# Patient Record
Sex: MALE | Race: WHITE | Employment: OTHER | URBAN - METROPOLITAN AREA
[De-identification: names, ages, dates, MRNs, and addresses within clinical notes are randomized per-mention and may not be internally consistent; named-entity substitution may affect disease eponyms.]

---

## 2019-07-04 ENCOUNTER — APPOINTMENT (OUTPATIENT)
Dept: CT IMAGING | Age: 69
End: 2019-07-04

## 2019-07-04 ENCOUNTER — HOSPITAL ENCOUNTER (EMERGENCY)
Age: 69
Discharge: HOME | End: 2019-07-05
Attending: EMERGENCY MEDICINE

## 2019-07-04 DIAGNOSIS — R42 VERTIGO: Primary | ICD-10-CM

## 2019-07-04 DIAGNOSIS — R42 DIZZINESS: ICD-10-CM

## 2019-07-04 DIAGNOSIS — R73.9 HYPERGLYCEMIA: ICD-10-CM

## 2019-07-04 LAB
A/G RATIO: 1 (ref 1.1–2.2)
ALBUMIN SERPL-MCNC: 3.7 G/DL (ref 3.4–5)
ALP BLD-CCNC: 70 U/L (ref 40–129)
ALT SERPL-CCNC: 16 U/L (ref 10–40)
ANION GAP SERPL CALCULATED.3IONS-SCNC: 15 MMOL/L (ref 3–16)
AST SERPL-CCNC: 11 U/L (ref 15–37)
BASOPHILS ABSOLUTE: 0 K/UL (ref 0–0.2)
BASOPHILS RELATIVE PERCENT: 0.3 %
BILIRUB SERPL-MCNC: 0.3 MG/DL (ref 0–1)
BUN BLDV-MCNC: 17 MG/DL (ref 7–20)
CALCIUM SERPL-MCNC: 9.1 MG/DL (ref 8.3–10.6)
CHLORIDE BLD-SCNC: 99 MMOL/L (ref 99–110)
CO2: 24 MMOL/L (ref 21–32)
CREAT SERPL-MCNC: 1.2 MG/DL (ref 0.8–1.3)
EOSINOPHILS ABSOLUTE: 0.3 K/UL (ref 0–0.6)
EOSINOPHILS RELATIVE PERCENT: 4.1 %
GFR AFRICAN AMERICAN: >60
GFR NON-AFRICAN AMERICAN: >60
GLOBULIN: 3.8 G/DL
GLUCOSE BLD-MCNC: 230 MG/DL (ref 70–99)
HCT VFR BLD CALC: 41.3 % (ref 40.5–52.5)
HEMOGLOBIN: 14 G/DL (ref 13.5–17.5)
INR BLD: 1.07 (ref 0.86–1.14)
LYMPHOCYTES ABSOLUTE: 1.6 K/UL (ref 1–5.1)
LYMPHOCYTES RELATIVE PERCENT: 20.9 %
MAGNESIUM: 1.9 MG/DL (ref 1.8–2.4)
MCH RBC QN AUTO: 30.1 PG (ref 26–34)
MCHC RBC AUTO-ENTMCNC: 34 G/DL (ref 31–36)
MCV RBC AUTO: 88.7 FL (ref 80–100)
MONOCYTES ABSOLUTE: 0.8 K/UL (ref 0–1.3)
MONOCYTES RELATIVE PERCENT: 10.2 %
NEUTROPHILS ABSOLUTE: 5 K/UL (ref 1.7–7.7)
NEUTROPHILS RELATIVE PERCENT: 64.5 %
PDW BLD-RTO: 13.2 % (ref 12.4–15.4)
PLATELET # BLD: 187 K/UL (ref 135–450)
PMV BLD AUTO: 8.4 FL (ref 5–10.5)
POTASSIUM SERPL-SCNC: 4.1 MMOL/L (ref 3.5–5.1)
PROTHROMBIN TIME: 12.2 SEC (ref 9.8–13)
RBC # BLD: 4.65 M/UL (ref 4.2–5.9)
SODIUM BLD-SCNC: 138 MMOL/L (ref 136–145)
TOTAL PROTEIN: 7.5 G/DL (ref 6.4–8.2)
TROPONIN: <0.01 NG/ML
WBC # BLD: 7.8 K/UL (ref 4–11)

## 2019-07-04 PROCEDURE — 6360000004 HC RX CONTRAST MEDICATION: Performed by: EMERGENCY MEDICINE

## 2019-07-04 PROCEDURE — 83735 ASSAY OF MAGNESIUM: CPT

## 2019-07-04 PROCEDURE — 93005 ELECTROCARDIOGRAM TRACING: CPT | Performed by: EMERGENCY MEDICINE

## 2019-07-04 PROCEDURE — 70498 CT ANGIOGRAPHY NECK: CPT

## 2019-07-04 PROCEDURE — 80053 COMPREHEN METABOLIC PANEL: CPT

## 2019-07-04 PROCEDURE — 84484 ASSAY OF TROPONIN QUANT: CPT

## 2019-07-04 PROCEDURE — 6360000002 HC RX W HCPCS: Performed by: EMERGENCY MEDICINE

## 2019-07-04 PROCEDURE — 99284 EMERGENCY DEPT VISIT MOD MDM: CPT

## 2019-07-04 PROCEDURE — 70496 CT ANGIOGRAPHY HEAD: CPT

## 2019-07-04 PROCEDURE — 85610 PROTHROMBIN TIME: CPT

## 2019-07-04 PROCEDURE — 96374 THER/PROPH/DIAG INJ IV PUSH: CPT

## 2019-07-04 PROCEDURE — 85025 COMPLETE CBC W/AUTO DIFF WBC: CPT

## 2019-07-04 PROCEDURE — 6370000000 HC RX 637 (ALT 250 FOR IP): Performed by: EMERGENCY MEDICINE

## 2019-07-04 PROCEDURE — 70450 CT HEAD/BRAIN W/O DYE: CPT

## 2019-07-04 RX ORDER — MECLIZINE HYDROCHLORIDE 25 MG/1
25 TABLET ORAL 3 TIMES DAILY PRN
Status: DISCONTINUED | OUTPATIENT
Start: 2019-07-04 | End: 2019-07-05 | Stop reason: HOSPADM

## 2019-07-04 RX ORDER — ONDANSETRON 2 MG/ML
4 INJECTION INTRAMUSCULAR; INTRAVENOUS ONCE
Status: COMPLETED | OUTPATIENT
Start: 2019-07-04 | End: 2019-07-04

## 2019-07-04 RX ADMIN — ONDANSETRON 4 MG: 2 INJECTION INTRAMUSCULAR; INTRAVENOUS at 22:44

## 2019-07-04 RX ADMIN — IOPAMIDOL 80 ML: 755 INJECTION, SOLUTION INTRAVENOUS at 23:12

## 2019-07-04 RX ADMIN — MECLIZINE HYDROCHLORIDE 25 MG: 25 TABLET ORAL at 22:44

## 2019-07-04 SDOH — HEALTH STABILITY: MENTAL HEALTH: HOW OFTEN DO YOU HAVE A DRINK CONTAINING ALCOHOL?: NEVER

## 2019-07-05 VITALS
DIASTOLIC BLOOD PRESSURE: 79 MMHG | HEIGHT: 78 IN | BODY MASS INDEX: 30.08 KG/M2 | WEIGHT: 260 LBS | TEMPERATURE: 98 F | OXYGEN SATURATION: 99 % | SYSTOLIC BLOOD PRESSURE: 135 MMHG | HEART RATE: 88 BPM | RESPIRATION RATE: 16 BRPM

## 2019-07-05 LAB
EKG ATRIAL RATE: 100 BPM
EKG DIAGNOSIS: NORMAL
EKG P AXIS: 71 DEGREES
EKG P-R INTERVAL: 178 MS
EKG Q-T INTERVAL: 380 MS
EKG QRS DURATION: 76 MS
EKG QTC CALCULATION (BAZETT): 490 MS
EKG R AXIS: 64 DEGREES
EKG T AXIS: 55 DEGREES
EKG VENTRICULAR RATE: 100 BPM

## 2019-07-05 PROCEDURE — 93010 ELECTROCARDIOGRAM REPORT: CPT | Performed by: INTERNAL MEDICINE

## 2019-07-05 RX ORDER — MECLIZINE HYDROCHLORIDE 25 MG/1
25 TABLET ORAL EVERY 6 HOURS PRN
Qty: 20 TABLET | Refills: 0 | Status: SHIPPED | OUTPATIENT
Start: 2019-07-05 | End: 2019-07-15

## 2019-07-05 RX ORDER — ONDANSETRON 4 MG/1
4 TABLET, FILM COATED ORAL EVERY 8 HOURS PRN
Qty: 15 TABLET | Refills: 0 | Status: SHIPPED | OUTPATIENT
Start: 2019-07-05 | End: 2019-07-15

## 2019-07-05 NOTE — ED PROVIDER NOTES
Food insecurity:     Worry: Not on file     Inability: Not on file    Transportation needs:     Medical: Not on file     Non-medical: Not on file   Tobacco Use    Smoking status: Former Smoker   Substance and Sexual Activity    Alcohol use: Never     Frequency: Never     Comment: rare    Drug use: Never    Sexual activity: Not on file   Lifestyle    Physical activity:     Days per week: Not on file     Minutes per session: Not on file    Stress: Not on file   Relationships    Social connections:     Talks on phone: Not on file     Gets together: Not on file     Attends Muslim service: Not on file     Active member of club or organization: Not on file     Attends meetings of clubs or organizations: Not on file     Relationship status: Not on file    Intimate partner violence:     Fear of current or ex partner: Not on file     Emotionally abused: Not on file     Physically abused: Not on file     Forced sexual activity: Not on file   Other Topics Concern    Not on file   Social History Narrative    Not on file       Nursing Notes Reviewed      ROS:  GENERAL:  No fever, no chills, no diaphoresis, no appetite changes  EYES: no eye discharge, no eye redness, no visual changes  ENT: no nasal congestion, no sore throat  CARDIAC: no chest pain, no palpitations, no leg swelling  PULM: no cough, no shortness of breath  ABD: no abdominal pain, + nausea, + vomiting, no diarrhea  : no dysuria, no hematuria, no urgency, no frequency. No flank pain  MUSCULOSKELETAL: no back pain, no arthralgias, no myalgias  NEURO: no headache, no lightheadedness, + dizziness, no numbness, no weakness, no syncope, no confusion, no speech difficulty  SKIN: no rashes, no erythema, no wounds, no ecchymosis      PHYSICAL EXAM:  GENERAL APPEARANCE: Zunilda Valera is in no acute respiratory distress. Awake and alert.   VITAL SIGNS:   ED Triage Vitals [07/04/19 2204]   Enc Vitals Group      BP (!) 147/84      Pulse 100      Resp 18 Temp 98 °F (36.7 °C)      Temp Source Oral      SpO2       Weight 260 lb (117.9 kg)      Height 6' 7\" (2.007 m)      Head Circumference       Peak Flow       Pain Score       Pain Loc       Pain Edu? Excl. in [address]? HEAD: Normocephalic, atraumatic. EYES:  Extraocular muscles are intact. Pupils equal round and reactive to light. Conjunctivas are pink. Negative scleral icterus. ENT:  Mucous membranes are moist.  Pharynx without erythema or exudates. NECK: Nontender and supple. No cervical adenopathy. CHEST:  Clear to auscultation bilaterally. No rales, rhonchi, or wheezing. HEART:  Regular rate and regular rhythm. No murmurs. Strong and equal pulses in the upper and lower extremities. ABDOMEN: Soft,  nondistended, positive bowel sounds. abdomen is nontender. No rebound. no guarding. MUSCULOSKELETAL: The calves are nontender to palpation. Active range of motion of the upper and lower extremities. No edema. NEUROLOGICAL: Awake, alert and oriented x 3. Power intact in the upper and lower extremities. Sensation is intact to light touch in the upper and lower extremities. Cranial Nerves 2-12 are intact. No truncal ataxia. No dysarthria or aphasia. Normal finger to nose. Mildly unsteady gait but able to ambulate without any assistance  NIH Stroke Scale     Interval: Baseline  Time: 12:33 AM  Person Administering Scale: BRANDY GONZALES   Administer stroke scale items in the order listed. Record performance in each category after each subscale exam. Do not go back and change scores. Follow directions provided for each exam technique. Scores should reflect what the patient does, not what the clinician thinks the patient can do. The clinician should record answers while administering the exam and work quickly. Except where indicated, the patient should not be coached (i.e., repeated requests to patient to make a special effort). 1a  Level of consciousness: 0=alert; keenly responsive   1b.  LOC questions:  0=Performs both tasks correctly   1c. LOC commands: 0=Performs both tasks correctly   2. Best Gaze: 0=normal   3. Visual: 0=No visual loss   4. Facial Palsy: 0=Normal symmetric movement   5a. Motor left arm: 0=No drift, limb holds 90 (or 45) degrees for full 10 seconds   5b. Motor right arm: 0=No drift, limb holds 90 (or 45) degrees for full 10 seconds   6a. motor left le=No drift, limb holds 90 (or 45) degrees for full 10 seconds   6b  Motor right le=No drift, limb holds 90 (or 45) degrees for full 10 seconds   7. Limb Ataxia: 1=Present in one limb   8. Sensory: 0=Normal; no sensory loss   9. Best Language:  0=No aphasia, normal   10. Dysarthria: 0=Normal   11. Extinction and Inattention: 0=No abnormality         Total:  0       DERMATOLOGIC: No petechiae, rashes, or ecchymoses. No erythema. PSYCH: normal mood and affect. Normal thought content. ED COURSE AND MEDICAL DECISION MAKING:    EKG as interpreted by myself:  normal sinus rhythm with a rate of 100  Axis is   Normal  QTc is  490ms  Intervals and Durations are unremarkable. No specific ST-T wave changes appreciated. No evidence of acute ischemia. No prior EKG    Radiology:  Films have been read by radiologist as noted in chart unless otherwise stated. Other radiologic studies (i.e. CT, MRI, ultrasounds, etc ) have been interpreted by radiologist.     [id number]   Final Result      1. Minimal atherosclerotic changes in the carotid bifurcations. No significant vascular abnormality otherwise. No significant arterial stenosis. 2. Incidental 5 mm nodule in the right upper lobe and 4 mm nodule in the left upper lobe. Following the Fleischner Society 2017 guidelines for single solid nodules <6 mm, if patient is low risk no routine follow-up is suggested unless suspicious    morphology or upper lobe location. If patient is high risk, then optional CT at 12 months is suggested.   qzxv            PROCEDURE: CT

## 2019-11-20 ENCOUNTER — HOSPITAL ENCOUNTER (EMERGENCY)
Age: 69
Discharge: HOME | DRG: 463 | End: 2019-11-20
Attending: EMERGENCY MEDICINE
Payer: MEDICAID

## 2019-11-20 ENCOUNTER — APPOINTMENT (OUTPATIENT)
Dept: CT IMAGING | Age: 69
DRG: 463 | End: 2019-11-20
Payer: MEDICAID

## 2019-11-20 ENCOUNTER — APPOINTMENT (OUTPATIENT)
Dept: GENERAL RADIOLOGY | Age: 69
DRG: 463 | End: 2019-11-20
Payer: MEDICAID

## 2019-11-20 ENCOUNTER — HOSPITAL ENCOUNTER (INPATIENT)
Age: 69
LOS: 1 days | Discharge: HOME | DRG: 463 | End: 2019-11-22
Attending: EMERGENCY MEDICINE | Admitting: INTERNAL MEDICINE
Payer: MEDICAID

## 2019-11-20 VITALS
BODY MASS INDEX: 34.65 KG/M2 | DIASTOLIC BLOOD PRESSURE: 85 MMHG | TEMPERATURE: 97.6 F | SYSTOLIC BLOOD PRESSURE: 142 MMHG | RESPIRATION RATE: 20 BRPM | WEIGHT: 270 LBS | HEART RATE: 102 BPM | HEIGHT: 74 IN | OXYGEN SATURATION: 100 %

## 2019-11-20 DIAGNOSIS — J90 BILATERAL PLEURAL EFFUSION: ICD-10-CM

## 2019-11-20 DIAGNOSIS — K59.00 CONSTIPATION, UNSPECIFIED CONSTIPATION TYPE: Primary | ICD-10-CM

## 2019-11-20 DIAGNOSIS — N30.01 ACUTE CYSTITIS WITH HEMATURIA: ICD-10-CM

## 2019-11-20 DIAGNOSIS — N13.9 URINARY OBSTRUCTION: Primary | ICD-10-CM

## 2019-11-20 DIAGNOSIS — B37.41 YEAST CYSTITIS: ICD-10-CM

## 2019-11-20 DIAGNOSIS — N21.0 URINARY BLADDER STONE: ICD-10-CM

## 2019-11-20 LAB
A/G RATIO: 1 (ref 1.1–2.2)
ALBUMIN SERPL-MCNC: 3.5 G/DL (ref 3.4–5)
ALP BLD-CCNC: 61 U/L (ref 40–129)
ALT SERPL-CCNC: 25 U/L (ref 10–40)
ANION GAP SERPL CALCULATED.3IONS-SCNC: 17 MMOL/L (ref 3–16)
AST SERPL-CCNC: 19 U/L (ref 15–37)
BACTERIA: ABNORMAL /HPF
BASOPHILS ABSOLUTE: 0 K/UL (ref 0–0.2)
BASOPHILS RELATIVE PERCENT: 0.4 %
BILIRUB SERPL-MCNC: 0.7 MG/DL (ref 0–1)
BILIRUBIN URINE: NEGATIVE
BLOOD, URINE: ABNORMAL
BUN BLDV-MCNC: 17 MG/DL (ref 7–20)
CALCIUM SERPL-MCNC: 8.8 MG/DL (ref 8.3–10.6)
CHLORIDE BLD-SCNC: 97 MMOL/L (ref 99–110)
CLARITY: ABNORMAL
CO2: 23 MMOL/L (ref 21–32)
COLOR: YELLOW
CREAT SERPL-MCNC: 1.1 MG/DL (ref 0.8–1.3)
EOSINOPHILS ABSOLUTE: 0.3 K/UL (ref 0–0.6)
EOSINOPHILS RELATIVE PERCENT: 2.5 %
EPITHELIAL CELLS, UA: ABNORMAL /HPF
GFR AFRICAN AMERICAN: >60
GFR NON-AFRICAN AMERICAN: >60
GLOBULIN: 3.4 G/DL
GLUCOSE BLD-MCNC: 244 MG/DL (ref 70–99)
GLUCOSE URINE: NEGATIVE MG/DL
HCT VFR BLD CALC: 40.2 % (ref 40.5–52.5)
HEMOGLOBIN: 13.5 G/DL (ref 13.5–17.5)
KETONES, URINE: NEGATIVE MG/DL
LEUKOCYTE ESTERASE, URINE: ABNORMAL
LYMPHOCYTES ABSOLUTE: 1.3 K/UL (ref 1–5.1)
LYMPHOCYTES RELATIVE PERCENT: 13.1 %
MCH RBC QN AUTO: 30.2 PG (ref 26–34)
MCHC RBC AUTO-ENTMCNC: 33.6 G/DL (ref 31–36)
MCV RBC AUTO: 90.1 FL (ref 80–100)
MICROSCOPIC EXAMINATION: YES
MONOCYTES ABSOLUTE: 0.8 K/UL (ref 0–1.3)
MONOCYTES RELATIVE PERCENT: 7.8 %
NEUTROPHILS ABSOLUTE: 7.8 K/UL (ref 1.7–7.7)
NEUTROPHILS RELATIVE PERCENT: 76.2 %
NITRITE, URINE: NEGATIVE
PDW BLD-RTO: 13.5 % (ref 12.4–15.4)
PH UA: 5 (ref 5–8)
PLATELET # BLD: 184 K/UL (ref 135–450)
PMV BLD AUTO: 8.7 FL (ref 5–10.5)
POTASSIUM REFLEX MAGNESIUM: 3.9 MMOL/L (ref 3.5–5.1)
PRO-BNP: 2385 PG/ML (ref 0–124)
PROTEIN UA: 30 MG/DL
RBC # BLD: 4.46 M/UL (ref 4.2–5.9)
RBC UA: ABNORMAL /HPF (ref 0–2)
SODIUM BLD-SCNC: 137 MMOL/L (ref 136–145)
SPECIFIC GRAVITY UA: >=1.03 (ref 1–1.03)
TOTAL PROTEIN: 6.9 G/DL (ref 6.4–8.2)
URINE REFLEX TO CULTURE: YES
URINE TYPE: ABNORMAL
UROBILINOGEN, URINE: 0.2 E.U./DL
WBC # BLD: 10.3 K/UL (ref 4–11)
WBC UA: >100 /HPF (ref 0–5)
YEAST: PRESENT /HPF

## 2019-11-20 PROCEDURE — 71046 X-RAY EXAM CHEST 2 VIEWS: CPT

## 2019-11-20 PROCEDURE — 85025 COMPLETE CBC W/AUTO DIFF WBC: CPT

## 2019-11-20 PROCEDURE — 81001 URINALYSIS AUTO W/SCOPE: CPT

## 2019-11-20 PROCEDURE — 87086 URINE CULTURE/COLONY COUNT: CPT

## 2019-11-20 PROCEDURE — 74177 CT ABD & PELVIS W/CONTRAST: CPT

## 2019-11-20 PROCEDURE — 83880 ASSAY OF NATRIURETIC PEPTIDE: CPT

## 2019-11-20 PROCEDURE — 99284 EMERGENCY DEPT VISIT MOD MDM: CPT

## 2019-11-20 PROCEDURE — 80053 COMPREHEN METABOLIC PANEL: CPT

## 2019-11-20 PROCEDURE — 51702 INSERT TEMP BLADDER CATH: CPT

## 2019-11-20 PROCEDURE — 36415 COLL VENOUS BLD VENIPUNCTURE: CPT

## 2019-11-20 PROCEDURE — 6360000004 HC RX CONTRAST MEDICATION: Performed by: EMERGENCY MEDICINE

## 2019-11-20 RX ORDER — MAGNESIUM CITRATE
150 SOLUTION, ORAL ORAL ONCE
Qty: 1 BOTTLE | Refills: 0 | Status: SHIPPED | OUTPATIENT
Start: 2019-11-20 | End: 2019-11-22 | Stop reason: HOSPADM

## 2019-11-20 RX ORDER — BENZONATATE 100 MG/1
100 CAPSULE ORAL 3 TIMES DAILY PRN
Qty: 30 CAPSULE | Refills: 0 | Status: SHIPPED | OUTPATIENT
Start: 2019-11-20 | End: 2019-11-27

## 2019-11-20 RX ORDER — DOCUSATE SODIUM 100 MG/1
100 CAPSULE, LIQUID FILLED ORAL 2 TIMES DAILY
Qty: 20 CAPSULE | Refills: 0 | Status: SHIPPED | OUTPATIENT
Start: 2019-11-20 | End: 2019-11-30

## 2019-11-20 RX ADMIN — IOPAMIDOL 100 ML: 755 INJECTION, SOLUTION INTRAVENOUS at 22:21

## 2019-11-21 PROBLEM — N32.0 BLADDER OBSTRUCTION: Status: ACTIVE | Noted: 2019-11-21

## 2019-11-21 LAB
ANION GAP SERPL CALCULATED.3IONS-SCNC: 14 MMOL/L (ref 3–16)
BASOPHILS ABSOLUTE: 0 K/UL (ref 0–0.2)
BASOPHILS RELATIVE PERCENT: 0.3 %
BUN BLDV-MCNC: 16 MG/DL (ref 7–20)
CALCIUM SERPL-MCNC: 8.3 MG/DL (ref 8.3–10.6)
CHLORIDE BLD-SCNC: 100 MMOL/L (ref 99–110)
CO2: 23 MMOL/L (ref 21–32)
CREAT SERPL-MCNC: 1 MG/DL (ref 0.8–1.3)
EOSINOPHILS ABSOLUTE: 0.2 K/UL (ref 0–0.6)
EOSINOPHILS RELATIVE PERCENT: 1.9 %
GFR AFRICAN AMERICAN: >60
GFR NON-AFRICAN AMERICAN: >60
GLUCOSE BLD-MCNC: 179 MG/DL (ref 70–99)
GLUCOSE BLD-MCNC: 198 MG/DL (ref 70–99)
GLUCOSE BLD-MCNC: 203 MG/DL (ref 70–99)
GLUCOSE BLD-MCNC: 222 MG/DL (ref 70–99)
GLUCOSE BLD-MCNC: 236 MG/DL (ref 70–99)
HCT VFR BLD CALC: 38.9 % (ref 40.5–52.5)
HEMOGLOBIN: 12.9 G/DL (ref 13.5–17.5)
LYMPHOCYTES ABSOLUTE: 1.1 K/UL (ref 1–5.1)
LYMPHOCYTES RELATIVE PERCENT: 12 %
MCH RBC QN AUTO: 30.7 PG (ref 26–34)
MCHC RBC AUTO-ENTMCNC: 33.2 G/DL (ref 31–36)
MCV RBC AUTO: 92.7 FL (ref 80–100)
MONOCYTES ABSOLUTE: 0.8 K/UL (ref 0–1.3)
MONOCYTES RELATIVE PERCENT: 8.9 %
NEUTROPHILS ABSOLUTE: 6.9 K/UL (ref 1.7–7.7)
NEUTROPHILS RELATIVE PERCENT: 76.9 %
PDW BLD-RTO: 13.4 % (ref 12.4–15.4)
PERFORMED ON: ABNORMAL
PLATELET # BLD: 180 K/UL (ref 135–450)
PMV BLD AUTO: 9.6 FL (ref 5–10.5)
POTASSIUM REFLEX MAGNESIUM: 4 MMOL/L (ref 3.5–5.1)
RBC # BLD: 4.19 M/UL (ref 4.2–5.9)
SODIUM BLD-SCNC: 137 MMOL/L (ref 136–145)
WBC # BLD: 9 K/UL (ref 4–11)

## 2019-11-21 PROCEDURE — 80048 BASIC METABOLIC PNL TOTAL CA: CPT

## 2019-11-21 PROCEDURE — 6360000002 HC RX W HCPCS: Performed by: EMERGENCY MEDICINE

## 2019-11-21 PROCEDURE — 6360000002 HC RX W HCPCS: Performed by: FAMILY MEDICINE

## 2019-11-21 PROCEDURE — 6370000000 HC RX 637 (ALT 250 FOR IP): Performed by: FAMILY MEDICINE

## 2019-11-21 PROCEDURE — 6360000002 HC RX W HCPCS: Performed by: INTERNAL MEDICINE

## 2019-11-21 PROCEDURE — 1200000000 HC SEMI PRIVATE

## 2019-11-21 PROCEDURE — C8923 2D TTE W OR W/O FOL W/CON,CO: HCPCS

## 2019-11-21 PROCEDURE — 2580000003 HC RX 258: Performed by: INTERNAL MEDICINE

## 2019-11-21 PROCEDURE — 2580000003 HC RX 258: Performed by: EMERGENCY MEDICINE

## 2019-11-21 PROCEDURE — 96374 THER/PROPH/DIAG INJ IV PUSH: CPT

## 2019-11-21 PROCEDURE — 2500000003 HC RX 250 WO HCPCS: Performed by: INTERNAL MEDICINE

## 2019-11-21 PROCEDURE — 36415 COLL VENOUS BLD VENIPUNCTURE: CPT

## 2019-11-21 PROCEDURE — 6360000004 HC RX CONTRAST MEDICATION: Performed by: INTERNAL MEDICINE

## 2019-11-21 PROCEDURE — 6370000000 HC RX 637 (ALT 250 FOR IP): Performed by: EMERGENCY MEDICINE

## 2019-11-21 PROCEDURE — 85025 COMPLETE CBC W/AUTO DIFF WBC: CPT

## 2019-11-21 RX ORDER — SODIUM CHLORIDE 0.9 % (FLUSH) 0.9 %
10 SYRINGE (ML) INJECTION PRN
Status: DISCONTINUED | OUTPATIENT
Start: 2019-11-21 | End: 2019-11-22 | Stop reason: HOSPADM

## 2019-11-21 RX ORDER — INSULIN LISPRO 100 [IU]/ML
0-6 INJECTION, SOLUTION INTRAVENOUS; SUBCUTANEOUS NIGHTLY
Status: DISCONTINUED | OUTPATIENT
Start: 2019-11-21 | End: 2019-11-22 | Stop reason: HOSPADM

## 2019-11-21 RX ORDER — ACETAMINOPHEN 325 MG/1
650 TABLET ORAL EVERY 4 HOURS PRN
Status: DISCONTINUED | OUTPATIENT
Start: 2019-11-21 | End: 2019-11-22 | Stop reason: HOSPADM

## 2019-11-21 RX ORDER — SODIUM CHLORIDE 0.9 % (FLUSH) 0.9 %
10 SYRINGE (ML) INJECTION EVERY 12 HOURS SCHEDULED
Status: DISCONTINUED | OUTPATIENT
Start: 2019-11-21 | End: 2019-11-22 | Stop reason: HOSPADM

## 2019-11-21 RX ORDER — FLUCONAZOLE 150 MG/1
150 TABLET ORAL ONCE
Status: COMPLETED | OUTPATIENT
Start: 2019-11-21 | End: 2019-11-21

## 2019-11-21 RX ORDER — ONDANSETRON 2 MG/ML
4 INJECTION INTRAMUSCULAR; INTRAVENOUS EVERY 4 HOURS PRN
Status: DISCONTINUED | OUTPATIENT
Start: 2019-11-21 | End: 2019-11-22 | Stop reason: HOSPADM

## 2019-11-21 RX ORDER — SODIUM CHLORIDE 9 MG/ML
INJECTION, SOLUTION INTRAVENOUS CONTINUOUS
Status: DISCONTINUED | OUTPATIENT
Start: 2019-11-21 | End: 2019-11-22 | Stop reason: HOSPADM

## 2019-11-21 RX ORDER — INSULIN LISPRO 100 [IU]/ML
0-12 INJECTION, SOLUTION INTRAVENOUS; SUBCUTANEOUS
Status: DISCONTINUED | OUTPATIENT
Start: 2019-11-21 | End: 2019-11-22 | Stop reason: HOSPADM

## 2019-11-21 RX ORDER — FUROSEMIDE 10 MG/ML
20 INJECTION INTRAMUSCULAR; INTRAVENOUS ONCE
Status: COMPLETED | OUTPATIENT
Start: 2019-11-21 | End: 2019-11-21

## 2019-11-21 RX ADMIN — ENOXAPARIN SODIUM 40 MG: 40 INJECTION SUBCUTANEOUS at 10:25

## 2019-11-21 RX ADMIN — DEXTROSE MONOHYDRATE 1 G: 5 INJECTION INTRAVENOUS at 00:02

## 2019-11-21 RX ADMIN — Medication 10 ML: at 20:24

## 2019-11-21 RX ADMIN — FLUCONAZOLE 150 MG: 150 TABLET ORAL at 00:14

## 2019-11-21 RX ADMIN — FAMOTIDINE 20 MG: 10 INJECTION INTRAVENOUS at 10:25

## 2019-11-21 RX ADMIN — INSULIN GLARGINE 15 UNITS: 100 INJECTION, SOLUTION SUBCUTANEOUS at 20:21

## 2019-11-21 RX ADMIN — INSULIN LISPRO 1 UNITS: 100 INJECTION, SOLUTION INTRAVENOUS; SUBCUTANEOUS at 20:21

## 2019-11-21 RX ADMIN — PERFLUTREN 2.2 MG: 6.52 INJECTION, SUSPENSION INTRAVENOUS at 14:17

## 2019-11-21 RX ADMIN — FAMOTIDINE 20 MG: 10 INJECTION INTRAVENOUS at 20:21

## 2019-11-21 RX ADMIN — INSULIN LISPRO 4 UNITS: 100 INJECTION, SOLUTION INTRAVENOUS; SUBCUTANEOUS at 16:27

## 2019-11-21 RX ADMIN — FUROSEMIDE 20 MG: 10 INJECTION, SOLUTION INTRAMUSCULAR; INTRAVENOUS at 15:38

## 2019-11-21 RX ADMIN — SODIUM CHLORIDE: 9 INJECTION, SOLUTION INTRAVENOUS at 03:21

## 2019-11-21 RX ADMIN — SODIUM CHLORIDE: 9 INJECTION, SOLUTION INTRAVENOUS at 16:26

## 2019-11-21 ASSESSMENT — ENCOUNTER SYMPTOMS
NAUSEA: 0
VOMITING: 0
VOICE CHANGE: 0
FACIAL SWELLING: 0
COLOR CHANGE: 0
BACK PAIN: 0
BLOOD IN STOOL: 0
TROUBLE SWALLOWING: 0
CONSTIPATION: 1
ABDOMINAL PAIN: 0
WHEEZING: 0
PHOTOPHOBIA: 0
STRIDOR: 0
SHORTNESS OF BREATH: 1

## 2019-11-22 VITALS
TEMPERATURE: 98.1 F | SYSTOLIC BLOOD PRESSURE: 126 MMHG | RESPIRATION RATE: 20 BRPM | WEIGHT: 270 LBS | HEIGHT: 74 IN | BODY MASS INDEX: 34.65 KG/M2 | OXYGEN SATURATION: 98 % | HEART RATE: 134 BPM | DIASTOLIC BLOOD PRESSURE: 85 MMHG

## 2019-11-22 LAB
ANION GAP SERPL CALCULATED.3IONS-SCNC: 13 MMOL/L (ref 3–16)
BASOPHILS ABSOLUTE: 0 K/UL (ref 0–0.2)
BASOPHILS RELATIVE PERCENT: 0.4 %
BUN BLDV-MCNC: 13 MG/DL (ref 7–20)
CALCIUM SERPL-MCNC: 8.5 MG/DL (ref 8.3–10.6)
CHLORIDE BLD-SCNC: 106 MMOL/L (ref 99–110)
CO2: 26 MMOL/L (ref 21–32)
CREAT SERPL-MCNC: 1.1 MG/DL (ref 0.8–1.3)
EOSINOPHILS ABSOLUTE: 0.2 K/UL (ref 0–0.6)
EOSINOPHILS RELATIVE PERCENT: 3.2 %
GFR AFRICAN AMERICAN: >60
GFR NON-AFRICAN AMERICAN: >60
GLUCOSE BLD-MCNC: 177 MG/DL (ref 70–99)
GLUCOSE BLD-MCNC: 181 MG/DL (ref 70–99)
HCT VFR BLD CALC: 38.3 % (ref 40.5–52.5)
HEMOGLOBIN: 12.7 G/DL (ref 13.5–17.5)
LYMPHOCYTES ABSOLUTE: 1.2 K/UL (ref 1–5.1)
LYMPHOCYTES RELATIVE PERCENT: 15.3 %
MCH RBC QN AUTO: 31.2 PG (ref 26–34)
MCHC RBC AUTO-ENTMCNC: 33.2 G/DL (ref 31–36)
MCV RBC AUTO: 93.8 FL (ref 80–100)
MONOCYTES ABSOLUTE: 0.8 K/UL (ref 0–1.3)
MONOCYTES RELATIVE PERCENT: 9.9 %
NEUTROPHILS ABSOLUTE: 5.6 K/UL (ref 1.7–7.7)
NEUTROPHILS RELATIVE PERCENT: 71.2 %
PDW BLD-RTO: 13.6 % (ref 12.4–15.4)
PERFORMED ON: ABNORMAL
PLATELET # BLD: 173 K/UL (ref 135–450)
PMV BLD AUTO: 9 FL (ref 5–10.5)
POTASSIUM REFLEX MAGNESIUM: 4.5 MMOL/L (ref 3.5–5.1)
RBC # BLD: 4.08 M/UL (ref 4.2–5.9)
SODIUM BLD-SCNC: 145 MMOL/L (ref 136–145)
URINE CULTURE, ROUTINE: NORMAL
WBC # BLD: 7.8 K/UL (ref 4–11)

## 2019-11-22 PROCEDURE — 36415 COLL VENOUS BLD VENIPUNCTURE: CPT

## 2019-11-22 PROCEDURE — 2500000003 HC RX 250 WO HCPCS: Performed by: INTERNAL MEDICINE

## 2019-11-22 PROCEDURE — 80048 BASIC METABOLIC PNL TOTAL CA: CPT

## 2019-11-22 PROCEDURE — 2580000003 HC RX 258: Performed by: INTERNAL MEDICINE

## 2019-11-22 PROCEDURE — 85025 COMPLETE CBC W/AUTO DIFF WBC: CPT

## 2019-11-22 PROCEDURE — 6360000002 HC RX W HCPCS: Performed by: INTERNAL MEDICINE

## 2019-11-22 RX ORDER — CIPROFLOXACIN 500 MG/1
500 TABLET, FILM COATED ORAL 2 TIMES DAILY
Qty: 14 TABLET | Refills: 0 | Status: SHIPPED | OUTPATIENT
Start: 2019-11-22 | End: 2019-11-29

## 2019-11-22 RX ADMIN — ENOXAPARIN SODIUM 40 MG: 40 INJECTION SUBCUTANEOUS at 08:37

## 2019-11-22 RX ADMIN — FAMOTIDINE 20 MG: 10 INJECTION INTRAVENOUS at 08:37

## 2019-11-22 RX ADMIN — INSULIN LISPRO 2 UNITS: 100 INJECTION, SOLUTION INTRAVENOUS; SUBCUTANEOUS at 08:35

## 2019-11-22 RX ADMIN — INSULIN LISPRO 2 UNITS: 100 INJECTION, SOLUTION INTRAVENOUS; SUBCUTANEOUS at 12:42

## 2019-11-22 RX ADMIN — CEFTRIAXONE 1 G: 1 INJECTION, POWDER, FOR SOLUTION INTRAMUSCULAR; INTRAVENOUS at 00:59

## 2019-11-22 ASSESSMENT — PAIN SCALES - GENERAL: PAINLEVEL_OUTOF10: 0

## 2019-12-06 ENCOUNTER — OFFICE VISIT (OUTPATIENT)
Dept: CARDIOLOGY CLINIC | Age: 69
End: 2019-12-06
Payer: MEDICAID

## 2019-12-06 ENCOUNTER — TELEPHONE (OUTPATIENT)
Dept: CARDIOLOGY CLINIC | Age: 69
End: 2019-12-06

## 2019-12-06 VITALS
WEIGHT: 260 LBS | DIASTOLIC BLOOD PRESSURE: 70 MMHG | SYSTOLIC BLOOD PRESSURE: 110 MMHG | HEART RATE: 120 BPM | BODY MASS INDEX: 33.38 KG/M2

## 2019-12-06 DIAGNOSIS — I48.91 NEW ONSET A-FIB (HCC): ICD-10-CM

## 2019-12-06 DIAGNOSIS — I42.0 DILATED CARDIOMYOPATHY (HCC): ICD-10-CM

## 2019-12-06 DIAGNOSIS — R94.31 ABNORMAL EKG: ICD-10-CM

## 2019-12-06 DIAGNOSIS — Z01.818 PRE-OP EVALUATION: Primary | ICD-10-CM

## 2019-12-06 PROCEDURE — G8427 DOCREV CUR MEDS BY ELIG CLIN: HCPCS | Performed by: INTERNAL MEDICINE

## 2019-12-06 PROCEDURE — 99204 OFFICE O/P NEW MOD 45 MIN: CPT | Performed by: INTERNAL MEDICINE

## 2019-12-06 PROCEDURE — 1111F DSCHRG MED/CURRENT MED MERGE: CPT | Performed by: INTERNAL MEDICINE

## 2019-12-06 PROCEDURE — G8417 CALC BMI ABV UP PARAM F/U: HCPCS | Performed by: INTERNAL MEDICINE

## 2019-12-06 PROCEDURE — 3017F COLORECTAL CA SCREEN DOC REV: CPT | Performed by: INTERNAL MEDICINE

## 2019-12-06 PROCEDURE — 93000 ELECTROCARDIOGRAM COMPLETE: CPT | Performed by: INTERNAL MEDICINE

## 2019-12-06 PROCEDURE — 1123F ACP DISCUSS/DSCN MKR DOCD: CPT | Performed by: INTERNAL MEDICINE

## 2019-12-06 PROCEDURE — G8484 FLU IMMUNIZE NO ADMIN: HCPCS | Performed by: INTERNAL MEDICINE

## 2019-12-06 PROCEDURE — 4040F PNEUMOC VAC/ADMIN/RCVD: CPT | Performed by: INTERNAL MEDICINE

## 2019-12-06 PROCEDURE — 1036F TOBACCO NON-USER: CPT | Performed by: INTERNAL MEDICINE

## 2019-12-06 ASSESSMENT — ENCOUNTER SYMPTOMS
ABDOMINAL DISTENTION: 0
CHEST TIGHTNESS: 0
CHOKING: 0
SHORTNESS OF BREATH: 0
COUGH: 0
APNEA: 0

## 2019-12-10 ENCOUNTER — HOSPITAL ENCOUNTER (OUTPATIENT)
Dept: CARDIAC CATH/INVASIVE PROCEDURES | Age: 69
Setting detail: OBSERVATION
Discharge: HOME | End: 2019-12-11
Attending: INTERNAL MEDICINE | Admitting: INTERNAL MEDICINE
Payer: MEDICAID

## 2019-12-10 DIAGNOSIS — Z98.890 S/P CORONARY ANGIOGRAM: ICD-10-CM

## 2019-12-10 DIAGNOSIS — I48.91 NEW ONSET A-FIB (HCC): ICD-10-CM

## 2019-12-10 DIAGNOSIS — I42.0 DILATED CARDIOMYOPATHY (HCC): Primary | ICD-10-CM

## 2019-12-10 DIAGNOSIS — Z01.818 PRE-OP EVALUATION: ICD-10-CM

## 2019-12-10 DIAGNOSIS — N32.0 BLADDER OBSTRUCTION: ICD-10-CM

## 2019-12-10 LAB
ANION GAP SERPL CALCULATED.3IONS-SCNC: 15 MMOL/L (ref 3–16)
BUN BLDV-MCNC: 21 MG/DL (ref 7–20)
CALCIUM SERPL-MCNC: 8.7 MG/DL (ref 8.3–10.6)
CHLORIDE BLD-SCNC: 98 MMOL/L (ref 99–110)
CO2: 23 MMOL/L (ref 21–32)
CREAT SERPL-MCNC: 1.2 MG/DL (ref 0.8–1.3)
EKG ATRIAL RATE: 85 BPM
EKG DIAGNOSIS: NORMAL
EKG P AXIS: 63 DEGREES
EKG P-R INTERVAL: 178 MS
EKG Q-T INTERVAL: 418 MS
EKG QRS DURATION: 82 MS
EKG QTC CALCULATION (BAZETT): 497 MS
EKG R AXIS: 65 DEGREES
EKG T AXIS: 76 DEGREES
EKG VENTRICULAR RATE: 85 BPM
GFR AFRICAN AMERICAN: >60
GFR NON-AFRICAN AMERICAN: >60
GLUCOSE BLD-MCNC: 232 MG/DL (ref 70–99)
HCT VFR BLD CALC: 45.9 % (ref 40.5–52.5)
HEMOGLOBIN: 15.1 G/DL (ref 13.5–17.5)
INR BLD: 1.12 (ref 0.86–1.14)
LEFT VENTRICULAR EJECTION FRACTION MODE: NORMAL
LV EF: 25 %
LV EF: 30 %
MCH RBC QN AUTO: 29.9 PG (ref 26–34)
MCHC RBC AUTO-ENTMCNC: 32.9 G/DL (ref 31–36)
MCV RBC AUTO: 90.9 FL (ref 80–100)
PDW BLD-RTO: 13.6 % (ref 12.4–15.4)
PLATELET # BLD: 242 K/UL (ref 135–450)
PMV BLD AUTO: 9.2 FL (ref 5–10.5)
POTASSIUM SERPL-SCNC: 4.1 MMOL/L (ref 3.5–5.1)
PROTHROMBIN TIME: 13 SEC (ref 10–13.2)
RBC # BLD: 5.05 M/UL (ref 4.2–5.9)
SODIUM BLD-SCNC: 136 MMOL/L (ref 136–145)
WBC # BLD: 8.5 K/UL (ref 4–11)

## 2019-12-10 PROCEDURE — 93458 L HRT ARTERY/VENTRICLE ANGIO: CPT | Performed by: INTERNAL MEDICINE

## 2019-12-10 PROCEDURE — 93458 L HRT ARTERY/VENTRICLE ANGIO: CPT

## 2019-12-10 PROCEDURE — C1760 CLOSURE DEV, VASC: HCPCS

## 2019-12-10 PROCEDURE — 99152 MOD SED SAME PHYS/QHP 5/>YRS: CPT

## 2019-12-10 PROCEDURE — 93010 ELECTROCARDIOGRAM REPORT: CPT | Performed by: INTERNAL MEDICINE

## 2019-12-10 PROCEDURE — 93005 ELECTROCARDIOGRAM TRACING: CPT | Performed by: INTERNAL MEDICINE

## 2019-12-10 PROCEDURE — 85610 PROTHROMBIN TIME: CPT

## 2019-12-10 PROCEDURE — C1769 GUIDE WIRE: HCPCS

## 2019-12-10 PROCEDURE — 2709999900 HC NON-CHARGEABLE SUPPLY

## 2019-12-10 PROCEDURE — C1894 INTRO/SHEATH, NON-LASER: HCPCS

## 2019-12-10 PROCEDURE — G0378 HOSPITAL OBSERVATION PER HR: HCPCS

## 2019-12-10 PROCEDURE — 80048 BASIC METABOLIC PNL TOTAL CA: CPT

## 2019-12-10 PROCEDURE — 85027 COMPLETE CBC AUTOMATED: CPT

## 2019-12-10 PROCEDURE — 6360000004 HC RX CONTRAST MEDICATION: Performed by: INTERNAL MEDICINE

## 2019-12-10 PROCEDURE — 2500000003 HC RX 250 WO HCPCS

## 2019-12-10 PROCEDURE — 6360000002 HC RX W HCPCS

## 2019-12-10 RX ORDER — SODIUM CHLORIDE 9 MG/ML
INJECTION, SOLUTION INTRAVENOUS CONTINUOUS
Status: ACTIVE | OUTPATIENT
Start: 2019-12-10 | End: 2019-12-10

## 2019-12-10 RX ORDER — ACETAMINOPHEN 325 MG/1
650 TABLET ORAL EVERY 4 HOURS PRN
Status: DISCONTINUED | OUTPATIENT
Start: 2019-12-10 | End: 2019-12-11 | Stop reason: HOSPADM

## 2019-12-10 RX ADMIN — IOPAMIDOL 150 ML: 755 INJECTION, SOLUTION INTRAVENOUS at 10:59

## 2019-12-10 ASSESSMENT — PAIN SCALES - GENERAL: PAINLEVEL_OUTOF10: 0

## 2019-12-11 VITALS
RESPIRATION RATE: 18 BRPM | WEIGHT: 250.22 LBS | SYSTOLIC BLOOD PRESSURE: 140 MMHG | HEIGHT: 74 IN | HEART RATE: 99 BPM | TEMPERATURE: 98.3 F | BODY MASS INDEX: 32.11 KG/M2 | DIASTOLIC BLOOD PRESSURE: 91 MMHG | OXYGEN SATURATION: 98 %

## 2019-12-11 LAB
CHOLESTEROL, TOTAL: 160 MG/DL (ref 0–199)
HDLC SERPL-MCNC: 33 MG/DL (ref 40–60)
LDL CHOLESTEROL CALCULATED: 83 MG/DL
TRIGL SERPL-MCNC: 220 MG/DL (ref 0–150)
VLDLC SERPL CALC-MCNC: 44 MG/DL

## 2019-12-11 PROCEDURE — 6370000000 HC RX 637 (ALT 250 FOR IP): Performed by: NURSE PRACTITIONER

## 2019-12-11 PROCEDURE — 80061 LIPID PANEL: CPT

## 2019-12-11 PROCEDURE — G0378 HOSPITAL OBSERVATION PER HR: HCPCS

## 2019-12-11 PROCEDURE — 99217 PR OBSERVATION CARE DISCHARGE MANAGEMENT: CPT | Performed by: NURSE PRACTITIONER

## 2019-12-11 PROCEDURE — 36415 COLL VENOUS BLD VENIPUNCTURE: CPT

## 2019-12-11 RX ORDER — ASPIRIN 81 MG/1
81 TABLET, CHEWABLE ORAL DAILY
Status: DISCONTINUED | OUTPATIENT
Start: 2019-12-11 | End: 2019-12-11 | Stop reason: HOSPADM

## 2019-12-11 RX ORDER — ATORVASTATIN CALCIUM 40 MG/1
40 TABLET, FILM COATED ORAL NIGHTLY
Qty: 30 TABLET | Refills: 0 | Status: SHIPPED
Start: 2019-12-11 | End: 2020-02-12 | Stop reason: ALTCHOICE

## 2019-12-11 RX ORDER — ASPIRIN 81 MG/1
81 TABLET, CHEWABLE ORAL DAILY
Qty: 30 TABLET | Refills: 0 | Status: SHIPPED | OUTPATIENT
Start: 2019-12-11 | End: 2019-12-11

## 2019-12-11 RX ORDER — ATORVASTATIN CALCIUM 40 MG/1
40 TABLET, FILM COATED ORAL NIGHTLY
Status: DISCONTINUED | OUTPATIENT
Start: 2019-12-11 | End: 2019-12-11 | Stop reason: HOSPADM

## 2019-12-11 RX ORDER — ATORVASTATIN CALCIUM 40 MG/1
40 TABLET, FILM COATED ORAL NIGHTLY
Qty: 30 TABLET | Refills: 0 | Status: SHIPPED | OUTPATIENT
Start: 2019-12-11 | End: 2019-12-11

## 2019-12-11 RX ORDER — ASPIRIN 81 MG/1
81 TABLET, CHEWABLE ORAL DAILY
Qty: 30 TABLET | Refills: 0 | Status: SHIPPED | OUTPATIENT
Start: 2019-12-11 | End: 2020-01-03 | Stop reason: SDUPTHER

## 2019-12-11 RX ORDER — FUROSEMIDE 20 MG/1
20 TABLET ORAL DAILY
Qty: 30 TABLET | Refills: 0 | Status: SHIPPED | OUTPATIENT
Start: 2019-12-11 | End: 2019-12-11 | Stop reason: SDUPTHER

## 2019-12-11 RX ORDER — FUROSEMIDE 20 MG/1
20 TABLET ORAL DAILY
Qty: 30 TABLET | Refills: 0 | Status: SHIPPED | OUTPATIENT
Start: 2019-12-11 | End: 2020-02-12 | Stop reason: ALTCHOICE

## 2019-12-11 RX ADMIN — ASPIRIN 81 MG 81 MG: 81 TABLET ORAL at 10:08

## 2019-12-11 RX ADMIN — NYSTATIN 500000 UNITS: 100000 SUSPENSION ORAL at 01:02

## 2019-12-11 RX ADMIN — APIXABAN 5 MG: 5 TABLET, FILM COATED ORAL at 10:07

## 2019-12-11 RX ADMIN — METOPROLOL TARTRATE 25 MG: 25 TABLET ORAL at 10:07

## 2019-12-11 ASSESSMENT — PAIN SCALES - GENERAL
PAINLEVEL_OUTOF10: 0

## 2019-12-13 ENCOUNTER — TELEPHONE (OUTPATIENT)
Dept: CARDIOTHORACIC SURGERY | Age: 69
End: 2019-12-13

## 2019-12-18 ENCOUNTER — OFFICE VISIT (OUTPATIENT)
Dept: CARDIOLOGY CLINIC | Age: 69
End: 2019-12-18
Payer: MEDICAID

## 2019-12-18 VITALS
HEART RATE: 84 BPM | WEIGHT: 258.8 LBS | DIASTOLIC BLOOD PRESSURE: 70 MMHG | SYSTOLIC BLOOD PRESSURE: 110 MMHG | BODY MASS INDEX: 33.23 KG/M2

## 2019-12-18 DIAGNOSIS — I25.10 CORONARY ARTERY DISEASE INVOLVING NATIVE CORONARY ARTERY OF NATIVE HEART WITHOUT ANGINA PECTORIS: ICD-10-CM

## 2019-12-18 DIAGNOSIS — I48.91 NEW ONSET A-FIB (HCC): ICD-10-CM

## 2019-12-18 DIAGNOSIS — I42.0 DILATED CARDIOMYOPATHY (HCC): Primary | ICD-10-CM

## 2019-12-18 DIAGNOSIS — Z98.890 S/P CORONARY ANGIOGRAM: ICD-10-CM

## 2019-12-18 PROCEDURE — G8417 CALC BMI ABV UP PARAM F/U: HCPCS | Performed by: NURSE PRACTITIONER

## 2019-12-18 PROCEDURE — G8427 DOCREV CUR MEDS BY ELIG CLIN: HCPCS | Performed by: NURSE PRACTITIONER

## 2019-12-18 PROCEDURE — 99214 OFFICE O/P EST MOD 30 MIN: CPT | Performed by: NURSE PRACTITIONER

## 2019-12-18 PROCEDURE — 1036F TOBACCO NON-USER: CPT | Performed by: NURSE PRACTITIONER

## 2019-12-18 PROCEDURE — 3017F COLORECTAL CA SCREEN DOC REV: CPT | Performed by: NURSE PRACTITIONER

## 2019-12-18 PROCEDURE — 1111F DSCHRG MED/CURRENT MED MERGE: CPT | Performed by: NURSE PRACTITIONER

## 2019-12-18 PROCEDURE — G8598 ASA/ANTIPLAT THER USED: HCPCS | Performed by: NURSE PRACTITIONER

## 2019-12-18 PROCEDURE — G8484 FLU IMMUNIZE NO ADMIN: HCPCS | Performed by: NURSE PRACTITIONER

## 2019-12-18 PROCEDURE — 4040F PNEUMOC VAC/ADMIN/RCVD: CPT | Performed by: NURSE PRACTITIONER

## 2019-12-18 PROCEDURE — 1123F ACP DISCUSS/DSCN MKR DOCD: CPT | Performed by: NURSE PRACTITIONER

## 2019-12-18 RX ORDER — CIPROFLOXACIN 500 MG/1
500 TABLET, FILM COATED ORAL 2 TIMES DAILY
COMMUNITY
End: 2020-02-12 | Stop reason: ALTCHOICE

## 2019-12-23 ENCOUNTER — TELEPHONE (OUTPATIENT)
Dept: CARDIOTHORACIC SURGERY | Age: 69
End: 2019-12-23

## 2020-01-03 RX ORDER — ASPIRIN 81 MG/1
81 TABLET, CHEWABLE ORAL DAILY
Qty: 30 TABLET | Refills: 3 | Status: SHIPPED | OUTPATIENT
Start: 2020-01-03 | End: 2020-03-24 | Stop reason: SDUPTHER

## 2020-01-09 PROBLEM — Z01.818 PRE-OP EVALUATION: Status: RESOLVED | Noted: 2019-12-10 | Resolved: 2020-01-09

## 2020-01-28 ENCOUNTER — OFFICE VISIT (OUTPATIENT)
Dept: CARDIOLOGY CLINIC | Age: 70
End: 2020-01-28
Payer: MEDICAID

## 2020-01-28 VITALS
HEART RATE: 96 BPM | WEIGHT: 268 LBS | DIASTOLIC BLOOD PRESSURE: 70 MMHG | SYSTOLIC BLOOD PRESSURE: 130 MMHG | BODY MASS INDEX: 34.41 KG/M2

## 2020-01-28 PROCEDURE — G8484 FLU IMMUNIZE NO ADMIN: HCPCS | Performed by: INTERNAL MEDICINE

## 2020-01-28 PROCEDURE — G8417 CALC BMI ABV UP PARAM F/U: HCPCS | Performed by: INTERNAL MEDICINE

## 2020-01-28 PROCEDURE — 99214 OFFICE O/P EST MOD 30 MIN: CPT | Performed by: INTERNAL MEDICINE

## 2020-01-28 PROCEDURE — 4040F PNEUMOC VAC/ADMIN/RCVD: CPT | Performed by: INTERNAL MEDICINE

## 2020-01-28 PROCEDURE — 3017F COLORECTAL CA SCREEN DOC REV: CPT | Performed by: INTERNAL MEDICINE

## 2020-01-28 PROCEDURE — G8427 DOCREV CUR MEDS BY ELIG CLIN: HCPCS | Performed by: INTERNAL MEDICINE

## 2020-01-28 PROCEDURE — 1036F TOBACCO NON-USER: CPT | Performed by: INTERNAL MEDICINE

## 2020-01-28 PROCEDURE — 1123F ACP DISCUSS/DSCN MKR DOCD: CPT | Performed by: INTERNAL MEDICINE

## 2020-01-28 ASSESSMENT — ENCOUNTER SYMPTOMS
APNEA: 0
COUGH: 0
CHOKING: 0
SHORTNESS OF BREATH: 0
CHEST TIGHTNESS: 0
ABDOMINAL DISTENTION: 0

## 2020-01-28 NOTE — PROGRESS NOTES
of Systems   Constitutional: Negative for activity change and fatigue. Respiratory: Negative for apnea, cough, choking, chest tightness and shortness of breath. Cardiovascular: Negative for chest pain, palpitations and leg swelling. No PND or orthopnea. No tachycardia. Gastrointestinal: Negative for abdominal distention. Musculoskeletal: Negative for myalgias. Neurological: Negative for dizziness, syncope and light-headedness. Psychiatric/Behavioral: Negative for agitation, behavioral problems and confusion. All other systems reviewed and are negative. Objective:   Physical Exam  Constitutional:       General: He is not in acute distress. Appearance: Normal appearance. He is well-developed. HENT:      Head: Normocephalic. Right Ear: External ear normal.      Left Ear: External ear normal.   Neck:      Musculoskeletal: Normal range of motion and neck supple. Vascular: No JVD. Cardiovascular:      Rate and Rhythm: Tachycardia present. Rhythm irregular. Heart sounds: Normal heart sounds. No gallop. Pulmonary:      Effort: Pulmonary effort is normal. No respiratory distress. Breath sounds: Normal breath sounds. No wheezing or rales. Abdominal:      General: Bowel sounds are normal.      Palpations: Abdomen is soft. Tenderness: There is no abdominal tenderness. Musculoskeletal: Normal range of motion. General: Swelling present. Comments: Tr-+ ankle edema. Skin:     General: Skin is warm and dry. Neurological:      Mental Status: He is oriented to person, place, and time. Psychiatric:         Mood and Affect: Mood normal.         Behavior: Behavior normal.         Assessment:       Diagnosis Orders   1. CAD in native artery     2. Ischemic cardiomyopathy     3. Paroxysmal atrial fibrillation (HCC)             Plan:      He is relatively asx. Some LEE. Had afib but NSR at cath. .  Also with significant LV dysfunction.  On ASA, Lopressor 25 mg bid. To restart Eliquis bid since did not. To see CVTS. 6-8 wks.         Lorene Ryan MD

## 2020-02-12 ENCOUNTER — OFFICE VISIT (OUTPATIENT)
Dept: CARDIOTHORACIC SURGERY | Age: 70
End: 2020-02-12
Payer: MEDICAID

## 2020-02-12 VITALS
SYSTOLIC BLOOD PRESSURE: 118 MMHG | HEIGHT: 70 IN | TEMPERATURE: 98.1 F | BODY MASS INDEX: 38.08 KG/M2 | OXYGEN SATURATION: 100 % | HEART RATE: 87 BPM | DIASTOLIC BLOOD PRESSURE: 76 MMHG | WEIGHT: 266 LBS

## 2020-02-12 PROCEDURE — 4040F PNEUMOC VAC/ADMIN/RCVD: CPT | Performed by: THORACIC SURGERY (CARDIOTHORACIC VASCULAR SURGERY)

## 2020-02-12 PROCEDURE — G8484 FLU IMMUNIZE NO ADMIN: HCPCS | Performed by: THORACIC SURGERY (CARDIOTHORACIC VASCULAR SURGERY)

## 2020-02-12 PROCEDURE — G8417 CALC BMI ABV UP PARAM F/U: HCPCS | Performed by: THORACIC SURGERY (CARDIOTHORACIC VASCULAR SURGERY)

## 2020-02-12 PROCEDURE — 3017F COLORECTAL CA SCREEN DOC REV: CPT | Performed by: THORACIC SURGERY (CARDIOTHORACIC VASCULAR SURGERY)

## 2020-02-12 PROCEDURE — 99202 OFFICE O/P NEW SF 15 MIN: CPT | Performed by: THORACIC SURGERY (CARDIOTHORACIC VASCULAR SURGERY)

## 2020-02-12 PROCEDURE — 1123F ACP DISCUSS/DSCN MKR DOCD: CPT | Performed by: THORACIC SURGERY (CARDIOTHORACIC VASCULAR SURGERY)

## 2020-02-12 PROCEDURE — G8427 DOCREV CUR MEDS BY ELIG CLIN: HCPCS | Performed by: THORACIC SURGERY (CARDIOTHORACIC VASCULAR SURGERY)

## 2020-02-12 PROCEDURE — 1036F TOBACCO NON-USER: CPT | Performed by: THORACIC SURGERY (CARDIOTHORACIC VASCULAR SURGERY)

## 2020-02-12 SDOH — HEALTH STABILITY: MENTAL HEALTH: HOW OFTEN DO YOU HAVE A DRINK CONTAINING ALCOHOL?: NOT ASKED

## 2020-02-12 ASSESSMENT — ENCOUNTER SYMPTOMS
SHORTNESS OF BREATH: 1
EYE PAIN: 0
DIARRHEA: 0
BACK PAIN: 0
ABDOMINAL PAIN: 0
APNEA: 0
EYE REDNESS: 0
CONSTIPATION: 0
BLOOD IN STOOL: 0
CHEST TIGHTNESS: 0
COUGH: 0
TROUBLE SWALLOWING: 0

## 2020-02-13 ENCOUNTER — TELEPHONE (OUTPATIENT)
Dept: CARDIOTHORACIC SURGERY | Age: 70
End: 2020-02-13

## 2020-02-17 NOTE — PROGRESS NOTES
[facility] / [facility] ([facility]) [address], [facility] [place] [address]    Acknowledgment of Informed Consent for Surgical or Medical Procedure and Sedation  I agree to allow doctor(S) Amina Gage and his/her associates or assistants, including residents and/or other qualified medical practitioner to perform the following medical treatment or procedure and to administer or direct the administration of sedation as necessary:  Procedure(s): CORONARY ARTERY BYPASS GRAFT X 3 INTERNAL MAMMARY AND SAPHENOUS VEIN GRAFT, TCP-BP WITH PULMONARY VEIN ISOLATION AND LEFT ARTRIAL APPENDAGE CLIP     My doctor has explained the following regarding the proposed procedure:   the explanation of the procedure   the benefits of the procedure   the potential problems that might occur during recuperation   the risks and side effects of the procedure which could include but are not limited to severe blood loss, infection, stroke or death   the benefits, risks and side effect of alternative procedures including the consequences of declining this procedure or any alternative procedures   the likelihood of achieving satisfactory results. I acknowledge no guarantee or assurance has been made to me regarding the results. I understand that during the course of this treatment/procedure, unforeseen conditions can occur which require an additional or different procedure. I agree to allow my physician or assistants to perform such extension of the original procedure as they may find necessary. I understand that sedation will often result in temporary impairment of memory and fine motor skills and that sedation can occasionally progress to a state of deep sedation or general anesthesia. I understand the risks of anesthesia for surgery include, but are not limited to, sore throat, hoarseness, injury to face, mouth, or teeth; nausea; headache; injury to blood vessels or nerves; death, brain damage, or paralysis.     I understand that if I have a Limitation of Treatment order in effect during my hospitalization, the order may or may not be in effect during this procedure. I give my doctor permission to give me blood or blood products. I understand that there are risks with receiving blood such as hepatitis, AIDS, fever, or allergic reaction. I acknowledge that the risks, benefits, and alternatives of this treatment have been explained to me and that no express or implied warranty has been given by the hospital, any blood bank, or any person or entity as to the blood or blood components transfused. At the discretion of my doctor, I agree to allow observers, equipment/product representatives and allow photographing, and/or televising of the procedure, provided my name or identity is maintained confidentially. I agree the hospital may dispose of or use for scientific or educational purposes any tissue, fluid, or body parts which may be removed.     ________________________________Date________Time______ am/pm  ([place])  Patient or Signature of Closest Relative or Legal Guardian    ________________________________Date________Time______am/pm      Page 1 of  1  Witness

## 2020-02-26 ENCOUNTER — HOSPITAL ENCOUNTER (OUTPATIENT)
Dept: PREADMISSION TESTING | Age: 70
Discharge: HOME | End: 2020-03-01
Payer: MEDICAID

## 2020-02-26 ENCOUNTER — ANESTHESIA EVENT (OUTPATIENT)
Dept: OPERATING ROOM | Age: 70
DRG: 166 | End: 2020-02-26
Payer: MEDICAID

## 2020-02-26 ENCOUNTER — HOSPITAL ENCOUNTER (OUTPATIENT)
Dept: GENERAL RADIOLOGY | Age: 70
Discharge: HOME | End: 2020-02-26
Payer: MEDICAID

## 2020-02-26 VITALS
SYSTOLIC BLOOD PRESSURE: 141 MMHG | DIASTOLIC BLOOD PRESSURE: 69 MMHG | TEMPERATURE: 97 F | BODY MASS INDEX: 38.65 KG/M2 | RESPIRATION RATE: 16 BRPM | WEIGHT: 270 LBS | HEIGHT: 70 IN | OXYGEN SATURATION: 97 % | HEART RATE: 102 BPM

## 2020-02-26 LAB
A/G RATIO: 1 (ref 1.1–2.2)
ABO/RH: NORMAL
ALBUMIN SERPL-MCNC: 3.6 G/DL (ref 3.4–5)
ALP BLD-CCNC: 63 U/L (ref 40–129)
ALT SERPL-CCNC: 12 U/L (ref 10–40)
ANION GAP SERPL CALCULATED.3IONS-SCNC: 16 MMOL/L (ref 3–16)
ANTIBODY SCREEN: NORMAL
APTT: 34.2 SEC (ref 24.2–36.2)
AST SERPL-CCNC: 13 U/L (ref 15–37)
BACTERIA: ABNORMAL /HPF
BILIRUB SERPL-MCNC: 0.6 MG/DL (ref 0–1)
BILIRUBIN URINE: NEGATIVE
BLOOD, URINE: ABNORMAL
BUN BLDV-MCNC: 22 MG/DL (ref 7–20)
CALCIUM SERPL-MCNC: 8.9 MG/DL (ref 8.3–10.6)
CHLORIDE BLD-SCNC: 94 MMOL/L (ref 99–110)
CLARITY: ABNORMAL
CO2: 21 MMOL/L (ref 21–32)
COLOR: YELLOW
CREAT SERPL-MCNC: 1.3 MG/DL (ref 0.8–1.3)
EKG ATRIAL RATE: 102 BPM
EKG DIAGNOSIS: NORMAL
EKG P AXIS: 67 DEGREES
EKG P-R INTERVAL: 166 MS
EKG Q-T INTERVAL: 356 MS
EKG QRS DURATION: 80 MS
EKG QTC CALCULATION (BAZETT): 463 MS
EKG R AXIS: 88 DEGREES
EKG T AXIS: 59 DEGREES
EKG VENTRICULAR RATE: 102 BPM
EPITHELIAL CELLS, UA: ABNORMAL /HPF (ref 0–5)
FIBRINOGEN: 338 MG/DL (ref 200–397)
GFR AFRICAN AMERICAN: >60
GFR NON-AFRICAN AMERICAN: 55
GLOBULIN: 3.7 G/DL
GLUCOSE BLD-MCNC: 325 MG/DL (ref 70–99)
GLUCOSE URINE: NEGATIVE MG/DL
HCT VFR BLD CALC: 43.5 % (ref 40.5–52.5)
HEMOGLOBIN: 14.6 G/DL (ref 13.5–17.5)
INR BLD: 1.14 (ref 0.86–1.14)
KETONES, URINE: NEGATIVE MG/DL
LEUKOCYTE ESTERASE, URINE: ABNORMAL
MAGNESIUM: 1.8 MG/DL (ref 1.8–2.4)
MCH RBC QN AUTO: 29.9 PG (ref 26–34)
MCHC RBC AUTO-ENTMCNC: 33.6 G/DL (ref 31–36)
MCV RBC AUTO: 89 FL (ref 80–100)
MICROSCOPIC EXAMINATION: YES
MUCUS: ABNORMAL /LPF
NITRITE, URINE: NEGATIVE
PDW BLD-RTO: 14 % (ref 12.4–15.4)
PH UA: 6.5 (ref 5–8)
PLATELET # BLD: 178 K/UL (ref 135–450)
PMV BLD AUTO: 8.4 FL (ref 5–10.5)
POTASSIUM SERPL-SCNC: 4 MMOL/L (ref 3.5–5.1)
PROTEIN UA: 30 MG/DL
PROTHROMBIN TIME: 13.3 SEC (ref 10–13.2)
RBC # BLD: 4.89 M/UL (ref 4.2–5.9)
RBC UA: ABNORMAL /HPF (ref 0–4)
SODIUM BLD-SCNC: 131 MMOL/L (ref 136–145)
SPECIFIC GRAVITY UA: 1.02 (ref 1–1.03)
TOTAL PROTEIN: 7.3 G/DL (ref 6.4–8.2)
URINE TYPE: ABNORMAL
UROBILINOGEN, URINE: 0.2 E.U./DL
WBC # BLD: 7.1 K/UL (ref 4–11)
WBC UA: >100 /HPF (ref 0–5)

## 2020-02-26 PROCEDURE — 85610 PROTHROMBIN TIME: CPT

## 2020-02-26 PROCEDURE — 86850 RBC ANTIBODY SCREEN: CPT

## 2020-02-26 PROCEDURE — 85730 THROMBOPLASTIN TIME PARTIAL: CPT

## 2020-02-26 PROCEDURE — 80053 COMPREHEN METABOLIC PANEL: CPT

## 2020-02-26 PROCEDURE — 87086 URINE CULTURE/COLONY COUNT: CPT

## 2020-02-26 PROCEDURE — 83036 HEMOGLOBIN GLYCOSYLATED A1C: CPT

## 2020-02-26 PROCEDURE — 85027 COMPLETE CBC AUTOMATED: CPT

## 2020-02-26 PROCEDURE — 71046 X-RAY EXAM CHEST 2 VIEWS: CPT

## 2020-02-26 PROCEDURE — 93010 ELECTROCARDIOGRAM REPORT: CPT | Performed by: INTERNAL MEDICINE

## 2020-02-26 PROCEDURE — 81001 URINALYSIS AUTO W/SCOPE: CPT

## 2020-02-26 PROCEDURE — 83735 ASSAY OF MAGNESIUM: CPT

## 2020-02-26 PROCEDURE — 85384 FIBRINOGEN ACTIVITY: CPT

## 2020-02-26 PROCEDURE — 93005 ELECTROCARDIOGRAM TRACING: CPT | Performed by: THORACIC SURGERY (CARDIOTHORACIC VASCULAR SURGERY)

## 2020-02-26 PROCEDURE — 87641 MR-STAPH DNA AMP PROBE: CPT

## 2020-02-26 PROCEDURE — 86900 BLOOD TYPING SEROLOGIC ABO: CPT

## 2020-02-26 PROCEDURE — 86901 BLOOD TYPING SEROLOGIC RH(D): CPT

## 2020-02-26 ASSESSMENT — LIFESTYLE VARIABLES: SMOKING_STATUS: 0

## 2020-02-26 NOTE — PROGRESS NOTES
Patient will not have anyone at hospital DOS or any support after discharge.  Discussed with Tres Fields

## 2020-02-26 NOTE — PROGRESS NOTES
[facility]                          Date of Procedure3/2     PRIOR TO PROCEDURE DATE:  1. Please follow any guidelines/instructions prior to your procedure as advised by your surgeon. 2. Arrange for someone to drive you home and be with you for the first 24 hours after discharge for your safety after your procedure for which you received sedation. Ensure it is someone we can share information with regarding your discharge. 3. You must contact your surgeon for instructions IF:   You are taking any blood thinners, aspirin, anti-inflammatory or vitamin E.   There is a change in your physical condition such as a cold, fever, rash, cuts, sores or any other infection, especially near your surgical site. 4. Do not drink alcohol the day before or day of your procedure. 5. A Pre-op History and Physical for surgery MUST be completed by your Physician or Urgent Care within 30 days of your procedure date. Please bring a copy with you on the day of your procedure and along with any other testing performed. THE DAY OF YOUR PROCEDURE:  1. Follow instructions for ARRIVAL TIME as DIRECTED BY YOUR SURGEON. 2. Enter the MAIN entrance from [employer] and follow the signs to the free [employer] or [employer] parking (offered free of charge 6am-5pm). 3. Enter the Main Entrance of the hospital (do NOT enter from the lower level of the parking garage). Upon entrance, check in with the  at the main desk on your left. If no one is available at the desk, proceed into the [facility] Waiting Room and go through the door directly into the [facility]. There is a Check-in desk ACROSS from Room 5 (marked with a sign hanging from the ceiling). The phone number for the surgery center is [phone].    4. DO NOT EAT ANYTHING eight hours prior to surgery.   May have 8 ounces of water 4 hours prior to surgery (exception would be medication instructions below only)     5. MEDICATIONS    Take the following medications with a SMALL sip of water:  METOPROLOL    Use your usual dose of inhalers the morning of surgery. BRING your rescue inhaler with you to hospital.    Anesthesia does NOT want you to take insulin the morning of surgery. They will control your blood sugar while you are at the hospital. Please contact your ordering physician for instructions regarding your insulin the night before your procedure. If you have an insulin pump, please keep it set on basal rate. 6.  Do not swallow water when brushing teeth. No gum, candy, mints or ice chips. Refrain from smoking or at least decrease the amount. 7.  Dress in loose, comfortable clothing appropriate for redressing after your procedure. Do not wear jewelry (including body piercings), make-up (especially NO eye make-up), fingernail polish (NO toenail polish if foot/leg surgery), lotion, powders or metal hairclips. 8.  Dentures, glasses, or contacts will need to be removed before your procedure. Bring cases for your glasses, contacts, dentures, or hearing aids to protect them while you are in surgery. 9.  If you use a CPAP, please bring it with you on the day of your procedure. 10. We recommend that valuable personal  belongings, such as cash, cell phones, e-tablets or jewelry, be left at home during your stay. The hospital will not be responsible for valuables that are not secured in the hospital safe. However, if your insurance requires a co-pay, you may want to bring a method of payment, i.e. Check or credit card, if you wish to pay your co-pay the day of surgery. 11. If you are to stay overnight, you may bring a bag with personal items. Please have any large items you may need brought in by your family after your arrival to your hospital room. 12. If you have a Living Will or Durable Power of , please bring a copy on the day of your procedure.      13. With your permission, one family member may accompany you while you are being prepared for surgery. Once you are ready, additional family members may join you. HOW WE KEEP YOU SAFE and WORK TO PREVENT SURGICAL SITE INFECTIONS:  1. Health care workers should always check your ID bracelet to verify your name and birth date. You will be asked many times to state your name, date of birth, and allergies. 2. Health care workers should always clean their hands with soap or alcohol gel before providing care to you. It is okay to ask anyone if they cleaned their hands before they touch you. 3. You will be actively involved in verifying the type of procedure you are having and ensuring the correct surgical site. This will be confirmed multiple times prior to your procedure. Do NOT eileen your surgery site UNLESS instructed to by your surgeon. 4. Do not shave or wax for 72 hours prior to procedure near your operative site. Shaving with a razor can irritate your skin and make it easier to develop an infection. On the day of your procedure, any hair that needs to be removed near the surgical site will be clipped by a healthcare worker using a special clippers designed to avoid skin irritation. 5. When you are in the operating room, your surgical site will be cleansed with a special soap, and in most cases, you will be given an antibiotic before the surgery begins. What to expect AFTER YOUR PROCEDURE:  1. Immediately following your procedure, your will be taken to the PACU for the first phase of your recovery. Your nurse will help you recover from any potential side effects of anesthesia, such as extreme drowsiness, changes in your vital signs or breathing patterns. Nausea, headache, muscle aches, or sore throat may also occur after anesthesia. Your nurse will help you manage these potential side effects. 2. For comfort and safety, arrange to have someone at home with you for the first 24 hours after discharge.     3. You and

## 2020-02-26 NOTE — ANESTHESIA PRE PROCEDURE
P70.665       Past Medical History:        Diagnosis Date    A-fib [facility]) 2020    CAD (coronary artery disease)     Diabetes mellitus ([address]     diet controlled     Enlarged prostate     Kidney stones        Past Surgical History:        Procedure Laterality Date    CARDIAC CATHETERIZATION  12/10/2019    NASAL POLYP SURGERY  x2    NASAL SEPTUM SURGERY         Social History:    Social History     Tobacco Use    Smoking status: Never Smoker    Smokeless tobacco: Never Used   Substance Use Topics    Alcohol use: Not Currently     Comment: rare                                Counseling given: Not Answered      Vital Signs (Current): There were no vitals filed for this visit. BP Readings from Last 3 Encounters:   02/26/20 (!) 147/78   02/12/20 118/76   01/28/20 130/70       NPO Status:                                                                                 BMI:   Wt Readings from Last 3 Encounters:   02/26/20 270 lb (122.5 kg)   02/12/20 266 lb (120.7 kg)   01/28/20 268 lb (121.6 kg)     There is no height or weight on file to calculate BMI.    CBC:   Lab Results   Component Value Date    WBC 7.1 02/26/2020    RBC 4.89 02/26/2020    HGB 14.6 02/26/2020    HCT 43.5 02/26/2020    MCV 89.0 02/26/2020    RDW 14.0 02/26/2020     02/26/2020       CMP:   Lab Results   Component Value Date     12/10/2019    K 4.1 12/10/2019    K 4.5 11/22/2019    CL 98 12/10/2019    CO2 23 12/10/2019    BUN 21 12/10/2019    CREATININE 1.2 12/10/2019    GFRAA >60 12/10/2019    AGRATIO 1.0 11/20/2019    LABGLOM >60 12/10/2019    GLUCOSE 232 12/10/2019    PROT 6.9 11/20/2019    CALCIUM 8.7 12/10/2019    BILITOT 0.7 11/20/2019    ALKPHOS 61 11/20/2019    AST 19 11/20/2019    ALT 25 11/20/2019       POC Tests: No results for input(s): POCGLU, POCNA, POCK, POCCL, POCBUN, POCHEMO, POCHCT in the last 72 hours.     Coags:   Lab Results   Component Value Date    PROTIME 13.3 02/26/2020    INR 1.14 02/26/2020    APTT 34.2 02/26/2020       HCG (If Applicable): No results found for: PREGTESTUR, PREGSERUM, HCG, HCGQUANT     ABGs: No results found for: PHART, PO2ART, GVY8IJL, RYT7QMF, BEART, O0RJBMWS     Type & Screen (If Applicable):  No results found for: LABABO, [address]    Anesthesia Evaluation  Patient summary reviewed  Airway: Mallampati: III  TM distance: >3 FB   Neck ROM: full  Mouth opening: > = 3 FB Dental:          Pulmonary: breath sounds clear to auscultation      (-) COPD, asthma, sleep apnea and not a current smoker                           Cardiovascular:  Exercise tolerance: good (>4 METS),   (+) angina: at rest, CAD: obstructive, dysrhythmias: atrial fibrillation, CHF: systolic, hyperlipidemia    (-) hypertension    ECG reviewed  Rhythm: regular  Rate: normal  Echocardiogram reviewed         Beta Blocker:  Dose within 24 Hrs      ROS comment: EF 25-30%     Neuro/Psych:      (-) seizures, TIA and CVA           GI/Hepatic/Renal:        (-) GERD, PUD, no renal disease, bowel prep and no morbid obesity       Endo/Other:    (+) DiabetesType II DM, , no malignancy/cancer. (-) hypothyroidism, hyperthyroidism, no malignancy/cancer               Abdominal:           Vascular:                                        Anesthesia Plan      general     ASA 4       Induction: intravenous. arterial line, central line, CVP, PA catheter and WAYEN  MIPS: Postoperative opioids intended and Prophylactic antiemetics administered. Anesthetic plan and risks discussed with patient. Use of blood products discussed with patient whom consented to blood products.                    Clotilde Soto MD   2/26/2020

## 2020-02-26 NOTE — PROGRESS NOTES
PRE-OP CARDIAC SURGERY TEACHING    Reviewed the upcoming operation with patient. Patient educated, using the teach back method. Reviewed the open heart teaching binder with the patient, specifically patient plan of care, goals and expectations, diet and exercise, medications and discharge instructions. Instructed that after surgery, nurses will be making frequent skin assessments particularly at the bony prominences. Gave patient prescription for Bactroban Nasal ointment 2% 1 gram with instructions for administration. Hibiclens was also given with instructions for administration pre-operatively. Instructed to take beta blocker (metoprolol) with a small sip of water prior to coming in to the hospital the morning of surgery. His last dose of apixaban was 2/25/2020. He lives alone and needs either ARU or SNF at discharge. He is agreeable to this. He is trying to figure out how to get to the hospital the day of surgery. Instructed to call Medicaid to see if they may help him. Have discussed this with  to see if there are ways to help him come to the hospital since he is by himself. All patient questions were answered pertaining to upcoming surgery and had no further questions.      Mili Salcedo, EVELYN  2/26/2020  9:52 AM

## 2020-02-27 LAB
ESTIMATED AVERAGE GLUCOSE: 177.2 MG/DL
HBA1C MFR BLD: 7.8 %
MRSA SCREEN RT-PCR: NORMAL
ORGANISM: ABNORMAL
URINE CULTURE, ROUTINE: ABNORMAL
URINE CULTURE, ROUTINE: ABNORMAL

## 2020-03-02 ENCOUNTER — HOSPITAL ENCOUNTER (INPATIENT)
Age: 70
LOS: 9 days | Discharge: NURSING, REHAB OR LONG-TERM CARE | DRG: 166 | End: 2020-03-11
Attending: THORACIC SURGERY (CARDIOTHORACIC VASCULAR SURGERY) | Admitting: THORACIC SURGERY (CARDIOTHORACIC VASCULAR SURGERY)
Payer: MEDICAID

## 2020-03-02 ENCOUNTER — APPOINTMENT (OUTPATIENT)
Dept: GENERAL RADIOLOGY | Age: 70
DRG: 166 | End: 2020-03-02
Attending: THORACIC SURGERY (CARDIOTHORACIC VASCULAR SURGERY)
Payer: MEDICAID

## 2020-03-02 ENCOUNTER — ANESTHESIA (OUTPATIENT)
Dept: OPERATING ROOM | Age: 70
DRG: 166 | End: 2020-03-02
Payer: MEDICAID

## 2020-03-02 VITALS
RESPIRATION RATE: 15 BRPM | DIASTOLIC BLOOD PRESSURE: 60 MMHG | TEMPERATURE: 99.1 F | OXYGEN SATURATION: 100 % | SYSTOLIC BLOOD PRESSURE: 117 MMHG

## 2020-03-02 PROBLEM — B37.41: Status: ACTIVE | Noted: 2020-03-02

## 2020-03-02 PROBLEM — I25.10 CAD IN NATIVE ARTERY: Status: ACTIVE | Noted: 2020-03-02

## 2020-03-02 PROBLEM — K59.00 CONSTIPATION: Status: ACTIVE | Noted: 2020-03-02

## 2020-03-02 PROBLEM — N13.9 URINARY TRACT OBSTRUCTION: Status: ACTIVE | Noted: 2020-03-02

## 2020-03-02 PROBLEM — N30.00 ACUTE CYSTITIS: Status: ACTIVE | Noted: 2020-03-02

## 2020-03-02 PROBLEM — J90 BILATERAL PLEURAL EFFUSION: Status: ACTIVE | Noted: 2020-03-02

## 2020-03-02 LAB
ABO/RH: NORMAL
ACTIVATED CLOTTING TIME: 111 SEC (ref 99–130)
ACTIVATED CLOTTING TIME: 120 SEC (ref 99–130)
ACTIVATED CLOTTING TIME: 426 SEC (ref 99–130)
ACTIVATED CLOTTING TIME: 429 SEC (ref 99–130)
ACTIVATED CLOTTING TIME: 451 SEC (ref 99–130)
ACTIVATED CLOTTING TIME: 467 SEC (ref 99–130)
ACTIVATED CLOTTING TIME: 473 SEC (ref 99–130)
ACTIVATED CLOTTING TIME: 480 SEC (ref 99–130)
ANION GAP SERPL CALCULATED.3IONS-SCNC: 20 MMOL/L (ref 3–16)
ANTIBODY SCREEN: NORMAL
APTT: 30.7 SEC (ref 24.2–36.2)
APTT: 37.2 SEC (ref 24.2–36.2)
BACTERIA: ABNORMAL /HPF
BASE EXCESS ARTERIAL: -2 (ref -3–3)
BASE EXCESS ARTERIAL: -2 (ref -3–3)
BASE EXCESS ARTERIAL: -3 (ref -3–3)
BASE EXCESS ARTERIAL: -3 (ref -3–3)
BASE EXCESS ARTERIAL: -4 (ref -3–3)
BASE EXCESS ARTERIAL: -5 (ref -3–3)
BASE EXCESS ARTERIAL: -5 (ref -3–3)
BASE EXCESS ARTERIAL: -6 (ref -3–3)
BASE EXCESS ARTERIAL: -7 (ref -3–3)
BASE EXCESS ARTERIAL: 0 (ref -3–3)
BILIRUBIN URINE: NEGATIVE
BLOOD, URINE: ABNORMAL
BUN BLDV-MCNC: 22 MG/DL (ref 7–20)
CALCIUM IONIZED: 1.08 MMOL/L (ref 1.12–1.32)
CALCIUM IONIZED: 1.09 MMOL/L (ref 1.12–1.32)
CALCIUM IONIZED: 1.09 MMOL/L (ref 1.12–1.32)
CALCIUM IONIZED: 1.12 MMOL/L (ref 1.12–1.32)
CALCIUM IONIZED: 1.14 MMOL/L (ref 1.12–1.32)
CALCIUM IONIZED: 1.15 MMOL/L (ref 1.12–1.32)
CALCIUM IONIZED: 1.21 MMOL/L (ref 1.12–1.32)
CALCIUM IONIZED: 1.21 MMOL/L (ref 1.12–1.32)
CALCIUM IONIZED: 1.23 MMOL/L (ref 1.12–1.32)
CALCIUM IONIZED: 1.29 MMOL/L (ref 1.12–1.32)
CALCIUM SERPL-MCNC: 8.6 MG/DL (ref 8.3–10.6)
CHLORIDE BLD-SCNC: 101 MMOL/L (ref 99–110)
CHOLESTEROL, TOTAL: 172 MG/DL (ref 0–199)
CLARITY: CLEAR
CO2: 19 MMOL/L (ref 21–32)
COLOR: YELLOW
CREAT SERPL-MCNC: 1.4 MG/DL (ref 0.8–1.3)
GFR AFRICAN AMERICAN: >60
GFR NON-AFRICAN AMERICAN: 50
GLUCOSE BLD-MCNC: 168 MG/DL (ref 70–99)
GLUCOSE BLD-MCNC: 173 MG/DL (ref 70–99)
GLUCOSE BLD-MCNC: 182 MG/DL (ref 70–99)
GLUCOSE BLD-MCNC: 190 MG/DL (ref 70–99)
GLUCOSE BLD-MCNC: 197 MG/DL (ref 70–99)
GLUCOSE BLD-MCNC: 198 MG/DL (ref 70–99)
GLUCOSE BLD-MCNC: 200 MG/DL (ref 70–99)
GLUCOSE BLD-MCNC: 217 MG/DL (ref 70–99)
GLUCOSE BLD-MCNC: 232 MG/DL (ref 70–99)
GLUCOSE BLD-MCNC: 256 MG/DL (ref 70–99)
GLUCOSE BLD-MCNC: 265 MG/DL (ref 70–99)
GLUCOSE BLD-MCNC: 281 MG/DL (ref 70–99)
GLUCOSE BLD-MCNC: 287 MG/DL (ref 70–99)
GLUCOSE BLD-MCNC: 308 MG/DL (ref 70–99)
GLUCOSE BLD-MCNC: 310 MG/DL (ref 70–99)
GLUCOSE BLD-MCNC: 313 MG/DL (ref 70–99)
GLUCOSE BLD-MCNC: 338 MG/DL (ref 70–99)
GLUCOSE BLD-MCNC: 367 MG/DL (ref 70–99)
GLUCOSE BLD-MCNC: 375 MG/DL (ref 70–99)
GLUCOSE BLD-MCNC: 390 MG/DL (ref 70–99)
GLUCOSE BLD-MCNC: 428 MG/DL (ref 70–99)
GLUCOSE URINE: NEGATIVE MG/DL
HCO3 ARTERIAL: 20.4 MMOL/L (ref 21–29)
HCO3 ARTERIAL: 21.1 MMOL/L (ref 21–29)
HCO3 ARTERIAL: 21.9 MMOL/L (ref 21–29)
HCO3 ARTERIAL: 22.1 MMOL/L (ref 21–29)
HCO3 ARTERIAL: 22.2 MMOL/L (ref 21–29)
HCO3 ARTERIAL: 22.3 MMOL/L (ref 21–29)
HCO3 ARTERIAL: 22.6 MMOL/L (ref 21–29)
HCO3 ARTERIAL: 22.7 MMOL/L (ref 21–29)
HCO3 ARTERIAL: 22.7 MMOL/L (ref 21–29)
HCO3 ARTERIAL: 23.2 MMOL/L (ref 21–29)
HCO3 ARTERIAL: 23.4 MMOL/L (ref 21–29)
HCO3 ARTERIAL: 24.2 MMOL/L (ref 21–29)
HCO3 ARTERIAL: 24.5 MMOL/L (ref 21–29)
HCO3 ARTERIAL: 25.8 MMOL/L (ref 21–29)
HCT VFR BLD CALC: 41.9 % (ref 40.5–52.5)
HDLC SERPL-MCNC: 31 MG/DL (ref 40–60)
HEMOGLOBIN: 13.9 G/DL (ref 13.5–17.5)
INR BLD: 1.06 (ref 0.86–1.14)
KETONES, URINE: NEGATIVE MG/DL
LACTATE: 2.08 MMOL/L (ref 0.4–2)
LACTATE: 9.01 MMOL/L (ref 0.4–2)
LDL CHOLESTEROL CALCULATED: 94 MG/DL
LEUKOCYTE ESTERASE, URINE: ABNORMAL
MAGNESIUM: 2.5 MG/DL (ref 1.8–2.4)
MCH RBC QN AUTO: 29.8 PG (ref 26–34)
MCHC RBC AUTO-ENTMCNC: 33.2 G/DL (ref 31–36)
MCV RBC AUTO: 89.7 FL (ref 80–100)
MICROSCOPIC EXAMINATION: YES
NITRITE, URINE: NEGATIVE
O2 SAT, ARTERIAL: 100 % (ref 93–100)
O2 SAT, ARTERIAL: 96 % (ref 93–100)
O2 SAT, ARTERIAL: 97 % (ref 93–100)
O2 SAT, ARTERIAL: 98 % (ref 93–100)
O2 SAT, ARTERIAL: 98 % (ref 93–100)
O2 SAT, ARTERIAL: 99 % (ref 93–100)
PCO2 ARTERIAL: 40.8 MM HG (ref 35–45)
PCO2 ARTERIAL: 41.2 MM HG (ref 35–45)
PCO2 ARTERIAL: 42 MM HG (ref 35–45)
PCO2 ARTERIAL: 45.7 MM HG (ref 35–45)
PCO2 ARTERIAL: 47.1 MM HG (ref 35–45)
PCO2 ARTERIAL: 47.1 MM HG (ref 35–45)
PCO2 ARTERIAL: 47.6 MM HG (ref 35–45)
PCO2 ARTERIAL: 48.6 MM HG (ref 35–45)
PCO2 ARTERIAL: 49.3 MM HG (ref 35–45)
PCO2 ARTERIAL: 50.1 MM HG (ref 35–45)
PCO2 ARTERIAL: 50.6 MM HG (ref 35–45)
PCO2 ARTERIAL: 51 MM HG (ref 35–45)
PCO2 ARTERIAL: 51.1 MM HG (ref 35–45)
PCO2 ARTERIAL: 53.6 MM HG (ref 35–45)
PDW BLD-RTO: 13.9 % (ref 12.4–15.4)
PERFORMED ON: ABNORMAL
PH ARTERIAL: 7.23 (ref 7.35–7.45)
PH ARTERIAL: 7.23 (ref 7.35–7.45)
PH ARTERIAL: 7.26 (ref 7.35–7.45)
PH ARTERIAL: 7.26 (ref 7.35–7.45)
PH ARTERIAL: 7.27 (ref 7.35–7.45)
PH ARTERIAL: 7.28 (ref 7.35–7.45)
PH ARTERIAL: 7.28 (ref 7.35–7.45)
PH ARTERIAL: 7.29 (ref 7.35–7.45)
PH ARTERIAL: 7.29 (ref 7.35–7.45)
PH ARTERIAL: 7.3 (ref 7.35–7.45)
PH ARTERIAL: 7.33 (ref 7.35–7.45)
PH ARTERIAL: 7.33 (ref 7.35–7.45)
PH ARTERIAL: 7.35 (ref 7.35–7.45)
PH ARTERIAL: 7.37 (ref 7.35–7.45)
PH UA: 6 (ref 5–8)
PLATELET # BLD: 179 K/UL (ref 135–450)
PMV BLD AUTO: 8.7 FL (ref 5–10.5)
PO2 ARTERIAL: 112.2 MM HG (ref 75–108)
PO2 ARTERIAL: 121.5 MM HG (ref 75–108)
PO2 ARTERIAL: 177.8 MM HG (ref 75–108)
PO2 ARTERIAL: 210.6 MM HG (ref 75–108)
PO2 ARTERIAL: 215.3 MM HG (ref 75–108)
PO2 ARTERIAL: 227.3 MM HG (ref 75–108)
PO2 ARTERIAL: 247.2 MM HG (ref 75–108)
PO2 ARTERIAL: 248.2 MM HG (ref 75–108)
PO2 ARTERIAL: 265.5 MM HG (ref 75–108)
PO2 ARTERIAL: 293.1 MM HG (ref 75–108)
PO2 ARTERIAL: 84.2 MM HG (ref 75–108)
PO2 ARTERIAL: 84.2 MM HG (ref 75–108)
PO2 ARTERIAL: 89.1 MM HG (ref 75–108)
PO2 ARTERIAL: 99.6 MM HG (ref 75–108)
POC POTASSIUM: 2.9 MMOL/L (ref 3.5–5.1)
POC POTASSIUM: 3 MMOL/L (ref 3.5–5.1)
POC POTASSIUM: 3 MMOL/L (ref 3.5–5.1)
POC POTASSIUM: 3.1 MMOL/L (ref 3.5–5.1)
POC POTASSIUM: 3.1 MMOL/L (ref 3.5–5.1)
POC POTASSIUM: 3.2 MMOL/L (ref 3.5–5.1)
POC POTASSIUM: 3.3 MMOL/L (ref 3.5–5.1)
POC POTASSIUM: 3.4 MMOL/L (ref 3.5–5.1)
POC POTASSIUM: 3.9 MMOL/L (ref 3.5–5.1)
POC POTASSIUM: 4.5 MMOL/L (ref 3.5–5.1)
POC SAMPLE TYPE: ABNORMAL
POC SODIUM: 139 MMOL/L (ref 136–145)
POC SODIUM: 139 MMOL/L (ref 136–145)
POC SODIUM: 140 MMOL/L (ref 136–145)
POC SODIUM: 140 MMOL/L (ref 136–145)
POC SODIUM: 141 MMOL/L (ref 136–145)
POC SODIUM: 142 MMOL/L (ref 136–145)
POC SODIUM: 143 MMOL/L (ref 136–145)
POC SODIUM: 146 MMOL/L (ref 136–145)
POTASSIUM SERPL-SCNC: 3.6 MMOL/L (ref 3.5–5.1)
PROTEIN UA: 30 MG/DL
PROTHROMBIN TIME: 12.3 SEC (ref 10–13.2)
RBC # BLD: 4.67 M/UL (ref 4.2–5.9)
RBC UA: ABNORMAL /HPF (ref 0–4)
SODIUM BLD-SCNC: 140 MMOL/L (ref 136–145)
SPECIFIC GRAVITY UA: 1.02 (ref 1–1.03)
TCO2 ARTERIAL: 22 MMOL/L
TCO2 ARTERIAL: 23 MMOL/L
TCO2 ARTERIAL: 23 MMOL/L
TCO2 ARTERIAL: 24 MMOL/L
TCO2 ARTERIAL: 25 MMOL/L
TCO2 ARTERIAL: 25 MMOL/L
TCO2 ARTERIAL: 26 MMOL/L
TCO2 ARTERIAL: 26 MMOL/L
TCO2 ARTERIAL: 27 MMOL/L
TRIGL SERPL-MCNC: 236 MG/DL (ref 0–150)
URINE REFLEX TO CULTURE: YES
URINE TYPE: ABNORMAL
UROBILINOGEN, URINE: 0.2 E.U./DL
VLDLC SERPL CALC-MCNC: 47 MG/DL
WBC # BLD: 29.9 K/UL (ref 4–11)
WBC UA: >100 /HPF (ref 0–5)

## 2020-03-02 PROCEDURE — 33572 OPEN CORONARY ENDARTERECTOMY: CPT | Performed by: THORACIC SURGERY (CARDIOTHORACIC VASCULAR SURGERY)

## 2020-03-02 PROCEDURE — 99999 PR OFFICE/OUTPT VISIT,PROCEDURE ONLY: CPT | Performed by: PHYSICIAN ASSISTANT

## 2020-03-02 PROCEDURE — 2500000003 HC RX 250 WO HCPCS: Performed by: ANESTHESIOLOGY

## 2020-03-02 PROCEDURE — 86850 RBC ANTIBODY SCREEN: CPT

## 2020-03-02 PROCEDURE — 88311 DECALCIFY TISSUE: CPT

## 2020-03-02 PROCEDURE — C1729 CATH, DRAINAGE: HCPCS | Performed by: THORACIC SURGERY (CARDIOTHORACIC VASCULAR SURGERY)

## 2020-03-02 PROCEDURE — 94761 N-INVAS EAR/PLS OXIMETRY MLT: CPT

## 2020-03-02 PROCEDURE — 88304 TISSUE EXAM BY PATHOLOGIST: CPT

## 2020-03-02 PROCEDURE — 85610 PROTHROMBIN TIME: CPT

## 2020-03-02 PROCEDURE — 6360000002 HC RX W HCPCS: Performed by: ANESTHESIOLOGY

## 2020-03-02 PROCEDURE — 3E033XZ INTRODUCTION OF VASOPRESSOR INTO PERIPHERAL VEIN, PERCUTANEOUS APPROACH: ICD-10-PCS | Performed by: THORACIC SURGERY (CARDIOTHORACIC VASCULAR SURGERY)

## 2020-03-02 PROCEDURE — 84132 ASSAY OF SERUM POTASSIUM: CPT

## 2020-03-02 PROCEDURE — 2580000003 HC RX 258: Performed by: ANESTHESIOLOGY

## 2020-03-02 PROCEDURE — 2700000000 HC OXYGEN THERAPY PER DAY

## 2020-03-02 PROCEDURE — 6360000002 HC RX W HCPCS: Performed by: THORACIC SURGERY (CARDIOTHORACIC VASCULAR SURGERY)

## 2020-03-02 PROCEDURE — 87086 URINE CULTURE/COLONY COUNT: CPT

## 2020-03-02 PROCEDURE — 33518 CABG ARTERY-VEIN TWO: CPT | Performed by: THORACIC SURGERY (CARDIOTHORACIC VASCULAR SURGERY)

## 2020-03-02 PROCEDURE — 2780000010 HC IMPLANT OTHER: Performed by: THORACIC SURGERY (CARDIOTHORACIC VASCULAR SURGERY)

## 2020-03-02 PROCEDURE — 3700000001 HC ADD 15 MINUTES (ANESTHESIA): Performed by: THORACIC SURGERY (CARDIOTHORACIC VASCULAR SURGERY)

## 2020-03-02 PROCEDURE — 92953 TEMPORARY EXTERNAL PACING: CPT

## 2020-03-02 PROCEDURE — 5A1221Z PERFORMANCE OF CARDIAC OUTPUT, CONTINUOUS: ICD-10-PCS | Performed by: THORACIC SURGERY (CARDIOTHORACIC VASCULAR SURGERY)

## 2020-03-02 PROCEDURE — 71045 X-RAY EXAM CHEST 1 VIEW: CPT

## 2020-03-02 PROCEDURE — 33508 ENDOSCOPIC VEIN HARVEST: CPT | Performed by: THORACIC SURGERY (CARDIOTHORACIC VASCULAR SURGERY)

## 2020-03-02 PROCEDURE — 02L70CK OCCLUSION OF LEFT ATRIAL APPENDAGE WITH EXTRALUMINAL DEVICE, OPEN APPROACH: ICD-10-PCS | Performed by: THORACIC SURGERY (CARDIOTHORACIC VASCULAR SURGERY)

## 2020-03-02 PROCEDURE — 5A2204Z RESTORATION OF CARDIAC RHYTHM, SINGLE: ICD-10-PCS | Performed by: THORACIC SURGERY (CARDIOTHORACIC VASCULAR SURGERY)

## 2020-03-02 PROCEDURE — 83735 ASSAY OF MAGNESIUM: CPT

## 2020-03-02 PROCEDURE — 85347 COAGULATION TIME ACTIVATED: CPT

## 2020-03-02 PROCEDURE — 93503 INSERT/PLACE HEART CATHETER: CPT

## 2020-03-02 PROCEDURE — 2500000003 HC RX 250 WO HCPCS: Performed by: THORACIC SURGERY (CARDIOTHORACIC VASCULAR SURGERY)

## 2020-03-02 PROCEDURE — C1751 CATH, INF, PER/CENT/MIDLINE: HCPCS | Performed by: THORACIC SURGERY (CARDIOTHORACIC VASCULAR SURGERY)

## 2020-03-02 PROCEDURE — 85027 COMPLETE CBC AUTOMATED: CPT

## 2020-03-02 PROCEDURE — 82947 ASSAY GLUCOSE BLOOD QUANT: CPT

## 2020-03-02 PROCEDURE — P9045 ALBUMIN (HUMAN), 5%, 250 ML: HCPCS | Performed by: THORACIC SURGERY (CARDIOTHORACIC VASCULAR SURGERY)

## 2020-03-02 PROCEDURE — 82330 ASSAY OF CALCIUM: CPT

## 2020-03-02 PROCEDURE — P9045 ALBUMIN (HUMAN), 5%, 250 ML: HCPCS | Performed by: ANESTHESIOLOGY

## 2020-03-02 PROCEDURE — 2580000003 HC RX 258: Performed by: THORACIC SURGERY (CARDIOTHORACIC VASCULAR SURGERY)

## 2020-03-02 PROCEDURE — 3600000008 HC SURGERY OHS BASE: Performed by: THORACIC SURGERY (CARDIOTHORACIC VASCULAR SURGERY)

## 2020-03-02 PROCEDURE — B246ZZ4 ULTRASONOGRAPHY OF RIGHT AND LEFT HEART, TRANSESOPHAGEAL: ICD-10-PCS | Performed by: THORACIC SURGERY (CARDIOTHORACIC VASCULAR SURGERY)

## 2020-03-02 PROCEDURE — 85018 HEMOGLOBIN: CPT

## 2020-03-02 PROCEDURE — 7100000010 HC PHASE II RECOVERY - FIRST 15 MIN

## 2020-03-02 PROCEDURE — 80048 BASIC METABOLIC PNL TOTAL CA: CPT

## 2020-03-02 PROCEDURE — 84295 ASSAY OF SERUM SODIUM: CPT

## 2020-03-02 PROCEDURE — 94770 HC ETCO2 MONITOR DAILY: CPT

## 2020-03-02 PROCEDURE — 86901 BLOOD TYPING SEROLOGIC RH(D): CPT

## 2020-03-02 PROCEDURE — 2000000000 HC ICU R&B

## 2020-03-02 PROCEDURE — 33257 ABLATE ATRIA LMTD ADD-ON: CPT | Performed by: THORACIC SURGERY (CARDIOTHORACIC VASCULAR SURGERY)

## 2020-03-02 PROCEDURE — 02100Z9 BYPASS CORONARY ARTERY, ONE ARTERY FROM LEFT INTERNAL MAMMARY, OPEN APPROACH: ICD-10-PCS | Performed by: THORACIC SURGERY (CARDIOTHORACIC VASCULAR SURGERY)

## 2020-03-02 PROCEDURE — 80061 LIPID PANEL: CPT

## 2020-03-02 PROCEDURE — 7100000011 HC PHASE II RECOVERY - ADDTL 15 MIN

## 2020-03-02 PROCEDURE — 1200000000 HC SEMI PRIVATE

## 2020-03-02 PROCEDURE — 3700000000 HC ANESTHESIA ATTENDED CARE: Performed by: THORACIC SURGERY (CARDIOTHORACIC VASCULAR SURGERY)

## 2020-03-02 PROCEDURE — 94002 VENT MGMT INPAT INIT DAY: CPT

## 2020-03-02 PROCEDURE — 2720000010 HC SURG SUPPLY STERILE: Performed by: THORACIC SURGERY (CARDIOTHORACIC VASCULAR SURGERY)

## 2020-03-02 PROCEDURE — 021109W BYPASS CORONARY ARTERY, TWO ARTERIES FROM AORTA WITH AUTOLOGOUS VENOUS TISSUE, OPEN APPROACH: ICD-10-PCS | Performed by: THORACIC SURGERY (CARDIOTHORACIC VASCULAR SURGERY)

## 2020-03-02 PROCEDURE — 2709999900 HC NON-CHARGEABLE SUPPLY: Performed by: THORACIC SURGERY (CARDIOTHORACIC VASCULAR SURGERY)

## 2020-03-02 PROCEDURE — 06BP4ZZ EXCISION OF RIGHT SAPHENOUS VEIN, PERCUTANEOUS ENDOSCOPIC APPROACH: ICD-10-PCS | Performed by: THORACIC SURGERY (CARDIOTHORACIC VASCULAR SURGERY)

## 2020-03-02 PROCEDURE — 83605 ASSAY OF LACTIC ACID: CPT

## 2020-03-02 PROCEDURE — 85730 THROMBOPLASTIN TIME PARTIAL: CPT

## 2020-03-02 PROCEDURE — 86900 BLOOD TYPING SEROLOGIC ABO: CPT

## 2020-03-02 PROCEDURE — 6370000000 HC RX 637 (ALT 250 FOR IP): Performed by: THORACIC SURGERY (CARDIOTHORACIC VASCULAR SURGERY)

## 2020-03-02 PROCEDURE — 3600000018 HC SURGERY OHS ADDTL 15MIN: Performed by: THORACIC SURGERY (CARDIOTHORACIC VASCULAR SURGERY)

## 2020-03-02 PROCEDURE — 33533 CABG ARTERIAL SINGLE: CPT | Performed by: THORACIC SURGERY (CARDIOTHORACIC VASCULAR SURGERY)

## 2020-03-02 PROCEDURE — 81001 URINALYSIS AUTO W/SCOPE: CPT

## 2020-03-02 PROCEDURE — 6370000000 HC RX 637 (ALT 250 FOR IP): Performed by: ANESTHESIOLOGY

## 2020-03-02 PROCEDURE — 82803 BLOOD GASES ANY COMBINATION: CPT

## 2020-03-02 PROCEDURE — C1713 ANCHOR/SCREW BN/BN,TIS/BN: HCPCS | Performed by: THORACIC SURGERY (CARDIOTHORACIC VASCULAR SURGERY)

## 2020-03-02 PROCEDURE — 94750 HC PULMONARY COMPLIANCE STUDY: CPT

## 2020-03-02 DEVICE — IMPLANTABLE DEVICE: Type: IMPLANTABLE DEVICE | Site: CHEST | Status: FUNCTIONAL

## 2020-03-02 DEVICE — SCREW BNE L15MM DIA2.3MM THOR STRNL TI LOK DRL FREE LEV 1: Type: IMPLANTABLE DEVICE | Site: CHEST | Status: FUNCTIONAL

## 2020-03-02 DEVICE — ZINACTIVE USE 2540325 DEVICE OCCL CLP L40MM SM FOOTPRINT 1 HND APPL SUTURELESS W/: Type: IMPLANTABLE DEVICE | Site: CHEST | Status: FUNCTIONAL

## 2020-03-02 RX ORDER — MAGNESIUM HYDROXIDE 1200 MG/15ML
LIQUID ORAL CONTINUOUS PRN
Status: COMPLETED | OUTPATIENT
Start: 2020-03-02 | End: 2020-03-02

## 2020-03-02 RX ORDER — POTASSIUM CHLORIDE 750 MG/1
10 TABLET, EXTENDED RELEASE ORAL
Status: DISCONTINUED | OUTPATIENT
Start: 2020-03-03 | End: 2020-03-05

## 2020-03-02 RX ORDER — ALBUTEROL SULFATE 2.5 MG/3ML
2.5 SOLUTION RESPIRATORY (INHALATION) EVERY 6 HOURS PRN
Status: DISCONTINUED | OUTPATIENT
Start: 2020-03-02 | End: 2020-03-11 | Stop reason: HOSPADM

## 2020-03-02 RX ORDER — MIDAZOLAM HYDROCHLORIDE 1 MG/ML
INJECTION INTRAMUSCULAR; INTRAVENOUS PRN
Status: DISCONTINUED | OUTPATIENT
Start: 2020-03-02 | End: 2020-03-02 | Stop reason: SDUPTHER

## 2020-03-02 RX ORDER — ROCURONIUM BROMIDE 10 MG/ML
INJECTION, SOLUTION INTRAVENOUS PRN
Status: DISCONTINUED | OUTPATIENT
Start: 2020-03-02 | End: 2020-03-02 | Stop reason: SDUPTHER

## 2020-03-02 RX ORDER — LANOLIN ALCOHOL/MO/W.PET/CERES
400 CREAM (GRAM) TOPICAL 2 TIMES DAILY
Status: DISCONTINUED | OUTPATIENT
Start: 2020-03-03 | End: 2020-03-04

## 2020-03-02 RX ORDER — FENTANYL CITRATE 50 UG/ML
25 INJECTION, SOLUTION INTRAMUSCULAR; INTRAVENOUS
Status: DISCONTINUED | OUTPATIENT
Start: 2020-03-02 | End: 2020-03-05

## 2020-03-02 RX ORDER — SENNA AND DOCUSATE SODIUM 50; 8.6 MG/1; MG/1
1 TABLET, FILM COATED ORAL 2 TIMES DAILY
Status: DISCONTINUED | OUTPATIENT
Start: 2020-03-02 | End: 2020-03-11 | Stop reason: HOSPADM

## 2020-03-02 RX ORDER — CHLORHEXIDINE GLUCONATE 0.12 MG/ML
15 RINSE ORAL ONCE
Status: COMPLETED | OUTPATIENT
Start: 2020-03-02 | End: 2020-03-02

## 2020-03-02 RX ORDER — MIDAZOLAM HYDROCHLORIDE 1 MG/ML
1 INJECTION INTRAMUSCULAR; INTRAVENOUS
Status: ACTIVE | OUTPATIENT
Start: 2020-03-02 | End: 2020-03-03

## 2020-03-02 RX ORDER — CHLORHEXIDINE GLUCONATE 4 G/100ML
SOLUTION TOPICAL ONCE
Status: DISCONTINUED | OUTPATIENT
Start: 2020-03-02 | End: 2020-03-02 | Stop reason: HOSPADM

## 2020-03-02 RX ORDER — DIPHENHYDRAMINE HCL 25 MG
25 TABLET ORAL NIGHTLY PRN
Status: DISCONTINUED | OUTPATIENT
Start: 2020-03-03 | End: 2020-03-11 | Stop reason: HOSPADM

## 2020-03-02 RX ORDER — 0.9 % SODIUM CHLORIDE 0.9 %
1000 INTRAVENOUS SOLUTION INTRAVENOUS CONTINUOUS PRN
Status: DISCONTINUED | OUTPATIENT
Start: 2020-03-02 | End: 2020-03-04

## 2020-03-02 RX ORDER — FENTANYL CITRATE 50 UG/ML
INJECTION, SOLUTION INTRAMUSCULAR; INTRAVENOUS PRN
Status: DISCONTINUED | OUTPATIENT
Start: 2020-03-02 | End: 2020-03-02

## 2020-03-02 RX ORDER — FUROSEMIDE 10 MG/ML
40 INJECTION INTRAMUSCULAR; INTRAVENOUS
Status: ACTIVE | OUTPATIENT
Start: 2020-03-02 | End: 2020-03-02

## 2020-03-02 RX ORDER — HEPARIN SODIUM 10000 [USP'U]/ML
INJECTION, SOLUTION INTRAVENOUS; SUBCUTANEOUS PRN
Status: DISCONTINUED | OUTPATIENT
Start: 2020-03-02 | End: 2020-03-02 | Stop reason: SDUPTHER

## 2020-03-02 RX ORDER — PROTAMINE SULFATE 10 MG/ML
INJECTION, SOLUTION INTRAVENOUS PRN
Status: DISCONTINUED | OUTPATIENT
Start: 2020-03-02 | End: 2020-03-02

## 2020-03-02 RX ORDER — LISINOPRIL 2.5 MG/1
2.5 TABLET ORAL
Status: DISCONTINUED | OUTPATIENT
Start: 2020-03-03 | End: 2020-03-11

## 2020-03-02 RX ORDER — DOPAMINE HYDROCHLORIDE 160 MG/100ML
2 INJECTION, SOLUTION INTRAVENOUS CONTINUOUS PRN
Status: DISCONTINUED | OUTPATIENT
Start: 2020-03-02 | End: 2020-03-04

## 2020-03-02 RX ORDER — DEXTROSE MONOHYDRATE 25 G/50ML
12.5 INJECTION, SOLUTION INTRAVENOUS PRN
Status: DISCONTINUED | OUTPATIENT
Start: 2020-03-02 | End: 2020-03-11 | Stop reason: HOSPADM

## 2020-03-02 RX ORDER — SODIUM CHLORIDE, SODIUM LACTATE, POTASSIUM CHLORIDE, CALCIUM CHLORIDE 600; 310; 30; 20 MG/100ML; MG/100ML; MG/100ML; MG/100ML
INJECTION, SOLUTION INTRAVENOUS CONTINUOUS PRN
Status: DISCONTINUED | OUTPATIENT
Start: 2020-03-02 | End: 2020-03-02 | Stop reason: SDUPTHER

## 2020-03-02 RX ORDER — ONDANSETRON 2 MG/ML
4 INJECTION INTRAMUSCULAR; INTRAVENOUS EVERY 8 HOURS PRN
Status: DISCONTINUED | OUTPATIENT
Start: 2020-03-02 | End: 2020-03-11 | Stop reason: HOSPADM

## 2020-03-02 RX ORDER — METOPROLOL TARTRATE 5 MG/5ML
2.5 INJECTION INTRAVENOUS EVERY 10 MIN PRN
Status: DISCONTINUED | OUTPATIENT
Start: 2020-03-02 | End: 2020-03-11 | Stop reason: HOSPADM

## 2020-03-02 RX ORDER — INSULIN LISPRO 100 [IU]/ML
0-12 INJECTION, SOLUTION INTRAVENOUS; SUBCUTANEOUS EVERY 4 HOURS
Status: DISCONTINUED | OUTPATIENT
Start: 2020-03-03 | End: 2020-03-06

## 2020-03-02 RX ORDER — MEPERIDINE HYDROCHLORIDE 50 MG/ML
25 INJECTION INTRAMUSCULAR; INTRAVENOUS; SUBCUTANEOUS
Status: ACTIVE | OUTPATIENT
Start: 2020-03-02 | End: 2020-03-02

## 2020-03-02 RX ORDER — NITROGLYCERIN 20 MG/100ML
10 INJECTION INTRAVENOUS CONTINUOUS PRN
Status: DISCONTINUED | OUTPATIENT
Start: 2020-03-02 | End: 2020-03-04

## 2020-03-02 RX ORDER — PROTAMINE SULFATE 10 MG/ML
50 INJECTION, SOLUTION INTRAVENOUS
Status: ACTIVE | OUTPATIENT
Start: 2020-03-02 | End: 2020-03-02

## 2020-03-02 RX ORDER — SODIUM CHLORIDE 0.9 % (FLUSH) 0.9 %
10 SYRINGE (ML) INJECTION PRN
Status: DISCONTINUED | OUTPATIENT
Start: 2020-03-02 | End: 2020-03-11 | Stop reason: HOSPADM

## 2020-03-02 RX ORDER — SODIUM CHLORIDE 0.9 % (FLUSH) 0.9 %
10 SYRINGE (ML) INJECTION EVERY 12 HOURS SCHEDULED
Status: CANCELLED | OUTPATIENT
Start: 2020-03-02

## 2020-03-02 RX ORDER — KETAMINE HYDROCHLORIDE 50 MG/ML
INJECTION, SOLUTION, CONCENTRATE INTRAMUSCULAR; INTRAVENOUS PRN
Status: DISCONTINUED | OUTPATIENT
Start: 2020-03-02 | End: 2020-03-02 | Stop reason: SDUPTHER

## 2020-03-02 RX ORDER — MILRINONE LACTATE 0.2 MG/ML
INJECTION, SOLUTION INTRAVENOUS CONTINUOUS PRN
Status: DISCONTINUED | OUTPATIENT
Start: 2020-03-02 | End: 2020-03-02

## 2020-03-02 RX ORDER — BUPIVACAINE HYDROCHLORIDE 5 MG/ML
INJECTION, SOLUTION EPIDURAL; INTRACAUDAL PRN
Status: DISCONTINUED | OUTPATIENT
Start: 2020-03-02 | End: 2020-03-02 | Stop reason: ALTCHOICE

## 2020-03-02 RX ORDER — HYDRALAZINE HYDROCHLORIDE 20 MG/ML
5 INJECTION INTRAMUSCULAR; INTRAVENOUS EVERY 5 MIN PRN
Status: DISCONTINUED | OUTPATIENT
Start: 2020-03-02 | End: 2020-03-11 | Stop reason: HOSPADM

## 2020-03-02 RX ORDER — ACETAMINOPHEN 325 MG/1
650 TABLET ORAL EVERY 4 HOURS PRN
Status: DISCONTINUED | OUTPATIENT
Start: 2020-03-02 | End: 2020-03-11 | Stop reason: HOSPADM

## 2020-03-02 RX ORDER — MILRINONE LACTATE 0.2 MG/ML
0.38 INJECTION, SOLUTION INTRAVENOUS CONTINUOUS PRN
Status: DISCONTINUED | OUTPATIENT
Start: 2020-03-02 | End: 2020-03-04

## 2020-03-02 RX ORDER — ATORVASTATIN CALCIUM 40 MG/1
40 TABLET, FILM COATED ORAL NIGHTLY
Status: DISCONTINUED | OUTPATIENT
Start: 2020-03-03 | End: 2020-03-11 | Stop reason: HOSPADM

## 2020-03-02 RX ORDER — SODIUM CHLORIDE 0.9 % (FLUSH) 0.9 %
10 SYRINGE (ML) INJECTION EVERY 12 HOURS SCHEDULED
Status: DISCONTINUED | OUTPATIENT
Start: 2020-03-02 | End: 2020-03-11 | Stop reason: HOSPADM

## 2020-03-02 RX ORDER — FUROSEMIDE 10 MG/ML
40 INJECTION INTRAMUSCULAR; INTRAVENOUS 2 TIMES DAILY
Status: DISCONTINUED | OUTPATIENT
Start: 2020-03-03 | End: 2020-03-04

## 2020-03-02 RX ORDER — SODIUM CHLORIDE 0.9 % (FLUSH) 0.9 %
10 SYRINGE (ML) INJECTION PRN
Status: CANCELLED | OUTPATIENT
Start: 2020-03-02

## 2020-03-02 RX ORDER — CLOPIDOGREL BISULFATE 75 MG/1
75 TABLET ORAL DAILY
Status: DISCONTINUED | OUTPATIENT
Start: 2020-03-03 | End: 2020-03-05

## 2020-03-02 RX ORDER — NICOTINE POLACRILEX 4 MG
15 LOZENGE BUCCAL PRN
Status: DISCONTINUED | OUTPATIENT
Start: 2020-03-02 | End: 2020-03-11 | Stop reason: HOSPADM

## 2020-03-02 RX ORDER — OXYCODONE HYDROCHLORIDE AND ACETAMINOPHEN 5; 325 MG/1; MG/1
2 TABLET ORAL EVERY 4 HOURS PRN
Status: DISCONTINUED | OUTPATIENT
Start: 2020-03-02 | End: 2020-03-05

## 2020-03-02 RX ORDER — SODIUM CHLORIDE, SODIUM LACTATE, POTASSIUM CHLORIDE, CALCIUM CHLORIDE 600; 310; 30; 20 MG/100ML; MG/100ML; MG/100ML; MG/100ML
INJECTION, SOLUTION INTRAVENOUS CONTINUOUS
Status: DISCONTINUED | OUTPATIENT
Start: 2020-03-02 | End: 2020-03-02

## 2020-03-02 RX ORDER — ESMOLOL HYDROCHLORIDE 10 MG/ML
INJECTION INTRAVENOUS PRN
Status: DISCONTINUED | OUTPATIENT
Start: 2020-03-02 | End: 2020-03-02

## 2020-03-02 RX ORDER — MAGNESIUM SULFATE IN WATER 40 MG/ML
2 INJECTION, SOLUTION INTRAVENOUS PRN
Status: DISCONTINUED | OUTPATIENT
Start: 2020-03-02 | End: 2020-03-11 | Stop reason: HOSPADM

## 2020-03-02 RX ORDER — DEXTROSE MONOHYDRATE 50 MG/ML
100 INJECTION, SOLUTION INTRAVENOUS PRN
Status: DISCONTINUED | OUTPATIENT
Start: 2020-03-02 | End: 2020-03-11 | Stop reason: HOSPADM

## 2020-03-02 RX ORDER — OXYCODONE HYDROCHLORIDE AND ACETAMINOPHEN 5; 325 MG/1; MG/1
1 TABLET ORAL EVERY 4 HOURS PRN
Status: DISCONTINUED | OUTPATIENT
Start: 2020-03-02 | End: 2020-03-05

## 2020-03-02 RX ORDER — METOCLOPRAMIDE HYDROCHLORIDE 5 MG/ML
10 INJECTION INTRAMUSCULAR; INTRAVENOUS EVERY 6 HOURS PRN
Status: DISCONTINUED | OUTPATIENT
Start: 2020-03-02 | End: 2020-03-04

## 2020-03-02 RX ORDER — POLYETHYLENE GLYCOL 3350 17 G/17G
17 POWDER, FOR SOLUTION ORAL DAILY
Status: DISCONTINUED | OUTPATIENT
Start: 2020-03-02 | End: 2020-03-11 | Stop reason: HOSPADM

## 2020-03-02 RX ORDER — PAPAVERINE HYDROCHLORIDE 30 MG/ML
INJECTION INTRAMUSCULAR; INTRAVENOUS PRN
Status: DISCONTINUED | OUTPATIENT
Start: 2020-03-02 | End: 2020-03-02 | Stop reason: HOSPADM

## 2020-03-02 RX ORDER — ALBUMIN, HUMAN INJ 5% 5 %
SOLUTION INTRAVENOUS PRN
Status: DISCONTINUED | OUTPATIENT
Start: 2020-03-02 | End: 2020-03-02

## 2020-03-02 RX ORDER — POTASSIUM CHLORIDE 29.8 MG/ML
20 INJECTION INTRAVENOUS PRN
Status: DISCONTINUED | OUTPATIENT
Start: 2020-03-02 | End: 2020-03-04

## 2020-03-02 RX ORDER — EPINEPHRINE 1 MG/ML
INJECTION, SOLUTION, CONCENTRATE INTRAVENOUS PRN
Status: DISCONTINUED | OUTPATIENT
Start: 2020-03-02 | End: 2020-03-02

## 2020-03-02 RX ORDER — SODIUM CHLORIDE, SODIUM LACTATE, POTASSIUM CHLORIDE, CALCIUM CHLORIDE 600; 310; 30; 20 MG/100ML; MG/100ML; MG/100ML; MG/100ML
INJECTION, SOLUTION INTRAVENOUS CONTINUOUS
Status: DISCONTINUED | OUTPATIENT
Start: 2020-03-02 | End: 2020-03-03

## 2020-03-02 RX ORDER — ALBUMIN, HUMAN INJ 5% 5 %
25 SOLUTION INTRAVENOUS PRN
Status: DISCONTINUED | OUTPATIENT
Start: 2020-03-02 | End: 2020-03-04

## 2020-03-02 RX ORDER — CHLORHEXIDINE GLUCONATE 0.12 MG/ML
15 RINSE ORAL 2 TIMES DAILY
Status: DISCONTINUED | OUTPATIENT
Start: 2020-03-02 | End: 2020-03-11 | Stop reason: HOSPADM

## 2020-03-02 RX ADMIN — POTASSIUM CHLORIDE 20 MEQ: 29.8 INJECTION, SOLUTION INTRAVENOUS at 13:51

## 2020-03-02 RX ADMIN — MUPIROCIN: 20 OINTMENT TOPICAL at 20:34

## 2020-03-02 RX ADMIN — POTASSIUM CHLORIDE 20 MEQ: 29.8 INJECTION, SOLUTION INTRAVENOUS at 18:38

## 2020-03-02 RX ADMIN — INSULIN HUMAN 10 UNITS: 100 INJECTION, SOLUTION PARENTERAL at 09:05

## 2020-03-02 RX ADMIN — CEFAZOLIN 3 G: 10 INJECTION, POWDER, FOR SOLUTION INTRAVENOUS; PARENTERAL at 07:30

## 2020-03-02 RX ADMIN — ALBUMIN (HUMAN) 250 ML: 12.5 SOLUTION INTRAVENOUS at 12:03

## 2020-03-02 RX ADMIN — POTASSIUM CHLORIDE 20 MEQ: 29.8 INJECTION, SOLUTION INTRAVENOUS at 17:26

## 2020-03-02 RX ADMIN — POTASSIUM CHLORIDE 20 MEQ: 29.8 INJECTION, SOLUTION INTRAVENOUS at 15:03

## 2020-03-02 RX ADMIN — MUPIROCIN: 20 OINTMENT TOPICAL at 15:43

## 2020-03-02 RX ADMIN — SODIUM CHLORIDE 9.8 UNITS/HR: 9 INJECTION, SOLUTION INTRAVENOUS at 16:21

## 2020-03-02 RX ADMIN — METOPROLOL TARTRATE 12.5 MG: 25 TABLET ORAL at 05:50

## 2020-03-02 RX ADMIN — FENTANYL CITRATE 12.5 MCG: 50 INJECTION, SOLUTION INTRAMUSCULAR; INTRAVENOUS at 15:09

## 2020-03-02 RX ADMIN — ESMOLOL HYDROCHLORIDE 100 MG: 10 INJECTION, SOLUTION INTRAVENOUS at 11:58

## 2020-03-02 RX ADMIN — ROCURONIUM BROMIDE 50 MG: 10 INJECTION, SOLUTION INTRAVENOUS at 08:55

## 2020-03-02 RX ADMIN — FENTANYL CITRATE 500 MCG: 50 INJECTION, SOLUTION INTRAMUSCULAR; INTRAVENOUS at 07:34

## 2020-03-02 RX ADMIN — CEFAZOLIN 1 G: 10 INJECTION, POWDER, FOR SOLUTION INTRAVENOUS; PARENTERAL at 11:30

## 2020-03-02 RX ADMIN — ALBUMIN (HUMAN) 12.5 G: 12.5 INJECTION, SOLUTION INTRAVENOUS at 13:55

## 2020-03-02 RX ADMIN — MILRINONE LACTATE IN DEXTROSE 0.38 MCG/KG/MIN: 200 INJECTION, SOLUTION INTRAVENOUS at 14:07

## 2020-03-02 RX ADMIN — Medication 0.02 MCG/KG/MIN: at 07:30

## 2020-03-02 RX ADMIN — SODIUM CHLORIDE 4.2 UNITS/HR: 900 INJECTION, SOLUTION INTRAVENOUS at 08:04

## 2020-03-02 RX ADMIN — SODIUM CHLORIDE 1000 ML: 9 INJECTION, SOLUTION INTRAVENOUS at 23:42

## 2020-03-02 RX ADMIN — MILRINONE LACTATE 0.38 MCG/KG/MIN: 200 INJECTION, SOLUTION INTRAVENOUS at 07:30

## 2020-03-02 RX ADMIN — DEXTROSE MONOHYDRATE 0.2 MCG/KG/MIN: 5 INJECTION, SOLUTION INTRAVENOUS at 12:03

## 2020-03-02 RX ADMIN — SODIUM CHLORIDE 1000 ML: 9 INJECTION, SOLUTION INTRAVENOUS at 15:41

## 2020-03-02 RX ADMIN — ALBUMIN (HUMAN) 12.5 G: 12.5 INJECTION, SOLUTION INTRAVENOUS at 14:08

## 2020-03-02 RX ADMIN — FAMOTIDINE 20 MG: 10 INJECTION, SOLUTION INTRAVENOUS at 21:09

## 2020-03-02 RX ADMIN — ROCURONIUM BROMIDE 50 MG: 10 INJECTION, SOLUTION INTRAVENOUS at 07:56

## 2020-03-02 RX ADMIN — SODIUM CHLORIDE, SODIUM LACTATE, POTASSIUM CHLORIDE, AND CALCIUM CHLORIDE: 600; 310; 30; 20 INJECTION, SOLUTION INTRAVENOUS at 06:42

## 2020-03-02 RX ADMIN — Medication 15 ML: at 05:50

## 2020-03-02 RX ADMIN — PROTAMINE SULFATE 450 MG: 10 INJECTION, SOLUTION INTRAVENOUS at 12:10

## 2020-03-02 RX ADMIN — SODIUM CHLORIDE, SODIUM LACTATE, POTASSIUM CHLORIDE, AND CALCIUM CHLORIDE: 600; 310; 30; 20 INJECTION, SOLUTION INTRAVENOUS at 12:03

## 2020-03-02 RX ADMIN — ROCURONIUM BROMIDE 50 MG: 10 INJECTION, SOLUTION INTRAVENOUS at 10:00

## 2020-03-02 RX ADMIN — POTASSIUM CHLORIDE 20 MEQ: 29.8 INJECTION, SOLUTION INTRAVENOUS at 16:17

## 2020-03-02 RX ADMIN — VANCOMYCIN HYDROCHLORIDE 1500 MG: 10 INJECTION, POWDER, LYOPHILIZED, FOR SOLUTION INTRAVENOUS at 20:33

## 2020-03-02 RX ADMIN — MIDAZOLAM HYDROCHLORIDE 3 MG: 2 INJECTION, SOLUTION INTRAMUSCULAR; INTRAVENOUS at 07:05

## 2020-03-02 RX ADMIN — KETAMINE HYDROCHLORIDE 50 MG: 50 INJECTION, SOLUTION INTRAMUSCULAR; INTRAVENOUS at 06:42

## 2020-03-02 RX ADMIN — EPINEPHRINE 10 MCG: 1 INJECTION, SOLUTION, CONCENTRATE INTRAVENOUS at 08:25

## 2020-03-02 RX ADMIN — CEFAZOLIN 2 G: 10 INJECTION, POWDER, FOR SOLUTION INTRAVENOUS; PARENTERAL at 19:02

## 2020-03-02 RX ADMIN — Medication 10 ML: at 20:42

## 2020-03-02 RX ADMIN — KETAMINE HYDROCHLORIDE 10 MG: 50 INJECTION, SOLUTION INTRAMUSCULAR; INTRAVENOUS at 07:05

## 2020-03-02 RX ADMIN — ALBUMIN (HUMAN) 12.5 G: 12.5 INJECTION, SOLUTION INTRAVENOUS at 20:36

## 2020-03-02 RX ADMIN — ALBUMIN (HUMAN) 12.5 G: 12.5 INJECTION, SOLUTION INTRAVENOUS at 15:10

## 2020-03-02 RX ADMIN — FENTANYL CITRATE 500 MCG: 50 INJECTION, SOLUTION INTRAMUSCULAR; INTRAVENOUS at 07:05

## 2020-03-02 RX ADMIN — VANCOMYCIN HYDROCHLORIDE 1.75 G: 10 INJECTION, POWDER, LYOPHILIZED, FOR SOLUTION INTRAVENOUS at 07:30

## 2020-03-02 RX ADMIN — MUPIROCIN: 20 OINTMENT TOPICAL at 05:51

## 2020-03-02 RX ADMIN — AMINOCAPROIC ACID 100 G/HR: 250 INJECTION, SOLUTION INTRAVENOUS at 07:30

## 2020-03-02 RX ADMIN — CHLORHEXIDINE GLUCONATE 0.12% ORAL RINSE 15 ML: 1.2 LIQUID ORAL at 19:49

## 2020-03-02 RX ADMIN — FAMOTIDINE 20 MG: 10 INJECTION, SOLUTION INTRAVENOUS at 15:42

## 2020-03-02 RX ADMIN — HEPARIN SODIUM 40000 UNITS: 10000 INJECTION, SOLUTION INTRAVENOUS; SUBCUTANEOUS at 09:03

## 2020-03-02 RX ADMIN — SODIUM BICARBONATE 50 MEQ: 84 INJECTION, SOLUTION INTRAVENOUS at 12:35

## 2020-03-02 RX ADMIN — MIDAZOLAM HYDROCHLORIDE 2 MG: 2 INJECTION, SOLUTION INTRAMUSCULAR; INTRAVENOUS at 06:42

## 2020-03-02 RX ADMIN — SODIUM CHLORIDE, POTASSIUM CHLORIDE, SODIUM LACTATE AND CALCIUM CHLORIDE: 600; 310; 30; 20 INJECTION, SOLUTION INTRAVENOUS at 13:56

## 2020-03-02 RX ADMIN — ROCURONIUM BROMIDE 50 MG: 10 INJECTION, SOLUTION INTRAVENOUS at 11:21

## 2020-03-02 RX ADMIN — ROCURONIUM BROMIDE 100 MG: 10 INJECTION, SOLUTION INTRAVENOUS at 07:05

## 2020-03-02 RX ADMIN — SODIUM BICARBONATE 50 MEQ: 84 INJECTION, SOLUTION INTRAVENOUS at 16:24

## 2020-03-02 RX ADMIN — ALBUMIN (HUMAN) 12.5 G: 12.5 INJECTION, SOLUTION INTRAVENOUS at 21:09

## 2020-03-02 ASSESSMENT — PULMONARY FUNCTION TESTS
PIF_VALUE: 1
PIF_VALUE: 27
PIF_VALUE: 1
PIF_VALUE: 23
PIF_VALUE: 1
PIF_VALUE: 1
PIF_VALUE: 2
PIF_VALUE: 20
PIF_VALUE: 23
PIF_VALUE: 24
PIF_VALUE: 15
PIF_VALUE: 26
PIF_VALUE: 26
PIF_VALUE: 24
PIF_VALUE: 1
PIF_VALUE: 1
PIF_VALUE: 22
PIF_VALUE: 23
PIF_VALUE: 24
PIF_VALUE: 26
PIF_VALUE: 27
PIF_VALUE: 2
PIF_VALUE: 21
PIF_VALUE: 22
PIF_VALUE: 15
PIF_VALUE: 1
PIF_VALUE: 21
PIF_VALUE: 23
PIF_VALUE: 1
PIF_VALUE: 24
PIF_VALUE: 1
PIF_VALUE: 25
PIF_VALUE: 23
PIF_VALUE: 1
PIF_VALUE: 24
PIF_VALUE: 2
PIF_VALUE: 3
PIF_VALUE: 24
PIF_VALUE: 1
PIF_VALUE: 26
PIF_VALUE: 24
PIF_VALUE: 23
PIF_VALUE: 25
PIF_VALUE: 1
PIF_VALUE: 23
PIF_VALUE: 22
PIF_VALUE: 23
PIF_VALUE: 2
PIF_VALUE: 1
PIF_VALUE: 25
PIF_VALUE: 24
PIF_VALUE: 24
PIF_VALUE: 7
PIF_VALUE: 1
PIF_VALUE: 26
PIF_VALUE: 1
PIF_VALUE: 1
PIF_VALUE: 25
PIF_VALUE: 1
PIF_VALUE: 15
PIF_VALUE: 24
PIF_VALUE: 1
PIF_VALUE: 23
PIF_VALUE: 1
PIF_VALUE: 23
PIF_VALUE: 23
PIF_VALUE: 1
PIF_VALUE: 1
PIF_VALUE: 26
PIF_VALUE: 2
PIF_VALUE: 4
PIF_VALUE: 26
PIF_VALUE: 23
PIF_VALUE: 0
PIF_VALUE: 22
PIF_VALUE: 23
PIF_VALUE: 1
PIF_VALUE: 24
PIF_VALUE: 1
PIF_VALUE: 2
PIF_VALUE: 19
PIF_VALUE: 26
PIF_VALUE: 1
PIF_VALUE: 26
PIF_VALUE: 21
PIF_VALUE: 1
PIF_VALUE: 2
PIF_VALUE: 1
PIF_VALUE: 23
PIF_VALUE: 1
PIF_VALUE: 19
PIF_VALUE: 1
PIF_VALUE: 1
PIF_VALUE: 25
PIF_VALUE: 25
PIF_VALUE: 1
PIF_VALUE: 25
PIF_VALUE: 23
PIF_VALUE: 1
PIF_VALUE: 26
PIF_VALUE: 25
PIF_VALUE: 1
PIF_VALUE: 1
PIF_VALUE: 23
PIF_VALUE: 23
PIF_VALUE: 2
PIF_VALUE: 22
PIF_VALUE: 25
PIF_VALUE: 26
PIF_VALUE: 2
PIF_VALUE: 1
PIF_VALUE: 26
PIF_VALUE: 3
PIF_VALUE: 23
PIF_VALUE: 27
PIF_VALUE: 1
PIF_VALUE: 2
PIF_VALUE: 14
PIF_VALUE: 24
PIF_VALUE: 24
PIF_VALUE: 27
PIF_VALUE: 24
PIF_VALUE: 27
PIF_VALUE: 1
PIF_VALUE: 1
PIF_VALUE: 24
PIF_VALUE: 1
PIF_VALUE: 24
PIF_VALUE: 2
PIF_VALUE: 20
PIF_VALUE: 3
PIF_VALUE: 24
PIF_VALUE: 26
PIF_VALUE: 23
PIF_VALUE: 22
PIF_VALUE: 23
PIF_VALUE: 26
PIF_VALUE: 22
PIF_VALUE: 3
PIF_VALUE: 27
PIF_VALUE: 20
PIF_VALUE: 26
PIF_VALUE: 21
PIF_VALUE: 1
PIF_VALUE: 27
PIF_VALUE: 24
PIF_VALUE: 3
PIF_VALUE: 24
PIF_VALUE: 23
PIF_VALUE: 18
PIF_VALUE: 3
PIF_VALUE: 15
PIF_VALUE: 3
PIF_VALUE: 25
PIF_VALUE: 26
PIF_VALUE: 24
PIF_VALUE: 26
PIF_VALUE: 26
PIF_VALUE: 1
PIF_VALUE: 20
PIF_VALUE: 1
PIF_VALUE: 25
PIF_VALUE: 23
PIF_VALUE: 1
PIF_VALUE: 25
PIF_VALUE: 26
PIF_VALUE: 22
PIF_VALUE: 23
PIF_VALUE: 25
PIF_VALUE: 3
PIF_VALUE: 24
PIF_VALUE: 24
PIF_VALUE: 1
PIF_VALUE: 2
PIF_VALUE: 26
PIF_VALUE: 23
PIF_VALUE: 26
PIF_VALUE: 28
PIF_VALUE: 24
PIF_VALUE: 1
PIF_VALUE: 24
PIF_VALUE: 4
PIF_VALUE: 25
PIF_VALUE: 25
PIF_VALUE: 5
PIF_VALUE: 23
PIF_VALUE: 0
PIF_VALUE: 1
PIF_VALUE: 1
PIF_VALUE: 26
PIF_VALUE: 6
PIF_VALUE: 24
PIF_VALUE: 24
PIF_VALUE: 2
PIF_VALUE: 26
PIF_VALUE: 24
PIF_VALUE: 3
PIF_VALUE: 24
PIF_VALUE: 25
PIF_VALUE: 1
PIF_VALUE: 26
PIF_VALUE: 1
PIF_VALUE: 25
PIF_VALUE: 1
PIF_VALUE: 23
PIF_VALUE: 1
PIF_VALUE: 25
PIF_VALUE: 24
PIF_VALUE: 3
PIF_VALUE: 24
PIF_VALUE: 1
PIF_VALUE: 25
PIF_VALUE: 27
PIF_VALUE: 26
PIF_VALUE: 1
PIF_VALUE: 3
PIF_VALUE: 19
PIF_VALUE: 2
PIF_VALUE: 26
PIF_VALUE: 27
PIF_VALUE: 6
PIF_VALUE: 24
PIF_VALUE: 2
PIF_VALUE: 2
PIF_VALUE: 24
PIF_VALUE: 23
PIF_VALUE: 15
PIF_VALUE: 1
PIF_VALUE: 1
PIF_VALUE: 29
PIF_VALUE: 24
PIF_VALUE: 1
PIF_VALUE: 1
PIF_VALUE: 2
PIF_VALUE: 23
PIF_VALUE: 10
PIF_VALUE: 25
PIF_VALUE: 25
PIF_VALUE: 1
PIF_VALUE: 26
PIF_VALUE: 9
PIF_VALUE: 23
PIF_VALUE: 1
PIF_VALUE: 24
PIF_VALUE: 1
PIF_VALUE: 21
PIF_VALUE: 24
PIF_VALUE: 24
PIF_VALUE: 21
PIF_VALUE: 2
PIF_VALUE: 24
PIF_VALUE: 26
PIF_VALUE: 1
PIF_VALUE: 24
PIF_VALUE: 2
PIF_VALUE: 1
PIF_VALUE: 1
PIF_VALUE: 23
PIF_VALUE: 24
PIF_VALUE: 4
PIF_VALUE: 1
PIF_VALUE: 26
PIF_VALUE: 1
PIF_VALUE: 24
PIF_VALUE: 22
PIF_VALUE: 24
PIF_VALUE: 1
PIF_VALUE: 19
PIF_VALUE: 1
PIF_VALUE: 24
PIF_VALUE: 27
PIF_VALUE: 1
PIF_VALUE: 1
PIF_VALUE: 24
PIF_VALUE: 1
PIF_VALUE: 24
PIF_VALUE: 15
PIF_VALUE: 26
PIF_VALUE: 24
PIF_VALUE: 2
PIF_VALUE: 1
PIF_VALUE: 26
PIF_VALUE: 23
PIF_VALUE: 1
PIF_VALUE: 2
PIF_VALUE: 1
PIF_VALUE: 26
PIF_VALUE: 27
PIF_VALUE: 24
PIF_VALUE: 15
PIF_VALUE: 2
PIF_VALUE: 23
PIF_VALUE: 24
PIF_VALUE: 1
PIF_VALUE: 25
PIF_VALUE: 23
PIF_VALUE: 24
PIF_VALUE: 24
PIF_VALUE: 18
PIF_VALUE: 23
PIF_VALUE: 23
PIF_VALUE: 1
PIF_VALUE: 1
PIF_VALUE: 27
PIF_VALUE: 19
PIF_VALUE: 1
PIF_VALUE: 26
PIF_VALUE: 1
PIF_VALUE: 1
PIF_VALUE: 24
PIF_VALUE: 23
PIF_VALUE: 27
PIF_VALUE: 3
PIF_VALUE: 25
PIF_VALUE: 1
PIF_VALUE: 23
PIF_VALUE: 21
PIF_VALUE: 1
PIF_VALUE: 36
PIF_VALUE: 1
PIF_VALUE: 26
PIF_VALUE: 25
PIF_VALUE: 24
PIF_VALUE: 1
PIF_VALUE: 23
PIF_VALUE: 1
PIF_VALUE: 2
PIF_VALUE: 22
PIF_VALUE: 26
PIF_VALUE: 24
PIF_VALUE: 2
PIF_VALUE: 1
PIF_VALUE: 24
PIF_VALUE: 1
PIF_VALUE: 26
PIF_VALUE: 23
PIF_VALUE: 2
PIF_VALUE: 1
PIF_VALUE: 24
PIF_VALUE: 25
PIF_VALUE: 25
PIF_VALUE: 24
PIF_VALUE: 1
PIF_VALUE: 18
PIF_VALUE: 25
PIF_VALUE: 27
PIF_VALUE: 22
PIF_VALUE: 22
PIF_VALUE: 24
PIF_VALUE: 1
PIF_VALUE: 24
PIF_VALUE: 27
PIF_VALUE: 1
PIF_VALUE: 20
PIF_VALUE: 22
PIF_VALUE: 1
PIF_VALUE: 26
PIF_VALUE: 24
PIF_VALUE: 1
PIF_VALUE: 24
PIF_VALUE: 18
PIF_VALUE: 24
PIF_VALUE: 1
PIF_VALUE: 25
PIF_VALUE: 2
PIF_VALUE: 39
PIF_VALUE: 32
PIF_VALUE: 23
PIF_VALUE: 1
PIF_VALUE: 25
PIF_VALUE: 21
PIF_VALUE: 1
PIF_VALUE: 26
PIF_VALUE: 26
PIF_VALUE: 1
PIF_VALUE: 1
PIF_VALUE: 2
PIF_VALUE: 26
PIF_VALUE: 1
PIF_VALUE: 2
PIF_VALUE: 25
PIF_VALUE: 23
PIF_VALUE: 4
PIF_VALUE: 22
PIF_VALUE: 23
PIF_VALUE: 26
PIF_VALUE: 1
PIF_VALUE: 1
PIF_VALUE: 24
PIF_VALUE: 24
PIF_VALUE: 1
PIF_VALUE: 8
PIF_VALUE: 23
PIF_VALUE: 1
PIF_VALUE: 27
PIF_VALUE: 1
PIF_VALUE: 24
PIF_VALUE: 2
PIF_VALUE: 23
PIF_VALUE: 23
PIF_VALUE: 27
PIF_VALUE: 1
PIF_VALUE: 14
PIF_VALUE: 23
PIF_VALUE: 19
PIF_VALUE: 27
PIF_VALUE: 1
PIF_VALUE: 1
PIF_VALUE: 23
PIF_VALUE: 1
PIF_VALUE: 24
PIF_VALUE: 1
PIF_VALUE: 25
PIF_VALUE: 23
PIF_VALUE: 25
PIF_VALUE: 25

## 2020-03-02 ASSESSMENT — PAIN - FUNCTIONAL ASSESSMENT: PAIN_FUNCTIONAL_ASSESSMENT: 0-10

## 2020-03-02 ASSESSMENT — PAIN SCALES - GENERAL
PAINLEVEL_OUTOF10: 0
PAINLEVEL_OUTOF10: 4
PAINLEVEL_OUTOF10: 3
PAINLEVEL_OUTOF10: 2

## 2020-03-02 NOTE — BRIEF OP NOTE
Pre-op Dx:CAD; Type 2 DM; obesity; Class1 chronic systolic heart failure (EF ~20%); paroxysmal afib      Post-op Dx:same; acute blood loss anemia      Procedure:WAYNE; TCPB; CABG x 3, LIMA to LAD w/ long segment endarterectomy (4 cm); bilateral pulmonary vein isolation; 40 mm Atriclip to L atril appendage; endoscopic L GSV harvest; ICNB x 5 levels bilat; sternal plate x 1 to manubrium      Anesthesia:General endotracheal anesthesia      Surgeon/Assistants:Jf/Chepe/Paulino      Specimens: LAD plaque      Complications: none      Findings:pre-op EF ~20-25%; post op EF ~40% w/ marked improvement in anterior and septal contracttlity

## 2020-03-02 NOTE — PROGRESS NOTES
MECHANICAL VENTILATION WEANING PROTOCOL    PRE-TRIAL PATIENT ASSESSMENT - COMPLETED AT 1825    Ventilatory Assessment:    PARAMETER CRITERIA FOR WEANING   Spontaneous Cough:  Yes    Sputum Characteristics:  ·    ·    ·   SPONTANEOUS COUGH With small to moderate  Amount of secretions   FiO2 : 50 % FIO2 less than or equal to 50%     PEEP less than or equal to 8   Progressive Mobility Protocol  No     ABG:  Lab Results   Component Value Date    PHART 7.347 03/02/2020    BZV9FCC 41.2 03/02/2020    PO2ART 89.1 03/02/2020    G3BFJSCL 96 03/02/2020    DBX8IUK 22.6 03/02/2020    BEART -3 03/02/2020     HGB/WBC:  Lab Results   Component Value Date    HGB 13.9 03/02/2020    WBC 29.9 03/02/2020        Vital Signs:    PARAMETER CRITERIA FOR WEANING Meets Criteria   Pulse: 102 Within patient's normal limits / stable Yes   Resp: 23 Less than or equal to 30 Yes   BP: 99/61 Within patient's normal limits / minimal pressors (Hemodynamically Stable) Yes   SpO2: 100 % Greater than or equal to 90% Yes   End Tidal CO2: 34 (%) Within patient's normal limits Yes   Temp: 98.4 °F (36.9 °C) Less than 38. 5oC / 101. 3oF Yes     [x]    Based on this assessment and the [facility] Ventilator Weaning Protocol, this patient  IS being placed on a Spontaneous Breathing Trial (SBT) at this time.  []    Based on this assessment and the [facility] Ventilator Weaning Protocol, this patient  IS NOT being placed on a Spontaneous Breathing Trial (SBT) at this time. []    Patient  IS NOT being placed on a Spontaneous Breathing Trial (SBT) at this time because of factors not previously addressed.           SBT - Initiated at  [address] [place] [address] Road:  CPAP - 5 cmH2O, PS - 10 cmH2O    1 Minute SBT Respiratory Parameters:   VE: 12.5 L   RR: 22 b/m   VT: 568 mL (average VT = VE/RR)   RSBI: 39 (RR/VT in liters)   ETCO2: 31 cmH2O   SPO2: 99 %   If on sedation, amount and type See MAR    []   RR is less than 35, RSBI is less than 100, patient's vitals signs are stable, and

## 2020-03-02 NOTE — PROGRESS NOTES
Patient is starting to wake up, able to lift head off of bed and nod head appropriately.  Respiratory called to attempt spontaneous breathing trial.

## 2020-03-02 NOTE — H&P
Nasim Mayers    [id number]    [facility] Same Day Surgery Update H & P  Department of General Surgery   Surgical Service   Pre-operative History and Physical  Last H & P within the last 30 days. DIAGNOSIS:   MULTI-VESSEL CORONARY ARTERY DISEASE I25.10    PROCEDURE:  NY CABG, VEIN, THREE [51374] (CORONARY ARTERY BYPASS GRAFT X 3 WITH PULMONARY VEIN ISOLATION AND LEFT ATRIAL APPENDAGE CLIP)      HISTORY OF PRESENT ILLNESS:   Patient with c/o dyspnea on exertion was found to have multi vessel CAD on workup and presents today for the above procedure.        Past Medical History:        Diagnosis Date    A-fib [facility]) 2020    CAD (coronary artery disease)     Diabetes mellitus ([facility] [address]     diet controlled     Enlarged prostate     Environmental allergies     Kidney stones      Past Surgical History:        Procedure Laterality Date    CARDIAC CATHETERIZATION  12/10/2019    NASAL POLYP SURGERY  x2    NASAL SEPTUM SURGERY       Past Social History:  Social History     Socioeconomic History    Marital status: Single     Spouse name: None    Number of children: None    Years of education: None    Highest education level: None   Occupational History    None   Social Needs    Financial resource strain: None    Food insecurity:     Worry: None     Inability: None    Transportation needs:     Medical: None     Non-medical: None   Tobacco Use    Smoking status: Never Smoker    Smokeless tobacco: Never Used   Substance and Sexual Activity    Alcohol use: Not Currently     Comment: rare    Drug use: Never    Sexual activity: None   Lifestyle    Physical activity:     Days per week: None     Minutes per session: None    Stress: None   Relationships    Social connections:     Talks on phone: None     Gets together: None     Attends [employer] service: None     Active member of club or organization: None     Attends meetings of clubs or organizations: None     Relationship status: None    Intimate partner

## 2020-03-02 NOTE — DISCHARGE INSTR - COC
Continuity of Care Form    Patient Name: Nasim Mayers   :    MRN:  [id number]    Admit date:  3/2/2020  Discharge date:  ***    Code Status Order: Full Code   Advance Directives:   Advance Care Flowsheet Documentation     Date/Time Healthcare Directive Type of Healthcare Directive Copy in [address] Agent's Name Healthcare Agent's Phone Number    20 0532  No, patient does not have an advance directive for healthcare treatment -- -- -- -- --          Admitting Physician:  Kian Perea MD  PCP: No primary care provider on file. Discharging Nurse: [facility] Unit/Room#: OR/NONE  Discharging Unit Phone Number: ***    Emergency Contact:   Extended Emergency Contact Information  Primary Emergency Contact: ADRIANNE SHAH  Home Phone: [phone]  Relation: Niece/Nephew    Past Surgical History:  Past Surgical History:   Procedure Laterality Date    CARDIAC CATHETERIZATION  12/10/2019    NASAL POLYP SURGERY  x2    NASAL SEPTUM SURGERY         Immunization History: There is no immunization history on file for this patient.     Active Problems:  Patient Active Problem List   Diagnosis Code    Bladder obstruction N32.0    Dilated cardiomyopathy (HCC) I42.0    New onset a-fib ([address] I48.91    S/P coronary angiogram Z98.890       Isolation/Infection:   Isolation          No Isolation        Patient Infection Status     None to display          Nurse Assessment:  Last Vital Signs: /79 Comment: right arm  Pulse 94   Temp 98.4 °F (36.9 °C) (Oral)   Resp 16   Wt 270 lb (122.5 kg)   SpO2 94%   BMI 38.74 kg/m²     Last documented pain score (0-10 scale): Pain Level: 0  Last Weight:   Wt Readings from Last 1 Encounters:   20 270 lb (122.5 kg)     Mental Status:  oriented and alert    IV Access:  - None    Nursing Mobility/ADLs:  Walking   Assisted  Transfer  Assisted  Bathing  Assisted  Dressing  Assisted  Toileting  Assisted  Feeding Independent  Med Admin  Assisted  Med Delivery   whole    Wound Care Documentation and Therapy:        Elimination:  Continence:   · Bowel: Yes  · Bladder: Yes  Urinary Catheter: None   Colostomy/Ileostomy/Ileal Conduit: No       Date of Last BM: 3/11/2020    No intake or output data in the 24 hours ending 03/02/20 0745  No intake/output data recorded. Safety Concerns: At Risk for Falls    Impairments/Disabilities:      None    Nutrition Therapy:  Current Nutrition Therapy:   - Oral Diet:  Carb Control 3 carbs/meal (1500kcals/day) and Low Sodium (3-4gm)    Routes of Feeding: Oral  Liquids: Thin Liquids  Daily Fluid Restriction: yes - amount 1500  Last Modified Barium Swallow with Video (Video Swallowing Test): not done    Treatments at the Time of Hospital Discharge:   Respiratory Treatments: ***  Oxygen Therapy:  is not on home oxygen therapy.   Ventilator:    - No ventilator support    Rehab Therapies: Physical Therapy and Occupational Therapy  Weight Bearing Status/Restrictions: No pushing or pulling with upper extremities d/t sternal precautions  Other Medical Equipment (for information only, NOT a DME order):  {EQUIPMENT:[id number]}  Other Treatments: ***    Patient's personal belongings (please select all that are sent with patient):  Asya    RN SIGNATURE:  Electronically signed by Jeaneth Kim RN on 3/11/20 at 1:40 PM EDT    CASE MANAGEMENT/SOCIAL WORK SECTION    Inpatient Status Date: 3/2/2020    Readmission Risk Assessment Score:  Readmission Risk              Risk of Unplanned Readmission:        9           Discharging to Facility/ [id number] [address]  Report: [phone]  Fax: [phone]      / signature: Electronically signed by EYAL Clement on 3/9/20 at 2:27 PM EDT    PHYSICIAN SECTION    Prognosis: Good    Condition at Discharge: Stable    Rehab Potential (if transferring to Rehab): Good    Recommended Labs or Other Treatments After Discharge:   MUST  [address] 24-48 HOURS FROM DISCHARGED [address]    Pre-op weight:   260 lbs    Incision/Wound Care:    Midsternal      Chest Tube Site     Legs         Clean with antibacterial soap and water daily. Monitor for signs of infection    Upper Extremity Restrictions (sternal precautions):  No lifting, pushing, pulling over 5 pounds for 8 weeks. Remove chest tubes sutures 10 days post-op, after checking with surgeon's office. Please remove IJ suture if it is still in.   1.  Wash EACH incision daily with separate wash cloth with antibacterial soap and water; pat dry. Do NOT apply anything to incisions - NO LOTIONS, HYDROGEN PEROXIDE, POWDER. 2.  If discharged with dressing on wound, remove dressing and wash daily with antibacterial soap and water. Leave open to air unless draining. 3. TRUMAN hose on during the day and off at night. Keep legs elevated while sitting, especially if edematous. 4.  Incentive spiropmeter X 10, cough and deep breath every hour while awake. 5.  Pulse Ox checks with each visit for home care and with vital signs for ECF. CALL if pulse ox less than 90%. 6.  Keep activity log (ambulate as directed and bring log to follow up appointment.)  7. Daily weight and record  8. No tub bath. May shower  9. Up in chair for all meals  10. PT/OT - evaluate and treat  11. Follow up to see Dr Kristina Kitchen in 1 week (Wednesday, March 18, 2020 at 2:45 PM)  12. Labs: POC glucose AC & HS, Renal fuction panel Friday, March 13 and Monday March 16. Send results to Dr Clover Ca office    Call the surgeon at [phone] for any for any of the following reasons:  · Changes in incision (increased redness, tenderness, warmth or drainage)  · Call for pain not relieved with pain medication  · Call for temp >101, HR <50 or >110 beats/min, SBP < 90 or >160.   · No bowel movement for 3 days  · Daily weights: call if 2 pound weight gain in 24 hours or 5 pound weight gain in 1 week. · Call for persistent diarrhea, nausea, or vomiting. · Pain, tenderness, warmth, or redness in calf  · Call for symptomatic fluttering in chest, irregular pulse, dyspnea  · DEPRESSION: Call Primary Care Physician if feelings of depression and depression persists  · DIABETICS: Call Primary Care Physician for blood sugars >130 consistently       Physician Certification: I certify the above information and transfer of Aric Tello  is necessary for the continuing treatment of the diagnosis listed and that he requires [facility] for less 30 days. Update Admission H&P: No change in H&P    PHYSICIAN SIGNATURE:  Electronically signed by EVELYN Lemons/SMITHA Krishnan MD on 3/11/20 at 10:31 AM EDT

## 2020-03-02 NOTE — PROGRESS NOTES
1340: patient admitted to 4513 from OR. Connected to monitor and continuous cardiac output monitoring. Patient opens eyes to name and follows simple commands. Hemodynamically patient is stable, with levophed and milrinone infusing. Urine output is bloody, MD aware. Blood collected and sent to lab. Pt has no family to update.

## 2020-03-03 ENCOUNTER — APPOINTMENT (OUTPATIENT)
Dept: CT IMAGING | Age: 70
DRG: 166 | End: 2020-03-03
Attending: THORACIC SURGERY (CARDIOTHORACIC VASCULAR SURGERY)
Payer: MEDICAID

## 2020-03-03 LAB
ANION GAP SERPL CALCULATED.3IONS-SCNC: 14 MMOL/L (ref 3–16)
BUN BLDV-MCNC: 21 MG/DL (ref 7–20)
CALCIUM SERPL-MCNC: 7.5 MG/DL (ref 8.3–10.6)
CHLORIDE BLD-SCNC: 109 MMOL/L (ref 99–110)
CO2: 21 MMOL/L (ref 21–32)
CREAT SERPL-MCNC: 1.3 MG/DL (ref 0.8–1.3)
GFR AFRICAN AMERICAN: >60
GFR NON-AFRICAN AMERICAN: 55
GLUCOSE BLD-MCNC: 109 MG/DL (ref 70–99)
GLUCOSE BLD-MCNC: 111 MG/DL (ref 70–99)
GLUCOSE BLD-MCNC: 112 MG/DL (ref 70–99)
GLUCOSE BLD-MCNC: 115 MG/DL (ref 70–99)
GLUCOSE BLD-MCNC: 116 MG/DL (ref 70–99)
GLUCOSE BLD-MCNC: 118 MG/DL (ref 70–99)
GLUCOSE BLD-MCNC: 119 MG/DL (ref 70–99)
GLUCOSE BLD-MCNC: 122 MG/DL (ref 70–99)
GLUCOSE BLD-MCNC: 125 MG/DL (ref 70–99)
GLUCOSE BLD-MCNC: 128 MG/DL (ref 70–99)
GLUCOSE BLD-MCNC: 143 MG/DL (ref 70–99)
GLUCOSE BLD-MCNC: 185 MG/DL (ref 70–99)
GLUCOSE BLD-MCNC: 211 MG/DL (ref 70–99)
HCT VFR BLD CALC: 32.6 % (ref 40.5–52.5)
HEMOGLOBIN: 10.7 G/DL (ref 13.5–17.5)
MAGNESIUM: 2 MG/DL (ref 1.8–2.4)
MCH RBC QN AUTO: 29.6 PG (ref 26–34)
MCHC RBC AUTO-ENTMCNC: 32.9 G/DL (ref 31–36)
MCV RBC AUTO: 89.8 FL (ref 80–100)
PDW BLD-RTO: 14.2 % (ref 12.4–15.4)
PERFORMED ON: ABNORMAL
PLATELET # BLD: 99 K/UL (ref 135–450)
PMV BLD AUTO: 9.1 FL (ref 5–10.5)
POTASSIUM SERPL-SCNC: 4.6 MMOL/L (ref 3.5–5.1)
RBC # BLD: 3.63 M/UL (ref 4.2–5.9)
SODIUM BLD-SCNC: 144 MMOL/L (ref 136–145)
URINE CULTURE, ROUTINE: NORMAL
WBC # BLD: 12.9 K/UL (ref 4–11)

## 2020-03-03 PROCEDURE — 97166 OT EVAL MOD COMPLEX 45 MIN: CPT

## 2020-03-03 PROCEDURE — 83735 ASSAY OF MAGNESIUM: CPT

## 2020-03-03 PROCEDURE — 94640 AIRWAY INHALATION TREATMENT: CPT

## 2020-03-03 PROCEDURE — 2000000000 HC ICU R&B

## 2020-03-03 PROCEDURE — 70470 CT HEAD/BRAIN W/O & W/DYE: CPT

## 2020-03-03 PROCEDURE — 6360000004 HC RX CONTRAST MEDICATION: Performed by: THORACIC SURGERY (CARDIOTHORACIC VASCULAR SURGERY)

## 2020-03-03 PROCEDURE — 2700000000 HC OXYGEN THERAPY PER DAY

## 2020-03-03 PROCEDURE — 6370000000 HC RX 637 (ALT 250 FOR IP): Performed by: THORACIC SURGERY (CARDIOTHORACIC VASCULAR SURGERY)

## 2020-03-03 PROCEDURE — 97535 SELF CARE MNGMENT TRAINING: CPT

## 2020-03-03 PROCEDURE — 2500000003 HC RX 250 WO HCPCS: Performed by: THORACIC SURGERY (CARDIOTHORACIC VASCULAR SURGERY)

## 2020-03-03 PROCEDURE — 2580000003 HC RX 258: Performed by: THORACIC SURGERY (CARDIOTHORACIC VASCULAR SURGERY)

## 2020-03-03 PROCEDURE — 6360000002 HC RX W HCPCS: Performed by: THORACIC SURGERY (CARDIOTHORACIC VASCULAR SURGERY)

## 2020-03-03 PROCEDURE — 97530 THERAPEUTIC ACTIVITIES: CPT

## 2020-03-03 PROCEDURE — 1200000000 HC SEMI PRIVATE

## 2020-03-03 PROCEDURE — 94761 N-INVAS EAR/PLS OXIMETRY MLT: CPT

## 2020-03-03 PROCEDURE — 85027 COMPLETE CBC AUTOMATED: CPT

## 2020-03-03 PROCEDURE — 80048 BASIC METABOLIC PNL TOTAL CA: CPT

## 2020-03-03 RX ORDER — KETOROLAC TROMETHAMINE 30 MG/ML
15 INJECTION, SOLUTION INTRAMUSCULAR; INTRAVENOUS EVERY 6 HOURS
Status: DISCONTINUED | OUTPATIENT
Start: 2020-03-03 | End: 2020-03-04

## 2020-03-03 RX ORDER — FAMOTIDINE 20 MG/1
20 TABLET, FILM COATED ORAL 2 TIMES DAILY
Status: DISCONTINUED | OUTPATIENT
Start: 2020-03-03 | End: 2020-03-04

## 2020-03-03 RX ORDER — KETOROLAC TROMETHAMINE 30 MG/ML
30 INJECTION, SOLUTION INTRAMUSCULAR; INTRAVENOUS ONCE
Status: COMPLETED | OUTPATIENT
Start: 2020-03-03 | End: 2020-03-03

## 2020-03-03 RX ADMIN — ATORVASTATIN CALCIUM 40 MG: 40 TABLET, FILM COATED ORAL at 21:35

## 2020-03-03 RX ADMIN — DIPHENHYDRAMINE HYDROCHLORIDE 25 MG: 25 TABLET ORAL at 21:35

## 2020-03-03 RX ADMIN — Medication 10 ML: at 11:25

## 2020-03-03 RX ADMIN — ALBUTEROL SULFATE 2.5 MG: 2.5 SOLUTION RESPIRATORY (INHALATION) at 06:55

## 2020-03-03 RX ADMIN — VANCOMYCIN HYDROCHLORIDE 1500 MG: 10 INJECTION, POWDER, LYOPHILIZED, FOR SOLUTION INTRAVENOUS at 20:34

## 2020-03-03 RX ADMIN — FUROSEMIDE 40 MG: 10 INJECTION, SOLUTION INTRAMUSCULAR; INTRAVENOUS at 11:24

## 2020-03-03 RX ADMIN — POTASSIUM CHLORIDE 10 MEQ: 10 TABLET, EXTENDED RELEASE ORAL at 18:26

## 2020-03-03 RX ADMIN — SENNOSIDES AND DOCUSATE SODIUM 1 TABLET: 8.6; 5 TABLET ORAL at 21:35

## 2020-03-03 RX ADMIN — FAMOTIDINE 20 MG: 20 TABLET, FILM COATED ORAL at 21:35

## 2020-03-03 RX ADMIN — ONDANSETRON 4 MG: 2 INJECTION INTRAMUSCULAR; INTRAVENOUS at 07:24

## 2020-03-03 RX ADMIN — MUPIROCIN: 20 OINTMENT TOPICAL at 21:35

## 2020-03-03 RX ADMIN — IOPAMIDOL 80 ML: 755 INJECTION, SOLUTION INTRAVENOUS at 09:32

## 2020-03-03 RX ADMIN — CHLORHEXIDINE GLUCONATE 0.12% ORAL RINSE 15 ML: 1.2 LIQUID ORAL at 21:35

## 2020-03-03 RX ADMIN — CEFAZOLIN 2 G: 10 INJECTION, POWDER, FOR SOLUTION INTRAVENOUS; PARENTERAL at 20:34

## 2020-03-03 RX ADMIN — VANCOMYCIN HYDROCHLORIDE 1500 MG: 10 INJECTION, POWDER, LYOPHILIZED, FOR SOLUTION INTRAVENOUS at 07:22

## 2020-03-03 RX ADMIN — METOPROLOL TARTRATE 12.5 MG: 25 TABLET, FILM COATED ORAL at 21:36

## 2020-03-03 RX ADMIN — FENTANYL CITRATE 25 MCG: 50 INJECTION, SOLUTION INTRAMUSCULAR; INTRAVENOUS at 07:02

## 2020-03-03 RX ADMIN — MUPIROCIN: 20 OINTMENT TOPICAL at 11:27

## 2020-03-03 RX ADMIN — INSULIN LISPRO 4 UNITS: 100 INJECTION, SOLUTION INTRAVENOUS; SUBCUTANEOUS at 23:02

## 2020-03-03 RX ADMIN — SODIUM CHLORIDE 6.6 UNITS/HR: 9 INJECTION, SOLUTION INTRAVENOUS at 03:02

## 2020-03-03 RX ADMIN — CEFAZOLIN 2 G: 10 INJECTION, POWDER, FOR SOLUTION INTRAVENOUS; PARENTERAL at 03:24

## 2020-03-03 RX ADMIN — POTASSIUM CHLORIDE 10 MEQ: 10 TABLET, EXTENDED RELEASE ORAL at 11:24

## 2020-03-03 RX ADMIN — Medication 10 ML: at 21:36

## 2020-03-03 RX ADMIN — FAMOTIDINE 20 MG: 10 INJECTION, SOLUTION INTRAVENOUS at 11:24

## 2020-03-03 RX ADMIN — INSULIN GLARGINE 15 UNITS: 100 INJECTION, SOLUTION SUBCUTANEOUS at 23:02

## 2020-03-03 RX ADMIN — CEFAZOLIN 2 G: 10 INJECTION, POWDER, FOR SOLUTION INTRAVENOUS; PARENTERAL at 11:26

## 2020-03-03 RX ADMIN — KETOROLAC TROMETHAMINE 15 MG: 30 INJECTION, SOLUTION INTRAMUSCULAR at 17:24

## 2020-03-03 RX ADMIN — Medication 400 MG: at 21:35

## 2020-03-03 RX ADMIN — KETOROLAC TROMETHAMINE 30 MG: 30 INJECTION, SOLUTION INTRAMUSCULAR at 11:24

## 2020-03-03 RX ADMIN — INSULIN LISPRO 2 UNITS: 100 INJECTION, SOLUTION INTRAVENOUS; SUBCUTANEOUS at 19:11

## 2020-03-03 RX ADMIN — ASPIRIN 325 MG: 325 TABLET, COATED ORAL at 11:24

## 2020-03-03 RX ADMIN — KETOROLAC TROMETHAMINE 15 MG: 30 INJECTION, SOLUTION INTRAMUSCULAR at 21:36

## 2020-03-03 RX ADMIN — FUROSEMIDE 40 MG: 10 INJECTION, SOLUTION INTRAMUSCULAR; INTRAVENOUS at 18:26

## 2020-03-03 RX ADMIN — CHLORHEXIDINE GLUCONATE 0.12% ORAL RINSE 15 ML: 1.2 LIQUID ORAL at 11:25

## 2020-03-03 RX ADMIN — Medication 400 MG: at 11:24

## 2020-03-03 RX ADMIN — CLOPIDOGREL BISULFATE 75 MG: 75 TABLET ORAL at 11:24

## 2020-03-03 ASSESSMENT — PAIN DESCRIPTION - PAIN TYPE: TYPE: ACUTE PAIN;SURGICAL PAIN

## 2020-03-03 ASSESSMENT — PAIN DESCRIPTION - LOCATION: LOCATION: CHEST

## 2020-03-03 ASSESSMENT — PAIN SCALES - GENERAL
PAINLEVEL_OUTOF10: 0
PAINLEVEL_OUTOF10: 8
PAINLEVEL_OUTOF10: 0
PAINLEVEL_OUTOF10: 2

## 2020-03-03 ASSESSMENT — PAIN DESCRIPTION - PROGRESSION: CLINICAL_PROGRESSION: NOT CHANGED

## 2020-03-03 ASSESSMENT — PAIN DESCRIPTION - FREQUENCY: FREQUENCY: INTERMITTENT

## 2020-03-03 ASSESSMENT — PAIN DESCRIPTION - DESCRIPTORS: DESCRIPTORS: ACHING;SORE;DISCOMFORT

## 2020-03-03 ASSESSMENT — PAIN DESCRIPTION - ONSET: ONSET: ON-GOING

## 2020-03-03 ASSESSMENT — PAIN - FUNCTIONAL ASSESSMENT: PAIN_FUNCTIONAL_ASSESSMENT: PREVENTS OR INTERFERES WITH MANY ACTIVE NOT PASSIVE ACTIVITIES

## 2020-03-03 ASSESSMENT — PAIN DESCRIPTION - ORIENTATION: ORIENTATION: ANTERIOR

## 2020-03-03 NOTE — PROGRESS NOTES
Occupational Therapy/Physical Therapy  Referral received. Chart reviewed. RN requesting PT/OT to evaluate at a later time 2/2 patient fatigue. Will follow up at a later time versus later date as schedule permits and as appropriate.    [facility],  [address], [place]  Darinel Espinoza, DPT [id number]

## 2020-03-03 NOTE — FLOWSHEET NOTE
03/03/20 1108   Encounter Summary   Services provided to: Patient   Referral/Consult From: Grace   Continue Visiting   (Seen 3/3/2020, KEMI. )   Complexity of Encounter Moderate   Length of Encounter 15 minutes   Routine   Type Initial   Assessment Approachable   Intervention Nurtured hope   Outcome Expressed gratitude   Spiritual/Rastafari   Type Spiritual support   Assessment Approachable   Intervention Nurtured hope   Outcome Expressed gratitude

## 2020-03-03 NOTE — PROGRESS NOTES
Dr. Anjelica Tang called for update, patient on Levophed drip at  0.05 mcg/kg/min, and Milrinone  Drip at 0.17 mcg/kg/min, /59, CI 3.1, SVO2 73,  ST. Patient calm and comfortable at this time, alert and oriented x4,  Tolerating ice chips, O2 Sats 98% on 4 liters O2 per nasal cannula. Urine and chest tube output WNL. Dr. Anjelica Tang ok to titrate drips as tolerated by patient and transition in the morning.

## 2020-03-03 NOTE — PROGRESS NOTES
Progress Note    S/P  CABG x 3 and surgical ablation with pulmonary vein isolation and ligation of left atrial appendage    Subjective/Interval Events:    Overall doing well  Extubated last night  Inotropes weaned off by early am.    Remains on low dose levophed at 0.02mcg/kg/min    Feels reasonable, says he is not having pain. Vital Signs:                                                 /69   Pulse 105   Temp (P) 97.2 °F (36.2 °C) (Core)   Resp 24   Ht 5' 10\" (1.778 m)   Wt 260 lb (117.9 kg)   SpO2 98%   BMI 37.31 kg/m²  O2 Flow Rate (L/min): 2 L/min     Gen: awake in bed, appears comfortable  CV:   Mild tachycardia, regular  Pulm: normal effort  Incisions:    Sternotomy site looks good, right leg with ACE wrap  Abd:  Soft, obese, non-tender  Ext: mild edema, warm  Neuro: no focal deficits, alert and oriented    Chest tubes with 500mL of serosanguinous output since OR    Labs reviewed  Na 144  BUN 21  Cr 1.3    BG  overnight    WBC 12.3  Hgb 10.7  Plt 99    Assessment/Plan:  Mr. Kimberly Caballero is doing well after CABG    He had history of chronic systolic heart failure related to ischemic cardiomyopathy, also had  recent paroxysmal atrial fibrillation    Overnight weaned off inotropes  Index remains good  DC swan    Remains on low dose levophed at 0.02mcg/kg/min with adequate BP.   Would not give more fluid at this point, discussed with nursing, wean pressors as able     Keep arterial line until off pressors    Hold BB  Start diuresis today, PAD and CVP elevated    Up in chair, ambulate later if BP adequate  Pulmonary toilet    Keep guo for now, has a very large bladder calculi  UOP good, guo required flushing overnight  History of mild renal insufficiency  Intra-op urine culture sent given character of urine, follow up results    Keep pacing wires and chest tubes    Will start aspirin and plavix for grafts  Will hold off on anticoagulation for now      Eber Barrientos MD  3/3/2020  6:11 AM

## 2020-03-03 NOTE — PLAN OF CARE
CDI, biopatch in place. CHG bath done this shift. Problem: Risk for Impaired Skin Integrity  Goal: Tissue integrity - skin and mucous membranes  Description  Structural intactness and normal physiological function of skin and  mucous membranes. Outcome: Ongoing  Note:   Pt at risk for skin breakdown. See Damaso score. Pt remains on bedrest. Unable to reposition self in bed. Heels elevated off bed. Sacral heart mepilex intact to protect,  site inspected and intact underneath. Will continue to turn and reposition patient every two hours and as needed. Will continue to keep patient clean and dry, applying skin care cream as needed. Pillows used for repositioning q2hs. Will continue to monitor and assess for skin breakdown. No skin breakdown. Sternal incision CDI , well approximated.

## 2020-03-03 NOTE — PROGRESS NOTES
Occupational Therapy   Occupational Therapy Initial Assessment  Date: 3/3/2020   Patient Name: Jose Parker  MRN: [id number]     : 1950    Date of Service: 3/3/2020    Discharge Recommendations:Veronica Chaidez scored a 82/36 on the AM-PAC ADL Inpatient form. Current research shows that an AM-PAC score of 17 or less is typically not associated with a discharge to the patient's home setting. Based on the patients AM-PAC score and their current ADL deficits, it is recommended that the patient have 3-5 sessions per week of Occupational Therapy at d/c to increase the patients independence. OT Equipment Recommendations  Other: assess further     Assessment   Performance deficits / Impairments: Decreased functional mobility ; Decreased ADL status  Assessment: Pt demonstrated decreased functional independence after a coronary artery bypass graft x 3. Pt completed a transfer with [facility] and increased time for line management and was educated on transfer training while considering sternal precautions. Pt talkative throughout session and stated he would be willing to go to a SNF as he does not have assist at home upon d/c. Feel pt would benefit from continued OT services   Treatment Diagnosis: Decreased functional independence   Prognosis: Good  Decision Making: Medium Complexity  OT Education: OT Role;Transfer Training  Patient Education: Pt acknowledged OT role and transfer training with sternal precautions   REQUIRES OT FOLLOW UP: Yes  Activity Tolerance  Activity Tolerance: Patient Tolerated treatment well  Safety Devices  Safety Devices in place: Yes  Type of devices: Left in chair;Call light within reach; Chair alarm in place;Nurse notified         Treatment Diagnosis: Decreased functional independence       Restrictions  Position Activity Restriction  Other position/activity restrictions: ambulate, sternal precautions, cushion in chair    Subjective   General  Chart Reviewed: Yes  Patient assessed for rehabilitation services?: Yes  Additional Pertinent Hx: 2/26/2020 coronary artery bypass graft x 3 internal mammary and saphenous vein graft, TCP-BP with pulmonary vein isolation and L arterial appendage clip. PMHx includes A fib, CAD, diabetes mellitus, kidney stones   Family / Caregiver Present: No  Referring Practitioner: Dr. Carmine Daniel   Diagnosis: CAD in native artery   Subjective  Subjective: Pt sitting in chair upon entry and agreeable to therapy. Pt talkative throughout session. Patient Currently in Pain: Denies  Vital Signs  Patient Currently in Pain: Denies  Social/Functional History  Social/Functional History  Lives With: Alone  Type of Home: House  Home Layout: 1/2 bath on main level, Bed/Bath upstairs, Two level  Home Access: Stairs to enter with rails  Entrance Stairs - Number of Steps: ~14 VIOLETTA  Bathroom Shower/Tub: Tub/Shower unit  [employer]: Standard  Bathroom Equipment: Grab bars in shower, Shower chair(has shower chair but does not use)  Home Equipment: Rolling walker  Receives Help From: (none )  ADL Assistance: [address] [place] [address] Avenue: Independent  Homemaking Responsibilities: Yes  Ambulation Assistance: Independent  Transfer Assistance: Independent  Active : Yes  Additional Comments: Pt reports one fall at home on the front steps 3 weeks prior to surgery. Pt does not have assist at home.        Objective   Vision: Impaired  Vision Exceptions: Wears glasses at all times  Hearing: Within functional limits    Orientation  Overall Orientation Status: Within Normal Limits  Observation/Palpation  Posture: Good  Observation: 2 L O2, chest tube w/ suction   Balance  Sitting Balance: Supervision  Standing Balance: Contact guard assistance  Standing Balance  Time: 1 min   Activity: Transfer   Functional Mobility  Functional - Mobility Device: (Rollator )  Activity: (Transfer)  Assist Level: Contact guard assistance  Functional Mobility Comments: increased time for line next treatment, this will serve as the discharge summary.     [address] [facility],  [address], [place]

## 2020-03-03 NOTE — CARE COORDINATION
Case Management Assessment           Initial Evaluation                Date / Time of Evaluation: 3/3/2020 5:19 PM                 Assessment Completed by: Shala Velarde    Patient Name: Yosvany Marie     YOB: 1950  Diagnosis: CAD in native artery [I25.10]     Date / Time: 3/2/2020  5:05 AM    Patient Admission Status: Inpatient    If patient is discharged prior to next notation, then this note serves as note for discharge by case management. Current PCP: No primary care provider on file. Clinic Patient: No    Chart Reviewed: Yes  Patient/ Family Interviewed: Yes    Initial assessment completed at bedside with: Patient     Hospitalization in the last 30 days: No    Emergency Contacts:  Extended Emergency Contact Information  Primary Emergency Contact: [address] Phone: [phone]  Relation: Niece/Nephew    Advance Directives:   Code Status: Full Code    Healthcare Power of : No    Financial:  Payor: Henri Unger / Plan: Lizette Fontanez / Product Type: *No Product type* /     Pre-cert required for SNF: Yes    Pharmacy:    Faisal Dawson[address] [phone]  [address] [place], [place] [address]ule[facility]  Phone: [phone] Fax: [phone]      Potential assistance Purchasing Medications:    Does Patient want to participate in local refill/ meds to beds program?:      Meds To Beds General Rules:  1. Can ONLY be done Monday- Friday between 8:30am-5pm  2. Prescription(s) must be in pharmacy by 3pm to be filled same day  3. Copy of patient's insurance/ prescription drug card and patient face sheet must be sent along with the prescription(s)  4. Cost of Rx cannot be added to hospital bill. If financial assistance is needed, please contact unit  or ;   or  CANNOT provide pharmacy voucher for patients co-pays  5. Patients can then  the prescription on their way out of the hospital at discharge, or pharmacy can deliver to the bedside if staff is available. (payment due at time of pick-up or delivery - cash, check, or card accepted)     Able to afford home medications/ co-pay costs: Yes    ADLS:  Support Systems:      PT AM-PAC:   /24  OT AM-PAC: 15 /24    Housing:  Lives With: Alone  Type of Home: [address] [place] [address],[address]: 1/2 bath on main level, Bed/Bath upstairs, Two level  Home Access: Stairs to enter with rails  Entrance Stairs - Number of Steps: ~14 VIOLETTA  Bathroom Shower/Tub: Tub/Shower unit  Bathroom Toilet: Standard  Bathroom Equipment: Grab bars in shower, Shower chair(has shower chair but does not use)  Home Equipment: Rolling walker  Receives Help From: (none )  ADL Assistance: Independent  Homemaking Assistance: Independent  Homemaking Responsibilities: Yes  Ambulation Assistance: Independent  Transfer Assistance: Independent  Active : Yes  Additional Comments: Pt reports one fall at home on the front steps 3 weeks prior to surgery. Pt does not have assist at home.       Home Care Information:  Currently ACTIVE with Home Health Care: No  Home Care Agency: Not Applicable    Currently ACTIVE with [facility] on Aging: No    Durable Medical Equipment:  DME Provider: none   Equipment: None     Home Oxygen and Respiratory Equipment:  Has HOME OXYGEN prior to admission: No    Dialysis:  Active with HD/PD prior to admission: No    DISCHARGE PLAN:  Disposition: Pending progress. Home with [place] [address] vs. Placement (SNF)     Transportation PLAN for discharge: unknown at this time. Factors facilitating achievement of predicted outcomes: Cooperative and Pleasant    Barriers to discharge: Limited family support, No caregiver support and Medical complications    Additional Case Management Notes:   SW met with patient at bedside on this date. Patient from  Home alone.  He has 14 steps to enter the home. Patient reported he believes he will need some type of placement at discharge with lack of support at home. SW stated he could provide list and options for patient to look at. Patient has [facility] and will need Elodie Soulier specific list for review. Patient was independent at home, but used a rolling walker at baseline. SW to gather list and provide tomorrow in AM. Patient to make at least 3 choices for referrals to be completed. Therapy pending at this time. SW provided contact information to patient/family and placed information on white board in room should needs arise. No other needs noted at this time. The Plan for Transition of Care is related to the following treatment goals   CAD, multiple vessel I25.10         The Patient and/or patient representative Patient was provided with a choice of provider and agrees with the discharge plan Yes    Freedom of choice list was provided with basic dialogue that supports the patient's individualized plan of care/goals and shares the quality data associated with the providers.  Yes    Care Transition patient: No    EYAL Bhatia  The [address] [place] [address] ICU  Case Management Department  Ph: [phone]

## 2020-03-04 LAB
ANION GAP SERPL CALCULATED.3IONS-SCNC: 14 MMOL/L (ref 3–16)
BUN BLDV-MCNC: 32 MG/DL (ref 7–20)
CALCIUM SERPL-MCNC: 7.7 MG/DL (ref 8.3–10.6)
CHLORIDE BLD-SCNC: 100 MMOL/L (ref 99–110)
CO2: 22 MMOL/L (ref 21–32)
CREAT SERPL-MCNC: 1.7 MG/DL (ref 0.8–1.3)
GFR AFRICAN AMERICAN: 49
GFR NON-AFRICAN AMERICAN: 40
GLUCOSE BLD-MCNC: 185 MG/DL (ref 70–99)
GLUCOSE BLD-MCNC: 234 MG/DL (ref 70–99)
GLUCOSE BLD-MCNC: 261 MG/DL (ref 70–99)
GLUCOSE BLD-MCNC: 262 MG/DL (ref 70–99)
GLUCOSE BLD-MCNC: 269 MG/DL (ref 70–99)
HCT VFR BLD CALC: 31.8 % (ref 40.5–52.5)
HEMOGLOBIN: 10.5 G/DL (ref 13.5–17.5)
HEMOGLOBIN: 10.5 G/DL (ref 13.5–17.5)
HEMOGLOBIN: 12.3 G/DL (ref 13.5–17.5)
HEMOGLOBIN: 13.5 G/DL (ref 13.5–17.5)
HEMOGLOBIN: 8.5 G/DL (ref 13.5–17.5)
HEMOGLOBIN: 8.6 G/DL (ref 13.5–17.5)
HEMOGLOBIN: 9 G/DL (ref 13.5–17.5)
HEMOGLOBIN: 9.1 G/DL (ref 13.5–17.5)
HEMOGLOBIN: 9.7 G/DL (ref 13.5–17.5)
MAGNESIUM: 2.2 MG/DL (ref 1.8–2.4)
MCH RBC QN AUTO: 29.7 PG (ref 26–34)
MCHC RBC AUTO-ENTMCNC: 32.9 G/DL (ref 31–36)
MCV RBC AUTO: 90.3 FL (ref 80–100)
PDW BLD-RTO: 14.7 % (ref 12.4–15.4)
PERFORMED ON: ABNORMAL
PLATELET # BLD: 93 K/UL (ref 135–450)
PMV BLD AUTO: 9.1 FL (ref 5–10.5)
POTASSIUM SERPL-SCNC: 5 MMOL/L (ref 3.5–5.1)
RBC # BLD: 3.52 M/UL (ref 4.2–5.9)
SODIUM BLD-SCNC: 136 MMOL/L (ref 136–145)
WBC # BLD: 14.2 K/UL (ref 4–11)

## 2020-03-04 PROCEDURE — 2000000000 HC ICU R&B

## 2020-03-04 PROCEDURE — 36592 COLLECT BLOOD FROM PICC: CPT

## 2020-03-04 PROCEDURE — 1200000000 HC SEMI PRIVATE

## 2020-03-04 PROCEDURE — 2580000003 HC RX 258: Performed by: THORACIC SURGERY (CARDIOTHORACIC VASCULAR SURGERY)

## 2020-03-04 PROCEDURE — 6360000002 HC RX W HCPCS: Performed by: THORACIC SURGERY (CARDIOTHORACIC VASCULAR SURGERY)

## 2020-03-04 PROCEDURE — 83735 ASSAY OF MAGNESIUM: CPT

## 2020-03-04 PROCEDURE — 97530 THERAPEUTIC ACTIVITIES: CPT

## 2020-03-04 PROCEDURE — 80048 BASIC METABOLIC PNL TOTAL CA: CPT

## 2020-03-04 PROCEDURE — 85027 COMPLETE CBC AUTOMATED: CPT

## 2020-03-04 PROCEDURE — 6370000000 HC RX 637 (ALT 250 FOR IP): Performed by: THORACIC SURGERY (CARDIOTHORACIC VASCULAR SURGERY)

## 2020-03-04 PROCEDURE — 97162 PT EVAL MOD COMPLEX 30 MIN: CPT

## 2020-03-04 PROCEDURE — 97535 SELF CARE MNGMENT TRAINING: CPT

## 2020-03-04 RX ORDER — ASPIRIN 81 MG/1
81 TABLET ORAL DAILY
Status: DISCONTINUED | OUTPATIENT
Start: 2020-03-05 | End: 2020-03-11 | Stop reason: HOSPADM

## 2020-03-04 RX ORDER — FAMOTIDINE 20 MG/1
20 TABLET, FILM COATED ORAL DAILY
Status: DISCONTINUED | OUTPATIENT
Start: 2020-03-05 | End: 2020-03-11 | Stop reason: HOSPADM

## 2020-03-04 RX ORDER — LANOLIN ALCOHOL/MO/W.PET/CERES
400 CREAM (GRAM) TOPICAL DAILY
Status: DISCONTINUED | OUTPATIENT
Start: 2020-03-05 | End: 2020-03-11 | Stop reason: HOSPADM

## 2020-03-04 RX ORDER — METOCLOPRAMIDE HYDROCHLORIDE 5 MG/ML
5 INJECTION INTRAMUSCULAR; INTRAVENOUS EVERY 6 HOURS PRN
Status: DISCONTINUED | OUTPATIENT
Start: 2020-03-04 | End: 2020-03-11 | Stop reason: HOSPADM

## 2020-03-04 RX ORDER — FUROSEMIDE 40 MG/1
40 TABLET ORAL DAILY
Status: DISCONTINUED | OUTPATIENT
Start: 2020-03-05 | End: 2020-03-05

## 2020-03-04 RX ADMIN — ATORVASTATIN CALCIUM 40 MG: 40 TABLET, FILM COATED ORAL at 20:44

## 2020-03-04 RX ADMIN — INSULIN LISPRO 2 UNITS: 100 INJECTION, SOLUTION INTRAVENOUS; SUBCUTANEOUS at 17:33

## 2020-03-04 RX ADMIN — Medication 10 ML: at 08:21

## 2020-03-04 RX ADMIN — INSULIN LISPRO 4 UNITS: 100 INJECTION, SOLUTION INTRAVENOUS; SUBCUTANEOUS at 06:09

## 2020-03-04 RX ADMIN — MUPIROCIN: 20 OINTMENT TOPICAL at 08:22

## 2020-03-04 RX ADMIN — SENNOSIDES AND DOCUSATE SODIUM 1 TABLET: 8.6; 5 TABLET ORAL at 20:44

## 2020-03-04 RX ADMIN — METOPROLOL TARTRATE 12.5 MG: 25 TABLET, FILM COATED ORAL at 20:44

## 2020-03-04 RX ADMIN — SENNOSIDES AND DOCUSATE SODIUM 1 TABLET: 8.6; 5 TABLET ORAL at 08:20

## 2020-03-04 RX ADMIN — FUROSEMIDE 40 MG: 10 INJECTION, SOLUTION INTRAMUSCULAR; INTRAVENOUS at 08:21

## 2020-03-04 RX ADMIN — INSULIN LISPRO 6 UNITS: 100 INJECTION, SOLUTION INTRAVENOUS; SUBCUTANEOUS at 11:06

## 2020-03-04 RX ADMIN — METOPROLOL TARTRATE 12.5 MG: 25 TABLET, FILM COATED ORAL at 08:19

## 2020-03-04 RX ADMIN — CEFAZOLIN 2 G: 10 INJECTION, POWDER, FOR SOLUTION INTRAVENOUS; PARENTERAL at 04:00

## 2020-03-04 RX ADMIN — ACETAMINOPHEN 650 MG: 325 TABLET ORAL at 08:19

## 2020-03-04 RX ADMIN — Medication 400 MG: at 08:21

## 2020-03-04 RX ADMIN — CHLORHEXIDINE GLUCONATE 0.12% ORAL RINSE 15 ML: 1.2 LIQUID ORAL at 08:19

## 2020-03-04 RX ADMIN — FAMOTIDINE 20 MG: 20 TABLET, FILM COATED ORAL at 08:19

## 2020-03-04 RX ADMIN — DEXTROSE MONOHYDRATE 150 MG: 50 INJECTION, SOLUTION INTRAVENOUS at 22:16

## 2020-03-04 RX ADMIN — MUPIROCIN: 20 OINTMENT TOPICAL at 20:45

## 2020-03-04 RX ADMIN — CLOPIDOGREL BISULFATE 75 MG: 75 TABLET ORAL at 08:21

## 2020-03-04 RX ADMIN — CHLORHEXIDINE GLUCONATE 0.12% ORAL RINSE 15 ML: 1.2 LIQUID ORAL at 20:45

## 2020-03-04 RX ADMIN — OXYCODONE HYDROCHLORIDE AND ACETAMINOPHEN 1 TABLET: 5; 325 TABLET ORAL at 14:37

## 2020-03-04 RX ADMIN — INSULIN LISPRO 6 UNITS: 100 INJECTION, SOLUTION INTRAVENOUS; SUBCUTANEOUS at 13:44

## 2020-03-04 RX ADMIN — KETOROLAC TROMETHAMINE 15 MG: 30 INJECTION, SOLUTION INTRAMUSCULAR at 04:02

## 2020-03-04 RX ADMIN — Medication 10 ML: at 20:47

## 2020-03-04 RX ADMIN — INSULIN LISPRO 6 UNITS: 100 INJECTION, SOLUTION INTRAVENOUS; SUBCUTANEOUS at 20:49

## 2020-03-04 ASSESSMENT — PAIN SCALES - GENERAL
PAINLEVEL_OUTOF10: 2
PAINLEVEL_OUTOF10: 3
PAINLEVEL_OUTOF10: 0
PAINLEVEL_OUTOF10: 4
PAINLEVEL_OUTOF10: 3
PAINLEVEL_OUTOF10: 4
PAINLEVEL_OUTOF10: 1
PAINLEVEL_OUTOF10: 0

## 2020-03-04 ASSESSMENT — PAIN DESCRIPTION - PROGRESSION: CLINICAL_PROGRESSION: GRADUALLY WORSENING

## 2020-03-04 ASSESSMENT — PAIN DESCRIPTION - FREQUENCY
FREQUENCY: CONTINUOUS
FREQUENCY: CONTINUOUS

## 2020-03-04 ASSESSMENT — PAIN DESCRIPTION - ORIENTATION
ORIENTATION: ANTERIOR;MID
ORIENTATION: ANTERIOR;MID

## 2020-03-04 ASSESSMENT — PAIN DESCRIPTION - PAIN TYPE
TYPE: SURGICAL PAIN
TYPE: SURGICAL PAIN

## 2020-03-04 ASSESSMENT — PAIN DESCRIPTION - DESCRIPTORS
DESCRIPTORS: DISCOMFORT
DESCRIPTORS: DISCOMFORT

## 2020-03-04 ASSESSMENT — PAIN DESCRIPTION - ONSET
ONSET: ON-GOING
ONSET: ON-GOING

## 2020-03-04 ASSESSMENT — PAIN DESCRIPTION - LOCATION
LOCATION: CHEST
LOCATION: CHEST

## 2020-03-04 ASSESSMENT — PAIN - FUNCTIONAL ASSESSMENT: PAIN_FUNCTIONAL_ASSESSMENT: PREVENTS OR INTERFERES SOME ACTIVE ACTIVITIES AND ADLS

## 2020-03-04 ASSESSMENT — PAIN DESCRIPTION - DIRECTION: RADIATING_TOWARDS: CHEST

## 2020-03-04 NOTE — PROGRESS NOTES
Pacing wire removed per order. Suture removed prior to removal of wire. Pt instructed on procedure; questions addressed. Pt instructed on mandatory bedrest with increased vital signs for next 1 hour. Lung sounds unchanged pre and post removal of wire SpO2 98% on 2 L O2 NC. EKG unchanged pre and post. Will monitor closely.

## 2020-03-04 NOTE — PROGRESS NOTES
Yes  Patient assessed for rehabilitation services?: Yes  Additional Pertinent Hx: 2/26/2020 coronary artery bypass graft x 3 internal mammary and saphenous vein graft, TCP-BP with pulmonary vein isolation and L arterial appendage clip. PMHx includes A fib, CAD, diabetes mellitus, kidney stones   Response to previous treatment: Patient with no complaints from previous session  Family / Caregiver Present: No  Referring Practitioner: Dr. Elle Kimball   Diagnosis: CAD in native artery   Subjective  Subjective: Pt lying in bed upon arrival and agreeable to therapy. \"Let's keep walking\"  Vital Signs  Patient Currently in Pain: Denies   Orientation  Orientation  Overall Orientation Status: Within Functional Limits  Objective    ADL  Toileting: (Denied need)  Additional Comments: to further assess as tolerated        Balance  Sitting Balance: Supervision  Standing Balance: Maximum assistance(CGA progressed to max during syncope episode )  Standing Balance  Time: 1 min x1, 5 min x1  Activity: bathroom mobility activity/walk in govea attempt  Functional Mobility  Functional - Mobility Device: (Rollator )  Activity: To/from bathroom(Walk in govea attempt )  Assist Level: Maximum assistance(CGA throughout but Max assist 2/2  syncope episode )  Functional Mobility Comments: Pt with syncopal episode during mobility with BP dropping to 83/44. Chair brought to pt and pt brought back to room. RN aware   Toilet Transfers  Toilet - Technique: Ambulating  Equipment Used: Standard toilet  Toilet Transfer: Minimal assistance  Bed mobility  Supine to Sit: Minimal assistance; Moderate assistance(min/mod Ax1 via log roll )  Scooting: Stand by assistance(to EOB )  Transfers  Stand Step Transfers: Contact guard assistance  Sit to stand: Contact guard assistance  Stand to sit: Contact guard assistance  Transfer Comments: verbal cues to adhere to sternal precautions.                         Cognition  Overall Cognitive Status: [facility] LUE AROM (degrees)  LUE AROM : WFL  RUE AROM (degrees)  RUE AROM : WFL                 Plan   Plan  Times per week: 2-5  Times per day: Daily  Current Treatment Recommendations: Safety Education & Training, Functional Mobility Training, Self-Care / ADL, Endurance Training    AM-PAC Score        AM-PAC Inpatient Daily Activity Raw Score: 15 (03/03/20 1513)  AM-PAC Inpatient ADL T-Scale Score : 34.69 (03/03/20 1513)  ADL Inpatient CMS 0-100% Score: 56.46 (03/03/20 1513)  ADL Inpatient CMS G-Code Modifier : CK (03/03/20 1513)    Goals  Short term goals                    No goals met. Time Frame for Short term goals: by discharge   Short term goal 1: toilet transfer with SBA-not met  Short term goal 2: stand 5 minutes with SBA during functional mobility/ADLs-not met   Patient Goals   Patient goals : to go home        Therapy Time   Individual Concurrent Group Co-treatment   Time In 1100         Time Out 1126         Minutes 26         Timed Code Treatment Minutes: 26 Minutes    Total Tx Min: 26 minutes     Therapist was present, directed the patient's care, made skilled judgment, and was responsible for assessment and treatment of the patient. If patient is discharged prior to next treatment, this will serve as the discharge summary.     2 [facility],  [address], OTS

## 2020-03-04 NOTE — PLAN OF CARE
Problem: Falls - Risk of:  Goal: Will remain free from falls  Description  Will remain free from falls  3/4/2020 0835 by Shyann Brown RN  Outcome: Ongoing  Note:   Pt is a fall risk. Fall risk protocol in place. See Leticia Lagunas Fall Score. Pt bed is in low position, bed alarm is on, side rails up, fall risk bracelet applied. , non-skid footwear in use. Patient/family educated on fall risk protocol, instructed to call for assistance when needed and belongings are in reach. assistance. Will continue with hourly rounds for po intake, pain needs, toileting and repositioning as needed. Will continue to monitor for needs. Problem: Cardiac Output - Decreased:  Goal: Hemodynamic stability will improve  Description  Hemodynamic stability will improve  Outcome: Ongoing  Note:   VSS this morning. Pt a little hypotensive overnight. Will monitor. Problem: Fluid Volume - Imbalance:  Goal: Ability to achieve a balanced intake and output will improve  Description  Ability to achieve a balanced intake and output will improve  Outcome: Ongoing  Note:   Pt tolerating lasix BID. Pt able to ambulate to bathroom to void. Will monitor. Problem: Pain:  Goal: Control of acute pain  Description  Control of acute pain  Outcome: Ongoing  Note:   Pt with c/o pain this morning r/t surgical incisions. PRN pain medication administered and pt repositioned for comfort. Will monitor. Problem: Infection - Central Venous Catheter-Associated Bloodstream Infection:  Goal: Will show no infection signs and symptoms  Description  Will show no infection signs and symptoms  Outcome: Ongoing  Note:   WBC elevated. Pt afebrile. Will monitor. Problem: Risk for Impaired Skin Integrity  Goal: Tissue integrity - skin and mucous membranes  Description  Structural intactness and normal physiological function of skin and  mucous membranes. 3/4/2020 0835 by Shyann Brown RN  Outcome: Ongoing  Note:   Pt at risk for skin breakdown.  See Damaso

## 2020-03-04 NOTE — OP NOTE
activations of the clamp. The aorta was then cross-clamped and the heart arrested with 600mL of anterograde and 600mL of retrograde cardioplegia. The heart arrested quickly, without distention of the LV, and the myocardium was cooled adequately. Throughout the case, retrograde cardioplegia was given at about 20-minute intervals to maintain diastolic arrest and cooling. The heart was positioned to expose the posterior descending artery which had a proximal stenosis based on the angiogram but the distal vessel was very small and not suitable for grafting. The heart was positioned to expose the third obtuse marginal artery which was on the inferior wall, and it was dissected out and an end to side anastomosis was made with the saphenous vein graft. The anastomosis was tested with cold heparinized saline, and there was brisk flow with no significant leak. The vein was used in a sequential fashion to perform a side to side anastomosis to the second obtuse marginal artery. This anastomosis was also tested with cold heparinized saline, and there was brisk flow with no significant leak. The heart was positioned to expose the left atrial appendage. The base of the appendage was measured and 40mm Atri-clip was selected and applied to the base of the appendage to ligate and exclude it. A lateral tunnel was made in the pericardium taking care to avoid injury to the left phrenic nerve, and the left internal mammary artery was brought through this opening in the pericardium. The mammary artery was confirmed not to be twisted and adequate length was confirmed prior to trimming length off the distal artery. The mid left anterior descending artery was dissected out and it was clear that there was diffuse disease. A long arteriotomy was made in the mid vessel and a long segment endarterectomy was performed ultimately removing a plaque 4 cm in length.   A long end to side anastomosis was made to the LAD manubrium for additional stabilization. The wound was irrigated with antibiotic-containing saline solution. The fascia and subcutaneous tissues and skin were then closed in multiple layers with running absorbable sutures. Local anesthetic was administered in the form of 0.5% plain marcaine along the incision site. The final skin closure was with a Prineo dressing. The right leg incision sites were closed with subcutaneous and subcuticular aborbable sutures. The right leg was wrapped with an ace-wrap. The patient was transferred to the ICU intubated and was on high dose levophed and moderate dose milrinone. Archie Bob's presence was essential for this procedure as no qualified resident was available for saphenous vein harvest.    MD ERICA Haines was present and scrubbed in for all critical portions of the procedure and participated in all decision making.

## 2020-03-04 NOTE — PROGRESS NOTES
Progress Note  Hospital Day: 3  POD#  2  S/P Coronary artery bypass x 3, bilat PVI, placement of SHANTI clip (3/2/2020)    Chief Complaint: Postop follow up    Mr. Wiley Galeazzi is sitting up in the chair. Incisional pain well controlled. Orthostatic hypotensive while working with PT. VITAL SIGNS   Temperature:  Current - Temp: 98 °F (36.7 °C); Max - Temp  Av.8 °F (36.6 °C)  Min: 97.5 °F (36.4 °C)  Max: 98 °F (36.7 °C)    Respiratory Rate : Resp  Av.5  Min: 16  Max: 30    Pulse Range: Pulse  Av.7  Min: 82  Max: 106    Blood Pressure Range:  Systolic (87BBA), HO , Min:73 , IHF:419   ; Diastolic (91RRN), YHA:83, Min:43, Max:75    Pulse ox Range: SpO2  Av.1 %  Min: 93 %  Max: 100 %  O2 Flow Rate (L/min): 2 L/min    Admission Weight: Weight: 270 lb (122.5 kg)    Current Weight: up 3 lbs from preop  Patient Vitals for the past 96 hrs (Last 3 readings):   Weight   20 0600 263 lb 14.3 oz (119.7 kg)   20 0528 260 lb (117.9 kg)     I/O:     Intake/Output Summary (Last 24 hours) at 3/4/2020 1113  Last data filed at 3/4/2020 1100  Gross per 24 hour   Intake 1469 ml   Output 815 ml   Net 654 ml     Urine: 400-75- x 9ml/shift  Chest tube:  100-150-50 ml/shift  serosanguinous, on waterseal, no leak    LINES/ACCESS:   Central Access: RIJ Day #: 2   Peripheral Access: Rt wrist Day #: 2  Pacing Wires Day #:  2      Diet: DIET CARDIAC; Carb Control: 3 carb choices (45 gms)/meal; No Added Salt (3-4 GM);  Daily Fluid Restriction: 1500 ml    MEDICATIONS:      [START ON 3/5/2020] famotidine  20 mg Oral Daily    [START ON 3/5/2020] magnesium oxide  400 mg Oral Daily    [START ON 3/5/2020] aspirin  81 mg Oral Daily    sodium chloride flush  10 mL Intravenous 2 times per day    metoprolol tartrate  12.5 mg Oral BID    [Held by provider] lisinopril  2.5 mg Oral Lunch    chlorhexidine  15 mL Mouth/Throat BID    furosemide  40 mg Intravenous BID    mupirocin   Nasal BID    [Held by provider] potassium chloride  10 mEq Oral TID WC    atorvastatin  40 mg Oral Nightly    clopidogrel  75 mg Oral Daily    insulin lispro  0-12 Units Subcutaneous Q4H    polyethylene glycol  17 g Oral Daily    sennosides-docusate sodium  1 tablet Oral BID     DATA REVIEW:   CBC:   Recent Labs     03/02/20  1347 03/03/20  0448 03/04/20  0407   WBC 29.9* 12.9* 14.2*   HGB 13.9 10.7* 10.5*   HCT 41.9 32.6* 31.8*   MCV 89.7 89.8 90.3    99* 93*     BMP:   Recent Labs     03/02/20  1347 03/03/20  0448 03/04/20  0407    144 136   K 3.6 4.6 5.0    109 100   CO2 19* 21 22   GLUCOSE 313* 116* 234*   BUN 22* 21* 32*   CREATININE 1.4* 1.3 1.7*   CALCIUM 8.6 7.5* 7.7*   MG 2.50* 2.00 2.20     Accucheck Glucoses:   Recent Labs     03/03/20  1123 03/03/20  1335 03/03/20  1718 03/03/20  2140 03/04/20  1105   POCGLU 118* 112* 185* 211* 262*     PT/INR:   Recent Labs     03/02/20  0618   PROTIME 12.3   INR 1.06     APTT:   Recent Labs     03/02/20  0618 03/02/20  1347   APTT 30.7 37.2*     ABG:    Lab Results   Component Value Date    PHART 7.367 03/02/2020    PO2ART 84.2 03/02/2020    FAM8OPR 40.8 03/02/2020    V1JHPIKP 96 03/02/2020    RPL9YVD 25 03/02/2020    DHR5AJD 23.4 03/02/2020    BEART -2 03/02/2020    BEART -3 03/02/2020    BEART -4 03/02/2020    BEART -5 03/02/2020    BEART -3 03/02/2020     U/A:  (intraop)  Recent Labs     03/02/20  0721 03/02/20  0934   COLORU Yellow  --    PHUR 6.0  --    WBCUA  --  >100*   RBCUA  --  *   BACTERIA  --  Rare*   CLARITYU Clear  --    SPECGRAV 1.020  --    LEUKOCYTESUR LARGE*  --    UROBILINOGEN 0.2  --    BILIRUBINUR Negative  --    BLOODU LARGE*  --    GLUCOSEU Negative  --      Urine Culture 3/2/2020 (intraop):  Streptococcus viridans isolated.  No further workup   This organism is considered normal skin/urogenital carine     ASSESSMENT:   Patient Active Problem List:     Bladder obstruction     Dilated cardiomyopathy ([facility] [address]     New onset a-fib (HCC)     S/P coronary angiogram     Acute cystitis     Bilateral pleural effusion     Constipation     Monilial cystitis     Urinary tract obstruction     CAD, multiple vessel     CAD in native artery      Neuro: awake, alert and oriented x 3  CV:   Sinus  Pulm:  Diminished in bases  Abd:  soft, non-tender; bowel sounds normal; passing flatus, +BM  Wounds: clean, dry and intact  Ext: warm, + edema    PLAN:   · CV - on Beta blocker, statin, ASA/Plavix   - hold ace inhibitor due to elevated creatinine   - remove pacing wires  · Pulm - wean O2 as tolerated, continue incentive spirometry  · Renal - creatinine elevated to 1.7 - decrease Lasix to daily  · Heme - Acute blood loss anemia as expected- continue to monitor                  -  Acute postoperative thrombocytopenia - continue to monitor, hold ASA     · FEN - cardiac diet with 1500 ml fluid restriction     - hold KCl today, decrease Mag ox to daily  · Remove chest tubes when criteria met  · DC scheduled Toradol due to elevated creatinine  · VTE prophylaxis - SCD and TRUMAN hose  · Disposition - Continue PT/OT, ARU/SNF at discharge  Discussed patient with Dr Johnnie Purvis who is agreeable to assessment and plan.         Vahe Patel, [phone] pager  3/4/2020  11:13 AM

## 2020-03-05 LAB
ALBUMIN SERPL-MCNC: 3.1 G/DL (ref 3.4–5)
ANION GAP SERPL CALCULATED.3IONS-SCNC: 10 MMOL/L (ref 3–16)
BUN BLDV-MCNC: 47 MG/DL (ref 7–20)
CALCIUM SERPL-MCNC: 8.4 MG/DL (ref 8.3–10.6)
CHLORIDE BLD-SCNC: 99 MMOL/L (ref 99–110)
CO2: 25 MMOL/L (ref 21–32)
CREAT SERPL-MCNC: 1.7 MG/DL (ref 0.8–1.3)
EKG ATRIAL RATE: 53 BPM
EKG DIAGNOSIS: NORMAL
EKG Q-T INTERVAL: 388 MS
EKG QRS DURATION: 92 MS
EKG QTC CALCULATION (BAZETT): 490 MS
EKG R AXIS: 69 DEGREES
EKG T AXIS: 136 DEGREES
EKG VENTRICULAR RATE: 96 BPM
GFR AFRICAN AMERICAN: 49
GFR NON-AFRICAN AMERICAN: 40
GLUCOSE BLD-MCNC: 140 MG/DL (ref 70–99)
GLUCOSE BLD-MCNC: 177 MG/DL (ref 70–99)
GLUCOSE BLD-MCNC: 183 MG/DL (ref 70–99)
GLUCOSE BLD-MCNC: 193 MG/DL (ref 70–99)
GLUCOSE BLD-MCNC: 207 MG/DL (ref 70–99)
GLUCOSE BLD-MCNC: 210 MG/DL (ref 70–99)
GLUCOSE BLD-MCNC: 242 MG/DL (ref 70–99)
HCT VFR BLD CALC: 32.3 % (ref 40.5–52.5)
HEMOGLOBIN: 10.6 G/DL (ref 13.5–17.5)
MAGNESIUM: 2.5 MG/DL (ref 1.8–2.4)
MCH RBC QN AUTO: 29.9 PG (ref 26–34)
MCHC RBC AUTO-ENTMCNC: 32.8 G/DL (ref 31–36)
MCV RBC AUTO: 91.1 FL (ref 80–100)
PDW BLD-RTO: 14.7 % (ref 12.4–15.4)
PERFORMED ON: ABNORMAL
PHOSPHORUS: 3.6 MG/DL (ref 2.5–4.9)
PLATELET # BLD: 103 K/UL (ref 135–450)
PMV BLD AUTO: 9.2 FL (ref 5–10.5)
POTASSIUM SERPL-SCNC: 4.6 MMOL/L (ref 3.5–5.1)
RBC # BLD: 3.55 M/UL (ref 4.2–5.9)
SODIUM BLD-SCNC: 134 MMOL/L (ref 136–145)
WBC # BLD: 14.7 K/UL (ref 4–11)

## 2020-03-05 PROCEDURE — 93010 ELECTROCARDIOGRAM REPORT: CPT | Performed by: INTERNAL MEDICINE

## 2020-03-05 PROCEDURE — 80069 RENAL FUNCTION PANEL: CPT

## 2020-03-05 PROCEDURE — 6360000002 HC RX W HCPCS: Performed by: CLINICAL NURSE SPECIALIST

## 2020-03-05 PROCEDURE — 2580000003 HC RX 258: Performed by: THORACIC SURGERY (CARDIOTHORACIC VASCULAR SURGERY)

## 2020-03-05 PROCEDURE — 97116 GAIT TRAINING THERAPY: CPT

## 2020-03-05 PROCEDURE — 36415 COLL VENOUS BLD VENIPUNCTURE: CPT

## 2020-03-05 PROCEDURE — 85027 COMPLETE CBC AUTOMATED: CPT

## 2020-03-05 PROCEDURE — 93005 ELECTROCARDIOGRAM TRACING: CPT | Performed by: THORACIC SURGERY (CARDIOTHORACIC VASCULAR SURGERY)

## 2020-03-05 PROCEDURE — 97535 SELF CARE MNGMENT TRAINING: CPT

## 2020-03-05 PROCEDURE — 36592 COLLECT BLOOD FROM PICC: CPT

## 2020-03-05 PROCEDURE — 2000000000 HC ICU R&B

## 2020-03-05 PROCEDURE — 97530 THERAPEUTIC ACTIVITIES: CPT

## 2020-03-05 PROCEDURE — 1200000000 HC SEMI PRIVATE

## 2020-03-05 PROCEDURE — 6370000000 HC RX 637 (ALT 250 FOR IP): Performed by: CLINICAL NURSE SPECIALIST

## 2020-03-05 PROCEDURE — 6370000000 HC RX 637 (ALT 250 FOR IP): Performed by: THORACIC SURGERY (CARDIOTHORACIC VASCULAR SURGERY)

## 2020-03-05 PROCEDURE — 6360000002 HC RX W HCPCS: Performed by: THORACIC SURGERY (CARDIOTHORACIC VASCULAR SURGERY)

## 2020-03-05 PROCEDURE — 83735 ASSAY OF MAGNESIUM: CPT

## 2020-03-05 RX ORDER — GLIPIZIDE 5 MG/1
5 TABLET ORAL
Status: DISCONTINUED | OUTPATIENT
Start: 2020-03-05 | End: 2020-03-06

## 2020-03-05 RX ORDER — ACETAMINOPHEN 500 MG
1000 TABLET ORAL EVERY 8 HOURS SCHEDULED
Status: DISCONTINUED | OUTPATIENT
Start: 2020-03-05 | End: 2020-03-09

## 2020-03-05 RX ORDER — TRAMADOL HYDROCHLORIDE 50 MG/1
50 TABLET ORAL EVERY 6 HOURS PRN
Status: DISCONTINUED | OUTPATIENT
Start: 2020-03-05 | End: 2020-03-11 | Stop reason: HOSPADM

## 2020-03-05 RX ORDER — POTASSIUM CHLORIDE 750 MG/1
10 TABLET, EXTENDED RELEASE ORAL 2 TIMES DAILY
Status: DISCONTINUED | OUTPATIENT
Start: 2020-03-05 | End: 2020-03-11 | Stop reason: HOSPADM

## 2020-03-05 RX ORDER — TRAMADOL HYDROCHLORIDE 50 MG/1
100 TABLET ORAL EVERY 6 HOURS PRN
Status: DISCONTINUED | OUTPATIENT
Start: 2020-03-05 | End: 2020-03-09

## 2020-03-05 RX ORDER — FUROSEMIDE 10 MG/ML
40 INJECTION INTRAMUSCULAR; INTRAVENOUS 2 TIMES DAILY
Status: DISCONTINUED | OUTPATIENT
Start: 2020-03-05 | End: 2020-03-10

## 2020-03-05 RX ORDER — AMIODARONE HYDROCHLORIDE 200 MG/1
400 TABLET ORAL 2 TIMES DAILY
Status: COMPLETED | OUTPATIENT
Start: 2020-03-05 | End: 2020-03-07

## 2020-03-05 RX ADMIN — ACETAMINOPHEN 650 MG: 325 TABLET ORAL at 08:24

## 2020-03-05 RX ADMIN — POTASSIUM CHLORIDE 10 MEQ: 750 TABLET, EXTENDED RELEASE ORAL at 20:50

## 2020-03-05 RX ADMIN — INSULIN LISPRO 4 UNITS: 100 INJECTION, SOLUTION INTRAVENOUS; SUBCUTANEOUS at 12:12

## 2020-03-05 RX ADMIN — APIXABAN 5 MG: 5 TABLET, FILM COATED ORAL at 20:50

## 2020-03-05 RX ADMIN — ONDANSETRON 4 MG: 2 INJECTION INTRAMUSCULAR; INTRAVENOUS at 16:30

## 2020-03-05 RX ADMIN — ACETAMINOPHEN 1000 MG: 500 TABLET, COATED ORAL at 15:00

## 2020-03-05 RX ADMIN — AMIODARONE HYDROCHLORIDE 400 MG: 200 TABLET ORAL at 08:24

## 2020-03-05 RX ADMIN — MUPIROCIN: 20 OINTMENT TOPICAL at 08:28

## 2020-03-05 RX ADMIN — INSULIN LISPRO 2 UNITS: 100 INJECTION, SOLUTION INTRAVENOUS; SUBCUTANEOUS at 18:16

## 2020-03-05 RX ADMIN — Medication 400 MG: at 08:24

## 2020-03-05 RX ADMIN — Medication 10 ML: at 20:53

## 2020-03-05 RX ADMIN — AMIODARONE HYDROCHLORIDE 400 MG: 200 TABLET ORAL at 20:50

## 2020-03-05 RX ADMIN — APIXABAN 5 MG: 5 TABLET, FILM COATED ORAL at 08:35

## 2020-03-05 RX ADMIN — FUROSEMIDE 40 MG: 10 INJECTION, SOLUTION INTRAMUSCULAR; INTRAVENOUS at 08:35

## 2020-03-05 RX ADMIN — FUROSEMIDE 40 MG: 10 INJECTION, SOLUTION INTRAMUSCULAR; INTRAVENOUS at 18:16

## 2020-03-05 RX ADMIN — AMIODARONE HYDROCHLORIDE 0.5 MG/MIN: 50 INJECTION, SOLUTION INTRAVENOUS at 08:35

## 2020-03-05 RX ADMIN — INSULIN LISPRO 2 UNITS: 100 INJECTION, SOLUTION INTRAVENOUS; SUBCUTANEOUS at 05:52

## 2020-03-05 RX ADMIN — CHLORHEXIDINE GLUCONATE 0.12% ORAL RINSE 15 ML: 1.2 LIQUID ORAL at 20:50

## 2020-03-05 RX ADMIN — FAMOTIDINE 20 MG: 20 TABLET, FILM COATED ORAL at 08:25

## 2020-03-05 RX ADMIN — ASPIRIN 81 MG: 81 TABLET, COATED ORAL at 08:24

## 2020-03-05 RX ADMIN — MUPIROCIN: 20 OINTMENT TOPICAL at 20:50

## 2020-03-05 RX ADMIN — METOPROLOL TARTRATE 12.5 MG: 25 TABLET, FILM COATED ORAL at 08:24

## 2020-03-05 RX ADMIN — Medication 10 ML: at 08:25

## 2020-03-05 RX ADMIN — SENNOSIDES AND DOCUSATE SODIUM 1 TABLET: 8.6; 5 TABLET ORAL at 08:24

## 2020-03-05 RX ADMIN — CHLORHEXIDINE GLUCONATE 0.12% ORAL RINSE 15 ML: 1.2 LIQUID ORAL at 08:24

## 2020-03-05 RX ADMIN — INSULIN LISPRO 2 UNITS: 100 INJECTION, SOLUTION INTRAVENOUS; SUBCUTANEOUS at 22:22

## 2020-03-05 RX ADMIN — INSULIN LISPRO 4 UNITS: 100 INJECTION, SOLUTION INTRAVENOUS; SUBCUTANEOUS at 08:25

## 2020-03-05 RX ADMIN — GLIPIZIDE 5 MG: 5 TABLET ORAL at 12:11

## 2020-03-05 RX ADMIN — INSULIN LISPRO 4 UNITS: 100 INJECTION, SOLUTION INTRAVENOUS; SUBCUTANEOUS at 01:46

## 2020-03-05 RX ADMIN — ATORVASTATIN CALCIUM 40 MG: 40 TABLET, FILM COATED ORAL at 20:50

## 2020-03-05 RX ADMIN — AMIODARONE HYDROCHLORIDE 0.5 MG/MIN: 50 INJECTION, SOLUTION INTRAVENOUS at 23:36

## 2020-03-05 ASSESSMENT — PAIN - FUNCTIONAL ASSESSMENT: PAIN_FUNCTIONAL_ASSESSMENT: PREVENTS OR INTERFERES SOME ACTIVE ACTIVITIES AND ADLS

## 2020-03-05 ASSESSMENT — PAIN DESCRIPTION - ONSET: ONSET: AWAKENED FROM SLEEP

## 2020-03-05 ASSESSMENT — PAIN SCALES - GENERAL
PAINLEVEL_OUTOF10: 0
PAINLEVEL_OUTOF10: 4
PAINLEVEL_OUTOF10: 0
PAINLEVEL_OUTOF10: 2
PAINLEVEL_OUTOF10: 0

## 2020-03-05 ASSESSMENT — PAIN DESCRIPTION - ORIENTATION: ORIENTATION: ANTERIOR;MID

## 2020-03-05 ASSESSMENT — PAIN DESCRIPTION - PAIN TYPE: TYPE: SURGICAL PAIN

## 2020-03-05 ASSESSMENT — PAIN DESCRIPTION - DIRECTION: RADIATING_TOWARDS: MIDDLE

## 2020-03-05 ASSESSMENT — PAIN DESCRIPTION - DESCRIPTORS: DESCRIPTORS: DISCOMFORT

## 2020-03-05 ASSESSMENT — PAIN DESCRIPTION - LOCATION: LOCATION: CHEST

## 2020-03-05 ASSESSMENT — PAIN DESCRIPTION - PROGRESSION: CLINICAL_PROGRESSION: GRADUALLY WORSENING

## 2020-03-05 ASSESSMENT — PAIN DESCRIPTION - FREQUENCY: FREQUENCY: CONTINUOUS

## 2020-03-05 ASSESSMENT — PULMONARY FUNCTION TESTS: PIF_VALUE: 26

## 2020-03-05 NOTE — PROGRESS NOTES
Pt continues to have issues with orthostatic hypotension today. Pt able to ambulate to bathroom and around room but BP noted to be soft post ambulation and takes some time to recuperate. Pt denies symptoms when hypotensive. MD aware. Will monitor .

## 2020-03-05 NOTE — PROGRESS NOTES
Patient bathed. Weight 126.4kg with standing scale. Patient walked with walker about 15 feet then stated he could no  Longer walk. Patient needs a lot of reinforcement for sternal precautions.

## 2020-03-05 NOTE — PROGRESS NOTES
Functional Mobility Training, Transfer Training, Endurance Training, Home Exercise Program, Safety Education & Training  Safety Devices  Type of devices: Left in chair, Chair alarm in place, Gait belt, Nurse notified, Call light within reach     Therapy Time   Individual Concurrent Group Co-treatment   Time In 1440         Time Out 1510         Minutes 30           Timed Code Treatment Minutes:  30    Total Treatment Minutes:  30    If the patient is discharged before the next treatment session, this note will serve as the discharge summary.      Tamika Neal, PT, DPT [id number]

## 2020-03-05 NOTE — PROGRESS NOTES
Progress Note    S/P  CABGx3 and Pulmonary Vein isolation and ligation of left atrial appendage    Subjective/Interval Events:    Doing relatively well although he is reluctant to ambulate and using his incentive spirometer as he has been instructed and encouraged. Had another talk this morning about the importance of these things. He also continues to struggle with being compliant with his sternal precautions and is using his arms to push himself around in the chair. This has been re-enforced with him several times. Went into afib overnight, amiodarone started    Vital Signs:                                                 /85   Pulse 94   Temp 98 °F (36.7 °C) (Oral)   Resp 18   Ht 5' 10\" (1.778 m)   Wt 276 lb 3.8 oz (125.3 kg)   SpO2 100%   BMI 39.64 kg/m²  O2 Flow Rate (L/min): 2 L/min     General: up in chair, appears well  CV:   Normal rate, Irregular, no murmur or rub  Pulm: normal effort, clear but limited effort  Incisions:    Sternotomy site looks good  Abd:  Soft, obese, non-tender  Ext: warm, moderate lower extremity edema  Neuro: no focal deficits      Assessment/Plan:  Mr. Maty Najera is recovering well after CABG and surgical ablation on 3/2/20    Went into atrial fibrillation las night, has history of recent paroxysmal atrial fibrillation pre-op  On amiodarone gtt, transition to oral per protocol    Resume home eliquis, 5 BID  Stop Plavix  Will plan for discharge on eliquis for afib and aspirin for grafts    Continue current dose of metoprolol    Increase lasix to 40 IV BID  Cr stable, 1.7,  has chronic renal insufficiency  Weight 16lbs above pre-op weight    Add glipizide 5 daily  Consult hospitalist for diabetes, was not on any meds at home and HgbA1C was 7.8 in pre-admission testing, should go home on something.     Encourage ambulation  Encourage pulmonary toilet    Discharge planning to ARU or SNF    Courtney Ward MD  3/5/2020  8:15 AM

## 2020-03-05 NOTE — CARE COORDINATION
Case Management Assessment           Daily Note                 Date/ Time of Note: 3/5/2020 3:53 PM         Note completed by: Zoltan Dacosta    Patient Name: Lesley Sargent  YOB: 1950    Diagnosis:CAD in native artery [I25.10]  Patient Admission Status: Inpatient    Date of Admission:3/2/2020  5:05 AM Length of Stay: 3 GLOS:        Current Plan of Care: SNF on d/c  ________________________________________________________________________________________  PT AM-PAC: 14 / 24 per last evaluation on: 03/05/2020    OT AM-PAC: 17 / 24 per last evaluation on: 03/05/2020    DME Needs for discharge: wheelchair    ________________________________________________________________________________________   Discharge Plan: SNF: [facility]    Tentative discharge date: 03/06/2020    Current barriers to discharge: Medical complications      Referrals completed: SNF: [facility]     Resources/ information provided: SNF List  ________________________________________________________________________________________  Case Management Notes:Referred to pt for d/c planning. Spoke with pt at bedside to follow up regarding SNF on d/c. Pt voiced he reviewed the advanced quality list but reports being uncertain of a facility. Pt reported he would like to remain close to his home.  outlined SNF close to pt residing zip code. Pt voiced interest in numerous facilities. [facility] both contacted and unable to accept pt on d/c. [facility] contacted and response for acceptance pending.  to follow up. Annmarie Lama and his family were provided with choice of provider; he and his family are in agreement with the discharge plan.     Care Transition Patient: DELVIN Smith  [facility]  Case Management Department  Electronically signed by EYAL Merrill, HAMIDA on 3/5/2020 at 4:19 PM

## 2020-03-05 NOTE — PROGRESS NOTES
Occupational Therapy  Facility/Department: [facility]  Daily Treatment Note  NAME: Louisa Vázquez  :   MRN: [id number]    Date of Service: 3/5/2020    Discharge Recommendations:Veronica Chaidez scored a 62/40 on the AM-PAC ADL Inpatient form. Current research shows that an AM-PAC score of 18 or greater is typically associated with a discharge to the patient's home setting. Based on the patients AM-PAC score and their current ADL deficits, it is recommended that the patient have 2-3 sessions per week of Occupational Therapy at d/c to increase the patients independence. OT Equipment Recommendations  Other: assess further     Assessment   Performance deficits / Impairments: Decreased functional mobility ; Decreased ADL status; Decreased strength;Decreased safe awareness;Decreased endurance  Assessment: Pt continues to demonstrate decreased functional independence. Pt rquired mod assist for transfers and needed verbal cues to adhere to sternal precautions. Pt's BP continues to drop with mobility. Continue OT services per plan of care. Treatment Diagnosis: Decreased functional independence   Prognosis: Good  OT Education: OT Role;Transfer Training  Patient Education: Pt acknowledged OT role, and transfer training with sternal precautions   REQUIRES OT FOLLOW UP: Yes  Activity Tolerance  Activity Tolerance: Treatment limited secondary to medical complications (free text); Patient Tolerated treatment well  Activity Tolerance: Pt's BP dropping to 74/47 after mobility - recovering to 90/72 after rest. RN notified  Safety Devices  Safety Devices in place: Yes  Type of devices: Left in chair;Call light within reach; Chair alarm in place;Nurse notified       Restrictions  Position Activity Restriction  Other position/activity restrictions: ambulate, sternal precautions, cushion in chair  Subjective   General  Chart Reviewed: Yes  Patient assessed for rehabilitation services?: Yes  Additional Pertinent Hx: 2020 Inpatient ADL T-Scale Score : 37.26 (03/05/20 1551)  ADL Inpatient CMS 0-100% Score: 50.11 (03/05/20 1551)  ADL Inpatient CMS G-Code Modifier : CK (03/05/20 1551)    Goals  Short term goals  Time Frame for Short term goals: by discharge   Short term goal 1: toilet transfer with SBA-not met  Short term goal 2: stand 5 minutes with SBA during functional mobility/ADLs-not met   Patient Goals   Patient goals : to go home        Therapy Time   Individual Concurrent Group Co-treatment   Time In 1440         Time Out 1510         Minutes 30         Timed Code Treatment Minutes: 30 Minutes    Total Tx Min: 30 Minutes     Therapist was present, directed the patient's care, made skilled judgment, and was responsible for assessment and treatment of the patient. If patient is discharged prior to next treatment, this will serve as the discharge summary.     [facility],  [address], OTS

## 2020-03-05 NOTE — PLAN OF CARE
Problem: Falls - Risk of:  Goal: Will remain free from falls  Description  Will remain free from falls  Outcome: Ongoing  Note:   Pt is a fall risk. Fall risk protocol in place. See Maryann Garcia Fall Score. Pt bed is in low position, bed alarm is on, side rails up, fall risk bracelet applied. , non-skid footwear in use. Patient/family educated on fall risk protocol, instructed to call for assistance when needed and belongings are in reach. assistance. Will continue with hourly rounds for po intake, pain needs, toileting and repositioning as needed. Will continue to monitor for needs. Problem: Discharge Planning:  Goal: Discharged to appropriate level of care  Description  Discharged to appropriate level of care  Outcome: Ongoing  Note:   Pt to be discharged to ARU or SNF per MD. Will communicate with SW about plan of care. Problem: Cardiac Output - Decreased:  Goal: Hemodynamic stability will improve  Description  Hemodynamic stability will improve  Outcome: Ongoing  Note:   VSS. Pt having issues with orthostatic hypotension. Will monitor. Problem: Pain:  Goal: Control of acute pain  Description  Control of acute pain  Outcome: Ongoing  Note:   Pt with c/o pain this morning r/t surgical incisions to chest. PRN pain medication administered and pt repositioned for comfort. Will monitor. Problem: Risk for Impaired Skin Integrity  Goal: Tissue integrity - skin and mucous membranes  Description  Structural intactness and normal physiological function of skin and  mucous membranes. Outcome: Ongoing  Note:   Pt at risk for skin breakdown. See Damaso score. Pt is able to help to reposition self in bed. Heels elevated off bed. Sacral heart mepilex intact to protect, site inspected and intact underneath. Will continue to turn and reposition patient every two hours and as needed. Will continue to keep patient clean and dry, applying skin care cream as needed. Pillows used for repositioning q2hs.   Will continue to monitor and assess for skin breakdown.

## 2020-03-06 ENCOUNTER — APPOINTMENT (OUTPATIENT)
Dept: ULTRASOUND IMAGING | Age: 70
DRG: 166 | End: 2020-03-06
Attending: THORACIC SURGERY (CARDIOTHORACIC VASCULAR SURGERY)
Payer: MEDICAID

## 2020-03-06 LAB
ALBUMIN SERPL-MCNC: 2.8 G/DL (ref 3.4–5)
ANION GAP SERPL CALCULATED.3IONS-SCNC: 12 MMOL/L (ref 3–16)
BUN BLDV-MCNC: 52 MG/DL (ref 7–20)
CALCIUM SERPL-MCNC: 8 MG/DL (ref 8.3–10.6)
CHLORIDE BLD-SCNC: 97 MMOL/L (ref 99–110)
CO2: 22 MMOL/L (ref 21–32)
CREAT SERPL-MCNC: 1.8 MG/DL (ref 0.8–1.3)
GFR AFRICAN AMERICAN: 46
GFR NON-AFRICAN AMERICAN: 38
GLUCOSE BLD-MCNC: 127 MG/DL (ref 70–99)
GLUCOSE BLD-MCNC: 139 MG/DL (ref 70–99)
GLUCOSE BLD-MCNC: 148 MG/DL (ref 70–99)
GLUCOSE BLD-MCNC: 167 MG/DL (ref 70–99)
GLUCOSE BLD-MCNC: 197 MG/DL (ref 70–99)
HCT VFR BLD CALC: 30 % (ref 40.5–52.5)
HEMOGLOBIN: 9.9 G/DL (ref 13.5–17.5)
MAGNESIUM: 2.3 MG/DL (ref 1.8–2.4)
MCH RBC QN AUTO: 30.2 PG (ref 26–34)
MCHC RBC AUTO-ENTMCNC: 33.1 G/DL (ref 31–36)
MCV RBC AUTO: 91.1 FL (ref 80–100)
PDW BLD-RTO: 14 % (ref 12.4–15.4)
PERFORMED ON: ABNORMAL
PHOSPHORUS: 4 MG/DL (ref 2.5–4.9)
PLATELET # BLD: 133 K/UL (ref 135–450)
PMV BLD AUTO: 9.4 FL (ref 5–10.5)
POTASSIUM SERPL-SCNC: 4.4 MMOL/L (ref 3.5–5.1)
RBC # BLD: 3.29 M/UL (ref 4.2–5.9)
SODIUM BLD-SCNC: 131 MMOL/L (ref 136–145)
WBC # BLD: 13.2 K/UL (ref 4–11)

## 2020-03-06 PROCEDURE — 80069 RENAL FUNCTION PANEL: CPT

## 2020-03-06 PROCEDURE — 6370000000 HC RX 637 (ALT 250 FOR IP): Performed by: INTERNAL MEDICINE

## 2020-03-06 PROCEDURE — 6360000002 HC RX W HCPCS: Performed by: CLINICAL NURSE SPECIALIST

## 2020-03-06 PROCEDURE — 6370000000 HC RX 637 (ALT 250 FOR IP): Performed by: CLINICAL NURSE SPECIALIST

## 2020-03-06 PROCEDURE — 76770 US EXAM ABDO BACK WALL COMP: CPT

## 2020-03-06 PROCEDURE — 6370000000 HC RX 637 (ALT 250 FOR IP): Performed by: THORACIC SURGERY (CARDIOTHORACIC VASCULAR SURGERY)

## 2020-03-06 PROCEDURE — 85027 COMPLETE CBC AUTOMATED: CPT

## 2020-03-06 PROCEDURE — 83735 ASSAY OF MAGNESIUM: CPT

## 2020-03-06 PROCEDURE — 36415 COLL VENOUS BLD VENIPUNCTURE: CPT

## 2020-03-06 PROCEDURE — 1200000000 HC SEMI PRIVATE

## 2020-03-06 PROCEDURE — 2580000003 HC RX 258: Performed by: THORACIC SURGERY (CARDIOTHORACIC VASCULAR SURGERY)

## 2020-03-06 PROCEDURE — 6360000002 HC RX W HCPCS: Performed by: THORACIC SURGERY (CARDIOTHORACIC VASCULAR SURGERY)

## 2020-03-06 PROCEDURE — 94150 VITAL CAPACITY TEST: CPT

## 2020-03-06 RX ORDER — INSULIN LISPRO 100 [IU]/ML
0-12 INJECTION, SOLUTION INTRAVENOUS; SUBCUTANEOUS
Status: DISCONTINUED | OUTPATIENT
Start: 2020-03-06 | End: 2020-03-11 | Stop reason: HOSPADM

## 2020-03-06 RX ORDER — INSULIN LISPRO 100 [IU]/ML
0-6 INJECTION, SOLUTION INTRAVENOUS; SUBCUTANEOUS NIGHTLY
Status: DISCONTINUED | OUTPATIENT
Start: 2020-03-06 | End: 2020-03-11 | Stop reason: HOSPADM

## 2020-03-06 RX ADMIN — POTASSIUM CHLORIDE 10 MEQ: 750 TABLET, EXTENDED RELEASE ORAL at 22:19

## 2020-03-06 RX ADMIN — FAMOTIDINE 20 MG: 20 TABLET, FILM COATED ORAL at 09:40

## 2020-03-06 RX ADMIN — CHLORHEXIDINE GLUCONATE 0.12% ORAL RINSE 15 ML: 1.2 LIQUID ORAL at 22:19

## 2020-03-06 RX ADMIN — MUPIROCIN: 20 OINTMENT TOPICAL at 09:41

## 2020-03-06 RX ADMIN — GLIPIZIDE 5 MG: 5 TABLET ORAL at 06:11

## 2020-03-06 RX ADMIN — AMIODARONE HYDROCHLORIDE 400 MG: 200 TABLET ORAL at 22:20

## 2020-03-06 RX ADMIN — SENNOSIDES AND DOCUSATE SODIUM 1 TABLET: 8.6; 5 TABLET ORAL at 09:41

## 2020-03-06 RX ADMIN — AMIODARONE HYDROCHLORIDE 400 MG: 200 TABLET ORAL at 09:40

## 2020-03-06 RX ADMIN — Medication 400 MG: at 09:40

## 2020-03-06 RX ADMIN — FUROSEMIDE 40 MG: 10 INJECTION, SOLUTION INTRAMUSCULAR; INTRAVENOUS at 19:08

## 2020-03-06 RX ADMIN — ACETAMINOPHEN 1000 MG: 500 TABLET, COATED ORAL at 22:20

## 2020-03-06 RX ADMIN — ONDANSETRON 4 MG: 2 INJECTION INTRAMUSCULAR; INTRAVENOUS at 06:53

## 2020-03-06 RX ADMIN — CHLORHEXIDINE GLUCONATE 0.12% ORAL RINSE 15 ML: 1.2 LIQUID ORAL at 09:40

## 2020-03-06 RX ADMIN — Medication 10 ML: at 09:41

## 2020-03-06 RX ADMIN — METOCLOPRAMIDE 5 MG: 5 INJECTION, SOLUTION INTRAMUSCULAR; INTRAVENOUS at 22:19

## 2020-03-06 RX ADMIN — ATORVASTATIN CALCIUM 40 MG: 40 TABLET, FILM COATED ORAL at 22:19

## 2020-03-06 RX ADMIN — INSULIN LISPRO 2 UNITS: 100 INJECTION, SOLUTION INTRAVENOUS; SUBCUTANEOUS at 05:47

## 2020-03-06 RX ADMIN — INSULIN LISPRO 1 UNITS: 100 INJECTION, SOLUTION INTRAVENOUS; SUBCUTANEOUS at 22:28

## 2020-03-06 RX ADMIN — FUROSEMIDE 40 MG: 10 INJECTION, SOLUTION INTRAMUSCULAR; INTRAVENOUS at 09:40

## 2020-03-06 RX ADMIN — Medication 10 ML: at 22:20

## 2020-03-06 RX ADMIN — APIXABAN 5 MG: 5 TABLET, FILM COATED ORAL at 22:20

## 2020-03-06 RX ADMIN — APIXABAN 5 MG: 5 TABLET, FILM COATED ORAL at 09:41

## 2020-03-06 RX ADMIN — ASPIRIN 81 MG: 81 TABLET, COATED ORAL at 09:40

## 2020-03-06 RX ADMIN — POTASSIUM CHLORIDE 10 MEQ: 750 TABLET, EXTENDED RELEASE ORAL at 09:40

## 2020-03-06 RX ADMIN — MUPIROCIN: 20 OINTMENT TOPICAL at 22:19

## 2020-03-06 ASSESSMENT — PAIN SCALES - GENERAL
PAINLEVEL_OUTOF10: 0

## 2020-03-06 NOTE — PROGRESS NOTES
Progress Note  Hospital Day: 5  POD#  4  S/P Coronary artery bypass x 3, bilat PVI, placement of SHANTI clip (3/2/2020)    Chief Complaint: Postop follow up    Mr. Monica Portillo complains of being either hot or cold. Needs frequent reminders for sternal precautions. Just ambulated around the ICU with wheeled walker and frequent stops to catch his breath as he walks quickly. Started on Glyburide yesterday for elevated blood sugars. Held BB yesterday to orthostatic hypotension although continued Amiodarone. Restarted preop apixaban for preop paroxysmal AF. VITAL SIGNS   Temperature:  Current - Temp: 98.9 °F (37.2 °C); Max - Temp  Av.1 °F (36.7 °C)  Min: 97.4 °F (36.3 °C)  Max: 98.9 °F (37.2 °C)    Respiratory Rate : Resp  Av.8  Min: 16  Max: 18    Pulse Range: Pulse  Av.8  Min: 75  Max: 103    Blood Pressure Range:  Systolic (95MQA), FSJ:29 , Min:81 , RNX:698   ; Diastolic (58XJO), DQY:37, Min:57, Max:98    Pulse ox Range: SpO2  Av.4 %  Min: 77 %  Max: 100 %  O2 Flow Rate (L/min): 0 L/min    Admission Weight: Weight: 270 lb (122.5 kg)    Current Weight: up 18 lbs from preop  Patient Vitals for the past 96 hrs (Last 3 readings):   Weight   20 0600 278 lb 10.6 oz (126.4 kg)   20 0400 276 lb 3.8 oz (125.3 kg)   20 0600 263 lb 14.3 oz (119.7 kg)     I/O:     Intake/Output Summary (Last 24 hours) at 3/6/2020 0945  Last data filed at 3/6/2020 0940  Gross per 24 hour   Intake 2378.84 ml   Output 450 ml   Net 1928.84 ml     Urine: 2x-450- 9x ml/shift    LINES/ACCESS:   Central Access: RIJ Day #: 4  Peripheral Access: Rt wrist Day #: 4      Diet: DIET CARDIAC; Carb Control: 3 carb choices (45 gms)/meal; No Added Salt (3-4 GM);  Daily Fluid Restriction: 1500 ml    MEDICATIONS:      amiodarone  400 mg Oral BID    apixaban  5 mg Oral BID    furosemide  40 mg Intravenous BID    glipiZIDE  5 mg Oral QAM AC    potassium chloride  10 mEq Oral BID    acetaminophen  1,000 mg Oral 3 passing flatus, +BM  Wounds: clean, dry and intact  Ext: warm, + edema    PLAN:   · CV - on Amiodarone, statin, ASA/apixaban   - hold ace inhibitor due to elevated creatinine   - hold BB due to marginal BP  · Pulm - off O2, continue incentive spirometry  · Renal - creatinine elevated to 1.8 (preop cr 1.3) - continue to monitor    · Heme - Acute blood loss anemia as expected- continue to monitor     · FEN - cardiac diet with 1500 ml fluid restriction     - continue glyburide 5 mg, consult hospitalist for DM mgmt and need for PCP  · VTE prophylaxis - SCD and TRUMAN hose  · Disposition - Continue PT/OT, await precert to SNF  Discussed patient with Dr Susanna Recinos who is agreeable to assessment and plan.         Keegan Armenta, [address] [facility][note 3] pager  3/6/2020  9:45 AM

## 2020-03-06 NOTE — PLAN OF CARE
Problem: Falls - Risk of:  Goal: Will remain free from falls  Description  Will remain free from falls  3/5/2020 2241 by Sergo Heller RN  Outcome: Ongoing  3/5/2020 1056 by Surya Knox RN  Outcome: Ongoing  Note:   Pt is a fall risk. Fall risk protocol in place. See Alin Cespedes Fall Score. Pt bed is in low position, bed alarm is on, side rails up, fall risk bracelet applied. , non-skid footwear in use. Patient/family educated on fall risk protocol, instructed to call for assistance when needed and belongings are in reach. assistance. Will continue with hourly rounds for po intake, pain needs, toileting and repositioning as needed. Will continue to monitor for needs. Goal: Absence of physical injury  Description  Absence of physical injury  Outcome: Ongoing     Problem: Discharge Planning:  Goal: Discharged to appropriate level of care  Description  Discharged to appropriate level of care  3/5/2020 2241 by Sergo Heller RN  Outcome: Ongoing  3/5/2020 1056 by Surya Knox RN  Outcome: Ongoing  Note:   Pt to be discharged to ARU or SNF per MD. Will communicate with SW about plan of care. Problem: Cardiac Output - Decreased:  Goal: Cardiac output within specified parameters  Description  Cardiac output within specified parameters  Outcome: Ongoing  Goal: Hemodynamic stability will improve  Description  Hemodynamic stability will improve  3/5/2020 2241 by Sergo Heller RN  Outcome: Ongoing  3/5/2020 1056 by Surya Knox RN  Outcome: Ongoing  Note:   VSS. Pt having issues with orthostatic hypotension. Will monitor.       Problem: Fluid Volume - Imbalance:  Goal: Ability to achieve a balanced intake and output will improve  Description  Ability to achieve a balanced intake and output will improve  Outcome: Ongoing  Goal: Chest tube drainage is within specified parameters  Description  Chest tube drainage is within specified parameters  Outcome: Ongoing     Problem: Pain:  Goal: Pain level will decrease  Description  Pain level will decrease  Outcome: Ongoing  Goal: Control of acute pain  Description  Control of acute pain  3/5/2020 2241 by Gavin Elizondo RN  Outcome: Ongoing  3/5/2020 1056 by Ayanna Proctor RN  Outcome: Ongoing  Note:   Pt with c/o pain this morning r/t surgical incisions to chest. PRN pain medication administered and pt repositioned for comfort. Will monitor. Goal: Control of chronic pain  Description  Control of chronic pain  Outcome: Ongoing     Problem: Tobacco Use:  Goal: Will participate in inpatient tobacco-use cessation counseling  Description  Will participate in inpatient tobacco-use cessation counseling  Outcome: Ongoing     Problem: Urinary Elimination:  Goal: Complications related to the disease process, condition or treatment will be avoided or minimized  Description  Complications related to the disease process, condition or treatment will be avoided or minimized  Outcome: Ongoing     Problem: Infection - Central Venous Catheter-Associated Bloodstream Infection:  Goal: Will show no infection signs and symptoms  Description  Will show no infection signs and symptoms  Outcome: Ongoing     Problem: Risk for Impaired Skin Integrity  Goal: Tissue integrity - skin and mucous membranes  Description  Structural intactness and normal physiological function of skin and  mucous membranes. 3/5/2020 2241 by Gavin Elizondo RN  Outcome: Ongoing  3/5/2020 1056 by Ayanna Proctor RN  Outcome: Ongoing  Note:   Pt at risk for skin breakdown. See Damaso score. Pt is able to help to reposition self in bed. Heels elevated off bed. Sacral heart mepilex intact to protect, site inspected and intact underneath. Will continue to turn and reposition patient every two hours and as needed. Will continue to keep patient clean and dry, applying skin care cream as needed. Pillows used for repositioning q2hs. Will continue to monitor and assess for skin breakdown.

## 2020-03-06 NOTE — CONSULTS
Hospital Medicine  Consult History & Physical        Chief Complaint:  Elevated blood sugars    Date of Service: Pt seen/examined in consultation on 3/6/2020    History Of Present Illness: The patient is a 81 y.o. male with multiple medical problems as below, most notable for CAD, s/p CABG this admission, systolic CHF, a fib, DM, HTN, bladder calculi, who we are asked to see/evaluate by Dr. Nguyen Napier for brian-operative mgt. Patient has not seen PCP for several years and his previous PCP retired and he did not establish new doctor. He presented to [facility] in November 2019 with urinary issues and was found to have obstructive bladder stones, as well as systolic CHF, which prompted further work up- cardiac angiogram, followed by CABG this admission. Evacuation of bladder stones is delayed until patient is stable cardio vascularly. Since CABG, patient has been extubated, he is ambulatory, tolerating PO, he reports frequent urination which became his baseline.  He was on variety of medications for diabetes in the past, he remembers he had bad diarrhea on metformin, and he was not on anything in the recent past.         Past Medical History:        Diagnosis Date    A-fib [facility]) 2020    CAD, multiple vessel 12/2019    Diabetes mellitus ([address]     diet controlled     Enlarged prostate     Environmental allergies     Kidney stones        Past Surgical History:        Procedure Laterality Date    CARDIAC CATHETERIZATION  12/10/2019    Dr. Bin Cano - severe multi-vessel disease    CORONARY ARTERY BYPASS GRAFT N/A 3/2/2020    WAYNE; TCPB; CABG x 3, LIMA to LAD w/ long segment endarterectomy (4 cm); bilateral pulmonary vein isolation; 40 mm Atriclip to L atril appendage; endoscopic L GSV harvest; ICNB x 5 levels bilat; sternal plate x 1 to manubrium performed by Allan Giraldo [facility] at [facility]  x2    NASAL SEPTUM SURGERY         Medications Prior to Admission:    Prior to Admission medications hours.  COAG  No results for input(s): INR in the last 72 hours. CARDIAC ENZYMES  No results for input(s): CKTOTAL, CKMB, CKMBINDEX, TROPONINI in the last 72 hours. U/A:    Lab Results   Component Value Date    COLORU Yellow 03/02/2020    WBCUA >100 03/02/2020    RBCUA  03/02/2020    MUCUS 1+ 02/26/2020    BACTERIA Rare 03/02/2020    CLARITYU Clear 03/02/2020    SPECGRAV 1.020 03/02/2020    LEUKOCYTESUR LARGE 03/02/2020    BLOODU LARGE 03/02/2020    GLUCOSEU Negative 03/02/2020       ABG    Lab Results   Component Value Date    FTA1NQK 23.4 03/02/2020    BEART -2 03/02/2020    M7BEQJSS 96 03/02/2020    PHART 7.367 03/02/2020    YHY2QMW 40.8 03/02/2020    PO2ART 84.2 03/02/2020    SSH2ROD 25 03/02/2020           Active Hospital Problems    Diagnosis Date Noted    CAD in native artery [I25.10] 03/02/2020    CAD, multiple vessel [I25.10]        ASSESSMENT/PLAN:    DM type 2:  A1C 7.8%. Considering fluctuating renal function, he might be safer to be on insulin until his bladder calculi is taken care of. Patient does not mind being on insulin if recommended- will switch him tonight and monitor. Consult diabetic educator. NANETTE on CKD 3:  Will get post void bladder scan and renal ultrasound- will rule out hydronephrosis given known bladder calculus. CABG, a fib, cardiomyopathy:  Continue with care as per CT surgery team.    DVT Prophylaxis: eliquis  Diet: DIET CARDIAC; Carb Control: 3 carb choices (45 gms)/meal; No Added Salt (3-4 GM);  Daily Fluid Restriction: 1500 ml  Code Status: Full Code  PT/OT Eval Status: pending    Dieter Rodriguez MD

## 2020-03-07 LAB
ALBUMIN SERPL-MCNC: 3.2 G/DL (ref 3.4–5)
ANION GAP SERPL CALCULATED.3IONS-SCNC: 13 MMOL/L (ref 3–16)
BUN BLDV-MCNC: 43 MG/DL (ref 7–20)
CALCIUM SERPL-MCNC: 8.2 MG/DL (ref 8.3–10.6)
CHLORIDE BLD-SCNC: 99 MMOL/L (ref 99–110)
CO2: 24 MMOL/L (ref 21–32)
CREAT SERPL-MCNC: 1.5 MG/DL (ref 0.8–1.3)
GFR AFRICAN AMERICAN: 56
GFR NON-AFRICAN AMERICAN: 47
GLUCOSE BLD-MCNC: 111 MG/DL (ref 70–99)
GLUCOSE BLD-MCNC: 118 MG/DL (ref 70–99)
GLUCOSE BLD-MCNC: 158 MG/DL (ref 70–99)
GLUCOSE BLD-MCNC: 170 MG/DL (ref 70–99)
GLUCOSE BLD-MCNC: 173 MG/DL (ref 70–99)
HCT VFR BLD CALC: 33 % (ref 40.5–52.5)
HEMOGLOBIN: 11.2 G/DL (ref 13.5–17.5)
MAGNESIUM: 2.2 MG/DL (ref 1.8–2.4)
MCH RBC QN AUTO: 30.6 PG (ref 26–34)
MCHC RBC AUTO-ENTMCNC: 34 G/DL (ref 31–36)
MCV RBC AUTO: 90.1 FL (ref 80–100)
PDW BLD-RTO: 13.8 % (ref 12.4–15.4)
PERFORMED ON: ABNORMAL
PHOSPHORUS: 3 MG/DL (ref 2.5–4.9)
PLATELET # BLD: 181 K/UL (ref 135–450)
PMV BLD AUTO: 8.4 FL (ref 5–10.5)
POTASSIUM SERPL-SCNC: 4 MMOL/L (ref 3.5–5.1)
RBC # BLD: 3.66 M/UL (ref 4.2–5.9)
SODIUM BLD-SCNC: 136 MMOL/L (ref 136–145)
WBC # BLD: 12 K/UL (ref 4–11)

## 2020-03-07 PROCEDURE — 83735 ASSAY OF MAGNESIUM: CPT

## 2020-03-07 PROCEDURE — 6370000000 HC RX 637 (ALT 250 FOR IP): Performed by: CLINICAL NURSE SPECIALIST

## 2020-03-07 PROCEDURE — 85027 COMPLETE CBC AUTOMATED: CPT

## 2020-03-07 PROCEDURE — 2580000003 HC RX 258: Performed by: THORACIC SURGERY (CARDIOTHORACIC VASCULAR SURGERY)

## 2020-03-07 PROCEDURE — 6370000000 HC RX 637 (ALT 250 FOR IP): Performed by: THORACIC SURGERY (CARDIOTHORACIC VASCULAR SURGERY)

## 2020-03-07 PROCEDURE — 6370000000 HC RX 637 (ALT 250 FOR IP): Performed by: INTERNAL MEDICINE

## 2020-03-07 PROCEDURE — 36415 COLL VENOUS BLD VENIPUNCTURE: CPT

## 2020-03-07 PROCEDURE — 80069 RENAL FUNCTION PANEL: CPT

## 2020-03-07 PROCEDURE — 6360000002 HC RX W HCPCS: Performed by: CLINICAL NURSE SPECIALIST

## 2020-03-07 PROCEDURE — 1200000000 HC SEMI PRIVATE

## 2020-03-07 RX ORDER — TAMSULOSIN HYDROCHLORIDE 0.4 MG/1
0.4 CAPSULE ORAL DAILY
Status: DISCONTINUED | OUTPATIENT
Start: 2020-03-07 | End: 2020-03-07

## 2020-03-07 RX ADMIN — METOPROLOL TARTRATE 6.25 MG: 25 TABLET, FILM COATED ORAL at 20:18

## 2020-03-07 RX ADMIN — Medication 10 ML: at 08:11

## 2020-03-07 RX ADMIN — FAMOTIDINE 20 MG: 20 TABLET, FILM COATED ORAL at 08:07

## 2020-03-07 RX ADMIN — FUROSEMIDE 40 MG: 10 INJECTION, SOLUTION INTRAMUSCULAR; INTRAVENOUS at 08:08

## 2020-03-07 RX ADMIN — Medication 10 ML: at 20:41

## 2020-03-07 RX ADMIN — ASPIRIN 81 MG: 81 TABLET, COATED ORAL at 08:07

## 2020-03-07 RX ADMIN — METOPROLOL TARTRATE 6.25 MG: 25 TABLET, FILM COATED ORAL at 18:14

## 2020-03-07 RX ADMIN — CHLORHEXIDINE GLUCONATE 0.12% ORAL RINSE 15 ML: 1.2 LIQUID ORAL at 20:28

## 2020-03-07 RX ADMIN — MAGNESIUM CITRATE 296 ML: 1.75 LIQUID ORAL at 14:30

## 2020-03-07 RX ADMIN — APIXABAN 5 MG: 5 TABLET, FILM COATED ORAL at 20:16

## 2020-03-07 RX ADMIN — POTASSIUM CHLORIDE 10 MEQ: 750 TABLET, EXTENDED RELEASE ORAL at 20:18

## 2020-03-07 RX ADMIN — APIXABAN 5 MG: 5 TABLET, FILM COATED ORAL at 08:08

## 2020-03-07 RX ADMIN — POLYETHYLENE GLYCOL (3350) 17 G: 17 POWDER, FOR SOLUTION ORAL at 08:07

## 2020-03-07 RX ADMIN — INSULIN LISPRO 2 UNITS: 100 INJECTION, SOLUTION INTRAVENOUS; SUBCUTANEOUS at 11:54

## 2020-03-07 RX ADMIN — CHLORHEXIDINE GLUCONATE 0.12% ORAL RINSE 15 ML: 1.2 LIQUID ORAL at 08:07

## 2020-03-07 RX ADMIN — ATORVASTATIN CALCIUM 40 MG: 40 TABLET, FILM COATED ORAL at 20:16

## 2020-03-07 RX ADMIN — AMIODARONE HYDROCHLORIDE 400 MG: 200 TABLET ORAL at 08:08

## 2020-03-07 RX ADMIN — Medication 400 MG: at 08:08

## 2020-03-07 RX ADMIN — INSULIN LISPRO 1 UNITS: 100 INJECTION, SOLUTION INTRAVENOUS; SUBCUTANEOUS at 22:12

## 2020-03-07 RX ADMIN — POTASSIUM CHLORIDE 10 MEQ: 750 TABLET, EXTENDED RELEASE ORAL at 08:08

## 2020-03-07 RX ADMIN — FUROSEMIDE 40 MG: 10 INJECTION, SOLUTION INTRAMUSCULAR; INTRAVENOUS at 18:45

## 2020-03-07 RX ADMIN — SENNOSIDES AND DOCUSATE SODIUM 1 TABLET: 8.6; 5 TABLET ORAL at 08:08

## 2020-03-07 RX ADMIN — INSULIN LISPRO 2 UNITS: 100 INJECTION, SOLUTION INTRAVENOUS; SUBCUTANEOUS at 18:09

## 2020-03-07 RX ADMIN — INSULIN GLARGINE 12 UNITS: 100 INJECTION, SOLUTION SUBCUTANEOUS at 08:11

## 2020-03-07 ASSESSMENT — PAIN SCALES - GENERAL
PAINLEVEL_OUTOF10: 0

## 2020-03-07 NOTE — PROGRESS NOTES
Pt is dressed and ready to go home. States he wants to check into rehab on Monday after taking the weekend off. Pt voiding hourly but he isn't using the urinal.  No BM \"in a couple of days,\" Senna S and Miralax given. Pt ambulated in govea 250 feet with walker, gait steady. Achieves IS volumes of 1500 mL. BLE 1+ edema, pt is unwilling to wear TRUMAN hose because he has his jeans on.

## 2020-03-07 NOTE — PROGRESS NOTES
Hospitalist Progress Note      PCP: No primary care provider on file. Date of Admission: 3/2/2020    Chief Complaint on Admission: need CABG    Pt Seen/Examined and Chart Reviewed. Admitting dx CAD, CABG    SUBJECTIVE/OBJECTIVE:   Patient is all dressed up this morning, he was going to go home for few days and then check in to rehab from home. He wants to have baconator from [facility], which is his routine on weekends. He called his friend to pick him up. Explained that he is not ready for discharge yet and on IV lasix. And in general he has to go to rehab directly from the hospital and it would be unwise to eat fast food after he just had CABG. Patient understood but wants to stay dressed for some time and settled on asking his friend to bring him bagel and lox. He was seen by urology and does not need guo catheter. Creatinine is better today.      Allergies  Codeine    Medications      Scheduled Meds:   insulin lispro  0-12 Units Subcutaneous TID WC    insulin lispro  0-6 Units Subcutaneous Nightly    insulin glargine  12 Units Subcutaneous Daily    apixaban  5 mg Oral BID    furosemide  40 mg Intravenous BID    potassium chloride  10 mEq Oral BID    acetaminophen  1,000 mg Oral 3 times per day    famotidine  20 mg Oral Daily    magnesium oxide  400 mg Oral Daily    aspirin  81 mg Oral Daily    sodium chloride flush  10 mL Intravenous 2 times per day    [Held by provider] metoprolol tartrate  12.5 mg Oral BID    [Held by provider] lisinopril  2.5 mg Oral Lunch    chlorhexidine  15 mL Mouth/Throat BID    atorvastatin  40 mg Oral Nightly    polyethylene glycol  17 g Oral Daily    sennosides-docusate sodium  1 tablet Oral BID       Infusions:   amiodarone Stopped (03/06/20 0800)    dextrose         PRN Meds:  traMADol **OR** traMADol, metoclopramide, sodium chloride flush, magnesium sulfate, acetaminophen, diphenhydrAMINE, ondansetron, hydrALAZINE, metoprolol, albuterol, glucose, dextrose, glucagon (rDNA), dextrose    Vitals    TEMPERATURE:  Current - Temp: 97.3 °F (36.3 °C); Max - Temp  Av.8 °F (36.6 °C)  Min: 97.3 °F (36.3 °C)  Max: 98 °F (36.7 °C)  RESPIRATIONS RANGE: Resp  Av.4  Min: 16  Max: 20  PULSE RANGE: Pulse  Av.3  Min: 78  Max: 115  BLOOD PRESSURE RANGE:  Systolic (84MIF), SFI:782 , Min:106 , JVD:813   ; Diastolic (94ZYN), WUU:25, Min:57, Max:87    PULSE OXIMETRY RANGE: SpO2  Av.4 %  Min: 95 %  Max: 100 %  24HR INTAKE/OUTPUT:      Intake/Output Summary (Last 24 hours) at 3/7/2020 1216  Last data filed at 3/7/2020 0930  Gross per 24 hour   Intake 614 ml   Output --   Net 614 ml       Exam:      General appearance: No apparent distress, appears stated age and cooperative. Lungs: mostly clear bilaterally  Heart: irregular rate and rhythm with Normal S1/S2 without  murmurs, rubs or gallops, point of maximum impulse non-displaced  Abdomen: Soft, non-tender or non-distended without rigidity or guarding and positive bowel sounds all four quadrants. Extremities: No clubbing, cyanosis, LE edema bilaterally. Skin: Skin color, texture, turgor normal.    Neurologic: Alert and oriented X 3,  grossly non-focal.  Mental status: Alert, oriented, thought content appropriate. Data    Recent Labs     200 20  044   WBC 14.7* 13.2* 12.0*   HGB 10.6* 9.9* 11.2*   HCT 32.3* 30.0* 33.0*   * 133* 181      Recent Labs     20  0453 20  0410 20  0442   * 131* 136   K 4.6 4.4 4.0   CL 99 97* 99   CO2 25 22 24   PHOS 3.6 4.0 3.0   BUN 47* 52* 43*   CREATININE 1.7* 1.8* 1.5*     No results for input(s): AST, ALT, ALB, BILIDIR, BILITOT, ALKPHOS in the last 72 hours. No results for input(s): INR in the last 72 hours. No results for input(s): CKTOTAL, CKMB, CKMBINDEX, TROPONINI in the last 72 hours.     Consults:     IP CONSULT TO CARDIAC REHAB  IP CONSULT TO CASE MANAGEMENT  IP CONSULT TO SOCIAL WORK  IP CONSULT TO DIETITIAN  IP CONSULT TO HOSPITALIST  IP CONSULT TO DIABETES EDUCATOR  IP CONSULT TO UROLOGY    Active Hospital Problems    Diagnosis Date Noted    CAD in native artery [I25.10] 03/02/2020    CAD, multiple vessel [I25.10]          ASSESSMENT AND PLAN      DM type 2:  A1C 7.8%. Considering fluctuating renal function, he might be safer to be on insulin until his bladder calculi is taken care of. Cont lantus 12 u daily and ISS     NANETTE on CKD 3:  Creatinine is fluctuating but improving. Seen by urology- no need for guo catheter for now, but needs close follow up with millicent on discharge.      CABG, a fib, cardiomyopathy:  Continue with care as per CT surgery team.        DVT Prophylaxis: eliquis  Diet: DIET CARDIAC; Carb Control: 3 carb choices (45 gms)/meal; No Added Salt (3-4 GM);  Daily Fluid Restriction: 1500 ml  Code Status: Full Code    PT/OT Eval Status:SNF    Dispo - inpt    Parvin Kaye MD

## 2020-03-07 NOTE — PROGRESS NOTES
Progress Note    S/P WAYNE; TCPB; CABG x 3, LIMA to LAD w/ long segment endarterectomy (4 cm); bilateral pulmonary vein isolation; 40 mm Atriclip to L atril appendage; on 3/2    Vital Signs:                                                 /81   Pulse 115   Temp 97.3 °F (36.3 °C) (Oral)   Resp 16   Ht 5' 10\" (1.778 m)   Wt 273 lb 9.5 oz (124.1 kg)   SpO2 98%   BMI 39.26 kg/m²  O2 Flow Rate (L/min): 0 L/min     CVP (Mean): 18 mmHg  PAP: 47/23  PAP (Mean): 32 mmHg  SVR (Using ABP Mean): 764.18 dyne*sec/cm5  CCI: 2.8 L/min  Admission Weight: 270 lb (122.5 kg)      Vent Settings:  Vent Information  $Ventilation: $Initial Day  Ventilator Started: Yes  Ventilation Day(s): 1  Equipment ID: [id number]  Equipment Changed: Airway securing device  Vent Type: [id number]  Vent Mode: CPAP  Vt Ordered: 500 mL  Rate Set: 18 bmp  Pressure Support: 10 cmH20  FiO2 : 80 %  Sensitivity: 2  PEEP/CPAP: 5  I Time/ I Time %: 1 s  Cuff Pressure (cm H2O): (MOV)  Humidification Source: [facility]     Drips:  none    I/O:      Intake/Output Summary (Last 24 hours) at 3/7/2020 1419  Last data filed at 3/7/2020 0930  Gross per 24 hour   Intake 614 ml   Output --   Net 614 ml     Chest Tube:     CV: reg, wound c/d/i  Pulm: decreased  Abd: soft  Ext: warm, 1+ edema    Data Review:  CBC:   Recent Labs     03/05/20 0453 03/06/20 0410 03/07/20  0442   WBC 14.7* 13.2* 12.0*   HGB 10.6* 9.9* 11.2*   HCT 32.3* 30.0* 33.0*   MCV 91.1 91.1 90.1   * 133* 181     BMP:   Recent Labs     03/05/20 0453 03/06/20 0410 03/07/20  0442   * 131* 136   K 4.6 4.4 4.0   CL 99 97* 99   CO2 25 22 24   PHOS 3.6 4.0 3.0   BUN 47* 52* 43*   CREATININE 1.7* 1.8* 1.5*   CALCIUM 8.4 8.0* 8.2*   MG 2.50* 2.30 2.20     Cardiac Enzymes: No results for input(s): CKTOTAL, CKMB, CKMBINDEX, TROPONINI in the last 72 hours. PT/INR: No results for input(s): PROTIME, INR in the last 72 hours. APTT: No results for input(s):  APTT in the last 72 hours.    Assessment/Plan:  CV - Rate controlled afib on PO amio, will add back small dose of lopressor (patient is at 25mg POD bid at home)  pulm - Aggressive pulmonary tolilet  Renal - Cr is coming down but not back to baseline.  Will rechekc BMP in am   Good UO  GI: Bowel regimen, Protein boost shakes to improve nutrition   Dispo     - awaiting precert for NSF placement    Silvino Tuttle MD  3/7/2020  2:19 PM

## 2020-03-07 NOTE — CONSULTS
Urology Attending Consult Note      Reason for Consultation: BPH, 7cm bladder stone, bilateral hydro     History: 82 yo M with significant cardiac issues, s/p CABG and bypass surgery. We are following for known 7cm bladder stone and bilateral hydro. The plan was to set up for TURP and open bladder stone removal once medically stable and cleared by cardiology. On Eliquis and ASA. Cr @ 1.5 from 1.8 (baseline 1.3). He is voiding. Discharging to SNF today? ? Family History, Social History, Review of Systems:  Reviewed and agreed to as per chart    Vitals:  /84   Pulse 106   Temp 98 °F (36.7 °C) (Oral)   Resp 20   Ht 5' 10\" (1.778 m)   Wt 273 lb 9.5 oz (124.1 kg)   SpO2 98%   BMI 39.26 kg/m²   Temp  Av °F (36.7 °C)  Min: 97.8 °F (36.6 °C)  Max: 98.2 °F (36.8 °C)    Intake/Output Summary (Last 24 hours) at 3/7/2020 1006  Last data filed at 3/7/2020 0930  Gross per 24 hour   Intake 974 ml   Output --   Net 974 ml         Physical:   Well developed, well nourished in no acute distress   Mood indicates no abnormalities. Pt doesnt appear depressed   Orientated to time and place   Neck is supple, trachea is midline   Respiratory effort is normal   Cardiovascular show no extremity swelling   Abdomen no masses or hernias are palpated, there is no tenderness. Liver and Spleen appear normal.   Skin show no abnormal lesions   Lymph nodes are not palpated in the inguinal, neck, or axillary area.      Male :   Penis appears normal and circumcised   Urethral meatus is normal in size and location   Scrotum appears normal and both testicles appear normal in size and location   Voiding clear urine       Labs:  WBC:    Lab Results   Component Value Date    WBC 12.0 2020     Hemoglobin/Hematocrit:    Lab Results   Component Value Date    HGB 11.2 2020    HCT 33.0 2020     BMP:    Lab Results   Component Value Date     2020    K 4.0 2020    K 4.5 2019    CL 99 03/07/2020    CO2 24 03/07/2020    BUN 43 03/07/2020    LABALBU 3.2 03/07/2020    CREATININE 1.5 03/07/2020    CALCIUM 8.2 03/07/2020    GFRAA 56 03/07/2020    LABGLOM 47 03/07/2020     PT/INR:    Lab Results   Component Value Date    PROTIME 12.3 03/02/2020    INR 1.06 03/02/2020     PTT:    Lab Results   Component Value Date    APTT 37.2 03/02/2020   [APTT        Antibiotic Therapy: none     Imaging:   GABY 3/6/20  1. Bilateral hydronephrosis. 2. Large right renal cyst, benign-appearing. 3. Large bladder calcification. CT abd/pelvis 11/20/20  1. Bilateral small pleural effusions. 2. Partially visualized mildly enlarged mediastinal subcarinal lymph nodes which are nonspecific/indeterminate. Recommended follow-up with dedicated CT chest.   3. Cholelithiasis. 4. 7.0 x 3.3 cm large calculus in the urinary bladder. 5. Irregular bladder wall thickening with evidence of diverticuli, trabeculation and sacculations suggestive of chronic outlet obstruction. 6. Bilateral mild hydroureteronephrosis likely related to back pressure changes from chronic bladder outlet obstruction. 7. Mild prostatomegaly. Impression/Plan:  80 yo M significant cardiac hx, s/p CABG and bypass this admission, known 7cm bladder stone  -voiding clear urine   -Cr @ 1.5 from 1.8  -okay to discharge to SNF  -will set up for open bladder stone removal once medically stable and cleared by cardiology.  He would have to be able to come off thinners prior   -he will see us in one week to repeat NCCT and check a [address], APRN - CNP

## 2020-03-08 LAB
ALBUMIN SERPL-MCNC: 3.3 G/DL (ref 3.4–5)
ANION GAP SERPL CALCULATED.3IONS-SCNC: 15 MMOL/L (ref 3–16)
BUN BLDV-MCNC: 42 MG/DL (ref 7–20)
CALCIUM SERPL-MCNC: 8.5 MG/DL (ref 8.3–10.6)
CHLORIDE BLD-SCNC: 96 MMOL/L (ref 99–110)
CO2: 24 MMOL/L (ref 21–32)
CREAT SERPL-MCNC: 1.6 MG/DL (ref 0.8–1.3)
GFR AFRICAN AMERICAN: 52
GFR NON-AFRICAN AMERICAN: 43
GLUCOSE BLD-MCNC: 146 MG/DL (ref 70–99)
GLUCOSE BLD-MCNC: 147 MG/DL (ref 70–99)
GLUCOSE BLD-MCNC: 150 MG/DL (ref 70–99)
GLUCOSE BLD-MCNC: 204 MG/DL (ref 70–99)
GLUCOSE BLD-MCNC: 238 MG/DL (ref 70–99)
HCT VFR BLD CALC: 34 % (ref 40.5–52.5)
HEMOGLOBIN: 11.2 G/DL (ref 13.5–17.5)
MAGNESIUM: 2.2 MG/DL (ref 1.8–2.4)
MCH RBC QN AUTO: 30.3 PG (ref 26–34)
MCHC RBC AUTO-ENTMCNC: 33.1 G/DL (ref 31–36)
MCV RBC AUTO: 91.8 FL (ref 80–100)
PDW BLD-RTO: 14 % (ref 12.4–15.4)
PERFORMED ON: ABNORMAL
PHOSPHORUS: 3.3 MG/DL (ref 2.5–4.9)
PLATELET # BLD: 218 K/UL (ref 135–450)
PMV BLD AUTO: 8.6 FL (ref 5–10.5)
POTASSIUM SERPL-SCNC: 4.3 MMOL/L (ref 3.5–5.1)
RBC # BLD: 3.7 M/UL (ref 4.2–5.9)
SODIUM BLD-SCNC: 135 MMOL/L (ref 136–145)
WBC # BLD: 12 K/UL (ref 4–11)

## 2020-03-08 PROCEDURE — 2580000003 HC RX 258: Performed by: THORACIC SURGERY (CARDIOTHORACIC VASCULAR SURGERY)

## 2020-03-08 PROCEDURE — 6370000000 HC RX 637 (ALT 250 FOR IP): Performed by: THORACIC SURGERY (CARDIOTHORACIC VASCULAR SURGERY)

## 2020-03-08 PROCEDURE — 6370000000 HC RX 637 (ALT 250 FOR IP): Performed by: INTERNAL MEDICINE

## 2020-03-08 PROCEDURE — 6370000000 HC RX 637 (ALT 250 FOR IP): Performed by: CLINICAL NURSE SPECIALIST

## 2020-03-08 PROCEDURE — 85027 COMPLETE CBC AUTOMATED: CPT

## 2020-03-08 PROCEDURE — 6360000002 HC RX W HCPCS: Performed by: CLINICAL NURSE SPECIALIST

## 2020-03-08 PROCEDURE — 36415 COLL VENOUS BLD VENIPUNCTURE: CPT

## 2020-03-08 PROCEDURE — 1200000000 HC SEMI PRIVATE

## 2020-03-08 PROCEDURE — 80069 RENAL FUNCTION PANEL: CPT

## 2020-03-08 PROCEDURE — 83735 ASSAY OF MAGNESIUM: CPT

## 2020-03-08 RX ORDER — INSULIN LISPRO 100 [IU]/ML
3 INJECTION, SOLUTION INTRAVENOUS; SUBCUTANEOUS
Status: DISCONTINUED | OUTPATIENT
Start: 2020-03-08 | End: 2020-03-11 | Stop reason: HOSPADM

## 2020-03-08 RX ADMIN — METOPROLOL TARTRATE 6.25 MG: 25 TABLET, FILM COATED ORAL at 08:56

## 2020-03-08 RX ADMIN — INSULIN LISPRO 2 UNITS: 100 INJECTION, SOLUTION INTRAVENOUS; SUBCUTANEOUS at 17:58

## 2020-03-08 RX ADMIN — INSULIN LISPRO 2 UNITS: 100 INJECTION, SOLUTION INTRAVENOUS; SUBCUTANEOUS at 08:59

## 2020-03-08 RX ADMIN — APIXABAN 5 MG: 5 TABLET, FILM COATED ORAL at 20:34

## 2020-03-08 RX ADMIN — INSULIN GLARGINE 12 UNITS: 100 INJECTION, SOLUTION SUBCUTANEOUS at 08:59

## 2020-03-08 RX ADMIN — FAMOTIDINE 20 MG: 20 TABLET, FILM COATED ORAL at 08:55

## 2020-03-08 RX ADMIN — ACETAMINOPHEN 1000 MG: 500 TABLET, COATED ORAL at 20:34

## 2020-03-08 RX ADMIN — APIXABAN 5 MG: 5 TABLET, FILM COATED ORAL at 08:55

## 2020-03-08 RX ADMIN — ASPIRIN 81 MG: 81 TABLET, COATED ORAL at 08:55

## 2020-03-08 RX ADMIN — POTASSIUM CHLORIDE 10 MEQ: 750 TABLET, EXTENDED RELEASE ORAL at 20:34

## 2020-03-08 RX ADMIN — INSULIN LISPRO 2 UNITS: 100 INJECTION, SOLUTION INTRAVENOUS; SUBCUTANEOUS at 20:55

## 2020-03-08 RX ADMIN — ACETAMINOPHEN 1000 MG: 500 TABLET, COATED ORAL at 15:41

## 2020-03-08 RX ADMIN — FUROSEMIDE 40 MG: 10 INJECTION, SOLUTION INTRAMUSCULAR; INTRAVENOUS at 08:58

## 2020-03-08 RX ADMIN — FUROSEMIDE 40 MG: 10 INJECTION, SOLUTION INTRAMUSCULAR; INTRAVENOUS at 17:54

## 2020-03-08 RX ADMIN — INSULIN LISPRO 3 UNITS: 100 INJECTION, SOLUTION INTRAVENOUS; SUBCUTANEOUS at 17:56

## 2020-03-08 RX ADMIN — INSULIN LISPRO 4 UNITS: 100 INJECTION, SOLUTION INTRAVENOUS; SUBCUTANEOUS at 12:40

## 2020-03-08 RX ADMIN — Medication 10 ML: at 09:00

## 2020-03-08 RX ADMIN — POTASSIUM CHLORIDE 10 MEQ: 750 TABLET, EXTENDED RELEASE ORAL at 08:55

## 2020-03-08 RX ADMIN — ATORVASTATIN CALCIUM 40 MG: 40 TABLET, FILM COATED ORAL at 20:34

## 2020-03-08 RX ADMIN — SENNOSIDES AND DOCUSATE SODIUM 1 TABLET: 8.6; 5 TABLET ORAL at 20:34

## 2020-03-08 RX ADMIN — Medication 400 MG: at 08:55

## 2020-03-08 RX ADMIN — METOPROLOL TARTRATE 6.25 MG: 25 TABLET, FILM COATED ORAL at 20:33

## 2020-03-08 ASSESSMENT — PAIN SCALES - GENERAL
PAINLEVEL_OUTOF10: 0
PAINLEVEL_OUTOF10: 0
PAINLEVEL_OUTOF10: 3
PAINLEVEL_OUTOF10: 0
PAINLEVEL_OUTOF10: 1
PAINLEVEL_OUTOF10: 0
PAINLEVEL_OUTOF10: 0

## 2020-03-08 NOTE — PROGRESS NOTES
Urology Progress Note      /78   Pulse 87   Temp 97.7 °F (36.5 °C) (Oral)   Resp 18   Ht 5' 10\" (1.778 m)   Wt 272 lb 11.3 oz (123.7 kg)   SpO2 99%   BMI 39.13 kg/m²   Temp  Av.5 °F (36.4 °C)  Min: 97.2 °F (36.2 °C)  Max: 98 °F (36.7 °C)    Pt seen and examined  He is having urinary frequency   Cr 1.6, stable  Plan: NCCT on Monday to eval stone burden in bladder as well as hydro      Davion Tavarez MD

## 2020-03-08 NOTE — PROGRESS NOTES
hours.    Assessment/Plan:  CV - Rate controlled afib on PO amio, will add back small dose of lopressor (patient is at 25mg POD bid at home)  pulm - Aggressive pulmonary tolilet  Renal - Cr 1.6, not back to baseline. Will rechekc BMP in am   Good UO. Still a little 3.5Kg up from pre op weight. Cont Gentle diuresis.  If urology finds hydronephrosis tomorrow, would consider nephrology consult  Per Urology, NCCT on Monday to eval stone burden in bladder as well as hydro  GI: Bowel regimen, Protein boost shakes to improve nutrition   Dispo     - awaiting precert for NSF placement    Silvino Tuttle MD  3/8/2020  12:16 PM

## 2020-03-08 NOTE — PLAN OF CARE
Problem: Falls - Risk of:  Goal: Will remain free from falls  Description: Will remain free from falls  3/7/2020 1953 by Eliseo Ga RN  Outcome: Ongoing  Note:  Pt ambulating with wheeled walker, gait steady. Fall precautions in place per protocol (nonskid socks, yellow cloth tied at foot of bed, fall sign on door, bed locked and in lowest position, side rails up x3, call light & possessions within reach). Chair alarm used this shift. Goal: Absence of physical injury  Description: Absence of physical injury  Outcome: Ongoing     Problem: Discharge Planning:  Goal: Discharged to appropriate level of care  Description: Discharged to appropriate level of care  Outcome: Ongoing  Note: Waiting on pre-cert     Problem: Cardiac Output - Decreased:  Goal: Cardiac output within specified parameters  Description: Cardiac output within specified parameters  Outcome: Ongoing  Note: Pt in a-fib with -115 bpm.  Received 400 mg po amiodarone. Metoprolol 6.25 mg po given for elevated HR. Problem: Fluid Volume - Imbalance:  Goal: Ability to achieve a balanced intake and output will improve  Description: Ability to achieve a balanced intake and output will improve  Outcome: Ongoing  Note: Education provided on fluid restriction, which was not well received. Lasix 40 mg IVP bid continued. Problem: Pain:  Description: Pain management should include both nonpharmacologic and pharmacologic interventions. Goal: Pain level will decrease  Description: Pain level will decrease  Outcome: Ongoing  Note: Pt reports he developed a slight headache after drinking a diet soda, has declined offer for Tylenol.     Goal: Control of acute pain  Description: Control of acute pain  Outcome: Ongoing  Goal: Control of chronic pain  Description: Control of chronic pain  Outcome: Ongoing     Problem: Risk for Impaired Skin Integrity  Goal: Tissue integrity - skin and mucous membranes  Description: Structural intactness and normal

## 2020-03-08 NOTE — PLAN OF CARE
Problem: Falls - Risk of:  Goal: Will remain free from falls  Description: Will remain free from falls  3/8/2020 0034 by Migel Sauceda RN  Outcome: Ongoing  Pt up with steady gait. Instructed to call for assistance for ambulation, call light in reach. Pt does call appropriately for assistance but then immediately heads to bathroom, not waiting for assistance to arrive. Pt educated multiple times on this, but refuses to wait d/t urgency. Chair alarm in place, working properly. Non skid socks in place. Problem: Cardiac Output - Decreased:  Goal: Cardiac output within specified parameters  Description: Cardiac output within specified parameters  3/8/2020 0034 by Migel Sauceda RN  Outcome: Ongoing    Problem: Pain:  Goal: Pain level will decrease  Description: Pain level will decrease  3/8/2020 0034 by Migel Sauceda RN  Outcome: Ongoing  Pt denies pain, will cont to monitor. Refusing scheduled Tylenol. Problem: Risk for Impaired Skin Integrity  Goal: Tissue integrity - skin and mucous membranes  Description: Structural intactness and normal physiological function of skin and  mucous membranes. 3/8/2020 0034 by Migel Sauceda RN  Outcome: Ongoing  Pt with surgical site to sternum and RLE. RLE ecchymosis noted around incision. Pt refusing to get back in bed, states he is more comfortable in chair. Up to bathroom very frequently.

## 2020-03-08 NOTE — PROGRESS NOTES
7.8%.   Considering fluctuating renal function, he might be safer to be on insulin until his bladder calculi is taken care of. Cont lantus 12 u daily , add prandial lispro as eating more, and ISS     NANETTE on CKD 3:  Creatinine is fluctuating, he is off ACE-I, needs diuresis and is on lasix. Seen by urology- no need for guo catheter for now, but needs close follow up, possible NCCT tomorrow.      CABG, a fib, cardiomyopathy:  Continue with care as per CT surgery team.        DVT Prophylaxis: eliquis  Diet: DIET CARDIAC; Carb Control: 3 carb choices (45 gms)/meal; No Added Salt (3-4 GM);  Daily Fluid Restriction: 1500 ml  Dietary Nutrition Supplements: Diabetic Oral Supplement  Code Status: Full Code    PT/OT Eval Status:SNF    Dispo - inpt    Claribel Garber MD

## 2020-03-09 ENCOUNTER — APPOINTMENT (OUTPATIENT)
Dept: CT IMAGING | Age: 70
DRG: 166 | End: 2020-03-09
Attending: THORACIC SURGERY (CARDIOTHORACIC VASCULAR SURGERY)
Payer: MEDICAID

## 2020-03-09 LAB
ALBUMIN SERPL-MCNC: 3.1 G/DL (ref 3.4–5)
ANION GAP SERPL CALCULATED.3IONS-SCNC: 13 MMOL/L (ref 3–16)
BUN BLDV-MCNC: 41 MG/DL (ref 7–20)
CALCIUM SERPL-MCNC: 8.9 MG/DL (ref 8.3–10.6)
CHLORIDE BLD-SCNC: 98 MMOL/L (ref 99–110)
CO2: 28 MMOL/L (ref 21–32)
CREAT SERPL-MCNC: 1.5 MG/DL (ref 0.8–1.3)
GFR AFRICAN AMERICAN: 56
GFR NON-AFRICAN AMERICAN: 47
GLUCOSE BLD-MCNC: 137 MG/DL (ref 70–99)
GLUCOSE BLD-MCNC: 149 MG/DL (ref 70–99)
GLUCOSE BLD-MCNC: 162 MG/DL (ref 70–99)
GLUCOSE BLD-MCNC: 203 MG/DL (ref 70–99)
GLUCOSE BLD-MCNC: 263 MG/DL (ref 70–99)
HCT VFR BLD CALC: 32.8 % (ref 40.5–52.5)
HEMOGLOBIN: 10.8 G/DL (ref 13.5–17.5)
MAGNESIUM: 2.2 MG/DL (ref 1.8–2.4)
MCH RBC QN AUTO: 29.9 PG (ref 26–34)
MCHC RBC AUTO-ENTMCNC: 33 G/DL (ref 31–36)
MCV RBC AUTO: 90.6 FL (ref 80–100)
PDW BLD-RTO: 14.4 % (ref 12.4–15.4)
PERFORMED ON: ABNORMAL
PHOSPHORUS: 4 MG/DL (ref 2.5–4.9)
PLATELET # BLD: 239 K/UL (ref 135–450)
PMV BLD AUTO: 8.6 FL (ref 5–10.5)
POTASSIUM SERPL-SCNC: 4.5 MMOL/L (ref 3.5–5.1)
RBC # BLD: 3.62 M/UL (ref 4.2–5.9)
SODIUM BLD-SCNC: 139 MMOL/L (ref 136–145)
WBC # BLD: 11.1 K/UL (ref 4–11)

## 2020-03-09 PROCEDURE — 36415 COLL VENOUS BLD VENIPUNCTURE: CPT

## 2020-03-09 PROCEDURE — 6370000000 HC RX 637 (ALT 250 FOR IP): Performed by: CLINICAL NURSE SPECIALIST

## 2020-03-09 PROCEDURE — 74176 CT ABD & PELVIS W/O CONTRAST: CPT

## 2020-03-09 PROCEDURE — 6370000000 HC RX 637 (ALT 250 FOR IP): Performed by: THORACIC SURGERY (CARDIOTHORACIC VASCULAR SURGERY)

## 2020-03-09 PROCEDURE — 94150 VITAL CAPACITY TEST: CPT

## 2020-03-09 PROCEDURE — 6360000002 HC RX W HCPCS: Performed by: CLINICAL NURSE SPECIALIST

## 2020-03-09 PROCEDURE — 83735 ASSAY OF MAGNESIUM: CPT

## 2020-03-09 PROCEDURE — 97530 THERAPEUTIC ACTIVITIES: CPT

## 2020-03-09 PROCEDURE — 1200000000 HC SEMI PRIVATE

## 2020-03-09 PROCEDURE — 85027 COMPLETE CBC AUTOMATED: CPT

## 2020-03-09 PROCEDURE — 97535 SELF CARE MNGMENT TRAINING: CPT

## 2020-03-09 PROCEDURE — 97116 GAIT TRAINING THERAPY: CPT

## 2020-03-09 PROCEDURE — 80069 RENAL FUNCTION PANEL: CPT

## 2020-03-09 PROCEDURE — 2580000003 HC RX 258: Performed by: THORACIC SURGERY (CARDIOTHORACIC VASCULAR SURGERY)

## 2020-03-09 RX ORDER — NYSTATIN 100000 U/G
CREAM TOPICAL 2 TIMES DAILY
Status: DISCONTINUED | OUTPATIENT
Start: 2020-03-09 | End: 2020-03-11 | Stop reason: HOSPADM

## 2020-03-09 RX ORDER — SODIUM CHLORIDE 9 MG/ML
INJECTION, SOLUTION INTRAVENOUS ONCE
Status: DISCONTINUED | OUTPATIENT
Start: 2020-03-09 | End: 2020-03-09

## 2020-03-09 RX ADMIN — FUROSEMIDE 40 MG: 10 INJECTION, SOLUTION INTRAMUSCULAR; INTRAVENOUS at 09:06

## 2020-03-09 RX ADMIN — FUROSEMIDE 40 MG: 10 INJECTION, SOLUTION INTRAMUSCULAR; INTRAVENOUS at 09:01

## 2020-03-09 RX ADMIN — NYSTATIN: 100000 CREAM TOPICAL at 09:20

## 2020-03-09 RX ADMIN — INSULIN LISPRO 3 UNITS: 100 INJECTION, SOLUTION INTRAVENOUS; SUBCUTANEOUS at 12:23

## 2020-03-09 RX ADMIN — SENNOSIDES AND DOCUSATE SODIUM 1 TABLET: 8.6; 5 TABLET ORAL at 21:21

## 2020-03-09 RX ADMIN — Medication 10 ML: at 09:20

## 2020-03-09 RX ADMIN — FAMOTIDINE 20 MG: 20 TABLET, FILM COATED ORAL at 09:07

## 2020-03-09 RX ADMIN — POTASSIUM CHLORIDE 10 MEQ: 750 TABLET, EXTENDED RELEASE ORAL at 09:07

## 2020-03-09 RX ADMIN — APIXABAN 5 MG: 5 TABLET, FILM COATED ORAL at 20:51

## 2020-03-09 RX ADMIN — INSULIN LISPRO 3 UNITS: 100 INJECTION, SOLUTION INTRAVENOUS; SUBCUTANEOUS at 18:24

## 2020-03-09 RX ADMIN — INSULIN LISPRO 3 UNITS: 100 INJECTION, SOLUTION INTRAVENOUS; SUBCUTANEOUS at 21:10

## 2020-03-09 RX ADMIN — POTASSIUM CHLORIDE 10 MEQ: 750 TABLET, EXTENDED RELEASE ORAL at 20:51

## 2020-03-09 RX ADMIN — METOPROLOL TARTRATE 6.25 MG: 25 TABLET, FILM COATED ORAL at 20:51

## 2020-03-09 RX ADMIN — CHLORHEXIDINE GLUCONATE 0.12% ORAL RINSE 15 ML: 1.2 LIQUID ORAL at 09:10

## 2020-03-09 RX ADMIN — INSULIN LISPRO 3 UNITS: 100 INJECTION, SOLUTION INTRAVENOUS; SUBCUTANEOUS at 09:10

## 2020-03-09 RX ADMIN — CHLORHEXIDINE GLUCONATE 0.12% ORAL RINSE 15 ML: 1.2 LIQUID ORAL at 20:51

## 2020-03-09 RX ADMIN — SENNOSIDES AND DOCUSATE SODIUM 1 TABLET: 8.6; 5 TABLET ORAL at 09:07

## 2020-03-09 RX ADMIN — ASPIRIN 81 MG: 81 TABLET, COATED ORAL at 09:07

## 2020-03-09 RX ADMIN — INSULIN LISPRO 4 UNITS: 100 INJECTION, SOLUTION INTRAVENOUS; SUBCUTANEOUS at 12:18

## 2020-03-09 RX ADMIN — ATORVASTATIN CALCIUM 40 MG: 40 TABLET, FILM COATED ORAL at 20:51

## 2020-03-09 RX ADMIN — METOPROLOL TARTRATE 6.25 MG: 25 TABLET, FILM COATED ORAL at 09:23

## 2020-03-09 RX ADMIN — Medication 10 ML: at 21:55

## 2020-03-09 RX ADMIN — INSULIN LISPRO 2 UNITS: 100 INJECTION, SOLUTION INTRAVENOUS; SUBCUTANEOUS at 09:13

## 2020-03-09 RX ADMIN — FUROSEMIDE 40 MG: 10 INJECTION, SOLUTION INTRAMUSCULAR; INTRAVENOUS at 18:24

## 2020-03-09 RX ADMIN — POLYETHYLENE GLYCOL (3350) 17 G: 17 POWDER, FOR SOLUTION ORAL at 09:06

## 2020-03-09 RX ADMIN — INSULIN GLARGINE 12 UNITS: 100 INJECTION, SOLUTION SUBCUTANEOUS at 09:15

## 2020-03-09 RX ADMIN — APIXABAN 5 MG: 5 TABLET, FILM COATED ORAL at 09:07

## 2020-03-09 RX ADMIN — NYSTATIN: 100000 CREAM TOPICAL at 21:21

## 2020-03-09 RX ADMIN — Medication 400 MG: at 09:10

## 2020-03-09 ASSESSMENT — PAIN SCALES - GENERAL
PAINLEVEL_OUTOF10: 0

## 2020-03-09 NOTE — PROGRESS NOTES
Pt currently resting in chair. Vitals stable with no change in status. Pt states no needs at this time. Pt is still non complaint with using call light before ambulating. Chair alarm in place. Will continue to educate, will continue to monitor.

## 2020-03-09 NOTE — PROGRESS NOTES
Hospital Medicine Care  Progress Note      Chief complain; CABG          Subjective:     Sitting up in a chair. Denies any chest pain, shortness of breath. Denies any nausea or vomiting. Review of Systems:     Review of Systems as mentioned above, other ros is negative. Objective:       Medications        Scheduled Meds:   nystatin   Topical BID    insulin lispro  3 Units Subcutaneous TID WC    metoprolol tartrate  6.25 mg Oral BID    insulin lispro  0-12 Units Subcutaneous TID WC    insulin lispro  0-6 Units Subcutaneous Nightly    insulin glargine  12 Units Subcutaneous Daily    apixaban  5 mg Oral BID    furosemide  40 mg Intravenous BID    potassium chloride  10 mEq Oral BID    famotidine  20 mg Oral Daily    magnesium oxide  400 mg Oral Daily    aspirin  81 mg Oral Daily    sodium chloride flush  10 mL Intravenous 2 times per day    [Held by provider] lisinopril  2.5 mg Oral Lunch    chlorhexidine  15 mL Mouth/Throat BID    atorvastatin  40 mg Oral Nightly    polyethylene glycol  17 g Oral Daily    sennosides-docusate sodium  1 tablet Oral BID     Continuous Infusions:   dextrose       PRN Meds:.traMADol **OR** traMADol, metoclopramide, sodium chloride flush, magnesium sulfate, acetaminophen, diphenhydrAMINE, ondansetron, hydrALAZINE, metoprolol, albuterol, glucose, dextrose, glucagon (rDNA), dextrose      Allergies  Allergies   Allergen Reactions    Codeine Other (See Comments)     Seizures         Vitals:    03/09/20 0000 03/09/20 0430 03/09/20 0800 03/09/20 1023   BP: 111/63 116/66 119/69    Pulse: 80 84     Resp: 17 18 18 16   Temp: 97.6 °F (36.4 °C) 97.9 °F (36.6 °C) 97.5 °F (36.4 °C)    TempSrc: Oral Oral Oral    SpO2: 95% 97% 96% 95%   Weight:       Height:             Physical exam:         Physical Exam  Constitutional:       Appearance: Normal appearance. HENT:      Head: Normocephalic and atraumatic. Neck:      Musculoskeletal: Neck supple.    Cardiovascular:

## 2020-03-09 NOTE — PROGRESS NOTES
3/2/2020). Restrictions  Position Activity Restriction  Other position/activity restrictions: ambulate, sternal precautions, cushion in chair  Subjective   General  Chart Reviewed: Yes  Additional Pertinent Hx: 82 y/o pt s/p CABG x 3 and surgical ablation with pulmonary vein isolation and ligation of left atrial appendage. Family / Caregiver Present: No  Referring Practitioner: Carmine Daniel MD  Subjective  Subjective: Pt standing in bathroom with OT present & agreeable to PT. Pain Screening  Patient Currently in Pain: Denies  Vital Signs  Patient Currently in Pain: Denies               Objective      Transfers  Sit to Stand: Contact guard assistance(from chair. verbal cues required)  Stand to sit: Contact guard assistance(verbal cues required)  Ambulation  Ambulation?: Yes  Ambulation 1  Surface: level tile  Device: Rolling Walker  Assistance: Contact guard assistance  Gait Deviations: Increased CHRISTIAN; Slow Pamella  Distance: 10 ft, 200 ft  Comments: distance limited by fatigue. c/o mild dizziness. Stairs/Curb  Stairs?: No                                                                                    AM-PAC Score  AM-PAC Inpatient Mobility Raw Score : 16 (03/09/20 1210)  AM-PAC Inpatient T-Scale Score : 40.78 (03/09/20 1210)  Mobility Inpatient CMS 0-100% Score: 54.16 (03/09/20 1210)  Mobility Inpatient CMS G-Code Modifier : CK (03/09/20 1210)          Goals  Short term goals  Time Frame for Short term goals: d/c  Short term goal 1: sup<>sit supervision maintaining sternal precautions ongoing  Short term goal 2: sit<>stand supervision maintaining sternal precautions ongoing  Short term goal 3: amb 150' with RW ongoing (met 3/9). new goal: amb 150 ft with RW SBA.   Short term goal 4: Ascend/descend 10 steps with R HR and CGA ongoing  Patient Goals   Patient goals : return home     Plan    Plan  Times per week: 2-5  Times per day: Daily  Current Treatment Recommendations: Strengthening, ROM, Stair training, Gait Training, Balance Training, Functional Mobility Training, Transfer Training, Endurance Training, Home Exercise Program, Safety Education & Training  Safety Devices  Type of devices: Call light within reach, Chair alarm in place, Left in chair, Nurse notified     Therapy Time   Individual Concurrent Group Co-treatment   Time In 1115         Time Out 1143         Minutes 28               This note will serve as a discharge summary if pt discharged prior to next treatment session.   Gume Davidson, PT

## 2020-03-09 NOTE — PROGRESS NOTES
Progress Note  Hospital Day: 8  POD#  7  S/P Coronary artery bypass x 3, bilat PVI, placement of SHANTI clip (3/2/2020)    Chief Complaint: Postop follow up    Mr. Tung Rivers is sitting up in the chair. States he feels ready to go to SNF. Urology consulted for pre-existing bladder stones and urinary frequency. Re-started on BB at a lower dose this weekend and patient is tolerating. VITAL SIGNS   Temperature:  Current - Temp: 97.9 °F (36.6 °C); Max - Temp  Av.6 °F (36.4 °C)  Min: 97 °F (36.1 °C)  Max: 97.9 °F (36.6 °C)    Respiratory Rate : Resp  Av.3  Min: 17  Max: 20    Pulse Range: Pulse  Av.7  Min: 80  Max: 93    Blood Pressure Range:  Systolic (44AST), MVZ:753 , Min:110 , WZK:320   ; Diastolic (89KJL), WNA:92, Min:53, Max:78    Pulse ox Range: SpO2  Av.1 %  Min: 95 %  Max: 100 %  O2 Flow Rate (L/min): 0 L/min    Admission Weight: Weight: 270 lb (122.5 kg)    Current Weight: up 12 lbs from preop  Patient Vitals for the past 96 hrs (Last 3 readings):   Weight   20 0623 272 lb 11.3 oz (123.7 kg)   20 0600 273 lb 9.5 oz (124.1 kg)   20 0600 278 lb 10.6 oz (126.4 kg)     I/O:     Intake/Output Summary (Last 24 hours) at 3/9/2020 0756  Last data filed at 3/9/2020 0500  Gross per 24 hour   Intake 1796 ml   Output --   Net 1796 ml     Urine: voiding, unable to measure amt    LINES/ACCESS:   Peripheral Access: Rt wrist Day #: 7      Diet: DIET CARDIAC; Carb Control: 3 carb choices (45 gms)/meal; No Added Salt (3-4 GM);  Daily Fluid Restriction: 1500 ml  Dietary Nutrition Supplements: Diabetic Oral Supplement    MEDICATIONS:      insulin lispro  3 Units Subcutaneous TID WC    metoprolol tartrate  6.25 mg Oral BID    insulin lispro  0-12 Units Subcutaneous TID WC    insulin lispro  0-6 Units Subcutaneous Nightly    insulin glargine  12 Units Subcutaneous Daily    apixaban  5 mg Oral BID    furosemide  40 mg Intravenous BID    potassium chloride  10 mEq Oral BID    acetaminophen  1,000 mg Oral 3 times per day    famotidine  20 mg Oral Daily    magnesium oxide  400 mg Oral Daily    aspirin  81 mg Oral Daily    sodium chloride flush  10 mL Intravenous 2 times per day    [Held by provider] metoprolol tartrate  12.5 mg Oral BID    [Held by provider] lisinopril  2.5 mg Oral Lunch    chlorhexidine  15 mL Mouth/Throat BID    atorvastatin  40 mg Oral Nightly    polyethylene glycol  17 g Oral Daily    sennosides-docusate sodium  1 tablet Oral BID     DATA REVIEW:   CBC:   Recent Labs     03/07/20  0442 03/08/20  0547 03/09/20  0450   WBC 12.0* 12.0* 11.1*   HGB 11.2* 11.2* 10.8*   HCT 33.0* 34.0* 32.8*   MCV 90.1 91.8 90.6    218 239     BMP:   Recent Labs     03/07/20  0442 03/08/20  0546 03/09/20  0450    135* 139   K 4.0 4.3 4.5   CL 99 96* 98*   CO2 24 24 28   PHOS 3.0 3.3 4.0   GLUCOSE 111* 150* 162*   BUN 43* 42* 41*   CREATININE 1.5* 1.6* 1.5*   CALCIUM 8.2* 8.5 8.9   MG 2.20 2.20 2.20     Accucheck Glucoses:   Recent Labs     03/07/20  2211 03/08/20  0805 03/08/20  1215 03/08/20  1707 03/08/20  2039   POCGLU 173* 147* 204* 146* 238*     ABG:    Lab Results   Component Value Date    PHART 7.367 03/02/2020    PO2ART 84.2 03/02/2020    BEG8BXZ 40.8 03/02/2020    S5HZMKEP 96 03/02/2020    SBF8MZJ 25 03/02/2020    FLD6LJL 23.4 03/02/2020    BEART -2 03/02/2020    BEART -3 03/02/2020    BEART -4 03/02/2020    BEART -5 03/02/2020    BEART -3 03/02/2020     ASSESSMENT:   Patient Active Problem List:     Bladder obstruction     Dilated cardiomyopathy ([address]     New onset a-fib (HCC)     S/P coronary angiogram     Acute cystitis     Bilateral pleural effusion     Constipation     Monilial cystitis     Urinary tract obstruction     CAD, multiple vessel     CAD in native artery      Neuro: awake, alert and oriented x 3  CV:   Sinus  Pulm:  Crackles left side  Abd:  soft, non-tender; bowel sounds normal; passing flatus, +BM  Wounds: clean, dry and intact  Ext: warm, + edema    PLAN:   · CV - on BB, statin, ASA/apixaban   - has been off oral amiodarone since 3/7/20   - hold ace inhibitor due to elevated creatinine  · Pulm - off O2, continue incentive spirometry  · Renal - creatinine 1.5 (preop cr 1.3) - continue diuresis    · Heme - stable  · FEN - cardiac diet with 1500 ml fluid restriction     - insulin per hospitalist  · VTE prophylaxis - SCD and TRUMAN hose  · Disposition - Continue PT/OT, await precert to SNF  Discussed patient with Dr Blake Soulier who is agreeable to assessment and plan.       Faviola Velez, 1964 [facility] pager  3/9/2020  7:56 AM

## 2020-03-09 NOTE — CARE COORDINATION
Case Management Assessment           Daily Note                 Date/ Time of Note: 3/9/2020 2:25 PM         Note completed by: Enrrique [place] Day    Patient Name: Jennifer Briscoe  YOB: 1950    Diagnosis:CAD in native artery [I25.10]  Patient Admission Status: Inpatient    Date of Admission:3/2/2020  5:05 AM Length of Stay: 7 GLOS:        Current Plan of Care: Pre-cert pending for SNF.    ________________________________________________________________________________________  PT AM-PAC: 16 / 24 per last evaluation on: 3/9    OT AM-PAC: 17 / 24 per last evaluation on: 3/9    DME Needs for discharge: defer  ________________________________________________________________________________________  Discharge Plan: SNF: [address], [place], [address] [facility]  Report: [phone]  Fax: [phone]    Tentative discharge date: 3/9 vs 3/10     Current barriers to discharge: Pending pre-cert. Updated therapy notes submitted today. Referrals completed: SNF: see previous    Resources/ information provided: SNF List  ________________________________________________________________________________________  Case Management Notes: SW reviewed chart this AM. SW spoke with therapy on floor around 10:00 AM and they reported patient is on their list to see. LEROY stated need related to pre-cert. LEROY spoke with Tracey Das, [phone], liaison this AM. LEROY stated therapy would put patient on SW. LEROY called and updated Frandy Sean at 2:00 pm that therapy notes were in. She will pull and submit for remain pre-cert. Neela Wilks and his family were provided with choice of provider; he and his family are in agreement with the discharge plan.     Care Transition Patient: EYAL Snider  The [address] [place] [address] ICU  Case Management Department  Ph: [phone]

## 2020-03-09 NOTE — PROGRESS NOTES
Occupational Therapy  Facility/Department: [facility] ICU  Daily Treatment Note  NAME: Nai Jimenez  : [birth year]  MRN: [id number]    Date of Service: 3/9/2020  Assessment: Functional independence still decreased from baseline. Pt requiring CGA for mobility and Max - Min A with ADLs. Pt requires max verbal cues to adhere to sternal precautions throughout session. Recommend continued inpt OT services at d/c. Discharge Recommendations:Veronica Chaidez scored a 65/02 on the AM-PAC ADL Inpatient form. Current research shows that an AM-PAC score of 17 or less is typically not associated with a discharge to the patient's home setting. Based on the patients AM-PAC score and their current ADL deficits, it is recommended that the patient have 3-5 sessions per week of Occupational Therapy at d/c to increase the patients independence. See assessment. OT Equipment Recommendations  Equipment Needed: No    Assessment   Performance deficits / Impairments: Decreased functional mobility ; Decreased ADL status; Decreased strength;Decreased safe awareness;Decreased endurance  REQUIRES OT FOLLOW UP: Yes  Activity Tolerance  Activity Tolerance: Patient Tolerated treatment well  Activity Tolerance: BP checked after activities in restroom - 121/74   Safety Devices  Safety Devices in place: Yes  Type of devices: Left in chair;Call light within reach; Chair alarm in place;Nurse notified           Restrictions  Position Activity Restriction  Other position/activity restrictions: ambulate, sternal precautions, cushion in chair  Subjective   General  Chart Reviewed: Yes  Patient assessed for rehabilitation services?: Yes  Additional Pertinent Hx: 2020 coronary artery bypass graft x 3 internal mammary and saphenous vein graft, TCP-BP with pulmonary vein isolation and L arterial appendage clip.  PMHx includes A fib, CAD, diabetes mellitus, kidney stones   Response to previous treatment: Patient with no complaints from previous session  Family / Caregiver Present: No  Referring Practitioner: Dr. Valerio Humphreys   Diagnosis: CAD in native artery   Subjective  Subjective: Pt in bathroom upon entry without walker. Agreeable to therapy. Orientation  Orientation  Overall Orientation Status: Within Functional Limits  Objective    ADL  Grooming: Independent(CGA for stance at sink )  LE Dressing: Moderate assistance(Don/doff pants )  Toileting: Contact guard assistance  Additional Comments: Pt needing max verbal cues throughout all ADL performance to follow sternal precautions         Balance  Sitting Balance: Supervision  Standing Balance: Contact guard assistance  Standing Balance  Time: 2 min + 1 min + 5 min   Activity: funcitonal mobility in room and bathroom with RW; ambulation in hallway   Comment: CGA for transfers, stance, and gait; Max cues needed for following sternal precautions, walker use, and hand placement   Functional Mobility  Functional - Mobility Device: Rolling Walker  Toilet Transfers  Toilet - Technique: Ambulating  Equipment Used: Standard toilet  Toilet Transfer: Contact guard assistance  Bed mobility  Comment: NT, pt in bathroom upon entry and in chair upon exit   Transfers  Sit to stand: Contact guard assistance  Stand to sit: Contact guard assistance  Transfer Comments: verbal cues to adhere to sternal precautions. Cognition  Overall Cognitive Status: [facility]                                         Plan   Plan  Times per week: 2-5  Times per day: Daily  Current Treatment Recommendations: Safety Education & Training, Functional Mobility Training, Self-Care / ADL, Endurance Training    If pt discharges prior to next treatment, this note will serve as d/c summary. Continue per POC if pt does not d/c.     AM-PAC Score  AM-PAC Inpatient Daily Activity Raw Score: 17 (03/09/20 1213)  AM-PAC Inpatient ADL T-Scale Score : 37.26 (03/09/20 1213)  ADL Inpatient CMS 0-100% Score: 50.11 (03/09/20 1213)  ADL Inpatient CMS G-Code Modifier : CK (03/09/20 1213)    Goals  Short term goals  Time Frame for Short term goals: by discharge   Short term goal 1: toilet transfer with SBA-not met  Short term goal 2: stand 5 minutes with SBA during functional mobility/ADLs-not met   Short term goal 3: complete lower body dressing with CGA - not met   Patient Goals   Patient goals : to go home        Therapy Time   Individual Concurrent Group Co-treatment   Time In 1114         Time Out 1141         Minutes 27         Timed Code Tx Min:27  Total Tx Time:27    Therapist was present, directed the patient's care, made skilled judgment, and was responsible for assessment and treatment of the patient.         Tiburcio Nichole, s/OT   (Vikki Girard, OTR/L, [id number])

## 2020-03-10 LAB
ALBUMIN SERPL-MCNC: 2.9 G/DL (ref 3.4–5)
ANION GAP SERPL CALCULATED.3IONS-SCNC: 12 MMOL/L (ref 3–16)
BUN BLDV-MCNC: 36 MG/DL (ref 7–20)
CALCIUM SERPL-MCNC: 8.7 MG/DL (ref 8.3–10.6)
CHLORIDE BLD-SCNC: 96 MMOL/L (ref 99–110)
CO2: 24 MMOL/L (ref 21–32)
CREAT SERPL-MCNC: 1.4 MG/DL (ref 0.8–1.3)
GFR AFRICAN AMERICAN: >60
GFR NON-AFRICAN AMERICAN: 50
GLUCOSE BLD-MCNC: 165 MG/DL (ref 70–99)
GLUCOSE BLD-MCNC: 175 MG/DL (ref 70–99)
GLUCOSE BLD-MCNC: 218 MG/DL (ref 70–99)
GLUCOSE BLD-MCNC: 249 MG/DL (ref 70–99)
GLUCOSE BLD-MCNC: 340 MG/DL (ref 70–99)
HCT VFR BLD CALC: 31.8 % (ref 40.5–52.5)
HEMOGLOBIN: 10.5 G/DL (ref 13.5–17.5)
MAGNESIUM: 2 MG/DL (ref 1.8–2.4)
MCH RBC QN AUTO: 29.8 PG (ref 26–34)
MCHC RBC AUTO-ENTMCNC: 32.9 G/DL (ref 31–36)
MCV RBC AUTO: 90.5 FL (ref 80–100)
PDW BLD-RTO: 14.5 % (ref 12.4–15.4)
PERFORMED ON: ABNORMAL
PHOSPHORUS: 3.9 MG/DL (ref 2.5–4.9)
PLATELET # BLD: 264 K/UL (ref 135–450)
PMV BLD AUTO: 8.5 FL (ref 5–10.5)
POTASSIUM SERPL-SCNC: 4.3 MMOL/L (ref 3.5–5.1)
RBC # BLD: 3.51 M/UL (ref 4.2–5.9)
SODIUM BLD-SCNC: 132 MMOL/L (ref 136–145)
WBC # BLD: 14.3 K/UL (ref 4–11)

## 2020-03-10 PROCEDURE — 6370000000 HC RX 637 (ALT 250 FOR IP): Performed by: THORACIC SURGERY (CARDIOTHORACIC VASCULAR SURGERY)

## 2020-03-10 PROCEDURE — 6360000002 HC RX W HCPCS: Performed by: THORACIC SURGERY (CARDIOTHORACIC VASCULAR SURGERY)

## 2020-03-10 PROCEDURE — 6360000002 HC RX W HCPCS: Performed by: CLINICAL NURSE SPECIALIST

## 2020-03-10 PROCEDURE — 85027 COMPLETE CBC AUTOMATED: CPT

## 2020-03-10 PROCEDURE — 2580000003 HC RX 258: Performed by: THORACIC SURGERY (CARDIOTHORACIC VASCULAR SURGERY)

## 2020-03-10 PROCEDURE — 83735 ASSAY OF MAGNESIUM: CPT

## 2020-03-10 PROCEDURE — 80069 RENAL FUNCTION PANEL: CPT

## 2020-03-10 PROCEDURE — 6370000000 HC RX 637 (ALT 250 FOR IP): Performed by: CLINICAL NURSE SPECIALIST

## 2020-03-10 PROCEDURE — 2060000000 HC ICU INTERMEDIATE R&B

## 2020-03-10 PROCEDURE — 36415 COLL VENOUS BLD VENIPUNCTURE: CPT

## 2020-03-10 PROCEDURE — 99024 POSTOP FOLLOW-UP VISIT: CPT | Performed by: THORACIC SURGERY (CARDIOTHORACIC VASCULAR SURGERY)

## 2020-03-10 RX ORDER — AMIODARONE HYDROCHLORIDE 200 MG/1
400 TABLET ORAL 2 TIMES DAILY
Status: CANCELLED | OUTPATIENT
Start: 2020-03-10 | End: 2020-03-15

## 2020-03-10 RX ORDER — AMIODARONE HYDROCHLORIDE 200 MG/1
400 TABLET ORAL 2 TIMES DAILY
Status: DISCONTINUED | OUTPATIENT
Start: 2020-03-10 | End: 2020-03-11 | Stop reason: HOSPADM

## 2020-03-10 RX ORDER — FUROSEMIDE 10 MG/ML
80 INJECTION INTRAMUSCULAR; INTRAVENOUS 2 TIMES DAILY
Status: DISCONTINUED | OUTPATIENT
Start: 2020-03-10 | End: 2020-03-11

## 2020-03-10 RX ORDER — AMIODARONE HYDROCHLORIDE 200 MG/1
200 TABLET ORAL DAILY
Status: DISCONTINUED | OUTPATIENT
Start: 2020-03-15 | End: 2020-03-11 | Stop reason: HOSPADM

## 2020-03-10 RX ADMIN — DEXTROSE MONOHYDRATE 1 MG/MIN: 50 INJECTION, SOLUTION INTRAVENOUS at 07:00

## 2020-03-10 RX ADMIN — NYSTATIN: 100000 CREAM TOPICAL at 20:11

## 2020-03-10 RX ADMIN — METOPROLOL TARTRATE 6.25 MG: 25 TABLET, FILM COATED ORAL at 20:11

## 2020-03-10 RX ADMIN — Medication 10 ML: at 09:26

## 2020-03-10 RX ADMIN — ATORVASTATIN CALCIUM 40 MG: 40 TABLET, FILM COATED ORAL at 20:11

## 2020-03-10 RX ADMIN — INSULIN LISPRO 4 UNITS: 100 INJECTION, SOLUTION INTRAVENOUS; SUBCUTANEOUS at 09:29

## 2020-03-10 RX ADMIN — ONDANSETRON 4 MG: 2 INJECTION INTRAMUSCULAR; INTRAVENOUS at 06:29

## 2020-03-10 RX ADMIN — INSULIN LISPRO 3 UNITS: 100 INJECTION, SOLUTION INTRAVENOUS; SUBCUTANEOUS at 09:30

## 2020-03-10 RX ADMIN — POTASSIUM CHLORIDE 10 MEQ: 750 TABLET, EXTENDED RELEASE ORAL at 20:10

## 2020-03-10 RX ADMIN — AMIODARONE HYDROCHLORIDE 0.5 MG/MIN: 50 INJECTION, SOLUTION INTRAVENOUS at 11:50

## 2020-03-10 RX ADMIN — AMIODARONE HYDROCHLORIDE 400 MG: 200 TABLET ORAL at 13:18

## 2020-03-10 RX ADMIN — DEXTROSE MONOHYDRATE 150 MG: 50 INJECTION, SOLUTION INTRAVENOUS at 06:11

## 2020-03-10 RX ADMIN — INSULIN GLARGINE 12 UNITS: 100 INJECTION, SOLUTION SUBCUTANEOUS at 09:30

## 2020-03-10 RX ADMIN — APIXABAN 5 MG: 5 TABLET, FILM COATED ORAL at 20:11

## 2020-03-10 RX ADMIN — POTASSIUM CHLORIDE 10 MEQ: 750 TABLET, EXTENDED RELEASE ORAL at 09:21

## 2020-03-10 RX ADMIN — FUROSEMIDE 40 MG: 10 INJECTION, SOLUTION INTRAMUSCULAR; INTRAVENOUS at 09:20

## 2020-03-10 RX ADMIN — APIXABAN 5 MG: 5 TABLET, FILM COATED ORAL at 11:51

## 2020-03-10 RX ADMIN — Medication 10 ML: at 20:24

## 2020-03-10 RX ADMIN — NYSTATIN: 100000 CREAM TOPICAL at 09:26

## 2020-03-10 RX ADMIN — FAMOTIDINE 20 MG: 20 TABLET, FILM COATED ORAL at 09:20

## 2020-03-10 RX ADMIN — INSULIN LISPRO 3 UNITS: 100 INJECTION, SOLUTION INTRAVENOUS; SUBCUTANEOUS at 18:12

## 2020-03-10 RX ADMIN — SENNOSIDES AND DOCUSATE SODIUM 1 TABLET: 8.6; 5 TABLET ORAL at 20:11

## 2020-03-10 RX ADMIN — Medication 400 MG: at 09:22

## 2020-03-10 RX ADMIN — INSULIN LISPRO 3 UNITS: 100 INJECTION, SOLUTION INTRAVENOUS; SUBCUTANEOUS at 13:19

## 2020-03-10 RX ADMIN — INSULIN LISPRO 8 UNITS: 100 INJECTION, SOLUTION INTRAVENOUS; SUBCUTANEOUS at 13:18

## 2020-03-10 RX ADMIN — CHLORHEXIDINE GLUCONATE 0.12% ORAL RINSE 15 ML: 1.2 LIQUID ORAL at 20:11

## 2020-03-10 RX ADMIN — FUROSEMIDE 80 MG: 10 INJECTION, SOLUTION INTRAMUSCULAR; INTRAVENOUS at 18:14

## 2020-03-10 RX ADMIN — INSULIN LISPRO 2 UNITS: 100 INJECTION, SOLUTION INTRAVENOUS; SUBCUTANEOUS at 20:11

## 2020-03-10 RX ADMIN — POLYETHYLENE GLYCOL (3350) 17 G: 17 POWDER, FOR SOLUTION ORAL at 09:26

## 2020-03-10 RX ADMIN — METOPROLOL TARTRATE 6.25 MG: 25 TABLET, FILM COATED ORAL at 09:20

## 2020-03-10 RX ADMIN — INSULIN LISPRO 2 UNITS: 100 INJECTION, SOLUTION INTRAVENOUS; SUBCUTANEOUS at 18:10

## 2020-03-10 RX ADMIN — SENNOSIDES AND DOCUSATE SODIUM 1 TABLET: 8.6; 5 TABLET ORAL at 09:21

## 2020-03-10 RX ADMIN — CHLORHEXIDINE GLUCONATE 0.12% ORAL RINSE 15 ML: 1.2 LIQUID ORAL at 09:22

## 2020-03-10 RX ADMIN — ASPIRIN 81 MG: 81 TABLET, COATED ORAL at 09:21

## 2020-03-10 RX ADMIN — AMIODARONE HYDROCHLORIDE 400 MG: 200 TABLET ORAL at 20:10

## 2020-03-10 ASSESSMENT — PAIN SCALES - GENERAL
PAINLEVEL_OUTOF10: 2
PAINLEVEL_OUTOF10: 0

## 2020-03-10 ASSESSMENT — PAIN DESCRIPTION - ORIENTATION: ORIENTATION: RIGHT

## 2020-03-10 ASSESSMENT — PAIN DESCRIPTION - PROGRESSION: CLINICAL_PROGRESSION: NOT CHANGED

## 2020-03-10 ASSESSMENT — PAIN DESCRIPTION - LOCATION: LOCATION: FOOT

## 2020-03-10 ASSESSMENT — PAIN DESCRIPTION - PAIN TYPE: TYPE: SURGICAL PAIN

## 2020-03-10 ASSESSMENT — PAIN DESCRIPTION - ONSET: ONSET: ON-GOING

## 2020-03-10 ASSESSMENT — PAIN DESCRIPTION - FREQUENCY: FREQUENCY: INTERMITTENT

## 2020-03-10 NOTE — PROGRESS NOTES
Evening assessment and medications completed. Patient up to chair, and has called out and gotten up multiple times to use the bathroom. Multiple small unmeasured bowel movements, multiple unmeasured urine occurrences. Patient needs significant reminding on not using arms and using proper sternal precautions, and has needed multiple reminders to clean and wash hands upon leaving bathroom. Discussed hand hygeine and infection prevention again with patient. Chair alarm on, call light within reach, RN outside room at nursing station.

## 2020-03-10 NOTE — PROGRESS NOTES
Hospital Medicine Care  Progress Note      Chief complain; CABG          Subjective:     Sitting up in a chair. Denies any chest pain, shortness of breath. Denies any nausea or vomiting. Had Afib with RVR     Review of Systems:     Review of Systems as mentioned above, other ros is negative.      Objective:       Medications        Scheduled Meds:   amiodarone  400 mg Oral BID    Followed by   Fernando Louis ON 3/15/2020] amiodarone  200 mg Oral Daily    [START ON 3/11/2020] insulin glargine  16 Units Subcutaneous Daily    nystatin   Topical BID    insulin lispro  3 Units Subcutaneous TID WC    metoprolol tartrate  6.25 mg Oral BID    insulin lispro  0-12 Units Subcutaneous TID WC    insulin lispro  0-6 Units Subcutaneous Nightly    apixaban  5 mg Oral BID    furosemide  40 mg Intravenous BID    potassium chloride  10 mEq Oral BID    famotidine  20 mg Oral Daily    magnesium oxide  400 mg Oral Daily    aspirin  81 mg Oral Daily    sodium chloride flush  10 mL Intravenous 2 times per day    [Held by provider] lisinopril  2.5 mg Oral Lunch    chlorhexidine  15 mL Mouth/Throat BID    atorvastatin  40 mg Oral Nightly    polyethylene glycol  17 g Oral Daily    sennosides-docusate sodium  1 tablet Oral BID     Continuous Infusions:   amiodarone 450mg/250ml D5W infusion 0.5 mg/min (03/10/20 1150)    dextrose       PRN Meds:.traMADol **OR** [DISCONTINUED] traMADol, metoclopramide, sodium chloride flush, magnesium sulfate, acetaminophen, diphenhydrAMINE, ondansetron, hydrALAZINE, metoprolol, albuterol, glucose, dextrose, glucagon (rDNA), dextrose      Allergies  Allergies   Allergen Reactions    Codeine Other (See Comments)     Seizures         Vitals:    03/10/20 1200 03/10/20 1330 03/10/20 1400 03/10/20 1430   BP: (!) 104/91  121/73    Pulse: 110 103 106 101   Resp: 18      Temp: 97.5 °F (36.4 °C)      TempSrc: Oral      SpO2:       Weight:       Height:             Physical exam:         Physical

## 2020-03-10 NOTE — PROGRESS NOTES
Assumed pt care at 0700. Pt AAOx3 warm dry pink. Hr 135 A fib. Pt currently sitting up in chair. Pt states no SOB or CP. Pt reeducated on all safety measures. Pt states he understands. Pt states no further needs at this time. Call bell within reach. Will continue to monitor.

## 2020-03-10 NOTE — PROGRESS NOTES
Patient continues to call and get up from chair, roughly every ten minutes. States he is having relief from very small bowel movements and continues to have urination during each bathroom trip. Does not have any cramping, no abdominal pain. Continues to feel \"urge to have a bowel movement. \" Continues to need reminders about sternal precautions, washing hands after using the bathroom.

## 2020-03-10 NOTE — PLAN OF CARE
All fall precautions and skin integrity precautions in place. Pt  reeducated on the importance of using his call light and how to protect his sternal incision. Pain is controlled at this time. Will continue to monitor.

## 2020-03-10 NOTE — PROGRESS NOTES
Looks well today. Back in afib last night. Rate controlled. Back on amio drip. On exam R leg more swollen than left. Normal DP pulses bilat. Common to see this post GSV harvest.  Overall his edema should be better by now. Given that it's not, will increase lasix and follow.

## 2020-03-11 VITALS
SYSTOLIC BLOOD PRESSURE: 96 MMHG | WEIGHT: 264.33 LBS | RESPIRATION RATE: 18 BRPM | OXYGEN SATURATION: 95 % | TEMPERATURE: 98 F | DIASTOLIC BLOOD PRESSURE: 56 MMHG | HEART RATE: 105 BPM | HEIGHT: 70 IN | BODY MASS INDEX: 37.84 KG/M2

## 2020-03-11 LAB
ALBUMIN SERPL-MCNC: 2.8 G/DL (ref 3.4–5)
ANION GAP SERPL CALCULATED.3IONS-SCNC: 14 MMOL/L (ref 3–16)
BUN BLDV-MCNC: 35 MG/DL (ref 7–20)
CALCIUM SERPL-MCNC: 8.4 MG/DL (ref 8.3–10.6)
CHLORIDE BLD-SCNC: 94 MMOL/L (ref 99–110)
CO2: 25 MMOL/L (ref 21–32)
CREAT SERPL-MCNC: 1.5 MG/DL (ref 0.8–1.3)
GFR AFRICAN AMERICAN: 56
GFR NON-AFRICAN AMERICAN: 47
GLUCOSE BLD-MCNC: 180 MG/DL (ref 70–99)
GLUCOSE BLD-MCNC: 190 MG/DL (ref 70–99)
GLUCOSE BLD-MCNC: 248 MG/DL (ref 70–99)
HCT VFR BLD CALC: 30 % (ref 40.5–52.5)
HEMOGLOBIN: 9.8 G/DL (ref 13.5–17.5)
MAGNESIUM: 2.1 MG/DL (ref 1.8–2.4)
MCH RBC QN AUTO: 29.5 PG (ref 26–34)
MCHC RBC AUTO-ENTMCNC: 32.7 G/DL (ref 31–36)
MCV RBC AUTO: 90.3 FL (ref 80–100)
PDW BLD-RTO: 14.2 % (ref 12.4–15.4)
PERFORMED ON: ABNORMAL
PERFORMED ON: ABNORMAL
PHOSPHORUS: 3.7 MG/DL (ref 2.5–4.9)
PLATELET # BLD: 252 K/UL (ref 135–450)
PMV BLD AUTO: 9.4 FL (ref 5–10.5)
POTASSIUM SERPL-SCNC: 4.2 MMOL/L (ref 3.5–5.1)
RBC # BLD: 3.33 M/UL (ref 4.2–5.9)
SODIUM BLD-SCNC: 133 MMOL/L (ref 136–145)
WBC # BLD: 14.2 K/UL (ref 4–11)

## 2020-03-11 PROCEDURE — 6370000000 HC RX 637 (ALT 250 FOR IP): Performed by: THORACIC SURGERY (CARDIOTHORACIC VASCULAR SURGERY)

## 2020-03-11 PROCEDURE — 80069 RENAL FUNCTION PANEL: CPT

## 2020-03-11 PROCEDURE — 36415 COLL VENOUS BLD VENIPUNCTURE: CPT

## 2020-03-11 PROCEDURE — 85027 COMPLETE CBC AUTOMATED: CPT

## 2020-03-11 PROCEDURE — 97110 THERAPEUTIC EXERCISES: CPT

## 2020-03-11 PROCEDURE — 6360000002 HC RX W HCPCS: Performed by: THORACIC SURGERY (CARDIOTHORACIC VASCULAR SURGERY)

## 2020-03-11 PROCEDURE — 97530 THERAPEUTIC ACTIVITIES: CPT

## 2020-03-11 PROCEDURE — 6370000000 HC RX 637 (ALT 250 FOR IP): Performed by: CLINICAL NURSE SPECIALIST

## 2020-03-11 PROCEDURE — 97116 GAIT TRAINING THERAPY: CPT

## 2020-03-11 PROCEDURE — 2580000003 HC RX 258: Performed by: THORACIC SURGERY (CARDIOTHORACIC VASCULAR SURGERY)

## 2020-03-11 PROCEDURE — 97535 SELF CARE MNGMENT TRAINING: CPT

## 2020-03-11 PROCEDURE — 83735 ASSAY OF MAGNESIUM: CPT

## 2020-03-11 RX ORDER — AMIODARONE HYDROCHLORIDE 400 MG/1
400 TABLET ORAL 2 TIMES DAILY
Qty: 30 TABLET | Refills: 3 | Status: CANCELLED | DISCHARGE
Start: 2020-03-11

## 2020-03-11 RX ORDER — POTASSIUM CHLORIDE 750 MG/1
10 TABLET, EXTENDED RELEASE ORAL 2 TIMES DAILY
Qty: 60 TABLET | Refills: 3 | DISCHARGE
Start: 2020-03-11 | End: 2020-03-13 | Stop reason: SDUPTHER

## 2020-03-11 RX ORDER — AMIODARONE HYDROCHLORIDE 200 MG/1
TABLET ORAL
Qty: 90 TABLET | Refills: 0 | DISCHARGE
Start: 2020-03-11 | End: 2020-03-13 | Stop reason: SDUPTHER

## 2020-03-11 RX ORDER — LANOLIN ALCOHOL/MO/W.PET/CERES
400 CREAM (GRAM) TOPICAL DAILY
Qty: 30 TABLET | DISCHARGE
Start: 2020-03-11 | End: 2020-03-13 | Stop reason: SDUPTHER

## 2020-03-11 RX ORDER — INSULIN LISPRO 100 [IU]/ML
0-6 INJECTION, SOLUTION INTRAVENOUS; SUBCUTANEOUS NIGHTLY
DISCHARGE
Start: 2020-03-11 | End: 2020-03-13

## 2020-03-11 RX ORDER — AMIODARONE HYDROCHLORIDE 200 MG/1
200 TABLET ORAL DAILY
Qty: 30 TABLET | Refills: 3 | Status: CANCELLED | DISCHARGE
Start: 2020-03-15

## 2020-03-11 RX ORDER — INSULIN LISPRO 100 [IU]/ML
0-12 INJECTION, SOLUTION INTRAVENOUS; SUBCUTANEOUS
DISCHARGE
Start: 2020-03-11 | End: 2020-03-13

## 2020-03-11 RX ORDER — FAMOTIDINE 20 MG/1
20 TABLET, FILM COATED ORAL DAILY
Qty: 60 TABLET | Refills: 3 | DISCHARGE
Start: 2020-03-11 | End: 2020-03-13 | Stop reason: SDUPTHER

## 2020-03-11 RX ORDER — FUROSEMIDE 80 MG
80 TABLET ORAL 2 TIMES DAILY
Qty: 60 TABLET | Refills: 3 | DISCHARGE
Start: 2020-03-11 | End: 2020-03-13 | Stop reason: SDUPTHER

## 2020-03-11 RX ORDER — ACETAMINOPHEN 325 MG/1
650 TABLET ORAL EVERY 4 HOURS PRN
Qty: 120 TABLET | Refills: 3 | DISCHARGE
Start: 2020-03-11 | End: 2020-05-13

## 2020-03-11 RX ORDER — ATORVASTATIN CALCIUM 40 MG/1
40 TABLET, FILM COATED ORAL NIGHTLY
Qty: 30 TABLET | Refills: 3 | DISCHARGE
Start: 2020-03-11 | End: 2020-03-13 | Stop reason: SDUPTHER

## 2020-03-11 RX ORDER — FUROSEMIDE 80 MG
80 TABLET ORAL 2 TIMES DAILY
Status: DISCONTINUED | OUTPATIENT
Start: 2020-03-11 | End: 2020-03-11 | Stop reason: HOSPADM

## 2020-03-11 RX ORDER — NYSTATIN 100000 U/G
CREAM TOPICAL
DISCHARGE
Start: 2020-03-11 | End: 2020-03-13 | Stop reason: SDUPTHER

## 2020-03-11 RX ORDER — POLYETHYLENE GLYCOL 3350 17 G/17G
17 POWDER, FOR SOLUTION ORAL DAILY
Qty: 527 G | Refills: 1 | DISCHARGE
Start: 2020-03-11 | End: 2020-03-13 | Stop reason: SDUPTHER

## 2020-03-11 RX ORDER — INSULIN LISPRO 100 [IU]/ML
3 INJECTION, SOLUTION INTRAVENOUS; SUBCUTANEOUS
DISCHARGE
Start: 2020-03-11 | End: 2020-03-13 | Stop reason: SDUPTHER

## 2020-03-11 RX ADMIN — ONDANSETRON 4 MG: 2 INJECTION INTRAMUSCULAR; INTRAVENOUS at 04:36

## 2020-03-11 RX ADMIN — POTASSIUM CHLORIDE 10 MEQ: 750 TABLET, EXTENDED RELEASE ORAL at 08:48

## 2020-03-11 RX ADMIN — ASPIRIN 81 MG: 81 TABLET, COATED ORAL at 08:48

## 2020-03-11 RX ADMIN — POLYETHYLENE GLYCOL (3350) 17 G: 17 POWDER, FOR SOLUTION ORAL at 08:49

## 2020-03-11 RX ADMIN — Medication 400 MG: at 08:48

## 2020-03-11 RX ADMIN — INSULIN LISPRO 4 UNITS: 100 INJECTION, SOLUTION INTRAVENOUS; SUBCUTANEOUS at 13:18

## 2020-03-11 RX ADMIN — INSULIN LISPRO 3 UNITS: 100 INJECTION, SOLUTION INTRAVENOUS; SUBCUTANEOUS at 08:50

## 2020-03-11 RX ADMIN — CHLORHEXIDINE GLUCONATE 0.12% ORAL RINSE 15 ML: 1.2 LIQUID ORAL at 08:49

## 2020-03-11 RX ADMIN — ACETAMINOPHEN 650 MG: 325 TABLET ORAL at 05:03

## 2020-03-11 RX ADMIN — METOPROLOL TARTRATE 6.25 MG: 25 TABLET, FILM COATED ORAL at 08:48

## 2020-03-11 RX ADMIN — INSULIN LISPRO 3 UNITS: 100 INJECTION, SOLUTION INTRAVENOUS; SUBCUTANEOUS at 13:18

## 2020-03-11 RX ADMIN — FUROSEMIDE 80 MG: 10 INJECTION, SOLUTION INTRAMUSCULAR; INTRAVENOUS at 08:49

## 2020-03-11 RX ADMIN — SENNOSIDES AND DOCUSATE SODIUM 1 TABLET: 8.6; 5 TABLET ORAL at 08:48

## 2020-03-11 RX ADMIN — FAMOTIDINE 20 MG: 20 TABLET, FILM COATED ORAL at 08:48

## 2020-03-11 RX ADMIN — NYSTATIN: 100000 CREAM TOPICAL at 08:49

## 2020-03-11 RX ADMIN — Medication 10 ML: at 08:49

## 2020-03-11 RX ADMIN — INSULIN LISPRO 2 UNITS: 100 INJECTION, SOLUTION INTRAVENOUS; SUBCUTANEOUS at 08:50

## 2020-03-11 RX ADMIN — APIXABAN 5 MG: 5 TABLET, FILM COATED ORAL at 08:48

## 2020-03-11 RX ADMIN — AMIODARONE HYDROCHLORIDE 400 MG: 200 TABLET ORAL at 08:46

## 2020-03-11 ASSESSMENT — PAIN SCALES - GENERAL
PAINLEVEL_OUTOF10: 0
PAINLEVEL_OUTOF10: 3
PAINLEVEL_OUTOF10: 0
PAINLEVEL_OUTOF10: 0

## 2020-03-11 ASSESSMENT — PAIN DESCRIPTION - LOCATION: LOCATION: KNEE

## 2020-03-11 ASSESSMENT — PAIN DESCRIPTION - ORIENTATION: ORIENTATION: RIGHT

## 2020-03-11 ASSESSMENT — PAIN DESCRIPTION - DESCRIPTORS: DESCRIPTORS: ACHING

## 2020-03-11 ASSESSMENT — PAIN DESCRIPTION - PAIN TYPE: TYPE: ACUTE PAIN

## 2020-03-11 NOTE — CARE COORDINATION
Case Management Assessment            Discharge Note                    Date / Time of Note: 3/11/2020 12:49 PM                  Discharge Note Completed by: Pauline Coffman    Patient Name: Jodi Sanchez   YOB: 1950  Diagnosis: CAD in native artery [I25.10]   Date / Time: 3/2/2020  5:05 AM    Current PCP: No primary care provider on file. Clinic patient: No    Hospitalization in the last 30 days: No    Advance Directives:  Code Status: Full Code  [place] DNR form completed and on chart: Not Indicated    Financial:  Payor: Heather Galdamez / Plan: Familia Husain / Product Type: *No Product type* /      Pharmacy:    [address] [facility] [address] [facility] [address] [facility] [phone]  [address] [place] [address] Isaias [place]  Phone: [phone] Fax: [phone]      Assistance purchasing medications?: Potential Assistance Purchasing Medications: No  Assistance provided by Case Management: None at this time    Does patient want to participate in local refill/ meds to beds program?: No    Meds To Beds General Rules:  1. Can ONLY be done Monday- Friday between 8:30am-5pm  2. Prescription(s) must be in pharmacy by 3pm to be filled same day  3. Copy of patient's insurance/ prescription drug card and patient face sheet must be sent along with the prescription(s)  4. Cost of Rx cannot be added to hospital bill. If financial assistance is needed, please contact unit  or ;  or  CANNOT provide pharmacy voucher for patients co-pays  5.  Patients can then  the prescription on their way out of the hospital at discharge, or pharmacy can deliver to the bedside if staff is available. (payment due at time of pick-up or delivery - cash, check, or card accepted)     Able to afford home medications/ co-pay costs: No    ADLS:  Current PT AM-PAC Score: 14 /24  Current OT AM-PAC Score: 18 /24      DISCHARGE Disposition: [place] (SNF): [address] [facility] Phone: [phone] Fax: [phone]    LOC at discharge: Skilled  [address] Completed: Yes    Notification completed in HENS/PAS?:  Yes : CM has completed HENS online through secure website for SNF admission at [address] [place] [address]. Document ID #: by EYAL Valladares    IMM Completed:   Not Indicated    Transportation:  Transportation PLAN for discharge: EMS transportation   Mode of Transport: Ambulance stretcher - BLS  Reason for medical transport: Bed confined: Meets the following criteria 1) unable to get out of bed without assistance or ambulate, 2) unable to safely sit up in a wheelchair, 3) unable to maintain erect seating position in a chair for time needed for transport  Name of Transport Company: [facility]  Phone: [phone]  Time of Transport: 3PM    Transport form completed: Yes    Home Care:  [address] ordered at discharge: Not [address]: Not Applicable  Orders faxed: No    Durable Medical Equipment:  DME Provider:   Equipment obtained during hospitalization:     Home Oxygen and Respiratory Equipment:  Oxygen needed at discharge?:  [address]:   Portable tank available for discharge?:     Dialysis:  Dialysis patient:     Dialysis Center:      Hospice Services:  Location:   Agency:     Consents signed:     Referrals made at [facility] for outpatient continued care: Additional CM Notes:   DC order in and SAMINA/AVS completed. Pt's precert for Heather Fowler obtained and CHAVA done. [facility] arranged. SW called Admissions at [facility] to inform and faxed Ting Esparza. Staff RN aware to call report. Pt aware of DC plans and in agreement.      The Plan for Transition of Care is related to the following treatment goals of CAD in native artery [I25.10]    The Patient and/or patient representative SURGERY [facility] and his family were provided with a choice of provider and agrees with the discharge plan Yes    Freedom of choice list was provided with

## 2020-03-11 NOTE — DISCHARGE SUMMARY
Patient discharged with all belongings to nursing facility via [address]. Report called to receiving RN. PHILIP removed.

## 2020-03-11 NOTE — PROGRESS NOTES
walker. Static stance at sink SBA with UE support, CGA without UE support. Time in stance: ~5 min    ADLs   Grooming: Mod I, set up (wash face and brush teeth standing at sink)  Bathing: offered, declined, stating already completed with nursing    UE Exercises   Elbow flex/ext: 20 reps  Alternating shoulder flex to 90 degrees: 10 reps (mod cues)  Alternating punches: 8 reps  Shoulder elevation: 10 reps (min cues)    Activity Tolerance: Fair, limited by knee pain today. Appears SOB at rest but spO2 100%, HR low 100s. Patient Education: Activity promotion, sternal precautions, transfers, ADLs - verb understanding (a bit more receptive about sternal precautions)    Safety Devices in Place: left in chair, reclined, ice to R knee, needs in reach, Alarm on, RN aware    Goals:  Short term goals  Time Frame for Short term goals: by discharge   Short term goal 1: toilet transfer with SBA-not met  Short term goal 2: stand 5 minutes with SBA during functional mobility/ADLs-not met   Short term goal 3: complete lower body dressing with CGA - not met          Plan:      Times per week: 2-5   Times per day: Daily    If patient is discharged prior to next treatment, this note will serve as the discharge summary.     Therapy Time   Individual Concurrent Group Co-treatment   Time In 1015         Time Out 1041         Minutes 26           Timed Code Treatment Minutes: 26  Total Treatment Time: 26       Therese Corbett, OT

## 2020-03-11 NOTE — PROGRESS NOTES
indeterminate may relate to hyperdense cyst less likely neoplasm. Follow-up recommended. Dominant right renal cyst      Gallstones. Fatty infiltration of the liver. Bilateral pleural effusions with bilateral lower lobe atelectasis and/or consolidation. US RENAL COMPLETE   Final Result      1. Bilateral hydronephrosis. 2. Large right renal cyst, benign-appearing. 3. Large bladder calcification. CT HEAD W WO CONTRAST   Final Result      1. No acute intracranial abnormality or significant mass effect. 2. Focal gliosis in right frontal lobe, new since 7/4/2019.   2. Mild to moderate diffuse paranasal sinus mucosal disease predominantly involving the maxillary sinuses. XR CHEST PORTABLE   Final Result   Impression: Interval sternotomy. Life support devices, as above. Prominence of the background pulmonary vasculature suggesting mild edema. No pneumothorax. Assessment and Plan: Active Problems:    CAD, multiple vessel    CAD in native artery  Resolved Problems:    * No resolved hospital problems. *       CAD  S/p CABG  On ASA, BB, Not on ACEI as has elevated cr. On statin    Kidney stone, hydronephrosis  Urology following. OP procedure planned    NANETTE on CKD  Cr at 1.5  Baseline 1.3  May improve with stone removal.     DM type 2  Hemoglobin A1c, 7.8  On dc consider Metformin if cr improves. Continue Lantus + SSI now. Increased lantus. Afib  S/p SHANTI clip   On Apixaban   HR controlled.      Dispo: per CT surgery   Jenifer Light MD  3/11/2020

## 2020-03-11 NOTE — PROGRESS NOTES
Patient up to chair and ambulating in room this morning to and from bathroom. Patient does become tired quickly, but vitals remain stable. Patient consumed 100% of breakfast. No complaints of chest pain, nausea, or vomiting. Patient voiding adequately and has had 2 BM this morning. Patient now working with PT. Will continue to monitor.

## 2020-03-11 NOTE — PROGRESS NOTES
Nasal septum surgery; and Coronary artery bypass graft (N/A, 3/2/2020). Restrictions  Position Activity Restriction  Other position/activity restrictions: ambulate, sternal precautions, cushion in chair  Subjective   General  Chart Reviewed: Yes  Additional Pertinent Hx: 82 y/o pt s/p CABG x 3 and surgical ablation with pulmonary vein isolation and ligation of left atrial appendage. Family / Caregiver Present: No  Referring Practitioner: Reema Nuñez MD  Subjective  Subjective: Pt found sitting up in chair upon arrival, reporting 7/10 R knee pain, agreeable to therapy. Orientation  Orientation  Overall Orientation Status: Within Normal Limits  Objective      Transfers  Sit to Stand: Maximum Assistance(pt requiring VC to maintain sternal precautions)  Stand to sit: Maximum Assistance(pt reuqiring VC to refrain from reaching back for chair 2/2 sternal precautions)  Comment: Pt with reports of R knee pain. Ambulation  Ambulation?: Yes  Ambulation 1  Surface: level tile  Device: Rolling Walker  Assistance: Contact guard assistance  Quality of Gait: moderate temo, stride length and Maci. Distance: 50'+100'   Comments: distance limited by fatigue. Seated rest break needed between bouts of amb  Stairs/Curb  Stairs?: No        Exercises  Comments: AP, LAQ, seated marches x10 BLE     AM-PAC Score  AM-PAC Inpatient Mobility Raw Score : 14 (03/11/20 1216)  AM-PAC Inpatient T-Scale Score : 38.1 (03/11/20 1216)  Mobility Inpatient CMS 0-100% Score: 61.29 (03/11/20 1216)  Mobility Inpatient CMS G-Code Modifier : CL (03/11/20 1216)          Goals  Short term goals  Time Frame for Short term goals: d/c  Short term goal 1: sup<>sit supervision maintaining sternal precautions ongoing  Short term goal 2: sit<>stand supervision maintaining sternal precautions ongoing  Short term goal 3: amb 150' with RW ongoing (met 3/9).  new goal: amb 150 ft with RW SBA. ongoing  Short term goal 4: Ascend/descend 10 steps with R HR and CGA ongoing  Patient Goals   Patient goals : return home     Plan    Plan  Times per week: 2-5  Times per day: Daily  Current Treatment Recommendations: Strengthening, ROM, Stair training, Gait Training, Balance Training, Functional Mobility Training, Transfer Training, Endurance Training, Home Exercise Program, Safety Education & Training  Safety Devices  Type of devices: Call light within reach, Chair alarm in place, Left in chair, Nurse notified     Therapy Time   Individual Concurrent Group Co-treatment   Time In 1140         Time Out 1204         Minutes 24           Timed Code Treatment Minutes:  24    Total Treatment Minutes:  24    If the patient is discharged before the next treatment session, this note will serve as the discharge summary.      Maria Guadalupe Hart, PT, DPT [id number]

## 2020-03-11 NOTE — DISCHARGE SUMMARY
DISCHARGE SUMMARY    Patient ID: Meghann Barbosa  MRN:  [id number]  YOB: 1950      Admission Date:  3/2/2020  5:05 AM  Discharge Date:  03/11/20    Principle Diagnosis: Coronary artery disease  Secondary Diagnosis:  Active Problems:    CAD, multiple vessel    CAD in native artery    Paroxysmal atrial fibrillation    Ischemic cardiomyopathy with severely reduced ejection fratction    Type 2 diabetes mellitus    BPH    Bladder stone    Bilateral hydronephrosis    Chronic Renal Insufficiency    Procedure:    Coronary artery bypass grafting x3 - Left internal mammary artery to mid left anterior descending artery, left anterior descending endarterectomy, reverse aortocoronary saphenous vein graft sequentially to obtuse marginal branches 2 and 3; bilateral pulmonary vein isolation, ligation and exclusion of the left atrial appendage with a 40mm Atri-Clip, endoscopic saphenous vein harvest from right thigh and leg; total cardiopulmonary bypass; transesophageal echocardiography; sternal plating x1    History:  The patient is a 81 y.o.  male  with multi-vessel coronary artery disease and ischemic cardiomyopathy as well as recent history of paroxysmal atrial fibrillation who presented for elective coronary artery bypass grafting and surgical atrial fibrillation ablation and ligation of the left atrial appendage. He also had a very large bladder calculi identified on the CT scan for which he is planned eventually for an intervention with urology. Hospital Course: The patient underwent elective CABG x 3 with atriclip placement and sternal plate x 1 to manubrium on March 2, 2020. In surgery, the preoperative WAYNE demonstrated preserved LV EF of 20%.   There were no significant valve abnormalities. After bypass, the WAYNE showed the LV EF improved to 40% with 0.375mcg/kg/min of milrinone.  The right saphenous vein was a good conduit.  The left internal mammary artery was a good conduit and had with bilateral lower lobe atelectasis and/or consolidation. Renal ultrasound 3/6/2020:   1. Bilateral hydronephrosis. 2. Large right renal cyst, benign-appearing. 3. Large bladder calcification. CT Head w/o contrast 3/3/2020:  1. No acute intracranial abnormality or significant mass effect. 2. Focal gliosis in right frontal lobe, new since 7/4/2019.   2. Mild to moderate diffuse paranasal sinus mucosal disease predominantly involving the maxillary sinuses.      Treatments: IV hydration, cardiac meds: metoprolol, furosemide and amiodarone, anticoagulation: ASA and apixaban, insulin: Humalog and Lantus and therapies: PT and OT    LABS:  Recent Labs     03/09/20  0450 03/10/20  0441 03/11/20  0419   WBC 11.1* 14.3* 14.2*   HGB 10.8* 10.5* 9.8*   HCT 32.8* 31.8* 30.0*    264 252                                                                  Recent Labs     03/09/20  0450 03/10/20  0441 03/11/20  0419    132* 133*   K 4.5 4.3 4.2   CL 98* 96* 94*   CO2 28 24 25   BUN 41* 36* 35*   CREATININE 1.5* 1.4* 1.5*   GLUCOSE 162* 165* 180*     Condition at discharge: Stable     Disposition:  [address]    Physical Exam on discharge day:   BP (!) 116/52   Pulse 107   Temp 98.1 °F (36.7 °C) (Axillary)   Resp 16   Ht 5' 10\" (1.778 m)   Wt 264 lb 5.3 oz (119.9 kg)   SpO2 93%   BMI 37.93 kg/m²     Neuro: awake, alert and oriented x 3  CV:   Sinus  Pulm:  Diminished  Abd:  soft, non-tender; bowel sounds normal; passing flatus, +BM  Wounds: clean, dry and intact  Ext: warm, + edema    Discharge Medications:      Medication List      START taking these medications    acetaminophen 325 MG tablet  Commonly known as:  TYLENOL  Take 2 tablets by mouth every 4 hours as needed for Pain or Fever     amiodarone 200 MG tablet  Commonly known as:  CORDARONE  Take 2 tablets by mouth twice daily for four days then 1 tablet daily     atorvastatin 40 MG tablet  Commonly known as:  LIPITOR  Take 1 tablet by mouth nightly     famotidine 20 MG tablet  Commonly known as:  PEPCID  Take 1 tablet by mouth daily     furosemide 80 MG tablet  Commonly known as:  LASIX  Take 1 tablet by mouth 2 times daily     insulin glargine 100 UNIT/ML injection pen  Commonly known as:  LANTUS  Inject 16 Units into the skin daily     * insulin lispro (1 Unit Dial) 100 UNIT/ML Sopn  Inject 3 Units into the skin 3 times daily (with meals)     * insulin lispro (1 Unit Dial) 100 UNIT/ML Sopn  Inject 0-6 Units into the skin nightly     * insulin lispro (1 Unit Dial) 100 UNIT/ML Sopn  Inject 0-12 Units into the skin 3 times daily (with meals)     magnesium oxide 400 (240 Mg) MG tablet  Commonly known as:  MAG-OX  Take 1 tablet by mouth daily     nystatin 635512 UNIT/GM cream  Commonly known as:  MYCOSTATIN  Apply topically 2 times daily. polyethylene glycol packet  Commonly known as:  GLYCOLAX  Take 17 g by mouth daily     potassium chloride 10 MEQ extended release tablet  Commonly known as:  KLOR-CON M  Take 1 tablet by mouth 2 times daily         * This list has 3 medication(s) that are the same as other medications prescribed for you. Read the directions carefully, and ask your doctor or other care provider to review them with you. CHANGE how you take these medications    metoprolol tartrate 25 MG tablet  Commonly known as:  LOPRESSOR  Take 0.25 tablets by mouth 2 times daily  What changed:  how much to take        CONTINUE taking these medications    apixaban 5 MG Tabs tablet  Commonly known as:  Eliquis  Take 1 tablet by mouth 2 times daily     aspirin 81 MG chewable tablet  Take 1 tablet by mouth daily     Handicap Placard Misc  by Does not apply route Pt cannot walk more that 250 feet without becoming SOB.   Dx: CAD, Cardiomyopathy, Atrial Fibrilation    Not to exceed 5 years           Where to Get Your Medications      Information about where to get these medications is not yet available    Ask your nurse or doctor

## 2020-03-12 RX ORDER — POTASSIUM CHLORIDE 750 MG/1
10 TABLET, EXTENDED RELEASE ORAL 2 TIMES DAILY
Qty: 60 TABLET | Refills: 3 | Status: CANCELLED | OUTPATIENT
Start: 2020-03-12

## 2020-03-12 RX ORDER — AMIODARONE HYDROCHLORIDE 200 MG/1
TABLET ORAL
Qty: 90 TABLET | Refills: 0 | Status: CANCELLED | OUTPATIENT
Start: 2020-03-12

## 2020-03-12 RX ORDER — FUROSEMIDE 80 MG
80 TABLET ORAL 2 TIMES DAILY
Qty: 60 TABLET | Refills: 3 | Status: CANCELLED | OUTPATIENT
Start: 2020-03-12

## 2020-03-13 ENCOUNTER — APPOINTMENT (OUTPATIENT)
Dept: VASCULAR LAB | Age: 70
End: 2020-03-13
Payer: MEDICAID

## 2020-03-13 ENCOUNTER — HOSPITAL ENCOUNTER (EMERGENCY)
Age: 70
Discharge: HOME | End: 2020-03-13
Attending: EMERGENCY MEDICINE
Payer: MEDICAID

## 2020-03-13 VITALS
RESPIRATION RATE: 16 BRPM | SYSTOLIC BLOOD PRESSURE: 136 MMHG | DIASTOLIC BLOOD PRESSURE: 81 MMHG | HEART RATE: 96 BPM | TEMPERATURE: 98.7 F | OXYGEN SATURATION: 98 %

## 2020-03-13 LAB
ANION GAP SERPL CALCULATED.3IONS-SCNC: 13 MMOL/L (ref 3–16)
BASOPHILS ABSOLUTE: 0.1 K/UL (ref 0–0.2)
BASOPHILS RELATIVE PERCENT: 0.5 %
BUN BLDV-MCNC: 27 MG/DL (ref 7–20)
CALCIUM SERPL-MCNC: 8.8 MG/DL (ref 8.3–10.6)
CHLORIDE BLD-SCNC: 97 MMOL/L (ref 99–110)
CO2: 25 MMOL/L (ref 21–32)
CREAT SERPL-MCNC: 1.3 MG/DL (ref 0.8–1.3)
EOSINOPHILS ABSOLUTE: 0.3 K/UL (ref 0–0.6)
EOSINOPHILS RELATIVE PERCENT: 2.9 %
GFR AFRICAN AMERICAN: >60
GFR NON-AFRICAN AMERICAN: 55
GLUCOSE BLD-MCNC: 195 MG/DL (ref 70–99)
HCT VFR BLD CALC: 31.8 % (ref 40.5–52.5)
HEMOGLOBIN: 10.4 G/DL (ref 13.5–17.5)
LYMPHOCYTES ABSOLUTE: 0.9 K/UL (ref 1–5.1)
LYMPHOCYTES RELATIVE PERCENT: 8.6 %
MCH RBC QN AUTO: 29.6 PG (ref 26–34)
MCHC RBC AUTO-ENTMCNC: 32.9 G/DL (ref 31–36)
MCV RBC AUTO: 90 FL (ref 80–100)
MONOCYTES ABSOLUTE: 0.8 K/UL (ref 0–1.3)
MONOCYTES RELATIVE PERCENT: 7.5 %
NEUTROPHILS ABSOLUTE: 8.6 K/UL (ref 1.7–7.7)
NEUTROPHILS RELATIVE PERCENT: 80.5 %
PDW BLD-RTO: 14.2 % (ref 12.4–15.4)
PLATELET # BLD: 397 K/UL (ref 135–450)
PMV BLD AUTO: 8.4 FL (ref 5–10.5)
POTASSIUM REFLEX MAGNESIUM: 4.1 MMOL/L (ref 3.5–5.1)
RBC # BLD: 3.53 M/UL (ref 4.2–5.9)
SODIUM BLD-SCNC: 135 MMOL/L (ref 136–145)
WBC # BLD: 10.7 K/UL (ref 4–11)

## 2020-03-13 PROCEDURE — 99284 EMERGENCY DEPT VISIT MOD MDM: CPT

## 2020-03-13 PROCEDURE — 80048 BASIC METABOLIC PNL TOTAL CA: CPT

## 2020-03-13 PROCEDURE — 85025 COMPLETE CBC W/AUTO DIFF WBC: CPT

## 2020-03-13 PROCEDURE — 93971 EXTREMITY STUDY: CPT

## 2020-03-13 PROCEDURE — 96374 THER/PROPH/DIAG INJ IV PUSH: CPT

## 2020-03-13 PROCEDURE — 6360000002 HC RX W HCPCS: Performed by: EMERGENCY MEDICINE

## 2020-03-13 PROCEDURE — 6370000000 HC RX 637 (ALT 250 FOR IP): Performed by: EMERGENCY MEDICINE

## 2020-03-13 PROCEDURE — 36415 COLL VENOUS BLD VENIPUNCTURE: CPT

## 2020-03-13 RX ORDER — LANOLIN ALCOHOL/MO/W.PET/CERES
400 CREAM (GRAM) TOPICAL DAILY
Qty: 30 TABLET | Refills: 0 | Status: SHIPPED | OUTPATIENT
Start: 2020-03-13 | End: 2020-05-13 | Stop reason: ALTCHOICE

## 2020-03-13 RX ORDER — POLYETHYLENE GLYCOL 3350 17 G/17G
17 POWDER, FOR SOLUTION ORAL DAILY
Qty: 30 EACH | Refills: 0 | Status: SHIPPED | OUTPATIENT
Start: 2020-03-13 | End: 2020-04-12

## 2020-03-13 RX ORDER — INSULIN LISPRO 100 [IU]/ML
3 INJECTION, SOLUTION INTRAVENOUS; SUBCUTANEOUS
Qty: 1 PEN | Refills: 0 | Status: ON HOLD | OUTPATIENT
Start: 2020-03-13 | End: 2022-08-27 | Stop reason: HOSPADM

## 2020-03-13 RX ORDER — HYDROCODONE BITARTRATE AND ACETAMINOPHEN 5; 325 MG/1; MG/1
1 TABLET ORAL ONCE
Status: COMPLETED | OUTPATIENT
Start: 2020-03-13 | End: 2020-03-13

## 2020-03-13 RX ORDER — NYSTATIN 100000 U/G
CREAM TOPICAL
Qty: 1 TUBE | Refills: 0 | Status: ON HOLD | OUTPATIENT
Start: 2020-03-13 | End: 2021-08-26

## 2020-03-13 RX ORDER — POTASSIUM CHLORIDE 750 MG/1
10 TABLET, EXTENDED RELEASE ORAL 2 TIMES DAILY
Qty: 60 TABLET | Refills: 3 | Status: SHIPPED | OUTPATIENT
Start: 2020-03-13 | End: 2022-08-29 | Stop reason: SDUPTHER

## 2020-03-13 RX ORDER — AMIODARONE HYDROCHLORIDE 200 MG/1
TABLET ORAL
Qty: 90 TABLET | Refills: 0 | Status: ON HOLD | OUTPATIENT
Start: 2020-03-13 | End: 2021-08-29 | Stop reason: HOSPADM

## 2020-03-13 RX ORDER — FUROSEMIDE 10 MG/ML
20 INJECTION INTRAMUSCULAR; INTRAVENOUS ONCE
Status: COMPLETED | OUTPATIENT
Start: 2020-03-13 | End: 2020-03-13

## 2020-03-13 RX ORDER — FUROSEMIDE 80 MG
80 TABLET ORAL 2 TIMES DAILY
Qty: 60 TABLET | Refills: 3 | Status: SHIPPED | OUTPATIENT
Start: 2020-03-13 | End: 2021-09-16 | Stop reason: SDUPTHER

## 2020-03-13 RX ORDER — ATORVASTATIN CALCIUM 40 MG/1
40 TABLET, FILM COATED ORAL NIGHTLY
Qty: 30 TABLET | Refills: 0 | Status: ON HOLD | OUTPATIENT
Start: 2020-03-13 | End: 2021-08-26

## 2020-03-13 RX ORDER — FAMOTIDINE 20 MG/1
20 TABLET, FILM COATED ORAL DAILY
Qty: 60 TABLET | Refills: 3 | Status: ON HOLD | OUTPATIENT
Start: 2020-03-13 | End: 2022-08-27 | Stop reason: HOSPADM

## 2020-03-13 RX ADMIN — HYDROCODONE BITARTRATE AND ACETAMINOPHEN 1 TABLET: 5; 325 TABLET ORAL at 15:41

## 2020-03-13 RX ADMIN — FUROSEMIDE 20 MG: 10 INJECTION, SOLUTION INTRAVENOUS at 17:42

## 2020-03-13 ASSESSMENT — PAIN SCALES - GENERAL
PAINLEVEL_OUTOF10: 6
PAINLEVEL_OUTOF10: 6
PAINLEVEL_OUTOF10: 5

## 2020-03-13 ASSESSMENT — ENCOUNTER SYMPTOMS
VOMITING: 0
NAUSEA: 0
DIARRHEA: 0
SHORTNESS OF BREATH: 0
ABDOMINAL PAIN: 0
COUGH: 0

## 2020-03-13 ASSESSMENT — PAIN DESCRIPTION - FREQUENCY: FREQUENCY: CONTINUOUS

## 2020-03-13 ASSESSMENT — PAIN - FUNCTIONAL ASSESSMENT: PAIN_FUNCTIONAL_ASSESSMENT: PREVENTS OR INTERFERES WITH MANY ACTIVE NOT PASSIVE ACTIVITIES

## 2020-03-13 ASSESSMENT — PAIN DESCRIPTION - PAIN TYPE: TYPE: ACUTE PAIN

## 2020-03-13 ASSESSMENT — PAIN DESCRIPTION - ONSET: ONSET: PROGRESSIVE

## 2020-03-13 ASSESSMENT — PAIN DESCRIPTION - PROGRESSION: CLINICAL_PROGRESSION: GRADUALLY WORSENING

## 2020-03-13 ASSESSMENT — PAIN DESCRIPTION - ORIENTATION: ORIENTATION: RIGHT;LOWER

## 2020-03-13 ASSESSMENT — PAIN DESCRIPTION - LOCATION: LOCATION: LEG

## 2020-03-13 ASSESSMENT — PAIN SCALES - WONG BAKER
WONGBAKER_NUMERICALRESPONSE: 6
WONGBAKER_NUMERICALRESPONSE: 6

## 2020-03-13 ASSESSMENT — PAIN DESCRIPTION - DESCRIPTORS: DESCRIPTORS: CONSTANT

## 2020-03-13 NOTE — ED PROVIDER NOTES
[address] [facility] [address] ENCOUNTER          ATTENDING PHYSICIAN NOTE       Date of evaluation: 3/13/2020    Chief Complaint     Leg Pain      History of Present Illness     Jodi Sanchez is a 81 y.o. male who presents to the emergency department with a chief complaint of right-sided leg swelling. He actually had a multivessel cardiac bypass performed a bit over a week ago. He had venous harvest from this right lower extremity. He had been discharged nursing care facility but stated that he \"left there early\" to go back home a few days ago. He notes that he has had swelling in the right leg for about 3 days. It has been painful and he has noted some discoloration. He denies any sensory change. He denies any chest pain or shortness of breath. Review of Systems     Review of Systems   Constitutional: Negative for chills and fever. Respiratory: Negative for cough and shortness of breath. Cardiovascular: Negative for chest pain. Gastrointestinal: Negative for abdominal pain, diarrhea, nausea and vomiting. Genitourinary: Negative for difficulty urinating. All other systems reviewed and are negative. Past Medical, Surgical, Family, and Social History     He has a past medical history of A-fib ([facility] [address] CAD, multiple vessel, Diabetes mellitus ([facility] [address] Enlarged prostate, Environmental allergies, and Kidney stones. He has a past surgical history that includes Cardiac catheterization (12/10/2019); Nasal polyp surgery (x2); Nasal septum surgery; and Coronary artery bypass graft (N/A, 3/2/2020). His family history includes Alzheimer's Disease in his mother; Dementia in his mother; Diabetes in his paternal aunt; Heart Attack in his father. He reports that he has never smoked. He has never used smokeless tobacco. He reports previous alcohol use. He reports that he does not use drugs.     Medications     Discharge Medication List as of 3/13/2020  6:39 PM      CONTINUE these medications which have NOT CHANGED    Details   acetaminophen (TYLENOL) 325 MG tablet Take 2 tablets by mouth every 4 hours as needed for Pain or Fever, Disp-120 tablet, R-3DC to [facility]      Handicap Placard [facility] Starting Tue 1/28/2020, Disp-1 each, R-0, PrintPt cannot walk more that 250 feet without becoming SOB. Dx: CAD, Cardiomyopathy, Atrial Fibrilation   Not to exceed 5 years      aspirin 81 MG chewable tablet Take 1 tablet by mouth daily, Disp-30 tablet, R-3Normal             Allergies     He is allergic to codeine and aspartame and phenylalanine. Physical Exam     INITIAL VITALS: BP: 134/70, Temp: 98.7 °F (37.1 °C), Pulse: 96, Resp: 16, SpO2: 100 %   Physical Exam  Vitals signs and nursing note reviewed. Constitutional:       General: He is not in acute distress. Appearance: He is well-developed. He is not diaphoretic. HENT:      Head: Normocephalic. Eyes:      Pupils: Pupils are equal, round, and reactive to light. Neck:      Musculoskeletal: Normal range of motion. Cardiovascular:      Rate and Rhythm: Normal rate and regular rhythm. Pulmonary:      Effort: Pulmonary effort is normal.      Breath sounds: Normal breath sounds. Abdominal:      General: There is no distension. Palpations: Abdomen is soft. Musculoskeletal: Normal range of motion. General: Swelling (Bilaterally, 2+ edema, right greater than left.) present. Skin:     General: Skin is warm and dry. Findings: No rash. Neurological:      Mental Status: He is alert and oriented to person, place, and time. Psychiatric:         Behavior: Behavior normal.         Diagnostic Results     EKG   None.     RADIOLOGY:  VL Extremity Venous Right             LABS:   Results for orders placed or performed during the hospital encounter of 03/13/20   CBC Auto Differential   Result Value Ref Range    WBC 10.7 4.0 - 11.0 K/uL    RBC 3.53 (L) 4.20 - 5.90 M/uL    Hemoglobin 10.4 (L) 13.5 - 17.5 g/dL    Hematocrit 31.8 (L) 40.5 - 52.5 % MCV 90.0 80.0 - 100.0 fL    MCH 29.6 26.0 - 34.0 pg    MCHC 32.9 31.0 - 36.0 g/dL    RDW 14.2 12.4 - 15.4 %    Platelets 173 435 - 897 K/uL    MPV 8.4 5.0 - 10.5 fL    Neutrophils % 80.5 %    Lymphocytes % 8.6 %    Monocytes % 7.5 %    Eosinophils % 2.9 %    Basophils % 0.5 %    Neutrophils Absolute 8.6 (H) 1.7 - 7.7 K/uL    Lymphocytes Absolute 0.9 (L) 1.0 - 5.1 K/uL    Monocytes Absolute 0.8 0.0 - 1.3 K/uL    Eosinophils Absolute 0.3 0.0 - 0.6 K/uL    Basophils Absolute 0.1 0.0 - 0.2 K/uL   Basic Metabolic Panel w/ Reflex to MG   Result Value Ref Range    Sodium 135 (L) 136 - 145 mmol/L    Potassium reflex Magnesium 4.1 3.5 - 5.1 mmol/L    Chloride 97 (L) 99 - 110 mmol/L    CO2 25 21 - 32 mmol/L    Anion Gap 13 3 - 16    Glucose 195 (H) 70 - 99 mg/dL    BUN 27 (H) 7 - 20 mg/dL    CREATININE 1.3 0.8 - 1.3 mg/dL    GFR Non-African American 55 (A) >60    GFR African American >60 >60    Calcium 8.8 8.3 - 10.6 mg/dL       ED BEDSIDE ULTRASOUND:  None. RECENT VITALS:  BP: 136/81,Temp: 98.7 °F (37.1 °C), Pulse: 96, Resp: 16, SpO2: 98 %     Procedures     None. ED Course     Nursing Notes, Past Medical Hx, Past Surgical Hx, Social Hx,Allergies, and Family Hx were reviewed.     patient was given the following medications:  Orders Placed This Encounter   Medications    HYDROcodone-acetaminophen (NORCO) 5-325 MG per tablet 1 tablet    furosemide (LASIX) injection 20 mg    amiodarone (CORDARONE) 200 MG tablet     Sig: Take 2 tablets by mouth twice daily for four days then 1 tablet daily     Dispense:  90 tablet     Refill:  0    atorvastatin (LIPITOR) 40 MG tablet     Sig: Take 1 tablet by mouth nightly     Dispense:  30 tablet     Refill:  0    famotidine (PEPCID) 20 MG tablet     Sig: Take 1 tablet by mouth daily     Dispense:  60 tablet     Refill:  3    furosemide (LASIX) 80 MG tablet     Sig: Take 1 tablet by mouth 2 times daily     Dispense:  60 tablet     Refill:  3    insulin glargine (LANTUS;BASAGLAR)

## 2020-03-23 ENCOUNTER — TELEPHONE (OUTPATIENT)
Dept: CARDIOTHORACIC SURGERY | Age: 70
End: 2020-03-23

## 2020-03-24 RX ORDER — ASPIRIN 81 MG/1
81 TABLET, CHEWABLE ORAL DAILY
Qty: 30 TABLET | Refills: 3 | Status: SHIPPED | OUTPATIENT
Start: 2020-03-24

## 2020-03-25 ENCOUNTER — OFFICE VISIT (OUTPATIENT)
Dept: CARDIOTHORACIC SURGERY | Age: 70
End: 2020-03-25

## 2020-03-25 VITALS
HEIGHT: 70 IN | WEIGHT: 247 LBS | SYSTOLIC BLOOD PRESSURE: 120 MMHG | HEART RATE: 100 BPM | OXYGEN SATURATION: 98 % | DIASTOLIC BLOOD PRESSURE: 68 MMHG | BODY MASS INDEX: 35.36 KG/M2 | TEMPERATURE: 98.4 F

## 2020-03-25 PROCEDURE — 99024 POSTOP FOLLOW-UP VISIT: CPT | Performed by: THORACIC SURGERY (CARDIOTHORACIC VASCULAR SURGERY)

## 2020-03-25 ASSESSMENT — ENCOUNTER SYMPTOMS
SHORTNESS OF BREATH: 1
ABDOMINAL DISTENTION: 0
NAUSEA: 0
DIARRHEA: 0
WHEEZING: 0
CONSTIPATION: 0
ABDOMINAL PAIN: 0
CHEST TIGHTNESS: 0
VOMITING: 0
COUGH: 0

## 2020-03-25 NOTE — PROGRESS NOTES
Progress Note    S/P    Coronary artery bypass grafting x3  Bilateral pulmonary vein isolation  Ligation and exclusion of the left atrial appendage with a 40mm Atri-Clip  Sternal Plating x1     Subjective:    Mr. Trevor West returns for post-operative follow-up. On discharge he was sent to SNF, however, took it upon himself to sign out AMA. He has returned to living in his home alone. He reports small fall several days ago but denies any dizziness, lightheadedness, or syncope. A friend has been bringing him a meal daily. He has been eating chili dogs, [employer], and sugary foods. He has not been checking his blood sugars as his glucometer is broken but reports his friend is bringing him a new one today. The importance of appropriate blood glucose management. He reports he stopped taking his aspirin but is taking all his other medications including his Eliquis and lasix. After discussion of importance of aspirin, he will continue. Mr. Trevor West reports that he has been nervous about showering. He was assured that   He needs to do so daily and wash wound with soapy water and pat it dry. Mr. Flynn Lynch has an abnormal affect. He displays some frontal lobe symptoms with inappropriate affect and poor hygiene displaying a lack of insight. Review of Systems   Constitutional: Positive for fatigue. Negative for activity change, appetite change and unexpected weight change. HENT: Negative. Respiratory: Positive for shortness of breath. Negative for cough, chest tightness and wheezing. Cardiovascular: Positive for leg swelling. Negative for chest pain and palpitations. Gastrointestinal: Negative for abdominal distention, abdominal pain, constipation, diarrhea, nausea and vomiting. Endocrine: Negative. Genitourinary: Negative. Musculoskeletal: Negative. Neurological: Negative. Hematological: Bruises/bleeds easily. Psychiatric/Behavioral: Positive for dysphoric mood.    All other systems
preferably daily. Make an appointment with your cardiologist Dr. Olimpia Cardenas 3-4 weeks after your surgery. [x] WEIGHT REDUCTION: Your ideal body mass index is 18.5-24.9 kg/m2. Your body mass index is Body mass index is 35.44 kg/m². . You may calculate this by using the following formula: (weight in pounds X 0.45) / (height in inches X 0.0254) squared. A waist circumference of < 40 inches for men and < 35 inches for women is recommended. A 10% weight reduction can lower your risk of future events. [x] DIABETES: Your HbA1c should be <7%. Diet, exercise, weight reduction and proper medications can reduce your body's tendency toward high blood sugar. Check with your PCP for assistance. [x] PROPER MEDICATIONS:  [x] Aspirin  [] ACE inhibitor / ARB  (-opril / -sartan)  [x] Beta-blocker (-olol or -ilol)  [x] Statin    Risk factor management works. The person for whom this is most important is YOU. Post this information on your refrigerator or other prominent place as a reminder to you. Please call or write if you have further questions. We want you and your operation to last for many more years.

## 2020-03-27 ENCOUNTER — TELEPHONE (OUTPATIENT)
Dept: CARDIOTHORACIC SURGERY | Age: 70
End: 2020-03-27

## 2020-03-27 NOTE — TELEPHONE ENCOUNTER
I spoke with Alejandro Adkins, RN with [facility] to confirm that she received referral for home care yesterday. She did receive the referral and states patient is scheduled to be seen today. I also confirmed that patient needed a Renal panel drawn today.

## 2020-03-30 ENCOUNTER — TELEPHONE (OUTPATIENT)
Dept: CARDIOTHORACIC SURGERY | Age: 70
End: 2020-03-30

## 2020-03-30 LAB
ALBUMIN SERPL-MCNC: 3.4 G/DL (ref 3.5–5)
ANION GAP SERPL CALCULATED.3IONS-SCNC: 9 MMOL/L (ref 6–18)
BUN BLDV-MCNC: 30 MG/DL (ref 8–26)
CALCIUM SERPL-MCNC: 8.7 MG/DL (ref 8.5–10.5)
CHLORIDE BLD-SCNC: 100 MEQ/L (ref 101–111)
CO2: 24 MMOL/L (ref 24–36)
CREAT SERPL-MCNC: 1.28 MG/DL (ref 0.64–1.27)
GFR AFRICAN AMERICAN: 65 ML/MIN/1.73 M2
GFR NON-AFRICAN AMERICAN: 56 ML/MIN/1.73 M2
GLUCOSE BLD-MCNC: 153 MG/DL (ref 70–99)
PHOSPHORUS: 3.5 MG/DL (ref 2.5–4.5)
POTASSIUM SERPL-SCNC: 4.7 MEQ/L (ref 3.6–5.1)
SODIUM BLD-SCNC: 133 MEQ/L (ref 135–145)

## 2020-03-30 NOTE — TELEPHONE ENCOUNTER
Silvana Pickett , ph: [phone] states blood sample for ordered blood work taken to lab at [facility].

## 2020-05-13 ENCOUNTER — OFFICE VISIT (OUTPATIENT)
Dept: CARDIOTHORACIC SURGERY | Age: 70
End: 2020-05-13

## 2020-05-13 VITALS
DIASTOLIC BLOOD PRESSURE: 70 MMHG | HEART RATE: 92 BPM | TEMPERATURE: 98.3 F | WEIGHT: 258 LBS | SYSTOLIC BLOOD PRESSURE: 128 MMHG | OXYGEN SATURATION: 98 % | HEIGHT: 70 IN | BODY MASS INDEX: 36.94 KG/M2

## 2020-05-13 PROCEDURE — 99024 POSTOP FOLLOW-UP VISIT: CPT | Performed by: THORACIC SURGERY (CARDIOTHORACIC VASCULAR SURGERY)

## 2020-05-13 ASSESSMENT — ENCOUNTER SYMPTOMS
GASTROINTESTINAL NEGATIVE: 1
SHORTNESS OF BREATH: 0
COUGH: 0
EYES NEGATIVE: 1
CHEST TIGHTNESS: 0

## 2020-05-13 NOTE — PROGRESS NOTES
range of motion. General: No swelling or tenderness. Skin:     General: Skin is warm and dry. Comments: Sternotomy incision c/d/i, no erythema or drainage, no clicking   Neurological:      Mental Status: He is alert and oriented to person, place, and time. Mental status is at baseline. Psychiatric:         Mood and Affect: Mood normal.         Behavior: Behavior normal.         Assessment:      Mr. Aniceto Johnson returns for his second post-op visit after his 3 vessel CABG 3/2/20. He is recovering well from surgery and will need to follow up with Dr. Michaela Call for urological intervention. Additionally he is to follow up with his cardiologist, Dr. Sarah Ortiz. Plan:      Mr. Aniceto Johnson may return to clinic should he need anything or call my office if he has any questions. He is to follow up with Urology for his large bladder calculus. Ana Munson MD     I saw and evaluated the patient. I agree with the findings/plan of care in the fellow's note.       Conrad Tobias MD  5/13/2020  12:38 PM

## 2020-07-06 ENCOUNTER — TELEPHONE (OUTPATIENT)
Dept: CARDIOLOGY CLINIC | Age: 70
End: 2020-07-06

## 2020-07-06 NOTE — TELEPHONE ENCOUNTER
Patient needs a letter stating he needs central air. His air condition is broken. Patient is low income and said that   [employer] is a non profit organization that can supply him central air.     please call patient [phone]

## 2020-09-03 ENCOUNTER — OFFICE VISIT (OUTPATIENT)
Dept: CARDIOLOGY CLINIC | Age: 70
End: 2020-09-03
Payer: MEDICAID

## 2020-09-03 VITALS
WEIGHT: 269.2 LBS | OXYGEN SATURATION: 96 % | BODY MASS INDEX: 38.63 KG/M2 | SYSTOLIC BLOOD PRESSURE: 124 MMHG | HEART RATE: 100 BPM | DIASTOLIC BLOOD PRESSURE: 70 MMHG | TEMPERATURE: 98 F

## 2020-09-03 PROCEDURE — 99214 OFFICE O/P EST MOD 30 MIN: CPT | Performed by: INTERNAL MEDICINE

## 2020-09-03 PROCEDURE — 1123F ACP DISCUSS/DSCN MKR DOCD: CPT | Performed by: INTERNAL MEDICINE

## 2020-09-03 PROCEDURE — 4040F PNEUMOC VAC/ADMIN/RCVD: CPT | Performed by: INTERNAL MEDICINE

## 2020-09-03 PROCEDURE — G8417 CALC BMI ABV UP PARAM F/U: HCPCS | Performed by: INTERNAL MEDICINE

## 2020-09-03 PROCEDURE — G8427 DOCREV CUR MEDS BY ELIG CLIN: HCPCS | Performed by: INTERNAL MEDICINE

## 2020-09-03 PROCEDURE — 3017F COLORECTAL CA SCREEN DOC REV: CPT | Performed by: INTERNAL MEDICINE

## 2020-09-03 PROCEDURE — 1036F TOBACCO NON-USER: CPT | Performed by: INTERNAL MEDICINE

## 2020-09-03 RX ORDER — LEVOCETIRIZINE DIHYDROCHLORIDE 5 MG/1
5 TABLET, FILM COATED ORAL NIGHTLY
Status: ON HOLD | COMMUNITY
End: 2021-08-26

## 2020-09-03 ASSESSMENT — ENCOUNTER SYMPTOMS
SHORTNESS OF BREATH: 0
ABDOMINAL DISTENTION: 0
CHOKING: 0
CHEST TIGHTNESS: 0
COUGH: 0
APNEA: 0

## 2020-09-03 NOTE — PROGRESS NOTES
Subjective:      Patient ID: Merna Andrade is a 78 y.o. male. Coronary Artery Disease   Pertinent negatives include no chest pain, chest tightness, dizziness, leg swelling, palpitations or shortness of breath. Hematuria       Follow up CAD/CABG/afib/cardiomyop/abn ekg. No specific cardiac sx. Not real active. No sob with walking. No chest pain. No tachycardia/syncope. Chronic LE edema. No pnd or orthopnea. Wt up. Feeling good. Some  bleeding. Usually with asa. Taking Eliquis. Rhythm stable.      Past Medical History:   Diagnosis Date    A-fib [facility]) 2020    CAD, multiple vessel 12/2019    Diabetes mellitus ([facility] [address]     diet controlled     Enlarged prostate     Environmental allergies     Kidney stones      Past Surgical History:   Procedure Laterality Date    CARDIAC CATHETERIZATION  12/10/2019    Dr. Hinojosa Neighbor - severe multi-vessel disease    CORONARY ARTERY BYPASS GRAFT N/A 3/2/2020    WAYNE; TCPB; CABG x 3, LIMA to LAD w/ long segment endarterectomy (4 cm); bilateral pulmonary vein isolation; 40 mm Atriclip to L atril appendage; endoscopic L GSV harvest; ICNB x 5 levels bilat; sternal plate x 1 to manubrium performed by Lucina Rogers MD at [facility]  x2    NASAL SEPTUM SURGERY       Social History     Socioeconomic History    Marital status: Single     Spouse name: Not on file    Number of children: Not on file    Years of education: Not on file    Highest education level: Not on file   Occupational History    Not on file   Social Needs    Financial resource strain: Not on file    Food insecurity     Worry: Not on file     Inability: Not on file    Transportation needs     Medical: Not on file     Non-medical: Not on file   Tobacco Use    Smoking status: Never Smoker    Smokeless tobacco: Never Used   Substance and Sexual Activity    Alcohol use: Not Currently     Comment: rare    Drug use: Never    Sexual activity: Not on file   Lifestyle    Physical activity     Days per week: Not on file     Minutes per session: Not on file    Stress: Not on file   Relationships    Social connections     Talks on phone: Not on file     Gets together: Not on file     Attends Oriental orthodox service: Not on file     Active member of club or organization: Not on file     Attends meetings of clubs or organizations: Not on file     Relationship status: Not on file    Intimate partner violence     Fear of current or ex partner: Not on file     Emotionally abused: Not on file     Physically abused: Not on file     Forced sexual activity: Not on file   Other Topics Concern    Not on file   Social History Narrative    Not on file      FH MI in [place] in 62s. Vitals:    09/03/20 1058   BP: 124/70   Pulse: 100   Temp: 98 °F (36.7 °C)   SpO2: 96%     Wt 268    Review of Systems   Constitutional: Negative for activity change and fatigue. Respiratory: Negative for apnea, cough, choking, chest tightness and shortness of breath. Cardiovascular: Negative for chest pain, palpitations and leg swelling. No PND or orthopnea. No tachycardia. Gastrointestinal: Negative for abdominal distention. Genitourinary: Positive for hematuria. Musculoskeletal: Negative for myalgias. Neurological: Negative for dizziness, syncope and light-headedness. Psychiatric/Behavioral: Negative for agitation, behavioral problems and confusion. All other systems reviewed and are negative. Objective:   Physical Exam  Constitutional:       General: He is not in acute distress. Appearance: Normal appearance. He is well-developed. HENT:      Head: Normocephalic. Right Ear: External ear normal.      Left Ear: External ear normal.   Neck:      Musculoskeletal: Normal range of motion and neck supple. Vascular: No JVD. Cardiovascular:      Rate and Rhythm: Tachycardia present. Rhythm irregular. Heart sounds: Normal heart sounds. No gallop.     Pulmonary:      Effort: Pulmonary effort is normal. No respiratory distress. Breath sounds: Normal breath sounds. No wheezing or rales. Abdominal:      General: Bowel sounds are normal.      Palpations: Abdomen is soft. Tenderness: There is no abdominal tenderness. Musculoskeletal: Normal range of motion. General: Swelling present. Comments: Tr-+ ankle edema. Skin:     General: Skin is warm and dry. Neurological:      Mental Status: He is oriented to person, place, and time. Psychiatric:         Mood and Affect: Mood normal.         Behavior: Behavior normal.         Assessment:       Diagnosis Orders   1. CAD in native artery     2. Hx of CABG     3. Ischemic cardiomyopathy     4. Paroxysmal atrial fibrillation (HCC)             Plan:      CV appear stable. No angina. Compensated. Continue eliquis. Will have echo to reassess LV function. Increase lopressor to 12.5 mg bid. On Eliquis. Reviewed previous records and testing. Likely have EP see if LV function low. No changes. Follow up 3 months.         Jose Limon MD

## 2020-10-07 ENCOUNTER — HOSPITAL ENCOUNTER (OUTPATIENT)
Dept: NON INVASIVE DIAGNOSTICS | Age: 70
Discharge: HOME | End: 2020-10-07
Payer: MEDICAID

## 2020-10-07 PROCEDURE — 6360000004 HC RX CONTRAST MEDICATION: Performed by: INTERNAL MEDICINE

## 2020-10-07 PROCEDURE — C8929 TTE W OR WO FOL WCON,DOPPLER: HCPCS

## 2020-10-07 RX ADMIN — PERFLUTREN 2.2 MG: 6.52 INJECTION, SUSPENSION INTRAVENOUS at 16:18

## 2020-11-03 PROBLEM — I25.10 CAD IN NATIVE ARTERY: Status: RESOLVED | Noted: 2020-03-02 | Resolved: 2020-11-03

## 2020-12-11 ENCOUNTER — OFFICE VISIT (OUTPATIENT)
Dept: CARDIOLOGY CLINIC | Age: 70
End: 2020-12-11
Payer: MEDICAID

## 2020-12-11 VITALS
WEIGHT: 268 LBS | BODY MASS INDEX: 38.45 KG/M2 | DIASTOLIC BLOOD PRESSURE: 80 MMHG | TEMPERATURE: 97.8 F | HEART RATE: 72 BPM | SYSTOLIC BLOOD PRESSURE: 128 MMHG

## 2020-12-11 PROCEDURE — 1036F TOBACCO NON-USER: CPT | Performed by: INTERNAL MEDICINE

## 2020-12-11 PROCEDURE — 1123F ACP DISCUSS/DSCN MKR DOCD: CPT | Performed by: INTERNAL MEDICINE

## 2020-12-11 PROCEDURE — G8417 CALC BMI ABV UP PARAM F/U: HCPCS | Performed by: INTERNAL MEDICINE

## 2020-12-11 PROCEDURE — G8484 FLU IMMUNIZE NO ADMIN: HCPCS | Performed by: INTERNAL MEDICINE

## 2020-12-11 PROCEDURE — 3017F COLORECTAL CA SCREEN DOC REV: CPT | Performed by: INTERNAL MEDICINE

## 2020-12-11 PROCEDURE — 99214 OFFICE O/P EST MOD 30 MIN: CPT | Performed by: INTERNAL MEDICINE

## 2020-12-11 PROCEDURE — G8427 DOCREV CUR MEDS BY ELIG CLIN: HCPCS | Performed by: INTERNAL MEDICINE

## 2020-12-11 PROCEDURE — 4040F PNEUMOC VAC/ADMIN/RCVD: CPT | Performed by: INTERNAL MEDICINE

## 2020-12-11 ASSESSMENT — ENCOUNTER SYMPTOMS
COUGH: 0
SHORTNESS OF BREATH: 0
CHOKING: 0
APNEA: 0
CHEST TIGHTNESS: 0
ABDOMINAL DISTENTION: 0

## 2020-12-11 NOTE — PROGRESS NOTES
Subjective:      Patient ID: Ibrahima Brooke is a 78 y.o. male. Coronary Artery Disease   Pertinent negatives include no chest pain, chest tightness, dizziness, leg swelling, palpitations or shortness of breath. Hematuria       Follow up CAD/CABG/afib/cardiomyop/abn ekg. No specific cardiac sx. Not real active. No chest pain. No tachycardia/syncope. Chronic LE edema. No pnd or orthopnea. Wt up. Feeling good. Usually with asa. Taking Eliquis. Rhythm stable.      Past Medical History:   Diagnosis Date    A-fib [facility]) 2020    CAD, multiple vessel 12/2019    Diabetes mellitus ([facility] [address]     diet controlled     Enlarged prostate     Environmental allergies     Kidney stones      Past Surgical History:   Procedure Laterality Date    CARDIAC CATHETERIZATION  12/10/2019    Dr. Sherwin Veras - severe multi-vessel disease    CORONARY ARTERY BYPASS GRAFT N/A 3/2/2020    WAYNE; TCPB; CABG x 3, LIMA to LAD w/ long segment endarterectomy (4 cm); bilateral pulmonary vein isolation; 40 mm Atriclip to L atril appendage; endoscopic L GSV harvest; ICNB x 5 levels bilat; sternal plate x 1 to manubrium performed by Zenaida Bardales MD at [facility]  x2    NASAL SEPTUM SURGERY       Social History     Socioeconomic History    Marital status: Single     Spouse name: Not on file    Number of children: Not on file    Years of education: Not on file    Highest education level: Not on file   Occupational History    Not on file   Social Needs    Financial resource strain: Not on file    Food insecurity     Worry: Not on file     Inability: Not on file    Transportation needs     Medical: Not on file     Non-medical: Not on file   Tobacco Use    Smoking status: Never Smoker    Smokeless tobacco: Never Used   Substance and Sexual Activity    Alcohol use: Not Currently     Comment: rare    Drug use: Never    Sexual activity: Not on file   Lifestyle    Physical activity     Days per week: Not on file Minutes per session: Not on file    Stress: Not on file   Relationships    Social connections     Talks on phone: Not on file     Gets together: Not on file     Attends Jew service: Not on file     Active member of club or organization: Not on file     Attends meetings of clubs or organizations: Not on file     Relationship status: Not on file    Intimate partner violence     Fear of current or ex partner: Not on file     Emotionally abused: Not on file     Physically abused: Not on file     Forced sexual activity: Not on file   Other Topics Concern    Not on file   Social History Narrative    Not on file      FH MI in [place] in 62s. Vitals:    12/11/20 1359   BP: 128/80   Pulse: 72   Temp: 97.8 °F (36.6 °C)     Wt 268    Review of Systems   Constitutional: Negative for activity change and fatigue. Respiratory: Negative for apnea, cough, choking, chest tightness and shortness of breath. Cardiovascular: Negative for chest pain, palpitations and leg swelling. No PND or orthopnea. No tachycardia. Gastrointestinal: Negative for abdominal distention. Genitourinary: Positive for hematuria. Musculoskeletal: Negative for myalgias. Neurological: Negative for dizziness, syncope and light-headedness. Psychiatric/Behavioral: Negative for agitation, behavioral problems and confusion. All other systems reviewed and are negative. Objective:   Physical Exam  Constitutional:       General: He is not in acute distress. Appearance: Normal appearance. He is well-developed. HENT:      Head: Normocephalic. Right Ear: External ear normal.      Left Ear: External ear normal.   Neck:      Musculoskeletal: Normal range of motion and neck supple. Vascular: No JVD. Cardiovascular:      Rate and Rhythm: Tachycardia present. Rhythm irregular. Heart sounds: Normal heart sounds. No gallop. Pulmonary:      Effort: Pulmonary effort is normal. No respiratory distress.       Breath sounds: Normal breath sounds. No wheezing or rales. Abdominal:      General: Bowel sounds are normal.      Palpations: Abdomen is soft. Tenderness: There is no abdominal tenderness. Musculoskeletal: Normal range of motion. General: Swelling present. Comments: Tr ankle edema. Skin:     General: Skin is warm and dry. Neurological:      Mental Status: He is oriented to person, place, and time. Psychiatric:         Mood and Affect: Mood normal.         Behavior: Behavior normal.         Assessment:       Diagnosis Orders   1. CAD in native artery     2. Hx of CABG     3. Ischemic cardiomyopathy     4. Paroxysmal atrial fibrillation (HCC)             Plan:      CV stable. No angina. Compensated. Continue eliquis. On Eliquis. Reviewed previous records and testing. EP to see for cardiomyop/ICD/afib. No changes. Follow up 3 months.         Jean Pierre Ang MD

## 2021-01-08 NOTE — PROGRESS NOTES
Physical Therapy    Facility/Department: [facility] ICU  Initial Assessment/Treatment  NAME: Mekhi Pierce  :   MRN: [id number]    Date of Service: 3/4/2020    Discharge Recommendations:  Mekih Pierce scored a 85/76 on the AM-PAC short mobility form. Current research shows that an AM-PAC score of 17 or less is typically not associated with a discharge to the patient's home setting. Based on the patients AM-PAC score and their current functional mobility deficits, it is recommended that the patient have 3-5 sessions per week of Physical Therapy at d/c to increase the patients independence. PT Equipment Recommendations  Equipment Needed: No    Assessment   Body structures, Functions, Activity limitations: Decreased functional mobility ; Decreased strength;Decreased balance;Decreased endurance  Assessment: 80 y/o pt s/p CABG x 3 and surgical ablation with pulmonary vein isolation and ligation of left atrial appendage. Pt currently requiring min/mod Ax1 for bed mobility with VC to maintain sternal precautions. Pt requiring CGA for transfers and amb with rollator. Pt with syncopal episode during amb with BP dropping to 83/44. Pt remains below his baseline and will require further skilled PT to maximize safety and independence with functional mobility. Will continue to follow. Treatment Diagnosis: Decreased functional mobility   Prognosis: Good  Patient Education: role of PT, use of call light, sternal precautions, d/c planning; pt verb understanding  Barriers to Learning: none  REQUIRES PT FOLLOW UP: Yes  Activity Tolerance  Activity Tolerance: Patient Tolerated treatment well; Other  Activity Tolerance: Pt with syncopal episode during amb 2/2 to BP dropping to 83/44. RN notified. Patient Diagnosis(es): There were no encounter diagnoses. has a past medical history of A-fib ([address] CAD, multiple vessel, Diabetes mellitus ([address] Enlarged prostate, Environmental allergies, and Kidney stones.    has a RLE: WFL  Strength LLE  Strength LLE: WFL        Bed mobility  Supine to Sit: Minimal assistance; Moderate assistance(min/mod Ax1 via log roll )  Scooting: Stand by assistance(to EOB )  Transfers  Sit to Stand: Contact guard assistance;Minimal Assistance  Stand to sit: Contact guard assistance;Minimal Assistance  Ambulation  Ambulation?: Yes  Ambulation 1  Surface: level tile  Device: Rollator  Other Apparatus: O2(3L, chest tube, monitor)  Assistance: Contact guard assistance  Quality of Gait: moderate temo, stride length and Maci. Distance: 10'+20'  Comments: Pt with one posterior LOB 2/2 to syncopal episode during 2nd bout of amb requiring Max Ax1 to maintain balance - pt recovering and able to stand with A until chair brought for him to sit. BP was 105/69 prior to mobility, 83/44 taken right after sitting in chair RN notfied- recovering to 99/69 after a few minutes of seated rest   Stairs/Curb  Stairs?: No     Balance  Posture: Good  Sitting - Static: Good  Sitting - Dynamic: Good  Standing - Static: Good  Standing - Dynamic: Good(with 4WW)      PT evaluation and treatment initiated. Treatment included gait and transfer training as well as patient education.     Plan   Plan  Times per week: 2-5  Times per day: Daily  Current Treatment Recommendations: Strengthening, ROM, Stair training, Gait Training, Balance Training, Functional Mobility Training, Transfer Training, Endurance Training, Home Exercise Program, Safety Education & Training  Safety Devices  Type of devices: Left in chair, Chair alarm in place, Gait belt, Nurse notified, Call light within reach    AM-PAC Score  AM-PAC Inpatient Mobility Raw Score : 16 (03/04/20 1213)  AM-PAC Inpatient T-Scale Score : 40.78 (03/04/20 1213)  Mobility Inpatient CMS 0-100% Score: 54.16 (03/04/20 1213)  Mobility Inpatient CMS G-Code Modifier : CK (03/04/20 1213)          Goals  Short term goals  Time Frame for Short term goals: d/c  Short term goal 1: sup<>sit no hearing difficulty/no throat pain

## 2021-02-16 NOTE — PROGRESS NOTES
[facility]   Cardiac Electrophysiology Consultation   Date: 2/25/2021  Reason for Consultation:  Leticia Andino AF  Consult Requesting Physician: No att. providers found     Chief Complaint:   Chief Complaint   Patient presents with    New Patient     ICD consideration      HPI: London Torres is a 78 y.o. male referred by Dr. Stephy Hernandez for consideration of ICD. 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 Amiodarone was started 3/2020 during hospitalization post CABG. Repeat echo (10/2020) showed EF remains at 20-25% with LAE and severe global hypokinesis. He is on Lopressor, but no ACE/ARB due to impaired kidney function. Interval History: Today, he is here to discuss ICD implantation for primary prevention. Denies complaints of palpitations, dizziness, CP, SOB, orthopnea, presyncope, or syncope. He is a retired . Past Medical History:   Diagnosis Date    A-fib [facility]) 2020    CAD, multiple vessel 12/2019    Diabetes mellitus ([facility] [address]     diet controlled     Enlarged prostate     Environmental allergies     Kidney stones         Past Surgical History:   Procedure Laterality Date    CARDIAC CATHETERIZATION  12/10/2019    Dr. Saran Lewis - severe multi-vessel disease    CORONARY ARTERY BYPASS GRAFT N/A 3/2/2020    WAYNE; TCPB; CABG x 3, LIMA to LAD w/ long segment endarterectomy (4 cm); bilateral pulmonary vein isolation; 40 mm Atriclip to L atril appendage; endoscopic L GSV harvest; ICNB x 5 levels bilat; sternal plate x 1 to manubrium performed by Paradise Wilson MD at [facility]  x2    NASAL SEPTUM SURGERY         Allergies: Allergies   Allergen Reactions    Codeine Other (See Comments)     Seizures    Aspartame And Phenylalanine        Medication:   Prior to Admission medications    Medication Sig Start Date End Date Taking?  Authorizing Provider   levocetirizine (XYZAL) 5 MG tablet Take 5 mg by mouth nightly   Yes Lisa Saenz MD   aspirin 81 MG chewable tablet Take 1 tablet by mouth daily 3/24/20  Yes Blanche Jeong MD   amiodarone (CORDARONE) 200 MG tablet Take 2 tablets by mouth twice daily for four days then 1 tablet daily 3/13/20  Yes Henrietta Duane, MD   atorvastatin (LIPITOR) 40 MG tablet Take 1 tablet by mouth nightly 3/13/20  Yes Henrietta Duane, MD   famotidine (PEPCID) 20 MG tablet Take 1 tablet by mouth daily 3/13/20  Yes Henrietta Duane, MD   furosemide (LASIX) 80 MG tablet Take 1 tablet by mouth 2 times daily 3/13/20  Yes Henrietta Duane, MD   insulin glargine (LANTUS;BASAGLAR) 100 UNIT/ML injection pen Inject 16 Units into the skin daily 3/13/20  Yes Henrietta Duane, MD   insulin lispro, 1 Unit Dial, 100 UNIT/ML SOPN Inject 3 Units into the skin 3 times daily (with meals) 3/13/20  Yes Henrietta Duane, MD   metoprolol tartrate (LOPRESSOR) 25 MG tablet Take 0.25 tablets by mouth 2 times daily 3/13/20  Yes Henrietta Duane, MD   nystatin (MYCOSTATIN) 236899 UNIT/GM cream Apply topically 2 times daily. 3/13/20  Yes Henrietta Duane, MD   potassium chloride (KLOR-CON M) 10 MEQ extended release tablet Take 1 tablet by mouth 2 times daily 3/13/20  Yes Henrietta Duane, MD   apixaban Gwendolyn Sellar) 5 MG TABS tablet Take 1 tablet by mouth 2 times daily 3/13/20  Yes Henrietta Duane, MD   Handicap Placard MISC by Does not apply route Pt cannot walk more that 250 feet without becoming SOB. Dx: CAD, Cardiomyopathy, Atrial Fibrilation    Not to exceed 5 years 1/28/20  Yes Blanche Jeong MD       Social History:   reports that he has never smoked. He has never used smokeless tobacco. He reports previous alcohol use. He reports that he does not use drugs. Family History:  family history includes Alzheimer's Disease in his mother; Dementia in his mother; Diabetes in his paternal aunt; Heart Attack in his father. Reviewed.  Denies family history of sudden cardiac death, arrhythmia, premature CAD    Review of System:    · General ROS: negative for - chills, fever   · Psychological ROS: negative for - anxiety or depression  · Ophthalmic ROS: negative for - eye pain or loss of vision  · ENT ROS: negative for - epistaxis, headaches, nasal discharge, sore throat   · Allergy and Immunology ROS: negative for - hives, nasal congestion   · Hematological and Lymphatic ROS: negative for - bleeding problems, blood clots, bruising or jaundice  · Endocrine ROS: negative for - skin changes, temperature intolerance or unexpected weight changes  · Respiratory ROS: negative for - cough, hemoptysis, pleuritic pain, SOB, sputum changes or wheezing  · Cardiovascular ROS: Per HPI. · Gastrointestinal ROS: negative for - abdominal pain, blood in stools, diarrhea, hematemesis, melena, nausea/vomiting or swallowing difficulty/pain  · Genito-Urinary ROS: negative for - dysuria or incontinence  · Musculoskeletal ROS: negative for - joint swelling or muscle pain  · Neurological ROS: negative for - confusion, dizziness, gait disturbance, headaches, numbness/tingling, seizures, speech problems, tremors, visual changes or weakness  · Dermatological ROS: negative for - rash    Physical Examination:  Vitals:    02/25/21 1052   BP: 132/74   Pulse: 104   Temp: 97.1 °F (36.2 °C)       · Constitutional: Oriented. No distress. · Head: Normocephalic and atraumatic. · Mouth/Throat: Oropharynx is clear and moist.   · Eyes: Conjunctivae normal. EOM are normal.   · Neck: Normal range of motion. Neck supple. No rigidity. No JVD present. · Cardiovascular: Normal rate, regular rhythm, S1&S2 and intact distal pulses. · Pulmonary/Chest: Bilateral respiratory sounds. No wheezes. No rhonchi. · Abdominal: Soft. Bowel sounds present. No distension, No tenderness. · Musculoskeletal: No tenderness. No edema    · Lymphadenopathy: Has no cervical adenopathy. · Neurological: Alert and oriented.  Cranial nerve appears intact, No Gross deficit   · Skin: Skin is warm and dry. No rash noted. · Psychiatric: Has a normal mood, affect and behavior     Labs:  Reviewed. ECG: reviewed, ST, rate 104, QRS duration 88 ms. No pathologic Q waves, ventricular pre-excitation, or QT prolongation. Studies:   1. Event monitor:     2. Echo 10/7/20:   Summary   Normal left ventricular size. Moderate concentric left ventricular hypertrophy. Severely decreased left ventricular systolic function with an estimated   ejection fraction of 20-25%. Severe global hypokinesis. Indeterminate diastolic function. Normal right ventricular size with decreased systolic function. Tapse: 0.56 cm   RV s: 6.31 cm/s   The aortic root is dilated measuring 3.8 cm. The pulmonic valve is not well visualized. IVC not well visualized. 3. Stress Test:      4. Cath 12/10/19:   RESULTS:  HEMODYNAMICS:  Left ventricular end-diastolic pressure equals 20. There was no significant gradient across the aortic valve by pullback  post cineangiography. LEFT VENTRICULOGRAM:  Left ventriculogram demonstrates global  hypokinesis. Estimated ejection fraction is 25%   LEFT MAIN:  Left main is free of significant obstructive disease. LEFT ANTERIOR DESCENDING:  The LAD courses to and wraps around the apex. It gives off a relatively large bifurcating first diagonal branch and a  moderate-size second diagonal branch. The large first diagonal branch  had an 80% proximal stenosis. The LAD proper had severe mid vessel  disease with a very irregular area of subtotal stenosis in the mid  vessel. It then coursed on towards the apex. There is a 90% stenosis  at the apex. LEFT CIRCUMFLEX:  Circumflex gives rise to a small-to-moderate first  marginal branch, a large second marginal branch, and terminates in  moderate-size posterolateral branch. There appears to be ostial disease  which is difficult to quantitate but appears to be in excess of 70%.    The circumflex then has 90% stenosis before the distal posterolateral  branch. RIGHT CORONARY ARTERY:  Right coronary artery is a dominant vessel. It  gives off a moderate-size PDA and two posterolateral branches. There is  diffuse disease throughout the body of the right coronary artery and 90%  stenosis in the PDA. IMPRESSION:  1. Severe LV dysfunction. Estimated ejection fraction is 25%. 2.  Severe multivessel coronary artery disease as described above. 3.  I recommend surgical consultation. 4.  Successful Angio-Seal of the right femoral arteriotomy site. The MCOT, echocardiogram, stress test, and coronary angiography/PCI were reviewed by myself and used for my plan of care. Procedures:  1. Implantation of dual chamber ICD    Assessment/Plan:   1. PAF, on amiodarone and Eliquis  2. ICMP, EF 20-25% on Toprol, no ACE/ARB due to impaired kidney function  3.  HFrEF  4.  CAD, s/p CABG 3/2020, on ASA  5.  HLD, stable on statin    Chronic systolic congestive heart failure with LV ejection fraction 20-25%  On maximally tolerated guideline directed medical therapy for more than 3 months. No recent revascularization within the past 3 months  No recent MI within the past 40 days  Narrow QRS complex. NYHA class 2    Hence he would qualify for ICD implantation for primary prevention of sudden cardiac arrest. (MADIT II, SCD HeFT and DEFINITE trials)    I had a detailed shared decision making interaction with the patient and he is willing to proceed with ICD implantation. CONSENT:  The indications, risks, benefits and alternatives to ICD implantation have been reviewed in detail with the patient. Procedure will be performed with moderate sedation. The risks include perforation, bleeding, pneumothorax, pocket pain or hematoma, pocket or system infection, lead dislodgement, malfunctioning device and complications associated with defibrillation testing. I have used 3D anatomic model of the heart to explain in detail.  Lay terms were used and patient's questions were answered in detail. The patient was specifically told that this device is meant to decrease the risk of sudden death and will not decrease the risk of myocardial infarction or symptomatic heart failure. Post-procedure wound care and restrictions were also discussed. A detailed instruction sheet will be given to the patient and family after procedure and before discharge from the hospital. Regular post-procedure follow-up in the device clinic was emphasized. The patient was told that getting one or multiple shocks is likely during the life of the device but the timing or the number of shocks cannot be predicted. The patient verbalized understanding and wishes to proceed with the procedure. Follow up in 1 week with device tech for wound and device check  Follow up in 1 month with EP  NP with device check  Follow up in 3 months with me with device check. Thank you for allowing me to participate in the care of [facility]. All questions and concerns were addressed to the patient/family. Alternatives to my treatment were discussed. Evette Morataya RN, am scribing for and in the presence of Dr. Saige Orr. 02/25/21 11:43 AM  Hi Cordero RN    I, Michele Portillo MD, personally performed the services prescribed in this documentation as scribed by Ms. Hi Cordero RN in my presence and it is both accurate and complete.      Michele Portillo MD  Cardiac Electrophysiology  \[facility] [place] [facility]\"" 81

## 2021-02-25 ENCOUNTER — OFFICE VISIT (OUTPATIENT)
Dept: CARDIOLOGY CLINIC | Age: 71
End: 2021-02-25
Payer: MEDICAID

## 2021-02-25 ENCOUNTER — PREP FOR PROCEDURE (OUTPATIENT)
Dept: CARDIOLOGY CLINIC | Age: 71
End: 2021-02-25

## 2021-02-25 VITALS
BODY MASS INDEX: 42.53 KG/M2 | WEIGHT: 271 LBS | SYSTOLIC BLOOD PRESSURE: 132 MMHG | DIASTOLIC BLOOD PRESSURE: 74 MMHG | TEMPERATURE: 97.1 F | HEIGHT: 67 IN | HEART RATE: 104 BPM

## 2021-02-25 DIAGNOSIS — I25.10 CAD IN NATIVE ARTERY: ICD-10-CM

## 2021-02-25 DIAGNOSIS — I42.0 DILATED CARDIOMYOPATHY (HCC): ICD-10-CM

## 2021-02-25 DIAGNOSIS — I48.0 PAROXYSMAL ATRIAL FIBRILLATION (HCC): Primary | ICD-10-CM

## 2021-02-25 DIAGNOSIS — E78.2 MIXED HYPERLIPIDEMIA: ICD-10-CM

## 2021-02-25 PROCEDURE — 3017F COLORECTAL CA SCREEN DOC REV: CPT | Performed by: INTERNAL MEDICINE

## 2021-02-25 PROCEDURE — 4040F PNEUMOC VAC/ADMIN/RCVD: CPT | Performed by: INTERNAL MEDICINE

## 2021-02-25 PROCEDURE — 1036F TOBACCO NON-USER: CPT | Performed by: INTERNAL MEDICINE

## 2021-02-25 PROCEDURE — 99205 OFFICE O/P NEW HI 60 MIN: CPT | Performed by: INTERNAL MEDICINE

## 2021-02-25 PROCEDURE — G8417 CALC BMI ABV UP PARAM F/U: HCPCS | Performed by: INTERNAL MEDICINE

## 2021-02-25 PROCEDURE — 93000 ELECTROCARDIOGRAM COMPLETE: CPT | Performed by: INTERNAL MEDICINE

## 2021-02-25 PROCEDURE — G8427 DOCREV CUR MEDS BY ELIG CLIN: HCPCS | Performed by: INTERNAL MEDICINE

## 2021-02-25 PROCEDURE — 1123F ACP DISCUSS/DSCN MKR DOCD: CPT | Performed by: INTERNAL MEDICINE

## 2021-02-25 PROCEDURE — G8484 FLU IMMUNIZE NO ADMIN: HCPCS | Performed by: INTERNAL MEDICINE

## 2021-02-25 RX ORDER — SODIUM CHLORIDE 0.9 % (FLUSH) 0.9 %
10 SYRINGE (ML) INJECTION EVERY 12 HOURS SCHEDULED
Status: CANCELLED | OUTPATIENT
Start: 2021-02-25

## 2021-02-25 RX ORDER — SODIUM CHLORIDE 9 MG/ML
INJECTION, SOLUTION INTRAVENOUS CONTINUOUS
Status: CANCELLED | OUTPATIENT
Start: 2021-02-25

## 2021-02-25 RX ORDER — CEFAZOLIN SODIUM 1 G/3ML
2000 INJECTION, POWDER, FOR SOLUTION INTRAMUSCULAR; INTRAVENOUS ONCE
Status: CANCELLED | OUTPATIENT
Start: 2021-03-12

## 2021-02-25 RX ORDER — SODIUM CHLORIDE 0.9 % (FLUSH) 0.9 %
10 SYRINGE (ML) INJECTION PRN
Status: CANCELLED | OUTPATIENT
Start: 2021-02-25

## 2021-02-25 NOTE — PATIENT INSTRUCTIONS
Implantable Cardioverter Defibrillator    Date of Procedure: March 12, 2021    Time of Arrival: 9:30 am    Cardiologist performing procedure: Dr. Nicki Sanford    · Arrive at [facility]. through the main entrance. Check in at the Outpatient Diagnostic desk on the 1st floor. · Do not eat or drink anything after midnight the night before the procedure. · You may brush your teeth and rinse the morning of the procedure. · Take your routine medications the morning of the procedure. However, if you are taking diabetic medications, please HOLD on the day of the procedure (including insulin). If you take Lantus insulin, take HALF of your usual dose the night before. · Do NOT stop taking aspirin. · HOLD Eliquis (blood thinner) for 2 days before the procedure. · Please get tested for COVID-19 on 3/8/21 at [facility] testing center (located outside the hospital). They are open 8 am - 3 pm.     · Please use Hibiclens soap (or any antibacterial soap such as Dial) to wash neck, chest, and abdomen the night before the procedure. · Do not apply any lotion, powder, or deodorant after you shower the night before or the morning of the procedure. · Please bring a list of your medications to the hospital with you. · You must have someone available to drive you home the next day. It is recommended that you do not drive for 24 hours after the procedure. · If you are unable to make this appointment, please call [facility] at [phone].

## 2021-02-25 NOTE — LETTER
[employer]  EP Procedure Sheet  [birth year]    Kylee Diazjosé antonio  [birth year]    Physician: Dr. Chuy Giron    EP Procedures   Pacemaker implant  EP Study   x ICD implant--dual chamber  Atrial flutter ablation     Biv implant  Atrial fibrillation ablation    Generator Change  SVT ablation    Lead revision  VT ablation    Lead extraction +/- upgrade  AVN ablation    Loop implant  Cardioversion     Other:   WAYNE     Equipment  x [employer]tronic   AVIVA Mapping System    [place]  [address] [address]  [employer]    [employer]ron[employer]  Laser Lead Extraction    Special Equipment       EP Procedures Scheduling Request  Time Requested  1130   Specific Day 3/12/21   Anesthesia xxx   CT surgery backup    Location [facility]     Pre-Procedure Labs / Imaging   PT/INR  Type & cross    CBC  Units PRBC    BMP/Mg  Units FFP    Venogram  CXR    Echo  Pulmonary CTA for Pulmonary vein mapping     Patient Instructions

## 2021-03-08 ENCOUNTER — OFFICE VISIT (OUTPATIENT)
Dept: PRIMARY CARE CLINIC | Age: 71
End: 2021-03-08
Payer: MEDICAID

## 2021-03-08 DIAGNOSIS — Z01.818 PREOP EXAMINATION: Primary | ICD-10-CM

## 2021-03-08 PROCEDURE — G8417 CALC BMI ABV UP PARAM F/U: HCPCS | Performed by: NURSE PRACTITIONER

## 2021-03-08 PROCEDURE — G8428 CUR MEDS NOT DOCUMENT: HCPCS | Performed by: NURSE PRACTITIONER

## 2021-03-08 PROCEDURE — 99211 OFF/OP EST MAY X REQ PHY/QHP: CPT | Performed by: NURSE PRACTITIONER

## 2021-03-09 LAB — SARS-COV-2, PCR: NOT DETECTED

## 2021-03-10 ENCOUNTER — OFFICE VISIT (OUTPATIENT)
Dept: CARDIOLOGY CLINIC | Age: 71
End: 2021-03-10
Payer: MEDICAID

## 2021-03-10 VITALS
SYSTOLIC BLOOD PRESSURE: 124 MMHG | DIASTOLIC BLOOD PRESSURE: 70 MMHG | WEIGHT: 213 LBS | BODY MASS INDEX: 33.36 KG/M2 | HEART RATE: 60 BPM | TEMPERATURE: 97.5 F

## 2021-03-10 DIAGNOSIS — I48.0 PAROXYSMAL ATRIAL FIBRILLATION (HCC): ICD-10-CM

## 2021-03-10 DIAGNOSIS — Z95.1 HX OF CABG: ICD-10-CM

## 2021-03-10 DIAGNOSIS — I25.10 CAD IN NATIVE ARTERY: Primary | ICD-10-CM

## 2021-03-10 DIAGNOSIS — I25.5 ISCHEMIC CARDIOMYOPATHY: ICD-10-CM

## 2021-03-10 PROCEDURE — 3017F COLORECTAL CA SCREEN DOC REV: CPT | Performed by: INTERNAL MEDICINE

## 2021-03-10 PROCEDURE — G8427 DOCREV CUR MEDS BY ELIG CLIN: HCPCS | Performed by: INTERNAL MEDICINE

## 2021-03-10 PROCEDURE — 99214 OFFICE O/P EST MOD 30 MIN: CPT | Performed by: INTERNAL MEDICINE

## 2021-03-10 PROCEDURE — 4040F PNEUMOC VAC/ADMIN/RCVD: CPT | Performed by: INTERNAL MEDICINE

## 2021-03-10 PROCEDURE — G8484 FLU IMMUNIZE NO ADMIN: HCPCS | Performed by: INTERNAL MEDICINE

## 2021-03-10 PROCEDURE — G8417 CALC BMI ABV UP PARAM F/U: HCPCS | Performed by: INTERNAL MEDICINE

## 2021-03-10 PROCEDURE — 1123F ACP DISCUSS/DSCN MKR DOCD: CPT | Performed by: INTERNAL MEDICINE

## 2021-03-10 PROCEDURE — 1036F TOBACCO NON-USER: CPT | Performed by: INTERNAL MEDICINE

## 2021-03-10 ASSESSMENT — ENCOUNTER SYMPTOMS
CHEST TIGHTNESS: 0
APNEA: 0
SHORTNESS OF BREATH: 0
COUGH: 0
ABDOMINAL DISTENTION: 0
CHOKING: 0

## 2021-03-10 NOTE — PROGRESS NOTES
Subjective:      Patient ID: Mary Alvarado is a 78 y.o. male. Coronary Artery Disease  Pertinent negatives include no chest pain, chest tightness, dizziness, leg swelling, palpitations or shortness of breath. Hematuria      Follow up CAD/CABG/afib/cardiomyop/abn ekg. No complaints. No chest pain. No tachycardia/syncope. Chronic LE edema. No pnd or orthopnea. Wt up. Feeling good. Usually with asa. Taking Eliquis. No bleeding issues. Rhythm stable.      Past Medical History:   Diagnosis Date    A-fib [facility]) 2020    CAD, multiple vessel 12/2019    Diabetes mellitus ([facility] [address]     diet controlled     Enlarged prostate     Environmental allergies     Kidney stones      Past Surgical History:   Procedure Laterality Date    CARDIAC CATHETERIZATION  12/10/2019    Dr. Henri Santacruz - severe multi-vessel disease    CORONARY ARTERY BYPASS GRAFT N/A 3/2/2020    WAYNE; TCPB; CABG x 3, LIMA to LAD w/ long segment endarterectomy (4 cm); bilateral pulmonary vein isolation; 40 mm Atriclip to L atril appendage; endoscopic L GSV harvest; ICNB x 5 levels bilat; sternal plate x 1 to manubrium performed by Jared Miguel MD at [facility]  x2    NASAL SEPTUM SURGERY       Social History     Socioeconomic History    Marital status: Single     Spouse name: Not on file    Number of children: Not on file    Years of education: Not on file    Highest education level: Not on file   Occupational History    Not on file   Social Needs    Financial resource strain: Not on file    Food insecurity     Worry: Not on file     Inability: Not on file    Transportation needs     Medical: Not on file     Non-medical: Not on file   Tobacco Use    Smoking status: Never Smoker    Smokeless tobacco: Never Used   Substance and Sexual Activity    Alcohol use: Not Currently     Comment: rare    Drug use: Never    Sexual activity: Not on file   Lifestyle    Physical activity     Days per week: Not on file     Minutes per session: Not on file    Stress: Not on file   Relationships    Social connections     Talks on phone: Not on file     Gets together: Not on file     Attends Islam service: Not on file     Active member of club or organization: Not on file     Attends meetings of clubs or organizations: Not on file     Relationship status: Not on file    Intimate partner violence     Fear of current or ex partner: Not on file     Emotionally abused: Not on file     Physically abused: Not on file     Forced sexual activity: Not on file   Other Topics Concern    Not on file   Social History Narrative    Not on file      FH MI in [place] in 64s. Vitals:    03/10/21 1414   BP: 124/70   Pulse: 60   Temp: 97.5 °F (36.4 °C)     Wt 213    Review of Systems   Constitutional: Negative for activity change and fatigue. Respiratory: Negative for apnea, cough, choking, chest tightness and shortness of breath. Cardiovascular: Negative for chest pain, palpitations and leg swelling. No PND or orthopnea. No tachycardia. Gastrointestinal: Negative for abdominal distention. Genitourinary: Positive for hematuria. Musculoskeletal: Negative for myalgias. Neurological: Negative for dizziness, syncope and light-headedness. Psychiatric/Behavioral: Negative for agitation, behavioral problems and confusion. All other systems reviewed and are negative. Objective:   Physical Exam  Constitutional:       General: He is not in acute distress. Appearance: Normal appearance. He is well-developed. HENT:      Head: Normocephalic. Right Ear: External ear normal.      Left Ear: External ear normal.   Neck:      Musculoskeletal: Normal range of motion and neck supple. Vascular: No JVD. Cardiovascular:      Rate and Rhythm: Tachycardia present. Rhythm irregular. Heart sounds: Normal heart sounds. No gallop. Pulmonary:      Effort: Pulmonary effort is normal. No respiratory distress.       Breath sounds: Normal breath sounds. No wheezing or rales. Abdominal:      General: Bowel sounds are normal.      Palpations: Abdomen is soft. Tenderness: There is no abdominal tenderness. Musculoskeletal: Normal range of motion. General: Swelling present. Comments: Tr ankle edema. Skin:     General: Skin is warm and dry. Neurological:      Mental Status: He is oriented to person, place, and time. Psychiatric:         Mood and Affect: Mood normal.         Behavior: Behavior normal.         Assessment:       Diagnosis Orders   1. CAD in native artery     2. Hx of CABG     3. Ischemic cardiomyopathy     4. Paroxysmal atrial fibrillation (HCC)             Plan:      CV stable. No angina. Compensated. Continue eliquis. On Eliquis. Reviewed previous records and testing. EP to see for cardiomyop/ICD/afib. To have ICD in 2 days. . No changes. Follow up 3 months.         Adilson Watson MD

## 2021-03-11 ENCOUNTER — ANESTHESIA EVENT (OUTPATIENT)
Dept: CARDIAC CATH/INVASIVE PROCEDURES | Age: 71
End: 2021-03-11

## 2021-03-12 ENCOUNTER — HOSPITAL ENCOUNTER (OUTPATIENT)
Dept: CARDIAC CATH/INVASIVE PROCEDURES | Age: 71
Setting detail: OBSERVATION
Discharge: HOME | End: 2021-03-13
Attending: INTERNAL MEDICINE | Admitting: INTERNAL MEDICINE
Payer: MEDICAID

## 2021-03-12 ENCOUNTER — ANESTHESIA (OUTPATIENT)
Dept: CARDIAC CATH/INVASIVE PROCEDURES | Age: 71
End: 2021-03-12

## 2021-03-12 ENCOUNTER — NURSE ONLY (OUTPATIENT)
Dept: CARDIOLOGY CLINIC | Age: 71
End: 2021-03-12

## 2021-03-12 VITALS — SYSTOLIC BLOOD PRESSURE: 121 MMHG | OXYGEN SATURATION: 100 % | DIASTOLIC BLOOD PRESSURE: 70 MMHG

## 2021-03-12 DIAGNOSIS — Z95.810 ICD (IMPLANTABLE CARDIOVERTER-DEFIBRILLATOR) IN PLACE: Primary | ICD-10-CM

## 2021-03-12 DIAGNOSIS — I25.5 ISCHEMIC CARDIOMYOPATHY: ICD-10-CM

## 2021-03-12 LAB
ANION GAP SERPL CALCULATED.3IONS-SCNC: 10 MMOL/L (ref 3–16)
BUN BLDV-MCNC: 26 MG/DL (ref 7–20)
CALCIUM SERPL-MCNC: 9 MG/DL (ref 8.3–10.6)
CHLORIDE BLD-SCNC: 101 MMOL/L (ref 99–110)
CO2: 23 MMOL/L (ref 21–32)
CREAT SERPL-MCNC: 1.2 MG/DL (ref 0.8–1.3)
EKG ATRIAL RATE: 312 BPM
EKG DIAGNOSIS: NORMAL
EKG Q-T INTERVAL: 386 MS
EKG QRS DURATION: 86 MS
EKG QTC CALCULATION (BAZETT): 515 MS
EKG R AXIS: 94 DEGREES
EKG T AXIS: 20 DEGREES
EKG VENTRICULAR RATE: 107 BPM
GFR AFRICAN AMERICAN: >60
GFR NON-AFRICAN AMERICAN: >60
GLUCOSE BLD-MCNC: 136 MG/DL (ref 70–99)
GLUCOSE BLD-MCNC: 180 MG/DL (ref 70–99)
GLUCOSE BLD-MCNC: 193 MG/DL (ref 70–99)
HCT VFR BLD CALC: 45 % (ref 40.5–52.5)
HEMOGLOBIN: 14.8 G/DL (ref 13.5–17.5)
INR BLD: 1.13 (ref 0.86–1.14)
MCH RBC QN AUTO: 29.4 PG (ref 26–34)
MCHC RBC AUTO-ENTMCNC: 32.8 G/DL (ref 31–36)
MCV RBC AUTO: 89.5 FL (ref 80–100)
PDW BLD-RTO: 13.7 % (ref 12.4–15.4)
PERFORMED ON: ABNORMAL
PERFORMED ON: ABNORMAL
PLATELET # BLD: 197 K/UL (ref 135–450)
PMV BLD AUTO: 9.3 FL (ref 5–10.5)
POTASSIUM SERPL-SCNC: 4.3 MMOL/L (ref 3.5–5.1)
PROTHROMBIN TIME: 13.1 SEC (ref 10–13.2)
RBC # BLD: 5.03 M/UL (ref 4.2–5.9)
SODIUM BLD-SCNC: 134 MMOL/L (ref 136–145)
WBC # BLD: 8.3 K/UL (ref 4–11)

## 2021-03-12 PROCEDURE — C1721 AICD, DUAL CHAMBER: HCPCS

## 2021-03-12 PROCEDURE — 2580000003 HC RX 258: Performed by: ANESTHESIOLOGY

## 2021-03-12 PROCEDURE — G0379 DIRECT REFER HOSPITAL OBSERV: HCPCS

## 2021-03-12 PROCEDURE — 93005 ELECTROCARDIOGRAM TRACING: CPT | Performed by: INTERNAL MEDICINE

## 2021-03-12 PROCEDURE — 6360000002 HC RX W HCPCS

## 2021-03-12 PROCEDURE — 6370000000 HC RX 637 (ALT 250 FOR IP): Performed by: INTERNAL MEDICINE

## 2021-03-12 PROCEDURE — 2580000003 HC RX 258: Performed by: INTERNAL MEDICINE

## 2021-03-12 PROCEDURE — 85610 PROTHROMBIN TIME: CPT

## 2021-03-12 PROCEDURE — 2500000003 HC RX 250 WO HCPCS

## 2021-03-12 PROCEDURE — C1898 LEAD, PMKR, OTHER THAN TRANS: HCPCS

## 2021-03-12 PROCEDURE — G0378 HOSPITAL OBSERVATION PER HR: HCPCS

## 2021-03-12 PROCEDURE — 33249 INSJ/RPLCMT DEFIB W/LEAD(S): CPT | Performed by: INTERNAL MEDICINE

## 2021-03-12 PROCEDURE — 3700000001 HC ADD 15 MINUTES (ANESTHESIA)

## 2021-03-12 PROCEDURE — 6360000002 HC RX W HCPCS: Performed by: NURSE ANESTHETIST, CERTIFIED REGISTERED

## 2021-03-12 PROCEDURE — 33249 INSJ/RPLCMT DEFIB W/LEAD(S): CPT

## 2021-03-12 PROCEDURE — C1777 LEAD, AICD, ENDO SINGLE COIL: HCPCS

## 2021-03-12 PROCEDURE — 6360000002 HC RX W HCPCS: Performed by: INTERNAL MEDICINE

## 2021-03-12 PROCEDURE — 80048 BASIC METABOLIC PNL TOTAL CA: CPT

## 2021-03-12 PROCEDURE — 85027 COMPLETE CBC AUTOMATED: CPT

## 2021-03-12 PROCEDURE — 3700000000 HC ANESTHESIA ATTENDED CARE

## 2021-03-12 PROCEDURE — 93010 ELECTROCARDIOGRAM REPORT: CPT | Performed by: INTERNAL MEDICINE

## 2021-03-12 PROCEDURE — C1894 INTRO/SHEATH, NON-LASER: HCPCS

## 2021-03-12 RX ORDER — SODIUM CHLORIDE 0.9 % (FLUSH) 0.9 %
10 SYRINGE (ML) INJECTION EVERY 12 HOURS SCHEDULED
Status: DISCONTINUED | OUTPATIENT
Start: 2021-03-12 | End: 2021-03-13 | Stop reason: HOSPADM

## 2021-03-12 RX ORDER — FENTANYL CITRATE 50 UG/ML
INJECTION, SOLUTION INTRAMUSCULAR; INTRAVENOUS PRN
Status: DISCONTINUED | OUTPATIENT
Start: 2021-03-12 | End: 2021-03-12 | Stop reason: SDUPTHER

## 2021-03-12 RX ORDER — FUROSEMIDE 80 MG
80 TABLET ORAL 2 TIMES DAILY
Status: DISCONTINUED | OUTPATIENT
Start: 2021-03-12 | End: 2021-03-13 | Stop reason: HOSPADM

## 2021-03-12 RX ORDER — NICOTINE POLACRILEX 4 MG
15 LOZENGE BUCCAL PRN
Status: DISCONTINUED | OUTPATIENT
Start: 2021-03-12 | End: 2021-03-13 | Stop reason: HOSPADM

## 2021-03-12 RX ORDER — CETIRIZINE HYDROCHLORIDE 10 MG/1
5 TABLET ORAL DAILY
Status: DISCONTINUED | OUTPATIENT
Start: 2021-03-12 | End: 2021-03-13 | Stop reason: HOSPADM

## 2021-03-12 RX ORDER — AMIODARONE HYDROCHLORIDE 200 MG/1
200 TABLET ORAL DAILY
Status: DISCONTINUED | OUTPATIENT
Start: 2021-03-12 | End: 2021-03-13 | Stop reason: HOSPADM

## 2021-03-12 RX ORDER — SODIUM CHLORIDE 9 MG/ML
INJECTION, SOLUTION INTRAVENOUS CONTINUOUS
Status: DISCONTINUED | OUTPATIENT
Start: 2021-03-12 | End: 2021-03-12

## 2021-03-12 RX ORDER — POTASSIUM CHLORIDE 750 MG/1
10 TABLET, EXTENDED RELEASE ORAL 2 TIMES DAILY
Status: DISCONTINUED | OUTPATIENT
Start: 2021-03-12 | End: 2021-03-13 | Stop reason: HOSPADM

## 2021-03-12 RX ORDER — ASPIRIN 81 MG/1
81 TABLET, CHEWABLE ORAL DAILY
Status: DISCONTINUED | OUTPATIENT
Start: 2021-03-12 | End: 2021-03-13 | Stop reason: HOSPADM

## 2021-03-12 RX ORDER — FAMOTIDINE 20 MG/1
20 TABLET, FILM COATED ORAL DAILY
Status: DISCONTINUED | OUTPATIENT
Start: 2021-03-12 | End: 2021-03-13 | Stop reason: HOSPADM

## 2021-03-12 RX ORDER — TRAMADOL HYDROCHLORIDE 50 MG/1
50 TABLET ORAL EVERY 6 HOURS PRN
Status: DISCONTINUED | OUTPATIENT
Start: 2021-03-12 | End: 2021-03-13 | Stop reason: HOSPADM

## 2021-03-12 RX ORDER — INSULIN LISPRO 100 [IU]/ML
3 INJECTION, SOLUTION INTRAVENOUS; SUBCUTANEOUS
Status: DISCONTINUED | OUTPATIENT
Start: 2021-03-12 | End: 2021-03-13 | Stop reason: HOSPADM

## 2021-03-12 RX ORDER — SODIUM CHLORIDE, SODIUM LACTATE, POTASSIUM CHLORIDE, CALCIUM CHLORIDE 600; 310; 30; 20 MG/100ML; MG/100ML; MG/100ML; MG/100ML
INJECTION, SOLUTION INTRAVENOUS CONTINUOUS
Status: DISCONTINUED | OUTPATIENT
Start: 2021-03-12 | End: 2021-03-12

## 2021-03-12 RX ORDER — ATORVASTATIN CALCIUM 40 MG/1
40 TABLET, FILM COATED ORAL NIGHTLY
Status: DISCONTINUED | OUTPATIENT
Start: 2021-03-12 | End: 2021-03-13 | Stop reason: HOSPADM

## 2021-03-12 RX ORDER — TRAMADOL HYDROCHLORIDE 50 MG/1
100 TABLET ORAL EVERY 6 HOURS PRN
Status: DISCONTINUED | OUTPATIENT
Start: 2021-03-12 | End: 2021-03-13 | Stop reason: HOSPADM

## 2021-03-12 RX ORDER — DEXTROSE MONOHYDRATE 25 G/50ML
12.5 INJECTION, SOLUTION INTRAVENOUS PRN
Status: DISCONTINUED | OUTPATIENT
Start: 2021-03-12 | End: 2021-03-13 | Stop reason: HOSPADM

## 2021-03-12 RX ORDER — PROPOFOL 10 MG/ML
INJECTION, EMULSION INTRAVENOUS CONTINUOUS PRN
Status: DISCONTINUED | OUTPATIENT
Start: 2021-03-12 | End: 2021-03-12 | Stop reason: SDUPTHER

## 2021-03-12 RX ORDER — DEXTROSE MONOHYDRATE 50 MG/ML
100 INJECTION, SOLUTION INTRAVENOUS PRN
Status: DISCONTINUED | OUTPATIENT
Start: 2021-03-12 | End: 2021-03-13 | Stop reason: HOSPADM

## 2021-03-12 RX ORDER — SODIUM CHLORIDE 0.9 % (FLUSH) 0.9 %
10 SYRINGE (ML) INJECTION PRN
Status: DISCONTINUED | OUTPATIENT
Start: 2021-03-12 | End: 2021-03-12

## 2021-03-12 RX ORDER — SODIUM CHLORIDE 0.9 % (FLUSH) 0.9 %
10 SYRINGE (ML) INJECTION PRN
Status: DISCONTINUED | OUTPATIENT
Start: 2021-03-12 | End: 2021-03-13 | Stop reason: HOSPADM

## 2021-03-12 RX ORDER — CEFAZOLIN SODIUM 2 G/50ML
2000 SOLUTION INTRAVENOUS EVERY 8 HOURS
Status: COMPLETED | OUTPATIENT
Start: 2021-03-12 | End: 2021-03-13

## 2021-03-12 RX ORDER — SODIUM CHLORIDE 9 MG/ML
INJECTION, SOLUTION INTRAVENOUS CONTINUOUS
Status: DISCONTINUED | OUTPATIENT
Start: 2021-03-12 | End: 2021-03-13 | Stop reason: HOSPADM

## 2021-03-12 RX ORDER — SODIUM CHLORIDE 0.9 % (FLUSH) 0.9 %
10 SYRINGE (ML) INJECTION EVERY 12 HOURS SCHEDULED
Status: DISCONTINUED | OUTPATIENT
Start: 2021-03-12 | End: 2021-03-12

## 2021-03-12 RX ORDER — CEFAZOLIN SODIUM 1 G/3ML
2000 INJECTION, POWDER, FOR SOLUTION INTRAMUSCULAR; INTRAVENOUS ONCE
Status: DISCONTINUED | OUTPATIENT
Start: 2021-03-12 | End: 2021-03-13 | Stop reason: HOSPADM

## 2021-03-12 RX ADMIN — Medication 10 ML: at 21:15

## 2021-03-12 RX ADMIN — FENTANYL CITRATE 50 MCG: 50 INJECTION, SOLUTION INTRAMUSCULAR; INTRAVENOUS at 13:54

## 2021-03-12 RX ADMIN — FENTANYL CITRATE 50 MCG: 50 INJECTION, SOLUTION INTRAMUSCULAR; INTRAVENOUS at 14:02

## 2021-03-12 RX ADMIN — CETIRIZINE HYDROCHLORIDE 5 MG: 10 TABLET, FILM COATED ORAL at 18:14

## 2021-03-12 RX ADMIN — SODIUM CHLORIDE: 9 INJECTION, SOLUTION INTRAVENOUS at 18:00

## 2021-03-12 RX ADMIN — PROPOFOL 25 MCG/KG/MIN: 10 INJECTION, EMULSION INTRAVENOUS at 13:38

## 2021-03-12 RX ADMIN — SODIUM CHLORIDE, SODIUM LACTATE, POTASSIUM CHLORIDE, AND CALCIUM CHLORIDE: .6; .31; .03; .02 INJECTION, SOLUTION INTRAVENOUS at 13:26

## 2021-03-12 RX ADMIN — FUROSEMIDE 80 MG: 80 TABLET ORAL at 18:05

## 2021-03-12 RX ADMIN — POTASSIUM CHLORIDE 10 MEQ: 750 TABLET, EXTENDED RELEASE ORAL at 21:15

## 2021-03-12 RX ADMIN — CEFAZOLIN SODIUM 2000 MG: 2 SOLUTION INTRAVENOUS at 21:55

## 2021-03-12 RX ADMIN — ATORVASTATIN CALCIUM 40 MG: 40 TABLET, FILM COATED ORAL at 21:14

## 2021-03-12 RX ADMIN — AMIODARONE HYDROCHLORIDE 200 MG: 200 TABLET ORAL at 18:05

## 2021-03-12 RX ADMIN — Medication 10 ML: at 18:01

## 2021-03-12 RX ADMIN — FAMOTIDINE 20 MG: 20 TABLET, FILM COATED ORAL at 18:05

## 2021-03-12 RX ADMIN — INSULIN GLARGINE 16 UNITS: 100 INJECTION, SOLUTION SUBCUTANEOUS at 21:15

## 2021-03-12 RX ADMIN — INSULIN LISPRO 3 UNITS: 100 INJECTION, SOLUTION INTRAVENOUS; SUBCUTANEOUS at 18:15

## 2021-03-12 ASSESSMENT — PULMONARY FUNCTION TESTS
PIF_VALUE: 0
PIF_VALUE: 1
PIF_VALUE: 0
PIF_VALUE: 1
PIF_VALUE: 0
PIF_VALUE: 1
PIF_VALUE: 1
PIF_VALUE: 0
PIF_VALUE: 1
PIF_VALUE: 0
PIF_VALUE: 1
PIF_VALUE: 0
PIF_VALUE: 1
PIF_VALUE: 0
PIF_VALUE: 0
PIF_VALUE: 2
PIF_VALUE: 0
PIF_VALUE: 1
PIF_VALUE: 0
PIF_VALUE: 1
PIF_VALUE: 0
PIF_VALUE: 0
PIF_VALUE: 1
PIF_VALUE: 0

## 2021-03-12 ASSESSMENT — LIFESTYLE VARIABLES: SMOKING_STATUS: 0

## 2021-03-12 ASSESSMENT — PAIN SCALES - GENERAL: PAINLEVEL_OUTOF10: 0

## 2021-03-12 NOTE — ANESTHESIA POSTPROCEDURE EVALUATION
Department of Anesthesiology  Postprocedure Note    Patient: Kylee Gutierrez  MRN: [id number]  YOB: 1950  Date of evaluation: 3/12/2021  Time:  4:20 PM     Procedure Summary     Date: 03/12/21 Room / Location: [facility] Cath Lab    Anesthesia Start: 8428 Anesthesia Stop: 4467    Procedure: CATH LAB WITH ANESTHESIA Diagnosis: Ischemic cardiomyopathy    Scheduled Providers: EVELYN Lynn - CRNA; April Tucker DO Responsible Provider: April Tucker DO    Anesthesia Type: MAC ASA Status: 4          Anesthesia Type: MAC    Esmer Phase I:      Esmer Phase II:      Last vitals: Reviewed and per EMR flowsheets.        Anesthesia Post Evaluation    Patient location during evaluation: PACU  Patient participation: complete - patient participated  Level of consciousness: awake and alert  Pain score: 0  Airway patency: patent  Nausea & Vomiting: no nausea and no vomiting  Complications: no  Cardiovascular status: hemodynamically stable  Respiratory status: acceptable  Hydration status: stable

## 2021-03-12 NOTE — H&P
Patient's History and Physical from February 16, by me was reviewed. Patient examined. There has been no change in the plan. Surgical site verified.        Krunal Estrada MD   Cardiac Electrophysiology  [address] [place] [address] [phone]  [facility]

## 2021-03-12 NOTE — PROCEDURES
[address]     Electrophysiology Procedure Note       Date of Procedure: 3/12/2021  Patient's Name: Jordan Perez  YOB: 1950   Medical Record Number: [id number]  Procedure Performed by: Keon Mackay MD    Procedures performed:    · Selective venography of left upper extremity. · Insertion of MRI compatible right ventricular defibrillator lead under fluoroscopy. · Insertion of MRI compatible right atrial lead under fluoroscopy  · Implantation of a MRI compatible Medtronic ICD. · Electronic analysis of lead and device. · Anesthesia: Local and Monitored Anesthesia Care, as per anesthesia colleagues  · Mallampati airway assessment class: I  · ASA class: 1  · Estimated blood loss less than 20 cc      Indication of the procedure:    Jordan Perez is a 78 y.o. male with ischemic cardiomyopathy, LVEF thirty%, NYHA Class 2, QRS duration ninety-six ms who has been on guideline-directed medical therapy for more than three months and with patient prognosis of life expectancy of > 1 year. He have a history of longstanding persistent atrial fibrillation and hence, I recommended dual-chamber ICD implantation. Details of procedure: The patient was brought to the electrophysiology laboratory in stable condition. The patient was in a fasting and non-sedated state. The risks, benefits and alternatives of the procedure were discussed with the patient. The risks including, but not limited to, the risks of vascular injury, bleeding, infection, device malfunction, lead dislodgement, radiation exposure, injury to cardiac and surrounding structures (including pneumothorax), stroke, myocardial infarction and death were discussed in detail. The patient opted to proceed with the device implantation. Written informed consent was signed and placed in the chart. Prophylactic antibiotic using Ancef 2 g IV was given. The patient was prepped and draped in sterile fashion.  A timeout protocol was completed to identify the patient and the procedure being performed. IV sedation was provided with IV Versed and IV Fentanyl. The patient was monitored continuously with ECG, pulse oximetry, blood pressure monitoring, and direct observation. An incision was made in the left upper pectoral area in a transverse line roughly 1 cm from the clavicle after administration of lidocaine/bupivicaine combination. Using electrocautery and blunt dissection, a pocket was created. Central venous access into the left extrathoracic subclavian vein was obtained using the modified Seldinger technique. After central venous access was obtained, a sheath was placed in the axillary vein. A right ventricular lead was advanced into the RV apical septal position under fluoroscopic guidance and using a series of curved and straight stylets. The lead was actively fixated. After confirming appropriate function and no diaphragmatic stimulation at maximum output, the sheath was split and removed. The lead was secured to the underlying tissue using suture material.      A new sheath was advanced over a second previously placed wire into the vein. The atrial lead was advanced to the right atrial appendage and actively fixated under fluoroscopic guidance. After confirming appropriate function and no diaphragmatic stimulation at maximum output, the sheath was split and removed. The lead was secured to the underlying tissue using suture. The leads were then connected to the new ICD pulse generator which was then placed into the cleaned pocket. Copious amount (> 200 cc) of saline flush was used to irrigate the pocket. The pocket was then closed using a 2-0 Vicryl and 3-0 Vicryl subcutaneous layer and a subcuticular layer using 4-0 Vicryl. The skin was covered with pressure dressing. Defibrillation threshold testing was not performed. All sponge and needle counts were reported as correct at the end of the procedure.  The patient tolerated the procedure well and there were no complications. Post-sedation evaluation was completed. Patient was transported to the holding area in stable condition. Device and Leads information:          Impression:    Pre- and post-procedural diagnoses of ischemic cardiomyopathy with successful implantation of a Medtronic ICD. Plan:     The patient will be admitted and have usual post-implant care, including chest x-ray, and antibiotic therapy and interrogation of the device. If there are no complications, the patient will be discharged home tomorrow with a 1-week wound check with our clinic nurse and a 1 month follow-up with Ms. Tita Medellin    Thank you for allowing us to participate in the care of your patient. If you have any questions, please do not hesitate to contact me.     Aura Sicard MD   Cardiac Electrophysiology  [address] [place] [address] [phone]  [employer]

## 2021-03-12 NOTE — ANESTHESIA PRE PROCEDURE
Department of Anesthesiology  Preprocedure Note       Name:  Jordan Perez   Age:  78 y.o.  :  1950                                          MRN:  [id number]         Date:  3/12/2021      Surgeon: * Surgery not found *    Procedure:     Medications prior to admission:   Prior to Admission medications    Medication Sig Start Date End Date Taking? Authorizing Provider   levocetirizine (XYZAL) 5 MG tablet Take 5 mg by mouth nightly   Yes Historical Provider, MD   aspirin 81 MG chewable tablet Take 1 tablet by mouth daily 3/24/20  Yes Dallie Cockayne, MD   amiodarone (CORDARONE) 200 MG tablet Take 2 tablets by mouth twice daily for four days then 1 tablet daily 3/13/20  Yes Salvatore Asencio MD   atorvastatin (LIPITOR) 40 MG tablet Take 1 tablet by mouth nightly 3/13/20  Yes Salvatore Asencio MD   famotidine (PEPCID) 20 MG tablet Take 1 tablet by mouth daily 3/13/20  Yes Salvatore Asencio MD   furosemide (LASIX) 80 MG tablet Take 1 tablet by mouth 2 times daily 3/13/20  Yes Salvatore Asencio MD   insulin glargine (LANTUS;BASAGLAR) 100 UNIT/ML injection pen Inject 16 Units into the skin daily 3/13/20  Yes Salvatore Asencio MD   insulin lispro, 1 Unit Dial, 100 UNIT/ML SOPN Inject 3 Units into the skin 3 times daily (with meals) 3/13/20  Yes Salvatore Asencio MD   metoprolol tartrate (LOPRESSOR) 25 MG tablet Take 0.25 tablets by mouth 2 times daily 3/13/20  Yes Salvatore Asencio MD   potassium chloride (KLOR-CON M) 10 MEQ extended release tablet Take 1 tablet by mouth 2 times daily 3/13/20  Yes Salvatore Asencio MD   apixaban (ELIQUIS) 5 MG TABS tablet Take 1 tablet by mouth 2 times daily 3/13/20  Yes Salvatore Asencio MD   nystatin (MYCOSTATIN) 275725 UNIT/GM cream Apply topically 2 times daily. 3/13/20   Salvatore Asencio MD   Handicap Placard MISC by Does not apply route Pt cannot walk more that 250 feet without becoming SOB.   Dx: CAD, Cardiomyopathy, Atrial Fibrilation    Not to exceed 5 years 20   Dallie Cockayne, MD Current medications:    Current Outpatient Medications   Medication Sig Dispense Refill    levocetirizine (XYZAL) 5 MG tablet Take 5 mg by mouth nightly      aspirin 81 MG chewable tablet Take 1 tablet by mouth daily 30 tablet 3    amiodarone (CORDARONE) 200 MG tablet Take 2 tablets by mouth twice daily for four days then 1 tablet daily 90 tablet 0    atorvastatin (LIPITOR) 40 MG tablet Take 1 tablet by mouth nightly 30 tablet 0    famotidine (PEPCID) 20 MG tablet Take 1 tablet by mouth daily 60 tablet 3    furosemide (LASIX) 80 MG tablet Take 1 tablet by mouth 2 times daily 60 tablet 3    insulin glargine (LANTUS;BASAGLAR) 100 UNIT/ML injection pen Inject 16 Units into the skin daily 5 pen 3    insulin lispro, 1 Unit Dial, 100 UNIT/ML SOPN Inject 3 Units into the skin 3 times daily (with meals) 1 pen 0    metoprolol tartrate (LOPRESSOR) 25 MG tablet Take 0.25 tablets by mouth 2 times daily 60 tablet 3    potassium chloride (KLOR-CON M) 10 MEQ extended release tablet Take 1 tablet by mouth 2 times daily 60 tablet 3    apixaban (ELIQUIS) 5 MG TABS tablet Take 1 tablet by mouth 2 times daily 60 tablet 5    nystatin (MYCOSTATIN) 717941 UNIT/GM cream Apply topically 2 times daily. 1 Tube 0    Handicap Placard MISC by Does not apply route Pt cannot walk more that 250 feet without becoming SOB.   Dx: CAD, Cardiomyopathy, Atrial Fibrilation    Not to exceed 5 years 1 each 0     Current Facility-Administered Medications   Medication Dose Route Frequency Provider Last Rate Last Admin    sodium chloride flush 0.9 % injection 10 mL  10 mL Intravenous 2 times per day Mary [place], DO        sodium chloride flush 0.9 % injection 10 mL  10 mL Intravenous PRN Mary [place], DO        0.9 % sodium chloride infusion   Intravenous Continuous Mary [place], DO        lactated ringers infusion   Intravenous Continuous Mary [place], DO        0.9 % sodium chloride infusion   Intravenous Continuous Shanna Morrow Comment: rare                                Counseling given: Not Answered      Vital Signs (Current): There were no vitals filed for this visit. BP Readings from Last 3 Encounters:   03/10/21 124/70   02/25/21 132/74   12/11/20 128/80       NPO Status: Time of last liquid consumption: 2300                        Time of last solid consumption: 2300                                                      BMI:   Wt Readings from Last 3 Encounters:   03/10/21 213 lb (96.6 kg)   02/25/21 271 lb (122.9 kg)   12/11/20 268 lb (121.6 kg)     There is no height or weight on file to calculate BMI.    CBC:   Lab Results   Component Value Date    WBC 8.3 03/12/2021    RBC 5.03 03/12/2021    HGB 14.8 03/12/2021    HCT 45.0 03/12/2021    MCV 89.5 03/12/2021    RDW 13.7 03/12/2021     03/12/2021       CMP:   Lab Results   Component Value Date     03/12/2021    K 4.3 03/12/2021    K 4.1 03/13/2020     03/12/2021    CO2 23 03/12/2021    BUN 26 03/12/2021    CREATININE 1.2 03/12/2021    GFRAA >60 03/12/2021    AGRATIO 1.0 02/26/2020    LABGLOM >60 03/12/2021    GLUCOSE 193 03/12/2021    PROT 7.3 02/26/2020    CALCIUM 9.0 03/12/2021    BILITOT 0.6 02/26/2020    ALKPHOS 63 02/26/2020    AST 13 02/26/2020    ALT 12 02/26/2020       POC Tests: No results for input(s): POCGLU, POCNA, POCK, POCCL, POCBUN, POCHEMO, POCHCT in the last 72 hours.     Coags:   Lab Results   Component Value Date    PROTIME 13.1 03/12/2021    INR 1.13 03/12/2021    APTT 37.2 03/02/2020       HCG (If Applicable): No results found for: PREGTESTUR, PREGSERUM, HCG, HCGQUANT     ABGs:   Lab Results   Component Value Date    PHART 7.367 03/02/2020    PO2ART 84.2 03/02/2020    MAR6XZI 40.8 03/02/2020    HLH6ORA 23.4 03/02/2020    BEART -2 03/02/2020    L8NZVKCI 96 03/02/2020        Type & Screen (If Applicable):  No results found for: LABABO, LABRH    Drug/Infectious Status (If Applicable):  No results found for: HIV, HEPCAB    COVID-19 Screening (If Applicable):   Lab Results   Component Value Date    COVID19 Not Detected 03/08/2021         Anesthesia Evaluation  Patient summary reviewed and Nursing notes reviewed no history of anesthetic complications:   Airway: Mallampati: II  TM distance: >3 FB   Neck ROM: full  Mouth opening: > = 3 FB Dental:          Pulmonary: breath sounds clear to auscultation  (+) sleep apnea (probable):      (-) not a current smoker (never )                           Cardiovascular:  Exercise tolerance: good (>4 METS),   (+) CABG/stent (3V CABG 3/2020):, dysrhythmias: atrial fibrillation,     (-)  angina    NYHA Classification: II  ECG reviewed  Rhythm: irregular  Rate: normal           Beta Blocker:  Dose within 24 Hrs      ROS comment: EF 20-25%     Neuro/Psych:                ROS comment: Diabetic neuropathy  GI/Hepatic/Renal:   (+) morbid obesity     (-) GERD       Endo/Other:    (+) DiabetesType II DM, well controlled, using insulin, . Abdominal:   (+) obese,         Vascular:                                        Anesthesia Plan      MAC     ASA 4       Induction: intravenous. MIPS: Prophylactic antiemetics administered. Anesthetic plan and risks discussed with patient. Plan discussed with CRNA.     Attending anesthesiologist reviewed and agrees with DO Nestor   3/12/2021

## 2021-03-12 NOTE — PRE SEDATION
Sedation Pre-Procedure Note    Patient Name: Jeanine Denis   [facility]  Room/Bed: Room/bed info not found  Medical Record Number: [id number]  Date: 3/12/2021   Time: 3:38 PM       Indication: ICD implant    Consent: I have discussed with the patient and/or the patient representative the indication, alternatives, and the possible risks and/or complications of the planned procedure and the anesthesia methods. The patient and/or patient representative appear to understand and agree to proceed. Vital Signs: There were no vitals filed for this visit. Past Medical History:   has a past medical history of A-fib ([facility] [address] CAD, multiple vessel, Diabetes mellitus ([facility] [address] Enlarged prostate, Environmental allergies, and Kidney stones. Past Surgical History:   has a past surgical history that includes Cardiac catheterization (12/10/2019); Nasal polyp surgery (x2); Nasal septum surgery; and Coronary artery bypass graft (N/A, 3/2/2020). Medications:   Scheduled Meds:    sodium chloride flush  10 mL Intravenous 2 times per day    sodium chloride flush  10 mL Intravenous 2 times per day    ceFAZolin  2,000 mg Intravenous Once     Continuous Infusions:    sodium chloride      lactated ringers      sodium chloride       PRN Meds: sodium chloride flush, sodium chloride flush, iohexol  Home Meds:   Prior to Admission medications    Medication Sig Start Date End Date Taking?  Authorizing Provider   levocetirizine (XYZAL) 5 MG tablet Take 5 mg by mouth nightly   Yes Historical Provider, MD   aspirin 81 MG chewable tablet Take 1 tablet by mouth daily 3/24/20  Yes Reymundo Gomez MD   amiodarone (CORDARONE) 200 MG tablet Take 2 tablets by mouth twice daily for four days then 1 tablet daily 3/13/20  Yes Karen Alonso MD   atorvastatin (LIPITOR) 40 MG tablet Take 1 tablet by mouth nightly 3/13/20  Yes Karen Alonso MD   famotidine (PEPCID) 20 MG tablet Take 1 tablet by mouth daily 3/13/20  Yes Karen Alonso MD   furosemide (LASIX) 80 MG tablet Take 1 tablet by mouth 2 times daily 3/13/20  Yes Luis Mcnulty MD   insulin glargine (LANTUS;BASAGLAR) 100 UNIT/ML injection pen Inject 16 Units into the skin daily 3/13/20  Yes Luis Mcnulty MD   insulin lispro, 1 Unit Dial, 100 UNIT/ML SOPN Inject 3 Units into the skin 3 times daily (with meals) 3/13/20  Yes Luis Mcnulty MD   metoprolol tartrate (LOPRESSOR) 25 MG tablet Take 0.25 tablets by mouth 2 times daily 3/13/20  Yes Luis Mcnulty MD   potassium chloride (KLOR-CON M) 10 MEQ extended release tablet Take 1 tablet by mouth 2 times daily 3/13/20  Yes Luis Mcnulty MD   apixaban (ELIQUIS) 5 MG TABS tablet Take 1 tablet by mouth 2 times daily 3/13/20  Yes Luis Mcnulty MD   nystatin (MYCOSTATIN) 618938 UNIT/GM cream Apply topically 2 times daily. 3/13/20   Luis Mcnulty MD   Handicacarina Gil MISC by Does not apply route Pt cannot walk more that 250 feet without becoming SOB. Dx: CAD, Cardiomyopathy, Atrial Fibrilation    Not to exceed 5 years 1/28/20   Mara Willingham MD     Coumadin Use Last 7 Days: No  Antiplatelet drug therapy use last 7 days: no  Other anticoagulant use last 7 days: yes -Eliquis  Additional Medication Information: None      Pre-Sedation Documentation and Exam:   NoneI have personally completed a history, physical exam & review of systems for this patient (see notes). Vital signs have been reviewed (see flow sheet for vitals).     Mallampati Airway Assessment:  Mallampati Class I - (soft palate, fauces, uvula & anterior/posterior tonsillar pillars are visible)    Prior History of Anesthesia Complications:   none    ASA Classification:  Class 2 - A normal healthy patient with mild systemic disease    Sedation/ Anesthesia Plan:   intravenous sedation    Medications Planned:   propofol intravenously    Patient is an appropriate candidate for plan of sedation: yes    Electronically signed by Romulo Aburto MD on 3/12/2021 at 3:38 PM

## 2021-03-13 ENCOUNTER — APPOINTMENT (OUTPATIENT)
Dept: GENERAL RADIOLOGY | Age: 71
End: 2021-03-13
Attending: INTERNAL MEDICINE
Payer: MEDICAID

## 2021-03-13 VITALS
SYSTOLIC BLOOD PRESSURE: 133 MMHG | HEIGHT: 67 IN | HEART RATE: 89 BPM | RESPIRATION RATE: 20 BRPM | DIASTOLIC BLOOD PRESSURE: 82 MMHG | BODY MASS INDEX: 33.43 KG/M2 | TEMPERATURE: 97.1 F | OXYGEN SATURATION: 96 % | WEIGHT: 213 LBS

## 2021-03-13 PROBLEM — Z95.810 AICD (AUTOMATIC CARDIOVERTER/DEFIBRILLATOR) PRESENT: Status: ACTIVE | Noted: 2021-03-13

## 2021-03-13 LAB
GLUCOSE BLD-MCNC: 147 MG/DL (ref 70–99)
PERFORMED ON: ABNORMAL

## 2021-03-13 PROCEDURE — 6360000002 HC RX W HCPCS: Performed by: INTERNAL MEDICINE

## 2021-03-13 PROCEDURE — 71046 X-RAY EXAM CHEST 2 VIEWS: CPT

## 2021-03-13 PROCEDURE — 6370000000 HC RX 637 (ALT 250 FOR IP): Performed by: INTERNAL MEDICINE

## 2021-03-13 PROCEDURE — G0378 HOSPITAL OBSERVATION PER HR: HCPCS

## 2021-03-13 PROCEDURE — 99217 PR OBSERVATION CARE DISCHARGE MANAGEMENT: CPT | Performed by: NURSE PRACTITIONER

## 2021-03-13 RX ADMIN — INSULIN LISPRO 3 UNITS: 100 INJECTION, SOLUTION INTRAVENOUS; SUBCUTANEOUS at 08:20

## 2021-03-13 RX ADMIN — ASPIRIN 81 MG: 81 TABLET, CHEWABLE ORAL at 08:18

## 2021-03-13 RX ADMIN — CEFAZOLIN SODIUM 2000 MG: 2 SOLUTION INTRAVENOUS at 07:00

## 2021-03-13 RX ADMIN — FUROSEMIDE 80 MG: 80 TABLET ORAL at 08:18

## 2021-03-13 RX ADMIN — POTASSIUM CHLORIDE 10 MEQ: 750 TABLET, EXTENDED RELEASE ORAL at 09:14

## 2021-03-13 RX ADMIN — FAMOTIDINE 20 MG: 20 TABLET, FILM COATED ORAL at 08:18

## 2021-03-13 RX ADMIN — AMIODARONE HYDROCHLORIDE 200 MG: 200 TABLET ORAL at 08:19

## 2021-03-13 RX ADMIN — CETIRIZINE HYDROCHLORIDE 5 MG: 10 TABLET, FILM COATED ORAL at 08:18

## 2021-03-13 NOTE — CARE COORDINATION
Case Management Assessment            Discharge Note                    Date / Time of Note: 3/13/2021 10:30 AM                  Discharge Note Completed by: Shaneka Kate    Patient Name: Miguelangel Marrero   YOB: 1950  Diagnosis: Ischemic cardiomyopathy [I25.5]   Date / Time: 3/12/2021  4:27 PM    Current PCP: No primary care provider on file. Clinic patient: No    Hospitalization in the last 30 days: No    Advance Directives:  Code Status: Full Code  [place] DNR form completed and on chart: No    Financial:  Payor: Stuart Dunn / Plan: Raven Kenyon / Product Type: *No Product type* /      Pharmacy:    [address] [place] [address] 68, [address] [facility] [address] Isaias Abdul  Phone: [phone] Fax: [phone]      Assistance purchasing medications?:    Assistance provided by Case Management: None at this time    Does patient want to participate in local refill/ meds to beds program?:      Meds To Beds General Rules:  1. Can ONLY be done Monday- Friday between 8:30am-5pm  2. Prescription(s) must be in pharmacy by 3pm to be filled same day  3. Copy of patient's insurance/ prescription drug card and patient face sheet must be sent along with the prescription(s)  4. Cost of Rx cannot be added to hospital bill. If financial assistance is needed, please contact unit  or ;  or  CANNOT provide pharmacy voucher for patients co-pays  5.  Patients can then  the prescription on their way out of the hospital at discharge, or pharmacy can deliver to the bedside if staff is available. (payment due at time of pick-up or delivery - cash, check, or card accepted)     Able to afford home medications/ co-pay costs: No    ADLS:  Current PT AM-PAC Score:   /24  Current OT AM-PAC Score:   /24      DISCHARGE Disposition: Home- No Services Needed    LOC at discharge: Not Applicable  SAMINA Completed: Not Indicated    Notification completed in HENS/PAS?:  Not Applicable    IMM Completed:   Not Indicated    Transportation:  Transportation PLAN for discharge: family   Mode of Transport: [facility]  ordered at discharge: Not [address]: Not Applicable  Orders faxed: No    Durable Medical Equipment:  DME Provider: none  Equipment obtained during hospitalization:     Home Oxygen and Respiratory Equipment:  Oxygen needed at discharge?: Not [address]: Not Applicable  Portable tank available for discharge?: Not Indicated    Dialysis:  Dialysis patient: No    Dialysis Center:  Not Applicable      Additional CM Notes: Pt from home ind with family support will return home no needs. The Plan for Transition of Care is related to the following treatment goals of Ischemic cardiomyopathy [I25.5]    The Patient and/or patient representative [facility] and his family were provided with a choice of provider and agrees with the discharge plan Yes    Freedom of choice list was provided with basic dialogue that supports the patient's individualized plan of care/goals and shares the quality data associated with the providers.  Not Indicated    Care Transitions patient: No    Nadya Mauricio RN  [facility]  Case Management Department  Ph: [phone]  Fax: [phone]

## 2021-03-13 NOTE — DISCHARGE SUMMARY
EP Discharge Summary  MD Krunal Dover MD Colie Edward CNP  3/13/21    Patient :Roberto Martin  Room/Bed: 9393/7765-86  Medical Record: [id number]  YOB: 1950    Admit date: 3/12/2021  Discharge date and time: 03/13/21     Admitting Physician: Anna Marie Mac MD   Discharge Physician: Anna Marie Mac MD   [address] [place] [address]    Admission Diagnoses: Ischemic cardiomyopathy [I25.5]  Discharge Diagnoses: CM    Discharged Condition: good   Hospital Course: pt admitted for AICD  with CAD/CABG/afib/cardiomyop/abn ekg  Alert calm pleasant no c/o cp/SOB/edema PND   On RA SV02  96%  / VSS / HR sinus   Chest xray 3/13/2021:  Left-sided ICD visualized in appropriate location.  No pneumothorax seen      Discharge Medications:   Current Discharge Medication List      CONTINUE these medications which have NOT CHANGED    Details   levocetirizine (XYZAL) 5 MG tablet Take 5 mg by mouth nightly      aspirin 81 MG chewable tablet Take 1 tablet by mouth daily  Qty: 30 tablet, Refills: 3      amiodarone (CORDARONE) 200 MG tablet Take 2 tablets by mouth twice daily for four days then 1 tablet daily  Qty: 90 tablet, Refills: 0      atorvastatin (LIPITOR) 40 MG tablet Take 1 tablet by mouth nightly  Qty: 30 tablet, Refills: 0      famotidine (PEPCID) 20 MG tablet Take 1 tablet by mouth daily  Qty: 60 tablet, Refills: 3      furosemide (LASIX) 80 MG tablet Take 1 tablet by mouth 2 times daily  Qty: 60 tablet, Refills: 3      insulin glargine (LANTUS;BASAGLAR) 100 UNIT/ML injection pen Inject 16 Units into the skin daily  Qty: 5 pen, Refills: 3      insulin lispro, 1 Unit Dial, 100 UNIT/ML SOPN Inject 3 Units into the skin 3 times daily (with meals)  Qty: 1 pen, Refills: 0      metoprolol tartrate (LOPRESSOR) 25 MG tablet Take 0.25 tablets by mouth 2 times daily  Qty: 60 tablet, Refills: 3      potassium chloride (KLOR-CON M) 10 MEQ extended release tablet Take 1 tablet by mouth 2 times daily  Qty: 60 tablet, Refills: 3      apixaban (ELIQUIS) 5 MG TABS tablet Take 1 tablet by mouth 2 times daily  Qty: 60 tablet, Refills: 5      nystatin (MYCOSTATIN) 199850 UNIT/GM cream Apply topically 2 times daily. Qty: 1 Tube, Refills: 0      Handicap Placard MISC by Does not apply route Pt cannot walk more that 250 feet without becoming SOB. Dx: CAD, Cardiomyopathy, Atrial Fibrilation    Not to exceed 5 years  Qty: 1 each, Refills: 0           Per Dr Rodriguez Ricardo is a 78 y.o. male with ischemic cardiomyopathy, LVEF thirty%, NYHA Class 2, QRS duration ninety-six ms who has been on guideline-directed medical therapy for more than three months and with patient prognosis of life expectancy of > 1 year. He have a history of longstanding persistent atrial fibrillation and hence, I recommended dual-chamber ICD implantation. · 3/12/2021 Insertion of MRI compatible right ventricular defibrillator lead under fluoroscopy. · Insertion of MRI compatible right atrial lead under fluoroscopy  Implantation of a MRI compatible Medtronic ICD. Discharge Exam:wnl   VSS lungs clear no edema noted DSD intact   Disposition:  home    Patient Instructions: Activity: See discharge instructions,  Diet: cardiac diet. Wound Care: See discharge instructions. Follow-up with [facility] as planned in approx a week and with   Dr. Emerita Keys  in a month      appt at [facility] scheduled appointment. Call [phone] with any questions.     AICD  MDT interrogation   wnl one at/af episode burden 64.5% duration 12 hours max v rate 150   AP<0.01%  V paced <0.1%     Signed:  Lieutenant Sellers  3/13/2021  9:16 AM    Greater than 25 min

## 2021-03-13 NOTE — PLAN OF CARE
Problem: Cardiac Output - Decreased:  Goal: Hemodynamic stability will improve  Description: Hemodynamic stability will improve  Outcome: Ongoing   VSS throughout shift thus far. Denies CP, lightheadedness, or SOB. Remains atrial fibrillation on telemetry, -110s at rest. Skin warm and dry. Pulses WDL. S/p L chest PPM, dressing CDI.

## 2021-03-13 NOTE — PROGRESS NOTES
Pt provided after visit summarry with pertinent medications and follow up appointments; educated on site care and signs and symptoms that owuld necessitate earlier follow-up; pt arranged for pick-up; IV and telemetry removed; pt taken down to ride in wheel chair; discharged home.

## 2021-03-15 ENCOUNTER — CARE COORDINATION (OUTPATIENT)
Dept: CASE MANAGEMENT | Age: 71
End: 2021-03-15

## 2021-03-15 ENCOUNTER — NURSE ONLY (OUTPATIENT)
Dept: CARDIOLOGY CLINIC | Age: 71
End: 2021-03-15
Payer: MEDICAID

## 2021-03-15 DIAGNOSIS — Z95.810 ICD (IMPLANTABLE CARDIOVERTER-DEFIBRILLATOR) IN PLACE: ICD-10-CM

## 2021-03-15 DIAGNOSIS — I42.0 DILATED CARDIOMYOPATHY (HCC): ICD-10-CM

## 2021-03-15 DIAGNOSIS — I48.91 NEW ONSET A-FIB (HCC): ICD-10-CM

## 2021-03-15 DIAGNOSIS — I25.5 ISCHEMIC CARDIOMYOPATHY: ICD-10-CM

## 2021-03-15 DIAGNOSIS — Z95.810 AICD (AUTOMATIC CARDIOVERTER/DEFIBRILLATOR) PRESENT: Primary | ICD-10-CM

## 2021-03-15 DIAGNOSIS — I48.0 PAROXYSMAL ATRIAL FIBRILLATION (HCC): ICD-10-CM

## 2021-03-15 PROCEDURE — 93283 PRGRMG EVAL IMPLANTABLE DFB: CPT | Performed by: INTERNAL MEDICINE

## 2021-03-15 NOTE — CARE COORDINATION
Patient contacted regarding Sneha Liming. Care Transition Nurse contacted the patient by telephone to perform post discharge assessment. Call within 2 business days of discharge: Yes. Verified name and  with patient as identifiers. Provided introduction to self, and explanation of the CTN/ACM role, and reason for call due to risk factors for infection and/or exposure to COVID-19. Symptoms reviewed with patient who verbalized the following symptoms: no new symptoms and no worsening symptoms. Due to no new or worsening symptoms encounter was not routed to provider for escalation. Discussed follow-up appointments. If no appointment was previously scheduled, appointment scheduling offered: [facility] follow up appointment(s):   Future Appointments   Date Time Provider Rai Aburto   3/15/2021  2:30 PM Rush Po Edwardo Treze De Cyndy [address] [facility]   2021  1:30 PM SCHEDULE, Edwardo Clarke Correa Cyndy [address] [facility]   2021  2:00 PM EVELYN De La Paz - CNP [place] [facility]   2021  2:30 PM MD Fito [place] Card [facility]   2021 11:30 AM SCHEDULE, Shay Hawk DEVICE CHECK [place] [facility]   2021 11:45 AM Aftab Feliciano MD [place] [facility]       Non-face-to-face services provided:  Obtained and reviewed discharge summary and/or continuity of care documents  Education of patient/family/caregiver/guardian to support self-management-. Assessment and support for treatment adherence and medication management-. Advance Care Planning:   Does patient have an Advance Directive:  not on file. Patient has following risk factors of: diabetes and CAD, A-Fib. CTN reviewed discharge instructions, medical action plan and red flags such as increased shortness of breath, increasing fever and signs of decompensation with patient who verbalized understanding. Discussed exposure protocols and quarantine with CDC Guidelines What to do if you are sick with coronavirus disease 2019.  Patient was given an opportunity for questions and concerns. The patient agrees to contact the [facility] exposure line [phone], local [facility] department [place] Department of Health: ([phone]) and PCP office for questions related to their healthcare. CTN provided contact information for future needs. Reviewed and educated patient on any new and changed medications related to discharge diagnosis. Summary  CTN spoke with patient this am for initial COVID call. Patient states he is doing well, reports that he is not had any fever, chills, nausea, vomiting, chest pain, SOB or cough. No other issues or concerns at this time. Patient instructed to continue to monitor himself for any of the above s/s, reporting any that may present to MD immediately. Plan for follow up call in 7-14 days based on severity of symptoms and risk factors.     Thank Marilynn Conte RN  Care Transition Coordinator  Contact [facility]URE[facility]NOW:[phone]

## 2021-03-16 NOTE — PROGRESS NOTES
1 week wound and device check. (not a full week)  MD DC ICD implanted 3/12/21  Temp 97.1    Dressing carefully removed from left chest device site. Incision is well approximated. Old drainage noted on steri strips. Reviewed post op wound care and restrictions. Pt informed to call the office if he develops any redness, increased swelling, fever or drainage from site. Discussed cell phone usage and not carrying cell phone in left chest pocket proximal to device. Pt voiced an understanding. Patient stated home monitor was ordered during hospitalization. Discussed home monitoring. [employer] postcard given to patient. Normal device function. All testing appear wnl. 11 years to VELIA  Presenting AsVs @ 100-110bpm  AP <0.1%   <0.1% MVP on  AT/AF burden <0.1%  0 episodes  Reviewed device tones  Updated ID in   Follow up as scheduled.

## 2021-03-22 ENCOUNTER — NURSE ONLY (OUTPATIENT)
Dept: CARDIOLOGY CLINIC | Age: 71
End: 2021-03-22

## 2021-03-22 NOTE — LETTER
[address] [facility] [phone]  [facility]- [address] [phone]    Pacemaker[facility]Defibrillator Clinic          [birth year]        [address] [address]        Dear Katia Arenas    This letter is to inform you that we received the transmission from your monitor at home that checks your implanted heart device. The next date your monitor will automatically transmit will be 9/27. If your report needs attention we will notify you. Your device and monitor are wireless and most transmit cellularly, but please periodically check your monitor is still plugged in to the electrical outlet. If you still use the telephone land line to send please ensure the connection to the phone rika is secure. This will help to ensure successful automatic transmissions in the future. Also, the monitor needs to be close to you while sleeping at night. Please be aware that the remote device transmission sites are periodically monitored only during regular business hours during which simultaneous in-office device clinics are being run. If your transmission requires attention, we will contact you as soon as possible. Thank you.             [facility]

## 2021-03-29 ENCOUNTER — CARE COORDINATION (OUTPATIENT)
Dept: CASE MANAGEMENT | Age: 71
End: 2021-03-29

## 2021-03-29 NOTE — CARE COORDINATION
You Patient resolved from the Care Transitions episode on 03/29/2021    Patient/family has been provided the following resources and education related to COVID-19:                         Signs, symptoms and red flags related to COVID-19            CDC exposure and quarantine guidelines            [facility] exposure contact - [phone]            Contact for their local Department of Health                 Patient currently reports that the following symptoms have improved:  no new/worsening symptoms. No further outreach scheduled with this CTN/ACM. Episode of Care resolved. Patient has this CTN/ACM contact information if future needs arise.     Thank Shalonda Fatima RN  Care Transition Coordinator  Contact HGEDNY:[phone]

## 2021-04-14 ENCOUNTER — TELEPHONE (OUTPATIENT)
Dept: CARDIOLOGY CLINIC | Age: 71
End: 2021-04-14

## 2021-04-14 NOTE — TELEPHONE ENCOUNTER
Patient [facility] to [facility], and needs to find out how long to stop blood thinner. [facility] phone number is [phone]. Please call patient about this at [phone]. Thanks.

## 2021-04-16 NOTE — PROGRESS NOTES
[address]   Electrophysiology  Office Visit  Date: 4/22/2021    Chief Complaint   Patient presents with    Cardiomyopathy    Congestive Heart Failure    Atrial Fibrillation    Coronary Artery Disease       Cardiac HX: Tata Aguirre is a 78 y.o. man with a h/o DM, HTN, CAD, CABG (3/2020), follows w/ Dr. Seema Harrell, pAF, on Eliquis post Cabg, ICMP, EF 20-25%, s/p dual ch MDT ICD (3/12/2021, Dr. Justin Ortiz). Interval History/HPI: Patient is here for follow-up for ICMP, ICD management and paroxysmal AF. Patient underwent implantation of a dual-chamber Medtronic ICD on 3/12/2021. His left chest incision is healed well. Review of device today shows that he paces 0.4% of the time in the atrium, 0.1% of the time in the ventricle. He has had 2 AT/AF episodes with the longest lasting 40 hours in length for a total AF burden of 8.7%. He has been on 200 mg of amiodarone daily since his CABG in March 2020. Review of chart shows that he is due for screening labs. Heart rate is fast when he is in atrial fibrillation and today he presents in NSR with a heart rate of 90 bpm.  He is on atorvastatin for his coronary artery disease and he has concerns today in the office as he feels that it is causing some memory loss. He would like to come off this medication. He has not discussed this yet with Dr. Seema Harrell. He has not had any heart racing, palpitations or irregular heartbeats that he can tell. He has no chest pain, does note shortness of breath with exertion but denies PND orthopnea. He does have chronic lower extremity edema.     Home medications:   Current Outpatient Medications on File Prior to Visit   Medication Sig Dispense Refill    levocetirizine (XYZAL) 5 MG tablet Take 5 mg by mouth nightly      aspirin 81 MG chewable tablet Take 1 tablet by mouth daily 30 tablet 3    amiodarone (CORDARONE) 200 MG tablet Take 2 tablets by mouth twice daily for four days then 1 tablet daily 90 tablet 0    atorvastatin (LIPITOR) 40 MG tablet Take 1 tablet by mouth nightly 30 tablet 0    famotidine (PEPCID) 20 MG tablet Take 1 tablet by mouth daily 60 tablet 3    furosemide (LASIX) 80 MG tablet Take 1 tablet by mouth 2 times daily 60 tablet 3    insulin glargine (LANTUS;BASAGLAR) 100 UNIT/ML injection pen Inject 16 Units into the skin daily 5 pen 3    insulin lispro, 1 Unit Dial, 100 UNIT/ML SOPN Inject 3 Units into the skin 3 times daily (with meals) 1 pen 0    nystatin (MYCOSTATIN) 713769 UNIT/GM cream Apply topically 2 times daily. 1 Tube 0    potassium chloride (KLOR-CON M) 10 MEQ extended release tablet Take 1 tablet by mouth 2 times daily 60 tablet 3    apixaban (ELIQUIS) 5 MG TABS tablet Take 1 tablet by mouth 2 times daily 60 tablet 5    Handicap Placard MISC by Does not apply route Pt cannot walk more that 250 feet without becoming SOB. Dx: CAD, Cardiomyopathy, Atrial Fibrilation    Not to exceed 5 years 1 each 0    ibuprofen (ADVIL;MOTRIN) 600 MG tablet prn       No current facility-administered medications on file prior to visit. Past Medical History:   Diagnosis Date    A-fib [facility]) 2020    CAD, multiple vessel 12/2019    Diabetes mellitus ([facility]     diet controlled     Enlarged prostate     Environmental allergies     Kidney stones         Past Surgical History:   Procedure Laterality Date    CARDIAC CATHETERIZATION  12/10/2019    Dr. Scott Britton - severe multi-vessel disease    CORONARY ARTERY BYPASS GRAFT N/A 3/2/2020    WAYNE; TCPB; CABG x 3, LIMA to LAD w/ long segment endarterectomy (4 cm); bilateral pulmonary vein isolation; 40 mm Atriclip to L atril appendage; endoscopic L GSV harvest; ICNB x 5 levels bilat; sternal plate x 1 to manubrium performed by Romulo Shaw MD at [facility]  x2    NASAL SEPTUM SURGERY         Allergies   Allergen Reactions    Codeine Other (See Comments)     Seizures    Aspartame And Phenylalanine        Social History:  Reviewed.   reports that he has never smoked. He has never used smokeless tobacco. He reports previous alcohol use. He reports that he does not use drugs. Family History:  Reviewed. family history includes Alzheimer's Disease in his mother; Dementia in his mother; Diabetes in his paternal aunt; Heart Attack in his father. Review of System:    · Constitutional: No fevers, chills. · Eyes: No visual changes or diplopia. No scleral icterus. · ENT: No Headaches. No mouth sores or sore throat. · Cardiovascular: No for chest pain, Yes for dyspnea on exertion, No for palpitations or No for loss of consciousness. No cough, hemoptysis, No for pleuritic pain, or phlebitis. · Respiratory: No for cough or wheezing. No hematemesis. · Gastrointestinal: No abdominal pain, blood in stools. · Genitourinary: No dysuria, or hematuria. · Musculoskeletal: No gait disturbance,    · Integumentary: No rash or pruritis. · Neurological: No headache, change in muscle strength, numbness or tingling. · Psychiatric: No anxiety, or depression. · Endocrine: No temperature intolerance. No excessive thirst, fluid intake, or urination. · Hem/Lymph: No abnormal bruising or bleeding, blood clots or swollen lymph nodes. · Allergic/Immunologic: No nasal congestion or hives. Physical Examination:  Vitals:    04/22/21 1545   BP: 134/72   Pulse: 90         Wt Readings from Last 3 Encounters:   04/22/21 280 lb (127 kg)   03/12/21 213 lb (96.6 kg)   03/10/21 213 lb (96.6 kg)       · Constitutional: Oriented. No distress. · Head: Normocephalic and atraumatic. · Mouth/Throat: Oropharynx is clear and moist.   · Eyes: Conjunctivae clear without jaunduice. PERRL. · Neck: Neck supple. No rigidity. No JVD present. · Cardiovascular: Normal rate, regular rhythm, S1&S2. · Pulmonary/Chest: Bilateral respiratory sounds. No wheezes, No rhonchi. · Abdominal: Soft. Bowel sounds present. No distension, No tenderness. · Musculoskeletal: No tenderness.  No ([facility] 12/10/2019    [facility]fib [facility] 12/10/2019    S/P coronary angiogram 12/10/2019    Bladder obstruction 11/21/2019        Assessment:   1. Ischemic cardiomyopathy    2. Chronic systolic CHF (congestive heart failure) ([facility]    3. ICD (implantable cardioverter-defibrillator), dual, in situ    4. Paroxysmal atrial fibrillation (HCC)    5. On amiodarone therapy    6. Coronary artery disease involving native coronary artery of native heart without angina pectoris         Cardiac HX: Jose Armando Rivera is a 78 y.o. man with a h/o DM, HTN, CAD, CABG (3/2020), follows w/ Dr. Kim Angeles, pAF, on Eliquis post Cabg, ICMP, EF 20-25%, s/p dual ch MDT ICD (3/12/2021, Dr. Vandana Silva). [id number] 5. ICMP  - EF 20- 25%  - NYHA class II/III  - QRS 90  - On metoprolol 6.25 mg twice daily, will change to Toprol XL 25 mg QD  - S/p dual-chamber MDT ICD  - Device check today shows 0.4% AP, 0.1% , 2AT/AF episodes with the longest lasting 40 hours in length for a total burden of 8.7%, other parameters stable. - F/u in 4 weeks to uptitrate medication. AF   - In NSR -HR 90  - 2 AT/AF episodes on device with the longest lasting 40 hours in length  - On Eliquis 5mg BID - no s/s bleeding - continue  - On amio 200 mg QD(3/13/2020) - will check screening labs  - Changing BB to Toprol for better HR control  - Will check PFTs  - Amio level  - Sleep study  - ECG ordered and results personally reviewed     CAD  - No s/s  - On ASA, statin  - S/p CABG (2020)  - Follows with Dr. Kim Angeles  - Patient would like to come off statin d/t memory loss - will refer back to Dr. Kim Angeles    EF of 78-68%  ACEi for systolic HF  ASA and Statin for CAD  Anticoagulation for AF and heart failure  No Tobacco use. All questions and concerns were addressed to the patient/family. Alternatives to my treatment were discussed. The note was completed using EMR.  Every effort was made to ensure accuracy; however, inadvertent computerized transcription errors may be present. Patient received education regarding their diagnosis, treatment and medications while in the office today. Helen Ortiz [facility]      I  have spent > 40 minutes of face to face time directly with the patient/family with more than 50% spent counseling and coordinating care for this patient. , including treatment options and the side effects of medications

## 2021-04-22 ENCOUNTER — OFFICE VISIT (OUTPATIENT)
Dept: CARDIOLOGY CLINIC | Age: 71
End: 2021-04-22
Payer: MEDICAID

## 2021-04-22 ENCOUNTER — NURSE ONLY (OUTPATIENT)
Dept: CARDIOLOGY CLINIC | Age: 71
End: 2021-04-22
Payer: MEDICAID

## 2021-04-22 VITALS
WEIGHT: 280 LBS | HEART RATE: 90 BPM | BODY MASS INDEX: 43.95 KG/M2 | DIASTOLIC BLOOD PRESSURE: 72 MMHG | SYSTOLIC BLOOD PRESSURE: 134 MMHG | HEIGHT: 67 IN

## 2021-04-22 DIAGNOSIS — I48.91 NEW ONSET A-FIB (HCC): ICD-10-CM

## 2021-04-22 DIAGNOSIS — Z95.810 AICD (AUTOMATIC CARDIOVERTER/DEFIBRILLATOR) PRESENT: ICD-10-CM

## 2021-04-22 DIAGNOSIS — Z79.899 ON AMIODARONE THERAPY: ICD-10-CM

## 2021-04-22 DIAGNOSIS — I48.0 PAROXYSMAL ATRIAL FIBRILLATION (HCC): ICD-10-CM

## 2021-04-22 DIAGNOSIS — I50.22 CHRONIC SYSTOLIC CHF (CONGESTIVE HEART FAILURE) (HCC): ICD-10-CM

## 2021-04-22 DIAGNOSIS — I25.5 ISCHEMIC CARDIOMYOPATHY: ICD-10-CM

## 2021-04-22 DIAGNOSIS — I25.5 ISCHEMIC CARDIOMYOPATHY: Primary | ICD-10-CM

## 2021-04-22 DIAGNOSIS — I42.0 DILATED CARDIOMYOPATHY (HCC): ICD-10-CM

## 2021-04-22 DIAGNOSIS — Z95.810 ICD (IMPLANTABLE CARDIOVERTER-DEFIBRILLATOR) IN PLACE: ICD-10-CM

## 2021-04-22 DIAGNOSIS — I25.10 CORONARY ARTERY DISEASE INVOLVING NATIVE CORONARY ARTERY OF NATIVE HEART WITHOUT ANGINA PECTORIS: ICD-10-CM

## 2021-04-22 DIAGNOSIS — Z95.810 ICD (IMPLANTABLE CARDIOVERTER-DEFIBRILLATOR), DUAL, IN SITU: ICD-10-CM

## 2021-04-22 PROCEDURE — 1036F TOBACCO NON-USER: CPT | Performed by: NURSE PRACTITIONER

## 2021-04-22 PROCEDURE — 1123F ACP DISCUSS/DSCN MKR DOCD: CPT | Performed by: NURSE PRACTITIONER

## 2021-04-22 PROCEDURE — 4040F PNEUMOC VAC/ADMIN/RCVD: CPT | Performed by: NURSE PRACTITIONER

## 2021-04-22 PROCEDURE — 93283 PRGRMG EVAL IMPLANTABLE DFB: CPT | Performed by: INTERNAL MEDICINE

## 2021-04-22 PROCEDURE — 3017F COLORECTAL CA SCREEN DOC REV: CPT | Performed by: NURSE PRACTITIONER

## 2021-04-22 PROCEDURE — 99215 OFFICE O/P EST HI 40 MIN: CPT | Performed by: NURSE PRACTITIONER

## 2021-04-22 PROCEDURE — G8417 CALC BMI ABV UP PARAM F/U: HCPCS | Performed by: NURSE PRACTITIONER

## 2021-04-22 PROCEDURE — G8427 DOCREV CUR MEDS BY ELIG CLIN: HCPCS | Performed by: NURSE PRACTITIONER

## 2021-04-22 RX ORDER — IBUPROFEN 600 MG/1
TABLET ORAL
Status: ON HOLD | COMMUNITY
Start: 2021-04-21 | End: 2021-08-26

## 2021-04-22 RX ORDER — METOPROLOL SUCCINATE 25 MG/1
25 TABLET, EXTENDED RELEASE ORAL DAILY
Qty: 30 TABLET | Refills: 3 | Status: SHIPPED | OUTPATIENT
Start: 2021-04-22 | End: 2021-06-01 | Stop reason: SDUPTHER

## 2021-04-23 NOTE — PROGRESS NOTES
1 month device check and ov w/NPFW. MDT DC ICD implanted 3/12/21 by ASHLEY. Hx: DCM, HF, AF/AFL  Eliquis, amiodarone, furosemide, asa 81 mg, metoprolol. Incision is healing nicely. Normal device function  11 years to VELIA  All testing appear wnl. Presenting AsVs @ 85-90 bpm  AP <0.5%   0.1%  AT/AF burden 8.7%  2 at/af episodes. Recent 4/18. Longest x 40 hours (oac). V rates > 100 bpm.  PVC Singles increased from <0.1/hr to 0.4/hr  No changes. See paceart report under cardiology tab. Follow up accordingly after today's visit w/NPFW.     Discussed home monitor and automaticity verses manual.

## 2021-05-04 ENCOUNTER — TELEPHONE (OUTPATIENT)
Dept: CARDIOLOGY CLINIC | Age: 71
End: 2021-05-04

## 2021-05-04 NOTE — TELEPHONE ENCOUNTER
Mr. Nany Street was in to see F.W on 04/22/2021 he had  concerns that Atorvastatin may be affecting his memory and wanted to discuss this with Boy Ball when he returned from vacation. After speaking with  he agreed he could come off of the medication however if he didn't see any changes within 2-4 weeks he is to restart the medication. Pt informed.

## 2021-05-24 NOTE — PROGRESS NOTES
[address]   Electrophysiology  Office Visit  Date: 6/1/2021    Chief Complaint   Patient presents with    Atrial Fibrillation    Cardiomyopathy    Coronary Artery Disease    Congestive Heart Failure       Cardiac HX: Maksim Morse is a 73 y.o. man with a h/o DM, HTN, CAD, CABG (3/2020), follows w/ Dr. Ladarius Flynn, pAF, on Eliquis post Cabg, ICMP, EF 20-25%, s/p dual ch MDT ICD (3/12/2021, Dr. Elliott Ortiz). Interval History/HPI: Patient is here for f/u for ICMP, ICD mgmnt and pAF. Patient underwent implantation of a dual-chamber Medtronic ICD on 3/12/2021. Review of device today shows he is AP 1.8%,  0.3%. He had 1 AT/AF episode lasting 48 hours in length for a total AF burden of 5.1%, he's asymptomatic. He is in NSR today. Remains on Eliquis, no reported breakthrough bleeding. On amiodarone 200 mg QD, due for screening labs. He has not had any heart racing, palpitations or irregular heartbeats that he can tell. He has no chest pain, does note shortness of breath with exertion but denies PND, orthopnea, he has chronic BLE edema that is unchanged. States that he stopped taking his Atorvastatin d/t memory loss, reports his memory is improved and that he notified Dr. Ladarius Flynn. Patient is focused on losing weight by changing his diet to help with his DM. Down 4 lbs from previous office visit.       Home medications:   Current Outpatient Medications on File Prior to Visit   Medication Sig Dispense Refill    ibuprofen (ADVIL;MOTRIN) 600 MG tablet prn      levocetirizine (XYZAL) 5 MG tablet Take 5 mg by mouth nightly      aspirin 81 MG chewable tablet Take 1 tablet by mouth daily 30 tablet 3    amiodarone (CORDARONE) 200 MG tablet Take 2 tablets by mouth twice daily for four days then 1 tablet daily 90 tablet 0    atorvastatin (LIPITOR) 40 MG tablet Take 1 tablet by mouth nightly 30 tablet 0    famotidine (PEPCID) 20 MG tablet Take 1 tablet by mouth daily 60 tablet 3    furosemide (LASIX) 80 MG tablet Take 1 tablet by mouth 2 times daily 60 tablet 3    insulin glargine (LANTUS;BASAGLAR) 100 UNIT/ML injection pen Inject 16 Units into the skin daily 5 pen 3    insulin lispro, 1 Unit Dial, 100 UNIT/ML SOPN Inject 3 Units into the skin 3 times daily (with meals) 1 pen 0    nystatin (MYCOSTATIN) 562631 UNIT/GM cream Apply topically 2 times daily. 1 Tube 0    potassium chloride (KLOR-CON M) 10 MEQ extended release tablet Take 1 tablet by mouth 2 times daily 60 tablet 3    apixaban (ELIQUIS) 5 MG TABS tablet Take 1 tablet by mouth 2 times daily 60 tablet 5    Handicap Placard MISC by Does not apply route Pt cannot walk more that 250 feet without becoming SOB. Dx: CAD, Cardiomyopathy, Atrial Fibrilation    Not to exceed 5 years 1 each 0     No current facility-administered medications on file prior to visit. Past Medical History:   Diagnosis Date    A-fib [facility]) 2020    CAD, multiple vessel 12/2019    Diabetes mellitus ([facility] [address]     diet controlled     Enlarged prostate     Environmental allergies     Kidney stones         Past Surgical History:   Procedure Laterality Date    CARDIAC CATHETERIZATION  12/10/2019    Dr. Patience Hopkins - severe multi-vessel disease    CORONARY ARTERY BYPASS GRAFT N/A 3/2/2020    [facility]; TCPB; CABG x 3, LIMA to LAD w/ long segment endarterectomy (4 cm); bilateral pulmonary vein isolation; 40 mm Atriclip to L atril appendage; endoscopic L GSV harvest; ICNB x 5 levels bilat; sternal plate x 1 to manubrium performed by Mery Payne MD at [facility]  x2    NASAL SEPTUM SURGERY         Allergies   Allergen Reactions    Codeine Other (See Comments)     Seizures    Aspartame And Phenylalanine        Social History:  Reviewed. reports that he has never smoked. He has never used smokeless tobacco. He reports previous alcohol use. He reports that he does not use drugs. Family History:  Reviewed.  family history includes Alzheimer's Disease in his mother; Dementia in his behavior     Labs:  Reviewed. No results for input(s): NA, K, CL, CO2, PHOS, BUN, CREATININE in the last 72 hours. Invalid input(s): CA,  TSH  No results for input(s): WBC, HGB, HCT, MCV, PLT in the last 72 hours. Lab Results   Component Value Date    TROPONINI <0.01 07/04/2019     No results found for: BNP  Lab Results   Component Value Date    PROTIME 13.1 03/12/2021    PROTIME 12.3 03/02/2020    PROTIME 13.3 02/26/2020    INR 1.13 03/12/2021    INR 1.06 03/02/2020    INR 1.14 02/26/2020     Lab Results   Component Value Date    CHOL 172 03/02/2020    HDL 31 03/02/2020    TRIG 236 03/02/2020       ECG: Personally reviewed: NSR, HR 94, , QRS 88, QTc 433    ECHO:  10.7.2020   Summary   Normal left ventricular size. Moderate concentric left ventricular hypertrophy. Severely decreased left ventricular systolic function with an estimated   ejection fraction of 20-25%. Severe global hypokinesis. Indeterminate diastolic function. Normal right ventricular size with decreased systolic function. Tapse: 0.56 cm   RV s: 6.31 cm/s   The aortic root is dilated measuring 3.8 cm. The pulmonic valve is not well visualized. IVC not well visualized. Stress Test: N/A    Cardiac Angiography: 12/10/2019  RESULTS:  HEMODYNAMICS:  Left ventricular end-diastolic pressure equals 20.     There was no significant gradient across the aortic valve by pullback  post cineangiography.     LEFT VENTRICULOGRAM:  Left ventriculogram demonstrates global  hypokinesis. Estimated ejection fraction is 25%.     LEFT MAIN:  Left main is free of significant obstructive disease.     LEFT ANTERIOR DESCENDING:  The LAD courses to and wraps around the apex. It gives off a relatively large bifurcating first diagonal branch and a  moderate-size second diagonal branch. The large first diagonal branch  had an 80% proximal stenosis.   The LAD proper had severe mid vessel  disease with a very irregular area of subtotal stenosis in the mid  vessel. It then coursed on towards the apex. There is a 90% stenosis  at the apex.     LEFT CIRCUMFLEX:  Circumflex gives rise to a small-to-moderate first  marginal branch, a large second marginal branch, and terminates in  moderate-size posterolateral branch. There appears to be ostial disease  which is difficult to quantitate but appears to be in excess of 70%. The circumflex then has 90% stenosis before the distal posterolateral  branch.     RIGHT CORONARY ARTERY:  Right coronary artery is a dominant vessel. It  gives off a moderate-size PDA and two posterolateral branches. There is  diffuse disease throughout the body of the right coronary artery and 90%  stenosis in the PDA.     IMPRESSION:  1. Severe LV dysfunction. Estimated ejection fraction is 25%. 2.  Severe multivessel coronary artery disease as described above. 3.  I recommend surgical consultation. 4.  Successful Angio-Seal of the right femoral arteriotomy site. Problem List:   Patient Active Problem List    Diagnosis Date Noted    AICD (automatic cardioverter/defibrillator) present 03/13/2021    ICD (implantable cardioverter-defibrillator) in place-MEDTRONIC 03/12/2021    Ischemic cardiomyopathy 03/12/2021    Acute cystitis 03/02/2020    Bilateral pleural effusion 03/02/2020    Constipation 03/02/2020    Monilial cystitis 03/02/2020    Urinary tract obstruction 03/02/2020    CAD, multiple vessel     Dilated cardiomyopathy ([address] 12/10/2019    New onset a-fib ([address] 12/10/2019    S/P coronary angiogram 12/10/2019    Bladder obstruction 11/21/2019        Assessment:   1. Ischemic cardiomyopathy    2. Chronic systolic HF (heart failure) ([address]    3. ICD (implantable cardioverter-defibrillator), dual, in situ    4. Paroxysmal atrial fibrillation (HCC)    5. On amiodarone therapy    6.  Coronary artery disease involving native coronary artery of native heart without angina pectoris         Cardiac HX: Kylietk Darryl is a 73 y.o. man with a h/o DM, HTN, CAD, CABG (3/2020), follows w/ Dr. Dayana Lujan, pAF, on Eliquis post Cabg, ICMP, EF 20-25%, s/p dual ch MDT ICD (3/12/2021, Dr. Mark Anthony Lockhart). [id number]. ICMP  - EF 20- 25%  - NYHA class II/III  - QRS 88  - On Toprol XL 25 mg QD- will increase to BID  - Consider adding ACEI/ ARB at next office visit  - S/p dual-chamber MDT ICD  - Review of device today shows he is AP 1.8%,  0.3%. He had 1 AT/AF episode lasting 48 hours in length for a total AF burden pf 5.1%, Other parameters stable  - Trying to lose weight- educated on diet/ exercise    AF   - In NSR -HR 94  - 1 AT/AF episode lasting 48 hours in length for a total AF burden pf 5.1%.  - On Eliquis 5mg BID - no s/s bleeding - continue  - On Amio 200 mg QD(3/13/2020) - ordered PFT, LFT, TSH today  - Toprol XL 25 mg- increase to BID  - Sleep study 8/1/2021 w/ Dr Steffanie Wallis  - ECG ordered and results personally reviewed     CAD  - No s/s  - On ASA, he stopped statin d/t memory loss  - S/p CABG (2020)  - Follows with Dr. Dayana Lujan    F/u with Dr. Mark Anthony Lockhart in 3 months    EF of 20-25%  No ACEi/ ARB d/t low BP will attempt to add at next OV  ASA for CAD  Anticoagulation for AF  No Tobacco use. All questions and concerns were addressed to the patient/family. Alternatives to my treatment were discussed. The note was completed using EMR. Every effort was made to ensure accuracy; however, inadvertent computerized transcription errors may be present. Patient received education regarding their diagnosis, treatment and medications while in the office today. Tim Cornea CNP  [address]      I  have spent > 40 minutes of face to face time directly with the patient/family with more than 50% spent counseling and coordinating care for this patient. , including treatment options and the side effects of medications

## 2021-06-01 ENCOUNTER — OFFICE VISIT (OUTPATIENT)
Dept: CARDIOLOGY CLINIC | Age: 71
End: 2021-06-01
Payer: MEDICAID

## 2021-06-01 ENCOUNTER — NURSE ONLY (OUTPATIENT)
Dept: CARDIOLOGY CLINIC | Age: 71
End: 2021-06-01
Payer: MEDICAID

## 2021-06-01 VITALS
WEIGHT: 276 LBS | BODY MASS INDEX: 43.23 KG/M2 | HEART RATE: 94 BPM | DIASTOLIC BLOOD PRESSURE: 84 MMHG | SYSTOLIC BLOOD PRESSURE: 130 MMHG

## 2021-06-01 DIAGNOSIS — I25.5 ISCHEMIC CARDIOMYOPATHY: Primary | ICD-10-CM

## 2021-06-01 DIAGNOSIS — I48.0 PAROXYSMAL ATRIAL FIBRILLATION (HCC): ICD-10-CM

## 2021-06-01 DIAGNOSIS — Z95.810 ICD (IMPLANTABLE CARDIOVERTER-DEFIBRILLATOR) IN PLACE: ICD-10-CM

## 2021-06-01 DIAGNOSIS — I42.0 DILATED CARDIOMYOPATHY (HCC): ICD-10-CM

## 2021-06-01 DIAGNOSIS — Z79.899 ON AMIODARONE THERAPY: ICD-10-CM

## 2021-06-01 DIAGNOSIS — I25.5 ISCHEMIC CARDIOMYOPATHY: ICD-10-CM

## 2021-06-01 DIAGNOSIS — I50.22 CHRONIC SYSTOLIC HF (HEART FAILURE) (HCC): ICD-10-CM

## 2021-06-01 DIAGNOSIS — Z95.810 ICD (IMPLANTABLE CARDIOVERTER-DEFIBRILLATOR), DUAL, IN SITU: ICD-10-CM

## 2021-06-01 DIAGNOSIS — Z95.810 AICD (AUTOMATIC CARDIOVERTER/DEFIBRILLATOR) PRESENT: ICD-10-CM

## 2021-06-01 DIAGNOSIS — I25.10 CORONARY ARTERY DISEASE INVOLVING NATIVE CORONARY ARTERY OF NATIVE HEART WITHOUT ANGINA PECTORIS: ICD-10-CM

## 2021-06-01 PROCEDURE — 1123F ACP DISCUSS/DSCN MKR DOCD: CPT | Performed by: NURSE PRACTITIONER

## 2021-06-01 PROCEDURE — 4040F PNEUMOC VAC/ADMIN/RCVD: CPT | Performed by: NURSE PRACTITIONER

## 2021-06-01 PROCEDURE — 1036F TOBACCO NON-USER: CPT | Performed by: NURSE PRACTITIONER

## 2021-06-01 PROCEDURE — 3017F COLORECTAL CA SCREEN DOC REV: CPT | Performed by: NURSE PRACTITIONER

## 2021-06-01 PROCEDURE — G8427 DOCREV CUR MEDS BY ELIG CLIN: HCPCS | Performed by: NURSE PRACTITIONER

## 2021-06-01 PROCEDURE — 93283 PRGRMG EVAL IMPLANTABLE DFB: CPT | Performed by: INTERNAL MEDICINE

## 2021-06-01 PROCEDURE — 99214 OFFICE O/P EST MOD 30 MIN: CPT | Performed by: NURSE PRACTITIONER

## 2021-06-01 PROCEDURE — G8417 CALC BMI ABV UP PARAM F/U: HCPCS | Performed by: NURSE PRACTITIONER

## 2021-06-01 RX ORDER — METOPROLOL SUCCINATE 25 MG/1
25 TABLET, EXTENDED RELEASE ORAL 2 TIMES DAILY
Qty: 60 TABLET | Refills: 5 | Status: SHIPPED | OUTPATIENT
Start: 2021-06-01 | End: 2021-09-22 | Stop reason: SDUPTHER

## 2021-06-01 NOTE — PATIENT INSTRUCTIONS
Amiodarone Instructions    You are taking amiodarone which is an antiarrhythmic medication. This helps keep you in a regular rhythm when you have atrial fibrillation or ventricular tachycardia. Due to the potential for side effects of the amiodarone, we monitor your liver, thyroid and lungs while taking this medication. A T4, TSH and liver profile should be done every 6 months while taking amiodarone. These are blood tests that look at the levels in your thryoid and liver. A chest xray should be done at least once a year. A Pulmonary Function Test should be done once a year. You should wear sunscreen daily as your skin  can burn easily while taking amiodarone. You should have your eyes examined once a year checking for corneal deposits while taking amiodarone.

## 2021-06-09 DIAGNOSIS — Z79.899 ON AMIODARONE THERAPY: ICD-10-CM

## 2021-06-09 DIAGNOSIS — I48.0 PAROXYSMAL ATRIAL FIBRILLATION (HCC): ICD-10-CM

## 2021-06-09 LAB
ALBUMIN SERPL-MCNC: 4.1 G/DL (ref 3.4–5)
ALP BLD-CCNC: 64 U/L (ref 40–129)
ALT SERPL-CCNC: 9 U/L (ref 10–40)
AST SERPL-CCNC: 12 U/L (ref 15–37)
BILIRUB SERPL-MCNC: 0.6 MG/DL (ref 0–1)
BILIRUBIN DIRECT: <0.2 MG/DL (ref 0–0.3)
BILIRUBIN, INDIRECT: ABNORMAL MG/DL (ref 0–1)
TOTAL PROTEIN: 7.2 G/DL (ref 6.4–8.2)
TSH REFLEX: 2.99 UIU/ML (ref 0.27–4.2)

## 2021-06-16 ENCOUNTER — OFFICE VISIT (OUTPATIENT)
Dept: CARDIOLOGY CLINIC | Age: 71
End: 2021-06-16
Payer: MEDICAID

## 2021-06-16 VITALS
HEART RATE: 64 BPM | DIASTOLIC BLOOD PRESSURE: 80 MMHG | SYSTOLIC BLOOD PRESSURE: 118 MMHG | WEIGHT: 271 LBS | BODY MASS INDEX: 42.44 KG/M2

## 2021-06-16 DIAGNOSIS — I25.5 ISCHEMIC CARDIOMYOPATHY: ICD-10-CM

## 2021-06-16 DIAGNOSIS — I48.0 PAROXYSMAL ATRIAL FIBRILLATION (HCC): ICD-10-CM

## 2021-06-16 DIAGNOSIS — Z95.1 HX OF CABG: ICD-10-CM

## 2021-06-16 DIAGNOSIS — Z95.810 ICD (IMPLANTABLE CARDIOVERTER-DEFIBRILLATOR) IN PLACE: ICD-10-CM

## 2021-06-16 DIAGNOSIS — Z79.899 ON AMIODARONE THERAPY: ICD-10-CM

## 2021-06-16 DIAGNOSIS — I25.10 CAD IN NATIVE ARTERY: Primary | ICD-10-CM

## 2021-06-16 PROCEDURE — G8427 DOCREV CUR MEDS BY ELIG CLIN: HCPCS | Performed by: INTERNAL MEDICINE

## 2021-06-16 PROCEDURE — 99213 OFFICE O/P EST LOW 20 MIN: CPT | Performed by: INTERNAL MEDICINE

## 2021-06-16 PROCEDURE — 4040F PNEUMOC VAC/ADMIN/RCVD: CPT | Performed by: INTERNAL MEDICINE

## 2021-06-16 PROCEDURE — 1123F ACP DISCUSS/DSCN MKR DOCD: CPT | Performed by: INTERNAL MEDICINE

## 2021-06-16 PROCEDURE — G8417 CALC BMI ABV UP PARAM F/U: HCPCS | Performed by: INTERNAL MEDICINE

## 2021-06-16 PROCEDURE — 1036F TOBACCO NON-USER: CPT | Performed by: INTERNAL MEDICINE

## 2021-06-16 PROCEDURE — 3017F COLORECTAL CA SCREEN DOC REV: CPT | Performed by: INTERNAL MEDICINE

## 2021-06-16 ASSESSMENT — ENCOUNTER SYMPTOMS
ABDOMINAL DISTENTION: 0
SHORTNESS OF BREATH: 0
COUGH: 0
CHOKING: 0
CHEST TIGHTNESS: 0
APNEA: 0

## 2021-06-16 NOTE — PROGRESS NOTES
Subjective:      Patient ID: Funmi Singer is a 73 y.o. male. Coronary Artery Disease  Pertinent negatives include no chest pain, chest tightness, dizziness, leg swelling, palpitations or shortness of breath. Hematuria      Follow up CAD/CABG/afib/cardiomyop/abn ekg. No complaints. Stress for being fined for not cutting grass. No chest pain. No tachycardia/syncope. Chronic LE edema. No pnd or orthopnea. Wt up. Feeling good. Taking Eliquis. No bleeding issues. Rhythm stable.      Past Medical History:   Diagnosis Date    A-fib [facility]) 2020    CAD, multiple vessel 12/2019    Diabetes mellitus ([facility] [address]     diet controlled     Enlarged prostate     Environmental allergies     Kidney stones      Past Surgical History:   Procedure Laterality Date    CARDIAC CATHETERIZATION  12/10/2019    Dr. Lenora Mac - severe multi-vessel disease    CORONARY ARTERY BYPASS GRAFT N/A 3/2/2020    [facility]; TCPB; CABG x 3, LIMA to LAD w/ long segment endarterectomy (4 cm); bilateral pulmonary vein isolation; 40 mm Atriclip to L atril appendage; endoscopic L GSV harvest; ICNB x 5 levels bilat; sternal plate x 1 to manubrium performed by [facility]nett [facility] at [facility]  x2    NASAL SEPTUM SURGERY       Social History     Socioeconomic History    Marital status: Single     Spouse name: Not on file    Number of children: Not on file    Years of education: Not on file    Highest education level: Not on file   Occupational History    Not on file   Tobacco Use    Smoking status: Never Smoker    Smokeless tobacco: Never Used   Substance and Sexual Activity    Alcohol use: Not Currently     Comment: rare    Drug use: Never    Sexual activity: Not on file   Other Topics Concern    Not on file   Social History Narrative    Not on file     Social Determinants of Health     Financial Resource Strain:     Difficulty of Paying Living Expenses:    Food Insecurity:     Worried About Running Out of Food in the Last Year:     Ran Out of Food in the Last Year:    Transportation Needs:     Lack of Transportation (Medical):  Lack of Transportation (Non-Medical):    Physical Activity:     Days of Exercise per Week:     Minutes of Exercise per Session:    Stress:     Feeling of Stress :    Social Connections:     Frequency of Communication with Friends and Family:     Frequency of Social Gatherings with Friends and Family:     Attends [employer] Services:     Active Member of Clubs or Organizations:     Attends Club or Organization Meetings:     Marital Status:    Intimate Partner Violence:     Fear of Current or Ex-Partner:     Emotionally Abused:     Physically Abused:     Sexually Abused:       FH MI in [address] in 64s. Vitals:    06/16/21 1436   BP: 118/80   Pulse: 64     Wt 271    Review of Systems   Constitutional: Negative for activity change and fatigue. Respiratory: Negative for apnea, cough, choking, chest tightness and shortness of breath. Cardiovascular: Negative for chest pain, palpitations and leg swelling. No PND or orthopnea. No tachycardia. Gastrointestinal: Negative for abdominal distention. Genitourinary: Positive for hematuria. Musculoskeletal: Negative for myalgias. Neurological: Negative for dizziness, syncope and light-headedness. Psychiatric/Behavioral: Negative for agitation, behavioral problems and confusion. All other systems reviewed and are negative. Objective:   Physical Exam  Constitutional:       General: He is not in acute distress. Appearance: Normal appearance. He is well-developed. HENT:      Head: Normocephalic. Right Ear: External ear normal.      Left Ear: External ear normal.   Neck:      Vascular: No JVD. Cardiovascular:      Rate and Rhythm: Tachycardia present. Rhythm irregular. Heart sounds: Normal heart sounds. No gallop. Pulmonary:      Effort: Pulmonary effort is normal. No respiratory distress.       Breath sounds: Normal breath sounds. No wheezing or rales. Abdominal:      General: Bowel sounds are normal.      Palpations: Abdomen is soft. Tenderness: There is no abdominal tenderness. Musculoskeletal:         General: Swelling present. Normal range of motion. Cervical back: Normal range of motion and neck supple. Comments: Tr ankle edema. Skin:     General: Skin is warm and dry. Neurological:      Mental Status: He is oriented to person, place, and time. Psychiatric:         Mood and Affect: Mood normal.         Behavior: Behavior normal.         Assessment:       Diagnosis Orders   1. CAD in native artery     2. Hx of CABG     3. Ischemic cardiomyopathy     4. Paroxysmal atrial fibrillation (HCC)     5. ICD (implantable cardioverter-defibrillator) in place             Plan:      CV stable. No angina. Compensated. Pushes 2 liters fluid per day. Encouraged not to push fluids. Continue eliquis. On Eliquis. Reviewed previous records and testing. No changes. Follow up 3 months.         Nitin Villeda MD

## 2021-06-17 LAB
ALBUMIN SERPL-MCNC: 4 G/DL (ref 3.4–5)
ALP BLD-CCNC: 67 U/L (ref 40–129)
ALT SERPL-CCNC: 11 U/L (ref 10–40)
AST SERPL-CCNC: 15 U/L (ref 15–37)
BILIRUB SERPL-MCNC: 0.6 MG/DL (ref 0–1)
BILIRUBIN DIRECT: <0.2 MG/DL (ref 0–0.3)
BILIRUBIN, INDIRECT: NORMAL MG/DL (ref 0–1)
TOTAL PROTEIN: 7.2 G/DL (ref 6.4–8.2)
TSH REFLEX: 2.16 UIU/ML (ref 0.27–4.2)

## 2021-06-18 LAB
AMIODARONE LEVEL: <0.3 UG/ML (ref 0.5–2)
DES-AMIOD: <0.3 UG/ML

## 2021-07-19 ENCOUNTER — HOSPITAL ENCOUNTER (EMERGENCY)
Age: 71
Discharge: ANOTHER HOSPITAL | End: 2021-07-19
Attending: EMERGENCY MEDICINE
Payer: MEDICAID

## 2021-07-19 VITALS
HEIGHT: 66 IN | HEART RATE: 86 BPM | BODY MASS INDEX: 43.39 KG/M2 | DIASTOLIC BLOOD PRESSURE: 84 MMHG | WEIGHT: 270 LBS | SYSTOLIC BLOOD PRESSURE: 140 MMHG | RESPIRATION RATE: 18 BRPM | TEMPERATURE: 100.4 F | OXYGEN SATURATION: 95 %

## 2021-07-19 DIAGNOSIS — T25.322A: ICD-10-CM
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
## 2021-07-19 DIAGNOSIS — T30.0 FULL THICKNESS BURN DUE TO SCALDING: Primary | ICD-10-CM

## 2021-07-19 LAB
ABO/RH: NORMAL
ANION GAP SERPL CALCULATED.3IONS-SCNC: 11 MMOL/L (ref 3–16)
ANTIBODY SCREEN: NORMAL
BASE EXCESS VENOUS: 2.9 MMOL/L (ref -2–3)
BASOPHILS ABSOLUTE: 0 K/UL (ref 0–0.2)
BASOPHILS RELATIVE PERCENT: 0.1 %
BUN BLDV-MCNC: 22 MG/DL (ref 7–20)
CALCIUM SERPL-MCNC: 9.2 MG/DL (ref 8.3–10.6)
CARBOXYHEMOGLOBIN: 1.8 % (ref 0–1.5)
CHLORIDE BLD-SCNC: 96 MMOL/L (ref 99–110)
CO2: 24 MMOL/L (ref 21–32)
CREAT SERPL-MCNC: 1.2 MG/DL (ref 0.8–1.3)
EOSINOPHILS ABSOLUTE: 0.2 K/UL (ref 0–0.6)
EOSINOPHILS RELATIVE PERCENT: 1.1 %
GFR AFRICAN AMERICAN: >60
GFR NON-AFRICAN AMERICAN: 60
GLUCOSE BLD-MCNC: 186 MG/DL (ref 70–99)
HCO3 VENOUS: 29.3 MMOL/L (ref 24–28)
HCT VFR BLD CALC: 43.9 % (ref 40.5–52.5)
HEMOGLOBIN, VEN, REDUCED: 77.6 %
HEMOGLOBIN: 15 G/DL (ref 13.5–17.5)
INR BLD: 1.77 (ref 0.88–1.12)
LACTIC ACID: 1.6 MMOL/L (ref 0.4–2)
LYMPHOCYTES ABSOLUTE: 0.7 K/UL (ref 1–5.1)
LYMPHOCYTES RELATIVE PERCENT: 5.2 %
MCH RBC QN AUTO: 30.1 PG (ref 26–34)
MCHC RBC AUTO-ENTMCNC: 34.2 G/DL (ref 31–36)
MCV RBC AUTO: 88 FL (ref 80–100)
METHEMOGLOBIN VENOUS: 0.5 % (ref 0–1.5)
MONOCYTES ABSOLUTE: 0.8 K/UL (ref 0–1.3)
MONOCYTES RELATIVE PERCENT: 6.2 %
NEUTROPHILS ABSOLUTE: 11.7 K/UL (ref 1.7–7.7)
NEUTROPHILS RELATIVE PERCENT: 87.4 %
O2 SAT, VEN: 21 %
PCO2, VEN: 50.2 MMHG (ref 41–51)
PDW BLD-RTO: 14.8 % (ref 12.4–15.4)
PH VENOUS: 7.37 (ref 7.35–7.45)
PLATELET # BLD: 167 K/UL (ref 135–450)
PMV BLD AUTO: 9 FL (ref 5–10.5)
PO2, VEN: <30 MMHG (ref 25–40)
POTASSIUM REFLEX MAGNESIUM: 4.4 MMOL/L (ref 3.5–5.1)
PROTHROMBIN TIME: 20.5 SEC (ref 9.9–12.7)
RBC # BLD: 4.99 M/UL (ref 4.2–5.9)
SODIUM BLD-SCNC: 131 MMOL/L (ref 136–145)
TCO2 CALC VENOUS: 31 MMOL/L
WBC # BLD: 13.4 K/UL (ref 4–11)

## 2021-07-19 PROCEDURE — 85610 PROTHROMBIN TIME: CPT

## 2021-07-19 PROCEDURE — 90471 IMMUNIZATION ADMIN: CPT | Performed by: EMERGENCY MEDICINE

## 2021-07-19 PROCEDURE — 96365 THER/PROPH/DIAG IV INF INIT: CPT

## 2021-07-19 PROCEDURE — 83605 ASSAY OF LACTIC ACID: CPT

## 2021-07-19 PROCEDURE — 6360000002 HC RX W HCPCS: Performed by: EMERGENCY MEDICINE

## 2021-07-19 PROCEDURE — 96368 THER/DIAG CONCURRENT INF: CPT

## 2021-07-19 PROCEDURE — 80048 BASIC METABOLIC PNL TOTAL CA: CPT

## 2021-07-19 PROCEDURE — 86850 RBC ANTIBODY SCREEN: CPT

## 2021-07-19 PROCEDURE — 36415 COLL VENOUS BLD VENIPUNCTURE: CPT

## 2021-07-19 PROCEDURE — 82803 BLOOD GASES ANY COMBINATION: CPT

## 2021-07-19 PROCEDURE — 99284 EMERGENCY DEPT VISIT MOD MDM: CPT

## 2021-07-19 PROCEDURE — 16020 DRESS/DEBRID P-THICK BURN S: CPT

## 2021-07-19 PROCEDURE — 85025 COMPLETE CBC W/AUTO DIFF WBC: CPT

## 2021-07-19 PROCEDURE — 86900 BLOOD TYPING SEROLOGIC ABO: CPT

## 2021-07-19 PROCEDURE — 86901 BLOOD TYPING SEROLOGIC RH(D): CPT

## 2021-07-19 PROCEDURE — 6370000000 HC RX 637 (ALT 250 FOR IP): Performed by: EMERGENCY MEDICINE

## 2021-07-19 PROCEDURE — 90715 TDAP VACCINE 7 YRS/> IM: CPT | Performed by: EMERGENCY MEDICINE

## 2021-07-19 PROCEDURE — 2580000003 HC RX 258: Performed by: EMERGENCY MEDICINE

## 2021-07-19 PROCEDURE — 96367 TX/PROPH/DG ADDL SEQ IV INF: CPT

## 2021-07-19 RX ORDER — GINSENG 100 MG
CAPSULE ORAL 3 TIMES DAILY
Status: DISCONTINUED | OUTPATIENT
Start: 2021-07-19 | End: 2021-07-19 | Stop reason: HOSPADM

## 2021-07-19 RX ADMIN — BACITRACIN: 500 OINTMENT TOPICAL at 15:17

## 2021-07-19 RX ADMIN — TETANUS TOXOID, REDUCED DIPHTHERIA TOXOID AND ACELLULAR PERTUSSIS VACCINE, ADSORBED 0.5 ML: 5; 2.5; 8; 8; 2.5 SUSPENSION INTRAMUSCULAR at 15:19

## 2021-07-19 RX ADMIN — VANCOMYCIN HYDROCHLORIDE 1750 MG: 10 INJECTION, POWDER, LYOPHILIZED, FOR SOLUTION INTRAVENOUS at 15:34

## 2021-07-19 RX ADMIN — PIPERACILLIN AND TAZOBACTAM 3375 MG: 3; .375 INJECTION, POWDER, LYOPHILIZED, FOR SOLUTION INTRAVENOUS at 15:16
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
## 2021-07-19 NOTE — ED NOTES
Bed: B18-18  Expected date:   Expected time:   Means of arrival:   Comments:  Andrew Aguayo, NAVDEEP  07/19/21 9917
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
## 2021-07-26 ENCOUNTER — NURSE ONLY (OUTPATIENT)
Dept: CARDIOLOGY CLINIC | Age: 71
End: 2021-07-26
Payer: MEDICAID

## 2021-07-26 DIAGNOSIS — Z95.810 ICD (IMPLANTABLE CARDIOVERTER-DEFIBRILLATOR) IN PLACE: ICD-10-CM

## 2021-07-26 DIAGNOSIS — I48.91 NEW ONSET A-FIB (HCC): ICD-10-CM

## 2021-07-26 DIAGNOSIS — I42.0 DILATED CARDIOMYOPATHY (HCC): ICD-10-CM

## 2021-07-26 DIAGNOSIS — I50.22 CHRONIC SYSTOLIC (CONGESTIVE) HEART FAILURE (HCC): ICD-10-CM

## 2021-07-26 NOTE — LETTER
[address] [facility] [phone]  [facility] [address] [phone]    Pacemaker/Defibrillator Clinic    [birth year]      [address] [address]      Dear Laurel Johnson    This letter is to inform you that we received the transmission from your monitor at home that checks your implanted heart device. The next date your monitor will automatically transmit will be 10/25. If your report needs attention we will notify you. Your device and monitor are wireless and most transmit cellularly, but please periodically check your monitor is still plugged in to the electrical outlet. If you still use the telephone land line to send please ensure the connection to the phone rika is secure. This will help to ensure successful automatic transmissions in the future. Also, the monitor needs to be close to you while sleeping at night. Please be aware that the remote device transmission sites are periodically monitored only during regular business hours during which simultaneous in-office device clinics are being run. If your transmission requires attention, we will contact you as soon as possible. **PLEASE NOTE** that our [facility] policy and processes are changing to ensure a more seamless approach for all parties involved, allowing more time for our nurses to address patient issues and concerns. We will no longer be sending letters for NORMAL remote transmissions. You will be contacted by phone if your transmission requires attention (as previously done), and letters will only be sent regarding monitor disconnections or missed transmissions if you are unable to be reached by phone. Please do not be alarmed by this new process, as we will continue to contact you if your transmission report requires attention. This will be your final \"remote received\" letter.   From this point forward, the [facility] will be utilizing the no news is good news approach. As always, please feel free to contact your nurse with any questions or concerns. Thank you.     [facility], [place], operated by [facility]

## 2021-07-28 ENCOUNTER — TELEPHONE (OUTPATIENT)
Dept: CARDIOLOGY CLINIC | Age: 71
End: 2021-07-28

## 2021-07-29 NOTE — PROGRESS NOTES
We received remote transmission from patient's dual chamber ICD monitor at home. Transmission shows normal sensing and pacing function. Known AF/AFL noted (Toprol XL, Amiodarone, Eliquis). Optivol is within normal range. EP physician will review. See interrogation under cardiology tab in the [address] [place] [address] [address] field for more details.

## 2021-07-30 ENCOUNTER — TELEPHONE (OUTPATIENT)
Dept: CARDIOLOGY CLINIC | Age: 71
End: 2021-07-30
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
## 2021-08-03 PROCEDURE — 93295 DEV INTERROG REMOTE 1/2/MLT: CPT | Performed by: INTERNAL MEDICINE

## 2021-08-03 PROCEDURE — 93296 REM INTERROG EVL PM/IDS: CPT | Performed by: INTERNAL MEDICINE

## 2021-08-03 PROCEDURE — 93297 REM INTERROG DEV EVAL ICPMS: CPT | Performed by: INTERNAL MEDICINE
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
## 2021-08-26 ENCOUNTER — APPOINTMENT (OUTPATIENT)
Dept: CT IMAGING | Age: 71
DRG: 501 | End: 2021-08-26
Payer: MEDICAID

## 2021-08-26 ENCOUNTER — APPOINTMENT (OUTPATIENT)
Dept: ULTRASOUND IMAGING | Age: 71
DRG: 501 | End: 2021-08-26
Payer: MEDICAID

## 2021-08-26 ENCOUNTER — HOSPITAL ENCOUNTER (INPATIENT)
Age: 71
LOS: 3 days | Discharge: HOSPICE | DRG: 501 | End: 2021-08-29
Attending: EMERGENCY MEDICINE | Admitting: INTERNAL MEDICINE
Payer: MEDICAID

## 2021-08-26 DIAGNOSIS — N49.2 SCROTAL ABSCESS: Primary | ICD-10-CM

## 2021-08-26 PROBLEM — N45.3 EPIDIDYMOORCHITIS: Status: ACTIVE | Noted: 2021-08-26

## 2021-08-26 LAB
ANION GAP SERPL CALCULATED.3IONS-SCNC: 14 MMOL/L (ref 3–16)
BACTERIA: ABNORMAL /HPF
BASOPHILS ABSOLUTE: 0 K/UL (ref 0–0.2)
BASOPHILS RELATIVE PERCENT: 0.1 %
BILIRUBIN URINE: NEGATIVE
BLOOD, URINE: ABNORMAL
BUN BLDV-MCNC: 21 MG/DL (ref 7–20)
C-REACTIVE PROTEIN: 16.1 MG/L (ref 0–5.1)
CALCIUM SERPL-MCNC: 9.8 MG/DL (ref 8.3–10.6)
CHLORIDE BLD-SCNC: 100 MMOL/L (ref 99–110)
CHP ED QC CHECK: YES
CLARITY: ABNORMAL
CO2: 21 MMOL/L (ref 21–32)
COLOR: YELLOW
CREAT SERPL-MCNC: 1.2 MG/DL (ref 0.8–1.3)
EOSINOPHILS ABSOLUTE: 0.3 K/UL (ref 0–0.6)
EOSINOPHILS RELATIVE PERCENT: 2.5 %
GFR AFRICAN AMERICAN: >60
GFR NON-AFRICAN AMERICAN: 60
GLUCOSE BLD-MCNC: 114 MG/DL
GLUCOSE BLD-MCNC: 114 MG/DL (ref 70–99)
GLUCOSE BLD-MCNC: 194 MG/DL (ref 70–99)
GLUCOSE BLD-MCNC: 255 MG/DL (ref 70–99)
GLUCOSE URINE: NEGATIVE MG/DL
HCT VFR BLD CALC: 36.2 % (ref 40.5–52.5)
HEMOGLOBIN: 12.2 G/DL (ref 13.5–17.5)
KETONES, URINE: NEGATIVE MG/DL
LACTIC ACID, SEPSIS: 1.2 MMOL/L (ref 0.4–1.9)
LACTIC ACID, SEPSIS: 1.8 MMOL/L (ref 0.4–1.9)
LEUKOCYTE ESTERASE, URINE: ABNORMAL
LYMPHOCYTES ABSOLUTE: 1.1 K/UL (ref 1–5.1)
LYMPHOCYTES RELATIVE PERCENT: 8.3 %
MCH RBC QN AUTO: 29.2 PG (ref 26–34)
MCHC RBC AUTO-ENTMCNC: 33.6 G/DL (ref 31–36)
MCV RBC AUTO: 86.8 FL (ref 80–100)
MICROSCOPIC EXAMINATION: YES
MONOCYTES ABSOLUTE: 0.8 K/UL (ref 0–1.3)
MONOCYTES RELATIVE PERCENT: 5.9 %
NEUTROPHILS ABSOLUTE: 10.8 K/UL (ref 1.7–7.7)
NEUTROPHILS RELATIVE PERCENT: 83.2 %
NITRITE, URINE: NEGATIVE
PDW BLD-RTO: 14.9 % (ref 12.4–15.4)
PERFORMED ON: ABNORMAL
PERFORMED ON: ABNORMAL
PH UA: 6 (ref 5–8)
PLATELET # BLD: 356 K/UL (ref 135–450)
PMV BLD AUTO: 8.5 FL (ref 5–10.5)
POTASSIUM REFLEX MAGNESIUM: 4.2 MMOL/L (ref 3.5–5.1)
PROTEIN UA: 30 MG/DL
RBC # BLD: 4.17 M/UL (ref 4.2–5.9)
RBC UA: ABNORMAL /HPF (ref 0–4)
SODIUM BLD-SCNC: 135 MMOL/L (ref 136–145)
SPECIFIC GRAVITY UA: 1.02 (ref 1–1.03)
URINE REFLEX TO CULTURE: YES
URINE TYPE: ABNORMAL
UROBILINOGEN, URINE: 0.2 E.U./DL
WBC # BLD: 13 K/UL (ref 4–11)
WBC UA: >100 /HPF (ref 0–5)

## 2021-08-26 PROCEDURE — 36415 COLL VENOUS BLD VENIPUNCTURE: CPT

## 2021-08-26 PROCEDURE — 2580000003 HC RX 258: Performed by: INTERNAL MEDICINE

## 2021-08-26 PROCEDURE — 6360000004 HC RX CONTRAST MEDICATION: Performed by: EMERGENCY MEDICINE

## 2021-08-26 PROCEDURE — 6360000002 HC RX W HCPCS: Performed by: INTERNAL MEDICINE

## 2021-08-26 PROCEDURE — 87088 URINE BACTERIA CULTURE: CPT

## 2021-08-26 PROCEDURE — 81001 URINALYSIS AUTO W/SCOPE: CPT

## 2021-08-26 PROCEDURE — 6370000000 HC RX 637 (ALT 250 FOR IP): Performed by: INTERNAL MEDICINE

## 2021-08-26 PROCEDURE — 86140 C-REACTIVE PROTEIN: CPT

## 2021-08-26 PROCEDURE — 96375 TX/PRO/DX INJ NEW DRUG ADDON: CPT

## 2021-08-26 PROCEDURE — 80048 BASIC METABOLIC PNL TOTAL CA: CPT

## 2021-08-26 PROCEDURE — 2580000003 HC RX 258: Performed by: EMERGENCY MEDICINE

## 2021-08-26 PROCEDURE — 83605 ASSAY OF LACTIC ACID: CPT

## 2021-08-26 PROCEDURE — 2500000003 HC RX 250 WO HCPCS: Performed by: INTERNAL MEDICINE

## 2021-08-26 PROCEDURE — 87591 N.GONORRHOEAE DNA AMP PROB: CPT

## 2021-08-26 PROCEDURE — 99283 EMERGENCY DEPT VISIT LOW MDM: CPT

## 2021-08-26 PROCEDURE — 96365 THER/PROPH/DIAG IV INF INIT: CPT

## 2021-08-26 PROCEDURE — 87186 SC STD MICRODIL/AGAR DIL: CPT

## 2021-08-26 PROCEDURE — 76870 US EXAM SCROTUM: CPT

## 2021-08-26 PROCEDURE — 6360000002 HC RX W HCPCS: Performed by: EMERGENCY MEDICINE

## 2021-08-26 PROCEDURE — 87491 CHLMYD TRACH DNA AMP PROBE: CPT

## 2021-08-26 PROCEDURE — 85025 COMPLETE CBC W/AUTO DIFF WBC: CPT

## 2021-08-26 PROCEDURE — 87086 URINE CULTURE/COLONY COUNT: CPT

## 2021-08-26 PROCEDURE — 1200000000 HC SEMI PRIVATE

## 2021-08-26 PROCEDURE — 72193 CT PELVIS W/DYE: CPT

## 2021-08-26 RX ORDER — NICOTINE POLACRILEX 4 MG
15 LOZENGE BUCCAL PRN
Status: DISCONTINUED | OUTPATIENT
Start: 2021-08-26 | End: 2021-08-29 | Stop reason: HOSPADM

## 2021-08-26 RX ORDER — SENNA AND DOCUSATE SODIUM 50; 8.6 MG/1; MG/1
2 TABLET, FILM COATED ORAL DAILY PRN
Status: DISCONTINUED | OUTPATIENT
Start: 2021-08-26 | End: 2021-08-29 | Stop reason: HOSPADM

## 2021-08-26 RX ORDER — POLYETHYLENE GLYCOL 3350 17 G/17G
17 POWDER, FOR SOLUTION ORAL ONCE
Status: COMPLETED | OUTPATIENT
Start: 2021-08-26 | End: 2021-08-26

## 2021-08-26 RX ORDER — ACETAMINOPHEN 650 MG/1
650 SUPPOSITORY RECTAL EVERY 6 HOURS PRN
Status: DISCONTINUED | OUTPATIENT
Start: 2021-08-26 | End: 2021-08-29 | Stop reason: HOSPADM

## 2021-08-26 RX ORDER — LORATADINE 10 MG/1
10 TABLET ORAL DAILY
COMMUNITY
End: 2022-08-22

## 2021-08-26 RX ORDER — INSULIN LISPRO 100 [IU]/ML
3 INJECTION, SOLUTION INTRAVENOUS; SUBCUTANEOUS
Status: DISCONTINUED | OUTPATIENT
Start: 2021-08-27 | End: 2021-08-29 | Stop reason: HOSPADM

## 2021-08-26 RX ORDER — ONDANSETRON 2 MG/ML
4 INJECTION INTRAMUSCULAR; INTRAVENOUS EVERY 6 HOURS PRN
Status: DISCONTINUED | OUTPATIENT
Start: 2021-08-26 | End: 2021-08-29 | Stop reason: HOSPADM

## 2021-08-26 RX ORDER — DEXTROSE MONOHYDRATE 25 G/50ML
12.5 INJECTION, SOLUTION INTRAVENOUS PRN
Status: DISCONTINUED | OUTPATIENT
Start: 2021-08-26 | End: 2021-08-29 | Stop reason: HOSPADM

## 2021-08-26 RX ORDER — SODIUM CHLORIDE 0.9 % (FLUSH) 0.9 %
5-40 SYRINGE (ML) INJECTION EVERY 12 HOURS SCHEDULED
Status: DISCONTINUED | OUTPATIENT
Start: 2021-08-26 | End: 2021-08-29 | Stop reason: HOSPADM

## 2021-08-26 RX ORDER — ONDANSETRON 4 MG/1
4 TABLET, ORALLY DISINTEGRATING ORAL EVERY 8 HOURS PRN
Status: DISCONTINUED | OUTPATIENT
Start: 2021-08-26 | End: 2021-08-29 | Stop reason: HOSPADM

## 2021-08-26 RX ORDER — METOPROLOL SUCCINATE 25 MG/1
25 TABLET, EXTENDED RELEASE ORAL 2 TIMES DAILY
Status: DISCONTINUED | OUTPATIENT
Start: 2021-08-26 | End: 2021-08-29 | Stop reason: HOSPADM

## 2021-08-26 RX ORDER — ACETAMINOPHEN 325 MG/1
650 TABLET ORAL EVERY 6 HOURS PRN
Status: DISCONTINUED | OUTPATIENT
Start: 2021-08-26 | End: 2021-08-29 | Stop reason: HOSPADM

## 2021-08-26 RX ORDER — ATORVASTATIN CALCIUM 40 MG/1
40 TABLET, FILM COATED ORAL NIGHTLY
Status: DISCONTINUED | OUTPATIENT
Start: 2021-08-26 | End: 2021-08-29 | Stop reason: HOSPADM

## 2021-08-26 RX ORDER — SODIUM CHLORIDE 9 MG/ML
25 INJECTION, SOLUTION INTRAVENOUS PRN
Status: DISCONTINUED | OUTPATIENT
Start: 2021-08-26 | End: 2021-08-29 | Stop reason: HOSPADM

## 2021-08-26 RX ORDER — SODIUM CHLORIDE 0.9 % (FLUSH) 0.9 %
5-40 SYRINGE (ML) INJECTION PRN
Status: DISCONTINUED | OUTPATIENT
Start: 2021-08-26 | End: 2021-08-29 | Stop reason: HOSPADM

## 2021-08-26 RX ORDER — ASPIRIN 81 MG/1
81 TABLET, CHEWABLE ORAL DAILY
Status: DISCONTINUED | OUTPATIENT
Start: 2021-08-26 | End: 2021-08-29 | Stop reason: HOSPADM

## 2021-08-26 RX ORDER — POLYETHYLENE GLYCOL 3350 17 G/17G
17 POWDER, FOR SOLUTION ORAL DAILY PRN
Status: DISCONTINUED | OUTPATIENT
Start: 2021-08-26 | End: 2021-08-29 | Stop reason: HOSPADM

## 2021-08-26 RX ORDER — LINEZOLID 2 MG/ML
600 INJECTION, SOLUTION INTRAVENOUS EVERY 12 HOURS
Status: DISCONTINUED | OUTPATIENT
Start: 2021-08-26 | End: 2021-08-29

## 2021-08-26 RX ORDER — DEXTROSE MONOHYDRATE 50 MG/ML
100 INJECTION, SOLUTION INTRAVENOUS PRN
Status: DISCONTINUED | OUTPATIENT
Start: 2021-08-26 | End: 2021-08-29 | Stop reason: HOSPADM

## 2021-08-26 RX ORDER — INSULIN LISPRO 100 [IU]/ML
0-3 INJECTION, SOLUTION INTRAVENOUS; SUBCUTANEOUS NIGHTLY
Status: DISCONTINUED | OUTPATIENT
Start: 2021-08-26 | End: 2021-08-29 | Stop reason: HOSPADM

## 2021-08-26 RX ORDER — MORPHINE SULFATE 2 MG/ML
2 INJECTION, SOLUTION INTRAMUSCULAR; INTRAVENOUS ONCE
Status: COMPLETED | OUTPATIENT
Start: 2021-08-26 | End: 2021-08-26

## 2021-08-26 RX ORDER — AMIODARONE HYDROCHLORIDE 200 MG/1
200 TABLET ORAL DAILY
Status: DISCONTINUED | OUTPATIENT
Start: 2021-08-26 | End: 2021-08-29 | Stop reason: HOSPADM

## 2021-08-26 RX ORDER — INSULIN LISPRO 100 [IU]/ML
0-6 INJECTION, SOLUTION INTRAVENOUS; SUBCUTANEOUS
Status: DISCONTINUED | OUTPATIENT
Start: 2021-08-26 | End: 2021-08-29 | Stop reason: HOSPADM

## 2021-08-26 RX ORDER — CETIRIZINE HYDROCHLORIDE 10 MG/1
10 TABLET ORAL DAILY
Status: DISCONTINUED | OUTPATIENT
Start: 2021-08-26 | End: 2021-08-29 | Stop reason: HOSPADM

## 2021-08-26 RX ADMIN — AMIODARONE HYDROCHLORIDE 200 MG: 200 TABLET ORAL at 22:52

## 2021-08-26 RX ADMIN — MICONAZOLE NITRATE: 20 POWDER TOPICAL at 22:54

## 2021-08-26 RX ADMIN — PIPERACILLIN AND TAZOBACTAM 3375 MG: 3; .375 INJECTION, POWDER, LYOPHILIZED, FOR SOLUTION INTRAVENOUS at 14:06

## 2021-08-26 RX ADMIN — LINEZOLID 600 MG: 600 INJECTION, SOLUTION INTRAVENOUS at 23:56

## 2021-08-26 RX ADMIN — METOPROLOL SUCCINATE 25 MG: 25 TABLET, EXTENDED RELEASE ORAL at 22:52

## 2021-08-26 RX ADMIN — MORPHINE SULFATE 2 MG: 2 INJECTION, SOLUTION INTRAMUSCULAR; INTRAVENOUS at 12:43

## 2021-08-26 RX ADMIN — SODIUM CHLORIDE 25 ML: 9 INJECTION, SOLUTION INTRAVENOUS at 23:03

## 2021-08-26 RX ADMIN — IOPAMIDOL 80 ML: 755 INJECTION, SOLUTION INTRAVENOUS at 14:17

## 2021-08-26 RX ADMIN — PIPERACILLIN AND TAZOBACTAM 3375 MG: 3; .375 INJECTION, POWDER, LYOPHILIZED, FOR SOLUTION INTRAVENOUS at 23:04

## 2021-08-26 RX ADMIN — CETIRIZINE HYDROCHLORIDE 10 MG: 10 TABLET, FILM COATED ORAL at 22:52

## 2021-08-26 RX ADMIN — ATORVASTATIN CALCIUM 40 MG: 40 TABLET, FILM COATED ORAL at 22:53

## 2021-08-26 RX ADMIN — POLYETHYLENE GLYCOL 3350 17 G: 17 POWDER, FOR SOLUTION ORAL at 22:54

## 2021-08-26 RX ADMIN — ASPIRIN 81 MG: 81 TABLET, CHEWABLE ORAL at 22:52

## 2021-08-26 RX ADMIN — ENOXAPARIN SODIUM 40 MG: 40 INJECTION SUBCUTANEOUS at 22:53

## 2021-08-26 ASSESSMENT — PAIN SCALES - GENERAL
PAINLEVEL_OUTOF10: 8
PAINLEVEL_OUTOF10: 0

## 2021-08-26 ASSESSMENT — ENCOUNTER SYMPTOMS
SHORTNESS OF BREATH: 0
DIARRHEA: 0
ABDOMINAL PAIN: 0
NAUSEA: 0
VOMITING: 0

## 2021-08-26 NOTE — CONSULTS
Clinical Pharmacy Consult Note       Pharmacy consulted by Dr. Marlo Hansen in ED to order first dose of vancomycin. Indication: Scrotal abscess    Patient Data:     Recent Labs     08/26/21  1217   *   K 4.2      CO2 21   BUN 21*   CREATININE 1.2     Estimated Creatinine Clearance: 69 mL/min (based on SCr of 1.2 mg/dL). Recent Labs     08/26/21  1217   WBC 13.0*   HGB 12.2*   HCT 36.2*   MCV 86.8        Height:    Weight: 249 lb 12.8 oz (113.3 kg)    Plan: Will order vancomycin 1750 x 1 dose, to be administered in ED. Please note this consult covers ED vancomycin dose only. If admitting provider would like further vancomycin dose management by pharmacy, please place additional consult order. Thank you for the consult. Please call with questions.   Dorthea Friendly, PharmD  Main Pharmacy: [id number]  8/26/2021 6:42 PM
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
## 2021-08-27 ENCOUNTER — APPOINTMENT (OUTPATIENT)
Dept: GENERAL RADIOLOGY | Age: 71
DRG: 501 | End: 2021-08-27
Payer: MEDICAID

## 2021-08-27 LAB
ANION GAP SERPL CALCULATED.3IONS-SCNC: 14 MMOL/L (ref 3–16)
BASOPHILS ABSOLUTE: 0 K/UL (ref 0–0.2)
BASOPHILS RELATIVE PERCENT: 0.4 %
BUN BLDV-MCNC: 20 MG/DL (ref 7–20)
CALCIUM SERPL-MCNC: 9.3 MG/DL (ref 8.3–10.6)
CHLORIDE BLD-SCNC: 101 MMOL/L (ref 99–110)
CO2: 20 MMOL/L (ref 21–32)
CREAT SERPL-MCNC: 1.1 MG/DL (ref 0.8–1.3)
EOSINOPHILS ABSOLUTE: 0.4 K/UL (ref 0–0.6)
EOSINOPHILS RELATIVE PERCENT: 5.4 %
GFR AFRICAN AMERICAN: >60
GFR NON-AFRICAN AMERICAN: >60
GLUCOSE BLD-MCNC: 129 MG/DL (ref 70–99)
GLUCOSE BLD-MCNC: 140 MG/DL (ref 70–99)
GLUCOSE BLD-MCNC: 150 MG/DL (ref 70–99)
GLUCOSE BLD-MCNC: 158 MG/DL (ref 70–99)
GLUCOSE BLD-MCNC: 249 MG/DL (ref 70–99)
HCT VFR BLD CALC: 34.2 % (ref 40.5–52.5)
HEMOGLOBIN: 11.7 G/DL (ref 13.5–17.5)
LYMPHOCYTES ABSOLUTE: 1.4 K/UL (ref 1–5.1)
LYMPHOCYTES RELATIVE PERCENT: 17.8 %
MCH RBC QN AUTO: 29.3 PG (ref 26–34)
MCHC RBC AUTO-ENTMCNC: 34.1 G/DL (ref 31–36)
MCV RBC AUTO: 86 FL (ref 80–100)
MONOCYTES ABSOLUTE: 0.7 K/UL (ref 0–1.3)
MONOCYTES RELATIVE PERCENT: 8.9 %
NEUTROPHILS ABSOLUTE: 5.2 K/UL (ref 1.7–7.7)
NEUTROPHILS RELATIVE PERCENT: 67.5 %
PDW BLD-RTO: 14.8 % (ref 12.4–15.4)
PERFORMED ON: ABNORMAL
PLATELET # BLD: 317 K/UL (ref 135–450)
PMV BLD AUTO: 8.1 FL (ref 5–10.5)
POTASSIUM REFLEX MAGNESIUM: 3.9 MMOL/L (ref 3.5–5.1)
RBC # BLD: 3.98 M/UL (ref 4.2–5.9)
SODIUM BLD-SCNC: 135 MMOL/L (ref 136–145)
WBC # BLD: 7.7 K/UL (ref 4–11)

## 2021-08-27 PROCEDURE — 1200000000 HC SEMI PRIVATE

## 2021-08-27 PROCEDURE — 36415 COLL VENOUS BLD VENIPUNCTURE: CPT

## 2021-08-27 PROCEDURE — 73630 X-RAY EXAM OF FOOT: CPT

## 2021-08-27 PROCEDURE — 84134 ASSAY OF PREALBUMIN: CPT

## 2021-08-27 PROCEDURE — 80048 BASIC METABOLIC PNL TOTAL CA: CPT

## 2021-08-27 PROCEDURE — 6370000000 HC RX 637 (ALT 250 FOR IP): Performed by: INTERNAL MEDICINE

## 2021-08-27 PROCEDURE — 6360000002 HC RX W HCPCS: Performed by: INTERNAL MEDICINE

## 2021-08-27 PROCEDURE — 2580000003 HC RX 258: Performed by: INTERNAL MEDICINE

## 2021-08-27 PROCEDURE — 85025 COMPLETE CBC W/AUTO DIFF WBC: CPT

## 2021-08-27 RX ADMIN — METOPROLOL SUCCINATE 25 MG: 25 TABLET, EXTENDED RELEASE ORAL at 10:17

## 2021-08-27 RX ADMIN — INSULIN LISPRO 1 UNITS: 100 INJECTION, SOLUTION INTRAVENOUS; SUBCUTANEOUS at 10:28

## 2021-08-27 RX ADMIN — INSULIN GLARGINE 16 UNITS: 100 INJECTION, SOLUTION SUBCUTANEOUS at 00:10

## 2021-08-27 RX ADMIN — MICONAZOLE NITRATE: 20 POWDER TOPICAL at 10:17

## 2021-08-27 RX ADMIN — INSULIN LISPRO 3 UNITS: 100 INJECTION, SOLUTION INTRAVENOUS; SUBCUTANEOUS at 10:29

## 2021-08-27 RX ADMIN — CETIRIZINE HYDROCHLORIDE 10 MG: 10 TABLET, FILM COATED ORAL at 10:16

## 2021-08-27 RX ADMIN — ATORVASTATIN CALCIUM 40 MG: 40 TABLET, FILM COATED ORAL at 21:10

## 2021-08-27 RX ADMIN — PIPERACILLIN AND TAZOBACTAM 3375 MG: 3; .375 INJECTION, POWDER, LYOPHILIZED, FOR SOLUTION INTRAVENOUS at 05:20

## 2021-08-27 RX ADMIN — AMIODARONE HYDROCHLORIDE 200 MG: 200 TABLET ORAL at 10:28

## 2021-08-27 RX ADMIN — METOPROLOL SUCCINATE 25 MG: 25 TABLET, EXTENDED RELEASE ORAL at 21:10

## 2021-08-27 RX ADMIN — INSULIN LISPRO 2 UNITS: 100 INJECTION, SOLUTION INTRAVENOUS; SUBCUTANEOUS at 13:30

## 2021-08-27 RX ADMIN — INSULIN LISPRO 3 UNITS: 100 INJECTION, SOLUTION INTRAVENOUS; SUBCUTANEOUS at 18:12

## 2021-08-27 RX ADMIN — INSULIN LISPRO 3 UNITS: 100 INJECTION, SOLUTION INTRAVENOUS; SUBCUTANEOUS at 13:31

## 2021-08-27 RX ADMIN — INSULIN LISPRO 2 UNITS: 100 INJECTION, SOLUTION INTRAVENOUS; SUBCUTANEOUS at 00:09

## 2021-08-27 RX ADMIN — SODIUM CHLORIDE 25 ML: 9 INJECTION, SOLUTION INTRAVENOUS at 05:20

## 2021-08-27 RX ADMIN — Medication 10 ML: at 10:17

## 2021-08-27 RX ADMIN — POLYETHYLENE GLYCOL 3350 17 G: 17 POWDER, FOR SOLUTION ORAL at 21:10

## 2021-08-27 RX ADMIN — INSULIN GLARGINE 16 UNITS: 100 INJECTION, SOLUTION SUBCUTANEOUS at 21:11

## 2021-08-27 RX ADMIN — Medication 5 ML: at 00:58

## 2021-08-27 RX ADMIN — MICONAZOLE NITRATE: 20 POWDER TOPICAL at 21:10

## 2021-08-27 RX ADMIN — PIPERACILLIN AND TAZOBACTAM 3375 MG: 3; .375 INJECTION, POWDER, LYOPHILIZED, FOR SOLUTION INTRAVENOUS at 13:33

## 2021-08-27 RX ADMIN — PIPERACILLIN AND TAZOBACTAM 3375 MG: 3; .375 INJECTION, POWDER, LYOPHILIZED, FOR SOLUTION INTRAVENOUS at 21:53

## 2021-08-27 RX ADMIN — ATORVASTATIN CALCIUM 40 MG: 40 TABLET, FILM COATED ORAL at 10:16

## 2021-08-27 RX ADMIN — LINEZOLID 600 MG: 600 INJECTION, SOLUTION INTRAVENOUS at 11:00

## 2021-08-27 RX ADMIN — Medication 10 ML: at 21:10

## 2021-08-27 RX ADMIN — LINEZOLID 600 MG: 600 INJECTION, SOLUTION INTRAVENOUS at 21:13

## 2021-08-27 RX ADMIN — INSULIN LISPRO 2 UNITS: 100 INJECTION, SOLUTION INTRAVENOUS; SUBCUTANEOUS at 21:11

## 2021-08-27 RX ADMIN — ASPIRIN 81 MG: 81 TABLET, CHEWABLE ORAL at 10:30

## 2021-08-27 RX ADMIN — ENOXAPARIN SODIUM 40 MG: 40 INJECTION SUBCUTANEOUS at 10:30

## 2021-08-27 ASSESSMENT — PAIN SCALES - GENERAL: PAINLEVEL_OUTOF10: 0

## 2021-08-27 NOTE — PROGRESS NOTES
Urology Attending Progress Note      Subjective: FEELS BETTER    Vitals:  /83   Pulse 94   Temp 97.9 °F (36.6 °C) (Oral)   Resp 18   Ht 5' 6\" (1.676 m)   Wt 249 lb 12.8 oz (113.3 kg)   SpO2 96%   BMI 40.32 kg/m²   Temp  Av.8 °F (36.6 °C)  Min: 97.6 °F (36.4 °C)  Max: 97.9 °F (36.6 °C)  No intake or output data in the 24 hours ending 21 1123    Exam: NAD  LEFT TESTICULAR SWELLING MILDLY IMPROVED, LESS TENDER  SCROTAL ERYTHEMA REMAINS. NO ABSCESS SEEN    Labs:  WBC:    Lab Results   Component Value Date    WBC 7.7 2021     Hemoglobin/Hematocrit:    Lab Results   Component Value Date    HGB 11.7 2021    HCT 34.2 2021     BMP:    Lab Results   Component Value Date     2021    K 3.9 2021     2021    CO2 20 2021    BUN 20 2021    LABALBU 4.0 2021    CREATININE 1.1 2021    CALCIUM 9.3 2021    GFRAA >60 2021    LABGLOM >60 2021     PT/INR:    Lab Results   Component Value Date    PROTIME 20.5 2021    INR 1.77 2021     PTT:    Lab Results   Component Value Date    APTT 37.2 2020   [APTT        Impression/Plan: 74 YO WITH HUGE BLADDER STONE, VOIDING DYSFUNCTION, HYDRO, LEFT ORCHITIS   -CONTINUE ANTIBIOTICS  -HE WILL ULTIMATELY NEED THIS BLADDER STONE REMOVED (WITH POSSIBLE TURP). -TIMING IS TBD DEPENDING ON HIS CLINICAL COURSE.     Clover Cameron MD
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
## 2021-08-27 NOTE — PLAN OF CARE
Problem: Falls - Risk of:  Goal: Will remain free from falls  Description: Will remain free from falls. 8/27/2021 1024 by Calli Prince RN  Outcome: Ongoing    Problem: Skin Integrity:  Goal: Will show no infection signs and symptoms  Description: Will show no infection signs and symptoms.   Outcome: Ongoing
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
## 2021-08-27 NOTE — PROGRESS NOTES
Patient admitted to room 5512. Patient is alert and oriented. Vital signs are stable. Will continue to monitor and reassess.

## 2021-08-27 NOTE — PLAN OF CARE
Problem: Falls - Risk of:  Goal: Will remain free from falls  Description: Will remain free from falls  Outcome: Ongoing  Patient remains free from falls during this shift. Patient is up x1 person assist. Bed is in the lowest position and the bed alarm is activated. Anti-slip socks are on. Call light is within reach. Will continue to monitor and reassess. Problem: Skin Integrity:  Goal: Absence of new skin breakdown  Description: Absence of new skin breakdown  Outcome: Ongoing   Patient shows no signs of new skin breakdown thus far this shift. Will continue to monitor and reassess.

## 2021-08-27 NOTE — PROGRESS NOTES
Patient is alert and oriented x4. VSS. Tolerates diet and fluids well. Denies n/v. Up x1 assist to bathroom. Calls out appropriately. Will continue to monitor.
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
## 2021-08-28 LAB
GLUCOSE BLD-MCNC: 139 MG/DL (ref 70–99)
GLUCOSE BLD-MCNC: 146 MG/DL (ref 70–99)
GLUCOSE BLD-MCNC: 160 MG/DL (ref 70–99)
GLUCOSE BLD-MCNC: 160 MG/DL (ref 70–99)
ORGANISM: ABNORMAL
PERFORMED ON: ABNORMAL
PREALBUMIN: 14.9 MG/DL (ref 20–40)
URINE CULTURE, ROUTINE: ABNORMAL

## 2021-08-28 PROCEDURE — 6360000002 HC RX W HCPCS: Performed by: INTERNAL MEDICINE

## 2021-08-28 PROCEDURE — 1200000000 HC SEMI PRIVATE

## 2021-08-28 PROCEDURE — 2580000003 HC RX 258: Performed by: INTERNAL MEDICINE

## 2021-08-28 PROCEDURE — 6370000000 HC RX 637 (ALT 250 FOR IP): Performed by: INTERNAL MEDICINE

## 2021-08-28 PROCEDURE — 83036 HEMOGLOBIN GLYCOSYLATED A1C: CPT

## 2021-08-28 PROCEDURE — 36415 COLL VENOUS BLD VENIPUNCTURE: CPT

## 2021-08-28 RX ADMIN — INSULIN LISPRO 1 UNITS: 100 INJECTION, SOLUTION INTRAVENOUS; SUBCUTANEOUS at 08:06

## 2021-08-28 RX ADMIN — ATORVASTATIN CALCIUM 40 MG: 40 TABLET, FILM COATED ORAL at 20:30

## 2021-08-28 RX ADMIN — METOPROLOL SUCCINATE 25 MG: 25 TABLET, EXTENDED RELEASE ORAL at 08:07

## 2021-08-28 RX ADMIN — INSULIN GLARGINE 16 UNITS: 100 INJECTION, SOLUTION SUBCUTANEOUS at 20:30

## 2021-08-28 RX ADMIN — LINEZOLID 600 MG: 600 INJECTION, SOLUTION INTRAVENOUS at 10:56

## 2021-08-28 RX ADMIN — INSULIN LISPRO 1 UNITS: 100 INJECTION, SOLUTION INTRAVENOUS; SUBCUTANEOUS at 12:00

## 2021-08-28 RX ADMIN — MICONAZOLE NITRATE: 20 POWDER TOPICAL at 20:30

## 2021-08-28 RX ADMIN — INSULIN LISPRO 3 UNITS: 100 INJECTION, SOLUTION INTRAVENOUS; SUBCUTANEOUS at 17:27

## 2021-08-28 RX ADMIN — MICONAZOLE NITRATE: 20 POWDER TOPICAL at 08:05

## 2021-08-28 RX ADMIN — Medication 10 ML: at 08:10

## 2021-08-28 RX ADMIN — CETIRIZINE HYDROCHLORIDE 10 MG: 10 TABLET, FILM COATED ORAL at 08:07

## 2021-08-28 RX ADMIN — INSULIN LISPRO 1 UNITS: 100 INJECTION, SOLUTION INTRAVENOUS; SUBCUTANEOUS at 17:26

## 2021-08-28 RX ADMIN — PIPERACILLIN AND TAZOBACTAM 3375 MG: 3; .375 INJECTION, POWDER, LYOPHILIZED, FOR SOLUTION INTRAVENOUS at 14:28

## 2021-08-28 RX ADMIN — LINEZOLID 600 MG: 600 INJECTION, SOLUTION INTRAVENOUS at 20:33

## 2021-08-28 RX ADMIN — ENOXAPARIN SODIUM 40 MG: 40 INJECTION SUBCUTANEOUS at 08:07

## 2021-08-28 RX ADMIN — PIPERACILLIN AND TAZOBACTAM 3375 MG: 3; .375 INJECTION, POWDER, LYOPHILIZED, FOR SOLUTION INTRAVENOUS at 06:15

## 2021-08-28 RX ADMIN — INSULIN LISPRO 3 UNITS: 100 INJECTION, SOLUTION INTRAVENOUS; SUBCUTANEOUS at 08:06

## 2021-08-28 RX ADMIN — METOPROLOL SUCCINATE 25 MG: 25 TABLET, EXTENDED RELEASE ORAL at 20:30

## 2021-08-28 RX ADMIN — PIPERACILLIN AND TAZOBACTAM 3375 MG: 3; .375 INJECTION, POWDER, LYOPHILIZED, FOR SOLUTION INTRAVENOUS at 21:58

## 2021-08-28 RX ADMIN — AMIODARONE HYDROCHLORIDE 200 MG: 200 TABLET ORAL at 08:07

## 2021-08-28 RX ADMIN — INSULIN LISPRO 3 UNITS: 100 INJECTION, SOLUTION INTRAVENOUS; SUBCUTANEOUS at 12:01

## 2021-08-28 RX ADMIN — ASPIRIN 81 MG: 81 TABLET, CHEWABLE ORAL at 08:07

## 2021-08-28 ASSESSMENT — PAIN SCALES - GENERAL
PAINLEVEL_OUTOF10: 0
PAINLEVEL_OUTOF10: 0

## 2021-08-28 NOTE — PROGRESS NOTES
Urology Attending Progress Note      Subjective:   Continues to feel better. Pain improving. Able to void larger volumes as well    Vitals:  /80   Pulse 82   Temp 98.3 °F (36.8 °C) (Oral)   Resp 16   Ht 5' 6\" (1.676 m)   Wt 249 lb 12.8 oz (113.3 kg)   SpO2 99%   BMI 40.32 kg/m²   Temp  Av °F (36.7 °C)  Min: 97.5 °F (36.4 °C)  Max: 98.3 °F (36.8 °C)    Intake/Output Summary (Last 24 hours) at 2021 1216  Last data filed at 2021 1149  Gross per 24 hour   Intake 1767.61 ml   Output 300 ml   Net 1467.61 ml       Exam: NAD  L testicular swelling improving. B/l testicles less painful. Scrotal erythema improving.      Labs:  WBC:    Lab Results   Component Value Date    WBC 7.7 2021     Hemoglobin/Hematocrit:    Lab Results   Component Value Date    HGB 11.7 2021    HCT 34.2 2021     BMP:    Lab Results   Component Value Date     2021    K 3.9 2021     2021    CO2 20 2021    BUN 20 2021    LABALBU 4.0 2021    CREATININE 1.1 2021    CALCIUM 9.3 2021    GFRAA >60 2021    LABGLOM >60 2021     PT/INR:    Lab Results   Component Value Date    PROTIME 20.5 2021    INR 1.77 2021     PTT:    Lab Results   Component Value Date    APTT 37.2 2020   [APTT    Susceptibility    Escherichia coli (1)    Antibiotic Interpretation BOB Status    ampicillin Sensitive 4 mcg/mL     ceFAZolin Sensitive <=4 mcg/mL     cefepime Sensitive <=0.12 mcg/mL     cefTRIAXone Sensitive <=0.25 mcg/mL     ciprofloxacin Sensitive <=0.25 mcg/mL     ertapenem Sensitive <=0.12 mcg/mL     gentamicin Sensitive <=1 mcg/mL     levofloxacin Sensitive <=0.12 mcg/mL     nitrofurantoin Sensitive <=16 mcg/mL     piperacillin-tazobactam Sensitive <=4 mcg/mL     trimethoprim-sulfamethoxazole Sensitive <=20 mcg/mL             Impression/Plan: 70y M w/ known large bladder stone, voiding dysfunction and hydronephrosis who p/w UTI and L epididymoorchitis    His infection is improving dramatically. UCx with pan-sensitive E coli. Based on his clinical status and our availability, will not plan on bladder stone treatment/TURP this admission. We will arrange this as an outpatient. 1. No additional urologic intervention - OK for discharge from Urology standpoint  2. Resume AC  3. Recommend switching Abx to levofloxacin 500 mg daily for an additional 10 days  4.  We will arrange for outpatient follow up in 2 weeks       Deidre Caicedo MD
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
## 2021-08-28 NOTE — PROGRESS NOTES
Physician Progress Note      PATIENT:               Abby Melendez  CSN #:                  [id number]  :                       1950  ADMIT DATE:       2021 11:18 AM  DISCH DATE:  RESPONDING  PROVIDER #:        Bao Nelson MD          QUERY TEXT:    Pt admitted with epididymoorchitis. Pt noted to have history of CHF. If   possible, please document in progress notes and discharge summary if you are   evaluating and/or treating any of the following: The medical record reflects the following:  Risk Factors: 72 yo w/ history of systolic chf  Clinical Indicators: ECHO 10/20: EF20-25%. Severe global hypokinesis. Indeterminate diastolic function. Per PN 3/3241:  Chronic systolic CHF   (congestive heart failure) ([facility] [address]  Treatment: Home dose Toprol XL 25 mg PO daily  Options provided:  -- Chronic Systolic CHF/HFrEF  -- Other - I will add my own diagnosis  -- Disagree - Not applicable / Not valid  -- Disagree - Clinically unable to determine / Unknown  -- Refer to Clinical Documentation Reviewer    PROVIDER RESPONSE TEXT:    This patient has chronic systolic CHF/HFrEF.     Query created by: Ivone Alves on 2021 7:21 PM      Electronically signed by:  Bao Nelson MD 2021 12:02 PM

## 2021-08-29 VITALS
HEART RATE: 81 BPM | BODY MASS INDEX: 40.15 KG/M2 | HEIGHT: 66 IN | DIASTOLIC BLOOD PRESSURE: 67 MMHG | TEMPERATURE: 98.1 F | OXYGEN SATURATION: 98 % | WEIGHT: 249.8 LBS | SYSTOLIC BLOOD PRESSURE: 108 MMHG | RESPIRATION RATE: 16 BRPM

## 2021-08-29 LAB
ESTIMATED AVERAGE GLUCOSE: 154.2 MG/DL
GLUCOSE BLD-MCNC: 148 MG/DL (ref 70–99)
GLUCOSE BLD-MCNC: 159 MG/DL (ref 70–99)
GLUCOSE BLD-MCNC: 159 MG/DL (ref 70–99)
HBA1C MFR BLD: 7 %
PERFORMED ON: ABNORMAL

## 2021-08-29 PROCEDURE — 93005 ELECTROCARDIOGRAM TRACING: CPT

## 2021-08-29 PROCEDURE — 2580000003 HC RX 258: Performed by: INTERNAL MEDICINE

## 2021-08-29 PROCEDURE — 6370000000 HC RX 637 (ALT 250 FOR IP): Performed by: INTERNAL MEDICINE

## 2021-08-29 PROCEDURE — 6360000002 HC RX W HCPCS: Performed by: INTERNAL MEDICINE

## 2021-08-29 RX ORDER — LEVOFLOXACIN 500 MG/1
500 TABLET, FILM COATED ORAL DAILY
Status: DISCONTINUED | OUTPATIENT
Start: 2021-08-29 | End: 2021-08-29 | Stop reason: HOSPADM

## 2021-08-29 RX ORDER — AMIODARONE HYDROCHLORIDE 200 MG/1
200 TABLET ORAL DAILY
COMMUNITY
Start: 2021-08-30 | End: 2021-09-02

## 2021-08-29 RX ORDER — ATORVASTATIN CALCIUM 40 MG/1
40 TABLET, FILM COATED ORAL NIGHTLY
Qty: 30 TABLET | Refills: 3 | Status: SHIPPED | OUTPATIENT
Start: 2021-08-29 | End: 2022-08-31 | Stop reason: ALTCHOICE

## 2021-08-29 RX ORDER — LEVOFLOXACIN 500 MG/1
500 TABLET, FILM COATED ORAL DAILY
Qty: 9 TABLET | Refills: 0 | Status: SHIPPED | OUTPATIENT
Start: 2021-08-29 | End: 2021-09-07

## 2021-08-29 RX ADMIN — SODIUM CHLORIDE 25 ML: 9 INJECTION, SOLUTION INTRAVENOUS at 09:06

## 2021-08-29 RX ADMIN — INSULIN LISPRO 3 UNITS: 100 INJECTION, SOLUTION INTRAVENOUS; SUBCUTANEOUS at 17:14

## 2021-08-29 RX ADMIN — INSULIN LISPRO 3 UNITS: 100 INJECTION, SOLUTION INTRAVENOUS; SUBCUTANEOUS at 13:02

## 2021-08-29 RX ADMIN — CETIRIZINE HYDROCHLORIDE 10 MG: 10 TABLET, FILM COATED ORAL at 08:25

## 2021-08-29 RX ADMIN — METOPROLOL SUCCINATE 25 MG: 25 TABLET, EXTENDED RELEASE ORAL at 08:26

## 2021-08-29 RX ADMIN — PIPERACILLIN AND TAZOBACTAM 3375 MG: 3; .375 INJECTION, POWDER, LYOPHILIZED, FOR SOLUTION INTRAVENOUS at 06:20

## 2021-08-29 RX ADMIN — ENOXAPARIN SODIUM 40 MG: 40 INJECTION SUBCUTANEOUS at 08:26

## 2021-08-29 RX ADMIN — LEVOFLOXACIN 500 MG: 500 TABLET, FILM COATED ORAL at 15:51

## 2021-08-29 RX ADMIN — INSULIN LISPRO 3 UNITS: 100 INJECTION, SOLUTION INTRAVENOUS; SUBCUTANEOUS at 08:27

## 2021-08-29 RX ADMIN — PIPERACILLIN AND TAZOBACTAM 3375 MG: 3; .375 INJECTION, POWDER, LYOPHILIZED, FOR SOLUTION INTRAVENOUS at 14:10

## 2021-08-29 RX ADMIN — AMIODARONE HYDROCHLORIDE 200 MG: 200 TABLET ORAL at 08:26

## 2021-08-29 RX ADMIN — INSULIN LISPRO 1 UNITS: 100 INJECTION, SOLUTION INTRAVENOUS; SUBCUTANEOUS at 08:26

## 2021-08-29 RX ADMIN — MICONAZOLE NITRATE: 20 POWDER TOPICAL at 08:26

## 2021-08-29 RX ADMIN — LINEZOLID 600 MG: 600 INJECTION, SOLUTION INTRAVENOUS at 09:07

## 2021-08-29 RX ADMIN — ASPIRIN 81 MG: 81 TABLET, CHEWABLE ORAL at 08:26

## 2021-08-29 RX ADMIN — INSULIN LISPRO 1 UNITS: 100 INJECTION, SOLUTION INTRAVENOUS; SUBCUTANEOUS at 13:01

## 2021-08-29 RX ADMIN — INSULIN LISPRO 1 UNITS: 100 INJECTION, SOLUTION INTRAVENOUS; SUBCUTANEOUS at 17:14

## 2021-08-29 RX ADMIN — Medication 10 ML: at 08:25

## 2021-08-29 NOTE — PROGRESS NOTES
Pt alert and oriented x4, VSS, IV ns locked in right arm with intermittent infusions of IV antibiotics. Pt ambulated to bathroom with standby assist and tolerated it well. Scrotum is swollen and reddened. Left foot ace wrapped. Dressing is clean, dry and intact. Bed alarm on, bedside table and call light in reach.

## 2021-08-29 NOTE — CARE COORDINATION
Case management following for discharge orders. Per, RN, the MD would like [facility] to follow up with dressing changes at home. Will make a referral once orders are entered into Epic.     Maine Burns RN  Case Management  [phone]

## 2021-08-29 NOTE — PLAN OF CARE
Problem: Falls - Risk of:  Goal: Will remain free from falls  Description: Will remain free from falls  Outcome: Ongoing   Pt has been free from falls this shift, bed alarm on, bed in lowest position, 2/4 side rails up, nonskid socks on, wheels locked, bedside table and call light in reach. Encouraged pt to call out if needed anything. Problem: Skin Integrity:  Goal: Will show no infection signs and symptoms  Description: Will show no infection signs and symptoms  Outcome: Ongoing   Pt able to turn and reposition self. Pt has scattered bruising and abrasions. Wound on left foot is wrapped with ace wrap. Dressing is clean, dry and intact. Black sore on top of right great toe. Pt has redness under abdominal folds, applied miconazole powder. Will continue to assess skin integrity.
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
## 2021-08-29 NOTE — PROGRESS NOTES
Secure message sent to Dr. Stephon Robles called respiratory to come do EKG and patient states about half hour ago started getting dizzy and still is.  /67, HR 82, Sp02 97% and temp 98.1

## 2021-08-29 NOTE — PROGRESS NOTES
Urology Attending Progress Note      Subjective:   Continues to feel better. Pain improving. Vitals:  /70   Pulse 71   Temp 97.9 °F (36.6 °C) (Oral)   Resp 16   Ht 5' 6\" (1.676 m)   Wt 249 lb 12.8 oz (113.3 kg)   SpO2 98%   BMI 40.32 kg/m²   Temp  Av °F (36.7 °C)  Min: 97.9 °F (36.6 °C)  Max: 98.3 °F (36.8 °C)    Intake/Output Summary (Last 24 hours) at 2021 0721  Last data filed at 2021 2228  Gross per 24 hour   Intake 2357.01 ml   Output --   Net 2357.01 ml       Exam: NAD  L testicular swelling improving. B/l testicles less painful. Scrotal erythema improving.      Labs:  WBC:    Lab Results   Component Value Date    WBC 7.7 2021     Hemoglobin/Hematocrit:    Lab Results   Component Value Date    HGB 11.7 2021    HCT 34.2 2021     BMP:    Lab Results   Component Value Date     2021    K 3.9 2021     2021    CO2 20 2021    BUN 20 2021    LABALBU 4.0 2021    CREATININE 1.1 2021    CALCIUM 9.3 2021    GFRAA >60 2021    LABGLOM >60 2021     PT/INR:    Lab Results   Component Value Date    PROTIME 20.5 2021    INR 1.77 2021     PTT:    Lab Results   Component Value Date    APTT 37.2 2020   [APTT    Susceptibility    Escherichia coli (1)    Antibiotic Interpretation BOB Status    ampicillin Sensitive 4 mcg/mL     ceFAZolin Sensitive <=4 mcg/mL     cefepime Sensitive <=0.12 mcg/mL     cefTRIAXone Sensitive <=0.25 mcg/mL     ciprofloxacin Sensitive <=0.25 mcg/mL     ertapenem Sensitive <=0.12 mcg/mL     gentamicin Sensitive <=1 mcg/mL     levofloxacin Sensitive <=0.12 mcg/mL     nitrofurantoin Sensitive <=16 mcg/mL     piperacillin-tazobactam Sensitive <=4 mcg/mL     trimethoprim-sulfamethoxazole Sensitive <=20 mcg/mL             Impression/Plan: 70y M w/ known large bladder stone, voiding dysfunction and hydronephrosis who p/w UTI and L epididymoorchitis    His infection is improving. UCx with pan-sensitive E coli. Based on his clinical status and our availability, will not plan on bladder stone treatment/TURP this admission. We will arrange this as an outpatient. 1. No additional urologic intervention - OK for discharge from Urology standpoint  2. Resume AC  3. Recommend switching Abx to levofloxacin 500 mg daily for an additional 10 days  4.  We will arrange for outpatient follow up in 2 weeks     We will sign off, please call with questions      Mariann Zhou MD
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
## 2021-08-29 NOTE — PROGRESS NOTES
Per Dr. Luis Fisher patient is stable after reviewing EKG and said patient is to be discharged. Also per Dr. Luis Fisher said no longer needs home health and patient is also able to drive himself home. Has not received narcotics. Discharge instructions given to patient. Explained when to follow up and with who. Also educated on all new medications purpose and possible side effects. Explained that prescriptions at [facility] in [facility]. Also instructed not to take lasix until 9/1/21. Explained to change dressings with banaide daily. Verbalized understanding and removed IV. Got himself dressed. Walked to bathroom and then to wheelchair. Then taken to car.

## 2021-08-31 LAB
C. TRACHOMATIS DNA ,URINE: NEGATIVE
EKG ATRIAL RATE: 79 BPM
EKG DIAGNOSIS: NORMAL
EKG P AXIS: 71 DEGREES
EKG P-R INTERVAL: 216 MS
EKG Q-T INTERVAL: 426 MS
EKG QRS DURATION: 94 MS
EKG QTC CALCULATION (BAZETT): 488 MS
EKG R AXIS: 73 DEGREES
EKG T AXIS: 97 DEGREES
EKG VENTRICULAR RATE: 79 BPM
N. GONORRHOEAE DNA, URINE: NEGATIVE

## 2021-09-02 ENCOUNTER — NURSE ONLY (OUTPATIENT)
Dept: CARDIOLOGY CLINIC | Age: 71
End: 2021-09-02
Payer: MEDICAID

## 2021-09-02 ENCOUNTER — OFFICE VISIT (OUTPATIENT)
Dept: CARDIOLOGY CLINIC | Age: 71
End: 2021-09-02
Payer: MEDICAID

## 2021-09-02 VITALS
HEART RATE: 90 BPM | SYSTOLIC BLOOD PRESSURE: 124 MMHG | BODY MASS INDEX: 39.7 KG/M2 | WEIGHT: 247 LBS | DIASTOLIC BLOOD PRESSURE: 64 MMHG | HEIGHT: 66 IN

## 2021-09-02 DIAGNOSIS — I50.22 CHRONIC SYSTOLIC (CONGESTIVE) HEART FAILURE (HCC): ICD-10-CM

## 2021-09-02 DIAGNOSIS — I48.0 PAROXYSMAL ATRIAL FIBRILLATION (HCC): Primary | ICD-10-CM

## 2021-09-02 DIAGNOSIS — I25.10 CAD IN NATIVE ARTERY: ICD-10-CM

## 2021-09-02 DIAGNOSIS — I48.91 NEW ONSET A-FIB (HCC): ICD-10-CM

## 2021-09-02 DIAGNOSIS — E78.2 MIXED HYPERLIPIDEMIA: ICD-10-CM

## 2021-09-02 DIAGNOSIS — Z95.810 ICD (IMPLANTABLE CARDIOVERTER-DEFIBRILLATOR) IN PLACE: ICD-10-CM

## 2021-09-02 DIAGNOSIS — I42.0 DILATED CARDIOMYOPATHY (HCC): ICD-10-CM

## 2021-09-02 DIAGNOSIS — I48.0 PAROXYSMAL ATRIAL FIBRILLATION (HCC): ICD-10-CM

## 2021-09-02 DIAGNOSIS — Z95.810 AICD (AUTOMATIC CARDIOVERTER/DEFIBRILLATOR) PRESENT: ICD-10-CM

## 2021-09-02 DIAGNOSIS — Z95.810 ICD (IMPLANTABLE CARDIOVERTER-DEFIBRILLATOR), DUAL, IN SITU: ICD-10-CM

## 2021-09-02 DIAGNOSIS — I25.5 ISCHEMIC CARDIOMYOPATHY: ICD-10-CM

## 2021-09-02 PROCEDURE — 4040F PNEUMOC VAC/ADMIN/RCVD: CPT | Performed by: INTERNAL MEDICINE

## 2021-09-02 PROCEDURE — 99215 OFFICE O/P EST HI 40 MIN: CPT | Performed by: INTERNAL MEDICINE

## 2021-09-02 PROCEDURE — 1111F DSCHRG MED/CURRENT MED MERGE: CPT | Performed by: INTERNAL MEDICINE

## 2021-09-02 PROCEDURE — G8417 CALC BMI ABV UP PARAM F/U: HCPCS | Performed by: INTERNAL MEDICINE

## 2021-09-02 PROCEDURE — 1123F ACP DISCUSS/DSCN MKR DOCD: CPT | Performed by: INTERNAL MEDICINE

## 2021-09-02 PROCEDURE — G8427 DOCREV CUR MEDS BY ELIG CLIN: HCPCS | Performed by: INTERNAL MEDICINE

## 2021-09-02 PROCEDURE — 93290 INTERROG DEV EVAL ICPMS IP: CPT | Performed by: INTERNAL MEDICINE

## 2021-09-02 PROCEDURE — 3017F COLORECTAL CA SCREEN DOC REV: CPT | Performed by: INTERNAL MEDICINE

## 2021-09-02 PROCEDURE — 93283 PRGRMG EVAL IMPLANTABLE DFB: CPT | Performed by: INTERNAL MEDICINE

## 2021-09-02 PROCEDURE — 1036F TOBACCO NON-USER: CPT | Performed by: INTERNAL MEDICINE

## 2021-09-02 RX ORDER — GINSENG 100 MG
CAPSULE ORAL
COMMUNITY
Start: 2021-08-07 | End: 2022-08-22

## 2021-09-02 RX ORDER — LANOLIN ALCOHOL/MO/W.PET/CERES
CREAM (GRAM) TOPICAL
COMMUNITY
Start: 2021-08-13 | End: 2022-08-22

## 2021-09-02 RX ORDER — ACETAMINOPHEN 325 MG/1
650 TABLET ORAL EVERY 6 HOURS PRN
COMMUNITY
Start: 2021-05-06 | End: 2022-08-22

## 2021-09-02 RX ORDER — OXYCODONE HYDROCHLORIDE 5 MG/1
TABLET ORAL
COMMUNITY
Start: 2021-08-15 | End: 2022-08-22

## 2021-09-02 RX ORDER — FOLIC ACID 1 MG/1
TABLET ORAL
COMMUNITY
Start: 2021-08-13 | End: 2022-08-22

## 2021-09-02 NOTE — PATIENT INSTRUCTIONS
Dofetilide Loading    Date of Admission: TBD    Time of Arrival: TBD    Admitting cardiologist: Dr. Rajesh Stringer    · Arrive at [facility] through the main entrance. Check in at the main desk. Be prepared to show photo ID and insurance card. · Be prepared for a 3 day admission to the hospital.  If you do not convert to a normal rhythm, you will have an external cardioversion on the 2nd or 3rd day. · Do NOT take your routine medications the morning of being admitted. · Do NOT stop taking aspirin, Plavix, coumadin, Eliquis, Xarelto, or Pradaxa (any blood thinners). · Please bring a list of your medications to the hospital with you. · You must have someone available to drive you home when you are discharged. It is recommended that you do not drive for 24 hours after the cardioversion. · Lab work is due 3 days prior to admission. · If you are unable to make this appointment, please call [facility] at [phone].
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
## 2021-09-11 ENCOUNTER — TELEPHONE (OUTPATIENT)
Dept: OTHER | Facility: CLINIC | Age: 71
End: 2021-09-11

## 2021-09-11 ENCOUNTER — HOSPITAL ENCOUNTER (EMERGENCY)
Age: 71
Discharge: HOME | End: 2021-09-11
Attending: EMERGENCY MEDICINE
Payer: MEDICAID

## 2021-09-11 ENCOUNTER — APPOINTMENT (OUTPATIENT)
Dept: GENERAL RADIOLOGY | Age: 71
End: 2021-09-11
Payer: MEDICAID

## 2021-09-11 VITALS
HEART RATE: 96 BPM | BODY MASS INDEX: 41.92 KG/M2 | TEMPERATURE: 101.2 F | OXYGEN SATURATION: 95 % | RESPIRATION RATE: 19 BRPM | HEIGHT: 67 IN | DIASTOLIC BLOOD PRESSURE: 79 MMHG | WEIGHT: 267.1 LBS | SYSTOLIC BLOOD PRESSURE: 121 MMHG

## 2021-09-11 DIAGNOSIS — R06.00 DYSPNEA, UNSPECIFIED TYPE: Primary | ICD-10-CM

## 2021-09-11 DIAGNOSIS — B34.9 VIRAL ILLNESS: ICD-10-CM

## 2021-09-11 LAB
ANION GAP SERPL CALCULATED.3IONS-SCNC: 13 MMOL/L (ref 3–16)
BASE EXCESS VENOUS: 0.7 MMOL/L (ref -2–3)
BASOPHILS ABSOLUTE: 0.1 K/UL (ref 0–0.2)
BASOPHILS RELATIVE PERCENT: 0.5 %
BUN BLDV-MCNC: 19 MG/DL (ref 7–20)
CALCIUM SERPL-MCNC: 8.7 MG/DL (ref 8.3–10.6)
CARBOXYHEMOGLOBIN: 1.9 % (ref 0–1.5)
CHLORIDE BLD-SCNC: 101 MMOL/L (ref 99–110)
CO2: 23 MMOL/L (ref 21–32)
CREAT SERPL-MCNC: 1.1 MG/DL (ref 0.8–1.3)
EOSINOPHILS ABSOLUTE: 0.4 K/UL (ref 0–0.6)
EOSINOPHILS RELATIVE PERCENT: 3.5 %
GFR AFRICAN AMERICAN: >60
GFR NON-AFRICAN AMERICAN: >60
GLUCOSE BLD-MCNC: 166 MG/DL (ref 70–99)
HCO3 VENOUS: 25.5 MMOL/L (ref 24–28)
HCT VFR BLD CALC: 35.8 % (ref 40.5–52.5)
HEMOGLOBIN, VEN, REDUCED: 24.1 %
HEMOGLOBIN: 12 G/DL (ref 13.5–17.5)
INR BLD: 1.41 (ref 0.88–1.12)
LACTIC ACID: 1.7 MMOL/L (ref 0.4–2)
LYMPHOCYTES ABSOLUTE: 0.7 K/UL (ref 1–5.1)
LYMPHOCYTES RELATIVE PERCENT: 6.4 %
MCH RBC QN AUTO: 30 PG (ref 26–34)
MCHC RBC AUTO-ENTMCNC: 33.5 G/DL (ref 31–36)
MCV RBC AUTO: 89.7 FL (ref 80–100)
METHEMOGLOBIN VENOUS: 0.3 % (ref 0–1.5)
MONOCYTES ABSOLUTE: 0.8 K/UL (ref 0–1.3)
MONOCYTES RELATIVE PERCENT: 7 %
NEUTROPHILS ABSOLUTE: 9.6 K/UL (ref 1.7–7.7)
NEUTROPHILS RELATIVE PERCENT: 82.6 %
O2 SAT, VEN: 75 %
PCO2, VEN: 40.4 MMHG (ref 41–51)
PDW BLD-RTO: 16.2 % (ref 12.4–15.4)
PH VENOUS: 7.41 (ref 7.35–7.45)
PLATELET # BLD: 193 K/UL (ref 135–450)
PMV BLD AUTO: 8.7 FL (ref 5–10.5)
PO2, VEN: 38.6 MMHG (ref 25–40)
POTASSIUM SERPL-SCNC: 3.7 MMOL/L (ref 3.5–5.1)
PRO-BNP: 2826 PG/ML (ref 0–124)
PROCALCITONIN: 0.06 NG/ML (ref 0–0.15)
PROTHROMBIN TIME: 16.2 SEC (ref 9.9–12.7)
RBC # BLD: 3.99 M/UL (ref 4.2–5.9)
SODIUM BLD-SCNC: 137 MMOL/L (ref 136–145)
TCO2 CALC VENOUS: 27 MMOL/L
TROPONIN: <0.01 NG/ML
WBC # BLD: 11.7 K/UL (ref 4–11)

## 2021-09-11 PROCEDURE — 84484 ASSAY OF TROPONIN QUANT: CPT

## 2021-09-11 PROCEDURE — 71046 X-RAY EXAM CHEST 2 VIEWS: CPT

## 2021-09-11 PROCEDURE — 82803 BLOOD GASES ANY COMBINATION: CPT

## 2021-09-11 PROCEDURE — 84145 PROCALCITONIN (PCT): CPT

## 2021-09-11 PROCEDURE — U0005 INFEC AGEN DETEC AMPLI PROBE: HCPCS

## 2021-09-11 PROCEDURE — 6370000000 HC RX 637 (ALT 250 FOR IP): Performed by: STUDENT IN AN ORGANIZED HEALTH CARE EDUCATION/TRAINING PROGRAM

## 2021-09-11 PROCEDURE — 80048 BASIC METABOLIC PNL TOTAL CA: CPT

## 2021-09-11 PROCEDURE — 93005 ELECTROCARDIOGRAM TRACING: CPT | Performed by: EMERGENCY MEDICINE

## 2021-09-11 PROCEDURE — 83880 ASSAY OF NATRIURETIC PEPTIDE: CPT

## 2021-09-11 PROCEDURE — 99284 EMERGENCY DEPT VISIT MOD MDM: CPT

## 2021-09-11 PROCEDURE — U0003 INFECTIOUS AGENT DETECTION BY NUCLEIC ACID (DNA OR RNA); SEVERE ACUTE RESPIRATORY SYNDROME CORONAVIRUS 2 (SARS-COV-2) (CORONAVIRUS DISEASE [COVID-19]), AMPLIFIED PROBE TECHNIQUE, MAKING USE OF HIGH THROUGHPUT TECHNOLOGIES AS DESCRIBED BY CMS-2020-01-R: HCPCS

## 2021-09-11 PROCEDURE — 87040 BLOOD CULTURE FOR BACTERIA: CPT

## 2021-09-11 PROCEDURE — 94640 AIRWAY INHALATION TREATMENT: CPT

## 2021-09-11 PROCEDURE — 36415 COLL VENOUS BLD VENIPUNCTURE: CPT

## 2021-09-11 PROCEDURE — 85025 COMPLETE CBC W/AUTO DIFF WBC: CPT

## 2021-09-11 PROCEDURE — 83605 ASSAY OF LACTIC ACID: CPT

## 2021-09-11 PROCEDURE — 85610 PROTHROMBIN TIME: CPT

## 2021-09-11 RX ORDER — ACETAMINOPHEN 325 MG/1
650 TABLET ORAL ONCE
Status: COMPLETED | OUTPATIENT
Start: 2021-09-11 | End: 2021-09-11

## 2021-09-11 RX ORDER — IPRATROPIUM BROMIDE AND ALBUTEROL SULFATE 2.5; .5 MG/3ML; MG/3ML
1 SOLUTION RESPIRATORY (INHALATION) ONCE
Status: COMPLETED | OUTPATIENT
Start: 2021-09-11 | End: 2021-09-11

## 2021-09-11 RX ADMIN — ACETAMINOPHEN 650 MG: 325 TABLET ORAL at 19:34

## 2021-09-11 RX ADMIN — IPRATROPIUM BROMIDE AND ALBUTEROL SULFATE 1 AMPULE: .5; 3 SOLUTION RESPIRATORY (INHALATION) at 18:55

## 2021-09-11 ASSESSMENT — PAIN SCALES - GENERAL: PAINLEVEL_OUTOF10: 0

## 2021-09-11 NOTE — ED PROVIDER NOTES
Non-tender. Musculoskeletal:  No long bone deformity. Vascular:  Extremities warm and perfused. Normal pulses in all 4 extremities  Skin:  Dry, no rashes  Extremities: Trace bilateral lower extremity edema. Neuro: Alert. Moves all four extremities to command. Sensation grossly intact to light touch. Speech and mentation normal. No focal deficit. Gait narrow and stable.      Diagnostic Results     EKG   Interpreted in conjunction with emergency department physician No att. providers found  Rhythm: normal sinus   Rate: normal  Axis: normal  Ectopy: none  Conduction: normal  ST Segments: no acute change  T Waves:no acute change  Q Waves: none  Clinical Impression: no acute changes  Comparison:  9/2/2021    RADIOLOGY:  XR CHEST (2 VW)    (Results Pending)       LABS:   Results for orders placed or performed during the hospital encounter of 09/11/21   Blood gas, venous (Lab)   Result Value Ref Range    pH, Erick 7.408 7.350 - 7.450    pCO2, Erick 40.4 (L) 41.0 - 51.0 mmHg    pO2, Erick 38.6 25 - 40 mmHg    HCO3, Venous 25.5 24.0 - 28.0 mmol/L    Base Excess, Erick 0.7 -2.0 - 3.0 mmol/L    O2 Sat, Erick 75 Not established %    Carboxyhemoglobin 1.9 (H) 0.0 - 1.5 %    MetHgb, Erick 0.3 0.0 - 1.5 %    TC02 (Calc), Erick 27 mmol/L    Hemoglobin, Erick, Reduced 24.10 %   CBC auto differential   Result Value Ref Range    WBC 11.7 (H) 4.0 - 11.0 K/uL    RBC 3.99 (L) 4.20 - 5.90 M/uL    Hemoglobin 12.0 (L) 13.5 - 17.5 g/dL    Hematocrit 35.8 (L) 40.5 - 52.5 %    MCV 89.7 80.0 - 100.0 fL    MCH 30.0 26.0 - 34.0 pg    MCHC 33.5 31.0 - 36.0 g/dL    RDW 16.2 (H) 12.4 - 15.4 %    Platelets 091 162 - 094 K/uL    MPV 8.7 5.0 - 10.5 fL    Neutrophils % 82.6 %    Lymphocytes % 6.4 %    Monocytes % 7.0 %    Eosinophils % 3.5 %    Basophils % 0.5 %    Neutrophils Absolute 9.6 (H) 1.7 - 7.7 K/uL    Lymphocytes Absolute 0.7 (L) 1.0 - 5.1 K/uL    Monocytes Absolute 0.8 0.0 - 1.3 K/uL    Eosinophils Absolute 0.4 0.0 - 0.6 K/uL    Basophils Absolute 0.1 0.0 - 0.2 K/uL       RECENT VITALS:  BP: 121/79, Temp: 101.2 °F (38.4 °C), Pulse: 96,Resp: 19, SpO2: 95 %     Procedures     ED Course     Nursing Notes, Past Medical Hx, Past Surgical Hx, Social Hx, Allergies, and Family Hx were reviewed. The patient was given the followingmedications:  Orders Placed This Encounter   Medications    ipratropium-albuterol (DUONEB) nebulizer solution 1 ampule     Order Specific Question:   Initiate RT Bronchodilator Protocol     Answer: Yes    acetaminophen (TYLENOL) tablet 650 mg       CONSULTS:  None    MEDICAL DECISION MAKING / ASSESSMENT / Eva Makua is a 73 y.o. male with a history and presentation as described above in HPI. The patient was evaluated by myself and the ED Attending Physician, Dr. Rochelle Ortiz. All management and disposition plans were discussed and agreed upon. Upon presentation, the patient was non-toxic appearing and had vitals notable for fever to 101.2 F, borderline oxygen saturation of 93% on room air at rest.  Patient with history of CAD, CHF presenting with fever and 3 days of productive cough and shortness of breath in setting of unvaccinated status against COVID-19. Exam notable only for some mild and expiratory wheezes. No evidence of volume overload on exam.  Broad work-up initiated for infectious source of symptoms as well as to evaluate for cardiac stress or CHF exacerbation. Duoneb ordered in setting of shortness of breath and wheezing. EKG obtained and without ST or T wave abnormalities concerning for acute ischemia. Appears similar to prior EKG dated 9/2/2021. COVID-19 testing obtained. Laboratory studies, CXR pending at time of signout to oncoming provider. Medications received during this ED visit:    Medications   acetaminophen (TYLENOL) tablet 650 mg (has no administration in time range)   ipratropium-albuterol (DUONEB) nebulizer solution 1 ampule (1 ampule Inhalation Given 9/11/21 1855)       Clinical Impression     1. Dyspnea, unspecified type        Disposition     PATIENT REFERRED TO:  No follow-up provider specified. DISCHARGE MEDICATIONS:  New Prescriptions    No medications on file       DISPOSITION     Sign out: At this time I am going off-service and will be signing out care of this patient to my colleague Dr. Geovanny Rosado for further care. Please refer to reassessment note for updates. My colleague's responsibilities will include: follow-up laboratory studies and chest x-ray, disposition.         Claudia Haynes MD  09/11/21 4490

## 2021-09-11 NOTE — ED PROVIDER NOTES
ED Attending Attestation Note     Date of evaluation: 9/11/2021    This patient was seen by the resident. I have seen and examined the patient, agree with the workup, evaluation, management and diagnosis. The care plan has been discussed. I have reviewed the ECG and concur with the resident's interpretation. I was present for any procedures performed in the resident's  note and have made edits to the note where appropriate. My assessment reveals 73 y.o. male with history of dilated cardiomyopathy, CAD, AICD placement, atrial fibrillation, diabetes presenting to the emergency department today for shortness of breath and fever. He is nontoxic-appearing, respirations unlabored, however oxygen saturations in the low 90s at rest on room air. Highly suspect Covid is he is not vaccinated. Will obtain broad labs to assess for volume overload as well.          Darcy Rudd MD  09/11/21 5850

## 2021-09-11 NOTE — ED PROVIDER NOTES
[address] [facility] RESIDENT NOTE     Date of evaluation: 9/11/2021    ADDENDUM:      Care of this patient was assumed from Dr. Stacey Blanco. The patient was seen for Shortness of Breath (started at 1700 took phenlyephrine at 1500 then started feeling sob)    The patient's initial evaluation and plan have been discussed with the prior provider who initially evaluated the patient. Nursing Notes, Past Medical Hx, Past Surgical Hx, Social Hx, Allergies, and Family Hx were all reviewed. Diagnostic Results       RADIOLOGY:  XR CHEST (2 VW)   Final Result      No acute pulmonary disease. Stable mild cardiomegaly.           LABS:   Results for orders placed or performed during the hospital encounter of 09/11/21   Troponin   Result Value Ref Range    Troponin <0.01 <0.01 ng/mL   Blood gas, venous (Lab)   Result Value Ref Range    pH, Erick 7.408 7.350 - 7.450    pCO2, Erick 40.4 (L) 41.0 - 51.0 mmHg    pO2, Erick 38.6 25 - 40 mmHg    HCO3, Venous 25.5 24.0 - 28.0 mmol/L    Base Excess, Erick 0.7 -2.0 - 3.0 mmol/L    O2 Sat, Erick 75 Not established %    Carboxyhemoglobin 1.9 (H) 0.0 - 1.5 %    MetHgb, Erick 0.3 0.0 - 1.5 %    TC02 (Calc), Erick 27 mmol/L    Hemoglobin, Erick, Reduced 24.10 %   CBC auto differential   Result Value Ref Range    WBC 11.7 (H) 4.0 - 11.0 K/uL    RBC 3.99 (L) 4.20 - 5.90 M/uL    Hemoglobin 12.0 (L) 13.5 - 17.5 g/dL    Hematocrit 35.8 (L) 40.5 - 52.5 %    MCV 89.7 80.0 - 100.0 fL    MCH 30.0 26.0 - 34.0 pg    MCHC 33.5 31.0 - 36.0 g/dL    RDW 16.2 (H) 12.4 - 15.4 %    Platelets 353 611 - 524 K/uL    MPV 8.7 5.0 - 10.5 fL    Neutrophils % 82.6 %    Lymphocytes % 6.4 %    Monocytes % 7.0 %    Eosinophils % 3.5 %    Basophils % 0.5 %    Neutrophils Absolute 9.6 (H) 1.7 - 7.7 K/uL    Lymphocytes Absolute 0.7 (L) 1.0 - 5.1 K/uL    Monocytes Absolute 0.8 0.0 - 1.3 K/uL    Eosinophils Absolute 0.4 0.0 - 0.6 K/uL    Basophils Absolute 0.1 0.0 - 0.2 K/uL   PT - INR   Result Value Ref Range    Protime 16.2 (H) 9.9 - 12.7 sec    INR 1.41 (H) 0.88 - 1.12   Brain Natriuretic Peptide   Result Value Ref Range    Pro-BNP 2,826 (H) 0 - 124 pg/mL   Basic Metabolic Panel   Result Value Ref Range    Sodium 137 136 - 145 mmol/L    Potassium 3.7 3.5 - 5.1 mmol/L    Chloride 101 99 - 110 mmol/L    CO2 23 21 - 32 mmol/L    Anion Gap 13 3 - 16    Glucose 166 (H) 70 - 99 mg/dL    BUN 19 7 - 20 mg/dL    CREATININE 1.1 0.8 - 1.3 mg/dL    GFR Non-African American >60 >60    GFR African American >60 >60    Calcium 8.7 8.3 - 10.6 mg/dL   Lactate, plasma   Result Value Ref Range    Lactic Acid 1.7 0.4 - 2.0 mmol/L   Procalcitonin   Result Value Ref Range    Procalcitonin 0.06 0.00 - 0.15 ng/mL       RECENT VITALS:  BP: 121/79, Temp: 101.2 °F (38.4 °C), Pulse: 96, Resp: 19, SpO2: 95 %       ED Course     The patient was given the following medications:  Orders Placed This Encounter   Medications    ipratropium-albuterol (DUONEB) nebulizer solution 1 ampule     Order Specific Question:   Initiate RT Bronchodilator Protocol     Answer: Yes    acetaminophen (TYLENOL) tablet 650 mg       CONSULTS:  None    MEDICAL DECISION MAKING / ASSESSMENT / PLAN     Sameer Velarde is a 73 y.o. male with pmhx atrial fibrillation, AICD, CAD s/p CABG March 2020, HTN, DM, CHF who presented with SOB and productive cough. Pt unvaccinated for COVID. On my reassessment, patient has greater than 95% saturation on room air, and when walking him around the room he continues to saturate well. Patient has been swabbed for Covid with test pending. At this time given he is hemodynamically stable, plan for discharge with outpatient follow-up and Covid isolation instructions. I have discussed strict return precautions with the patient and included information about taking pulse ox at home to monitor symptoms. This patient was also evaluated by the attending physician. All care plans were discussed and agreed upon.     Clinical Impression 1. Dyspnea, unspecified type    2. Viral illness        Disposition     PATIENT REFERRED TO:  No follow-up provider specified.     DISCHARGE MEDICATIONS:  Discharge Medication List as of 9/11/2021  8:44 PM          DISPOSITION Decision To Discharge 09/11/2021 08:22:11 PM          Zayda Cardoso MD  Resident  09/12/21 2728

## 2021-09-11 NOTE — TELEPHONE ENCOUNTER
Writer contacted Dr. Mitul Esparza to inform of 30 day readmission risk. Dr. Mitul Esparza informed writer there is no decision on disposition at this time.

## 2021-09-12 LAB
EKG ATRIAL RATE: 97 BPM
EKG DIAGNOSIS: NORMAL
EKG P AXIS: 74 DEGREES
EKG P-R INTERVAL: 192 MS
EKG Q-T INTERVAL: 356 MS
EKG QRS DURATION: 84 MS
EKG QTC CALCULATION (BAZETT): 452 MS
EKG R AXIS: 70 DEGREES
EKG T AXIS: 102 DEGREES
EKG VENTRICULAR RATE: 97 BPM
SARS-COV-2: NOT DETECTED

## 2021-09-12 NOTE — ED NOTES
Patient discharged to home via family. Written discharge instructions reviewed with understanding. Copy of AVS sent home with patient. Patient able to walk from ED without assistance.        Marleny Strange RN  09/11/21 1463

## 2021-09-15 LAB
BLOOD CULTURE, ROUTINE: NORMAL
CULTURE, BLOOD 2: NORMAL

## 2021-09-16 ENCOUNTER — OFFICE VISIT (OUTPATIENT)
Dept: CARDIOLOGY CLINIC | Age: 71
End: 2021-09-16
Payer: MEDICAID

## 2021-09-16 VITALS
WEIGHT: 259 LBS | DIASTOLIC BLOOD PRESSURE: 80 MMHG | SYSTOLIC BLOOD PRESSURE: 124 MMHG | HEART RATE: 68 BPM | BODY MASS INDEX: 40.57 KG/M2

## 2021-09-16 DIAGNOSIS — Z95.1 HX OF CABG: ICD-10-CM

## 2021-09-16 DIAGNOSIS — I25.10 CAD IN NATIVE ARTERY: Primary | ICD-10-CM

## 2021-09-16 DIAGNOSIS — Z95.810 ICD (IMPLANTABLE CARDIOVERTER-DEFIBRILLATOR) IN PLACE: ICD-10-CM

## 2021-09-16 DIAGNOSIS — I48.0 PAROXYSMAL ATRIAL FIBRILLATION (HCC): ICD-10-CM

## 2021-09-16 DIAGNOSIS — I25.5 ISCHEMIC CARDIOMYOPATHY: ICD-10-CM

## 2021-09-16 PROCEDURE — 1111F DSCHRG MED/CURRENT MED MERGE: CPT | Performed by: INTERNAL MEDICINE

## 2021-09-16 PROCEDURE — G8417 CALC BMI ABV UP PARAM F/U: HCPCS | Performed by: INTERNAL MEDICINE

## 2021-09-16 PROCEDURE — 3017F COLORECTAL CA SCREEN DOC REV: CPT | Performed by: INTERNAL MEDICINE

## 2021-09-16 PROCEDURE — G8427 DOCREV CUR MEDS BY ELIG CLIN: HCPCS | Performed by: INTERNAL MEDICINE

## 2021-09-16 PROCEDURE — 99213 OFFICE O/P EST LOW 20 MIN: CPT | Performed by: INTERNAL MEDICINE

## 2021-09-16 PROCEDURE — 1036F TOBACCO NON-USER: CPT | Performed by: INTERNAL MEDICINE

## 2021-09-16 PROCEDURE — 4040F PNEUMOC VAC/ADMIN/RCVD: CPT | Performed by: INTERNAL MEDICINE

## 2021-09-16 PROCEDURE — 1123F ACP DISCUSS/DSCN MKR DOCD: CPT | Performed by: INTERNAL MEDICINE

## 2021-09-16 RX ORDER — FUROSEMIDE 80 MG
80 TABLET ORAL 2 TIMES DAILY
Qty: 60 TABLET | Refills: 3 | Status: SHIPPED | OUTPATIENT
Start: 2021-09-16 | End: 2021-10-27 | Stop reason: SDUPTHER

## 2021-09-16 ASSESSMENT — ENCOUNTER SYMPTOMS
APNEA: 0
SHORTNESS OF BREATH: 0
ABDOMINAL DISTENTION: 0
COUGH: 0
CHEST TIGHTNESS: 0
CHOKING: 0

## 2021-09-16 NOTE — PROGRESS NOTES
Subjective:      Patient ID: Anyi Gomez is a 73 y.o. male. Coronary Artery Disease  Pertinent negatives include no chest pain, chest tightness, dizziness, leg swelling, palpitations or shortness of breath. Hematuria      Follow up CAD/CABG/afib/cardiomyop/abn ekg. No complaints. Went to ER recently for sob. Felt due to allergy med he took. Back to baseline  No further sx. No chest pain. No tachycardia/syncope. Chronic LE edema. No pnd or orthopnea. Wt up. Feeling good. Taking Eliquis. No bleeding issues. Rhythm stable.      Past Medical History:   Diagnosis Date    A-fib [facility]) 2020    CAD, multiple vessel 12/2019    Diabetes mellitus ([facility] [address]     diet controlled     Enlarged prostate     Environmental allergies     Kidney stones     Systolic CHF [facility])      Past Surgical History:   Procedure Laterality Date    CARDIAC CATHETERIZATION  12/10/2019    Dr. Yakov Weir - severe multi-vessel disease    CORONARY ARTERY BYPASS GRAFT N/A 3/2/2020    [facility]; TCPB; CABG x 3, LIMA to LAD w/ long segment endarterectomy (4 cm); bilateral pulmonary vein isolation; 40 mm Atriclip to L atril appendage; endoscopic L GSV harvest; ICNB x 5 levels bilat; sternal plate x 1 to manubrium performed by Mekhi Polo [facility] at [facility]  x2    NASAL SEPTUM SURGERY       Social History     Socioeconomic History    Marital status: Single     Spouse name: Not on file    Number of children: Not on file    Years of education: Not on file    Highest education level: Not on file   Occupational History    Not on file   Tobacco Use    Smoking status: Never Smoker    Smokeless tobacco: Never Used   Substance and Sexual Activity    Alcohol use: Not Currently     Comment: rare    Drug use: Never    Sexual activity: Not on file   Other Topics Concern    Not on file   Social History Narrative    Not on file     Social Determinants of Health     Financial Resource Strain:     Difficulty of Paying Living Expenses:    Food Insecurity:     Worried About Running Out of Food in the Last Year:     [address] in the Last Year:    Transportation Needs:     Lack of Transportation (Medical):  Lack of Transportation (Non-Medical):    Physical Activity:     Days of Exercise per Week:     Minutes of Exercise per Session:    Stress:     Feeling of Stress :    Social Connections:     Frequency of Communication with Friends and Family:     Frequency of Social Gatherings with Friends and Family:     Attends [employer] Services:     Active Member of Clubs or Organizations:     Attends Club or Organization Meetings:     Marital Status:    Intimate Partner Violence:     Fear of Current or Ex-Partner:     Emotionally Abused:     Physically Abused:     Sexually Abused:       FH MI in [address] in 62s. Vitals:    09/16/21 1011   BP: 124/80   Pulse: 68     Wt 259    Review of Systems   Constitutional: Negative for activity change and fatigue. Respiratory: Negative for apnea, cough, choking, chest tightness and shortness of breath. Cardiovascular: Negative for chest pain, palpitations and leg swelling. No PND or orthopnea. No tachycardia. Gastrointestinal: Negative for abdominal distention. Genitourinary: Positive for hematuria. Musculoskeletal: Negative for myalgias. Neurological: Negative for dizziness, syncope and light-headedness. Psychiatric/Behavioral: Negative for agitation, behavioral problems and confusion. All other systems reviewed and are negative. Objective:   Physical Exam  Constitutional:       General: He is not in acute distress. Appearance: Normal appearance. He is well-developed. HENT:      Head: Normocephalic. Right Ear: External ear normal.      Left Ear: External ear normal.   Neck:      Vascular: No JVD. Cardiovascular:      Rate and Rhythm: Normal rate and regular rhythm. Heart sounds: Normal heart sounds. No gallop.     Pulmonary:      Effort: Pulmonary effort is normal. No respiratory distress. Breath sounds: Normal breath sounds. No wheezing or rales. Abdominal:      General: Bowel sounds are normal.      Palpations: Abdomen is soft. Tenderness: There is no abdominal tenderness. Musculoskeletal:         General: Swelling present. Normal range of motion. Cervical back: Normal range of motion and neck supple. Comments: Tr ankle edema. Skin:     General: Skin is warm and dry. Neurological:      Mental Status: He is oriented to person, place, and time. Psychiatric:         Mood and Affect: Mood normal.         Behavior: Behavior normal.         Assessment:       Diagnosis Orders   1. CAD in native artery     2. Hx of CABG     3. Ischemic cardiomyopathy     4. Paroxysmal atrial fibrillation (HCC)     5. ICD (implantable cardioverter-defibrillator) in place             Plan:      CV stable. No angina. Compensated. Pushes 2 liters fluid per day. Encouraged not to push fluids. Continue lipitor, toprol, ASA for CAD. Lasix for cardiomyop/CHF. Refilled. Continue eliquis for CVA proph for afib. Reviewed previous records and testing. No changes. Follow up 3 months.         Janelle Bay MD

## 2021-09-22 RX ORDER — METOPROLOL SUCCINATE 25 MG/1
25 TABLET, EXTENDED RELEASE ORAL 2 TIMES DAILY
Qty: 60 TABLET | Refills: 5 | Status: SHIPPED | OUTPATIENT
Start: 2021-09-22 | End: 2021-11-03

## 2021-09-22 NOTE — TELEPHONE ENCOUNTER
Last appt  9-16-21  Next appt 12-21-21        metoprolol succinate (TOPROL XL) 25 MG extended release tablet  Take 1 tablet by mouth 2 times daily, Disp-60 tablet, R-5  Normal Last Dose: Not Recorded  Refills:5 ordered [facility] Pharmacy:[facility] [address] [facility] [address] [facility] -  [phone]

## 2021-10-25 ENCOUNTER — NURSE ONLY (OUTPATIENT)
Dept: CARDIOLOGY CLINIC | Age: 71
End: 2021-10-25
Payer: MEDICAID

## 2021-10-25 DIAGNOSIS — I25.5 ISCHEMIC CARDIOMYOPATHY: ICD-10-CM

## 2021-10-25 DIAGNOSIS — I50.22 CHRONIC SYSTOLIC (CONGESTIVE) HEART FAILURE (HCC): ICD-10-CM

## 2021-10-25 DIAGNOSIS — Z95.810 AICD (AUTOMATIC CARDIOVERTER/DEFIBRILLATOR) PRESENT: ICD-10-CM

## 2021-10-26 ENCOUNTER — TELEPHONE (OUTPATIENT)
Dept: CARDIOLOGY CLINIC | Age: 71
End: 2021-10-26

## 2021-10-26 PROCEDURE — 93295 DEV INTERROG REMOTE 1/2/MLT: CPT | Performed by: INTERNAL MEDICINE

## 2021-10-26 PROCEDURE — 93297 REM INTERROG DEV EVAL ICPMS: CPT | Performed by: INTERNAL MEDICINE

## 2021-10-26 PROCEDURE — 93296 REM INTERROG EVL PM/IDS: CPT | Performed by: INTERNAL MEDICINE

## 2021-10-26 NOTE — TELEPHONE ENCOUNTER
We received remote transmission from patient's dual chamber ICD monitor at home. Possible Optivol fluid accumulation 10.17. 21-ongoing, currently elevated up to 100 w correlating TI trends below reference line. Please contact pt to assess for possible CHF sx.

## 2021-10-27 ENCOUNTER — TELEPHONE (OUTPATIENT)
Dept: CARDIOLOGY CLINIC | Age: 71
End: 2021-10-27

## 2021-10-27 RX ORDER — FUROSEMIDE 80 MG
80 TABLET ORAL 2 TIMES DAILY
Qty: 60 TABLET | Refills: 5 | Status: SHIPPED | OUTPATIENT
Start: 2021-10-27 | End: 2021-11-03 | Stop reason: SDUPTHER

## 2021-10-27 NOTE — TELEPHONE ENCOUNTER
Patient states he missed a phone call from our office and would like a call back please call [phone]

## 2021-10-27 NOTE — TELEPHONE ENCOUNTER
Spoke with pt. He has noticed for the past month, he becomes LEE with a cough. He thought it was related to allergies and has been taking an antihistamine with some relief. Denies edema. He does not weigh himself daily. He has been taking Lasix 80 mg BID (ran out yesterday). Refilled Lasix and scheduled OV with CG next week to assess for CHF. Advised pt to weigh himself daily (same time, same clothing, etc) and keep a log. Advised to bring the log to OV with CG. Also, advised pt to call our office for weight gain of 3 lbs in one day or 5 lbs in one week. Pt verbalized understanding.

## 2021-11-03 ENCOUNTER — OFFICE VISIT (OUTPATIENT)
Dept: CARDIOLOGY CLINIC | Age: 71
End: 2021-11-03
Payer: MEDICAID

## 2021-11-03 VITALS
BODY MASS INDEX: 40.28 KG/M2 | SYSTOLIC BLOOD PRESSURE: 120 MMHG | HEART RATE: 96 BPM | DIASTOLIC BLOOD PRESSURE: 70 MMHG | WEIGHT: 257.2 LBS

## 2021-11-03 DIAGNOSIS — I48.0 PAROXYSMAL ATRIAL FIBRILLATION (HCC): ICD-10-CM

## 2021-11-03 DIAGNOSIS — E78.2 MIXED HYPERLIPIDEMIA: ICD-10-CM

## 2021-11-03 DIAGNOSIS — I50.20 HFREF (HEART FAILURE WITH REDUCED EJECTION FRACTION) (HCC): Primary | ICD-10-CM

## 2021-11-03 DIAGNOSIS — I25.10 CAD IN NATIVE ARTERY: ICD-10-CM

## 2021-11-03 DIAGNOSIS — Z95.810 ICD (IMPLANTABLE CARDIOVERTER-DEFIBRILLATOR) IN PLACE: ICD-10-CM

## 2021-11-03 DIAGNOSIS — Z95.1 HX OF CABG: ICD-10-CM

## 2021-11-03 PROCEDURE — 4040F PNEUMOC VAC/ADMIN/RCVD: CPT | Performed by: NURSE PRACTITIONER

## 2021-11-03 PROCEDURE — 3017F COLORECTAL CA SCREEN DOC REV: CPT | Performed by: NURSE PRACTITIONER

## 2021-11-03 PROCEDURE — 1036F TOBACCO NON-USER: CPT | Performed by: NURSE PRACTITIONER

## 2021-11-03 PROCEDURE — G8427 DOCREV CUR MEDS BY ELIG CLIN: HCPCS | Performed by: NURSE PRACTITIONER

## 2021-11-03 PROCEDURE — 99214 OFFICE O/P EST MOD 30 MIN: CPT | Performed by: NURSE PRACTITIONER

## 2021-11-03 PROCEDURE — G8417 CALC BMI ABV UP PARAM F/U: HCPCS | Performed by: NURSE PRACTITIONER

## 2021-11-03 PROCEDURE — G8484 FLU IMMUNIZE NO ADMIN: HCPCS | Performed by: NURSE PRACTITIONER

## 2021-11-03 PROCEDURE — 1123F ACP DISCUSS/DSCN MKR DOCD: CPT | Performed by: NURSE PRACTITIONER

## 2021-11-03 RX ORDER — FUROSEMIDE 80 MG
80 TABLET ORAL 2 TIMES DAILY
Qty: 180 TABLET | Refills: 3 | Status: SHIPPED | OUTPATIENT
Start: 2021-11-03

## 2021-11-03 NOTE — PROGRESS NOTES
CC elevated optivol, LEE    HPI:  73 y.o. patient of Patti Capone and Chyna Hall with CAD/CABG, HFrEF (20-25%), ICD, pAF, HLD, and DM who is here with LEE and elevated Optivol. He has mild LEE but nothing worse than baseline. He has mild LE edema (L>R). Denies orthopnea or weight gain. No chest pain, LH/dizziness, palpitations or syncope. No n/v/d, fever, chills, or GI/ bleeding. Limits salt. Drinks ~ 3 Liters fluid because of the excessive urination with lasix. Dealing with wounds to the bottom of left foot. Past Medical History:   Diagnosis Date    A-fib [facility]) 2020    CAD, multiple vessel 12/2019    Diabetes mellitus ([facility] [address]     diet controlled     Enlarged prostate     Environmental allergies     Kidney stones     Systolic CHF [facility])      Past Surgical History:   Procedure Laterality Date    CARDIAC CATHETERIZATION  12/10/2019    Dr. Socorro Verduzco - severe multi-vessel disease    CORONARY ARTERY BYPASS GRAFT N/A 3/2/2020    WAYNE; TCPB; CABG x 3, LIMA to LAD w/ long segment endarterectomy (4 cm); bilateral pulmonary vein isolation; 40 mm Atriclip to L atril appendage; endoscopic L GSV harvest; ICNB x 5 levels bilat; sternal plate x 1 to manubrium performed by Noy Small MD at [facility]  x2    NASAL SEPTUM SURGERY       Family History   Problem Relation Age of Onset    Dementia Mother         Vascular    Alzheimer's Disease Mother     Heart Attack Father     Diabetes Paternal Aunt      Social History     Tobacco Use    Smoking status: Never Smoker    Smokeless tobacco: Never Used   Substance Use Topics    Alcohol use: Not Currently     Comment: rare    Drug use: Never     Allergies:Codeine and Aspartame and phenylalanine    Review of Systems  General: No changes in weight, fatigue, or night sweats. HEENT: No blurry or decreased vision. No changes in hearing, nasal discharge or sore throat. Cardiovascular:  See HPI. Respiratory: No cough, hemoptysis, or wheezing. Gastrointestinal:  No abdominal pain, hematochezia, melana, constipation, diarrhea, or history of GI ulcers. Genito-Urinary: No dysuria or hematuria. No urgency or polyuria. Musculoskeletal:  No complaints of joint pain, joint swelling or muscular weakness/soreness. Neurological:  No dizziness, headaches, numbness/tingling, speech problems or weakness. Psychological:  No anxiety or depression. Hematological and Lymphatic: No abnormal bleeding or bruising, blood clots, jaundice or swollen lymph nodes. Endocrine:   No malaise/lethargy, palpitations, polydipsia/polyuria, temperature intolerance or unexpected weight changes  Skin:  No rashes or non-healing ulcers. Physical Exam:  /70   Pulse 96   Wt 257 lb 3.2 oz (116.7 kg)   BMI 40.28 kg/m²    General (appearance):  No acute distress  Eyes: anicteric   Neck: soft, No JVD  Ears/Nose/Mouth/Thorat: No cyanosis  CV: RRR   Respiratory:  Clear, normal effort  GI: soft, non-tender, non-distended  Skin: Warm, dry. No rashes  Neuro/Psych: Alert and oriented x 3. Appropriate behavior  Ext:  No c/c.  Mild ankle edema  Pulses:  2+ radial     Weight  Wt Readings from Last 3 Encounters:   11/03/21 257 lb 3.2 oz (116.7 kg)   09/16/21 259 lb (117.5 kg)   09/11/21 267 lb 1.6 oz (121.2 kg)          CBC:   Lab Results   Component Value Date    WBC 11.7 (H) 09/11/2021    HGB 12.0 (L) 09/11/2021    HCT 35.8 (L) 09/11/2021    MCV 89.7 09/11/2021     09/11/2021     BMP:  Lab Results   Component Value Date    CREATININE 1.1 09/11/2021    BUN 19 09/11/2021     09/11/2021    K 3.7 09/11/2021     09/11/2021    CO2 23 09/11/2021     Mag:   Lab Results   Component Value Date    MG 2.10 03/11/2020     LIVER PROFILE:   Lab Results   Component Value Date    ALT 11 06/16/2021    AST 15 06/16/2021    ALKPHOS 67 06/16/2021    BILITOT 0.6 06/16/2021     PT/INR:   Lab Results   Component Value Date    INR 1.41 (H) 09/11/2021    INR 1.77 (H) 07/19/2021    INR 1.13 2021    PROTIME 16.2 (H) 2021    PROTIME 20.5 (H) 2021    PROTIME 13.1 2021     Pro-BNP   Lab Results   Component Value Date    PROBNP 2,826 2021    PROBNP 2,385 2019     LIPIDS:  No components found for: CHLPL  Lab Results   Component Value Date    TRIG 236 (H) 2020    TRIG 220 (H) 2019     Lab Results   Component Value Date    HDL 31 (L) 2020    HDL 33 (L) 2019     Lab Results   Component Value Date    LDLCALC 94 2020    LDLCALC 83 2019     Lab Results   Component Value Date    LABVLDL 47 2020    LABVLDL 44 2019     TSH:No results found for: TSH, M6QTBEZ, Z6ZLUJL, THYROIDAB    IMAGIN Echo:   Normal left ventricular size. Moderate concentric left ventricular hypertrophy. Severely decreased left ventricular systolic function with an estimated   ejection fraction of 20-25%. Severe global hypokinesis. Indeterminate diastolic function. Normal right ventricular size with decreased systolic function. Tapse: 0.56 cm   RV s: 6.31 cm/s   The aortic root is dilated measuring 3.8 cm. The pulmonic valve is not well visualized. IVC not well visualized. 2019 coronary angiogram  HEMODYNAMICS:  Left ventricular end-diastolic pressure equals 20. There was no significant gradient across the aortic valve by pullback  post cineangiography. LEFT VENTRICULOGRAM:  Left ventriculogram demonstrates global  hypokinesis. Estimated ejection fraction is 25%. LEFT MAIN:  Left main is free of significant obstructive disease. LEFT ANTERIOR DESCENDING:  The LAD courses to and wraps around the apex. It gives off a relatively large bifurcating first diagonal branch and a  moderate-size second diagonal branch. The large first diagonal branch  had an 80% proximal stenosis. The LAD proper had severe mid vessel  disease with a very irregular area of subtotal stenosis in the mid  vessel. It then coursed on towards the apex.   There is a 90% stenosis  at the apex. LEFT CIRCUMFLEX:  Circumflex gives rise to a small-to-moderate first  marginal branch, a large second marginal branch, and terminates in  moderate-size posterolateral branch. There appears to be ostial disease  which is difficult to quantitate but appears to be in excess of 70%. The circumflex then has 90% stenosis before the distal posterolateral  branch. RIGHT CORONARY ARTERY:  Right coronary artery is a dominant vessel. It  gives off a moderate-size PDA and two posterolateral branches. There is  diffuse disease throughout the body of the right coronary artery and 90%  stenosis in the PDA. 2019 CTA neck/head  1. Minimal atherosclerotic changes in the carotid bifurcations. No significant vascular abnormality otherwise. No significant arterial stenosis. 2. Incidental 5 mm nodule in the right upper lobe and 4 mm nodule in the left upper lobe. Following the Fleischner Society 2017 guidelines for single solid nodules <6 mm, if patient is low risk no routine follow-up is suggested unless suspicious    morphology or upper lobe location.  If patient is high risk, then optional CT at 12 months is suggested.  qzxv        1. No evidence of large vessel occlusion or significant intracranial vascular abnormality. 2. No acute intracranial abnormality. 3. Rightward deviation of the nasal septum with bilateral maxillary sinus small mucous retention cysts or polyps.     4.  Incidental pulmonary nodules as described in the neck portion of the dictation         Medications:   Current Outpatient Medications   Medication Sig Dispense Refill    furosemide (LASIX) 80 MG tablet Take 1 tablet by mouth 2 times daily 60 tablet 5    metoprolol succinate (TOPROL XL) 25 MG extended release tablet Take 1 tablet by mouth 2 times daily 60 tablet 5    thiamine 100 MG tablet TAKE 1 TABLET BY MOUTH ONCE DAILY (Patient not taking: Reported on 9/2/2021)      oxyCODONE (ROXICODONE) 5 MG immediate release tablet TAKE 1 TABLET BY MOUTH EVERY 6 HOURS AS NEEDED FOR PAIN      metoprolol tartrate (LOPRESSOR) 25 MG tablet TAKE 1 TABLET BY MOUTH TWICE DAILY      folic acid (FOLVITE) 1 MG tablet TAKE 1 TABLET BY MOUTH ONCE DAILY      bacitracin 500 UNIT/GM ointment   1 sekou, Ointment, Topical Daily, 1 EA, 0 Refill(s), Route to Pharmacy Electronically, [address], [address], 08/10/21 16:28:00 EDT, Height/Length Dosing, cm, 96.6, 08/07/21 15:12:00 EDT, Weight Dosing, kg      acetaminophen (TYLENOL) 325 MG tablet Take 650 mg by mouth every 6 hours as needed      miconazole (MICOTIN) 2 % powder Apply topically 2 times daily. 45 g 1    atorvastatin (LIPITOR) 40 MG tablet Take 1 tablet by mouth nightly 30 tablet 3    loratadine (CLARITIN) 10 MG tablet Take 10 mg by mouth daily      aspirin 81 MG chewable tablet Take 1 tablet by mouth daily 30 tablet 3    famotidine (PEPCID) 20 MG tablet Take 1 tablet by mouth daily (Patient not taking: Reported on 9/16/2021) 60 tablet 3    insulin lispro, 1 Unit Dial, 100 UNIT/ML SOPN Inject 3 Units into the skin 3 times daily (with meals) 1 pen 0    potassium chloride (KLOR-CON M) 10 MEQ extended release tablet Take 1 tablet by mouth 2 times daily 60 tablet 3    apixaban (ELIQUIS) 5 MG TABS tablet Take 1 tablet by mouth 2 times daily 60 tablet 5    Handicap Placard MISC by Does not apply route Pt cannot walk more that 250 feet without becoming SOB. Dx: CAD, Cardiomyopathy, Atrial Fibrilation    Not to exceed 5 years 1 each 0     No current facility-administered medications for this visit. Assessment:  1. HFrEF (heart failure with reduced ejection fraction) ([facility] [address]    2. ICD (implantable cardioverter-defibrillator) in place    3. CAD in native artery    4. Hx of CABG    5. Paroxysmal atrial fibrillation (HCC)    6.  Mixed hyperlipidemia        Plan:  HFpEF: acute   Cont lasix 80 mg po bid   Daily weights, low salt diet   Decrease fluid intake to < 2 Liters a day   Metoprolol  ICD; stable   Follow up with EP  CAD/CABG; stable   Asa, statin   Metoprolol  PAF; stable   Follow up with EP   Metoprolol   eliquis  HLD; stable    Lipitor     Follow up with Patti Bates and Gil Membreno    Pt's last dose of lasix was yesterday 11/2/2021 and apparently pharmacy will not refill until 11/7/2021. Spoke with pharmacy and they will fill a 2 week supply for 4 dollars then refill through insurance on 11/7/2021.      Reviewed most recent: CBC, BMP, LFT, Lipids, Mag, PT/INR, BNP, TSH  Reviewed most recent: ECG, Echo, Nuc stress test,  CTA, LHC

## 2021-12-08 ENCOUNTER — NURSE ONLY (OUTPATIENT)
Dept: CARDIOLOGY CLINIC | Age: 71
End: 2021-12-08
Payer: MEDICAID

## 2021-12-08 DIAGNOSIS — Z95.810 AICD (AUTOMATIC CARDIOVERTER/DEFIBRILLATOR) PRESENT: ICD-10-CM

## 2021-12-08 DIAGNOSIS — I50.22 CHRONIC SYSTOLIC (CONGESTIVE) HEART FAILURE (HCC): ICD-10-CM

## 2021-12-08 DIAGNOSIS — I42.0 DILATED CARDIOMYOPATHY (HCC): ICD-10-CM

## 2021-12-09 PROCEDURE — G2066 INTER DEVC REMOTE 30D: HCPCS | Performed by: NURSE PRACTITIONER

## 2021-12-09 PROCEDURE — 93297 REM INTERROG DEV EVAL ICPMS: CPT | Performed by: NURSE PRACTITIONER

## 2021-12-09 NOTE — PROGRESS NOTES
We received remote transmission from patient's dual chamber ICD monitor at home. Transmission shows normal sensing and pacing function. Known AF/AFL noted, 5.2% burden, longest 2days (Lopressor, Eliquis). Possible Optivol fluid accumulation 10.17-11.20.21, now resolved and back near baseline. NP will review. See interrogation under cardiology tab in the [address] [place] [address] [address] field for more details. Will continue to monitor remotely.

## 2022-02-18 PROCEDURE — 93297 REM INTERROG DEV EVAL ICPMS: CPT | Performed by: INTERNAL MEDICINE

## 2022-02-18 PROCEDURE — 93296 REM INTERROG EVL PM/IDS: CPT | Performed by: INTERNAL MEDICINE

## 2022-02-18 PROCEDURE — 93295 DEV INTERROG REMOTE 1/2/MLT: CPT | Performed by: INTERNAL MEDICINE

## 2022-02-21 ENCOUNTER — NURSE ONLY (OUTPATIENT)
Dept: CARDIOLOGY CLINIC | Age: 72
End: 2022-02-21
Payer: MEDICAID

## 2022-02-21 DIAGNOSIS — Z95.810 AICD (AUTOMATIC CARDIOVERTER/DEFIBRILLATOR) PRESENT: ICD-10-CM

## 2022-02-21 DIAGNOSIS — I50.22 CHRONIC SYSTOLIC HEART FAILURE (HCC): ICD-10-CM

## 2022-02-21 DIAGNOSIS — I42.0 DILATED CARDIOMYOPATHY (HCC): ICD-10-CM

## 2022-02-21 NOTE — PROGRESS NOTES
We received remote transmission from patient's dual chamber ICD monitor at home. Transmission shows normal sensing and pacing function. Known AT/AF noted, 1.3% burden, longest 23hrs (Lopressor, Eliquis). Possible Optivol fluid accumulation 01.19.22-ongoing, currently elevated up to 80 w decreasing trend noted; TI has returned to reference line. EP physician will review. See interrogation under cardiology tab in the [address] [place] [address] [address] field for more details. Will continue to monitor remotely.

## 2022-03-01 ENCOUNTER — NURSE ONLY (OUTPATIENT)
Dept: CARDIOLOGY CLINIC | Age: 72
End: 2022-03-01

## 2022-03-01 ENCOUNTER — TELEPHONE (OUTPATIENT)
Dept: CARDIOLOGY CLINIC | Age: 72
End: 2022-03-01

## 2022-03-01 NOTE — TELEPHONE ENCOUNTER
Manual remote transmission received from patient's dual chamber ICD monitor at home. Possible Optivol fluid accumulation 01.19.22-ongoing, currently elevated up to 80 w increasing trend noted; TI shows correlating decreasing trend below reference line. Please contact pt to assess for possible CHF sx.

## 2022-03-03 NOTE — TELEPHONE ENCOUNTER
Spoke with patient, denies shortness of breath, swelling or weight gain. Patient will call back and make an appointment if symptoms develop.

## 2022-04-04 ENCOUNTER — NURSE ONLY (OUTPATIENT)
Dept: CARDIOLOGY CLINIC | Age: 72
End: 2022-04-04
Payer: MEDICAID

## 2022-04-04 DIAGNOSIS — I42.0 DILATED CARDIOMYOPATHY (HCC): ICD-10-CM

## 2022-04-04 DIAGNOSIS — Z95.810 ICD (IMPLANTABLE CARDIOVERTER-DEFIBRILLATOR) IN PLACE: ICD-10-CM

## 2022-04-04 DIAGNOSIS — I48.0 PAF (PAROXYSMAL ATRIAL FIBRILLATION) (HCC): ICD-10-CM

## 2022-04-04 DIAGNOSIS — I50.22 CHRONIC SYSTOLIC (CONGESTIVE) HEART FAILURE (HCC): ICD-10-CM

## 2022-04-05 PROCEDURE — 93297 REM INTERROG DEV EVAL ICPMS: CPT | Performed by: NURSE PRACTITIONER

## 2022-04-05 PROCEDURE — G2066 INTER DEVC REMOTE 30D: HCPCS | Performed by: NURSE PRACTITIONER

## 2022-04-05 NOTE — PROGRESS NOTES
We received remote transmission from patient's dual chamber ICD monitor at home. Transmission shows normal sensing and pacing function. Known AT/AF noted, 10.3% burden, current event ongoing since 04.01.22 @ 22:44 (Lopressor, Eliquis). Optivol is within normal range. NP will review. See interrogation under cardiology tab in the [address] [place] [address] [address] field for more details. Will continue to monitor remotely.

## 2022-05-05 ENCOUNTER — NURSE ONLY (OUTPATIENT)
Dept: CARDIOLOGY CLINIC | Age: 72
End: 2022-05-05
Payer: MEDICAID

## 2022-05-05 DIAGNOSIS — Z95.810 AICD (AUTOMATIC CARDIOVERTER/DEFIBRILLATOR) PRESENT: ICD-10-CM

## 2022-05-05 DIAGNOSIS — I42.0 DILATED CARDIOMYOPATHY (HCC): ICD-10-CM

## 2022-05-05 DIAGNOSIS — I50.22 CHRONIC SYSTOLIC HF (HEART FAILURE) (HCC): ICD-10-CM

## 2022-05-05 PROCEDURE — 93297 REM INTERROG DEV EVAL ICPMS: CPT | Performed by: NURSE PRACTITIONER

## 2022-05-05 NOTE — PROGRESS NOTES
We received remote transmission from patient's dual chamber ICD monitor at home. Transmission shows normal sensing and pacing function. Known AT/AF, <0.1% burden, no available EGM's (Lopressor, Eliquis). Optivol is within normal range. TriageHF Heart Failure Risk Status on 05-May-2022 is Medium*    NP will review. See interrogation under cardiology tab in the [address] [place] [address] [address] field for more details. Will continue to monitor remotely.
Yes - the patient is able to be screened

## 2022-05-12 NOTE — PROGRESS NOTES
"Anesthesia Transfer of Care Note    Patient: Alexandra Rivas    Procedure(s) Performed: Procedure(s) (LRB):  COLONOSCOPY (N/A)    Patient location: PACU    Anesthesia Type: general    Transport from OR: Transported from OR on 100% O2 by closed face mask with adequate spontaneous ventilation    Post pain: adequate analgesia    Post assessment: no apparent anesthetic complications and tolerated procedure well    Post vital signs: stable    Level of consciousness: awake and alert    Nausea/Vomiting: no nausea/vomiting    Complications: none    Transfer of care protocol was followed      Last vitals:   Visit Vitals  BP (!) 141/74 (BP Location: Right arm, Patient Position: Sitting)   Pulse 86   Temp 36.7 °C (98 °F) (Temporal)   Resp 16   Ht 5' 5" (1.651 m)   Wt 51.8 kg (114 lb 3.2 oz)   LMP 12/23/2017 (Approximate)   SpO2 100%   Breastfeeding No   BMI 19.00 kg/m²     " We received remote transmission from patient's dual chamber ICD monitor at home. Transmission shows normal sensing and pacing function. Known AF/AFL noted, 0.1% burden, longest 14mins (Toprol XL, Lopressor, Eliquis). Possible Optivol fluid accumulation 10.17. 21-ongoing, currently elevated up to 100 w correlating TI trends below reference line. Office staff notified to contact pt to assess for possible CHF sx.    EP physician will review. See interrogation under cardiology tab in the [address] [place] [address] [address] field for more details. Will continue to monitor remotely.

## 2022-06-08 ENCOUNTER — NURSE ONLY (OUTPATIENT)
Dept: CARDIOLOGY CLINIC | Age: 72
End: 2022-06-08
Payer: MEDICAID

## 2022-06-08 DIAGNOSIS — I50.22 CHRONIC SYSTOLIC HF (HEART FAILURE) (HCC): ICD-10-CM

## 2022-06-08 DIAGNOSIS — Z95.810 AICD (AUTOMATIC CARDIOVERTER/DEFIBRILLATOR) PRESENT: ICD-10-CM

## 2022-06-08 DIAGNOSIS — I42.0 DILATED CARDIOMYOPATHY (HCC): ICD-10-CM

## 2022-06-09 PROCEDURE — G2066 INTER DEVC REMOTE 30D: HCPCS | Performed by: NURSE PRACTITIONER

## 2022-06-09 PROCEDURE — 93296 REM INTERROG EVL PM/IDS: CPT | Performed by: INTERNAL MEDICINE

## 2022-06-09 PROCEDURE — 93297 REM INTERROG DEV EVAL ICPMS: CPT | Performed by: NURSE PRACTITIONER

## 2022-06-09 PROCEDURE — 93295 DEV INTERROG REMOTE 1/2/MLT: CPT | Performed by: INTERNAL MEDICINE

## 2022-06-09 NOTE — PROGRESS NOTES
We received remote transmission from patient's dual chamber ICD monitor at home. Transmission shows normal sensing and pacing function. Known AT/AF, <0.1% burden, no available EGM's (Lopressor, Eliquis). Optivol is within normal range. EP/ NP will review. See interrogation under cardiology tab in the [address] [place] [address] [address] field for more details. Will continue to monitor remotely.

## 2022-08-04 ENCOUNTER — NURSE ONLY (OUTPATIENT)
Dept: CARDIOLOGY CLINIC | Age: 72
End: 2022-08-04
Payer: MEDICAID

## 2022-08-04 DIAGNOSIS — I50.22 CHRONIC SYSTOLIC HF (HEART FAILURE) (HCC): ICD-10-CM

## 2022-08-04 DIAGNOSIS — I42.0 DILATED CARDIOMYOPATHY (HCC): ICD-10-CM

## 2022-08-04 DIAGNOSIS — Z95.810 AICD (AUTOMATIC CARDIOVERTER/DEFIBRILLATOR) PRESENT: Primary | ICD-10-CM

## 2022-08-04 PROCEDURE — 93297 REM INTERROG DEV EVAL ICPMS: CPT | Performed by: NURSE PRACTITIONER

## 2022-08-04 PROCEDURE — G2066 INTER DEVC REMOTE 30D: HCPCS | Performed by: NURSE PRACTITIONER

## 2022-08-05 NOTE — PROGRESS NOTES
We received remote transmission from patient's dual chamber ICD monitor at home. Transmission shows normal sensing and pacing function. Known AT/AF, 18.6% burden (Lopressor, Eliquis). Optivol is within normal range. TriageHF Heart Failure Risk Status on 04-Aug-2022 is High*    NP will review. See interrogation under cardiology tab in the [address] [place] [address] [address] field for more details. Will continue to monitor remotely.      (End of 31-day monitoring period 8/4/22)

## 2022-08-17 ENCOUNTER — APPOINTMENT (OUTPATIENT)
Dept: GENERAL RADIOLOGY | Age: 72
End: 2022-08-17
Payer: MEDICAID

## 2022-08-17 ENCOUNTER — HOSPITAL ENCOUNTER (EMERGENCY)
Age: 72
Discharge: HOME | End: 2022-08-18
Attending: EMERGENCY MEDICINE
Payer: MEDICAID

## 2022-08-17 DIAGNOSIS — R06.02 SHORTNESS OF BREATH: Primary | ICD-10-CM

## 2022-08-17 DIAGNOSIS — N39.0 UTI (URINARY TRACT INFECTION), UNCOMPLICATED: ICD-10-CM

## 2022-08-17 LAB
A/G RATIO: 1 (ref 1.1–2.2)
ALBUMIN SERPL-MCNC: 3.8 G/DL (ref 3.4–5)
ALP BLD-CCNC: 82 U/L (ref 40–129)
ALT SERPL-CCNC: 10 U/L (ref 10–40)
ANION GAP SERPL CALCULATED.3IONS-SCNC: 13 MMOL/L (ref 3–16)
AST SERPL-CCNC: 13 U/L (ref 15–37)
BASOPHILS ABSOLUTE: 0.1 K/UL (ref 0–0.2)
BASOPHILS RELATIVE PERCENT: 0.9 %
BILIRUB SERPL-MCNC: 0.4 MG/DL (ref 0–1)
BILIRUBIN URINE: NEGATIVE
BLOOD, URINE: ABNORMAL
BUN BLDV-MCNC: 28 MG/DL (ref 7–20)
CALCIUM SERPL-MCNC: 9 MG/DL (ref 8.3–10.6)
CHLORIDE BLD-SCNC: 101 MMOL/L (ref 99–110)
CLARITY: CLEAR
CO2: 22 MMOL/L (ref 21–32)
COLOR: YELLOW
CREAT SERPL-MCNC: 1.3 MG/DL (ref 0.8–1.3)
EOSINOPHILS ABSOLUTE: 0.3 K/UL (ref 0–0.6)
EOSINOPHILS RELATIVE PERCENT: 3.9 %
EPITHELIAL CELLS, UA: ABNORMAL /HPF (ref 0–5)
GFR AFRICAN AMERICAN: >60
GFR NON-AFRICAN AMERICAN: 55
GLUCOSE BLD-MCNC: 171 MG/DL (ref 70–99)
GLUCOSE URINE: NEGATIVE MG/DL
HCT VFR BLD CALC: 39.3 % (ref 40.5–52.5)
HEMOGLOBIN: 13.4 G/DL (ref 13.5–17.5)
INR BLD: 1.19 (ref 0.87–1.14)
KETONES, URINE: NEGATIVE MG/DL
LEUKOCYTE ESTERASE, URINE: ABNORMAL
LYMPHOCYTES ABSOLUTE: 1.1 K/UL (ref 1–5.1)
LYMPHOCYTES RELATIVE PERCENT: 14 %
MCH RBC QN AUTO: 30.5 PG (ref 26–34)
MCHC RBC AUTO-ENTMCNC: 34 G/DL (ref 31–36)
MCV RBC AUTO: 89.6 FL (ref 80–100)
MICROSCOPIC EXAMINATION: YES
MONOCYTES ABSOLUTE: 0.6 K/UL (ref 0–1.3)
MONOCYTES RELATIVE PERCENT: 6.9 %
NEUTROPHILS ABSOLUTE: 6 K/UL (ref 1.7–7.7)
NEUTROPHILS RELATIVE PERCENT: 74.3 %
NITRITE, URINE: POSITIVE
PDW BLD-RTO: 14.5 % (ref 12.4–15.4)
PH UA: 6 (ref 5–8)
PLATELET # BLD: 182 K/UL (ref 135–450)
PMV BLD AUTO: 8.7 FL (ref 5–10.5)
POTASSIUM REFLEX MAGNESIUM: 4 MMOL/L (ref 3.5–5.1)
PRO-BNP: 1430 PG/ML (ref 0–124)
PROTEIN UA: 30 MG/DL
PROTHROMBIN TIME: 15 SEC (ref 11.7–14.5)
RBC # BLD: 4.38 M/UL (ref 4.2–5.9)
RBC UA: ABNORMAL /HPF (ref 0–4)
SODIUM BLD-SCNC: 136 MMOL/L (ref 136–145)
SPECIFIC GRAVITY UA: 1.02 (ref 1–1.03)
TOTAL PROTEIN: 7.6 G/DL (ref 6.4–8.2)
TROPONIN: <0.01 NG/ML
URINE TYPE: ABNORMAL
UROBILINOGEN, URINE: 0.2 E.U./DL
WBC # BLD: 8 K/UL (ref 4–11)
WBC UA: ABNORMAL /HPF (ref 0–5)

## 2022-08-17 PROCEDURE — 85610 PROTHROMBIN TIME: CPT

## 2022-08-17 PROCEDURE — 84484 ASSAY OF TROPONIN QUANT: CPT

## 2022-08-17 PROCEDURE — 99285 EMERGENCY DEPT VISIT HI MDM: CPT

## 2022-08-17 PROCEDURE — 93005 ELECTROCARDIOGRAM TRACING: CPT

## 2022-08-17 PROCEDURE — 83880 ASSAY OF NATRIURETIC PEPTIDE: CPT

## 2022-08-17 PROCEDURE — 85025 COMPLETE CBC W/AUTO DIFF WBC: CPT

## 2022-08-17 PROCEDURE — 81001 URINALYSIS AUTO W/SCOPE: CPT

## 2022-08-17 PROCEDURE — 71046 X-RAY EXAM CHEST 2 VIEWS: CPT

## 2022-08-17 PROCEDURE — 80053 COMPREHEN METABOLIC PANEL: CPT

## 2022-08-18 VITALS
HEART RATE: 88 BPM | RESPIRATION RATE: 18 BRPM | SYSTOLIC BLOOD PRESSURE: 153 MMHG | OXYGEN SATURATION: 97 % | HEIGHT: 67 IN | TEMPERATURE: 98.3 F | WEIGHT: 258.3 LBS | DIASTOLIC BLOOD PRESSURE: 84 MMHG | BODY MASS INDEX: 40.54 KG/M2

## 2022-08-18 LAB
EKG ATRIAL RATE: 88 BPM
EKG DIAGNOSIS: NORMAL
EKG P AXIS: 67 DEGREES
EKG P-R INTERVAL: 242 MS
EKG Q-T INTERVAL: 398 MS
EKG QRS DURATION: 90 MS
EKG QTC CALCULATION (BAZETT): 481 MS
EKG R AXIS: 61 DEGREES
EKG T AXIS: 90 DEGREES
EKG VENTRICULAR RATE: 88 BPM

## 2022-08-18 RX ORDER — NITROFURANTOIN 25; 75 MG/1; MG/1
100 CAPSULE ORAL 2 TIMES DAILY
Qty: 10 CAPSULE | Refills: 0 | Status: SHIPPED | OUTPATIENT
Start: 2022-08-18 | End: 2022-08-23

## 2022-08-18 NOTE — ED PROVIDER NOTES
[id number] [facility] RESIDENT NOTE       Date of evaluation: 8/17/2022    Chief Complaint     Shortness of Breath    History of Present Illness     Osman Brown is a 81 y.o. male with a history of CAD with ischemic cardiomyopathy (s/p CABGx3 3/2021), HFrEF (20-25%), paroxysmal A. Fib (s/p dual chamber ICD, placement, on apixaban and metoprolol), DM who presents to the ED for evaluation of episodic dyspnea. Not strictly adherent with Lasix or apixiban. Recent interrogation of pacemaker 8/4/2022 shows an average AT/AF burden of >6 hours/day. Patient reports that since his CABG 3/8/2021, he has had episodes where he feels like the walls are closing in on him and he experiences dyspnea with shallow breathing. These occur about twice per month. He says that he thinks these may be related to allergies, and when these happen in his home he typically steps outside to get some fresh air with resolution of symptoms. He also states that Benadryl sometimes helps his symptoms. Today, he had 1 of these episodes that did not improve with getting more fresh air outside or taking Benadryl. He had no associated chest pain, palpitations, diaphoresis, vision changes, headache, nausea/vomiting, abdominal pain, new numbness/weakness/tingling, presyncope, or other physical concerns. He does feels better now since being in the ED. No current dyspnea or chest pain. He denies feeling anxious. Of note, he says was found to have severe CAD as part of work-up for a kidney stone. Prior to that, he does say that he was having some dizziness after he would walk for a while, but did not seek any medical evaluation for this. He did not have any symptomatic MI or chest pain that he is aware of. Patient's adherence with apixban and Lasix is unclear. He states he takes Lasix intermittently, saying he does not like taking it daily due to how often makes him urinate.   He held his apixaban for 3 days for dental procedure that occurred about 2 weeks ago, unsure of the date that he resumed it. He states his last missed dose was one of his daily doses 2 days ago. Unclear how consistently he takes it otherwise. No recent surgery or immobilizations. No peripheral edema. On specific questioning, the patient does report falling at 1800 today prior to having an episode of dyspnea. States it was a mechanical fall about loss of consciousness, believes he fell on his left hand. Was able to get himself up after couple minutes. Denies any hand pain, trauma, or other pain or injuries from the fall. Denies any preceding symptoms including presyncope, chest pain, or dyspnea. He says last time he felt prior to this was a couple months ago. Patient lives alone. Review of Systems     A complete ROS was performed and is otherwise negative except as described above. Past Medical, Surgical, Family, and Social History     He has a past medical history of A-fib ([address] CAD, multiple vessel, Diabetes mellitus ([address] Enlarged prostate, Environmental allergies, Kidney stones, and Systolic CHF ([address] Patient Active Problem List   Diagnosis    Bladder obstruction    Dilated cardiomyopathy (HCC)    New onset a-fib (HCC)    S/P coronary angiogram    Acute cystitis    Bilateral pleural effusion    Constipation    Monilial cystitis    Urinary tract obstruction    CAD, multiple vessel    ICD (implantable cardioverter-defibrillator) in place-[employer]    Ischemic cardiomyopathy    AICD (automatic cardioverter/defibrillator) present    Epididymoorchitis         He has a past surgical history that includes Cardiac catheterization (12/10/2019); Nasal polyp surgery (x2); Nasal septum surgery; and Coronary artery bypass graft (N/A, 3/2/2020). His family history includes Alzheimer's Disease in his mother; Dementia in his mother; Diabetes in his paternal aunt; Heart Attack in his father.     He reports that he has never smoked. He has never used smokeless tobacco. He reports that he does not currently use alcohol. He reports that he does not use drugs. Medications     Discharge Medication List as of 8/18/2022 12:43 AM        CONTINUE these medications which have NOT CHANGED    Details   furosemide (LASIX) 80 MG tablet Take 1 tablet by mouth 2 times daily, Disp-180 tablet, R-3Normal      thiamine 100 MG tablet TAKE 1 TABLET BY MOUTH ONCE DAILYHistorical Med      oxyCODONE (ROXICODONE) 5 MG immediate release tablet TAKE 1 TABLET BY MOUTH EVERY 6 HOURS AS NEEDED FOR PAINHistorical Med      metoprolol tartrate (LOPRESSOR) 25 MG tablet TAKE 1 TABLET BY MOUTH TWICE DAILYHistorical Med      folic acid (FOLVITE) 1 MG tablet TAKE 1 TABLET BY MOUTH ONCE DAILYHistorical Med      bacitracin 500 UNIT/GM ointment   1 sekou, Ointment, Topical Daily, 1 EA, 0 Refill(s), Route to Pharmacy Electronically, [email or web address], 168, 08/10/21 16:28:00 EDT, Height/Length Dosing, cm, 96.6, 08/07/21 15:12:00 EDT, Weight Dosing, kg, Historical Med      acetaminophen (TYLENOL) 325 MG tablet Take 650 mg by mouth every 6 hours as neededHistorical Med      miconazole (MICOTIN) 2 % powder Apply topically 2 times daily. , Disp-45 g, R-1, Normal      atorvastatin (LIPITOR) 40 MG tablet Take 1 tablet by mouth nightly, Disp-30 tablet, R-3Normal      loratadine (CLARITIN) 10 MG tablet Take 10 mg by mouth dailyHistorical Med      aspirin 81 MG chewable tablet Take 1 tablet by mouth daily, Disp-30 tablet, R-3Normal      famotidine (PEPCID) 20 MG tablet Take 1 tablet by mouth daily, Disp-60 tablet, R-3Print      insulin lispro, 1 Unit Dial, 100 UNIT/ML SOPN Inject 3 Units into the skin 3 times daily (with meals), Disp-1 pen, R-0Print      potassium chloride (KLOR-CON M) 10 MEQ extended release tablet Take 1 tablet by mouth 2 times daily, Disp-60 tablet, R-3Print      apixaban (ELIQUIS) 5 MG TABS tablet Take 1 tablet by mouth 2 times daily, Disp-60 tablet, RADIOLOGY:  XR CHEST (2 VW)   Final Result      No acute disease.            LABS:   Results for orders placed or performed during the hospital encounter of 08/17/22   CBC with Auto Differential   Result Value Ref Range    WBC 8.0 4.0 - 11.0 K/uL    RBC 4.38 4.20 - 5.90 M/uL    Hemoglobin 13.4 (L) 13.5 - 17.5 g/dL    Hematocrit 39.3 (L) 40.5 - 52.5 %    MCV 89.6 80.0 - 100.0 fL    MCH 30.5 26.0 - 34.0 pg    MCHC 34.0 31.0 - 36.0 g/dL    RDW 14.5 12.4 - 15.4 %    Platelets 532 666 - 844 K/uL    MPV 8.7 5.0 - 10.5 fL    Neutrophils % 74.3 %    Lymphocytes % 14.0 %    Monocytes % 6.9 %    Eosinophils % 3.9 %    Basophils % 0.9 %    Neutrophils Absolute 6.0 1.7 - 7.7 K/uL    Lymphocytes Absolute 1.1 1.0 - 5.1 K/uL    Monocytes Absolute 0.6 0.0 - 1.3 K/uL    Eosinophils Absolute 0.3 0.0 - 0.6 K/uL    Basophils Absolute 0.1 0.0 - 0.2 K/uL   Comprehensive Metabolic Panel w/ Reflex to MG   Result Value Ref Range    Sodium 136 136 - 145 mmol/L    Potassium reflex Magnesium 4.0 3.5 - 5.1 mmol/L    Chloride 101 99 - 110 mmol/L    CO2 22 21 - 32 mmol/L    Anion Gap 13 3 - 16    Glucose 171 (H) 70 - 99 mg/dL    BUN 28 (H) 7 - 20 mg/dL    Creatinine 1.3 0.8 - 1.3 mg/dL    GFR Non-African American 55 (A) >60    GFR African American >60 >60    Calcium 9.0 8.3 - 10.6 mg/dL    Total Protein 7.6 6.4 - 8.2 g/dL    Albumin 3.8 3.4 - 5.0 g/dL    Albumin/Globulin Ratio 1.0 (L) 1.1 - 2.2    Total Bilirubin 0.4 0.0 - 1.0 mg/dL    Alkaline Phosphatase 82 40 - 129 U/L    ALT 10 10 - 40 U/L    AST 13 (L) 15 - 37 U/L   Protime-INR   Result Value Ref Range    Protime 15.0 (H) 11.7 - 14.5 sec    INR 1.19 (H) 0.87 - 1.14   Troponin   Result Value Ref Range    Troponin <0.01 <0.01 ng/mL   Urinalysis   Result Value Ref Range    Color, UA Yellow Straw/Yellow    Clarity, UA Clear Clear    Glucose, Ur Negative Negative mg/dL    Bilirubin Urine Negative Negative    Ketones, Urine Negative Negative mg/dL    Specific Gravity, UA 1.020 1.005 - 1.030 Blood, Urine MODERATE (A) Negative    pH, UA 6.0 5.0 - 8.0    Protein, UA 30 (A) Negative mg/dL    Urobilinogen, Urine 0.2 <2.0 E.U./dL    Nitrite, Urine POSITIVE (A) Negative    Leukocyte Esterase, Urine LARGE (A) Negative    Microscopic Examination YES     Urine Type NotGiven    Brain Natriuretic Peptide   Result Value Ref Range    Pro-BNP 1,430 (H) 0 - 124 pg/mL   Microscopic Urinalysis   Result Value Ref Range    WBC, UA  (A) 0 - 5 /HPF    RBC, UA 21-50 (A) 0 - 4 /HPF    Epithelial Cells, UA 2-5 0 - 5 /HPF       ED BEDSIDE ULTRASOUND:  No results found. ICD Interrogation (8/4/2022)      RECENT VITALS:  BP: (!) 153/84, Temp: 98.3 °F (36.8 °C), Heart Rate: 88,Resp: 18, SpO2: 97 %     Procedures     None    ED Course     Nursing Notes, Past Medical Hx, Past Surgical Hx, Social Hx, Allergies, and Family Hx were reviewed. ED Course as of 08/18/22 0118   Wed Aug 17, 2022   2200 CXR negative. EKG in NSR, non-ischemic. [IL]   2248 BNP 1430 (most recent prior for comparison 2800 about 1 year ago). Initial trop negative. Chronic anemia hgb 13.4 up from 12 last year. [IL]   2249 UA (+) blood, nitrites, leuk estrase - c/w UTI. Will treat. [IL]      ED Course User Index  [IL] Luke Gan MD       The patient was given the followingmedications:  Orders Placed This Encounter   Medications    nitrofurantoin, macrocrystal-monohydrate, (MACROBID) 100 MG capsule     Sig: Take 1 capsule by mouth 2 times daily for 5 days For urinary tract infection     Dispense:  10 capsule     Refill:  0         CONSULTS:  None    MEDICAL DECISION MAKING / ASSESSMENT / Jose Martinpat Barrios is a 81 y.o. male with history as described above who is presenting with recurrent episodes of dyspnea for the past 1.5 years. Symptoms resolved since coming to the emergency department. No red flag symptoms elicited on history, appears euvolemic on exam, no respiratory distress, lungs CTAB, or other concerning findings.  initial troponin negative and nonischemic unchanged EKG here. reviewed recent interrogation of dual-chamber ICD as above, patient is in sinus rhythm here and is stable. Chest x-ray without acute abnormalities. BNP appears lower than his baseline, clinically euvolemic on exam, denies any orthopnea (sleeps laying flat) so low concern for ADHF. He denies any urinary symptoms or back pain, however UA positive as above, will treat for UTI with Macrobid (Prior Ucx 8/2021 with pan sensitive e coli). Evaluation otherwise unrevealing. Patient continued to be asymptomatic in the ED. At this time, patient appropriate for discharge home with outpatient antibiotics. Given ED return precautions. Encouraged to find PCP and schedule next soonest available appointment. He will follow-up with cardiologist as scheduled. Told he can return the ED for medical care if unable to find PCP. This patient was also evaluated by the attending physician. All care plans werediscussed and agreed upon. Clinical Impression     1. Shortness of breath    2. UTI (urinary tract infection), uncomplicated        Disposition     PATIENT REFERRED TO:  No follow-up provider specified.     DISCHARGE MEDICATIONS:  Discharge Medication List as of 8/18/2022 12:43 AM        START taking these medications    Details   nitrofurantoin, macrocrystal-monohydrate, (MACROBID) 100 MG capsule Take 1 capsule by mouth 2 times daily for 5 days For urinary tract infection, Disp-10 capsule, R-0Print             DISPOSITION Decision To Discharge 08/18/2022 01:05:06 AM       Riddhi Fonseca MD  Resident  08/18/22 [id number]

## 2022-08-18 NOTE — FLOWSHEET NOTE
C/O: SOB approximately 1 hour. Patient describes it as an \"attack that comes on suddenly and lasts about an hour then goes away\". Patient states this happens approximately twice a month. Patient states having cool air resolves symptoms usually.    Hx: CHF -- >Triple Bypass, on eliquis

## 2022-08-18 NOTE — DISCHARGE INSTRUCTIONS
You are seen in the emergency department for feeling short of breath. You felt better when you were in the emergency department. Performed physical exam, blood work, chest x-ray, EKG which were all reassuring. Your urine shows signs of a urinary tract infection. Please take antibiotics (Macrobid) for your urinary tract infection. Take the entire course of them even if you feel better. As discussed, call your insurance company tomorrow to help you find a primary care doctor. You should try to be seen soon as possible by primary care doctor to establish care. Follow-up with your cardiologist as scheduled    please return Emergency Department or seek other emergency medical care if you have worsening symptoms or other worrisome concerns    Take your apixiban twice daily as prescribed.  Its important not to miss any doses of this medication

## 2022-08-18 NOTE — ED PROVIDER NOTES
ED Attending Attestation Note     Date of evaluation: 8/17/2022    This patient was seen by the resident. I have seen and examined the patient, agree with the workup, evaluation, management and diagnosis. The care plan has been discussed. I have reviewed the ECG and concur with the resident's interpretation. My assessment reveals an older gentleman who presents with difficulty breathing. Patient states is been having intermittently for quite some time. He states that his symptoms have resolved since he arrived here. On exam he has no respiratory difficulty. Lungs are grossly clear. He has minimal nonpitting lower extremity edema.      Delano Fenton MD  08/18/22 [id number]

## 2022-08-22 ENCOUNTER — OFFICE VISIT (OUTPATIENT)
Dept: FAMILY MEDICINE CLINIC | Age: 72
End: 2022-08-22
Payer: MEDICAID

## 2022-08-22 VITALS
BODY MASS INDEX: 41.21 KG/M2 | HEART RATE: 105 BPM | HEIGHT: 67 IN | DIASTOLIC BLOOD PRESSURE: 72 MMHG | OXYGEN SATURATION: 96 % | WEIGHT: 262.6 LBS | TEMPERATURE: 97.2 F | SYSTOLIC BLOOD PRESSURE: 130 MMHG

## 2022-08-22 DIAGNOSIS — E11.65 UNCONTROLLED TYPE 2 DIABETES MELLITUS WITH HYPERGLYCEMIA (HCC): ICD-10-CM

## 2022-08-22 DIAGNOSIS — E78.2 MIXED HYPERLIPIDEMIA: ICD-10-CM

## 2022-08-22 DIAGNOSIS — Z95.810 AICD (AUTOMATIC CARDIOVERTER/DEFIBRILLATOR) PRESENT: ICD-10-CM

## 2022-08-22 DIAGNOSIS — I10 PRIMARY HYPERTENSION: Primary | ICD-10-CM

## 2022-08-22 DIAGNOSIS — I42.0 DILATED CARDIOMYOPATHY (HCC): ICD-10-CM

## 2022-08-22 DIAGNOSIS — I50.22 CHRONIC SYSTOLIC (CONGESTIVE) HEART FAILURE (HCC): ICD-10-CM

## 2022-08-22 DIAGNOSIS — I25.10 CAD, MULTIPLE VESSEL: ICD-10-CM

## 2022-08-22 DIAGNOSIS — Z91.81 AT HIGH RISK FOR FALLS: ICD-10-CM

## 2022-08-22 DIAGNOSIS — K21.9 GASTROESOPHAGEAL REFLUX DISEASE WITHOUT ESOPHAGITIS: ICD-10-CM

## 2022-08-22 DIAGNOSIS — I48.0 PAROXYSMAL ATRIAL FIBRILLATION (HCC): ICD-10-CM

## 2022-08-22 PROBLEM — N45.3 EPIDIDYMOORCHITIS: Status: RESOLVED | Noted: 2021-08-26 | Resolved: 2022-08-22

## 2022-08-22 PROBLEM — J90 BILATERAL PLEURAL EFFUSION: Status: RESOLVED | Noted: 2020-03-02 | Resolved: 2022-08-22

## 2022-08-22 PROBLEM — I48.91 NEW ONSET A-FIB (HCC): Status: RESOLVED | Noted: 2019-12-10 | Resolved: 2022-08-22

## 2022-08-22 PROBLEM — N30.00 ACUTE CYSTITIS: Status: RESOLVED | Noted: 2020-03-02 | Resolved: 2022-08-22

## 2022-08-22 PROBLEM — K59.00 CONSTIPATION: Status: RESOLVED | Noted: 2020-03-02 | Resolved: 2022-08-22

## 2022-08-22 PROCEDURE — 1123F ACP DISCUSS/DSCN MKR DOCD: CPT | Performed by: FAMILY MEDICINE

## 2022-08-22 PROCEDURE — 99204 OFFICE O/P NEW MOD 45 MIN: CPT | Performed by: FAMILY MEDICINE

## 2022-08-22 RX ORDER — EPINEPHRINE INHALATION 125 UG/1
2 AEROSOL RESPIRATORY (INHALATION) ONCE
COMMUNITY

## 2022-08-22 ASSESSMENT — PATIENT HEALTH QUESTIONNAIRE - PHQ9
SUM OF ALL RESPONSES TO PHQ QUESTIONS 1-9: 0
SUM OF ALL RESPONSES TO PHQ QUESTIONS 1-9: 0
2. FEELING DOWN, DEPRESSED OR HOPELESS: 0
SUM OF ALL RESPONSES TO PHQ QUESTIONS 1-9: 0
SUM OF ALL RESPONSES TO PHQ9 QUESTIONS 1 & 2: 0
1. LITTLE INTEREST OR PLEASURE IN DOING THINGS: 0
SUM OF ALL RESPONSES TO PHQ QUESTIONS 1-9: 0

## 2022-08-22 NOTE — PROGRESS NOTES
Subjective    Laura Sánchez is a 81 y.o. Male who came into the clinic today to establish care with me and for appropriate   management. Since I am seeing the patient for the first time today I did review in detail his medical history   along with the medications the patient is currently on. The patient informs me that he got overwhelmed with   taking so many medications so he stopped taking most of his medications. As per the patient he was noticing   some side effects from the atorvastatin so he quit taking it. He has an upcoming appointment with his cardiologist   in a month and he will discuss alternatives for the same. The patient also has diabetes and does check his blood   sugar at home and as per the patient it has been mildly elevated. The patient would like to have the HbA1c   checked at this time. The patient did inform me that he quit taking his insulin which he took as per the sliding   scale. Today he did not have any other questions or concerns and all the question and concerns were appropriately   answered.     I reviewed and discussed below mentioned labs with the patient today:    Lab Results   Component Value Date/Time    WBC 8.0 08/17/2022 09:59 PM    RBC 4.38 08/17/2022 09:59 PM    MCV 89.6 08/17/2022 09:59 PM    MCHC 34.0 08/17/2022 09:59 PM     Lab Results   Component Value Date/Time    ANIONGAP 13 08/17/2022 09:59 PM    GLUCOSE 171 08/17/2022 09:59 PM    BUN 28 08/17/2022 09:59 PM    CREATININE 1.3 08/17/2022 09:59 PM    AGRATIO 1.0 08/17/2022 09:59 PM    CALCIUM 9.0 08/17/2022 09:59 PM     Past Medical History:   Diagnosis Date    A-fib ([facility] 2020    CAD, multiple vessel 12/2019    Diabetes mellitus ([facility]     diet controlled     Enlarged prostate     Environmental allergies     Kidney stones     Systolic CHF [facility])        Patient Active Problem List   Diagnosis    Bladder obstruction    Dilated cardiomyopathy ([facility]    Paroxysmal atrial fibrillation (HCC)    S/P coronary angiogram without becoming SOB. Dx: CAD, Cardiomyopathy, Atrial Fibrilation    Not to exceed 5 years (Patient taking differently: by Does not apply route Pt cannot walk more that 250 feet without becoming SOB. Dx: CAD, Cardiomyopathy, Atrial Fibrilation    Not to exceed 5 years) 1 each 0    atorvastatin (LIPITOR) 40 MG tablet Take 1 tablet by mouth nightly (Patient not taking: Reported on 8/22/2022) 30 tablet 3    aspirin 81 MG chewable tablet Take 1 tablet by mouth daily (Patient not taking: Reported on 8/22/2022) 30 tablet 3    famotidine (PEPCID) 20 MG tablet Take 1 tablet by mouth daily (Patient not taking: Reported on 8/22/2022) 60 tablet 3    insulin lispro, 1 Unit Dial, 100 UNIT/ML SOPN Inject 3 Units into the skin 3 times daily (with meals) (Patient not taking: Reported on 8/22/2022) 1 pen 0    potassium chloride (KLOR-CON M) 10 MEQ extended release tablet Take 1 tablet by mouth 2 times daily (Patient not taking: Reported on 8/22/2022) 60 tablet 3     No current facility-administered medications on file prior to visit. Allergies   Allergen Reactions    Codeine Other (See Comments)     Seizures    Aspartame And Phenylalanine        REVIEW OF SYSTEMS:   CONSTITUTIONAL: No weight loss, fever, chills. Complains of weakness or fatigue. HEENT: Eyes: No visual loss, blurred vision, double vision or yellow sclerae. Ears, Nose, Throat: No hearing loss, sneezing, congestion, runny nose or sore throat. SKIN: No rash or itching. CARDIOVASCULAR: No chest pain, chest pressure or chest discomfort. No  palpitations. Complains of edema. RESPIRATORY: No shortness of breath, cough or sputum. GASTROINTESTINAL: No anorexia, nausea, vomiting, diarrhea or constipation. No abdominal pain, hematochezia or melena. GERD. GENITOURINARY:No dysuria, urgency, frequency, hematuria. NEUROLOGICAL: No headache, dizziness, syncope, paralysis, ataxia,   numbness or tingling in the extremities.  No change in bowel or bladder control. MUSCULOSKELETAL: No muscle pain, back pain, joint pain or stiffness. PSYCHIATRIC: No history of depression or anxiety. ENDOCRINOLOGIC: No reports of sweating, cold or heat intolerance. No polyuria or polydipsia. Objective     . /72   Pulse (!) 105   Temp 97.2 °F (36.2 °C)   Ht 5' 7\" (1.702 m)   Wt 262 lb 9.6 oz (119.1 kg)   SpO2 96%   BMI 41.13 kg/m²     GENERAL:  Katelyn Dale is a 81 y.o.  male who is not in any acute distress. He was well attired and well groomed and was speaking in full sentences. Using   cane for ambulation. HEENT:  Head:  Atraumatic, normocephalic. Eyes: Both the pupils are round,   equal and reactive to light. No squint noted. Normal red reflex is seen. Ears: Both external auditory canals are clear. No discharge noted. Tympanic membranes healthy. Normal light reflex is seen. Nose: Both the   nares are clear. No discharge noted. No epistaxis. Throat:  No posterior   pharyngeal wall erythema. Oral mucosa moist. No enlarged tonsils. NECK:  Supple. No cervical lymphadenopathy. No thyromegaly. LUNGS:  Normal ventilatory breath sounds are heard bilaterally. No crackles   or wheezes heard. CARDIOVASCULAR:  Normal S1, S2 heard. No murmurs heard. No JVD. ICD   felt to below the left clavicle    GASTROINTESTINAL:  Abdomen soft, nontender. No guarding or rigidity noted. No organomegaly noted. Normal bowel sounds were heard. EXTREMITIES:  All 4 extremities were moving fine. Full range of motion is   present. No deformity noted. Peripheral pulses were felt. Bilateral trace lower   extremity edema. Neurovascular integrity maintained. NEUROLOGICAL:  The patient was alert, conscious, and cooperative. Oriented   to time, place, and person. Muscle power in all 4 limbs is 5/5. Cranial   nerves II thru XII are grossly intact. No sensory or motor loss. PSYCHIATRIC:  Appropriate mood and affect. No flights of ideas or thoughts.    Intact recent and past memory. No confusion or delirium. No suicidal or homicidal ideations. BACK:  Normal alignment of the spine. Full range of motion is present. No   stepoff. No deformity. No spinal or paraspinal tenderness noted. Assessment/Plan     Diagnoses and all orders for this visit:    Primary hypertension    Mixed hyperlipidemia    Uncontrolled type 2 diabetes mellitus with hyperglycemia (HCC)  -     Hemoglobin A1C; Future    Chronic systolic (congestive) heart failure (HCC)    Dilated cardiomyopathy (HCC)    Paroxysmal atrial fibrillation (HCC)    CAD, multiple vessel    Gastroesophageal reflux disease without esophagitis    AICD (automatic cardioverter/defibrillator) present    At high risk for falls      Advise given:  - Get appropriate investigations done. Will call back with the results and will manage accordingly. - Take all prescription medication as prescribed. - Take precaution during ambulation.  - Eat five servings of fruits and vegetables everyday. - Discussed importance of regular exercise and recommended starting or continuing a regular exercise program for good health. - Try to lose weight with diet and exercise as discussed. - The importance of monitoring blood sugar regularly was reviewed. - The importance of annual eye exams was reviewed. - The importance of proper foot care and regularly checking feet to prevent sores and possibly loss of limbs was reviewed. - The importance of keeping the blood pressure monitoring to prevent stroke, heart attacks, kidney failure, blindness, and loss of limbs was reviewed. - Appropriate handout were given to the patient. -  All the questions and concerns were appropriately answered. - Patient / family member / caregiver verbalized understanding of patient instructions from today's visit. - The patient was advised to follow up with me in 3 months for recheck or can call me before if has any other questions or concerns.     On the basis of positive falls risk screening, assessment and plan is as follows: home safety tips provided.

## 2022-08-22 NOTE — PATIENT INSTRUCTIONS
Patient Education        Learning About Carbohydrate (Carb) Counting and Eating Out When You Have Diabetes  Why plan your meals? Meal planning can be a key part of managing diabetes. Planning meals and snacks with the right balance of carbohydrate, protein, and fat can help you keep yourblood sugar at the target level you set with your doctor. You don't have to eat special foods. You can eat what your family eats, including sweets once in a while. But you do have to pay attention to how oftenyou eat and how much you eat of certain foods. You may want to work with a dietitian or a diabetes educator. They can give you tips and meal ideas and can answer your questions about meal planning. This health professional can also help you reach a healthy weight if that is one ofyour goals. What should you know about eating carbs? Managing the amount of carbohydrate (carbs) you eat is an important part ofhealthy meals when you have diabetes. Carbohydrate is found in many foods. Learn which foods have carbs. And learn the amounts of carbs in different foods. Bread, cereal, pasta, and rice have about 15 grams of carbs in a serving. A serving is 1 slice of bread (1 ounce), ½ cup of cooked cereal, or 1/3 cup of cooked pasta or rice. Fruits have 15 grams of carbs in a serving. A serving is 1 small fresh fruit, such as an apple or orange; ½ of a banana; ½ cup of cooked or canned fruit; ½ cup of fruit juice; 1 cup of melon or raspberries; or 2 tablespoons of dried fruit. Milk and no-sugar-added yogurt have 15 grams of carbs in a serving. A serving is 1 cup of milk or 3/4 cup (6 oz) of no-sugar-added yogurt. Starchy vegetables have 15 grams of carbs in a serving. A serving is ½ cup of mashed potatoes or sweet potato; 1 cup winter squash; ½ of a small baked potato; ½ cup of cooked beans; or ½ cup cooked corn or green peas.   Learn how much carbs to eat each day and at each meal. A dietitian or certified diabetes educator can teach you how to keep track of the amount of carbs you eat. This is called carbohydrate counting. If you are not sure how to count carbohydrate grams, use the plate method to plan meals. It is a quick way to make sure that you have a balanced meal. It also can help you manage the amount of carbohydrate you eat at meals. Divide your plate by types of foods. Put non-starchy vegetables on half the plate, meat or other protein food on one-quarter of the plate, and a grain or starchy vegetable in the final quarter of the plate. To this you can add a small piece of fruit and 1 cup of milk or yogurt, depending on how many carbs you are supposed to eat at a meal.  Try to eat about the same amount of carbs at each meal. Do not \"save up\" your daily allowance of carbs to eat at one meal.  Proteins have very little or no carbs. Examples of proteins are beef, chicken, turkey, fish, eggs, tofu, cheese, cottage cheese, and peanut butter. How can you eat out and still eat healthy? Learn to estimate the serving sizes of foods that have carbohydrate. If you measure food at home, it will be easier to estimate the amount in a serving of restaurant food. If the meal you order has too much carbohydrate (such as potatoes, corn, or baked beans), ask to have a low-carbohydrate food instead. Ask for a salad or non-starchy vegetables like broccoli, cauliflower, green beans, or peppers. If you eat more carbohydrate at a meal than you had planned, take a walk or do other exercise. This will help lower your blood sugar. What are some tips for eating healthy? Limit saturated fat, such as the fat from meat and dairy products. This is a healthy choice because people who have diabetes are at higher risk of heart disease. So choose lean cuts of meat and nonfat or low-fat dairy products. Use olive or canola oil instead of butter or shortening when cooking. Don't skip meals.  Your blood sugar may drop too low if you skip meals and take insulin or certain medicines for diabetes. Check with your doctor before you drink alcohol. Alcohol can cause your blood sugar to drop too low. Alcohol can also cause a bad reaction if you take certain diabetes medicines. Follow-up care is a key part of your treatment and safety. Be sure to make and go to all appointments, and call your doctor if you are having problems. It's also a good idea to know your test results and keep alist of the medicines you take. Where can you learn more? Go to [email or web address].com. org and sign in to your [employer] account. Enter Z145 in the mytheresa.com box to learn more about \"Learning About Carbohydrate (Carb) Counting and Eating Out When You Have Diabetes. \"     If you do not have an account, please click on the \"Sign Up Now\" link. Current as of: September 8, 2021               Content Version: 13.3  © 2006-2022 Healthwise, Incorporated. Care instructions adapted under license by [facility] ([facility]). If you have questions about a medical condition or this instruction, always ask your healthcare professional. [address] any warranty or liability for your use of this information.

## 2022-08-23 LAB
ESTIMATED AVERAGE GLUCOSE: 159.9 MG/DL
HBA1C MFR BLD: 7.2 %

## 2022-08-24 PROCEDURE — 96365 THER/PROPH/DIAG IV INF INIT: CPT

## 2022-08-24 PROCEDURE — 96366 THER/PROPH/DIAG IV INF ADDON: CPT

## 2022-08-24 PROCEDURE — 99285 EMERGENCY DEPT VISIT HI MDM: CPT

## 2022-08-25 ENCOUNTER — HOSPITAL ENCOUNTER (INPATIENT)
Age: 72
LOS: 2 days | Discharge: HOME | DRG: 194 | End: 2022-08-27
Attending: EMERGENCY MEDICINE | Admitting: INTERNAL MEDICINE
Payer: MEDICAID

## 2022-08-25 ENCOUNTER — APPOINTMENT (OUTPATIENT)
Dept: GENERAL RADIOLOGY | Age: 72
DRG: 194 | End: 2022-08-25
Payer: MEDICAID

## 2022-08-25 DIAGNOSIS — N28.9 ACUTE RENAL INSUFFICIENCY: ICD-10-CM

## 2022-08-25 DIAGNOSIS — N30.01 ACUTE CYSTITIS WITH HEMATURIA: Primary | ICD-10-CM

## 2022-08-25 PROBLEM — I50.23 ACUTE ON CHRONIC SYSTOLIC CONGESTIVE HEART FAILURE (HCC): Status: ACTIVE | Noted: 2022-08-22

## 2022-08-25 PROBLEM — N17.0 ACUTE KIDNEY INJURY (AKI) WITH ACUTE TUBULAR NECROSIS (ATN) (HCC): Status: ACTIVE | Noted: 2022-08-25

## 2022-08-25 LAB
ANION GAP SERPL CALCULATED.3IONS-SCNC: 14 MMOL/L (ref 3–16)
BASE EXCESS VENOUS: 0.3 MMOL/L (ref -2–3)
BASOPHILS ABSOLUTE: 0 K/UL (ref 0–0.2)
BASOPHILS RELATIVE PERCENT: 0.4 %
BILIRUBIN URINE: NEGATIVE
BLOOD, URINE: ABNORMAL
BUN BLDV-MCNC: 45 MG/DL (ref 7–20)
CALCIUM SERPL-MCNC: 9.1 MG/DL (ref 8.3–10.6)
CARBOXYHEMOGLOBIN: 1.4 % (ref 0–1.5)
CHLORIDE BLD-SCNC: 99 MMOL/L (ref 99–110)
CLARITY: CLEAR
CO2: 22 MMOL/L (ref 21–32)
COLOR: YELLOW
CREAT SERPL-MCNC: 1.8 MG/DL (ref 0.8–1.3)
EKG ATRIAL RATE: 108 BPM
EKG DIAGNOSIS: NORMAL
EKG Q-T INTERVAL: 394 MS
EKG QRS DURATION: 82 MS
EKG QTC CALCULATION (BAZETT): 505 MS
EKG R AXIS: 64 DEGREES
EKG T AXIS: 136 DEGREES
EKG VENTRICULAR RATE: 99 BPM
EOSINOPHILS ABSOLUTE: 0.4 K/UL (ref 0–0.6)
EOSINOPHILS RELATIVE PERCENT: 4.1 %
GFR AFRICAN AMERICAN: 45
GFR NON-AFRICAN AMERICAN: 37
GLUCOSE BLD-MCNC: 157 MG/DL (ref 70–99)
GLUCOSE BLD-MCNC: 166 MG/DL (ref 70–99)
GLUCOSE BLD-MCNC: 215 MG/DL (ref 70–99)
GLUCOSE BLD-MCNC: 223 MG/DL (ref 70–99)
GLUCOSE URINE: NEGATIVE MG/DL
HCO3 VENOUS: 26.8 MMOL/L (ref 24–28)
HCT VFR BLD CALC: 42.1 % (ref 40.5–52.5)
HEMOGLOBIN, VEN, REDUCED: 44.8 %
HEMOGLOBIN: 14.1 G/DL (ref 13.5–17.5)
KETONES, URINE: NEGATIVE MG/DL
LEUKOCYTE ESTERASE, URINE: ABNORMAL
LYMPHOCYTES ABSOLUTE: 1.8 K/UL (ref 1–5.1)
LYMPHOCYTES RELATIVE PERCENT: 18 %
MCH RBC QN AUTO: 29.9 PG (ref 26–34)
MCHC RBC AUTO-ENTMCNC: 33.4 G/DL (ref 31–36)
MCV RBC AUTO: 89.5 FL (ref 80–100)
METHEMOGLOBIN VENOUS: 0.5 % (ref 0–1.5)
MICROSCOPIC EXAMINATION: YES
MONOCYTES ABSOLUTE: 0.9 K/UL (ref 0–1.3)
MONOCYTES RELATIVE PERCENT: 8.9 %
NEUTROPHILS ABSOLUTE: 6.8 K/UL (ref 1.7–7.7)
NEUTROPHILS RELATIVE PERCENT: 68.6 %
NITRITE, URINE: NEGATIVE
O2 SAT, VEN: 54 %
OSMOLALITY URINE: 450 MOSM/KG (ref 390–1070)
PCO2, VEN: 49 MMHG (ref 41–51)
PDW BLD-RTO: 14.5 % (ref 12.4–15.4)
PERFORMED ON: ABNORMAL
PH UA: 6 (ref 5–8)
PH VENOUS: 7.35 (ref 7.35–7.45)
PLATELET # BLD: 205 K/UL (ref 135–450)
PMV BLD AUTO: 8.9 FL (ref 5–10.5)
PO2, VEN: 30.2 MMHG (ref 25–40)
POTASSIUM SERPL-SCNC: 4 MMOL/L (ref 3.5–5.1)
PRO-BNP: 3047 PG/ML (ref 0–124)
PROTEIN UA: 100 MG/DL
RBC # BLD: 4.7 M/UL (ref 4.2–5.9)
RBC UA: ABNORMAL /HPF (ref 0–4)
SODIUM BLD-SCNC: 135 MMOL/L (ref 136–145)
SODIUM URINE: 62 MMOL/L
SPECIFIC GRAVITY UA: 1.02 (ref 1–1.03)
TCO2 CALC VENOUS: 28 MMOL/L
TROPONIN: <0.01 NG/ML
URINE TYPE: ABNORMAL
UROBILINOGEN, URINE: 0.2 E.U./DL
WBC # BLD: 9.9 K/UL (ref 4–11)
WBC UA: >100 /HPF (ref 0–5)

## 2022-08-25 PROCEDURE — 2580000003 HC RX 258: Performed by: INTERNAL MEDICINE

## 2022-08-25 PROCEDURE — 94761 N-INVAS EAR/PLS OXIMETRY MLT: CPT

## 2022-08-25 PROCEDURE — 80048 BASIC METABOLIC PNL TOTAL CA: CPT

## 2022-08-25 PROCEDURE — 6370000000 HC RX 637 (ALT 250 FOR IP): Performed by: INTERNAL MEDICINE

## 2022-08-25 PROCEDURE — 84300 ASSAY OF URINE SODIUM: CPT

## 2022-08-25 PROCEDURE — 85025 COMPLETE CBC W/AUTO DIFF WBC: CPT

## 2022-08-25 PROCEDURE — 6360000004 HC RX CONTRAST MEDICATION: Performed by: INTERNAL MEDICINE

## 2022-08-25 PROCEDURE — 93005 ELECTROCARDIOGRAM TRACING: CPT | Performed by: EMERGENCY MEDICINE

## 2022-08-25 PROCEDURE — 6360000002 HC RX W HCPCS: Performed by: INTERNAL MEDICINE

## 2022-08-25 PROCEDURE — 97116 GAIT TRAINING THERAPY: CPT

## 2022-08-25 PROCEDURE — 94150 VITAL CAPACITY TEST: CPT

## 2022-08-25 PROCEDURE — 80061 LIPID PANEL: CPT

## 2022-08-25 PROCEDURE — 97535 SELF CARE MNGMENT TRAINING: CPT

## 2022-08-25 PROCEDURE — 97162 PT EVAL MOD COMPLEX 30 MIN: CPT

## 2022-08-25 PROCEDURE — 6360000002 HC RX W HCPCS: Performed by: EMERGENCY MEDICINE

## 2022-08-25 PROCEDURE — 2580000003 HC RX 258: Performed by: EMERGENCY MEDICINE

## 2022-08-25 PROCEDURE — 1200000000 HC SEMI PRIVATE

## 2022-08-25 PROCEDURE — 83935 ASSAY OF URINE OSMOLALITY: CPT

## 2022-08-25 PROCEDURE — 99255 IP/OBS CONSLTJ NEW/EST HI 80: CPT | Performed by: INTERNAL MEDICINE

## 2022-08-25 PROCEDURE — 97166 OT EVAL MOD COMPLEX 45 MIN: CPT

## 2022-08-25 PROCEDURE — 82803 BLOOD GASES ANY COMBINATION: CPT

## 2022-08-25 PROCEDURE — 83880 ASSAY OF NATRIURETIC PEPTIDE: CPT

## 2022-08-25 PROCEDURE — 82570 ASSAY OF URINE CREATININE: CPT

## 2022-08-25 PROCEDURE — 71046 X-RAY EXAM CHEST 2 VIEWS: CPT

## 2022-08-25 PROCEDURE — 81001 URINALYSIS AUTO W/SCOPE: CPT

## 2022-08-25 PROCEDURE — 82306 VITAMIN D 25 HYDROXY: CPT

## 2022-08-25 PROCEDURE — 87205 SMEAR GRAM STAIN: CPT

## 2022-08-25 PROCEDURE — 84484 ASSAY OF TROPONIN QUANT: CPT

## 2022-08-25 PROCEDURE — C8929 TTE W OR WO FOL WCON,DOPPLER: HCPCS

## 2022-08-25 RX ORDER — INSULIN LISPRO 100 [IU]/ML
0-8 INJECTION, SOLUTION INTRAVENOUS; SUBCUTANEOUS
Status: DISCONTINUED | OUTPATIENT
Start: 2022-08-25 | End: 2022-08-27 | Stop reason: HOSPADM

## 2022-08-25 RX ORDER — ASPIRIN 81 MG/1
81 TABLET, CHEWABLE ORAL DAILY
Status: DISCONTINUED | OUTPATIENT
Start: 2022-08-25 | End: 2022-08-27 | Stop reason: HOSPADM

## 2022-08-25 RX ORDER — AMIODARONE HYDROCHLORIDE 200 MG/1
200 TABLET ORAL 2 TIMES DAILY
Status: DISCONTINUED | OUTPATIENT
Start: 2022-08-25 | End: 2022-08-26

## 2022-08-25 RX ORDER — SODIUM CHLORIDE 0.9 % (FLUSH) 0.9 %
5-40 SYRINGE (ML) INJECTION EVERY 12 HOURS SCHEDULED
Status: DISCONTINUED | OUTPATIENT
Start: 2022-08-25 | End: 2022-08-27 | Stop reason: HOSPADM

## 2022-08-25 RX ORDER — ENOXAPARIN SODIUM 100 MG/ML
30 INJECTION SUBCUTANEOUS 2 TIMES DAILY
Status: DISCONTINUED | OUTPATIENT
Start: 2022-08-25 | End: 2022-08-25

## 2022-08-25 RX ORDER — POTASSIUM CHLORIDE 750 MG/1
10 TABLET, EXTENDED RELEASE ORAL 2 TIMES DAILY
Status: DISCONTINUED | OUTPATIENT
Start: 2022-08-25 | End: 2022-08-27 | Stop reason: HOSPADM

## 2022-08-25 RX ORDER — INSULIN LISPRO 100 [IU]/ML
0-4 INJECTION, SOLUTION INTRAVENOUS; SUBCUTANEOUS NIGHTLY
Status: DISCONTINUED | OUTPATIENT
Start: 2022-08-25 | End: 2022-08-27 | Stop reason: HOSPADM

## 2022-08-25 RX ORDER — ACETAMINOPHEN 650 MG/1
650 SUPPOSITORY RECTAL EVERY 6 HOURS PRN
Status: DISCONTINUED | OUTPATIENT
Start: 2022-08-25 | End: 2022-08-27 | Stop reason: HOSPADM

## 2022-08-25 RX ORDER — SODIUM CHLORIDE 0.9 % (FLUSH) 0.9 %
5-40 SYRINGE (ML) INJECTION PRN
Status: DISCONTINUED | OUTPATIENT
Start: 2022-08-25 | End: 2022-08-27 | Stop reason: HOSPADM

## 2022-08-25 RX ORDER — ACETAMINOPHEN 325 MG/1
650 TABLET ORAL EVERY 6 HOURS PRN
Status: DISCONTINUED | OUTPATIENT
Start: 2022-08-25 | End: 2022-08-27 | Stop reason: HOSPADM

## 2022-08-25 RX ORDER — FUROSEMIDE 10 MG/ML
60 INJECTION INTRAMUSCULAR; INTRAVENOUS ONCE
Status: COMPLETED | OUTPATIENT
Start: 2022-08-25 | End: 2022-08-25

## 2022-08-25 RX ORDER — ONDANSETRON 2 MG/ML
4 INJECTION INTRAMUSCULAR; INTRAVENOUS EVERY 6 HOURS PRN
Status: DISCONTINUED | OUTPATIENT
Start: 2022-08-25 | End: 2022-08-27 | Stop reason: HOSPADM

## 2022-08-25 RX ORDER — ONDANSETRON 4 MG/1
4 TABLET, ORALLY DISINTEGRATING ORAL EVERY 8 HOURS PRN
Status: DISCONTINUED | OUTPATIENT
Start: 2022-08-25 | End: 2022-08-27 | Stop reason: HOSPADM

## 2022-08-25 RX ORDER — FUROSEMIDE 40 MG/1
80 TABLET ORAL 2 TIMES DAILY
Status: DISCONTINUED | OUTPATIENT
Start: 2022-08-25 | End: 2022-08-27 | Stop reason: HOSPADM

## 2022-08-25 RX ADMIN — METOPROLOL TARTRATE 25 MG: 25 TABLET, FILM COATED ORAL at 23:46

## 2022-08-25 RX ADMIN — PERFLUTREN 1.65 MG: 6.52 INJECTION, SUSPENSION INTRAVENOUS at 13:29

## 2022-08-25 RX ADMIN — POTASSIUM CHLORIDE 10 MEQ: 750 TABLET, EXTENDED RELEASE ORAL at 14:44

## 2022-08-25 RX ADMIN — APIXABAN 5 MG: 5 TABLET, FILM COATED ORAL at 23:45

## 2022-08-25 RX ADMIN — ASPIRIN 81 MG 81 MG: 81 TABLET ORAL at 14:44

## 2022-08-25 RX ADMIN — AMIODARONE HYDROCHLORIDE 200 MG: 200 TABLET ORAL at 15:16

## 2022-08-25 RX ADMIN — INSULIN LISPRO 2 UNITS: 100 INJECTION, SOLUTION INTRAVENOUS; SUBCUTANEOUS at 12:33

## 2022-08-25 RX ADMIN — SODIUM CHLORIDE, PRESERVATIVE FREE 10 ML: 5 INJECTION INTRAVENOUS at 21:05

## 2022-08-25 RX ADMIN — APIXABAN 5 MG: 5 TABLET, FILM COATED ORAL at 14:44

## 2022-08-25 RX ADMIN — FUROSEMIDE 80 MG: 40 TABLET ORAL at 17:52

## 2022-08-25 RX ADMIN — SODIUM CHLORIDE, PRESERVATIVE FREE 10 ML: 5 INJECTION INTRAVENOUS at 10:00

## 2022-08-25 RX ADMIN — METOPROLOL TARTRATE 25 MG: 25 TABLET, FILM COATED ORAL at 14:44

## 2022-08-25 RX ADMIN — AMIODARONE HYDROCHLORIDE 200 MG: 200 TABLET ORAL at 23:46

## 2022-08-25 RX ADMIN — INSULIN LISPRO 2 UNITS: 100 INJECTION, SOLUTION INTRAVENOUS; SUBCUTANEOUS at 17:52

## 2022-08-25 RX ADMIN — CEFTRIAXONE 1000 MG: 1 INJECTION, POWDER, FOR SOLUTION INTRAMUSCULAR; INTRAVENOUS at 04:52

## 2022-08-25 RX ADMIN — POTASSIUM CHLORIDE 10 MEQ: 750 TABLET, EXTENDED RELEASE ORAL at 21:05

## 2022-08-25 RX ADMIN — FUROSEMIDE 60 MG: 10 INJECTION, SOLUTION INTRAMUSCULAR; INTRAVENOUS at 14:46

## 2022-08-25 RX ADMIN — ENOXAPARIN SODIUM 30 MG: 100 INJECTION SUBCUTANEOUS at 08:19

## 2022-08-25 ASSESSMENT — ENCOUNTER SYMPTOMS
EYES NEGATIVE: 1
GASTROINTESTINAL NEGATIVE: 1
SHORTNESS OF BREATH: 1
COUGH: 0

## 2022-08-25 ASSESSMENT — PAIN - FUNCTIONAL ASSESSMENT: PAIN_FUNCTIONAL_ASSESSMENT: NONE - DENIES PAIN

## 2022-08-25 NOTE — CONSULTS
Reason for Consultation/Chief Complaint: SOB    History of Present Illness:  Danette Marlow is a 81 y.o. patient whom we were asked to see for CHF/NANETTE/afib/CABG. Presents with increasing sob. Also has noted orthopnea when lying down. Has had been having increase in edema. No chest pain. Noted to have irregular HB on pulse ox. Noted no palp/tachy. No syncope. Noted in ED to have increased BNP and cr from several days ago. Appears to have cystitis and NANETTE. Past Medical History:   has a past medical history of A-fib ([facility] [address] CAD, multiple vessel, Diabetes mellitus ([facility] [address] Enlarged prostate, Environmental allergies, Kidney stones, and Systolic CHF ([facility] [address] Surgical History:   has a past surgical history that includes Cardiac catheterization (12/10/2019); Nasal polyp surgery (x2); Nasal septum surgery; and Coronary artery bypass graft (N/A, 3/2/2020). Social History:   reports that he has never smoked. He has never used smokeless tobacco. He reports that he does not currently use alcohol. He reports that he does not use drugs. Family History:  No evidence for sudden cardiac death or premature CAD    Home Medications:  Were reviewed and are listed in nursing record. and/or listed below  Prior to Admission medications    Medication Sig Start Date End Date Taking?  Authorizing Provider   EPINEPHrine (PRIMATENE MIST) 0.125 MG/ACT AERO Inhale 2 puffs into the lungs once    Historical Provider, MD   furosemide (LASIX) 80 MG tablet Take 1 tablet by mouth 2 times daily 11/3/21   EVELYN Pringle - CNP   metoprolol tartrate (LOPRESSOR) 25 MG tablet TAKE 1 TABLET BY MOUTH TWICE DAILY 8/13/21   Historical Provider, MD   atorvastatin (LIPITOR) 40 MG tablet Take 1 tablet by mouth nightly  Patient not taking: Reported on 8/22/2022 8/29/21   Chacha Salcedo MD   aspirin 81 MG chewable tablet Take 1 tablet by mouth daily  Patient not taking: No sig reported 3/24/20   Mya Silva MD   famotidine (PEPCID) 20 MG tablet Take 1 tablet by mouth daily  Patient not taking: Reported on 8/22/2022 3/13/20   Mauricio Vick MD   insulin lispro, 1 Unit Dial, 100 UNIT/ML SOPN Inject 3 Units into the skin 3 times daily (with meals)  Patient not taking: No sig reported 3/13/20   Mauricio Vick MD   potassium chloride (KLOR-CON M) 10 MEQ extended release tablet Take 1 tablet by mouth 2 times daily  Patient not taking: Reported on 8/22/2022 3/13/20   Mauricio Vick MD   [facility]) 5 MG TABS tablet Take 1 tablet by mouth 2 times daily 3/13/20   Mauricio Vick MD   Handicap Placard MISC by Does not apply route Pt cannot walk more that 250 feet without becoming SOB. Dx: CAD, Cardiomyopathy, Atrial Fibrilation    Not to exceed 5 years  Patient taking differently: by Does not apply route Pt cannot walk more that 250 feet without becoming SOB. Dx: CAD, Cardiomyopathy, Atrial Fibrilation    Not to exceed 5 years 1/28/20   Miguel Crooks MD        Allergies:  Codeine and Aspartame and phenylalanine     Review of Systems:       Constitutional: there has been no unanticipated weight loss. There's been no change in energy level, sleep pattern, or activity level. Eyes: No visual changes or diplopia. No scleral icterus. ENT: No Headaches, hearing loss or vertigo. No mouth sores or sore throat. Cardiovascular: No loss of consciousness. No hemoptysis, pleuritic pain, or phlebitis. Respiratory: No cough or wheezing, no sputum production. No hematemesis. Gastrointestinal: No abdominal pain, appetite loss, blood in stools. No change in bowel or bladder habits. Genitourinary: No dysuria, trouble voiding, or hematuria. Musculoskeletal:  No gait disturbance, weakness or joint complaints. Integumentary: No rash or pruritis. All other systems reviewed negative as done.      Physical Examination:    Vitals:    08/25/22 1105   BP: 135/80   Pulse: 90   Resp: 16   Temp: 97.5 °F (36.4 °C)   SpO2: 99%    Weight: 262 lb (118.8 kg) General Appearance:  Alert, cooperative, no distress, appears stated age   Head:  Normocephalic, without obvious abnormality, atraumatic   Eyes:  PERRL, conjunctiva/corneas clear       Nose: Nares normal, no drainage or sinus tenderness   Throat: Lips, mucosa, and tongue normal   Neck: Supple, symmetrical, trachea midline       Lungs:   Decreased BS, respirations unlabored   Chest Wall:  No tenderness or deformity   Heart:  Irreg/irreg rate and rhythm, S1, S2 normal, no murmur, rub or gallop   Abdomen:   Soft, non-tender, bowel sounds active all four quadrants           Extremities: Extremities normal, atraumatic, no cyanosis + edema       Skin: Skin color, texture, turgor normal, no rashes or lesions   Pysch: Normal mood and affect   Neurologic: Normal gross motor and sensory exam.         Labs  CBC:   Lab Results   Component Value Date/Time    WBC 9.9 08/25/2022 02:21 AM    RBC 4.70 08/25/2022 02:21 AM    HGB 14.1 08/25/2022 02:21 AM    HCT 42.1 08/25/2022 02:21 AM    MCV 89.5 08/25/2022 02:21 AM    RDW 14.5 08/25/2022 02:21 AM     08/25/2022 02:21 AM     CMP:    Lab Results   Component Value Date/Time     08/25/2022 02:21 AM    K 4.0 08/25/2022 02:21 AM    K 4.0 08/17/2022 09:59 PM    CL 99 08/25/2022 02:21 AM    CO2 22 08/25/2022 02:21 AM    BUN 45 08/25/2022 02:21 AM    CREATININE 1.8 08/25/2022 02:21 AM    GFRAA 45 08/25/2022 02:21 AM    AGRATIO 1.0 08/17/2022 09:59 PM    LABGLOM 37 08/25/2022 02:21 AM    GLUCOSE 166 08/25/2022 02:21 AM    PROT 7.6 08/17/2022 09:59 PM    CALCIUM 9.1 08/25/2022 02:21 AM    BILITOT 0.4 08/17/2022 09:59 PM    ALKPHOS 82 08/17/2022 09:59 PM    AST 13 08/17/2022 09:59 PM    ALT 10 08/17/2022 09:59 PM     PT/INR:  No results found for: PTINR  Lab Results   Component Value Date    TROPONINI <0.01 08/25/2022       EKG:  I have reviewed EKG with the following interpretation:  Impression:  Personally reviewed, Afib, NSSTTW changes.      Assessment  Patient Active Problem List   Diagnosis    Bladder obstruction    Dilated cardiomyopathy (HCC)    Paroxysmal atrial fibrillation (HCC)    S/P coronary angiogram    Monilial cystitis    Urinary tract obstruction    CAD, multiple vessel    ICD (implantable cardioverter-defibrillator) in place-[employer]    Ischemic cardiomyopathy    AICD (automatic cardioverter/defibrillator) present    Mixed hyperlipidemia    Acute on chronic systolic congestive heart failure (HCC)    Gastroesophageal reflux disease without esophagitis    Uncontrolled type 2 diabetes mellitus with hyperglycemia ([address]    Acute kidney injury (NANETTE) with acute tubular necrosis (ATN) ([facility] [address]         Plan:    Increase in edema/sob. Increased BNP consistent with acute on chronic CHF. Again in afib. Controlled. Cr up with NANETTE. Diurese with IV lasix if ok with renal.   HR controlled. On Eliquis. Recurrent afib may have exacerbated chronic CHF. Echo ordered. Consider amio 200 mg bid.

## 2022-08-25 NOTE — PROGRESS NOTES
15 days and plans to live in an . Vision/Hearing  Vision: Within Functional Limits (wears glasses)  Hearing: Within functional limits      Cognition   Within Functional Limits     Objective   Gross Assessment  AROM: Within functional limits  Strength: Within functional limits     Bed mobility  Supine to Sit: Independent (bed flat, no rail)  Sit to Supine: Independent (bed flat, no rail)    Transfers  Sit to Stand: Independent  Stand to sit:  Independent    Ambulation  Device: No Device  Assistance: Independent  Gait Deviations: None  Distance: 150ft      Balance  Sitting - Static: Good  Sitting - Dynamic: Good  Standing - Static: Good  Standing - Dynamic: Good      AM-PAC Score  AM-PAC Inpatient Mobility Raw Score : 24 (08/25/22 [facility])  AM-PAC Inpatient T-Scale Score : 61.14 (08/25/22 [facility])  Mobility Inpatient CMS 0-100% Score: 0 (08/25/22 [facility])  Mobility Inpatient CMS G-Code Modifier : [address] (08/25/22 [facility])        Education  Patient Education  Education Given To: Patient  Education Provided: Role of Therapy  Education Method: Verbal  Education Outcome: Verbalized understanding      Therapy Time   Individual Concurrent Group Co-treatment   Time In 7075         Time Out 1355         Minutes 38         Timed Code Treatment Minutes:  25  Total Treatment Minutes:  Heather 95, PT

## 2022-08-25 NOTE — PROGRESS NOTES
Patient A&Ox4 and admitted to the floor this shift. Patient denies having any pain at this time. Patient vitals taken and skin assessment completed. Patient put on tele and NSR at this time. Patient does have a history of a-fib. Will continue to monitor.

## 2022-08-25 NOTE — ED PROVIDER NOTES
[id number]          ATTENDING PHYSICIAN NOTE       Date of evaluation: 8/24/2022    Chief Complaint     Shortness of Breath (Presents to ED after was woken up from sleep stating could catch breath. States worse when lying flat and initially had some chest discomfort and nausea that passed. Hx of triple bipass. Denies any recent weight gain or fever/chills)      History of Present Illness     Laura Sánchez is a 81 y.o. male who presents to the emergency department complaining of shortness of breath. Patient states that he woke up approximately 30 minutes prior to presentation feeling short of breath. He states that he has had symptoms similar to this in the past but today it was worse than usual.  He also notes a sharp pain in the left side of his chest that he has not had in the past.  Patient does have a history of coronary artery bypass approximately 2-1/2 years ago. He describes this chest pain at that time of more of a pressure and squeezing sensation which was different than the pain today. Patient denies any recent fevers or chills. He denies any cough. He denies any nausea or vomiting. He does note he was diagnosed with urinary tract infection several days ago and was started on antibiotics. He states his urine still appears cloudy. Review of Systems     Review of Systems   Constitutional: Negative. HENT: Negative. Eyes: Negative. Respiratory:  Positive for shortness of breath. Negative for cough. Cardiovascular:  Positive for chest pain. Gastrointestinal: Negative. Genitourinary:  Positive for dysuria. Negative for frequency and urgency. Musculoskeletal: Negative. Neurological: Negative. All other systems reviewed and are negative.     Past Medical, Surgical, Family, and Social History     He has a past medical history of A-fib ([facility] [address] CAD, multiple vessel, Diabetes mellitus ([facility] [address] Enlarged prostate, Environmental allergies, Kidney stones, and Systolic CHF ([facility] [address] He has a past surgical history that includes Cardiac catheterization (12/10/2019); Nasal polyp surgery (x2); Nasal septum surgery; and Coronary artery bypass graft (N/A, 3/2/2020). His family history includes Alzheimer's Disease in his mother; Dementia in his mother; Diabetes in his paternal aunt; Heart Attack in his father. He reports that he has never smoked. He has never used smokeless tobacco. He reports that he does not currently use alcohol. He reports that he does not use drugs. Medications     Previous Medications    APIXABAN (ELIQUIS) 5 MG TABS TABLET    Take 1 tablet by mouth 2 times daily    ASPIRIN 81 MG CHEWABLE TABLET    Take 1 tablet by mouth daily    ATORVASTATIN (LIPITOR) 40 MG TABLET    Take 1 tablet by mouth nightly    EPINEPHRINE (PRIMATENE MIST) 0.125 MG/ACT AERO    Inhale 2 puffs into the lungs once    FAMOTIDINE (PEPCID) 20 MG TABLET    Take 1 tablet by mouth daily    FUROSEMIDE (LASIX) 80 MG TABLET    Take 1 tablet by mouth 2 times daily    HANDICAP PLACARD MISC    by Does not apply route Pt cannot walk more that 250 feet without becoming SOB. Dx: CAD, Cardiomyopathy, Atrial Fibrilation    Not to exceed 5 years    INSULIN LISPRO, 1 UNIT DIAL, 100 UNIT/ML SOPN    Inject 3 Units into the skin 3 times daily (with meals)    METOPROLOL TARTRATE (LOPRESSOR) 25 MG TABLET    TAKE 1 TABLET BY MOUTH TWICE DAILY    POTASSIUM CHLORIDE (KLOR-CON M) 10 MEQ EXTENDED RELEASE TABLET    Take 1 tablet by mouth 2 times daily       Allergies     He is allergic to codeine and aspartame and phenylalanine. Physical Exam     INITIAL VITALS: BP: 113/74, Temp: 98.6 °F (37 °C), Heart Rate: (!) 102, Resp: 18, SpO2: 99 %   Physical Exam  Vitals and nursing note reviewed. Constitutional:       General: He is not in acute distress. Appearance: He is obese. HENT:      Head: Normocephalic and atraumatic.       Mouth/Throat:      Mouth: Mucous membranes are moist. Pharynx: No oropharyngeal exudate. Eyes:      General: No scleral icterus. Extraocular Movements: Extraocular movements intact. Conjunctiva/sclera: Conjunctivae normal.      Pupils: Pupils are equal, round, and reactive to light. Cardiovascular:      Rate and Rhythm: Normal rate and regular rhythm. Heart sounds: Normal heart sounds. Pulmonary:      Effort: Pulmonary effort is normal.      Breath sounds: Normal breath sounds. No wheezing, rhonchi or rales. Abdominal:      General: Bowel sounds are normal.      Palpations: Abdomen is soft. Tenderness: There is no abdominal tenderness. There is no guarding or rebound. Musculoskeletal:      Cervical back: Normal range of motion and neck supple. Right lower leg: Edema present. Left lower leg: Edema present. Skin:     General: Skin is warm and dry. Neurological:      General: No focal deficit present. Mental Status: He is alert and oriented to person, place, and time. Cranial Nerves: No cranial nerve deficit. Motor: No weakness.       Coordination: Coordination normal.       Diagnostic Results     EKG   EKG as interpreted by me shows patient be in atrial fibrillation with a rate of 99, normal axis, normal QT interval, normal QRS duration, no ST segment abnormalities, T wave flattening present in the lateral leads    RADIOLOGY:  XR CHEST (2 VW)   Final Result   Faint atelectasis present, but no clear signs of an infiltrate          LABS:   Results for orders placed or performed during the hospital encounter of 08/25/22   CBC with Auto Differential   Result Value Ref Range    WBC 9.9 4.0 - 11.0 K/uL    RBC 4.70 4.20 - 5.90 M/uL    Hemoglobin 14.1 13.5 - 17.5 g/dL    Hematocrit 42.1 40.5 - 52.5 %    MCV 89.5 80.0 - 100.0 fL    MCH 29.9 26.0 - 34.0 pg    MCHC 33.4 31.0 - 36.0 g/dL    RDW 14.5 12.4 - 15.4 %    Platelets 863 385 - 334 K/uL    MPV 8.9 5.0 - 10.5 fL    Neutrophils % 68.6 %    Lymphocytes % 18.0 % Monocytes % 8.9 %    Eosinophils % 4.1 %    Basophils % 0.4 %    Neutrophils Absolute 6.8 1.7 - 7.7 K/uL    Lymphocytes Absolute 1.8 1.0 - 5.1 K/uL    Monocytes Absolute 0.9 0.0 - 1.3 K/uL    Eosinophils Absolute 0.4 0.0 - 0.6 K/uL    Basophils Absolute 0.0 0.0 - 0.2 K/uL   Basic Metabolic Panel   Result Value Ref Range    Sodium 135 (L) 136 - 145 mmol/L    Potassium 4.0 3.5 - 5.1 mmol/L    Chloride 99 99 - 110 mmol/L    CO2 22 21 - 32 mmol/L    Anion Gap 14 3 - 16    Glucose 166 (H) 70 - 99 mg/dL    BUN 45 (H) 7 - 20 mg/dL    Creatinine 1.8 (H) 0.8 - 1.3 mg/dL    GFR Non- 37 (A) >60    GFR  45 (A) >60    Calcium 9.1 8.3 - 10.6 mg/dL   Brain Natriuretic Peptide   Result Value Ref Range    Pro-BNP 3,047 (H) 0 - 124 pg/mL   Troponin   Result Value Ref Range    Troponin <0.01 <0.01 ng/mL   Blood Gas, Venous   Result Value Ref Range    pH, Erick 7.346 (L) 7.350 - 7.450    pCO2, Erick 49.0 41.0 - 51.0 mmHg    pO2, Erick 30.2 25.0 - 40.0 mmHg    HCO3, Venous 26.8 24.0 - 28.0 mmol/L    Base Excess, Erick 0.3 -2.0 - 3.0 mmol/L    O2 Sat, Erick 54 Not established %    Carboxyhemoglobin 1.4 0.0 - 1.5 %    MetHgb, Erick 0.5 0.0 - 1.5 %    TC02 (Calc), Erick 28 mmol/L    Hemoglobin, Erick, Reduced 44.80 %   Urinalysis   Result Value Ref Range    Color, UA Yellow Straw/Yellow    Clarity, UA Clear Clear    Glucose, Ur Negative Negative mg/dL    Bilirubin Urine Negative Negative    Ketones, Urine Negative Negative mg/dL    Specific Gravity, UA 1.020 1.005 - 1.030    Blood, Urine LARGE (A) Negative    pH, UA 6.0 5.0 - 8.0    Protein,  (A) Negative mg/dL    Urobilinogen, Urine 0.2 <2.0 E.U./dL    Nitrite, Urine Negative Negative    Leukocyte Esterase, Urine LARGE (A) Negative    Microscopic Examination YES     Urine Type Voided    Microscopic Urinalysis   Result Value Ref Range    WBC, UA >100 (A) 0 - 5 /HPF    RBC, UA see below (A) 0 - 4 /HPF       ED BEDSIDE ULTRASOUND:  No results found.     RECENT VITALS: BP: 123/88,Temp: 98.6 °F (37 °C), Heart Rate: 87, Resp: 16, SpO2: 100 %     Procedures     N/A    ED Course     Nursing Notes, Past Medical Hx, Past Surgical Hx, Social Hx,Allergies, and Family Hx were reviewed. patient was given the following medications:  Orders Placed This Encounter   Medications    cefTRIAXone (ROCEPHIN) 1,000 mg in dextrose 5 % 50 mL IVPB mini-bag     Order Specific Question:   Antimicrobial Indications     Answer:   Urinary Tract Infection       CONSULTS:  IP CONSULT TO HOSPITALIST    MEDICAL DECISIONMAKING / ASSESSMENT / Татьяна Sharri is a 81 y.o. male with history of coronary artery disease status post bypass presents to the emergency department complaining of shortness of breath. Patient describes episode of orthopnea with feeling short of breath when he was lying down to go to sleep. He had similar episode several days ago and was seen in the emergency department and at that time was diagnosed with a urinary tract infection and started on nitrofurantoin. On arrival to the emergency department, patient's vital signs are stable. He has clear breath sounds and normal heart sounds. He has a soft abdomen. He does have lower extremity edema present bilaterally. Laboratory studies are significant for an NT proBNP of 3000 which is up from 1400 several days ago. Creatinine is at 1.8 which is up from 1.3 several days ago, but in review of records his baseline is 0.9-1.0. Urinalysis does show large leukocyte esterase with greater than 100 white blood cells. In review of records, he did not have urine culture sent on the prior visit so I will order 1 at this time. Patient was given a dose of ceftriaxone. My concern is the patient does have urinary tract infection and may be developing early cardiorenal syndrome with his elevated NT proBNP and elevated creatinine. Patient will be admitted through his primary care provider for further evaluation.     Clinical Impression 1. Acute cystitis with hematuria    2. Acute renal insufficiency        Disposition     PATIENT REFERRED TO:  No follow-up provider specified.     DISCHARGE MEDICATIONS:  New Prescriptions    No medications on file       DISPOSITION Decision To Admit 08/25/2022 04:08:41 AM         Lauri Hodgson MD  08/25/22 [id number]

## 2022-08-25 NOTE — H&P
HOSPITALISTS HISTORY AND PHYSICAL    8/25/2022 12:06 PM    Patient Information:  Celeste Garrido is a 81 y.o. male [id number]  PCP:  Driss Johnson MD (Tel: [phone] )    Chief complaint:    Chief Complaint   Patient presents with    Shortness of Breath     Presents to ED after was woken up from sleep stating could catch breath. States worse when lying flat and initially had some chest discomfort and nausea that passed. Hx of triple bipass. Denies any recent weight gain or fever/chills        Problem List  Acute on chronic systolic heart failure  NANETTE  UTI  History of diabetes mellitus    Assessment/Plan:   Acute on chronic systolic heart failure appears mild with the NANETTE probably related to cardiorenal hemodynamics  Home p.o. diuretics, discussed with cardiology, one-time dose of Lasix ordered, monitor renal function slowly, monitor on telemetry, cardiology consultation,    History of atrial fibrillation  On Eliquis    UTI on Rocephin follow-up urine cultures    Diabetes mellitus  On insulin sliding scale      DVT prophylaxis Eliquis  Code status full code  Diet cardiac/diabetic  IV access peripheral  Caicedo Catheter no    Admit as inpatient. I anticipate hospitalization spanning more than two midnights for investigation and treatment of the above medically necessary diagnoses. History of Present Illness:  Bishop Pena is a 81 y.o. male who presented with history of atrial relation, coronary artery disease s/p CABG, history of systolic heart failure, history of ICD, diabetes mellitus and large prostate, kidney stones, CABG in 2020 presented to the hospital with symptoms of shortness of breath he said that he went to bed at 10:00 and at 11:00 could not be. He does gives mild exertional dyspnea. Patient lives alone. He denies any smoking or drinking at this time.   He does have a visit to the ER couple of days ago. Was treated for UTI. He also seen PCP recently about 3 days ago. History obtained from patient and going over the chart. Old medical records show   Seen PCP to establish care 2 days ago where he did mention that he stop taking many of his medicines. To me he does mention that he has been taking Eliquis metoprolol. Mention is not taking his diabetic medicines. REVIEW OF SYSTEMS:   All other ROS negative except mentioned in [place]      Past Medical History:   has a past medical history of A-fib ([facility] [address] CAD, multiple vessel, Diabetes mellitus ([facility] [address] Enlarged prostate, Environmental allergies, Kidney stones, and Systolic CHF ([facility] [address] Past Surgical History:   has a past surgical history that includes Cardiac catheterization (12/10/2019); Nasal polyp surgery (x2); Nasal septum surgery; and Coronary artery bypass graft (N/A, 3/2/2020). Medications:  No current facility-administered medications on file prior to encounter.      Current Outpatient Medications on File Prior to Encounter   Medication Sig Dispense Refill    EPINEPHrine (PRIMATENE MIST) 0.125 MG/ACT AERO Inhale 2 puffs into the lungs once      furosemide (LASIX) 80 MG tablet Take 1 tablet by mouth 2 times daily 180 tablet 3    metoprolol tartrate (LOPRESSOR) 25 MG tablet TAKE 1 TABLET BY MOUTH TWICE DAILY      atorvastatin (LIPITOR) 40 MG tablet Take 1 tablet by mouth nightly (Patient not taking: Reported on 8/22/2022) 30 tablet 3    aspirin 81 MG chewable tablet Take 1 tablet by mouth daily (Patient not taking: No sig reported) 30 tablet 3    famotidine (PEPCID) 20 MG tablet Take 1 tablet by mouth daily (Patient not taking: Reported on 8/22/2022) 60 tablet 3    insulin lispro, 1 Unit Dial, 100 UNIT/ML SOPN Inject 3 Units into the skin 3 times daily (with meals) (Patient not taking: No sig reported) 1 pen 0    potassium chloride (KLOR-CON M) 10 MEQ extended release tablet Take 1 tablet by mouth 2 times daily (Patient not taking: Reported on 8/22/2022) 60 tablet 3    apixaban (ELIQUIS) 5 MG TABS tablet Take 1 tablet by mouth 2 times daily 60 tablet 5    Handicap Placard MISC by Does not apply route Pt cannot walk more that 250 feet without becoming SOB. Dx: CAD, Cardiomyopathy, Atrial Fibrilation    Not to exceed 5 years (Patient taking differently: by Does not apply route Pt cannot walk more that 250 feet without becoming SOB. 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 He has never used smokeless tobacco. He reports that he does not currently use alcohol. He reports that he does not use drugs. Family History:  family history includes Alzheimer's Disease in his mother; Dementia in his mother; Diabetes in his paternal aunt; Heart Attack in his father. Physical Exam:  /80   Pulse 90   Temp 97.5 °F (36.4 °C)   Resp 16   Wt 262 lb (118.8 kg)   SpO2 99%   BMI 41.04 kg/m²   No significant pedal edema  Diminished breath sounds I do not hear much crackles  General appearance:  Appears comfortable. Well nourished  Eyes: Sclera clear, pupils equal  ENT: Moist mucus membranes, no thrush. Trachea midline. Cardiovascular: Regular rhythm, normal S1, S2. No murmur, gallop, rub. No edema in lower extremities  Gastrointestinal: Abdomen soft, non-tender, not distended, normal bowel sounds  Musculoskeletal: No cyanosis in digits, neck supple  Neurology: Cranial nerves grossly intact. Alert and oriented in time, place and person. No speech or motor deficits  Psychiatry: Appropriate affect.  Not agitated  Skin: Warm, dry, normal turgor, no rash  Brisk capillary refill, peripheral pulses palpable   Labs:  CBC:   Lab Results   Component Value Date/Time    WBC 9.9 08/25/2022 02:21 AM    RBC 4.70 08/25/2022 02:21 AM    HGB 14.1 08/25/2022 02:21 AM    HCT 42.1 08/25/2022 02:21 AM    MCV 89.5 08/25/2022 02:21 AM    MCH 29.9 08/25/2022 02:21 AM    MCHC 33.4 08/25/2022 02:21 AM    RDW 14.5 08/25/2022 02:21 AM     08/25/2022 02:21 AM    MPV 8.9 08/25/2022 02:21 AM     BMP:    Lab Results   Component Value Date/Time     08/25/2022 02:21 AM    K 4.0 08/25/2022 02:21 AM    K 4.0 08/17/2022 09:59 PM    CL 99 08/25/2022 02:21 AM    CO2 22 08/25/2022 02:21 AM    BUN 45 08/25/2022 02:21 AM    CREATININE 1.8 08/25/2022 02:21 AM    CALCIUM 9.1 08/25/2022 02:21 AM    GFRAA 45 08/25/2022 02:21 AM    LABGLOM 37 08/25/2022 02:21 AM    GLUCOSE 166 08/25/2022 02:21 AM     XR CHEST (2 VW)   Final Result   Faint atelectasis present, but no clear signs of an infiltrate        Chest Xray:   EKG:    I visualized CXR images and EKG strips A. fib    Discussed case  with cardiology Dr. Aline Peters      Please note that some part of this chart was generated using Dragon dictation software. Although every effort was made to ensure the accuracy of this automated transcription, some errors in transcription may have occurred inadvertently. If you may need any clarification, please do not hesitate to contact me through [facility].        Kendal Johnson MD    8/25/2022 12:06 PM

## 2022-08-25 NOTE — PROGRESS NOTES
4 Eyes Admission Assessment     I agree as the admission nurse that 2 RN's have performed a thorough Head to Toe Skin Assessment on the patient. ALL assessment sites listed below have been assessed on admission. Areas assessed by both nurses:   [x]   Head, Face, and Ears   [x]   Shoulders, Back, and Chest  [x]   Arms, Elbows, and Hands   [x]   Coccyx, Sacrum, and Ischium  [x]   Legs, Feet, and Heels        Does the Patient have Skin Breakdown?   No         Damaso Prevention initiated:  No   Wound Care Orders initiated:  No      [facility] nurse consulted for Pressure Injury (Stage 3,4, Unstageable, DTI, NWPT, and Complex wounds) or Damaso score 18 or lower:  No      Nurse 1 eSignature: Electronically signed by Ankita No RN on 8/25/22 at 6:16 PM EDT    **SHARE this note so that the co-signing nurse is able to place an eSignature**    Nurse 2 eSignature: {Esignature:[id number]}

## 2022-08-25 NOTE — PLAN OF CARE
Problem: Safety - Adult  Goal: Free from fall injury  Outcome: Progressing     Problem: Infection - Adult  Goal: Absence of infection at discharge  Outcome: Progressing  Goal: Absence of infection during hospitalization  Outcome: Progressing     Problem: Metabolic/Fluid and Electrolytes - Adult  Goal: Electrolytes maintained within normal limits  Outcome: Progressing  Goal: Hemodynamic stability and optimal renal function maintained  Outcome: Progressing  Goal: Glucose maintained within prescribed range  Outcome: Progressing

## 2022-08-25 NOTE — PROGRESS NOTES
Week: dc OT     Restrictions  Position Activity Restriction  Other position/activity restrictions: Up with assist    Subjective   General  Chart Reviewed: Yes  Patient assessed for rehabilitation services?: Yes  Family / Caregiver Present: No  Referring Practitioner: Maci Beyer MD  Diagnosis: NANETTE  Subjective  Subjective: Pt returning from echo upon arrival, pleasant and agreeable to therapy eval and treat     Social/Functional History  Social/Functional History  Lives With: Alone  Type of Home: [facility] [email or web address] [facility]: 1/2 bath on main level, Two level, Bed/Bath upstairs (Pt has a shower stall in basement level)  Bathroom Shower/Tub: Tub/Shower unit  Bathroom Toilet: Standard  Bathroom Equipment:  (None)  Home Equipment: [employer]  ADL Assistance: Independent  Homemaking Assistance: Independent  Ambulation Assistance: Independent  Transfer Assistance: Independent  Active : Yes  Occupation: Retired ()  Additional Comments: Pt is selling his house in 15 days and plans to live in an [address] Objective   Heart Rate: 94  Heart Rate Source: Monitor  BP: (!) 143/82  BP Location: Left upper arm  BP Method: Automatic  Patient Position: Supine  MAP (Calculated): 102.33  Resp: 16  SpO2: 95 %  O2 Device: None (Room air)             Safety Devices  Type of Devices: Left in bed;Call light within reach;Nurse notified (sitting on side of bed, pt UAL per RN)  Balance  Sitting: Intact  Standing: Intact  Gait  Overall Level of Assistance: Independent  Distance (ft): 150 Feet  Assistive Device:  (no AD)  Toilet Transfers  Toilet - Technique: Ambulating  Toilet Transfer: Independent  Toilet Transfers Comments: simulated  AROM: Within functional limits  PROM: Within functional limits  Strength: Within functional limits  Coordination: Within functional limits  Sensation:  (pt reports baseline neuropathy in BL feet)  ADL  LE Dressing: Modified independent ; Increased time to complete  LE Dressing Skilled Clinical Factors: donning/doffing socks. pt demo increased difficulty, however able to complete w/ modified figure 4 tech and increased time and effort. Pt reports having difficulty w/ this at baseline. Discussed use of socks aid and [place] [address] to assist w/ dressing upon dc. Pt reports having a sock aid and [place] [address] at home and plans to use them upon dc to asist w/ LE dressing  Toileting: Modified independent ; Increased time to complete  Toileting Skilled Clinical Factors: simulated w/ Mod I        Bed mobility  Supine to Sit: Independent (bed flat, no rail)  Sit to Supine: Independent (bed flat, no rail)  Transfers  Sit to stand: Independent  Stand to sit: Independent  Vision  Vision: Within Functional Limits (wears glasses)  Hearing  Hearing: Within functional limits  Cognition  Overall Cognitive Status: WFL  Orientation  Overall Orientation Status: Within Functional Limits  Perception  Overall Perceptual Status: WFL               Education Given To: Patient  Education Provided: Role of Therapy;Plan of Care  Education Method: Verbal  Barriers to Learning: None  Education Outcome: Verbalized understanding  LUE AROM (degrees)  LUE AROM : WFL  Left Hand AROM (degrees)  Left Hand AROM: WFL  RUE AROM (degrees)  RUE AROM : WFL  Right Hand AROM (degrees)  Right Hand AROM: WFL                     G-Code     OutComes Score                                                  AM-PAC Score        AM-PAC Inpatient Daily Activity Raw Score: 24 (08/25/22 1505)  AM-PAC Inpatient ADL T-Scale Score : 57.54 (08/25/22 1505)  ADL Inpatient CMS 0-100% Score: 0 (08/25/22 1505)  ADL Inpatient CMS G-Code Modifier : CH (08/25/22 1505)    Tinneti Score       Goals  Short Term Goals  Time Frame for Short term goals: .        Therapy Time   Individual Concurrent Group Co-treatment   Time In 1325         Time Out 1355         Minutes 30                   Timed Code Treatment Minutes:  15    Total Treatment Minutes:  Karma 65, OT

## 2022-08-26 LAB
ALBUMIN SERPL-MCNC: 4.1 G/DL (ref 3.4–5)
ANION GAP SERPL CALCULATED.3IONS-SCNC: 13 MMOL/L (ref 3–16)
BASOPHILS ABSOLUTE: 0.1 K/UL (ref 0–0.2)
BASOPHILS RELATIVE PERCENT: 0.8 %
BUN BLDV-MCNC: 40 MG/DL (ref 7–20)
CALCIUM SERPL-MCNC: 8.9 MG/DL (ref 8.3–10.6)
CHLORIDE BLD-SCNC: 99 MMOL/L (ref 99–110)
CO2: 25 MMOL/L (ref 21–32)
CREAT SERPL-MCNC: 1.6 MG/DL (ref 0.8–1.3)
CREATININE URINE: 91.1 MG/DL (ref 39–259)
EOSINOPHIL,URINE: NORMAL
EOSINOPHILS ABSOLUTE: 0.5 K/UL (ref 0–0.6)
EOSINOPHILS RELATIVE PERCENT: 5.5 %
GFR AFRICAN AMERICAN: 52
GFR NON-AFRICAN AMERICAN: 43
GLUCOSE BLD-MCNC: 139 MG/DL (ref 70–99)
GLUCOSE BLD-MCNC: 173 MG/DL (ref 70–99)
GLUCOSE BLD-MCNC: 184 MG/DL (ref 70–99)
GLUCOSE BLD-MCNC: 189 MG/DL (ref 70–99)
GLUCOSE BLD-MCNC: 210 MG/DL (ref 70–99)
HCT VFR BLD CALC: 41.3 % (ref 40.5–52.5)
HEMOGLOBIN: 13.9 G/DL (ref 13.5–17.5)
LV EF: 50 %
LVEF MODALITY: NORMAL
LYMPHOCYTES ABSOLUTE: 1.4 K/UL (ref 1–5.1)
LYMPHOCYTES RELATIVE PERCENT: 15.8 %
MAGNESIUM: 2.2 MG/DL (ref 1.8–2.4)
MCH RBC QN AUTO: 30.6 PG (ref 26–34)
MCHC RBC AUTO-ENTMCNC: 33.6 G/DL (ref 31–36)
MCV RBC AUTO: 91 FL (ref 80–100)
MONOCYTES ABSOLUTE: 0.9 K/UL (ref 0–1.3)
MONOCYTES RELATIVE PERCENT: 10.5 %
NEUTROPHILS ABSOLUTE: 5.8 K/UL (ref 1.7–7.7)
NEUTROPHILS RELATIVE PERCENT: 67.4 %
PDW BLD-RTO: 14.6 % (ref 12.4–15.4)
PERFORMED ON: ABNORMAL
PHOSPHORUS: 4.2 MG/DL (ref 2.5–4.9)
PLATELET # BLD: 177 K/UL (ref 135–450)
PMV BLD AUTO: 9.1 FL (ref 5–10.5)
POTASSIUM SERPL-SCNC: 4.2 MMOL/L (ref 3.5–5.1)
RBC # BLD: 4.54 M/UL (ref 4.2–5.9)
SODIUM BLD-SCNC: 137 MMOL/L (ref 136–145)
WBC # BLD: 8.6 K/UL (ref 4–11)

## 2022-08-26 PROCEDURE — 6370000000 HC RX 637 (ALT 250 FOR IP): Performed by: INTERNAL MEDICINE

## 2022-08-26 PROCEDURE — 2580000003 HC RX 258: Performed by: INTERNAL MEDICINE

## 2022-08-26 PROCEDURE — 85025 COMPLETE CBC W/AUTO DIFF WBC: CPT

## 2022-08-26 PROCEDURE — 1200000000 HC SEMI PRIVATE

## 2022-08-26 PROCEDURE — 99233 SBSQ HOSP IP/OBS HIGH 50: CPT | Performed by: INTERNAL MEDICINE

## 2022-08-26 PROCEDURE — 36415 COLL VENOUS BLD VENIPUNCTURE: CPT

## 2022-08-26 PROCEDURE — 83735 ASSAY OF MAGNESIUM: CPT

## 2022-08-26 PROCEDURE — 80069 RENAL FUNCTION PANEL: CPT

## 2022-08-26 PROCEDURE — 6360000002 HC RX W HCPCS: Performed by: INTERNAL MEDICINE

## 2022-08-26 RX ORDER — AMIODARONE HYDROCHLORIDE 200 MG/1
200 TABLET ORAL DAILY
Status: DISCONTINUED | OUTPATIENT
Start: 2022-08-27 | End: 2022-08-27 | Stop reason: HOSPADM

## 2022-08-26 RX ADMIN — AMIODARONE HYDROCHLORIDE 200 MG: 200 TABLET ORAL at 09:31

## 2022-08-26 RX ADMIN — APIXABAN 5 MG: 5 TABLET, FILM COATED ORAL at 09:31

## 2022-08-26 RX ADMIN — METOPROLOL TARTRATE 25 MG: 25 TABLET, FILM COATED ORAL at 09:31

## 2022-08-26 RX ADMIN — APIXABAN 5 MG: 5 TABLET, FILM COATED ORAL at 20:08

## 2022-08-26 RX ADMIN — INSULIN LISPRO 2 UNITS: 100 INJECTION, SOLUTION INTRAVENOUS; SUBCUTANEOUS at 12:19

## 2022-08-26 RX ADMIN — POTASSIUM CHLORIDE 10 MEQ: 750 TABLET, EXTENDED RELEASE ORAL at 20:08

## 2022-08-26 RX ADMIN — POTASSIUM CHLORIDE 10 MEQ: 750 TABLET, EXTENDED RELEASE ORAL at 09:31

## 2022-08-26 RX ADMIN — METOPROLOL TARTRATE 25 MG: 25 TABLET, FILM COATED ORAL at 20:08

## 2022-08-26 RX ADMIN — SODIUM CHLORIDE, PRESERVATIVE FREE 10 ML: 5 INJECTION INTRAVENOUS at 09:32

## 2022-08-26 RX ADMIN — FUROSEMIDE 80 MG: 40 TABLET ORAL at 18:42

## 2022-08-26 RX ADMIN — SODIUM CHLORIDE, PRESERVATIVE FREE 10 ML: 5 INJECTION INTRAVENOUS at 20:18

## 2022-08-26 RX ADMIN — ASPIRIN 81 MG 81 MG: 81 TABLET ORAL at 09:31

## 2022-08-26 RX ADMIN — FUROSEMIDE 80 MG: 40 TABLET ORAL at 09:36

## 2022-08-26 RX ADMIN — CEFTRIAXONE 1000 MG: 1 INJECTION, POWDER, FOR SOLUTION INTRAMUSCULAR; INTRAVENOUS at 04:44

## 2022-08-26 NOTE — PROGRESS NOTES
[facility] Daily Progress Note      Admit Date:  8/25/2022    Subjective:  Mr. Carlos Alcala was seen and examined. F/u CHF/afib. Feeling much better. No further sob/orthopnea. Feels better since converting. Objective:   /74   Pulse 67   Temp 97.4 °F (36.3 °C) (Oral)   Resp 16   Ht 5' 7\" (1.702 m)   Wt 249 lb 8 oz (113.2 kg)   SpO2 97%   BMI 39.08 kg/m²     Intake/Output Summary (Last 24 hours) at 8/26/2022 1439  Last data filed at 8/26/2022 0901  Gross per 24 hour   Intake 480 ml   Output 500 ml   Net -20 ml       TELEMETRY: Personally reviewed, Sinus     Physical Exam:  General:  Awake, alert, NAD  Skin:  Warm and dry  Neck:  JVP not elevated  Chest:  Clear to auscultation, respiration normal  Cardiovascular:  RRR S1S2  Abdomen:  Soft nontender  Extremities:  no edema    Medications:    sodium chloride flush  5-40 mL IntraVENous 2 times per day    cefTRIAXone (ROCEPHIN) IV  1,000 mg IntraVENous Q24H    insulin lispro  0-8 Units SubCUTAneous TID WC    insulin lispro  0-4 Units SubCUTAneous Nightly    apixaban  5 mg Oral BID    aspirin  81 mg Oral Daily    metoprolol tartrate  25 mg Oral BID    potassium chloride  10 mEq Oral BID    furosemide  80 mg Oral BID    amiodarone  200 mg Oral BID       sodium chloride flush, ondansetron **OR** ondansetron, acetaminophen **OR** acetaminophen    Lab Data:  CBC:   Recent Labs     08/25/22  0221 08/26/22  0650   WBC 9.9 8.6   HGB 14.1 13.9   HCT 42.1 41.3   MCV 89.5 91.0    177     BMP:   Recent Labs     08/25/22  0221 08/26/22  0650   * 137   K 4.0 4.2   CL 99 99   CO2 22 25   PHOS  --  4.2   BUN 45* 40*   CREATININE 1.8* 1.6*     LIVER PROFILE: No results for input(s): AST, ALT, LIPASE, BILIDIR, BILITOT, ALKPHOS in the last 72 hours. Invalid input(s): AMYLASE,  ALB  PT/INR: No results for input(s): PROTIME, INR in the last 72 hours. APTT: No results for input(s): APTT in the last 72 hours.   BNP:  No results for input(s): BNP in

## 2022-08-26 NOTE — PLAN OF CARE
Problem: Safety - Adult  Goal: Free from fall injury  Outcome: Progressing     Problem: Infection - Adult  Goal: Absence of infection at discharge  Outcome: Progressing     Problem: Metabolic/Fluid and Electrolytes - Adult  Goal: Electrolytes maintained within normal limits  Outcome: Progressing     Problem: Metabolic/Fluid and Electrolytes - Adult  Goal: Hemodynamic stability and optimal renal function maintained  Outcome: Progressing     Problem: Discharge Planning  Goal: Discharge to home or other facility with appropriate resources  Outcome: Progressing

## 2022-08-26 NOTE — CARE COORDINATION
Case Management Assessment           Initial Evaluation                Date / Time of Evaluation: 8/26/2022 9:12 AM                 Assessment Completed by: EYAL Mueller, NATALIEW    Patient Name: Saulo Dueñas     YOB: 1950  Diagnosis: Acute renal insufficiency [N28.9]  Acute cystitis with hematuria [N30.01]  Acute kidney injury (NANETTE) with acute tubular necrosis (ATN) ([facility] [address] [N17.0]     Date / Time: 8/25/2022  1:40 AM    Patient Admission Status: Inpatient    If patient is discharged prior to next notation, then this note serves as note for discharge by case management. Current PCP: Zacarias Dial MD  Clinic Patient: No    Chart Reviewed: Yes  Patient/ Family Interviewed: Yes    Initial assessment completed at bedside with: patient    Hospitalization in the last 30 days: No    Emergency Contacts:  Extended Emergency Contact Information  Primary Emergency Contact: [address] [place] [address] Phone: [phone]  Mobile Phone: [phone]  Relation: Niece/Nephew    Advance Directives:   Code Status: Full Code    Healthcare Power of : No    Financial  Payor: Uriel Furnace / Plan: Zelphia Gauze / Product Type: *No Product type* /     Pre-cert required for SNF: Yes    Pharmacy    [address] [phone]  [address] [place] [address]  Phone: [phone] Fax: [phone]      Potential assistance Purchasing Medications: Potential Assistance Purchasing Medications: No  Does Patient want to participate in local refill/ meds to beds program?:      Meds To Beds General Rules:  1. Can ONLY be done Monday- Friday between 8:30am-5pm  2. Prescription(s) must be in pharmacy by 3pm to be filled same day  3. Copy of patient's insurance/ prescription drug card and patient face sheet must be sent along with the prescription(s)  4. Cost of Rx cannot be added to hospital bill.  If financial assistance is needed, please contact unit  or ;  or  CANNOT provide pharmacy voucher for patients co-pays  5. Patients can then  the prescription on their way out of the hospital at discharge, or pharmacy can deliver to the bedside if staff is available. (payment due at time of pick-up or delivery - cash, check, or card accepted)     Able to afford home medications/ co-pay costs: Yes    ADLS  Support Systems: Family Members    PT AM-PAC:   OT AM-PAC:     HOUSING  Home Environment: home alone  Steps: multilevel    Plans to RETURN to current housing: Yes  Barriers to RETURNING to current housin [address]  Currently ACTIVE with [employer] [facility]: No    Currently ACTIVE with [place] on Aging: No      Durable Medical Equipment  Equipment: cane    Home Oxygen and Respiratory Equipment  Has HOME OXYGEN prior to admission: No    Dialysis  Active with HD/PD prior to admission: No    DISCHARGE PLAN:  Disposition: Home- No Services Needed    Transportation PLAN for discharge: family     Factors facilitating achievement of predicted outcomes: Pleasant    Barriers to discharge: Medical complications    Additional Case Management Notes:  Pt is from home alone, plans to return at DC. Pt independent PTA, no current DME or HHC needs. CM will continue to follow for DC needs and recs. The Plan for Transition of Care is related to the following treatment goals of Acute renal insufficiency [N28.9]  Acute cystitis with hematuria [N30.01]  Acute kidney injury (NANETTE) with acute tubular necrosis (ATN) ([facility] [address] [N17.0]    The Patient and/or patient representative [facility] and his family were provided with a choice of provider and agrees with the discharge plan Yes    Freedom of choice list was provided with basic dialogue that supports the patient's individualized plan of care/goals and shares the quality data associated with the providers.  Yes    Care Transition patient: Yes    EYAL James, [facility] [employer]  Case Management Department  Ph: [phone]   Fax: [phone]

## 2022-08-26 NOTE — DISCHARGE INSTRUCTIONS
Extra Heart Failure sites:   https://[employer]byUs/ --- this is American Heart Association interactive Healthier Living with Heart Failure guidebook. Please copy and paste link into search bar. Use your mouse to scroll through the pages. Lots and lots of info / tips    [address] [address] --- free smart phone sekou- (icon will look like a lopsided pink heart) --Use your phone to track sodium / fluid intake,2 symptoms, weight, etc.    [employer] - website-- [employer] is a dialysis company. All dialysis patients follow a renal diet which IS low sodium!! This website offers free seasonal cookbooks.   Each quarter, they will release 25-30 new recipes with a breakdown of calories, sodium, glucose, etc    [email or web address].byUs/recipes -- more free recipes

## 2022-08-26 NOTE — PROGRESS NOTES
HOSPITALISTS HISTORY AND PHYSICAL    8/26/2022 5:31 PM    Patient Information:  Edmundo Gallegos is a 81 y.o. male [id number]  PCP:  Hine Odom MD (Tel: [phone] )    Chief complaint:    Chief Complaint   Patient presents with    Shortness of Breath     Presents to ED after was woken up from sleep stating could catch breath. States worse when lying flat and initially had some chest discomfort and nausea that passed. Hx of triple bipass. Denies any recent weight gain or fever/chills      Interval history:  Patient was seen and examined at bedside, alert and active, pleasant gentleman. No acute events reported or observed overnight. Doing well, reported good urine output. Did not offer any complaints. Anticipate discharge in 24 hours. Problem List  Acute on chronic systolic heart failure  NANETTE  UTI  History of diabetes mellitus    Assessment/Plan:   Acute on chronic systolic heart failure appears mild with the NANETTE probably related to cardiorenal hemodynamics  Home p.o. diuretics, discussed with cardiology, one-time dose of Lasix ordered, monitor renal function slowly, monitor on telemetry, cardiology consultation,    History of atrial fibrillation  On Eliquis    UTI on Rocephin follow-up urine cultures    Diabetes mellitus  On insulin sliding scale      DVT prophylaxis Eliquis  Code status full code  Diet cardiac/diabetic  IV access peripheral  Caicedo Catheter no        History of Present Illness:  Patricia Hill is a 81 y.o. male who presented with history of atrial relation, coronary artery disease s/p CABG, history of systolic heart failure, history of ICD, diabetes mellitus and large prostate, kidney stones, CABG in 2020 presented to the hospital with symptoms of shortness of breath he said that he went to bed at 10:00 and at 11:00 could not be. He does gives mild exertional dyspnea.   Patient lives alone.  He denies any smoking or drinking at this time. He does have a visit to the ER couple of days ago. Was treated for UTI. He also seen PCP recently about 3 days ago. History obtained from patient and going over the chart. Old medical records show   Seen PCP to establish care 2 days ago where he did mention that he stop taking many of his medicines. To me he does mention that he has been taking Eliquis metoprolol. Mention is not taking his diabetic medicines. Past Medical History:   has a past medical history of A-fib ([facility] [address] CAD, multiple vessel, Diabetes mellitus ([facility] [address] Enlarged prostate, Environmental allergies, Kidney stones, and Systolic CHF ([facility] [address] Past Surgical History:   has a past surgical history that includes Cardiac catheterization (12/10/2019); Nasal polyp surgery (x2); Nasal septum surgery; and Coronary artery bypass graft (N/A, 3/2/2020). Medications:  No current facility-administered medications on file prior to encounter.      Current Outpatient Medications on File Prior to Encounter   Medication Sig Dispense Refill    EPINEPHrine (PRIMATENE MIST) 0.125 MG/ACT AERO Inhale 2 puffs into the lungs once      furosemide (LASIX) 80 MG tablet Take 1 tablet by mouth 2 times daily 180 tablet 3    metoprolol tartrate (LOPRESSOR) 25 MG tablet TAKE 1 TABLET BY MOUTH TWICE DAILY      atorvastatin (LIPITOR) 40 MG tablet Take 1 tablet by mouth nightly (Patient not taking: Reported on 8/22/2022) 30 tablet 3    aspirin 81 MG chewable tablet Take 1 tablet by mouth daily (Patient not taking: No sig reported) 30 tablet 3    famotidine (PEPCID) 20 MG tablet Take 1 tablet by mouth daily (Patient not taking: Reported on 8/22/2022) 60 tablet 3    insulin lispro, 1 Unit Dial, 100 UNIT/ML SOPN Inject 3 Units into the skin 3 times daily (with meals) (Patient not taking: No sig reported) 1 pen 0    potassium chloride (KLOR-CON M) 10 MEQ extended release tablet Take 1 tablet by mouth 2 times daily (Patient not taking: Reported on 8/22/2022) 60 tablet 3    apixaban (ELIQUIS) 5 MG TABS tablet Take 1 tablet by mouth 2 times daily 60 tablet 5    Handicap Placard MISC by Does not apply route Pt cannot walk more that 250 feet without becoming SOB. Dx: CAD, Cardiomyopathy, Atrial Fibrilation    Not to exceed 5 years (Patient taking differently: by Does not apply route Pt cannot walk more that 250 feet without becoming SOB. Dx: CAD, Cardiomyopathy, Atrial Fibrilation    Not to exceed 5 years) 1 each 0       Allergies: Allergies   Allergen Reactions    Codeine Other (See Comments)     Seizures    Aspartame And Phenylalanine         Social History:  Patient Lives alone      reports that he has never smoked. He has never used smokeless tobacco. He reports that he does not currently use alcohol. He reports that he does not use drugs. Family History:  family history includes Alzheimer's Disease in his mother; Dementia in his mother; Diabetes in his paternal aunt; Heart Attack in his father. Physical Exam:  /74   Pulse 67   Temp 97.4 °F (36.3 °C) (Oral)   Resp 16   Ht 5' 7\" (1.702 m)   Wt 249 lb 8 oz (113.2 kg)   SpO2 97%   BMI 39.08 kg/m²   Bilateral air entry, diminished breath sounds, no added sounds. General appearance:  Appears comfortable. Well nourished  Eyes: Sclera clear, pupils equal  ENT: Moist mucus membranes, no thrush. Trachea midline. Cardiovascular: Regular rhythm, normal S1, S2. No murmur, gallop, rub. Trace lower extremity edema  Gastrointestinal: Abdomen soft, non-tender, not distended, normal bowel sounds  Musculoskeletal: No cyanosis in digits, neck supple  Neurology: Cranial nerves grossly intact. Alert and oriented in time, place and person. No speech or motor deficits  Psychiatry: Appropriate affect.  Not agitated  Skin: Warm, dry, normal turgor, no rash  Brisk capillary refill, peripheral pulses palpable   Labs:  CBC:   Lab Results   Component Value Date/Time    WBC 8.6 08/26/2022 06:50 AM    RBC 4.54 08/26/2022 06:50 AM    HGB 13.9 08/26/2022 06:50 AM    HCT 41.3 08/26/2022 06:50 AM    MCV 91.0 08/26/2022 06:50 AM    MCH 30.6 08/26/2022 06:50 AM    MCHC 33.6 08/26/2022 06:50 AM    RDW 14.6 08/26/2022 06:50 AM     08/26/2022 06:50 AM    MPV 9.1 08/26/2022 06:50 AM     BMP:    Lab Results   Component Value Date/Time     08/26/2022 06:50 AM    K 4.2 08/26/2022 06:50 AM    K 4.0 08/17/2022 09:59 PM    CL 99 08/26/2022 06:50 AM    CO2 25 08/26/2022 06:50 AM    BUN 40 08/26/2022 06:50 AM    CREATININE 1.6 08/26/2022 06:50 AM    CALCIUM 8.9 08/26/2022 06:50 AM    GFRAA 52 08/26/2022 06:50 AM    LABGLOM 43 08/26/2022 06:50 AM    GLUCOSE 173 08/26/2022 06:50 AM     XR CHEST (2 VW)   Final Result   Faint atelectasis present, but no clear signs of an infiltrate          Please note that some part of this chart was generated using Dragon dictation software. Although every effort was made to ensure the accuracy of this automated transcription, some errors in transcription may have occurred inadvertently. If you may need any clarification, please do not hesitate to contact me through [facility].        Julia Cuba MD    8/26/2022 5:31 PM

## 2022-08-27 VITALS
DIASTOLIC BLOOD PRESSURE: 69 MMHG | RESPIRATION RATE: 18 BRPM | OXYGEN SATURATION: 96 % | HEIGHT: 67 IN | WEIGHT: 249.56 LBS | SYSTOLIC BLOOD PRESSURE: 111 MMHG | BODY MASS INDEX: 39.17 KG/M2 | HEART RATE: 67 BPM | TEMPERATURE: 98.1 F

## 2022-08-27 PROBLEM — I48.0 PAROXYSMAL A-FIB (HCC): Status: ACTIVE | Noted: 2022-08-27

## 2022-08-27 PROBLEM — N30.01 ACUTE CYSTITIS WITH HEMATURIA: Status: ACTIVE | Noted: 2020-03-02

## 2022-08-27 LAB
GLUCOSE BLD-MCNC: 162 MG/DL (ref 70–99)
GLUCOSE BLD-MCNC: 219 MG/DL (ref 70–99)
PERFORMED ON: ABNORMAL
PERFORMED ON: ABNORMAL

## 2022-08-27 PROCEDURE — 2580000003 HC RX 258: Performed by: INTERNAL MEDICINE

## 2022-08-27 PROCEDURE — 99233 SBSQ HOSP IP/OBS HIGH 50: CPT | Performed by: NURSE PRACTITIONER

## 2022-08-27 PROCEDURE — 6370000000 HC RX 637 (ALT 250 FOR IP): Performed by: INTERNAL MEDICINE

## 2022-08-27 PROCEDURE — 6360000002 HC RX W HCPCS: Performed by: INTERNAL MEDICINE

## 2022-08-27 PROCEDURE — 6370000000 HC RX 637 (ALT 250 FOR IP): Performed by: NURSE PRACTITIONER

## 2022-08-27 RX ORDER — POLYETHYLENE GLYCOL 3350 17 G/17G
17 POWDER, FOR SOLUTION ORAL ONCE
Status: COMPLETED | OUTPATIENT
Start: 2022-08-27 | End: 2022-08-27

## 2022-08-27 RX ORDER — AMIODARONE HYDROCHLORIDE 200 MG/1
200 TABLET ORAL DAILY
Qty: 30 TABLET | Refills: 0 | Status: SHIPPED | OUTPATIENT
Start: 2022-08-27 | End: 2022-09-26

## 2022-08-27 RX ORDER — CEFDINIR 300 MG/1
300 CAPSULE ORAL 2 TIMES DAILY
Qty: 10 CAPSULE | Refills: 0 | Status: SHIPPED | OUTPATIENT
Start: 2022-08-27 | End: 2022-09-01

## 2022-08-27 RX ADMIN — ASPIRIN 81 MG 81 MG: 81 TABLET ORAL at 10:01

## 2022-08-27 RX ADMIN — AMIODARONE HYDROCHLORIDE 200 MG: 200 TABLET ORAL at 10:01

## 2022-08-27 RX ADMIN — APIXABAN 5 MG: 5 TABLET, FILM COATED ORAL at 10:01

## 2022-08-27 RX ADMIN — POLYETHYLENE GLYCOL 3350 17 G: 17 POWDER, FOR SOLUTION ORAL at 11:03

## 2022-08-27 RX ADMIN — SODIUM CHLORIDE, PRESERVATIVE FREE 10 ML: 5 INJECTION INTRAVENOUS at 10:06

## 2022-08-27 RX ADMIN — CEFTRIAXONE 1000 MG: 1 INJECTION, POWDER, FOR SOLUTION INTRAMUSCULAR; INTRAVENOUS at 05:11

## 2022-08-27 RX ADMIN — INSULIN LISPRO 2 UNITS: 100 INJECTION, SOLUTION INTRAVENOUS; SUBCUTANEOUS at 12:58

## 2022-08-27 RX ADMIN — FUROSEMIDE 80 MG: 40 TABLET ORAL at 10:05

## 2022-08-27 RX ADMIN — METOPROLOL TARTRATE 25 MG: 25 TABLET, FILM COATED ORAL at 10:01

## 2022-08-27 RX ADMIN — POTASSIUM CHLORIDE 10 MEQ: 750 TABLET, EXTENDED RELEASE ORAL at 10:01

## 2022-08-27 NOTE — DISCHARGE SUMMARY
Hospital Medicine Discharge Summary    Patient ID: Magui [id number]      Patient's PCP: Shaunna Atkinson MD    Admit Date: 8/25/2022     Discharge Date:   08/27/22    Admitting Provider: Ralph Bryan MD     Discharge Provider: Fara Ramirez MD     Discharge Diagnoses: Active Hospital Problems    Diagnosis     Paroxysmal A-fib ([address] [I48.0]      Priority: Medium    Acute kidney injury (NANETTE) with acute tubular necrosis (ATN) ([facility]) [N17.0]      Priority: Medium    Acute on chronic systolic congestive heart failure (HCC) [I50.23]      Priority: Medium    Ischemic cardiomyopathy [I25.5]     Acute cystitis with hematuria [N30.01]     Paroxysmal atrial fibrillation [facility]) [I48.0]        Hospital Course:     Magui Farooq is a 81 y.o. male who presented with history of atrial fibrillation, coronary artery disease s/p CABG, history of systolic heart failure, history of ICD, diabetes mellitus and large prostate, kidney stones, CABG in 2020 presented to the hospital with symptoms of shortness of breath he said that he went to bed at 10:00 and at 11:00 started feeling short of breath. He report mild exertional dyspnea. Patient lives alone. He denied any smoking or drinking at this time. He did have a visit to the ER couple of days prior to this presentation. He was treated for UTI then. He also seen his PCP recently about 3 days prior to this admission. He was admitted for CHF exacerbation. Problem List  Acute on chronic systolic heart failure  NANETTE  UTI  History of diabetes mellitus     Assessment/Plan:   Acute on chronic systolic heart failure appears mild with the NANETTE probably related to cardiorenal hemodynamics  Home p.o. diuretics, discussed with cardiology, one-time dose of IV Lasix ordered, improved steadily over the course of hospital stay. Back to his baseline.     History of atrial fibrillation  On Eliquis    UTI: Was on Rocephin, culture grew E. coli sensitive to third-generation cephalosporin. Discharged on cefdinir to complete 7-day course. Diabetes mellitus  On insulin sliding scale      Today patient was seen and examined at bedside, alert and oriented, pleasant gentleman. No acute events reported or observed overnight. Back to his baseline. Reported good urine output. Ready for discharge. Physical Exam Performed:     /69   Pulse 67   Temp 98.1 °F (36.7 °C) (Oral)   Resp 18   Ht 5' 7\" (1.702 m)   Wt 249 lb 9 oz (113.2 kg)   SpO2 96%   BMI 39.09 kg/m²       General appearance:  No apparent distress, appears stated age and cooperative. HEENT:  Normal cephalic, atraumatic without obvious deformity. Pupils equal, round, and reactive to light. Extra ocular muscles intact. Conjunctivae/corneas clear. Neck: Supple, with full range of motion. No jugular venous distention. Trachea midline. Respiratory:  Normal respiratory effort. Clear to auscultation, bilaterally without Rales/Wheezes/Rhonchi. Cardiovascular:  Regular rate and rhythm with normal S1/S2 without murmurs, rubs or gallops. Trace lower extremity edema. Abdomen: Soft, non-tender, non-distended with normal bowel sounds. Musculoskeletal:  No clubbing, cyanosis or edema bilaterally. Full range of motion without deformity. Skin: Skin color, texture, turgor normal.  No rashes or lesions. Neurologic:  Neurovascularly intact without any focal sensory/motor deficits. Cranial nerves: II-XII intact, grossly non-focal.  Psychiatric:  Alert and oriented, thought content appropriate, normal insight  Capillary Refill: Brisk,< 3 seconds     Labs:  For convenience and continuity at follow-up the following most recent labs are provided:      CBC:    Lab Results   Component Value Date/Time    WBC 8.6 08/26/2022 06:50 AM    HGB 13.9 08/26/2022 06:50 AM    HCT 41.3 08/26/2022 06:50 AM     08/26/2022 06:50 AM       Renal:    Lab Results   Component Value Date/Time     08/26/2022 06:50 AM    K 4.2 08/26/2022 06:50 AM    K 4.0 08/17/2022 09:59 PM    CL 99 08/26/2022 06:50 AM    CO2 25 08/26/2022 06:50 AM    BUN 40 08/26/2022 06:50 AM    CREATININE 1.6 08/26/2022 06:50 AM    CALCIUM 8.9 08/26/2022 06:50 AM    PHOS 4.2 08/26/2022 06:50 AM         Significant Diagnostic Studies    Radiology:   XR CHEST (2 VW)   Final Result   Faint atelectasis present, but no clear signs of an infiltrate             Consults:     IP CONSULT TO HOSPITALIST  IP CONSULT TO CARDIOLOGY  IP CONSULT TO HEART FAILURE NURSE/COORDINATOR    Disposition:  Home     Condition at Discharge: Stable    Discharge Instructions/Follow-up:    As per D/C instructions    Code Status:  Full Code     Activity: activity as tolerated    Diet: cardiac diet      Discharge Medications:     Current Discharge Medication List             Details   amiodarone (CORDARONE) 200 MG tablet Take 1 tablet by mouth daily  Qty: 30 tablet, Refills: 0      cefdinir (OMNICEF) 300 MG capsule Take 1 capsule by mouth 2 times daily for 5 days  Qty: 10 capsule, Refills: 0                Details   EPINEPHrine (PRIMATENE MIST) 0.125 MG/ACT AERO Inhale 2 puffs into the lungs once      furosemide (LASIX) 80 MG tablet Take 1 tablet by mouth 2 times daily  Qty: 180 tablet, Refills: 3      metoprolol tartrate (LOPRESSOR) 25 MG tablet TAKE 1 TABLET BY MOUTH TWICE DAILY      atorvastatin (LIPITOR) 40 MG tablet Take 1 tablet by mouth nightly  Qty: 30 tablet, Refills: 3      aspirin 81 MG chewable tablet Take 1 tablet by mouth daily  Qty: 30 tablet, Refills: 3      potassium chloride (KLOR-CON M) 10 MEQ extended release tablet Take 1 tablet by mouth 2 times daily  Qty: 60 tablet, Refills: 3      apixaban (ELIQUIS) 5 MG TABS tablet Take 1 tablet by mouth 2 times daily  Qty: 60 tablet, Refills: 5      Handicap Placard MISC by Does not apply route Pt cannot walk more that 250 feet without becoming SOB.   Dx: CAD, Cardiomyopathy, Atrial Fibrilation    Not to exceed 5 years  Qty: 1 each, Refills: 0 Time Spent on discharge is more than 1 hour in the examination, evaluation, counseling and review of medications and discharge plan.       Signed:    Stanley Snyder MD   8/27/2022

## 2022-08-27 NOTE — PROGRESS NOTES
Patient discharged home. IV removed. Tele removed. Discharge paperwork discussed in length with patient, heart failure discharge education provided. Patient verbalized understanding. New prescriptions send to patient's pharmacy. Wheeled down to patients own vehicle to drive himself home.

## 2022-08-27 NOTE — PLAN OF CARE
Patient remains free from falls this shift. Patient HS sugar check WNL with no sliding scale needed. Patient plans on returning home at discharge.   Problem: Safety - Adult  Goal: Free from fall injury  Outcome: Progressing     Problem: Metabolic/Fluid and Electrolytes - Adult  Goal: Glucose maintained within prescribed range  Outcome: Progressing     Problem: Discharge Planning  Goal: Discharge to home or other facility with appropriate resources  Outcome: Progressing

## 2022-08-27 NOTE — PROGRESS NOTES
[facility]   Cardiology  Note   Pt of Dr Maile Humphrey MD, Clarence Felix RN, FNP APRN CVNP  Date: 8/27/2022  Admit Date: 8/25/2022       CC: dCHF/afib    Interval Hx /  Subjective: Today, he is resting in bed, VSS SV02 95% RA  / in NSR his only c/o is feeling of constipation, recommend miralax,   net out -450ml <1+  edema noted   Cardiac meds amiodarone Eliquis asa lasix lopressor  klor con   Ojk to discharge when ok with others      Patient seen and examined. Clinical notes reviewed. Telemetry reviewed / Pertinent labs, diagnostic, device, and imaging results reviewed as a part of this visit  I spent a total of 35 minutes and greater than 50% of the time was spent counseling with patient  coordinating care regarding her diagnosis, treatments and plan of care      Scheduled Meds:   amiodarone  200 mg Oral Daily    sodium chloride flush  5-40 mL IntraVENous 2 times per day    cefTRIAXone (ROCEPHIN) IV  1,000 mg IntraVENous Q24H    insulin lispro  0-8 Units SubCUTAneous TID WC    insulin lispro  0-4 Units SubCUTAneous Nightly    apixaban  5 mg Oral BID    aspirin  81 mg Oral Daily    metoprolol tartrate  25 mg Oral BID    potassium chloride  10 mEq Oral BID    furosemide  80 mg Oral BID       Physical Examination:  Vitals:    08/27/22 0805   BP: 113/61   Pulse: 73   Resp: 16   Temp: 98.1 °F (36.7 °C)   SpO2: 95%      In: 1080 [P.O.:1080]  Out: 450    Wt Readings from Last 3 Encounters:   08/27/22 249 lb 9 oz (113.2 kg)   08/22/22 262 lb 9.6 oz (119.1 kg)   08/17/22 258 lb 4.8 oz (117.2 kg)       Intake/Output Summary (Last 24 hours) at 8/27/2022 1230  Last data filed at 8/27/2022 1037  Gross per 24 hour   Intake 720 ml   Output 450 ml   Net 270 ml       Telemetry: Personally Reviewed    Constitutional: Cooperative and in no apparent distress, and appears well nourished  Skin: Warm and pink; no pallor, cyanosis, clubbing, or bruising   HEENT: Symmetric and normocephalic.    Cardiovascular: Regular rate and rhythm. S1/S2 present  <1+  peripheral edema  Respiratory: Respirations symmetric and unlabored. Lungs clear to auscultation bilaterally, no wheezing, crackles, or rhonchi  Gastrointestinal: Abdomen soft and round. Bowel sounds normoactive without tenderness or masses. Musculoskeletal: Bilateral upper and lower extremity strength 5/5 with full ROM  Neurologic/Psych: Awake and orientated to person, place and time. Calm affect, appropriate mood    Patient Active Problem List    Diagnosis Date Noted    Acute kidney injury (NANETTE) with acute tubular necrosis (ATN) ([address] 08/25/2022    Mixed hyperlipidemia 08/22/2022    Acute on chronic systolic congestive heart failure ([address] 08/22/2022    Gastroesophageal reflux disease without esophagitis 08/22/2022    Uncontrolled type 2 diabetes mellitus with hyperglycemia ([address] 08/22/2022    AICD (automatic cardioverter/defibrillator) present 03/13/2021    ICD (implantable cardioverter-defibrillator) in place-[employer] 03/12/2021    Ischemic cardiomyopathy 03/12/2021    Monilial cystitis 03/02/2020    Urinary tract obstruction 03/02/2020    CAD, multiple vessel     Dilated cardiomyopathy ([address] 12/10/2019    Paroxysmal atrial fibrillation ([address] 12/10/2019    S/P coronary angiogram 12/10/2019    Bladder obstruction 11/21/2019        Assessment     PAF  [id number] Score for Atrial Fibrillation Stroke Risk 4  On eliquis not bleeding or falls noted   Recommend no NSAIDS only Tylenol PRN   TSH mag wnl     Acute on chronic diastolic  heart failure EF 50 %   Improved   on oral diuretic     8/25/2022 TTE   Normal left ventricular size. Mild concentric left ventricular hypertrophy. Left ventricular systolic function appears at the lower limits of normal with an estimated ejection fraction of 50%. No gross regional wall motion abnormalities were noted. Indeterminate diastolic function. The right ventricle is normal in size with preserved function.   Pacer / ICD wire is visualized in the right ventricle. Trace pericardial effusion noted anteriorly. Definity contrast agent was used to help visualize endocardial borders. NANETTE / CRI  Improved 1.8> 1.6     UTI  On ABX    DMT2   Managed per IM     HLD   Do lipid profile     Plan   Cardiac meds amiodarone Eliquis asa lasix lopressor  klor con   Ok to discharge when ok with others   Fu with Dr Yan Mail in 1- 2 weeks     Thank you for allowing to us to participate in the care of [facility].   Gilson RIVAS-CNP-CVNP    [address]

## 2022-08-28 LAB
CHOLESTEROL, FASTING: 182 MG/DL (ref 0–199)
HDLC SERPL-MCNC: 30 MG/DL (ref 40–60)
LDL CHOLESTEROL CALCULATED: 109 MG/DL
TRIGLYCERIDE, FASTING: 213 MG/DL (ref 0–150)
VITAMIN D 25-HYDROXY: 19.2 NG/ML
VLDLC SERPL CALC-MCNC: 43 MG/DL

## 2022-08-29 ENCOUNTER — CARE COORDINATION (OUTPATIENT)
Dept: CASE MANAGEMENT | Age: 72
End: 2022-08-29

## 2022-08-29 ENCOUNTER — TELEPHONE (OUTPATIENT)
Dept: CARDIOLOGY CLINIC | Age: 72
End: 2022-08-29

## 2022-08-29 DIAGNOSIS — N17.9 AKI (ACUTE KIDNEY INJURY) (HCC): Primary | ICD-10-CM

## 2022-08-29 RX ORDER — POTASSIUM CHLORIDE 750 MG/1
10 TABLET, EXTENDED RELEASE ORAL 2 TIMES DAILY
Qty: 60 TABLET | Refills: 0 | Status: SHIPPED | OUTPATIENT
Start: 2022-08-29

## 2022-08-29 NOTE — CARE COORDINATION
[facility] Transitions Initial Follow Up Call    Call within 2 business days of discharge: Yes    Patient: Abdiel Lam Patient :    MRN: [id number]   Reason for Admission: NANETTE, COVID -19 Monitoring   Discharge Date: 22 RARS: Readmission Risk Score: 12.3      Last Discharge [facility]       Date Complaint Diagnosis Description Type Department Provider    22 Shortness of Breath Acute cystitis with hematuria . .. ED to Hosp-Admission (Discharged) (ADMITTED) [facility] 6 Fior Schulte MD; Kiki Mattson,. .. Spoke with: [address]:  [facility] [employer]     CTN spoke with patient this am for initial 24 hour discharge follow up CTN call. Patient states he is doing well, states he is still constipated, passing gas, no reports of any fever, chills, nausea, vomiting, chest pain, SOB or cough. CTN and Pt reviewed meds and CTN completed the 1111f. Non-face-to-face services provided:  Obtained and reviewed discharge summary and/or continuity of care documents  Education of patient/family/caregiver/guardian to support self-management-Patient instructed to continue to monitor for any of the above noted s/s, as well as monitoring for any continued constipation, reporting to MD immediately. Patient also instructed to continue to take PRN's for constipation and stool softeners. Assessment and support for treatment adherence and medication management-Medication Review Completed. Transitions of Care Initial Call    Was this an external facility discharge? No     Challenges to be reviewed by the provider   Additional needs identified to be addressed with provider: No  none             Method of communication with provider : none    Advance Care Planning:   Does patient have an Advance Directive: not on file. Care Transition Nurse contacted the patient by telephone to perform post hospital discharge assessment. Verified name and  with patient as identifiers. Provided introduction to self, and explanation of the CTN role. CTN reviewed discharge instructions, medical action plan and red flags with patient who verbalized understanding. Patient given an opportunity to ask questions and does not have any further questions or concerns at this time. Were discharge instructions available to patient? Yes. Reviewed appropriate site of care based on symptoms and resources available to patient including: PCP  Specialist  Urgent care clinics  [facility] 24/7  When to call 911. The patient agrees to contact the PCP office for questions related to their healthcare. Medication reconciliation was performed with patient, who verbalizes understanding of administration of home medications. Advised obtaining a 90-day supply of all daily and as-needed medications. Was patient discharged with a pulse oximeter? no    CTN provided contact information. Plan for follow-up call in 5-7 days based on severity of symptoms and risk factors. Plan for next call: symptom management-Any worsening constipation.     Care Transitions 24 Hour Call    Schedule Follow Up Appointment with PCP: Rachelle Mcguire you have a copy of your discharge instructions?: Yes  Do you have all of your prescriptions and are they filled?: Yes  Have you been contacted by a [address]?: No  Have you scheduled your follow up appointment?: Yes  How are you going to get to your appointment?: Car - family or friend to transport  Do you have support at home?: Alone  Do you feel like you have everything you need to keep you well at home?: Yes  Are you an active caregiver in your home?: No  Care Transitions Interventions  No Identified Needs       Follow Up  Future Appointments   Date Time Provider Rai Aburto   8/31/2022 11:30 AM EVELYN Gonzáles - CNP [place] [facility]   9/29/2022  7:00 AM SCHEDULE, [address] [facility]     Thank You,    Racheal Delatorre, RN  Care Transition Coordinator  Contact Number:[phone]

## 2022-08-31 ENCOUNTER — OFFICE VISIT (OUTPATIENT)
Dept: CARDIOLOGY CLINIC | Age: 72
End: 2022-08-31
Payer: MEDICAID

## 2022-08-31 VITALS
BODY MASS INDEX: 41.06 KG/M2 | SYSTOLIC BLOOD PRESSURE: 126 MMHG | HEIGHT: 67 IN | WEIGHT: 261.6 LBS | HEART RATE: 86 BPM | OXYGEN SATURATION: 99 % | DIASTOLIC BLOOD PRESSURE: 76 MMHG

## 2022-08-31 DIAGNOSIS — Z95.810 ICD (IMPLANTABLE CARDIOVERTER-DEFIBRILLATOR) IN PLACE: ICD-10-CM

## 2022-08-31 DIAGNOSIS — I25.10 CAD IN NATIVE ARTERY: ICD-10-CM

## 2022-08-31 DIAGNOSIS — Z95.1 HX OF CABG: ICD-10-CM

## 2022-08-31 DIAGNOSIS — Z09 HOSPITAL DISCHARGE FOLLOW-UP: ICD-10-CM

## 2022-08-31 DIAGNOSIS — I50.32 CHRONIC HEART FAILURE WITH PRESERVED EJECTION FRACTION (HCC): Primary | ICD-10-CM

## 2022-08-31 DIAGNOSIS — I48.0 PAROXYSMAL ATRIAL FIBRILLATION (HCC): ICD-10-CM

## 2022-08-31 DIAGNOSIS — E78.2 MIXED HYPERLIPIDEMIA: ICD-10-CM

## 2022-08-31 PROCEDURE — 99214 OFFICE O/P EST MOD 30 MIN: CPT | Performed by: NURSE PRACTITIONER

## 2022-08-31 PROCEDURE — 1111F DSCHRG MED/CURRENT MED MERGE: CPT | Performed by: NURSE PRACTITIONER

## 2022-08-31 RX ORDER — ROSUVASTATIN CALCIUM 20 MG/1
20 TABLET, COATED ORAL DAILY
Qty: 90 TABLET | Refills: 3 | Status: SHIPPED | OUTPATIENT
Start: 2022-08-31

## 2022-08-31 NOTE — PROGRESS NOTES
CC CHF hospital follow up     HPI:  81 y.o. patient of Patti Arcos and Tommy Aleman with CAD/CABG, HFrEF (20-25%), ICD, pAF, HLD, and DM who is here for CHF hospital follow up. SOB is better and orthopnea and PND have resolved. Denies weight gain, abdominal bloating or early satiety. Notes minimal LE edema. Bruises easily     He denies cp, LH/dizziness, palpitations, syncope or falls. No n/v/d, fever or GI/ bleeding. C/o constipation. Has a lot of stress trying to sell his house. He will need to have kidney stones removed in the near future. Past Medical History:   Diagnosis Date    A-fib [facility]) 2020    CAD, multiple vessel 12/2019    Diabetes mellitus ([address]     diet controlled     Enlarged prostate     Environmental allergies     Kidney stones     Systolic CHF [facility])      Past Surgical History:   Procedure Laterality Date    CARDIAC CATHETERIZATION  12/10/2019    Dr. Edson Boyce - severe multi-vessel disease    CORONARY ARTERY BYPASS GRAFT N/A 3/2/2020    WAYNE; TCPB; CABG x 3, LIMA to LAD w/ long segment endarterectomy (4 cm); bilateral pulmonary vein isolation; 40 mm Atriclip to L atril appendage; endoscopic L GSV harvest; ICNB x 5 levels bilat; sternal plate x 1 to manubrium performed by Florentino Sears MD at [address]  x2    NASAL SEPTUM SURGERY       Family History   Problem Relation Age of Onset    Dementia Mother         Vascular    Alzheimer's Disease Mother     Heart Attack Father     Diabetes Paternal Aunt      Social History     Tobacco Use    Smoking status: Never    Smokeless tobacco: Never   Substance Use Topics    Alcohol use: Not Currently     Comment: rare    Drug use: Never     Allergies:Codeine and Aspartame and phenylalanine    Review of Systems  General: No changes in weight, fatigue, or night sweats. HEENT: No blurry or decreased vision. No changes in hearing, nasal discharge or sore throat. Cardiovascular:  See HPI. Respiratory: No cough, hemoptysis, or wheezing. Gastrointestinal:  No abdominal pain, hematochezia, melana, diarrhea, or history of GI ulcers. Genito-Urinary: No dysuria or hematuria. No urgency or polyuria. Musculoskeletal:  No complaints of joint pain, joint swelling or muscular weakness/soreness. Neurological:  No dizziness, headaches, numbness/tingling, speech problems or weakness. Psychological:  No anxiety or depression. Hematological and Lymphatic: No abnormal bleeding or bruising, blood clots, jaundice or swollen lymph nodes. Endocrine:   No malaise/lethargy, palpitations, polydipsia/polyuria, temperature intolerance or unexpected weight changes  Skin:  No rashes or non-healing ulcers. Physical Exam:  /76   Pulse 86   Ht 5' 7\" (1.702 m)   Wt 261 lb 9.6 oz (118.7 kg)   SpO2 99%   BMI 40.97 kg/m²    General (appearance):  No acute distress  Eyes: anicteric   Neck: soft, No JVD  Ears/Nose/Mouth/Thorat: No cyanosis  CV: RRR   Respiratory:  Clear, normal effort  GI: soft, non-tender, non-distended  Skin: Warm, dry. No rashes + ecchymosis   Neuro/Psych: Alert and oriented x 3. Appropriate behavior  Ext:  No c/c.  Mild non-pitting LE edema and venostasis discoloration   Pulses:  2+ radial     Weight  Wt Readings from Last 3 Encounters:   08/31/22 261 lb 9.6 oz (118.7 kg)   08/27/22 249 lb 9 oz (113.2 kg)   08/22/22 262 lb 9.6 oz (119.1 kg)          CBC:   Lab Results   Component Value Date    WBC 8.6 08/26/2022    HGB 13.9 08/26/2022    HCT 41.3 08/26/2022    MCV 91.0 08/26/2022     08/26/2022     BMP:  Lab Results   Component Value Date    CREATININE 1.6 (H) 08/26/2022    BUN 40 (H) 08/26/2022     08/26/2022    K 4.2 08/26/2022    CL 99 08/26/2022    CO2 25 08/26/2022     Mag:   Lab Results   Component Value Date/Time    MG 2.20 08/26/2022 06:50 AM     LIVER PROFILE:   Lab Results   Component Value Date    ALT 10 08/17/2022    AST 13 (L) 08/17/2022    ALKPHOS 82 08/17/2022    BILITOT 0.4 08/17/2022     PT/INR:   Lab Results Component Value Date    INR 1.19 (H) 2022    INR 1.41 (H) 2021    INR 1.77 (H) 2021    PROTIME 15.0 (H) 2022    PROTIME 16.2 (H) 2021    PROTIME 20.5 (H) 2021     Pro-BNP   Lab Results   Component Value Date/Time    PROBNP 3,047 2022 02:21 AM    PROBNP 1,430 2022 09:59 PM    PROBNP 2,826 2021 06:31 PM     LIPIDS:  No components found for: CHLPL  Lab Results   Component Value Date    TRIG 236 (H) 2020    TRIG 220 (H) 2019     Lab Results   Component Value Date    HDL 30 (L) 2022    HDL 31 (L) 2020    HDL 33 (L) 2019     Lab Results   Component Value Date    LDLCALC 109 (H) 2022    LDLCALC 94 2020    LDLCALC 83 2019     Lab Results   Component Value Date    LABVLDL 43 2022    LABVLDL 47 2020    LABVLDL 44 2019     TSH:No results found for: TSH, L1LYYHT, Z6ZBQZE, THYROIDAB    IMAGIN2022 Echo:   Normal left ventricular size. Mild concentric left ventricular hypertrophy. Left ventricular systolic function appears at the lower limits of normal   with an estimated ejection fraction of 50%. No gross regional wall motion abnormalities were noted. Indeterminate diastolic function. The right ventricle is normal in size with preserved function. Pacer / ICD wire is visualized in the right ventricle. Trace pericardial effusion noted anteriorly. Definity contrast agent was used to help visualize endocardial borders     Echo:   Normal left ventricular size. Moderate concentric left ventricular hypertrophy. Severely decreased left ventricular systolic function with an estimated   ejection fraction of 20-25%. Severe global hypokinesis. Indeterminate diastolic function. Normal right ventricular size with decreased systolic function. Tapse: 0.56 cm   RV s: 6.31 cm/s   The aortic root is dilated measuring 3.8 cm. The pulmonic valve is not well visualized.    IVC not well visualized. 2019 coronary angiogram  HEMODYNAMICS:  Left ventricular end-diastolic pressure equals 20. There was no significant gradient across the aortic valve by pullback  post cineangiography. LEFT VENTRICULOGRAM:  Left ventriculogram demonstrates global  hypokinesis. Estimated ejection fraction is 25%. LEFT MAIN:  Left main is free of significant obstructive disease. LEFT ANTERIOR DESCENDING:  The LAD courses to and wraps around the apex. It gives off a relatively large bifurcating first diagonal branch and a  moderate-size second diagonal branch. The large first diagonal branch  had an 80% proximal stenosis. The LAD proper had severe mid vessel  disease with a very irregular area of subtotal stenosis in the mid  vessel. It then coursed on towards the apex. There is a 90% stenosis  at the apex. LEFT CIRCUMFLEX:  Circumflex gives rise to a small-to-moderate first  marginal branch, a large second marginal branch, and terminates in  moderate-size posterolateral branch. There appears to be ostial disease  which is difficult to quantitate but appears to be in excess of 70%. The circumflex then has 90% stenosis before the distal posterolateral  branch. RIGHT CORONARY ARTERY:  Right coronary artery is a dominant vessel. It  gives off a moderate-size PDA and two posterolateral branches. There is  diffuse disease throughout the body of the right coronary artery and 90%  stenosis in the PDA. 2019 CTA neck/head  1. Minimal atherosclerotic changes in the carotid bifurcations. No significant vascular abnormality otherwise. No significant arterial stenosis. 2. Incidental 5 mm nodule in the right upper lobe and 4 mm nodule in the left upper lobe. Following the Fleischner Society 2017 guidelines for single solid nodules <6 mm, if patient is low risk no routine follow-up is suggested unless suspicious    morphology or upper lobe location.   If patient is high risk, then optional CT at 12 months is suggested. qzxv        1. No evidence of large vessel occlusion or significant intracranial vascular abnormality. 2. No acute intracranial abnormality. 3. Rightward deviation of the nasal septum with bilateral maxillary sinus small mucous retention cysts or polyps. 4. Incidental pulmonary nodules as described in the neck portion of the dictation         Medications:   Current Outpatient Medications   Medication Sig Dispense Refill    potassium chloride (KLOR-CON M) 10 MEQ extended release tablet Take 1 tablet by mouth 2 times daily 60 tablet 0    amiodarone (CORDARONE) 200 MG tablet Take 1 tablet by mouth daily 30 tablet 0    cefdinir (OMNICEF) 300 MG capsule Take 1 capsule by mouth 2 times daily for 5 days 10 capsule 0    EPINEPHrine (PRIMATENE MIST) 0.125 MG/ACT AERO Inhale 2 puffs into the lungs once      furosemide (LASIX) 80 MG tablet Take 1 tablet by mouth 2 times daily 180 tablet 3    metoprolol tartrate (LOPRESSOR) 25 MG tablet TAKE 1 TABLET BY MOUTH TWICE DAILY      atorvastatin (LIPITOR) 40 MG tablet Take 1 tablet by mouth nightly 30 tablet 3    aspirin 81 MG chewable tablet Take 1 tablet by mouth daily 30 tablet 3    apixaban (ELIQUIS) 5 MG TABS tablet Take 1 tablet by mouth 2 times daily 60 tablet 5    Handicap Placard MISC by Does not apply route Pt cannot walk more that 250 feet without becoming SOB. Dx: CAD, Cardiomyopathy, Atrial Fibrilation    Not to exceed 5 years (Patient taking differently: by Does not apply route Pt cannot walk more that 250 feet without becoming SOB. Dx: CAD, Cardiomyopathy, Atrial Fibrilation    Not to exceed 5 years) 1 each 0     No current facility-administered medications for this visit. Assessment:  1. Chronic heart failure with preserved ejection fraction ([address]    2. ICD (implantable cardioverter-defibrillator) in place    3. CAD in native artery    4. Hx of CABG    5. Paroxysmal atrial fibrillation (HCC)    6.  Mixed hyperlipidemia          Plan:  HFpEF (EF 50%): chronic    Cont lasix 80 mg po bid   Daily weights, low salt diet   Decrease fluid intake to < 2 Liters a day   Metoprolol   Had ischemic cardiomyopathy EF 20-25 and now EF 50%  ICD; stable   Follow up with EP  CAD/CABG; stable   Asa, statin   Metoprolol 25 mg po bid   PAF; stable   Follow up with EP   Metoprolol 25 mg po bid    Eliquis 5 mg po bid    Amio 200 mg qday   HLD; stable    Lipitor 40 mg po daily     He has CKD and Cr 1.6 GFR 43  Had recent cystitis/hematuria/UTI and finishing abx    Follow up with Patti Javier Moder   Follow up with Mary Grace in 1 month     Reviewed most recent: CBC, BMP, LFT, Lipids, Mag, PT/INR, BNP, TSH  Reviewed most recent: ECG, Echo, Nuc stress test,  CTA, LHC

## 2022-09-07 ENCOUNTER — CARE COORDINATION (OUTPATIENT)
Dept: CASE MANAGEMENT | Age: 72
End: 2022-09-07

## 2022-09-07 NOTE — CARE COORDINATION
[facility] Transitions Follow Up Call    2022    Patient: Rossi Ruiz  Patient : 1950   MRN: [id number]   Reason for Admission: COVID -19 Monitoring   Discharge Date: 22 RARS: Readmission Risk Score: 12.3         Spoke with: Rossi Ruiz     CTN spoke with patient this afternoon for follow up CTN call. Patient states he is doing well, no reports of any fever, chills, nausea, vomiting, chest pain, SOB or cough. Patient with no Patient with no congestion, pain, difficulty emptying bladder, LE edema, feeling lightheaded, dizziness, and heart palpitations. Patient is following fluid restriction, states he is still having some constipation. Patient taking stool softener and Miralax. Care Transitions Follow Up Call    Needs to be reviewed by the provider   Additional needs identified to be addressed with provider: No  none             Method of communication with provider : none      Care Transition Nurse contacted the patient by telephone to follow up after admission on 2022. Verified name and  with patient as identifiers. Addressed changes since last contact: none  Discussed follow-up appointments. If no appointment was previously scheduled, appointment scheduling offered: Yes. Is follow up appointment scheduled within 7 days of discharge? Yes. Advance Care Planning:   Does patient have an Advance Directive: not on file. CTN reviewed discharge instructions, medical action plan and red flags with patient and discussed any barriers to care and/or understanding of plan of care after discharge. Discussed appropriate site of care based on symptoms and resources available to patient including: PCP  Specialist  Urgent care clinics  [place]   When to call 911. The patient agrees to contact the PCP office for questions related to their healthcare.      Patients top risk factors for readmission: medication management  multiple health system providers  polypharmacy  Interventions to address risk factors: Education of patient/family/caregiver/guardian to support self-management-Patient instructed to continue to monitor for any of the above noted s/s, as well as monitoring for any weight gain, continue to follow fluid restriction and continue with miralax and stool softener. CTN provided contact information for future needs. Plan for follow-up call in 5-7 days based on severity of symptoms and risk factors. Plan for next call: symptom management-Any continued constipation, any issues or concerns related to CHF. Care Transitions Subsequent and Final Call    Schedule Follow Up Appointment with PCP: Declined  Subsequent and Final Calls  Do you have any ongoing symptoms?: Yes  Onset of Patient-reported symptoms: Other  Patient-reported symptoms: Constipation  Have your medications changed?: No  Do you have any questions related to your medications?: No  Do you currently have any active services?: No  Do you have any needs or concerns that I can assist you with?: No  Identified Barriers: None  Care Transitions Interventions  No Identified Needs  Other Interventions:            Follow Up  Future Appointments   Date Time Provider Rai Aburto   9/29/2022  9:00 AM SCHEDULE, Chalino Camacho REMOTE TRANSMISSION July [facility]WIS   10/12/2022  2:30 PM MD [facility]     Thank You,    Ryan Oconnor RN  Care Transition Coordinator  Contact CUKXZD:[phone]

## 2022-09-12 ENCOUNTER — CARE COORDINATION (OUTPATIENT)
Dept: CASE MANAGEMENT | Age: 72
End: 2022-09-12

## 2022-09-12 NOTE — CARE COORDINATION
Date/Time:  9/12/2022 12:58 PM  Attempted to reach patient by telephone. Call within 2 business days of discharge: Yes,  Left HIPPA compliant message requesting a return call. Will attempt to reach patient again.     Thank Lacy Romero RN  Care Transition Coordinator  Contact ADJT:[phone]

## 2022-09-19 ENCOUNTER — CARE COORDINATION (OUTPATIENT)
Dept: CASE MANAGEMENT | Age: 72
End: 2022-09-19

## 2022-09-19 NOTE — CARE COORDINATION
[facility] Transitions Follow Up Call    2022    Patient: Rossi Ruiz  Patient : 1950   MRN: [id number]   Reason for Admission: COVID -19 Monitoring   Discharge Date: 22 RARS: Readmission Risk Score: 12.3         Spoke with: Rossi Ruiz     CTN spoke with patient this afternoon for follow up CTN call. Patient states he is doing better, constipation is gone, no other issues with bowel. Patient also denies having any fever, chills, nausea, vomiting, chest pain, SOB or cough. Care Transitions Follow Up Call    Needs to be reviewed by the provider   Additional needs identified to be addressed with provider: No  none             Method of communication with provider : none      Care Transition Nurse contacted the patient by telephone to follow up after admission on 2022. Verified name and  with patient as identifiers. Addressed changes since last contact: none  Discussed follow-up appointments. If no appointment was previously scheduled, appointment scheduling offered: Yes. Is follow up appointment scheduled within 7 days of discharge? Yes. Advance Care Planning:   Does patient have an Advance Directive: not on file. CTN reviewed discharge instructions, medical action plan and red flags with patient and discussed any barriers to care and/or understanding of plan of care after discharge. Discussed appropriate site of care based on symptoms and resources available to patient including: PCP  Specialist  Urgent care clinics  [facility]   When to call 911. The patient agrees to contact the PCP office for questions related to their healthcare.      Patients top risk factors for readmission: medical condition-COVID -19 Monitoring   medication management  multiple health system providers  polypharmacy  Interventions to address risk factors: Education of patient/family/caregiver/guardian to support self-management-Patient instructed to continue to monitor for any of the above noted s/s, as well as any returning constipation. CTN provided contact information for future needs. Plan for follow-up call in 5-7 days based on severity of symptoms and risk factors. Plan for next call: Any other issues or concerns that may arise. Care Transitions Subsequent and Final Call    Schedule Follow Up Appointment with PCP: Declined  Subsequent and Final Calls  Do you have any ongoing symptoms?: No  Have your medications changed?: No  Do you have any questions related to your medications?: No  Do you currently have any active services?: No  Do you have any needs or concerns that I can assist you with?: No  Identified Barriers: None  Care Transitions Interventions  No Identified Needs  Other Interventions:            Follow Up  Future Appointments   Date Time Provider Rai Aburto   9/29/2022  9:00 AM SCHEDULE, [place] REMOTE [facility] July [facility]   10/12/2022  2:30 PM MD July [place]     Thank You,    Nohelia Cid, RN  Care Transition Coordinator  Contact IQJX:[phone]

## 2022-09-27 ENCOUNTER — CARE COORDINATION (OUTPATIENT)
Dept: CASE MANAGEMENT | Age: 72
End: 2022-09-27

## 2022-09-27 NOTE — CARE COORDINATION
Date/Time:  9/27/2022 10:56 AM  Attempted to reach patient by telephone. Call within 2 business days of discharge: Yes,  Left HIPPA compliant message requesting a return call. CTN will close out CTN/COVID -19 Monitoring episode at this time.     Thank Frances Orellana RN  Care Transition Coordinator  Contact GERARDOO:[phone]

## 2022-09-29 ENCOUNTER — NURSE ONLY (OUTPATIENT)
Dept: CARDIOLOGY CLINIC | Age: 72
End: 2022-09-29
Payer: MEDICAID

## 2022-09-29 DIAGNOSIS — I50.23 ACUTE ON CHRONIC SYSTOLIC CONGESTIVE HEART FAILURE (HCC): ICD-10-CM

## 2022-09-29 DIAGNOSIS — I42.0 DILATED CARDIOMYOPATHY (HCC): ICD-10-CM

## 2022-09-29 DIAGNOSIS — I48.0 PAROXYSMAL ATRIAL FIBRILLATION (HCC): ICD-10-CM

## 2022-09-29 DIAGNOSIS — Z95.810 AICD (AUTOMATIC CARDIOVERTER/DEFIBRILLATOR) PRESENT: Primary | ICD-10-CM

## 2022-09-29 PROCEDURE — 93297 REM INTERROG DEV EVAL ICPMS: CPT | Performed by: NURSE PRACTITIONER

## 2022-09-29 PROCEDURE — 93295 DEV INTERROG REMOTE 1/2/MLT: CPT | Performed by: INTERNAL MEDICINE

## 2022-09-29 PROCEDURE — 93296 REM INTERROG EVL PM/IDS: CPT | Performed by: INTERNAL MEDICINE

## 2022-09-30 NOTE — PROGRESS NOTES
Remote transmission received from patient's dual chamber ICD monitor at home. Transmission shows normal sensing and pacing function. Known AT/AF noted, 18.8% burden, current event ongoing since 09.28.22 @ 08:51 (amiodarone, Lopressor, Eliquis). EP physician will review. Possible Optivol fluid accumulation 09.12-09.15.22, resolved and currently elevated slightly below 60 threshold w correlating TI trend below reference line. TriageHF Heart Failure Risk Status on 29-Sep-2022 is High*. NP will review. End of 91-day monitoring period 9/29/22. See interrogation under cardiology tab in the [address] [place] [address] [address] field for more details. Will continue to monitor remotely.

## 2022-10-12 ENCOUNTER — OFFICE VISIT (OUTPATIENT)
Dept: CARDIOLOGY CLINIC | Age: 72
End: 2022-10-12
Payer: MEDICAID

## 2022-10-12 VITALS
DIASTOLIC BLOOD PRESSURE: 70 MMHG | WEIGHT: 272 LBS | BODY MASS INDEX: 42.6 KG/M2 | HEART RATE: 76 BPM | SYSTOLIC BLOOD PRESSURE: 120 MMHG

## 2022-10-12 DIAGNOSIS — I25.10 CAD IN NATIVE ARTERY: ICD-10-CM

## 2022-10-12 DIAGNOSIS — Z95.810 ICD (IMPLANTABLE CARDIOVERTER-DEFIBRILLATOR) IN PLACE: ICD-10-CM

## 2022-10-12 DIAGNOSIS — I48.0 PAROXYSMAL ATRIAL FIBRILLATION (HCC): ICD-10-CM

## 2022-10-12 DIAGNOSIS — I50.32 CHRONIC DIASTOLIC CONGESTIVE HEART FAILURE (HCC): Primary | ICD-10-CM

## 2022-10-12 DIAGNOSIS — I25.5 ISCHEMIC CARDIOMYOPATHY: ICD-10-CM

## 2022-10-12 DIAGNOSIS — Z95.1 HX OF CABG: ICD-10-CM

## 2022-10-12 PROCEDURE — 1036F TOBACCO NON-USER: CPT | Performed by: INTERNAL MEDICINE

## 2022-10-12 PROCEDURE — G8428 CUR MEDS NOT DOCUMENT: HCPCS | Performed by: INTERNAL MEDICINE

## 2022-10-12 PROCEDURE — 3017F COLORECTAL CA SCREEN DOC REV: CPT | Performed by: INTERNAL MEDICINE

## 2022-10-12 PROCEDURE — 99214 OFFICE O/P EST MOD 30 MIN: CPT | Performed by: INTERNAL MEDICINE

## 2022-10-12 PROCEDURE — 1123F ACP DISCUSS/DSCN MKR DOCD: CPT | Performed by: INTERNAL MEDICINE

## 2022-10-12 PROCEDURE — G8417 CALC BMI ABV UP PARAM F/U: HCPCS | Performed by: INTERNAL MEDICINE

## 2022-10-12 PROCEDURE — G8484 FLU IMMUNIZE NO ADMIN: HCPCS | Performed by: INTERNAL MEDICINE

## 2022-10-12 ASSESSMENT — ENCOUNTER SYMPTOMS
CHOKING: 0
COUGH: 0
CHEST TIGHTNESS: 0
ABDOMINAL DISTENTION: 0
SHORTNESS OF BREATH: 0
APNEA: 0

## 2022-10-12 NOTE — PROGRESS NOTES
Subjective:      Patient ID: Scott Quinteros is a 81 y.o. male. Follow up CAD/CABG/afib/cardiomyop/abn ekg. No complaints. Went to ER recently for sob. Felt due to allergy med he took. Back to baseline  No further sx. No chest pain. No tachycardia/syncope. Chronic LE edema. No pnd or orthopnea. Wt up. Feeling good. Taking Eliquis. No bleeding issues. Rhythm stable.      Past Medical History:   Diagnosis Date    A-fib [facility]) 2020    CAD, multiple vessel 12/2019    Diabetes mellitus ([facility] [address]     diet controlled     Enlarged prostate     Environmental allergies     Kidney stones     Systolic CHF [facility])      Past Surgical History:   Procedure Laterality Date    CARDIAC CATHETERIZATION  12/10/2019    Dr. Dionicio Pimentel - severe multi-vessel disease    CORONARY ARTERY BYPASS GRAFT N/A 3/2/2020    WAYNE; TCPB; CABG x 3, LIMA to LAD w/ long segment endarterectomy (4 cm); bilateral pulmonary vein isolation; 40 mm Atriclip to L atril appendage; endoscopic L GSV harvest; ICNB x 5 levels bilat; sternal plate x 1 to manubrium performed by Charles Romano MD at [address]  x2    NASAL SEPTUM SURGERY       Social History     Socioeconomic History    Marital status: Single     Spouse name: Not on file    Number of children: Not on file    Years of education: Not on file    Highest education level: Not on file   Occupational History    Not on file   Tobacco Use    Smoking status: Never    Smokeless tobacco: Never   Substance and Sexual Activity    Alcohol use: Not Currently     Comment: rare    Drug use: Never    Sexual activity: Not on file   Other Topics Concern    Not on file   Social History Narrative    Not on file     Social Determinants of Health     Financial Resource Strain: Not on file   Food Insecurity: Not on file   Transportation Needs: Not on file   Physical Activity: Not on file   Stress: Not on file   Social Connections: Not on file   Intimate Partner Violence: Not on file   Housing Stability: Not on file      FH MI in [address] in 62s. Vitals:    10/12/22 1428   BP: 120/70   Pulse: 76     Wt 272 (up 13 lbs)    Review of Systems   Constitutional:  Negative for activity change and fatigue. Respiratory:  Negative for apnea, cough, choking, chest tightness and shortness of breath. Cardiovascular:  Negative for chest pain, palpitations and leg swelling. No PND or orthopnea. No tachycardia. Gastrointestinal:  Negative for abdominal distention. Genitourinary:  Positive for hematuria. Musculoskeletal:  Negative for myalgias. Neurological:  Negative for dizziness, syncope and light-headedness. Psychiatric/Behavioral:  Negative for agitation, behavioral problems and confusion. All other systems reviewed and are negative. Objective:   Physical Exam  Constitutional:       General: He is not in acute distress. Appearance: Normal appearance. He is well-developed. HENT:      Head: Normocephalic. Right Ear: External ear normal.      Left Ear: External ear normal.   Neck:      Vascular: No JVD. Cardiovascular:      Rate and Rhythm: Normal rate and regular rhythm. Heart sounds: Normal heart sounds. No gallop. Pulmonary:      Effort: Pulmonary effort is normal. No respiratory distress. Breath sounds: Normal breath sounds. No wheezing or rales. Abdominal:      General: Bowel sounds are normal.      Palpations: Abdomen is soft. Tenderness: There is no abdominal tenderness. Musculoskeletal:         General: Swelling present. Normal range of motion. Cervical back: Normal range of motion and neck supple. Comments: Tr ankle edema. Skin:     General: Skin is warm and dry. Neurological:      Mental Status: He is oriented to person, place, and time. Psychiatric:         Mood and Affect: Mood normal.         Behavior: Behavior normal.       Assessment:       Diagnosis Orders   1.  Chronic diastolic congestive heart failure (HCC)        2. Paroxysmal atrial fibrillation ([facility] [address]        3. CAD in native artery        4. Hx of CABG        5. Ischemic cardiomyopathy        6. ICD (implantable cardioverter-defibrillator) in place                  Plan:      CV stable. No angina. Compensated. No orthopnea. Continue lipitor, toprol, ASA for CAD. Lasix 80 mg qd for cardiomyop/CHF. Continue eliquis for CVA proph for afib. No bleeding issues. Reviewed previous records and testing. No changes. Follow up 2 months.         Jacky Ng MD

## 2022-10-26 NOTE — RESULT ENCOUNTER NOTE
Unremarkable device check.    Continue to monitor    Stephon Rosales MD   Cardiac Electrophysiology  [facility]  Office [phone]

## 2022-11-11 ENCOUNTER — HOSPITAL ENCOUNTER (EMERGENCY)
Age: 72
Discharge: HOME | End: 2022-11-11
Attending: STUDENT IN AN ORGANIZED HEALTH CARE EDUCATION/TRAINING PROGRAM
Payer: MEDICAID

## 2022-11-11 VITALS
SYSTOLIC BLOOD PRESSURE: 144 MMHG | OXYGEN SATURATION: 95 % | RESPIRATION RATE: 18 BRPM | TEMPERATURE: 98.1 F | HEART RATE: 91 BPM | DIASTOLIC BLOOD PRESSURE: 80 MMHG

## 2022-11-11 DIAGNOSIS — N30.01 ACUTE CYSTITIS WITH HEMATURIA: Primary | ICD-10-CM

## 2022-11-11 DIAGNOSIS — K59.00 CONSTIPATION, UNSPECIFIED CONSTIPATION TYPE: ICD-10-CM

## 2022-11-11 DIAGNOSIS — R33.9 URINARY RETENTION: ICD-10-CM

## 2022-11-11 LAB
ANION GAP SERPL CALCULATED.3IONS-SCNC: 11 MMOL/L (ref 3–16)
BACTERIA: ABNORMAL /HPF
BILIRUBIN URINE: NEGATIVE
BLOOD, URINE: ABNORMAL
BUN BLDV-MCNC: 25 MG/DL (ref 7–20)
CALCIUM SERPL-MCNC: 9.4 MG/DL (ref 8.3–10.6)
CHLORIDE BLD-SCNC: 103 MMOL/L (ref 99–110)
CLARITY: ABNORMAL
CO2: 22 MMOL/L (ref 21–32)
COLOR: YELLOW
CREAT SERPL-MCNC: 1.2 MG/DL (ref 0.8–1.3)
EPITHELIAL CELLS, UA: ABNORMAL /HPF (ref 0–5)
GFR SERPL CREATININE-BSD FRML MDRD: >60 ML/MIN/{1.73_M2}
GLUCOSE BLD-MCNC: 179 MG/DL (ref 70–99)
GLUCOSE URINE: NEGATIVE MG/DL
KETONES, URINE: NEGATIVE MG/DL
LEUKOCYTE ESTERASE, URINE: ABNORMAL
MICROSCOPIC EXAMINATION: YES
NITRITE, URINE: NEGATIVE
PH UA: 6 (ref 5–8)
POTASSIUM REFLEX MAGNESIUM: 4.4 MMOL/L (ref 3.5–5.1)
PROTEIN UA: 100 MG/DL
RBC UA: >100 /HPF (ref 0–4)
SODIUM BLD-SCNC: 136 MMOL/L (ref 136–145)
SPECIFIC GRAVITY UA: 1.02 (ref 1–1.03)
URINE REFLEX TO CULTURE: YES
URINE TYPE: ABNORMAL
UROBILINOGEN, URINE: 0.2 E.U./DL
WBC UA: >100 /HPF (ref 0–5)

## 2022-11-11 PROCEDURE — 51798 US URINE CAPACITY MEASURE: CPT

## 2022-11-11 PROCEDURE — 36415 COLL VENOUS BLD VENIPUNCTURE: CPT

## 2022-11-11 PROCEDURE — 80048 BASIC METABOLIC PNL TOTAL CA: CPT

## 2022-11-11 PROCEDURE — 81001 URINALYSIS AUTO W/SCOPE: CPT

## 2022-11-11 PROCEDURE — 99283 EMERGENCY DEPT VISIT LOW MDM: CPT

## 2022-11-11 PROCEDURE — 6370000000 HC RX 637 (ALT 250 FOR IP): Performed by: STUDENT IN AN ORGANIZED HEALTH CARE EDUCATION/TRAINING PROGRAM

## 2022-11-11 PROCEDURE — 87086 URINE CULTURE/COLONY COUNT: CPT

## 2022-11-11 RX ORDER — POLYETHYLENE GLYCOL 3350 17 G/17G
17 POWDER, FOR SOLUTION ORAL DAILY
Qty: 510 G | Refills: 0 | Status: SHIPPED | OUTPATIENT
Start: 2022-11-11 | End: 2022-12-11

## 2022-11-11 RX ORDER — SENNA AND DOCUSATE SODIUM 50; 8.6 MG/1; MG/1
1 TABLET, FILM COATED ORAL DAILY
Qty: 30 TABLET | Refills: 0 | Status: SHIPPED | OUTPATIENT
Start: 2022-11-11 | End: 2022-12-11

## 2022-11-11 RX ORDER — CEPHALEXIN 500 MG/1
500 CAPSULE ORAL 4 TIMES DAILY
Qty: 28 CAPSULE | Refills: 0 | Status: SHIPPED | OUTPATIENT
Start: 2022-11-11 | End: 2022-11-18

## 2022-11-11 RX ORDER — BISACODYL 10 MG
10 SUPPOSITORY, RECTAL RECTAL ONCE
Status: COMPLETED | OUTPATIENT
Start: 2022-11-11 | End: 2022-11-11

## 2022-11-11 RX ORDER — CETIRIZINE HYDROCHLORIDE 10 MG/1
10 TABLET ORAL DAILY
Qty: 30 TABLET | Refills: 0 | Status: SHIPPED | OUTPATIENT
Start: 2022-11-11 | End: 2022-12-11

## 2022-11-11 RX ADMIN — BISACODYL 10 MG: 10 SUPPOSITORY RECTAL at 12:18

## 2022-11-11 NOTE — ED PROVIDER NOTES
[id number] RESIDENT NOTE       Date of evaluation: 11/11/2022    Chief Complaint     Urinary Retention      History of Present Illness     Bentley Lezama is a 81 y.o. male with a PMHx of atrial fibrillation on Eliquis, HLD, HFpEF, AICD, CAD s/p CABG, DM, and BPH who presents with urinary retention and constipation. Over the last few months, he has started to struggle with constipation that has largely been managed by OTC laxatives. It has been worse over the last few days, with him only being able to produce small amounts of stool. He has now also had difficulty urinating since yesterday evening as well. He has BPH and nocturia at baseline and has required a catheter previously, though not recently. He states that his back was hurting yesterday evening, which he thought was from the constipation. When this back pain was present it was gradual in onset and offset and bilateral in nature. Here today, he had a small BM while in the waiting room and subsequently was able to produce more urine. His back pain has resolved. His main concern at this time is his ongoing constipation. He otherwise denies any fevers, chills, abdominal pain, nausea/vomiting, hematuria, dysuria, penile/testicular pain, or bloody stools. Review of Systems     Review of Systems   Constitutional:  Negative for chills and fever. HENT:  Negative for congestion and sore throat. Eyes: Negative. Negative for visual disturbance. Respiratory:  Negative for shortness of breath. Cardiovascular:  Negative for chest pain and palpitations. Gastrointestinal:  Positive for constipation. Negative for abdominal pain, blood in stool, nausea and vomiting. Endocrine: Negative. Genitourinary:  Positive for difficulty urinating. Negative for dysuria, flank pain and hematuria. Musculoskeletal:  Positive for back pain. Negative for arthralgias and myalgias. Skin:  Negative for rash. Allergic/Immunologic: Negative. Neurological:  Negative for weakness, numbness and headaches. Hematological: Negative. Psychiatric/Behavioral: Negative. All other systems reviewed and are negative. Past Medical, Surgical, Family, and Social History     He has a past medical history of A-fib ([facility] [address] CAD, multiple vessel, Diabetes mellitus ([facility] [address] Enlarged prostate, Environmental allergies, Kidney stones, and Systolic CHF ([facility] [address] He has a past surgical history that includes Cardiac catheterization (12/10/2019); Nasal polyp surgery (x2); Nasal septum surgery; and Coronary artery bypass graft (N/A, 3/2/2020). His family history includes Alzheimer's Disease in his mother; Dementia in his mother; Diabetes in his paternal aunt; Heart Attack in his father. He reports that he has never smoked. He has never used smokeless tobacco. He reports that he does not currently use alcohol. He reports that he does not use drugs.     Medications     Discharge Medication List as of 11/11/2022  3:30 PM        CONTINUE these medications which have NOT CHANGED    Details   rosuvastatin (CRESTOR) 20 MG tablet Take 1 tablet by mouth daily, Disp-90 tablet, R-3Normal      potassium chloride (KLOR-CON M) 10 MEQ extended release tablet Take 1 tablet by mouth 2 times daily, Disp-60 tablet, R-0Normal      amiodarone (CORDARONE) 200 MG tablet Take 1 tablet by mouth daily, Disp-30 tablet, R-0Normal      EPINEPHrine (PRIMATENE MIST) 0.125 MG/ACT AERO Inhale 2 puffs into the lungs onceHistorical Med      furosemide (LASIX) 80 MG tablet Take 1 tablet by mouth 2 times daily, Disp-180 tablet, R-3Normal      metoprolol tartrate (LOPRESSOR) 25 MG tablet TAKE 1 TABLET BY MOUTH TWICE DAILYHistorical Med      aspirin 81 MG chewable tablet Take 1 tablet by mouth daily, Disp-30 tablet, R-3Normal      apixaban (ELIQUIS) 5 MG TABS tablet Take 1 tablet by mouth 2 times daily, Disp-60 tablet, R-5Print      Handicap Placard [facility] Starting Tue 1/28/2020, Disp-1 each, R-0, PrintPt cannot walk more that 250 feet without becoming SOB. Dx: CAD, Cardiomyopathy, Atrial Fibrilation   Not to exceed 5 years             Allergies     He is allergic to codeine and aspartame and phenylalanine. Physical Exam     INITIAL VITALS: BP: (!) 172/99, Temp: 98.1 °F (36.7 °C), Heart Rate: (!) 111, Resp: 14, SpO2: 98 %   Physical Exam  Constitutional:       General: He is not in acute distress. Appearance: Normal appearance. He is not ill-appearing or toxic-appearing. HENT:      Head: Normocephalic and atraumatic. Right Ear: External ear normal.      Left Ear: External ear normal.      Mouth/Throat:      Mouth: Mucous membranes are moist.      Pharynx: Oropharynx is clear. Eyes:      General: No scleral icterus. Conjunctiva/sclera: Conjunctivae normal.   Cardiovascular:      Rate and Rhythm: Normal rate and regular rhythm. Pulses: Normal pulses. Heart sounds: Normal heart sounds. Pulmonary:      Effort: Pulmonary effort is normal. No respiratory distress. Breath sounds: Normal breath sounds. No wheezing or rhonchi. Abdominal:      General: There is no distension. Palpations: Abdomen is soft. Tenderness: There is no abdominal tenderness. There is no guarding or rebound. Comments: Abdomen is protuberant but not distended, no tenderness to palpation, soft throughout. Rectal examination performed with RN chaperone. Normal rectal tone. Small amount of soft brown stool present within the rectal vault, no palpable stool ball   Musculoskeletal:         General: No deformity or signs of injury. Cervical back: Normal range of motion and neck supple. Skin:     General: Skin is warm and dry. Capillary Refill: Capillary refill takes less than 2 seconds. Coloration: Skin is not jaundiced. Findings: No rash. Neurological:      General: No focal deficit present.       Mental Status: He is alert and oriented to person, place, and time. Mental status is at baseline. DiagnosticResults     EKG   EKG not obtained    RADIOLOGY:  No orders to display       LABS:   Results for orders placed or performed during the hospital encounter of 11/11/22   Urinalysis with Reflex to Culture    Specimen: Urine   Result Value Ref Range    Color, UA Yellow Straw/Yellow    Clarity, UA TURBID (A) Clear    Glucose, Ur Negative Negative mg/dL    Bilirubin Urine Negative Negative    Ketones, Urine Negative Negative mg/dL    Specific Gravity, UA 1.025 1.005 - 1.030    Blood, Urine MODERATE (A) Negative    pH, UA 6.0 5.0 - 8.0    Protein,  (A) Negative mg/dL    Urobilinogen, Urine 0.2 <2.0 E.U./dL    Nitrite, Urine Negative Negative    Leukocyte Esterase, Urine LARGE (A) Negative    Microscopic Examination YES     Urine Type NotGiven     Urine Reflex to Culture Yes    Basic Metabolic Panel w/ Reflex to MG   Result Value Ref Range    Sodium 136 136 - 145 mmol/L    Potassium reflex Magnesium 4.4 3.5 - 5.1 mmol/L    Chloride 103 99 - 110 mmol/L    CO2 22 21 - 32 mmol/L    Anion Gap 11 3 - 16    Glucose 179 (H) 70 - 99 mg/dL    BUN 25 (H) 7 - 20 mg/dL    Creatinine 1.2 0.8 - 1.3 mg/dL    Est, Glom Filt Rate >60 >60    Calcium 9.4 8.3 - 10.6 mg/dL   Microscopic Urinalysis   Result Value Ref Range    WBC, UA >100 (A) 0 - 5 /HPF    RBC, UA >100 (A) 0 - 4 /HPF    Epithelial Cells, UA 2-5 0 - 5 /HPF    Bacteria, UA 2+ (A) None Seen /HPF       ED BEDSIDE ULTRASOUND:  No results found. RECENT VITALS:  BP: (!) 144/80, Temp: 98.1 °F (36.7 °C), Heart Rate: 91,Resp: 18, SpO2: 95 %     Procedures     None    ED Course     Nursing Notes, Past Medical Hx, Past Surgical Hx, Social Hx, Allergies, and Family Hx were reviewed.          The patient was given the followingmedications:  Orders Placed This Encounter   Medications    bisacodyl (DULCOLAX) suppository 10 mg    polyethylene glycol (GLYCOLAX) 17 GM/SCOOP powder     Sig: Take 17 g by mouth daily     Dispense: 510 g     Refill:  0    sennosides-docusate sodium (SENOKOT-S) 8.6-50 MG tablet     Sig: Take 1 tablet by mouth daily     Dispense:  30 tablet     Refill:  0    cephALEXin (KEFLEX) 500 MG capsule     Sig: Take 1 capsule by mouth 4 times daily for 7 days     Dispense:  28 capsule     Refill:  0    cetirizine (ZYRTEC) 10 MG tablet     Sig: Take 1 tablet by mouth daily For seasonal allergies     Dispense:  30 tablet     Refill:  0       CONSULTS:  None    MEDICAL DECISION MAKING / ASSESSMENT / Heatherjasvir Diaz is a 81 y.o. male with a PMHx that is significant for CAD s/p CABG, HFpEF, ICD, and BPH, as well as other comorbidities described above. The patient was evaluated by myself and the ED Attending Physician, Dr. Bal Zapien. All management and disposition plans were discussed and agreed upon. Appropriate labs and diagnostic studies were reviewed as they were made available. Pertinent laboratory studies in medical decision making are listed below. He is presenting with constipation x2-3 days as well as urinary retention since last night that has improved since he had a small BM while in the waiting room. Upon presentation, the patient was alert and oriented, afebrile, and hemodynamically stable. He is overall well-appearing and currently comfortable. Abdominal examination is reassuring with a soft, nontender abdomen. No CVA tenderness. Rectal exam without palpable stool ball. His symptoms are overall improving but he is still interested in trying something more for his constipation. We gave him a dulcolax suppository here with some improvement. He was able to urinate here in the ED and PVR was only 55 cc, no significant retention. BMP does not reveal evidence of a post-obstructive NANETTE. His urine was sent for UA and this revealed >100 WBCs and RBCs.  Although he is not having any dysuria that would be classic for a UTI, he is at risk for worsening symptoms and ascending infection given his retention and urinary stasis. Therefore, we will elect to treat this with a one week course of Keflex. Urine was sent for culture, prior cultures have shown pan-sensitive e. Coli. He was otherwise deemed appropriate for discharge. I discharged him with a bowel regimen of Miralax + senna. We discussed that he should have a colonoscopy at some point, as this constipation is a relatively new problem for him, to r/o underlying mass. He expresses understanding. At the time of discharge he also requested something for his seasonal allergies and a prescription for zyrtec was sent. Standard return precautions were discussed. He was advised to follow-up with his PCP within the next week for a recheck of his symptoms if not improving. Risks, benefits, and alternatives were discussed. At this time the patient has been deemed safe for discharge. My customary discharge instructions including strict return precautions for worsening or new symptoms have been communicated. Clinical Impression     1. Acute cystitis with hematuria    2. Urinary retention    3.  Constipation, unspecified constipation type        Disposition     PATIENT REFERRED TO:  Monique Stanley MD  [address]  [phone]    Schedule an appointment as soon as possible for a visit in 1 week      DISCHARGE MEDICATIONS:  Discharge Medication List as of 11/11/2022  3:30 PM        START taking these medications    Details   polyethylene glycol (GLYCOLAX) 17 GM/SCOOP powder Take 17 g by mouth daily, Disp-510 g, R-0Normal      sennosides-docusate sodium (SENOKOT-S) 8.6-50 MG tablet Take 1 tablet by mouth daily, Disp-30 tablet, R-0Normal      cephALEXin (KEFLEX) 500 MG capsule Take 1 capsule by mouth 4 times daily for 7 days, Disp-28 capsule, R-0Normal      cetirizine (ZYRTEC) 10 MG tablet Take 1 tablet by mouth daily For seasonal allergies, Disp-30 tablet, R-0Normal             DISPOSITION Decision To Discharge 11/11/2022 03:22:16 PM       Nalini Bar MD  Resident  11/12/22 9733

## 2022-11-11 NOTE — ED NOTES
Patient prepared for and ready to be discharged. Dressed in clothes and given belongings. IV removed, pt tolerated well, no complications. Patient discharged at this time in no acute distress after he   verbalized understanding of discharge instructions. Reviewed medications, and when to return to the ED with patient. Encouraged follow up with PCP  Patient walked to lobby, Family to take patient home.       Emeterio Ghotra RN  11/11/22 1169

## 2022-11-11 NOTE — ED PROVIDER NOTES
ED Attending Attestation Note     Date of evaluation: 11/11/2022    This patient was seen by the resident. I have seen and examined the patient, agree with the workup, evaluation, management and diagnosis. The care plan has been discussed. My assessment reveals 57-year-old male with a history of atrial fibrillation, renal dysfunction, type 2 diabetes is presenting to the ED for evaluation of constipation for the past 2 to 3 days. He states that he has been eating a lot of [place] food and he has noticed that when he tends to do this he gets more constipated. He has been taking over-the-counter stool softeners with some intermittent improvement in his symptoms. He states that he had been having some difficulty urinating as well. When he was in the waiting room he went to the bathroom and put a little bit but he did not see the stool because of the shape of the toilet. Patient examination has a soft abdomen but somewhat distended. He is not having any pain. We gave him a suppository here and given his history of renal dysfunction and decreased urinary output over the past 2 days renal panel was obtained here.   Urinalysis will be assessed as well to assess for stones or infection     Marilyn Serrano MD  11/11/22 6624

## 2022-11-11 NOTE — ED TRIAGE NOTES
Patient c/o difficulty urinating I day and constipation for 6 hours, Pain 6/10 Last BM 2 days ago and urination 12 last night. Susu Capps

## 2022-11-11 NOTE — ED NOTES
55 ml urine retained per bladder scan. Pt tolerated bladder scan well.      Raven Santos, RN  11/11/22 8423

## 2022-11-11 NOTE — DISCHARGE INSTRUCTIONS
You were seen in the Emergency Department on 11/11 with constipation and urinary retention. We gave you a suppository while you were here and you were able to have a small bowel movement and your symptoms improved. Your blood work showed normal kidney function, but your urine shows a urinary tract infection. I am giving you an antibiotic called Keflex that you should take four times a day for the next week. I am also giving you medications for your constipation. You should use the MiraLAX powder, 1 capful in a liquid of your choosing per day. You can adjust the amount of powder up or down each day with the goal of having 1 soft bowel movement per day. I am also giving you a laxative medication Senna that you should take daily. We also discussed her seasonal allergies, I am giving a medicine called Zyrtec that you can try to take daily to see if this helps. Please follow-up with your primary care provider within the next week for recheck of your symptoms and to make sure that your constipation and urinary symptoms are improving. Like we discussed, I would recommend that you have a colonoscopy in the near future especially because you are having new constipation.     Return to the emergency department if you develop any the following:  - Fevers  - Inability to urinate  - Worsening constipation despite these medications  - Severe abdominal pain  - Persistent vomiting  - Or you develop any other new concerns

## 2022-11-12 LAB — URINE CULTURE, ROUTINE: NORMAL

## 2022-11-12 ASSESSMENT — ENCOUNTER SYMPTOMS
NAUSEA: 0
ABDOMINAL PAIN: 0
BLOOD IN STOOL: 0
ALLERGIC/IMMUNOLOGIC NEGATIVE: 1
VOMITING: 0
SHORTNESS OF BREATH: 0
BACK PAIN: 1
CONSTIPATION: 1
EYES NEGATIVE: 1
SORE THROAT: 0

## 2022-12-09 ENCOUNTER — NURSE ONLY (OUTPATIENT)
Dept: CARDIOLOGY CLINIC | Age: 72
End: 2022-12-09

## 2022-12-09 DIAGNOSIS — Z95.810 AICD (AUTOMATIC CARDIOVERTER/DEFIBRILLATOR) PRESENT: Primary | ICD-10-CM

## 2022-12-09 DIAGNOSIS — I48.0 PAROXYSMAL A-FIB (HCC): ICD-10-CM

## 2022-12-09 DIAGNOSIS — I50.23 ACUTE ON CHRONIC SYSTOLIC CONGESTIVE HEART FAILURE (HCC): ICD-10-CM

## 2022-12-09 DIAGNOSIS — I42.0 DILATED CARDIOMYOPATHY (HCC): ICD-10-CM

## 2022-12-14 NOTE — PROGRESS NOTES
Remote transmission received from patient's dual chamber ICD monitor at home. Transmission shows normal sensing and pacing function. Known AT/AF noted, 16.9% burden, current event ongoing since 12.08.22 @ 16:35 (amiodarone, Lopressor, Eliquis). Ap 1.2%   0.5% (MVP On)  PVCs 0.1/hr    Possible Optivol fluid accumulation 10.02.22-ongoing, currently elevated up to 100 w decreasing trend noted; TI trend is back near reference line. TriageHF Heart Failure Risk Status on 09-Dec-2022 is High*. Pt has upcoming appt 12.20.22/DDW. End of 31-day monitoring period 12/9/22. NP will review. See interrogation under cardiology tab in the [address] [place] [address] [address] field for more details. Will continue to monitor remotely.

## 2022-12-15 ENCOUNTER — HOSPITAL ENCOUNTER (INPATIENT)
Age: 72
LOS: 4 days | Discharge: HOME | DRG: 113 | End: 2022-12-19
Attending: EMERGENCY MEDICINE | Admitting: INTERNAL MEDICINE
Payer: MEDICAID

## 2022-12-15 ENCOUNTER — APPOINTMENT (OUTPATIENT)
Dept: GENERAL RADIOLOGY | Age: 72
DRG: 113 | End: 2022-12-15
Payer: MEDICAID

## 2022-12-15 DIAGNOSIS — J11.1 INFLUENZA WITH RESPIRATORY MANIFESTATION OTHER THAN PNEUMONIA: Primary | ICD-10-CM

## 2022-12-15 DIAGNOSIS — N39.0 URINARY TRACT INFECTION WITHOUT HEMATURIA, SITE UNSPECIFIED: ICD-10-CM

## 2022-12-15 DIAGNOSIS — R09.02 HYPOXIA: ICD-10-CM

## 2022-12-15 DIAGNOSIS — I50.43 ACUTE ON CHRONIC COMBINED SYSTOLIC (CONGESTIVE) AND DIASTOLIC (CONGESTIVE) HEART FAILURE (HCC): ICD-10-CM

## 2022-12-15 PROBLEM — J10.1 INFLUENZA A: Status: ACTIVE | Noted: 2022-12-15

## 2022-12-15 PROBLEM — I10 MALIGNANT HYPERTENSION: Status: ACTIVE | Noted: 2022-12-15

## 2022-12-15 PROBLEM — J96.00 ACUTE RESPIRATORY FAILURE (HCC): Status: ACTIVE | Noted: 2022-12-15

## 2022-12-15 LAB
ANION GAP SERPL CALCULATED.3IONS-SCNC: 10 MMOL/L (ref 3–16)
BACTERIA: ABNORMAL /HPF
BASOPHILS ABSOLUTE: 0 K/UL (ref 0–0.2)
BASOPHILS RELATIVE PERCENT: 0.5 %
BILIRUBIN URINE: NEGATIVE
BLOOD, URINE: ABNORMAL
BUN BLDV-MCNC: 24 MG/DL (ref 7–20)
C-REACTIVE PROTEIN: 19.4 MG/L (ref 0–5.1)
CALCIUM SERPL-MCNC: 9.6 MG/DL (ref 8.3–10.6)
CHLORIDE BLD-SCNC: 101 MMOL/L (ref 99–110)
CLARITY: CLEAR
CO2: 25 MMOL/L (ref 21–32)
COLOR: YELLOW
CREAT SERPL-MCNC: 1.3 MG/DL (ref 0.8–1.3)
EKG ATRIAL RATE: 107 BPM
EKG DIAGNOSIS: NORMAL
EKG P AXIS: 94 DEGREES
EKG P-R INTERVAL: 216 MS
EKG Q-T INTERVAL: 332 MS
EKG QRS DURATION: 86 MS
EKG QTC CALCULATION (BAZETT): 443 MS
EKG R AXIS: 98 DEGREES
EKG T AXIS: 84 DEGREES
EKG VENTRICULAR RATE: 107 BPM
EOSINOPHILS ABSOLUTE: 0.1 K/UL (ref 0–0.6)
EOSINOPHILS RELATIVE PERCENT: 1.1 %
EPITHELIAL CELLS, UA: ABNORMAL /HPF (ref 0–5)
GFR SERPL CREATININE-BSD FRML MDRD: 59 ML/MIN/{1.73_M2}
GLUCOSE BLD-MCNC: 160 MG/DL (ref 70–99)
GLUCOSE BLD-MCNC: 195 MG/DL (ref 70–99)
GLUCOSE BLD-MCNC: 212 MG/DL (ref 70–99)
GLUCOSE URINE: NEGATIVE MG/DL
HCT VFR BLD CALC: 41.7 % (ref 40.5–52.5)
HEMOGLOBIN: 13.9 G/DL (ref 13.5–17.5)
KETONES, URINE: NEGATIVE MG/DL
LACTIC ACID, SEPSIS: 1.4 MMOL/L (ref 0.4–1.9)
LACTIC ACID, SEPSIS: 1.9 MMOL/L (ref 0.4–1.9)
LEUKOCYTE ESTERASE, URINE: ABNORMAL
LYMPHOCYTES ABSOLUTE: 0.2 K/UL (ref 1–5.1)
LYMPHOCYTES RELATIVE PERCENT: 2.9 %
MCH RBC QN AUTO: 30 PG (ref 26–34)
MCHC RBC AUTO-ENTMCNC: 33.3 G/DL (ref 31–36)
MCV RBC AUTO: 90.2 FL (ref 80–100)
MICROSCOPIC EXAMINATION: YES
MONOCYTES ABSOLUTE: 0.5 K/UL (ref 0–1.3)
MONOCYTES RELATIVE PERCENT: 9.1 %
MUCUS: ABNORMAL /LPF
NEUTROPHILS ABSOLUTE: 4.7 K/UL (ref 1.7–7.7)
NEUTROPHILS RELATIVE PERCENT: 86.4 %
NITRITE, URINE: NEGATIVE
PDW BLD-RTO: 14.9 % (ref 12.4–15.4)
PERFORMED ON: ABNORMAL
PERFORMED ON: ABNORMAL
PH UA: 6 (ref 5–8)
PLATELET # BLD: 157 K/UL (ref 135–450)
PMV BLD AUTO: 9 FL (ref 5–10.5)
POTASSIUM REFLEX MAGNESIUM: 4.3 MMOL/L (ref 3.5–5.1)
PRO-BNP: 4713 PG/ML (ref 0–124)
PROTEIN UA: 100 MG/DL
RAPID INFLUENZA  B AGN: NEGATIVE
RAPID INFLUENZA A AGN: POSITIVE
RBC # BLD: 4.62 M/UL (ref 4.2–5.9)
RBC UA: ABNORMAL /HPF (ref 0–4)
SARS-COV-2, NAAT: NOT DETECTED
SEDIMENTATION RATE, ERYTHROCYTE: 18 MM/HR (ref 0–20)
SODIUM BLD-SCNC: 136 MMOL/L (ref 136–145)
SPECIFIC GRAVITY UA: 1.02 (ref 1–1.03)
TROPONIN: <0.01 NG/ML
URINE REFLEX TO CULTURE: YES
URINE TYPE: ABNORMAL
UROBILINOGEN, URINE: 0.2 E.U./DL
WBC # BLD: 5.4 K/UL (ref 4–11)
WBC UA: ABNORMAL /HPF (ref 0–5)

## 2022-12-15 PROCEDURE — 80048 BASIC METABOLIC PNL TOTAL CA: CPT

## 2022-12-15 PROCEDURE — 36415 COLL VENOUS BLD VENIPUNCTURE: CPT

## 2022-12-15 PROCEDURE — 99285 EMERGENCY DEPT VISIT HI MDM: CPT

## 2022-12-15 PROCEDURE — 96375 TX/PRO/DX INJ NEW DRUG ADDON: CPT

## 2022-12-15 PROCEDURE — 85652 RBC SED RATE AUTOMATED: CPT

## 2022-12-15 PROCEDURE — 94664 DEMO&/EVAL PT USE INHALER: CPT

## 2022-12-15 PROCEDURE — 81001 URINALYSIS AUTO W/SCOPE: CPT

## 2022-12-15 PROCEDURE — 85025 COMPLETE CBC W/AUTO DIFF WBC: CPT

## 2022-12-15 PROCEDURE — 93005 ELECTROCARDIOGRAM TRACING: CPT | Performed by: EMERGENCY MEDICINE

## 2022-12-15 PROCEDURE — 2580000003 HC RX 258: Performed by: INTERNAL MEDICINE

## 2022-12-15 PROCEDURE — 73630 X-RAY EXAM OF FOOT: CPT

## 2022-12-15 PROCEDURE — 86140 C-REACTIVE PROTEIN: CPT

## 2022-12-15 PROCEDURE — 87635 SARS-COV-2 COVID-19 AMP PRB: CPT

## 2022-12-15 PROCEDURE — 96374 THER/PROPH/DIAG INJ IV PUSH: CPT

## 2022-12-15 PROCEDURE — 71045 X-RAY EXAM CHEST 1 VIEW: CPT

## 2022-12-15 PROCEDURE — 83605 ASSAY OF LACTIC ACID: CPT

## 2022-12-15 PROCEDURE — 87804 INFLUENZA ASSAY W/OPTIC: CPT

## 2022-12-15 PROCEDURE — 1200000000 HC SEMI PRIVATE

## 2022-12-15 PROCEDURE — 6360000002 HC RX W HCPCS: Performed by: EMERGENCY MEDICINE

## 2022-12-15 PROCEDURE — 94761 N-INVAS EAR/PLS OXIMETRY MLT: CPT

## 2022-12-15 PROCEDURE — 6370000000 HC RX 637 (ALT 250 FOR IP): Performed by: EMERGENCY MEDICINE

## 2022-12-15 PROCEDURE — 83880 ASSAY OF NATRIURETIC PEPTIDE: CPT

## 2022-12-15 PROCEDURE — 87040 BLOOD CULTURE FOR BACTERIA: CPT

## 2022-12-15 PROCEDURE — 6370000000 HC RX 637 (ALT 250 FOR IP): Performed by: INTERNAL MEDICINE

## 2022-12-15 PROCEDURE — 2700000000 HC OXYGEN THERAPY PER DAY

## 2022-12-15 PROCEDURE — 84484 ASSAY OF TROPONIN QUANT: CPT

## 2022-12-15 PROCEDURE — 2580000003 HC RX 258: Performed by: EMERGENCY MEDICINE

## 2022-12-15 PROCEDURE — 94640 AIRWAY INHALATION TREATMENT: CPT

## 2022-12-15 RX ORDER — POTASSIUM CHLORIDE 7.45 MG/ML
10 INJECTION INTRAVENOUS PRN
Status: DISCONTINUED | OUTPATIENT
Start: 2022-12-15 | End: 2022-12-19 | Stop reason: HOSPADM

## 2022-12-15 RX ORDER — SODIUM CHLORIDE 9 MG/ML
INJECTION, SOLUTION INTRAVENOUS PRN
Status: DISCONTINUED | OUTPATIENT
Start: 2022-12-15 | End: 2022-12-19 | Stop reason: HOSPADM

## 2022-12-15 RX ORDER — LABETALOL HYDROCHLORIDE 5 MG/ML
10 INJECTION, SOLUTION INTRAVENOUS EVERY 6 HOURS PRN
Status: DISCONTINUED | OUTPATIENT
Start: 2022-12-15 | End: 2022-12-19 | Stop reason: HOSPADM

## 2022-12-15 RX ORDER — FUROSEMIDE 10 MG/ML
40 INJECTION INTRAMUSCULAR; INTRAVENOUS ONCE
Status: COMPLETED | OUTPATIENT
Start: 2022-12-15 | End: 2022-12-15

## 2022-12-15 RX ORDER — HYDRALAZINE HYDROCHLORIDE 20 MG/ML
10 INJECTION INTRAMUSCULAR; INTRAVENOUS EVERY 6 HOURS PRN
Status: DISCONTINUED | OUTPATIENT
Start: 2022-12-15 | End: 2022-12-19 | Stop reason: HOSPADM

## 2022-12-15 RX ORDER — SODIUM CHLORIDE 0.9 % (FLUSH) 0.9 %
5-40 SYRINGE (ML) INJECTION EVERY 12 HOURS SCHEDULED
Status: DISCONTINUED | OUTPATIENT
Start: 2022-12-15 | End: 2022-12-19 | Stop reason: HOSPADM

## 2022-12-15 RX ORDER — INSULIN LISPRO 100 [IU]/ML
0-8 INJECTION, SOLUTION INTRAVENOUS; SUBCUTANEOUS
Status: DISCONTINUED | OUTPATIENT
Start: 2022-12-15 | End: 2022-12-19 | Stop reason: HOSPADM

## 2022-12-15 RX ORDER — POLYETHYLENE GLYCOL 3350 17 G/17G
17 POWDER, FOR SOLUTION ORAL DAILY PRN
Status: DISCONTINUED | OUTPATIENT
Start: 2022-12-15 | End: 2022-12-19 | Stop reason: HOSPADM

## 2022-12-15 RX ORDER — ASPIRIN 81 MG/1
81 TABLET, CHEWABLE ORAL DAILY
Status: DISCONTINUED | OUTPATIENT
Start: 2022-12-16 | End: 2022-12-19 | Stop reason: HOSPADM

## 2022-12-15 RX ORDER — ACETAMINOPHEN 325 MG/1
650 TABLET ORAL EVERY 6 HOURS PRN
Status: DISCONTINUED | OUTPATIENT
Start: 2022-12-15 | End: 2022-12-19 | Stop reason: HOSPADM

## 2022-12-15 RX ORDER — SODIUM CHLORIDE 0.9 % (FLUSH) 0.9 %
5-40 SYRINGE (ML) INJECTION PRN
Status: DISCONTINUED | OUTPATIENT
Start: 2022-12-15 | End: 2022-12-19 | Stop reason: HOSPADM

## 2022-12-15 RX ORDER — IPRATROPIUM BROMIDE AND ALBUTEROL SULFATE 2.5; .5 MG/3ML; MG/3ML
1 SOLUTION RESPIRATORY (INHALATION) ONCE
Status: COMPLETED | OUTPATIENT
Start: 2022-12-15 | End: 2022-12-15

## 2022-12-15 RX ORDER — POTASSIUM CHLORIDE 20 MEQ/1
40 TABLET, EXTENDED RELEASE ORAL PRN
Status: DISCONTINUED | OUTPATIENT
Start: 2022-12-15 | End: 2022-12-19 | Stop reason: HOSPADM

## 2022-12-15 RX ORDER — POTASSIUM CHLORIDE 750 MG/1
10 TABLET, EXTENDED RELEASE ORAL 2 TIMES DAILY
Status: DISCONTINUED | OUTPATIENT
Start: 2022-12-15 | End: 2022-12-15

## 2022-12-15 RX ORDER — OSELTAMIVIR PHOSPHATE 75 MG/1
75 CAPSULE ORAL 2 TIMES DAILY
Status: DISCONTINUED | OUTPATIENT
Start: 2022-12-15 | End: 2022-12-19 | Stop reason: HOSPADM

## 2022-12-15 RX ORDER — ONDANSETRON 2 MG/ML
4 INJECTION INTRAMUSCULAR; INTRAVENOUS EVERY 6 HOURS PRN
Status: DISCONTINUED | OUTPATIENT
Start: 2022-12-15 | End: 2022-12-19 | Stop reason: HOSPADM

## 2022-12-15 RX ORDER — NYSTATIN 100000 U/G
CREAM TOPICAL 2 TIMES DAILY
Status: DISCONTINUED | OUTPATIENT
Start: 2022-12-15 | End: 2022-12-19 | Stop reason: HOSPADM

## 2022-12-15 RX ORDER — ROSUVASTATIN CALCIUM 20 MG/1
20 TABLET, COATED ORAL DAILY
Status: DISCONTINUED | OUTPATIENT
Start: 2022-12-15 | End: 2022-12-19 | Stop reason: HOSPADM

## 2022-12-15 RX ORDER — ACETAMINOPHEN 650 MG/1
650 SUPPOSITORY RECTAL EVERY 6 HOURS PRN
Status: DISCONTINUED | OUTPATIENT
Start: 2022-12-15 | End: 2022-12-19 | Stop reason: HOSPADM

## 2022-12-15 RX ORDER — MAGNESIUM SULFATE IN WATER 40 MG/ML
2000 INJECTION, SOLUTION INTRAVENOUS PRN
Status: DISCONTINUED | OUTPATIENT
Start: 2022-12-15 | End: 2022-12-19 | Stop reason: HOSPADM

## 2022-12-15 RX ORDER — NYSTATIN 100000 U/G
OINTMENT TOPICAL 2 TIMES DAILY
Status: DISCONTINUED | OUTPATIENT
Start: 2022-12-15 | End: 2022-12-15

## 2022-12-15 RX ORDER — DEXTROSE MONOHYDRATE 100 MG/ML
INJECTION, SOLUTION INTRAVENOUS CONTINUOUS PRN
Status: DISCONTINUED | OUTPATIENT
Start: 2022-12-15 | End: 2022-12-19 | Stop reason: HOSPADM

## 2022-12-15 RX ORDER — ONDANSETRON 4 MG/1
4 TABLET, ORALLY DISINTEGRATING ORAL EVERY 8 HOURS PRN
Status: DISCONTINUED | OUTPATIENT
Start: 2022-12-15 | End: 2022-12-19 | Stop reason: HOSPADM

## 2022-12-15 RX ORDER — AMIODARONE HYDROCHLORIDE 200 MG/1
200 TABLET ORAL DAILY
Status: DISCONTINUED | OUTPATIENT
Start: 2022-12-16 | End: 2022-12-19 | Stop reason: HOSPADM

## 2022-12-15 RX ORDER — FUROSEMIDE 10 MG/ML
40 INJECTION INTRAMUSCULAR; INTRAVENOUS DAILY
Status: DISCONTINUED | OUTPATIENT
Start: 2022-12-16 | End: 2022-12-16

## 2022-12-15 RX ORDER — INSULIN LISPRO 100 [IU]/ML
0-4 INJECTION, SOLUTION INTRAVENOUS; SUBCUTANEOUS NIGHTLY
Status: DISCONTINUED | OUTPATIENT
Start: 2022-12-15 | End: 2022-12-19 | Stop reason: HOSPADM

## 2022-12-15 RX ADMIN — METOPROLOL TARTRATE 25 MG: 25 TABLET, FILM COATED ORAL at 20:47

## 2022-12-15 RX ADMIN — CEFTRIAXONE 1000 MG: 1 INJECTION, POWDER, FOR SOLUTION INTRAMUSCULAR; INTRAVENOUS at 14:53

## 2022-12-15 RX ADMIN — IPRATROPIUM BROMIDE AND ALBUTEROL SULFATE 1 AMPULE: 2.5; .5 SOLUTION RESPIRATORY (INHALATION) at 12:25

## 2022-12-15 RX ADMIN — SODIUM CHLORIDE, PRESERVATIVE FREE 10 ML: 5 INJECTION INTRAVENOUS at 20:48

## 2022-12-15 RX ADMIN — FUROSEMIDE 40 MG: 10 INJECTION, SOLUTION INTRAMUSCULAR; INTRAVENOUS at 14:47

## 2022-12-15 RX ADMIN — ROSUVASTATIN CALCIUM 20 MG: 20 TABLET, FILM COATED ORAL at 20:48

## 2022-12-15 RX ADMIN — NYSTATIN: 100000 CREAM TOPICAL at 21:20

## 2022-12-15 RX ADMIN — OSELTAMIVIR PHOSPHATE 75 MG: 75 CAPSULE ORAL at 14:46

## 2022-12-15 RX ADMIN — OSELTAMIVIR PHOSPHATE 75 MG: 75 CAPSULE ORAL at 20:47

## 2022-12-15 RX ADMIN — APIXABAN 5 MG: 5 TABLET, FILM COATED ORAL at 20:48

## 2022-12-15 ASSESSMENT — ENCOUNTER SYMPTOMS
BLOOD IN STOOL: 0
SHORTNESS OF BREATH: 1
DIARRHEA: 0
ALLERGIC/IMMUNOLOGIC NEGATIVE: 1
SINUS PRESSURE: 1

## 2022-12-15 ASSESSMENT — PAIN - FUNCTIONAL ASSESSMENT: PAIN_FUNCTIONAL_ASSESSMENT: NONE - DENIES PAIN

## 2022-12-15 NOTE — PROGRESS NOTES
4 Eyes Admission Assessment     I agree as the admission nurse that 2 RN's have performed a thorough Head to Toe Skin Assessment on the patient. ALL assessment sites listed below have been assessed on admission. Areas assessed by both nurses:   [x]   Head, Face, and Ears   [x]   Shoulders, Back, and Chest  [x]   Arms, Elbows, and Hands   [x]   Coccyx, Sacrum, and Ischium: yeasty redness to abd folds and groin on L side  [x]   Legs, Feet, and Heels: nat redness, multiple open areas on balls of feet with black centers, moisure related discoloration to soles of feet and toes. Large black headed wart or ulcer on R large toe. Does the Patient have Skin Breakdown?   Yes a wound was noted on the Admission Assessment and an LDA was Initiated documentation include the Mayra-wound, Wound Assessment, Measurements, Dressing Treatment, Drainage, and Color\",         Damaso Prevention initiated:  Yes   Wound Care Orders initiated:  Yes      [id number] nurse consulted for Pressure Injury (Stage 3,4, Unstageable, DTI, NWPT, and Complex wounds) or Damaso score 18 or lower:  will request podiatry for foot concerns    Nurse 1 eSignature: Electronically signed by Rebecca Mathew RN on 12/15/22 at 6:37 PM EST    **SHARE this note so that the co-signing nurse is able to place an eSignature**    Nurse 2 eSignature: Electronically signed by Gagan Gonzalez RN on 12/15/22 at 6:48 PM EST

## 2022-12-15 NOTE — ED NOTES
Pt assisted from bed and ambulated to restroom without difficulty.      Delvin Smith RN  12/15/22 4547

## 2022-12-15 NOTE — H&P
Hospital Medicine History & Physical      PCP: Denise Tellez MD    Date of Admission: 12/15/2022    Date of Service: Pt seen/examined on 12/15/22   and Admitted to Inpatient with expected LOS greater than two midnights due to medical therapy. Chief Complaint:  worsening sob      History Of Present Illness:    81 y.o. male who presented to [facility] with the past medical history of coronary artery disease, pacemaker placement, CHF, atrial fibrillation, and diabetes who presented to the emergency department after 3 days of worsening shortness of breath and productive cough. On today, patient's shortness of breath was very prohibitive and limiting. He presented to the emergency department hypoxic with an oxygen saturation of 86% on room air. He was evaluated and noted to be positive for influenza A and his chest x-ray also revealed some pulmonary congestion. The patient was given Tamiflu and a one-time dose of Lasix. Of note, this patient also showed evidence of acute cystitis with a positive urinalysis and was given IV antibiotics with reflexive cultures. Patient has required 2 L of oxygen to bring his oxygen saturation up to 93%. He was also noted to have a BNP of 4713. This is up slightly from his baseline of approximately 3000. He did have an echocardiogram in August of this year showing an ejection fraction of 50%, improved from prior. He is on Eliquis at home and endorses compliance. Medicine was asked to admit him. Was to patient's bedside, he was noted to be on room air speaking in complete sentences but did appear to start panting. Oxygen was placed back on him but he did not become significantly hypoxic. He did overall feel better. However, he did not seem to fully understand our conversations. Therefore, he was a poor historian.     Past Medical History:          Diagnosis Date    A-fib [facility]) 2020    CAD, multiple vessel 12/2019    Diabetes mellitus ([facility] [address]     diet controlled Enlarged prostate     Environmental allergies     Kidney stones     Systolic CHF [facility])        Past Surgical History:          Procedure Laterality Date    CARDIAC CATHETERIZATION  12/10/2019    Dr. Rashel Carrillo - severe multi-vessel disease    CORONARY ARTERY BYPASS GRAFT N/A 3/2/2020    WAYNE; TCPB; CABG x 3, LIMA to LAD w/ long segment endarterectomy (4 cm); bilateral pulmonary vein isolation; 40 mm Atriclip to L atril appendage; endoscopic L GSV harvest; ICNB x 5 levels bilat; sternal plate x 1 to manubrium performed by Jarod Leung MD at [address] [facility]  x2    NASAL SEPTUM SURGERY         Medications Prior to Admission:      Prior to Admission medications    Medication Sig Start Date End Date Taking?  Authorizing Provider   rosuvastatin (CRESTOR) 20 MG tablet Take 1 tablet by mouth daily 8/31/22   EVELYN Cavazos CNP   potassium chloride (KLOR-CON M) 10 MEQ extended release tablet Take 1 tablet by mouth 2 times daily 8/29/22   EVELYN Cavazos CNP   amiodarone (CORDARONE) 200 MG tablet Take 1 tablet by mouth daily 8/27/22 12/15/22  Eitan Cortes MD   EPINEPHrine (PRIMATENE MIST) 0.125 MG/ACT AERO Inhale 2 puffs into the lungs once    Historical Provider, MD   furosemide (LASIX) 80 MG tablet Take 1 tablet by mouth 2 times daily  Patient not taking: Reported on 12/15/2022 11/3/21   EVELYN Cavazos CNP   metoprolol tartrate (LOPRESSOR) 25 MG tablet TAKE 1 TABLET BY MOUTH TWICE DAILY 8/13/21   Historical Provider, MD   aspirin 81 MG chewable tablet Take 1 tablet by mouth daily 3/24/20   Mainor Black MD   apixaban Reita Keys) 5 MG TABS tablet Take 1 tablet by mouth 2 times daily 3/13/20   Regulo Callejas MD   famotidine (PEPCID) 20 MG tablet Take 1 tablet by mouth daily  Patient not taking: Reported on 8/22/2022 3/13/20 8/27/22  Regulo Callejas MD   insulin lispro, 1 Unit Dial, 100 UNIT/ML SOPN Inject 3 Units into the skin 3 times daily (with meals)  Patient not taking: No sig reported 3/13/20 8/27/22  Stephan Huitron MD   Handicap Placard MISC by Does not apply route Pt cannot walk more that 250 feet without becoming SOB. Dx: CAD, Cardiomyopathy, Atrial Fibrilation    Not to exceed 5 years  Patient taking differently: by Does not apply route Pt cannot walk more that 250 feet without becoming SOB. Dx: CAD, Cardiomyopathy, Atrial Fibrilation    Not to exceed 5 years 1/28/20   Bjorn Mcintosh MD       Allergies:  Codeine and Aspartame and phenylalanine    Social History:      The patient currently lives at home    TOBACCO:   reports that he has never smoked. He has never used smokeless tobacco.  ETOH:   reports that he does not currently use alcohol. E-cigarette/Vaping       Questions Responses    E-cigarette/Vaping Use     Start Date     Passive Exposure     Quit Date     Counseling Given     Comments               Family History:      Reviewed and negative in regards to presenting illness/complaint. Problem Relation Age of Onset    Dementia Mother         Vascular    Alzheimer's Disease Mother     Heart Attack Father     Diabetes Paternal Aunt        REVIEW OF SYSTEMS COMPLETED:   Pertinent positives as noted in the HPI. All other systems reviewed and negative. PHYSICAL EXAM PERFORMED:    BP (!) 151/100   Pulse (!) 109   Temp 98.8 °F (37.1 °C) (Oral)   Ht 6' 1\" (1.854 m)   Wt 277 lb (125.6 kg)   SpO2 93%   BMI 36.55 kg/m²     General appearance:  No apparent distress, appears stated age and cooperative. HEENT:  Normal cephalic, atraumatic without obvious deformity. Pupils equal, round, and reactive to light. Extra ocular muscles intact. Conjunctivae/corneas clear. Neck: Supple, with full range of motion. No jugular venous distention. Trachea midline. Respiratory:  Normal respiratory effort. Clear to auscultation, bilaterally without Rales/Wheezes/Rhonchi. Cardiovascular:  Regular rate and rhythm with normal S1/S2 without murmurs, rubs or gallops.   Abdomen: Soft, non-tender, non-distended with normal bowel sounds. Musculoskeletal:  No clubbing, cyanosis or edema bilaterally. Full range of motion without deformity. Skin: Skin color, texture, turgor normal.  No rashes or lesions. Neurologic:  Neurovascularly intact without any focal sensory/motor deficits. Cranial nerves: II-XII intact, grossly non-focal.  Psychiatric:  Alert and oriented, thought content appropriate, normal insight  Capillary Refill: Brisk,3 seconds, normal  Peripheral Pulses: +2 palpable, equal bilaterally       Labs:     Recent Labs     12/15/22  1219   WBC 5.4   HGB 13.9   HCT 41.7        Recent Labs     12/15/22  1219      K 4.3      CO2 25   BUN 24*   CREATININE 1.3   CALCIUM 9.6     No results for input(s): AST, ALT, BILIDIR, BILITOT, ALKPHOS in the last 72 hours. No results for input(s): INR in the last 72 hours. Recent Labs     12/15/22  1219   TROPONINI <0.01       Urinalysis:      Lab Results   Component Value Date/Time    NITRU Negative 12/15/2022 01:44 PM    WBCUA  12/15/2022 01:44 PM    BACTERIA 2+ 12/15/2022 01:44 PM    RBCUA 5-10 12/15/2022 01:44 PM    BLOODU LARGE 12/15/2022 01:44 PM    SPECGRAV 1.025 12/15/2022 01:44 PM    GLUCOSEU Negative 12/15/2022 01:44 PM       Radiology:     CXR: I have reviewed the CXR with the following interpretation: Pulmonary congestion  EKG:  I have reviewed the EKG with the following interpretation: Normal sinus rhythm    XR CHEST PORTABLE   Final Result      Marked prominence of the central bronchovascular markings most compatible with moderate pulmonary edema. Atypical pneumonia is less likely. Cardiomegaly status post CABG with left chest ICD.           Consults:    IP CONSULT TO HOSPITALIST    ASSESSMENT:    Active Hospital Problems    Diagnosis Date Noted    Acute respiratory failure ([address] [J96.00] 12/15/2022     Priority: High    Influenza A [J10.1] 12/15/2022     Priority: High    Acute cystitis [N30.00] 03/02/2020 Priority: High    Paroxysmal A-fib [facility]) [I48.0] 08/27/2022     Priority: Medium    Acute on chronic combined systolic (congestive) and diastolic (congestive) heart failure ([facility] [address] [I50.43] 08/22/2022     Priority: Medium    Uncontrolled type 2 diabetes mellitus with hyperglycemia ([facility] [E11.65] 08/22/2022     Priority: Medium    CAD, multiple vessel [I25.10]      Priority: Medium    Malignant hypertension [I10] 12/15/2022     Priority: Low         PLAN:  Acute hypoxic respiratory failure  -Multifactorial including influenza and probable CHF exacerbation  -Oxygen support with Tamiflu therapy; increase activity  -Diurese and provide mild fluid restriction    Influenza a infection  -Tamiflu as mentioned above  -Droplet isolation precautions    Acute on chronic combined congestive heart failure  -No need to repeat echocardiogram.  -We will continue IV diuresis Daily and convert back to oral dosing within the next 24 to 48 hours  -Continue cardioprotective medications  -Repeat BMP on Sunday. Lab already ordered. Acute cystitis with a concern for urinary tract infection  -Continue IV antibiotics in the form of Rocephin. Follow-up on urinary cultures  -We will treat for complex UTI in the setting of male physiology  -Monitor for any urine retention and treat accordingly    Malignant hypertension  -Continue home blood pressure medications and cover with as needed labetalol and hydralazine with parameters as written    Insulin-dependent type 2 diabetes mellitus  -Correctional coverage for now  -Last hemoglobin A1c was August 2022 at a level of 7.2. Well-controlled. Continue to monitor. Chronic atrial fibrillation  -Continue amiodarone and Eliquis  -No need for telemetry during controlled symptoms at this point. Monitor for the need to initiate such.       DVT Prophylaxis: on eliquis  Diet: No diet orders on file  Code Status: Prior    PT/OT Eval Status: n/a    Dispo - likely home in 3-4 days       Erick Elise MD    Thank you Karsten Baird MD for the opportunity to be involved in this patient's care. If you have any questions or concerns please feel free to contact me at [phone].

## 2022-12-15 NOTE — ED PROVIDER NOTES
Date of evaluation: 12/15/2022    Chief Complaint   Cough and Shortness of Breath (Cough, congestion, sob x3 days)      Nursing Notes, Past Medical Hx, Past Surgical Hx, Social Hx, Allergies, and Family Hx were reviewed. History of Present Illness     Ethel Hu is a 81 y.o. male who presents presents with cough shortness of breath. This started 3 days ago. He had myalgias as well. He is unvaccinated against influenza. He states he has had increasing shortness of breath at rest.  Denies smoking history. Did have fevers and chills. No chest pain does have chronic swelling in his legs from diabetic neuropathy and heart failure. Denies any nausea vomiting    Review of Systems     Review of Systems   Constitutional:  Positive for activity change, chills and fatigue. HENT:  Positive for sinus pressure. Respiratory:  Positive for shortness of breath. Cardiovascular:  Positive for leg swelling. Gastrointestinal:  Negative for blood in stool and diarrhea. Genitourinary: Negative. Allergic/Immunologic: Negative. Neurological: Negative. Psychiatric/Behavioral: Negative. All other systems reviewed and are negative. Past Medical, Surgical, Family, and Social History     He has a past medical history of A-fib ([facility] [address] CAD, multiple vessel, Diabetes mellitus ([address] Enlarged prostate, Environmental allergies, Kidney stones, and Systolic CHF ([address] He has a past surgical history that includes Cardiac catheterization (12/10/2019); Nasal polyp surgery (x2); Nasal septum surgery; and Coronary artery bypass graft (N/A, 3/2/2020). His family history includes Alzheimer's Disease in his mother; Dementia in his mother; Diabetes in his paternal aunt; Heart Attack in his father. He reports that he has never smoked. He has never used smokeless tobacco. He reports that he does not currently use alcohol. He reports that he does not use drugs.     Medications     Previous Medications    AMIODARONE (CORDARONE) 200 MG TABLET    Take 1 tablet by mouth daily    APIXABAN (ELIQUIS) 5 MG TABS TABLET    Take 1 tablet by mouth 2 times daily    ASPIRIN 81 MG CHEWABLE TABLET    Take 1 tablet by mouth daily    EPINEPHRINE (PRIMATENE MIST) 0.125 MG/ACT AERO    Inhale 2 puffs into the lungs once    FUROSEMIDE (LASIX) 80 MG TABLET    Take 1 tablet by mouth 2 times daily    HANDICAP PLACARD MISC    by Does not apply route Pt cannot walk more that 250 feet without becoming SOB. Dx: CAD, Cardiomyopathy, Atrial Fibrilation    Not to exceed 5 years    METOPROLOL TARTRATE (LOPRESSOR) 25 MG TABLET    TAKE 1 TABLET BY MOUTH TWICE DAILY    POTASSIUM CHLORIDE (KLOR-CON M) 10 MEQ EXTENDED RELEASE TABLET    Take 1 tablet by mouth 2 times daily    ROSUVASTATIN (CRESTOR) 20 MG TABLET    Take 1 tablet by mouth daily       Allergies     He is allergic to codeine and aspartame and phenylalanine. Physical Exam     INITIAL VITALS: BP (!) 151/100   Pulse (!) 109   Temp 98.8 °F (37.1 °C) (Oral)   Ht 6' 1\" (1.854 m)   Wt 277 lb (125.6 kg)   SpO2 93%   BMI 36.55 kg/m²    Physical Exam  Vitals and nursing note reviewed. Constitutional:       Appearance: Normal appearance. He is obese. HENT:      Head: Normocephalic. Right Ear: External ear normal.      Left Ear: External ear normal.      Nose: Congestion present. Mouth/Throat:      Mouth: Mucous membranes are moist.      Comments: Lateral erythema to the posterior pharynx no exudate no PTA  Cardiovascular:      Rate and Rhythm: Normal rate. Rhythm irregular. Pulmonary:      Comments: Diminished breath sounds posterior thorax  Abdominal:      General: Bowel sounds are normal. There is no distension. Palpations: There is no mass. Tenderness: There is no abdominal tenderness. Musculoskeletal:         General: Swelling present. No tenderness. Right lower leg: Edema present. Left lower leg: Edema present. Skin:     General: Skin is warm.       Capillary Refill: Capillary refill takes less than 2 seconds. Neurological:      General: No focal deficit present. Mental Status: He is alert. Psychiatric:         Mood and Affect: Mood normal.         Behavior: Behavior normal.       Diagnostic Results     EKG   Sinus tachycardia rate of 107  OR interval 216 nonspecific ST-T wave abnormalities    RADIOLOGY:  XR CHEST PORTABLE   Final Result      Marked prominence of the central bronchovascular markings most compatible with moderate pulmonary edema. Atypical pneumonia is less likely. Cardiomegaly status post CABG with left chest ICD.         See EMR    LABS:   Labs Reviewed   RAPID INFLUENZA A/B ANTIGENS - Abnormal; Notable for the following components:       Result Value    Rapid Influenza A Ag POSITIVE (*)     All other components within normal limits   BASIC METABOLIC PANEL W/ REFLEX TO MG FOR LOW K - Abnormal; Notable for the following components:    Glucose 195 (*)     BUN 24 (*)     Est, Glom Filt Rate 59 (*)     All other components within normal limits   CBC WITH AUTO DIFFERENTIAL - Abnormal; Notable for the following components:    Lymphocytes Absolute 0.2 (*)     All other components within normal limits   URINALYSIS WITH REFLEX TO CULTURE - Abnormal; Notable for the following components:    Blood, Urine LARGE (*)     Protein,  (*)     Leukocyte Esterase, Urine LARGE (*)     All other components within normal limits   BRAIN NATRIURETIC PEPTIDE - Abnormal; Notable for the following components:    Pro-BNP 4,713 (*)     All other components within normal limits   MICROSCOPIC URINALYSIS - Abnormal; Notable for the following components:    Mucus, UA 1+ (*)     WBC, UA  (*)     RBC, UA 5-10 (*)     Bacteria, UA 2+ (*)     All other components within normal limits   COVID-19, RAPID   CULTURE, URINE   TROPONIN       BEDSIDE ULTRASOUND:      VITALS:  BP: (!) 151/100, Temp: 98.8 °F (37.1 °C), Heart Rate: (!) 109,       Procedures         ED Course     The patient was given the followingmedications:  Orders Placed This Encounter   Medications    ipratropium-albuterol (DUONEB) nebulizer solution 1 ampule     Order Specific Question:   Initiate RT Bronchodilator Protocol     Answer:   Yes - Inpatient Protocol    furosemide (LASIX) injection 40 mg    oseltamivir (TAMIFLU) capsule 75 mg    cefTRIAXone (ROCEPHIN) 1,000 mg in dextrose 5 % 50 mL IVPB mini-bag     Order Specific Question:   Antimicrobial Indications     Answer:   Urinary Tract Infection            CONSULTS:  IP CONSULT TO HOSPITALIST    MEDICAL DECISION MAKING     Danielle Palmer is a 81 y.o. male Ashleigh Ache with shortness of breath cough. Influenza A is positive. Symptoms started 3 days ago. He is hypoxic to 86% on room air up to 95 on 2 L. Chest x-ray shows opacities consistent with atypical pneumonia versus pulmonary edema. He does have a history of congestive heart failure EF of 55 to 60%. Troponins negative. He was given Tamiflu ;remains hypoxic. Could be a component of CHF as well which she was given 40 Lasix IV. His BNP is up over 4000 which in the past was in the 3000 range. Could be a component of cardiomyopathy or worsening failure from his influenza. In any event, he is hypoxic or require admission for influenza A hypoxia and CHF. Note, the patient did have UTI as well. Urine culture was sent he was given Rocephin 1 g IV        Clinical Impression     1. Influenza with respiratory manifestation other than pneumonia    2. Hypoxia    3. Urinary tract infection without hematuria, site unspecified        /Plan     PATIENT REFERRED TO:  No follow-up provider specified.     DISCHARGE MEDICATIONS:  New Prescriptions    No medications on file       DISPOSITION        Kennedy Rowell MD  12/15/22 [address], [place]  12/15/22 [id number]

## 2022-12-16 LAB
A/G RATIO: 1.1 (ref 1.1–2.2)
ALBUMIN SERPL-MCNC: 3.6 G/DL (ref 3.4–5)
ALP BLD-CCNC: 73 U/L (ref 40–129)
ALT SERPL-CCNC: 10 U/L (ref 10–40)
ANION GAP SERPL CALCULATED.3IONS-SCNC: 9 MMOL/L (ref 3–16)
AST SERPL-CCNC: 21 U/L (ref 15–37)
BASOPHILS ABSOLUTE: 0 K/UL (ref 0–0.2)
BASOPHILS RELATIVE PERCENT: 0.5 %
BILIRUB SERPL-MCNC: 0.6 MG/DL (ref 0–1)
BUN BLDV-MCNC: 29 MG/DL (ref 7–20)
CALCIUM SERPL-MCNC: 8.7 MG/DL (ref 8.3–10.6)
CHLORIDE BLD-SCNC: 100 MMOL/L (ref 99–110)
CO2: 26 MMOL/L (ref 21–32)
CREAT SERPL-MCNC: 1.6 MG/DL (ref 0.8–1.3)
EOSINOPHILS ABSOLUTE: 0 K/UL (ref 0–0.6)
EOSINOPHILS RELATIVE PERCENT: 0.4 %
GFR SERPL CREATININE-BSD FRML MDRD: 46 ML/MIN/{1.73_M2}
GLUCOSE BLD-MCNC: 122 MG/DL (ref 70–99)
GLUCOSE BLD-MCNC: 125 MG/DL (ref 70–99)
GLUCOSE BLD-MCNC: 257 MG/DL (ref 70–99)
GLUCOSE BLD-MCNC: 313 MG/DL (ref 70–99)
GLUCOSE BLD-MCNC: 357 MG/DL (ref 70–99)
GLUCOSE BLD-MCNC: 385 MG/DL (ref 70–99)
HCT VFR BLD CALC: 37.8 % (ref 40.5–52.5)
HEMOGLOBIN: 12.4 G/DL (ref 13.5–17.5)
LYMPHOCYTES ABSOLUTE: 0.4 K/UL (ref 1–5.1)
LYMPHOCYTES RELATIVE PERCENT: 10.4 %
MCH RBC QN AUTO: 29.4 PG (ref 26–34)
MCHC RBC AUTO-ENTMCNC: 32.9 G/DL (ref 31–36)
MCV RBC AUTO: 89.3 FL (ref 80–100)
MONOCYTES ABSOLUTE: 0.7 K/UL (ref 0–1.3)
MONOCYTES RELATIVE PERCENT: 19.9 %
NEUTROPHILS ABSOLUTE: 2.5 K/UL (ref 1.7–7.7)
NEUTROPHILS RELATIVE PERCENT: 68.8 %
PDW BLD-RTO: 14.5 % (ref 12.4–15.4)
PERFORMED ON: ABNORMAL
PLATELET # BLD: 134 K/UL (ref 135–450)
PMV BLD AUTO: 9 FL (ref 5–10.5)
POTASSIUM REFLEX MAGNESIUM: 4.5 MMOL/L (ref 3.5–5.1)
RBC # BLD: 4.23 M/UL (ref 4.2–5.9)
RBC # BLD: NORMAL 10*6/UL
SLIDE REVIEW: ABNORMAL
SODIUM BLD-SCNC: 135 MMOL/L (ref 136–145)
TOTAL PROTEIN: 6.9 G/DL (ref 6.4–8.2)
WBC # BLD: 3.6 K/UL (ref 4–11)

## 2022-12-16 PROCEDURE — 85025 COMPLETE CBC W/AUTO DIFF WBC: CPT

## 2022-12-16 PROCEDURE — 80053 COMPREHEN METABOLIC PANEL: CPT

## 2022-12-16 PROCEDURE — 94640 AIRWAY INHALATION TREATMENT: CPT

## 2022-12-16 PROCEDURE — 6360000002 HC RX W HCPCS: Performed by: INTERNAL MEDICINE

## 2022-12-16 PROCEDURE — 2580000003 HC RX 258: Performed by: INTERNAL MEDICINE

## 2022-12-16 PROCEDURE — 6370000000 HC RX 637 (ALT 250 FOR IP): Performed by: INTERNAL MEDICINE

## 2022-12-16 PROCEDURE — 2700000000 HC OXYGEN THERAPY PER DAY

## 2022-12-16 PROCEDURE — 94761 N-INVAS EAR/PLS OXIMETRY MLT: CPT

## 2022-12-16 PROCEDURE — 36415 COLL VENOUS BLD VENIPUNCTURE: CPT

## 2022-12-16 PROCEDURE — 1200000000 HC SEMI PRIVATE

## 2022-12-16 RX ORDER — ALBUTEROL SULFATE 2.5 MG/3ML
2.5 SOLUTION RESPIRATORY (INHALATION) EVERY 4 HOURS PRN
Status: DISCONTINUED | OUTPATIENT
Start: 2022-12-16 | End: 2022-12-19 | Stop reason: HOSPADM

## 2022-12-16 RX ORDER — INSULIN LISPRO 100 [IU]/ML
3 INJECTION, SOLUTION INTRAVENOUS; SUBCUTANEOUS
Status: DISCONTINUED | OUTPATIENT
Start: 2022-12-16 | End: 2022-12-19 | Stop reason: HOSPADM

## 2022-12-16 RX ORDER — CEFDINIR 300 MG/1
300 CAPSULE ORAL EVERY 12 HOURS SCHEDULED
Status: DISCONTINUED | OUTPATIENT
Start: 2022-12-17 | End: 2022-12-19 | Stop reason: HOSPADM

## 2022-12-16 RX ORDER — GUAIFENESIN 600 MG/1
600 TABLET, EXTENDED RELEASE ORAL 2 TIMES DAILY
Status: DISCONTINUED | OUTPATIENT
Start: 2022-12-16 | End: 2022-12-19 | Stop reason: HOSPADM

## 2022-12-16 RX ORDER — METHYLPREDNISOLONE SODIUM SUCCINATE 125 MG/2ML
125 INJECTION, POWDER, LYOPHILIZED, FOR SOLUTION INTRAMUSCULAR; INTRAVENOUS ONCE
Status: COMPLETED | OUTPATIENT
Start: 2022-12-16 | End: 2022-12-16

## 2022-12-16 RX ORDER — FUROSEMIDE 10 MG/ML
40 INJECTION INTRAMUSCULAR; INTRAVENOUS ONCE
Status: COMPLETED | OUTPATIENT
Start: 2022-12-16 | End: 2022-12-16

## 2022-12-16 RX ADMIN — INSULIN LISPRO 8 UNITS: 100 INJECTION, SOLUTION INTRAVENOUS; SUBCUTANEOUS at 12:18

## 2022-12-16 RX ADMIN — SODIUM CHLORIDE: 9 INJECTION, SOLUTION INTRAVENOUS at 14:50

## 2022-12-16 RX ADMIN — APIXABAN 5 MG: 5 TABLET, FILM COATED ORAL at 08:57

## 2022-12-16 RX ADMIN — OSELTAMIVIR PHOSPHATE 75 MG: 75 CAPSULE ORAL at 21:30

## 2022-12-16 RX ADMIN — METHYLPREDNISOLONE SODIUM SUCCINATE 125 MG: 125 INJECTION, POWDER, FOR SOLUTION INTRAMUSCULAR; INTRAVENOUS at 06:04

## 2022-12-16 RX ADMIN — GUAIFENESIN 600 MG: 600 TABLET ORAL at 11:22

## 2022-12-16 RX ADMIN — METOPROLOL TARTRATE 25 MG: 25 TABLET, FILM COATED ORAL at 21:30

## 2022-12-16 RX ADMIN — FUROSEMIDE 40 MG: 10 INJECTION, SOLUTION INTRAMUSCULAR; INTRAVENOUS at 06:04

## 2022-12-16 RX ADMIN — INSULIN LISPRO 3 UNITS: 100 INJECTION, SOLUTION INTRAVENOUS; SUBCUTANEOUS at 17:38

## 2022-12-16 RX ADMIN — NYSTATIN: 100000 CREAM TOPICAL at 21:30

## 2022-12-16 RX ADMIN — ROSUVASTATIN CALCIUM 20 MG: 20 TABLET, FILM COATED ORAL at 08:57

## 2022-12-16 RX ADMIN — NYSTATIN: 100000 CREAM TOPICAL at 09:08

## 2022-12-16 RX ADMIN — INSULIN LISPRO 4 UNITS: 100 INJECTION, SOLUTION INTRAVENOUS; SUBCUTANEOUS at 17:38

## 2022-12-16 RX ADMIN — SODIUM CHLORIDE, PRESERVATIVE FREE 10 ML: 5 INJECTION INTRAVENOUS at 21:31

## 2022-12-16 RX ADMIN — FUROSEMIDE 40 MG: 10 INJECTION, SOLUTION INTRAMUSCULAR; INTRAVENOUS at 08:57

## 2022-12-16 RX ADMIN — ASPIRIN 81 MG: 81 TABLET, CHEWABLE ORAL at 08:57

## 2022-12-16 RX ADMIN — METOPROLOL TARTRATE 25 MG: 25 TABLET, FILM COATED ORAL at 08:57

## 2022-12-16 RX ADMIN — APIXABAN 5 MG: 5 TABLET, FILM COATED ORAL at 21:30

## 2022-12-16 RX ADMIN — OSELTAMIVIR PHOSPHATE 75 MG: 75 CAPSULE ORAL at 08:57

## 2022-12-16 RX ADMIN — AMIODARONE HYDROCHLORIDE 200 MG: 200 TABLET ORAL at 08:57

## 2022-12-16 RX ADMIN — GUAIFENESIN 600 MG: 600 TABLET ORAL at 21:30

## 2022-12-16 RX ADMIN — ALBUTEROL SULFATE 2.5 MG: 2.5 SOLUTION RESPIRATORY (INHALATION) at 05:40

## 2022-12-16 RX ADMIN — CEFTRIAXONE 1000 MG: 1 INJECTION, POWDER, FOR SOLUTION INTRAMUSCULAR; INTRAVENOUS at 14:50

## 2022-12-16 RX ADMIN — INSULIN LISPRO 4 UNITS: 100 INJECTION, SOLUTION INTRAVENOUS; SUBCUTANEOUS at 21:37

## 2022-12-16 RX ADMIN — SODIUM CHLORIDE, PRESERVATIVE FREE 10 ML: 5 INJECTION INTRAVENOUS at 08:58

## 2022-12-16 RX ADMIN — INSULIN GLARGINE 5 UNITS: 100 INJECTION, SOLUTION SUBCUTANEOUS at 21:38

## 2022-12-16 NOTE — CONSULTS
Department of Podiatry Consult Note  Resident       Reason for Consult: Multiple open lesions on bilateral feet  Requesting Physician: Jonah Reyes MD    CHIEF COMPLAINT: Wounds on both feet    HISTORY OF PRESENT ILLNESS:                The patient is a 81 y.o. male with significant past medical history as listed below. Podiatry was consulted for multiple open lesions on bilateral feet. Patient known to the podiatry service and has seen Dr. Singh  in the past.  Patient has been lost to follow-up. Patient reports various wounds to both feet that come and go. Patient reports that he does not apply a dressing to his feet daily. Patient reports he prefers to walk barefoot. Patient denies any pain to bilateral lower extremity. Patient reports he has neuropathy that is difficult to feel his feet. Patient denies fever, chills, nausea, vomiting, shortness of breath, chest pain. Patient has no other pedal complaints at this time. Past Medical History:        Diagnosis Date    A-fib [facility]) 2020    CAD, multiple vessel 12/2019    Diabetes mellitus ([address]     diet controlled     Enlarged prostate     Environmental allergies     Kidney stones     Systolic CHF [facility])        Past Surgical History:        Procedure Laterality Date    CARDIAC CATHETERIZATION  12/10/2019    Dr. Paz Height - severe multi-vessel disease    CORONARY ARTERY BYPASS GRAFT N/A 3/2/2020    WAYNE; TCPB; CABG x 3, LIMA to LAD w/ long segment endarterectomy (4 cm); bilateral pulmonary vein isolation; 40 mm Atriclip to L atril appendage; endoscopic L GSV harvest; ICNB x 5 levels bilat; sternal plate x 1 to manubrium performed by Jacqueline Land MD at [address] [place] [address]  x2    NASAL SEPTUM SURGERY         Allergies:   Codeine and Aspartame and phenylalanine    Medications:   Home Meds  No current facility-administered medications on file prior to encounter.      Current Outpatient Medications on File Prior to Encounter   Medication Sig Dispense Refill    rosuvastatin (CRESTOR) 20 MG tablet Take 1 tablet by mouth daily 90 tablet 3    potassium chloride (KLOR-CON M) 10 MEQ extended release tablet Take 1 tablet by mouth 2 times daily (Patient not taking: Reported on 12/15/2022) 60 tablet 0    amiodarone (CORDARONE) 200 MG tablet Take 1 tablet by mouth daily 30 tablet 0    EPINEPHrine (PRIMATENE MIST) 0.125 MG/ACT AERO Inhale 2 puffs into the lungs once      furosemide (LASIX) 80 MG tablet Take 1 tablet by mouth 2 times daily (Patient not taking: Reported on 12/15/2022) 180 tablet 3    metoprolol tartrate (LOPRESSOR) 25 MG tablet TAKE 1 TABLET BY MOUTH TWICE DAILY      aspirin 81 MG chewable tablet Take 1 tablet by mouth daily 30 tablet 3    apixaban (ELIQUIS) 5 MG TABS tablet Take 1 tablet by mouth 2 times daily (Patient taking differently: Take 5 mg by mouth daily) 60 tablet 5    [DISCONTINUED] famotidine (PEPCID) 20 MG tablet Take 1 tablet by mouth daily (Patient not taking: Reported on 8/22/2022) 60 tablet 3    [DISCONTINUED] insulin lispro, 1 Unit Dial, 100 UNIT/ML SOPN Inject 3 Units into the skin 3 times daily (with meals) (Patient not taking: No sig reported) 1 pen 0    Handicap Placard MISC by Does not apply route Pt cannot walk more that 250 feet without becoming SOB. Dx: CAD, Cardiomyopathy, Atrial Fibrilation    Not to exceed 5 years (Patient taking differently: by Does not apply route Pt cannot walk more that 250 feet without becoming SOB.   Dx: CAD, Cardiomyopathy, Atrial Fibrilation    Not to exceed 5 years) 1 each 0       Current Meds  albuterol (PROVENTIL) nebulizer solution 2.5 mg, Q4H PRN  oseltamivir (TAMIFLU) capsule 75 mg, BID  amiodarone (CORDARONE) tablet 200 mg, Daily  apixaban (ELIQUIS) tablet 5 mg, BID  aspirin chewable tablet 81 mg, Daily  metoprolol tartrate (LOPRESSOR) tablet 25 mg, BID  rosuvastatin (CRESTOR) tablet 20 mg, Daily  furosemide (LASIX) injection 40 mg, Daily  cefTRIAXone (ROCEPHIN) 1,000 mg in dextrose 5 % 50 mL IVPB mini-bag, Q24H  insulin lispro (1 Unit Dial) (HUMALOG/ADMELOG) pen 0-8 Units, TID WC  insulin lispro (1 Unit Dial) (HUMALOG/ADMELOG) pen 0-4 Units, Nightly  sodium chloride flush 0.9 % injection 5-40 mL, 2 times per day  sodium chloride flush 0.9 % injection 5-40 mL, PRN  0.9 % sodium chloride infusion, PRN  ondansetron (ZOFRAN-ODT) disintegrating tablet 4 mg, Q8H PRN   Or  ondansetron (ZOFRAN) injection 4 mg, Q6H PRN  polyethylene glycol (GLYCOLAX) packet 17 g, Daily PRN  acetaminophen (TYLENOL) tablet 650 mg, Q6H PRN   Or  acetaminophen (TYLENOL) suppository 650 mg, Q6H PRN  potassium chloride (KLOR-CON M) extended release tablet 40 mEq, PRN   Or  potassium bicarb-citric acid (EFFER-K) effervescent tablet 40 mEq, PRN   Or  potassium chloride 10 mEq/100 mL IVPB (Peripheral Line), PRN  magnesium sulfate 2000 mg in 50 mL IVPB premix, PRN  labetalol (NORMODYNE;TRANDATE) injection 10 mg, Q6H PRN  hydrALAZINE (APRESOLINE) injection 10 mg, Q6H PRN  sodium chloride flush 0.9 % injection 5-40 mL, 2 times per day  sodium chloride flush 0.9 % injection 5-40 mL, PRN  0.9 % sodium chloride infusion, PRN  glucose chewable tablet 16 g, PRN  dextrose bolus 10% 125 mL, PRN   Or  dextrose bolus 10% 250 mL, PRN  glucagon (rDNA) injection 1 mg, PRN  dextrose 10 % infusion, Continuous PRN  nystatin (MYCOSTATIN) cream, BID      Family History:   Family History   Problem Relation Age of Onset    Dementia Mother         Vascular    Alzheimer's Disease Mother     Heart Attack Father     Diabetes Paternal Aunt        Social History:   TOBACCO:   reports that he has never smoked. He has never used smokeless tobacco.  ETOH:   reports that he does not currently use alcohol. DRUGS:   reports no history of drug use. REVIEW OF SYSTEMS:    As above.     PHYSICAL EXAM:      Vitals:    /69   Pulse 70   Temp 98.2 °F (36.8 °C) (Oral)   Resp 22   Ht 6' 1\" (1.854 m)   Wt 266 lb 12.1 oz (121 kg)   SpO2 97%   BMI 35.19 kg/m² LABS:   Recent Labs     12/15/22  1219 12/16/22  0456   WBC 5.4 3.6*   HGB 13.9 12.4*   HCT 41.7 37.8*    134*     Recent Labs     12/16/22  0456   *   K 4.5      CO2 26   BUN 29*   CREATININE 1.6*     Recent Labs     12/16/22  0456   PROT 6.9         VASCULAR: DP and PT pulses faintly palpable 1/4 bilateral.  Upon hand-held Doppler examination, DP and PT signals biphasic left foot and triphasic right foot. CFT is brisk to the digits of the foot b/l. Skin temperature is warm to cool from proximal to distal with no focal calor noted. Nonpitting edema noted bilateral. No pain with calf compression b/l. NEUROLOGIC: Gross and epicritic sensation is diminished b/l. Protective sensation is absent at all pedal sites b/l. DERMATOLOGIC: Diffuse dermatologic changes noted bilateral lower extremity. Plantar and interdigital debris noted to bilateral lower extremity. Scattered abrasions noted to bilateral lower extremity, no signs of acute infection noted. Right lower extremity:  Partial-thickness ulceration noted to the distal lateral aspect of the hallux, measures 0.3 cm x 0.3 cm x 0.1 cm. Wound base granular. No fluctuance, crepitus, erythema, drainage, or malodor noted. Wound does not probe to bone, tunnel, or track    Well adhered eschar noted to the dorsal aspect of the hallux at the base of the nail. No fluctuance, crepitus, erythema, drainage, or malodor noted. Wound does not probe to bone, tunnel, or track. Multiple nevi noted to plantar aspect of hallux. 0.2 cm in diameter. No asymmetry. Borders are regular. No elevation. Patient provided verbal consent for today's photos. Left lower extremity:  Partial-thickness ulceration noted to the plantar medial aspect of the hallux at the interphalangeal joint, measures 0.3 cm x 0.2 cm x 0.1 cm. Wound base granular. No erythema, drainage, malodor, fluctuance, or crepitus noted.   Wound does not probe to bone, tunnel, or track. MUSCULOSKELETAL: Muscle strength is 5/5 for all pedal groups tested. No tenderness with palpation of the foot or ankle b/l. Ankle joint ROM is decreased in dorsiflexion with the knee extended. No obvious biomechanical abnormalities. IMAGING:  X-ray bilateral foot 12/15/2022  Narrative   Exam: XR FOOT LEFT (MIN 3 VIEWS), XR FOOT RIGHT (MIN 3 VIEWS)       History: wounds       Comparison: August 27, 2021       Findings:   Right:   Bones: There is no acute fracture or dislocation. No osseous erosion or destruction is seen. Joint spaces: The joint spaces are well maintained. A calcaneal spur is present. Soft tissues: There is no soft tissue swelling. No radiopaque foreign body is seen. Left:   Bones: There is no acute fracture or dislocation. No osseous erosion or destruction is seen. Joint spaces: The joint spaces are well maintained. A small calcaneal spur is present. Soft tissues: There is no soft tissue swelling. No radiopaque foreign body is seen. Impression   Impression:   1. No acute fracture or dislocation. IMPRESSION/RECOMMENDATIONS:      -Diabetic foot ulcerations, bilateral foot, Samuels 1  -Diabetes mellitus type 2 with peripheral neuropathy  -Edema, bilateral lower extremity  -Venous stasis, bilateral lower extremity    -Patient examined and evaluated at the bedside   -Tachypneic, otherwise VSS. Leukopenia noted (WBC 3.6). -ESR 18, CRP 19.4  -X-rays reviewed, noted above  -Dressing applied to bilateral lower extremity consisting of Betadine and Band-Aid  -Recommend TRUMAN hose to be applied daily to bilateral lower extremity  -Antibiotics not indicated from podiatric standpoint  -Weightbearing as tolerated bilateral lower extremity  -Discussed with patient that he does not need to continue walking barefoot as he cannot feel his feet.   Educated patient that he should ambulate with shoe gear at all times.  -Nursing communication placed for daily dressing changes as above. -Podiatry will follow peripherally and see patient next Friday if still in house.  -Patient to follow-up at [facility] pod[facility]      DISPO: Diabetic foot ulcerations, bilateral foot, Samuels 1; stable. Labs and imaging reviewed. Nursing communication placed for daily dressing changes.   Podiatry will see patient peripherally on Fridays.    - The patient will be staffed with Dr. Isaias Jackson DPM.    Alona Juan DPM  12/16/22  8:16 AM

## 2022-12-16 NOTE — PROGRESS NOTES
Pts blood sugar 357, gave ordered 8 units of insulin and notified MD  Electronically signed by April Graves RN on 12/16/2022 at 12:50 PM

## 2022-12-16 NOTE — PROGRESS NOTES
Pt awoke from sleep around 0500 with increased shortness of breath. SpO2 > 90% on 2L. RN assisted pt to sit up in the bed. Slight relief noted, however dyspnea persisted. Nasal cannula increased to 4L for pt comfort at this time. On call hospitalist notified, placing order for prn albuterol. Respiratory notified. Pt reported slight relief, but still endorses shortness of breath. MD assessed at bedside and ordered x1 solumedrol and 1x IVP Lasix 40mg.     Electronically signed by Mehul Motta RN on 70/33/1488 at 5:38 AM

## 2022-12-16 NOTE — PROGRESS NOTES
Pt admitted to unit from ED. Vss. Placed on 3 L NC due to his SpO2 dropping into the mid 80s after ambulation. Dyspnea with exertion and talking noted. Oriented to room, call light, bed alarm. Assisted patient to change out of urine soaked clothes into gown. Placed on tele. Dr. Lewis Unger notified of skin issues present on bilateral feet and groin. Orders placed for nystatin and podiatry consult.

## 2022-12-16 NOTE — DISCHARGE INSTR - COC
Continuity of Care Form    Patient Name: Frank Recinos   :    MRN:  [id number]    Admit date:  12/15/2022  Discharge date:  ***    Code Status Order: Full Code   Advance Directives:     Admitting Physician:  Cheryl Albrecht MD  PCP: Bladimir Guzman MD    Discharging Nurse: [facility] Unit/Room#: 5044/7379-66  Discharging Unit Phone Number: ***    Emergency Contact:   Extended Emergency Contact Information  Primary Emergency Contact: [address] [place] [address] Phone: [phone]  Mobile Phone: [phone]  Relation: Niece/Nephew    Past Surgical History:  Past Surgical History:   Procedure Laterality Date    CARDIAC CATHETERIZATION  12/10/2019    Dr. Urmila Omer - severe multi-vessel disease    CORONARY ARTERY BYPASS GRAFT N/A 3/2/2020    WAYNE; TCPB; CABG x 3, LIMA to LAD w/ long segment endarterectomy (4 cm); bilateral pulmonary vein isolation; 40 mm Atriclip to L atril appendage; endoscopic L GSV harvest; ICNB x 5 levels bilat; sternal plate x 1 to manubrium performed by Ksenia Choudhary MD at [address]  x2    NASAL SEPTUM SURGERY         Immunization History:   Immunization History   Administered Date(s) Administered    Tdap (Boostrix, Adacel) 2021       Active Problems:  Patient Active Problem List   Diagnosis Code    Bladder obstruction N32.0    Dilated cardiomyopathy (HCC) I42.0    Paroxysmal atrial fibrillation (HCC) I48.0    S/P coronary angiogram Z98.890    Acute cystitis N30.00    Monilial cystitis B37.41    Urinary tract obstruction N13.9    CAD, multiple vessel I25.10    ICD (implantable cardioverter-defibrillator) in place-[employer] Z95.810    Ischemic cardiomyopathy I25.5    AICD (automatic cardioverter/defibrillator) present Z95.810    Mixed hyperlipidemia E78.2    Acute on chronic combined systolic (congestive) and diastolic (congestive) heart failure (HCC) I50.43    Gastroesophageal reflux disease without esophagitis K21.9    Uncontrolled type 2 diabetes mellitus with hyperglycemia (HCC) E11.65    Acute kidney injury (NANETTE) with acute tubular necrosis (ATN) (HCC) N17.0    Paroxysmal A-fib (HCC) I48.0    Acute respiratory failure (HCC) J96.00    Influenza A J10.1    Malignant hypertension I10       Isolation/Infection:   Isolation            Droplet          Patient Infection Status       Infection Onset Added Last Indicated Last Indicated By Review Planned Expiration Resolved Resolved By    Influenza 12/15/22 12/15/22 12/15/22 Rapid Flu Swab 12/22/22 12/25/22      Resolved    COVID-19 (Rule Out) 12/15/22 12/15/22 12/15/22 COVID-19, Rapid (Ordered)   12/15/22 Rule-Out Test Resulted    COVID-19 (Rule Out) 09/11/21 09/11/21 09/11/21 COVID-19 (Ordered)   09/12/21 Rule-Out Test Resulted            Nurse Assessment:  Last Vital Signs: /69   Pulse 70   Temp 98.2 °F (36.8 °C) (Oral)   Resp 22   Ht 6' 1\" (1.854 m)   Wt 266 lb 12.1 oz (121 kg)   SpO2 97%   BMI 35.19 kg/m²     Last documented pain score (0-10 scale):    Last Weight:   Wt Readings from Last 1 Encounters:   12/16/22 266 lb 12.1 oz (121 kg)     Mental Status:  {IP PT MENTAL STATUS:14127}    IV Access:  { SAMINA IV ACCESS:278856710}    Nursing Mobility/ADLs:  Walking   {[facility] DME IUUE:211121252}  Transfer  {[facility] DME EZRO:040446229}  Bathing  {[facility] DME TXUZ:884748610}  Dressing  {[facility] DME GWBH:515711501}  Toileting  {[facility] DME DRBQ:004956711}  Feeding  {[facility] DME KPQU:006582403}  Med Admin  {[facility] DME IUZM:508796508}  Med Delivery   { SAMINA MED Delivery:527133282}    Wound Care Documentation and Therapy:  Incision 03/02/20 Sternum Medial;Anterior (Active)   Number of days: 1018        Elimination:  Continence:    Bowel: {YES / OM:73468}  Bladder: {YES / CU:58132}  Urinary Catheter: {Urinary Catheter:918083185}   Colostomy/Ileostomy/Ileal Conduit: {YES / IY:20061}       Date of Last BM: ***    Intake/Output Summary (Last 24 hours) at 12/16/2022 0818  Last data filed at 12/16/2022 0445  Gross per 24 hour   Intake 60 ml   Output 225 ml   Net -165 ml     I/O last 3 completed shifts: In: 61 [I.V.:10; IV Piggyback:50]  Out: 225 [Urine:225]    Safety Concerns:     [address] [employer] Safety Concerns:052408818}    Impairments/Disabilities:      [address] [employer] Impairments/Disabilities:199854549}    Nutrition Therapy:  Current Nutrition Therapy:   [address] SAMINA Diet List:512023549}    Routes of Feeding: {CHP DME Other Feedings:821898646}  Liquids: {Slp liquid thickness:03889}  Daily Fluid Restriction: {CHP DME Yes amt example:676049964}  Last Modified Barium Swallow with Video (Video Swallowing Test): {Done Not Done VTYI:481008455}    Treatments at the Time of Hospital Discharge:   Respiratory Treatments: ***  Oxygen Therapy:  {Therapy; copd oxygen:66445}  Ventilator:    {MH CC Vent NAJY:315310282}    Heart Failure Instructions for Daily Management  Patient was treated for acute on chronic systolic and diastolic heart failure. he  will require the following:    Please weigh daily on the same scale and approximately the same time of day. Report weight gain of 3 pounds/day or 5 pounds/week to : Kike Lew [phone] and [facility] / [address] [place] [address] [phone]. Please use hospital discharge weight as baseline reference. Please monitor for signs and symptoms of and report to MD:  Worsening Heart Failure: sudden weight gain, shortness of breath, lower extremity or general edema/swelling, abdominal bloating/swelling, inability to lie flat, intolerance to usual activity, or cough (especially at night). Report these finding even if no increase in weight. Dehydration:  having difficulty or a decrease in urination, dizziness, worsening fatigue, or new onset/worsening of generalized weakness. Please continue a LOW SODIUM diet and LIMIT fluid intake to 48 - 64 ounces ( 1.5 - 2 liters) per day.      Call [facility] [phone], Kike Lew [phone], and [facility] / [address] [place] [address] [phone] with any questions or concerns. Please continue heart failure education to patient and family/support system. See After Visit Summary for hospital follow up appointment details. Consider spiritual care referral for support and/or completion of advance directives . Consider: [place] [address] telehealth program if patient agreeable and able to participate and palliative care consult for ongoing goals of care, end of life, and/or chronic disease management discussions. Patient's primary cardiologist is Dr Nilson Lacy. Please draw BMP on  and fax results to [facility] so they can be reviewed at follow up.      Rehab Therapies: {THERAPEUTIC INTERVENTION:7015463853}  Weight Bearing Status/Restrictions: [address] CC Weight Bearin}  Other Medical Equipment (for information only, NOT a DME order):  {EQUIPMENT:670357046}  Other Treatments: ***    Patient's personal belongings (please select all that are sent with patient):  {CHP DME Belongings:972703469}    RN SIGNATURE:  {Esignature:915048517}    CASE MANAGEMENT/SOCIAL WORK SECTION    Inpatient Status Date: ***    Readmission Risk Assessment Score:  Readmission Risk              Risk of Unplanned Readmission:  21           Discharging to Facility/ Agency   Name:   Address:  Phone:  Fax:    Dialysis Facility (if applicable)   Name:  Address:  Dialysis Schedule:  Phone:  Fax:    / signature: {Esignature:382551756}    PHYSICIAN SECTION    Prognosis: {Prognosis:4057738295}    Condition at Discharge: [address] Patient Condition:190031992}    Rehab Potential (if transferring to Rehab): {Prognosis:8144017563}    Recommended Labs or Other Treatments After Discharge: ***    Podiatry Wound Care Discharge Instructions  Please perform every day dressing changes to bilateral lower extremity as follows  -Apply Betadine to the wounds on the toes of both feet  -Next apply a Band-Aid over the wounds on both feet    -Apply TRUMAN hose or compression stocking daily to both legs  Patient is weightbearing as tolerated Bilateral lower extremity  Please follow-up with Dr. Kelly Brizuela DPM for wound recheck within 1 week of hospital discharge at [address] [place] [address]      Physician Certification: I certify the above information and transfer of Amando Serrano  is necessary for the continuing treatment of the diagnosis listed and that he requires {Admit to Appropriate Level of Care:85074} for {GREATER/LESS:504613114} 30 days.      Update Admission H&P: {CHP DME Changes in ILEBJ:942286085}    PHYSICIAN SIGNATURE:  {Esignature:735861166}

## 2022-12-16 NOTE — CARE COORDINATION
Isabella Branch is a 81 y.o. male who presented to ED with cough shortness of breath. This started 3 days ago. He had myalgias as well. He is unvaccinated against influenza. He states he has had increasing shortness of breath at rest.  Denies smoking history. Did have fevers and chills. No chest pain does have chronic swelling in his legs from diabetic neuropathy and heart failure. Denies any nausea vomiting. Patient is positive for influenza A and is on O2  at 3L. Prior function is independent from home. Patient was seen by podiatry with multiple lesions bilaterally on feet and states he walks barefoot around the house. Patient c/o neuropathy and has diabetes. Glucose today 257 at 16:42. CM will continue to follow patient until discharge.  Electronically signed by Lawrence Damian RN on 12/16/2022 at 4:57 PM

## 2022-12-16 NOTE — DISCHARGE INSTRUCTIONS
Podiatry Wound Care Discharge Instructions  Please perform every day dressing changes to bilateral lower extremity as follows  -Apply Betadine to the wounds on the toes of both feet  -Next apply a Band-Aid over the wounds on both feet    -Apply TRUMAN hose or compression stocking daily to both legs  Patient is weightbearing as tolerated Bilateral lower extremity  Please follow-up with Dr. Bobby Burt DPM for wound recheck within 1 week of hospital discharge at [address] [place] [address]    Extra Heart Failure sites:   [email or web address]/  Http://[email or web address].[employer]/aha-heartfailure   --- this is American Heart Association interactive Healthier Living with Heart Failure guidebook. Please copy and paste link into search bar. Use your mouse to scroll through the pages. Lots and lots of info / tips    HF [place] sekou  --- free smart phone sekou available for [employer] and [employer]. Use your phone to track sodium / fluid intake,  symptoms, weight, etc.    [employer]. [employer] - website-- [employer] is a dialysis Greetz. All dialysis patients follow a renal diet which IS low sodium!! This website offers free seasonal cookbooks.   Each quarter, they will release 25-30 new recipes with a breakdown of calories, sodium, glucose, etc    www.[employer]/recipes -- more free recipes

## 2022-12-16 NOTE — PROGRESS NOTES
Hospitalist Progress Note      PCP: Naya Griggs MD    Date of Admission: 12/15/2022    Chief Complaint: worsening sob    Hospital Course:   Elijah Dimas 81 y.o. male CAD s/p CABG, PM, sCHF, a-fib, dm atrial fibrillation, coronary artery disease s/p CABG, p/ to ED after 3 days of worsening shortness of breath and productive cough, worse reported as prohibitive and limiting.      - he was hypoxic 86% on RA, work up +ve for Influenza A and his chest x-ray also revealed some pulmonary congestion. The patient was given Tamiflu and a one-time dose of Lasix. Of note, this patient also showed evidence of acute cystitis with a positive urinalysis and was given IV antibiotics with reflexive cultures. Patient has required 2 L of oxygen to bring his oxygen saturation up to 93%. He was also noted to have a BNP of 4713. This is up slightly from his baseline of approximately 3000. He did have an echocardiogram in August of this year showing an ejection fraction of 50%, improved from prior. He is on Eliquis at home and endorses compliance. Internal Medicine was asked to admit him.     Subjective: feeling better, no as short of breath  Says he always have some leg swelling and pain due to his diabetic neuropathy       Medications:  Reviewed    Infusion Medications    sodium chloride      sodium chloride      dextrose       Scheduled Medications    oseltamivir  75 mg Oral BID    amiodarone  200 mg Oral Daily    apixaban  5 mg Oral BID    aspirin  81 mg Oral Daily    metoprolol tartrate  25 mg Oral BID    rosuvastatin  20 mg Oral Daily    furosemide  40 mg IntraVENous Daily    cefTRIAXone (ROCEPHIN) IV  1,000 mg IntraVENous Q24H    insulin lispro  0-8 Units SubCUTAneous TID WC    insulin lispro  0-4 Units SubCUTAneous Nightly    sodium chloride flush  5-40 mL IntraVENous 2 times per day    sodium chloride flush  5-40 mL IntraVENous 2 times per day    nystatin   Topical BID     PRN Meds: albuterol, sodium chloride flush, sodium chloride, ondansetron **OR** ondansetron, polyethylene glycol, acetaminophen **OR** acetaminophen, potassium chloride **OR** potassium alternative oral replacement **OR** potassium chloride, magnesium sulfate, labetalol, hydrALAZINE, sodium chloride flush, sodium chloride, glucose, dextrose bolus **OR** dextrose bolus, glucagon (rDNA), dextrose      Intake/Output Summary (Last 24 hours) at 12/16/2022 0904  Last data filed at 12/16/2022 0445  Gross per 24 hour   Intake 60 ml   Output 225 ml   Net -165 ml       Physical Exam Performed:    /69   Pulse 79   Temp 98.7 °F (37.1 °C) (Oral)   Resp 28   Ht 6' 1\" (1.854 m)   Wt 266 lb 12.1 oz (121 kg)   SpO2 99%   BMI 35.19 kg/m²     General appearance: No apparent distress, appears stated age and cooperative. HEENT: Pupils equal, round, and reactive to light. Conjunctivae/corneas clear. Neck: Supple, with full range of motion. No jugular venous distention. Trachea midline. Respiratory:  Normal respiratory effort. Clear to auscultation, bilaterally without Rales/Wheezes/Rhonchi. Cardiovascular: Regular rate and rhythm with normal S1/S2 without murmurs, rubs or gallops. Abdomen: Soft, non-tender, non-distended with normal bowel sounds. Musculoskeletal: No clubbing, cyanosis or edema bilaterally. Full range of motion without deformity. Skin: Skin color, texture, turgor normal.  No rashes or lesions. Neurologic:  Neurovascularly intact without any focal sensory/motor deficits.  Cranial nerves: II-XII intact, grossly non-focal.  Psychiatric: Alert and oriented, thought content appropriate, normal insight  Capillary Refill: Brisk,< 3 seconds   Peripheral Pulses: +2 palpable, equal bilaterally       Labs:   Recent Labs     12/15/22  1219 12/16/22  0456   WBC 5.4 3.6*   HGB 13.9 12.4*   HCT 41.7 37.8*    134*     Recent Labs     12/15/22  1219 12/16/22  0456    135*   K 4.3 4.5    100   CO2 25 26   BUN 24* 29* CREATININE 1.3 1.6*   CALCIUM 9.6 8.7     Recent Labs     12/16/22  0456   AST 21   ALT 10   BILITOT 0.6   ALKPHOS 73     No results for input(s): INR in the last 72 hours. Recent Labs     12/15/22  1219   TROPONINI <0.01       Urinalysis:      Lab Results   Component Value Date/Time    NITRU Negative 12/15/2022 01:44 PM    WBCUA  12/15/2022 01:44 PM    BACTERIA 2+ 12/15/2022 01:44 PM    RBCUA 5-10 12/15/2022 01:44 PM    BLOODU LARGE 12/15/2022 01:44 PM    SPECGRAV 1.025 12/15/2022 01:44 PM    GLUCOSEU Negative 12/15/2022 01:44 PM       Radiology:  XR FOOT RIGHT (MIN 3 VIEWS)   Final Result   Impression:   1. No acute fracture or dislocation. XR FOOT LEFT (MIN 3 VIEWS)   Final Result   Impression:   1. No acute fracture or dislocation. XR CHEST PORTABLE   Final Result      Marked prominence of the central bronchovascular markings most compatible with moderate pulmonary edema. Atypical pneumonia is less likely. Cardiomegaly status post CABG with left chest ICD. Assessment/Plan:    Active Hospital Problems    Diagnosis Date Noted    Acute respiratory failure ([facility] [address] [J96.00] 12/15/2022     Priority: Medium    Influenza A [J10.1] 12/15/2022     Priority: Medium    Malignant hypertension [I10] 12/15/2022     Priority: Medium    Paroxysmal A-fib ([facility] [address] [I48.0] 08/27/2022     Priority: Medium    Acute on chronic combined systolic (congestive) and diastolic (congestive) heart failure ([facility] [address] [I50.43] 08/22/2022     Priority: Medium    Uncontrolled type 2 diabetes mellitus with hyperglycemia ([facility] [address] [E11.65] 08/22/2022     Priority: Medium    Acute cystitis [N30.00] 03/02/2020    CAD, multiple vessel [I25.10]      Acute resp failure with hypoxemia   - needing 2 L NC oxygen to maintain o2 sats, noted to be panting per H&P note, tachypnea. Multifactorial including influenza and probable CHF exacerbation  -Oxygen support with Tamiflu therapy; increase activity.   - Wean off oxygen for SpO2 92% or above     Influenza a infection  -Tamiflu, supportive care, add mucinex  -Droplet isolation precautions    Acute on chronic combined congestive heart failure  -No need to repeat echocardiogram.  -We will continue IV diuresis Daily and convert back to oral dosing within the next 24 to 48 hours  -Continue cardioprotective medications  -Repeat BMP on Sunday. Lab already ordered. Acute cystitis with a concern for urinary tract infection  - Continue IV antibiotics in the form of Rocephin.    - Follow-up on urinary cultures  -Monitor for any urine retention and treat accordingly    Malignant hypertension  -Continue home blood pressure medications and cover with as needed labetalol and hydralazine with parameters as written    Hyperglycemia with type 2 diabetes mellitus  -Correctional coverage for now  -Last hemoglobin A1c was August 2022 at a level of 7.2. Well-controlled. Continue to monitor. Chronic atrial fibrillation  - Continue amiodarone, metoprolol and Eliquis  - ok to monitor off tele as chronic problem    LE wounds, podiatary consulted for evaluaiton and recommendations, Diabetic foot ulcerations, bilateral foot    DVT Prophylaxis: apixaban  Diet: ADULT DIET; Regular; 5 carb choices (75 gm/meal);  Low Sodium (2 gm)  Code Status: Full Code    PT/OT Eval Status: evaluate daily order if needed    Dispo - continue inpatient care    Tiago Arboleda MD  Internal Medicine Hospitalist

## 2022-12-17 LAB
A/G RATIO: 1.2 (ref 1.1–2.2)
ALBUMIN SERPL-MCNC: 3.7 G/DL (ref 3.4–5)
ALP BLD-CCNC: 77 U/L (ref 40–129)
ALT SERPL-CCNC: 12 U/L (ref 10–40)
ANION GAP SERPL CALCULATED.3IONS-SCNC: 10 MMOL/L (ref 3–16)
AST SERPL-CCNC: 26 U/L (ref 15–37)
BASOPHILS ABSOLUTE: 0 K/UL (ref 0–0.2)
BASOPHILS RELATIVE PERCENT: 0.1 %
BILIRUB SERPL-MCNC: 0.4 MG/DL (ref 0–1)
BUN BLDV-MCNC: 46 MG/DL (ref 7–20)
CALCIUM SERPL-MCNC: 8.5 MG/DL (ref 8.3–10.6)
CHLORIDE BLD-SCNC: 99 MMOL/L (ref 99–110)
CO2: 23 MMOL/L (ref 21–32)
CREAT SERPL-MCNC: 1.7 MG/DL (ref 0.8–1.3)
EOSINOPHILS ABSOLUTE: 0 K/UL (ref 0–0.6)
EOSINOPHILS RELATIVE PERCENT: 0 %
GFR SERPL CREATININE-BSD FRML MDRD: 43 ML/MIN/{1.73_M2}
GLUCOSE BLD-MCNC: 108 MG/DL (ref 70–99)
GLUCOSE BLD-MCNC: 125 MG/DL (ref 70–99)
GLUCOSE BLD-MCNC: 159 MG/DL (ref 70–99)
GLUCOSE BLD-MCNC: 191 MG/DL (ref 70–99)
GLUCOSE BLD-MCNC: 253 MG/DL (ref 70–99)
HCT VFR BLD CALC: 38.4 % (ref 40.5–52.5)
HEMOGLOBIN: 12.9 G/DL (ref 13.5–17.5)
LYMPHOCYTES ABSOLUTE: 0.6 K/UL (ref 1–5.1)
LYMPHOCYTES RELATIVE PERCENT: 10.9 %
MCH RBC QN AUTO: 30.1 PG (ref 26–34)
MCHC RBC AUTO-ENTMCNC: 33.6 G/DL (ref 31–36)
MCV RBC AUTO: 89.5 FL (ref 80–100)
MONOCYTES ABSOLUTE: 0.8 K/UL (ref 0–1.3)
MONOCYTES RELATIVE PERCENT: 14 %
NEUTROPHILS ABSOLUTE: 4.4 K/UL (ref 1.7–7.7)
NEUTROPHILS RELATIVE PERCENT: 75 %
PDW BLD-RTO: 14.3 % (ref 12.4–15.4)
PERFORMED ON: ABNORMAL
PLATELET # BLD: 154 K/UL (ref 135–450)
PMV BLD AUTO: 8.7 FL (ref 5–10.5)
POTASSIUM REFLEX MAGNESIUM: 4.5 MMOL/L (ref 3.5–5.1)
PRO-BNP: 5732 PG/ML (ref 0–124)
RBC # BLD: 4.29 M/UL (ref 4.2–5.9)
SODIUM BLD-SCNC: 132 MMOL/L (ref 136–145)
TOTAL CK: 175 U/L (ref 39–308)
TOTAL PROTEIN: 6.8 G/DL (ref 6.4–8.2)
URINE TYPE: NORMAL
WBC # BLD: 5.8 K/UL (ref 4–11)

## 2022-12-17 PROCEDURE — 1200000000 HC SEMI PRIVATE

## 2022-12-17 PROCEDURE — 36415 COLL VENOUS BLD VENIPUNCTURE: CPT

## 2022-12-17 PROCEDURE — 6370000000 HC RX 637 (ALT 250 FOR IP): Performed by: INTERNAL MEDICINE

## 2022-12-17 PROCEDURE — 2580000003 HC RX 258: Performed by: INTERNAL MEDICINE

## 2022-12-17 PROCEDURE — 80053 COMPREHEN METABOLIC PANEL: CPT

## 2022-12-17 PROCEDURE — 94640 AIRWAY INHALATION TREATMENT: CPT

## 2022-12-17 PROCEDURE — 85025 COMPLETE CBC W/AUTO DIFF WBC: CPT

## 2022-12-17 PROCEDURE — 94761 N-INVAS EAR/PLS OXIMETRY MLT: CPT

## 2022-12-17 PROCEDURE — 82550 ASSAY OF CK (CPK): CPT

## 2022-12-17 PROCEDURE — 83880 ASSAY OF NATRIURETIC PEPTIDE: CPT

## 2022-12-17 PROCEDURE — 2700000000 HC OXYGEN THERAPY PER DAY

## 2022-12-17 PROCEDURE — 6360000002 HC RX W HCPCS: Performed by: INTERNAL MEDICINE

## 2022-12-17 RX ORDER — FUROSEMIDE 80 MG
80 TABLET ORAL 2 TIMES DAILY
Status: DISCONTINUED | OUTPATIENT
Start: 2022-12-17 | End: 2022-12-19 | Stop reason: HOSPADM

## 2022-12-17 RX ORDER — BENZONATATE 100 MG/1
200 CAPSULE ORAL 3 TIMES DAILY PRN
Status: DISCONTINUED | OUTPATIENT
Start: 2022-12-17 | End: 2022-12-19 | Stop reason: HOSPADM

## 2022-12-17 RX ADMIN — METOPROLOL TARTRATE 25 MG: 25 TABLET, FILM COATED ORAL at 21:24

## 2022-12-17 RX ADMIN — OSELTAMIVIR PHOSPHATE 75 MG: 75 CAPSULE ORAL at 21:26

## 2022-12-17 RX ADMIN — GUAIFENESIN 600 MG: 600 TABLET ORAL at 22:06

## 2022-12-17 RX ADMIN — NYSTATIN: 100000 CREAM TOPICAL at 09:40

## 2022-12-17 RX ADMIN — ROSUVASTATIN CALCIUM 20 MG: 20 TABLET, FILM COATED ORAL at 11:02

## 2022-12-17 RX ADMIN — INSULIN LISPRO 3 UNITS: 100 INJECTION, SOLUTION INTRAVENOUS; SUBCUTANEOUS at 12:26

## 2022-12-17 RX ADMIN — FUROSEMIDE 80 MG: 80 TABLET ORAL at 11:02

## 2022-12-17 RX ADMIN — ALBUTEROL SULFATE 2.5 MG: 2.5 SOLUTION RESPIRATORY (INHALATION) at 15:33

## 2022-12-17 RX ADMIN — METOPROLOL TARTRATE 25 MG: 25 TABLET, FILM COATED ORAL at 09:37

## 2022-12-17 RX ADMIN — SODIUM CHLORIDE, PRESERVATIVE FREE 10 ML: 5 INJECTION INTRAVENOUS at 21:23

## 2022-12-17 RX ADMIN — GUAIFENESIN 600 MG: 600 TABLET ORAL at 09:37

## 2022-12-17 RX ADMIN — AMIODARONE HYDROCHLORIDE 200 MG: 200 TABLET ORAL at 09:36

## 2022-12-17 RX ADMIN — APIXABAN 5 MG: 5 TABLET, FILM COATED ORAL at 21:24

## 2022-12-17 RX ADMIN — NYSTATIN: 100000 CREAM TOPICAL at 21:24

## 2022-12-17 RX ADMIN — CEFDINIR 300 MG: 300 CAPSULE ORAL at 09:37

## 2022-12-17 RX ADMIN — Medication 5 ML: at 09:54

## 2022-12-17 RX ADMIN — INSULIN GLARGINE 5 UNITS: 100 INJECTION, SOLUTION SUBCUTANEOUS at 21:33

## 2022-12-17 RX ADMIN — ASPIRIN 81 MG: 81 TABLET, CHEWABLE ORAL at 09:37

## 2022-12-17 RX ADMIN — CEFDINIR 300 MG: 300 CAPSULE ORAL at 22:06

## 2022-12-17 RX ADMIN — APIXABAN 5 MG: 5 TABLET, FILM COATED ORAL at 09:37

## 2022-12-17 RX ADMIN — OSELTAMIVIR PHOSPHATE 75 MG: 75 CAPSULE ORAL at 09:38

## 2022-12-17 RX ADMIN — INSULIN LISPRO 4 UNITS: 100 INJECTION, SOLUTION INTRAVENOUS; SUBCUTANEOUS at 12:26

## 2022-12-17 RX ADMIN — SODIUM CHLORIDE, PRESERVATIVE FREE 5 ML: 5 INJECTION INTRAVENOUS at 09:40

## 2022-12-17 RX ADMIN — INSULIN LISPRO 3 UNITS: 100 INJECTION, SOLUTION INTRAVENOUS; SUBCUTANEOUS at 17:23

## 2022-12-17 RX ADMIN — INSULIN LISPRO 3 UNITS: 100 INJECTION, SOLUTION INTRAVENOUS; SUBCUTANEOUS at 09:44

## 2022-12-17 ASSESSMENT — PAIN SCALES - GENERAL: PAINLEVEL_OUTOF10: 0

## 2022-12-17 NOTE — PROGRESS NOTES
Pt awake sitting on side of the bed, looking at something on his cellphone. Alert & oriented x 4. /80, other VSS. Assessment completed. No complaints at this time. Pt on 2 L O2 reduced to 1.5 L. Nighttime medications given, pt tolerated well. Pt went to the bathroom without assistance, gait steady. Pt voided and returned to the bed. Reminded pt to call for assistance with any needs. Call light within reach.

## 2022-12-17 NOTE — PROGRESS NOTES
Alert, oriented. VSS on 1.5L NC. Saturating 95-98%. Afebrile Occasional non-productive harsh cough. Lungs clear diminished T/O. Denying SOB. Remains in droplet precautions for influenza A. Receiving PO ABX and Tamiflu. Podiatry following for bilateral foot wounds and applied betadine and band-aids, CDI, with ruddiness and red discoloration. Denying pain.

## 2022-12-17 NOTE — PROGRESS NOTES
Hospitalist Progress Note      PCP: Rodriguez Stark MD    Date of Admission: 12/15/2022    Chief Complaint: worsening sob    Hospital Course:   Judy Beyer 81 y.o. male CAD s/p CABG, PM, sCHF, a-fib, dm atrial fibrillation, coronary artery disease s/p CABG, p/ to ED after 3 days of worsening shortness of breath and productive cough, worse reported as prohibitive and limiting.      - he was hypoxic 86% on RA, work up +ve for Influenza A and his chest x-ray also revealed some pulmonary congestion. The patient was given Tamiflu and a one-time dose of Lasix. Of note, this patient also showed evidence of acute cystitis with a positive urinalysis and was given IV antibiotics with reflexive cultures. Patient has required 2 L of oxygen to bring his oxygen saturation up to 93%. He was also noted to have a BNP of 4713. This is up slightly from his baseline of approximately 3000. He did have an echocardiogram in August of this year showing an ejection fraction of 50%, improved from prior. He is on Eliquis at home and endorses compliance. Internal Medicine was asked to admit him.     Subjective:   feeling better, no as short of breath    Medications:  Reviewed    Infusion Medications    sodium chloride      sodium chloride 25 mL/hr at 12/16/22 1450    dextrose       Scheduled Medications    furosemide  80 mg Oral BID    guaiFENesin  600 mg Oral BID    insulin lispro  3 Units SubCUTAneous TID WC    insulin glargine  5 Units SubCUTAneous Nightly    cefdinir  300 mg Oral 2 times per day    oseltamivir  75 mg Oral BID    amiodarone  200 mg Oral Daily    apixaban  5 mg Oral BID    aspirin  81 mg Oral Daily    metoprolol tartrate  25 mg Oral BID    rosuvastatin  20 mg Oral Daily    insulin lispro  0-8 Units SubCUTAneous TID WC    insulin lispro  0-4 Units SubCUTAneous Nightly    sodium chloride flush  5-40 mL IntraVENous 2 times per day    sodium chloride flush  5-40 mL IntraVENous 2 times per day nystatin   Topical BID     PRN Meds: albuterol, sodium chloride flush, sodium chloride, ondansetron **OR** ondansetron, polyethylene glycol, acetaminophen **OR** acetaminophen, potassium chloride **OR** potassium alternative oral replacement **OR** potassium chloride, magnesium sulfate, labetalol, hydrALAZINE, sodium chloride flush, sodium chloride, glucose, dextrose bolus **OR** dextrose bolus, glucagon (rDNA), dextrose      Intake/Output Summary (Last 24 hours) at 12/17/2022 0946  Last data filed at 12/17/2022 0940  Gross per 24 hour   Intake 725 ml   Output 0 ml   Net 725 ml       Physical Exam Performed:    /75   Pulse 70   Temp 97.7 °F (36.5 °C) (Oral)   Resp 20   Ht 6' 1\" (1.854 m)   Wt 266 lb 12.1 oz (121 kg)   SpO2 98%   BMI 35.19 kg/m²     General appearance: No apparent distress, appears stated age and cooperative. HEENT: Pupils equal, round, and reactive to light. Conjunctivae/corneas clear. Neck: Supple, with full range of motion. No jugular venous distention. Trachea midline. Respiratory:  Normal respiratory effort. Clear to auscultation, bilaterally without Rales/Wheezes/Rhonchi. Cardiovascular: Regular rate and rhythm with normal S1/S2 without murmurs, rubs or gallops. Abdomen: Soft, non-tender, non-distended with normal bowel sounds. Musculoskeletal: No clubbing, cyanosis or edema bilaterally. Full range of motion without deformity. Skin: Skin color, texture, turgor normal.  No rashes or lesions. Neurologic:  Neurovascularly intact without any focal sensory/motor deficits.  Cranial nerves: II-XII intact, grossly non-focal.  Psychiatric: Alert and oriented, thought content appropriate, normal insight  Capillary Refill: Brisk,< 3 seconds   Peripheral Pulses: +2 palpable, equal bilaterally       Labs:   Recent Labs     12/15/22  1219 12/16/22  0456 12/17/22  0629   WBC 5.4 3.6* 5.8   HGB 13.9 12.4* 12.9*   HCT 41.7 37.8* 38.4*    134* 154     Recent Labs     12/15/22  1219 12/16/22  0456 12/17/22  0629    135* 132*   K 4.3 4.5 4.5    100 99   CO2 25 26 23   BUN 24* 29* 46*   CREATININE 1.3 1.6* 1.7*   CALCIUM 9.6 8.7 8.5     Recent Labs     12/16/22  0456 12/17/22  0629   AST 21 26   ALT 10 12   BILITOT 0.6 0.4   ALKPHOS 73 77     No results for input(s): INR in the last 72 hours. Recent Labs     12/15/22  1219   TROPONINI <0.01       Urinalysis:      Lab Results   Component Value Date/Time    NITRU Negative 12/15/2022 01:44 PM    WBCUA  12/15/2022 01:44 PM    BACTERIA 2+ 12/15/2022 01:44 PM    RBCUA 5-10 12/15/2022 01:44 PM    BLOODU LARGE 12/15/2022 01:44 PM    SPECGRAV 1.025 12/15/2022 01:44 PM    GLUCOSEU Negative 12/15/2022 01:44 PM       Radiology:  XR FOOT RIGHT (MIN 3 VIEWS)   Final Result   Impression:   1. No acute fracture or dislocation. XR FOOT LEFT (MIN 3 VIEWS)   Final Result   Impression:   1. No acute fracture or dislocation. XR CHEST PORTABLE   Final Result      Marked prominence of the central bronchovascular markings most compatible with moderate pulmonary edema. Atypical pneumonia is less likely. Cardiomegaly status post CABG with left chest ICD.               Assessment/Plan:    Active Hospital Problems    Diagnosis Date Noted    Acute respiratory failure ([facility] [address] [J96.00] 12/15/2022     Priority: Medium    Influenza A [J10.1] 12/15/2022     Priority: Medium    Malignant hypertension [I10] 12/15/2022     Priority: Medium    Paroxysmal A-fib ([facility] [address] [I48.0] 08/27/2022     Priority: Medium    Acute on chronic combined systolic (congestive) and diastolic (congestive) heart failure ([facility] [address] [I50.43] 08/22/2022     Priority: Medium    Uncontrolled type 2 diabetes mellitus with hyperglycemia ([facility] [address] [E11.65] 08/22/2022     Priority: Medium    Acute cystitis [N30.00] 03/02/2020    CAD, multiple vessel [I25.10]      Acute resp failure with hypoxemia   - needing 2 L NC oxygen to maintain o2 sats, noted to be panting per H&P note, tachypnea. Multifactorial including influenza and probable CHF exacerbation  -Oxygen support with Tamiflu therapy; increase activity. - Wean off oxygen for SpO2 92% or above     Influenza a infection  -Tamiflu, supportive care, add mucinex  -Droplet isolation precautions    NANETTE, Azotemia  - check UA, urine sodum, BNP  - previously at home was on lasix but stopped as didn't want to go to bathroom so many time  - start lasix 80 mg bid, his prior home dose  - Nephrology consulted    Acute on chronic combined congestive heart failure  -No need to repeat echocardiogram.  - he was give IV lasix on admission and cr went up, will start home dose of lasix  - repeat proBNP  -Continue cardioprotective medications    Acute cystitis with a concern for urinary tract infection  - cefidinir bid for now  - Follow-up on urinary cultures  -Monitor for any urine retention and treat accordingly    Malignant hypertension  -Continue home blood pressure medications and cover with as needed labetalol and hydralazine with parameters as written    Hyperglycemia with type 2 diabetes mellitus  -Correctional coverage for now  -Last hemoglobin A1c was August 2022 at a level of 7.2. Well-controlled. Continue to monitor. Chronic atrial fibrillation  - Continue amiodarone, metoprolol and Eliquis  - ok to monitor off tele as chronic problem    LE wounds, podiatary consulted for evaluaiton and recommendations, Diabetic foot ulcerations, bilateral foot    DVT Prophylaxis: apixaban  Diet: ADULT DIET; Regular; 5 carb choices (75 gm/meal);  Low Fat/Low Chol/High Fiber/2 gm Na  Code Status: Full Code    PT/OT Eval Status: evaluate daily order if needed    Dispo - continue inpatient care, nephrology consulted for evaluation and recommendations     Julia Bautista MD  Internal Medicine Hospitalist

## 2022-12-17 NOTE — PLAN OF CARE
Problem: Safety - Adult  Goal: Free from fall injury  Outcome: Progressing  Flowsheets (Taken 12/17/2022 0444)  Free From Fall Injury: Instruct family/caregiver on patient safety     Problem: Respiratory - Adult  Goal: Achieves optimal ventilation and oxygenation  Outcome: Progressing  Flowsheets (Taken 12/17/2022 0444)  Achieves optimal ventilation and oxygenation:   Assess for changes in respiratory status   Assess for changes in mentation and behavior   Oxygen supplementation based on oxygen saturation or arterial blood gases   Position to facilitate oxygenation and minimize respiratory effort     Problem: Cardiovascular - Adult  Goal: Maintains optimal cardiac output and hemodynamic stability  Outcome: Progressing  Flowsheets (Taken 12/17/2022 0444)  Maintains optimal cardiac output and hemodynamic stability:   Monitor blood pressure and heart rate   Monitor urine output and notify Licensed Independent Practitioner for values outside of normal range   Assess for signs of decreased cardiac output     Problem: Skin/Tissue Integrity - Adult  Goal: Skin integrity remains intact  Recent Flowsheet Documentation  Taken 12/16/2022 2112 by Chance Amador RN  Skin Integrity Remains Intact: Monitor for areas of redness and/or skin breakdown     Problem: Discharge Planning  Goal: Discharge to home or other facility with appropriate resources  Flowsheets (Taken 12/17/2022 0444)  Discharge to home or other facility with appropriate resources:   Identify barriers to discharge with patient and caregiver   Identify discharge learning needs (meds, wound care, etc)

## 2022-12-18 LAB
A/G RATIO: 0.9 (ref 1.1–2.2)
ALBUMIN SERPL-MCNC: 3.5 G/DL (ref 3.4–5)
ALP BLD-CCNC: 78 U/L (ref 40–129)
ALT SERPL-CCNC: 18 U/L (ref 10–40)
ANION GAP SERPL CALCULATED.3IONS-SCNC: 11 MMOL/L (ref 3–16)
AST SERPL-CCNC: 33 U/L (ref 15–37)
BASOPHILS ABSOLUTE: 0 K/UL (ref 0–0.2)
BASOPHILS RELATIVE PERCENT: 0.4 %
BILIRUB SERPL-MCNC: 0.5 MG/DL (ref 0–1)
BUN BLDV-MCNC: 38 MG/DL (ref 7–20)
CALCIUM SERPL-MCNC: 8.9 MG/DL (ref 8.3–10.6)
CHLORIDE BLD-SCNC: 99 MMOL/L (ref 99–110)
CO2: 27 MMOL/L (ref 21–32)
CREAT SERPL-MCNC: 1.5 MG/DL (ref 0.8–1.3)
EOSINOPHILS ABSOLUTE: 0.1 K/UL (ref 0–0.6)
EOSINOPHILS RELATIVE PERCENT: 0.9 %
GFR SERPL CREATININE-BSD FRML MDRD: 50 ML/MIN/{1.73_M2}
GLUCOSE BLD-MCNC: 129 MG/DL (ref 70–99)
GLUCOSE BLD-MCNC: 148 MG/DL (ref 70–99)
GLUCOSE BLD-MCNC: 186 MG/DL (ref 70–99)
GLUCOSE BLD-MCNC: 278 MG/DL (ref 70–99)
GLUCOSE BLD-MCNC: 290 MG/DL (ref 70–99)
HCT VFR BLD CALC: 42.3 % (ref 40.5–52.5)
HEMOGLOBIN: 14 G/DL (ref 13.5–17.5)
LYMPHOCYTES ABSOLUTE: 0.9 K/UL (ref 1–5.1)
LYMPHOCYTES RELATIVE PERCENT: 15.2 %
MCH RBC QN AUTO: 29.8 PG (ref 26–34)
MCHC RBC AUTO-ENTMCNC: 33.1 G/DL (ref 31–36)
MCV RBC AUTO: 90.2 FL (ref 80–100)
MONOCYTES ABSOLUTE: 0.6 K/UL (ref 0–1.3)
MONOCYTES RELATIVE PERCENT: 10.8 %
NEUTROPHILS ABSOLUTE: 4.4 K/UL (ref 1.7–7.7)
NEUTROPHILS RELATIVE PERCENT: 72.7 %
PDW BLD-RTO: 14.5 % (ref 12.4–15.4)
PERFORMED ON: ABNORMAL
PLATELET # BLD: 170 K/UL (ref 135–450)
PMV BLD AUTO: 8.8 FL (ref 5–10.5)
POTASSIUM REFLEX MAGNESIUM: 4.2 MMOL/L (ref 3.5–5.1)
PRO-BNP: 2115 PG/ML (ref 0–124)
RBC # BLD: 4.69 M/UL (ref 4.2–5.9)
SODIUM BLD-SCNC: 137 MMOL/L (ref 136–145)
TOTAL PROTEIN: 7.2 G/DL (ref 6.4–8.2)
WBC # BLD: 6 K/UL (ref 4–11)

## 2022-12-18 PROCEDURE — 6370000000 HC RX 637 (ALT 250 FOR IP): Performed by: INTERNAL MEDICINE

## 2022-12-18 PROCEDURE — 85025 COMPLETE CBC W/AUTO DIFF WBC: CPT

## 2022-12-18 PROCEDURE — 6370000000 HC RX 637 (ALT 250 FOR IP): Performed by: HOSPITALIST

## 2022-12-18 PROCEDURE — 83880 ASSAY OF NATRIURETIC PEPTIDE: CPT

## 2022-12-18 PROCEDURE — 1200000000 HC SEMI PRIVATE

## 2022-12-18 PROCEDURE — 2580000003 HC RX 258: Performed by: INTERNAL MEDICINE

## 2022-12-18 PROCEDURE — 36415 COLL VENOUS BLD VENIPUNCTURE: CPT

## 2022-12-18 PROCEDURE — 99223 1ST HOSP IP/OBS HIGH 75: CPT | Performed by: INTERNAL MEDICINE

## 2022-12-18 PROCEDURE — 80053 COMPREHEN METABOLIC PANEL: CPT

## 2022-12-18 RX ORDER — PREDNISONE 20 MG/1
20 TABLET ORAL DAILY
Status: DISCONTINUED | OUTPATIENT
Start: 2022-12-18 | End: 2022-12-19 | Stop reason: HOSPADM

## 2022-12-18 RX ADMIN — OSELTAMIVIR PHOSPHATE 75 MG: 75 CAPSULE ORAL at 11:02

## 2022-12-18 RX ADMIN — METOPROLOL TARTRATE 25 MG: 25 TABLET, FILM COATED ORAL at 11:00

## 2022-12-18 RX ADMIN — APIXABAN 5 MG: 5 TABLET, FILM COATED ORAL at 11:02

## 2022-12-18 RX ADMIN — FUROSEMIDE 80 MG: 80 TABLET ORAL at 17:51

## 2022-12-18 RX ADMIN — SODIUM CHLORIDE, PRESERVATIVE FREE 10 ML: 5 INJECTION INTRAVENOUS at 21:09

## 2022-12-18 RX ADMIN — OSELTAMIVIR PHOSPHATE 75 MG: 75 CAPSULE ORAL at 21:14

## 2022-12-18 RX ADMIN — GUAIFENESIN 600 MG: 600 TABLET ORAL at 21:13

## 2022-12-18 RX ADMIN — APIXABAN 5 MG: 5 TABLET, FILM COATED ORAL at 21:13

## 2022-12-18 RX ADMIN — CEFDINIR 300 MG: 300 CAPSULE ORAL at 21:14

## 2022-12-18 RX ADMIN — INSULIN LISPRO 4 UNITS: 100 INJECTION, SOLUTION INTRAVENOUS; SUBCUTANEOUS at 17:46

## 2022-12-18 RX ADMIN — FUROSEMIDE 80 MG: 80 TABLET ORAL at 11:12

## 2022-12-18 RX ADMIN — GUAIFENESIN 600 MG: 600 TABLET ORAL at 11:00

## 2022-12-18 RX ADMIN — METOPROLOL TARTRATE 25 MG: 25 TABLET, FILM COATED ORAL at 21:13

## 2022-12-18 RX ADMIN — INSULIN LISPRO 3 UNITS: 100 INJECTION, SOLUTION INTRAVENOUS; SUBCUTANEOUS at 09:24

## 2022-12-18 RX ADMIN — SODIUM CHLORIDE, PRESERVATIVE FREE 10 ML: 5 INJECTION INTRAVENOUS at 11:01

## 2022-12-18 RX ADMIN — ROSUVASTATIN CALCIUM 20 MG: 20 TABLET, FILM COATED ORAL at 11:00

## 2022-12-18 RX ADMIN — NYSTATIN: 100000 CREAM TOPICAL at 11:03

## 2022-12-18 RX ADMIN — NYSTATIN: 100000 CREAM TOPICAL at 21:15

## 2022-12-18 RX ADMIN — INSULIN GLARGINE 5 UNITS: 100 INJECTION, SOLUTION SUBCUTANEOUS at 21:19

## 2022-12-18 RX ADMIN — AMIODARONE HYDROCHLORIDE 200 MG: 200 TABLET ORAL at 10:59

## 2022-12-18 RX ADMIN — CEFDINIR 300 MG: 300 CAPSULE ORAL at 11:02

## 2022-12-18 RX ADMIN — ASPIRIN 81 MG: 81 TABLET, CHEWABLE ORAL at 10:59

## 2022-12-18 RX ADMIN — PREDNISONE 20 MG: 20 TABLET ORAL at 11:12

## 2022-12-18 RX ADMIN — INSULIN LISPRO 3 UNITS: 100 INJECTION, SOLUTION INTRAVENOUS; SUBCUTANEOUS at 17:47

## 2022-12-18 ASSESSMENT — PAIN SCALES - GENERAL
PAINLEVEL_OUTOF10: 0

## 2022-12-18 NOTE — PLAN OF CARE
Pt free from falls this shift. Fall precautions in place at all times. Call light always withinreach. Pt able and agreeable to contact for safety appropriately. Pain/discomfort being managed with PRN analgesics per MD orders. Pt able to express presence and absence of pain and rate pain appropriately using numerical scale.

## 2022-12-18 NOTE — CONSULTS
Nephrology Consult Note                                                                                                                                                                                                                                                                                                                                                               Office : [phone]     Fax :[phone]              Patient's Name: Danielle Palmer  9:94 AM  43/[birth year]    Reason for Consult: NANETTE   Requesting Physician:  Kike Lew MD      Chief Complaint:  SOB     History of Present Ilness:    81 y.o. male who presented to [facility] with the past medical history of coronary artery disease, pacemaker placement, CHF, atrial fibrillation, and diabetes who presented to the emergency department after 3 days of worsening shortness of breath and productive cough. On today, patient's shortness of breath was very prohibitive and limiting. He presented to the emergency department hypoxic with an oxygen saturation of 86% on room air. He was evaluated and noted to be positive for influenza A and his chest x-ray also revealed some pulmonary congestion.      Past Medical History:   Diagnosis Date    A-fib [facility]) 2020    CAD, multiple vessel 12/2019    Coronary artery disease involving coronary bypass graft     Diabetes mellitus ([address]     diet controlled     Enlarged prostate     Environmental allergies     Kidney stones     Pacemaker     Systolic CHF [facility])        Past Surgical History:   Procedure Laterality Date    CARDIAC CATHETERIZATION  12/10/2019    Dr. Durand Esters - severe multi-vessel disease    CORONARY ARTERY BYPASS GRAFT N/A 3/2/2020    WAYNE; TCPB; CABG x 3, LIMA to LAD w/ long segment endarterectomy (4 cm); bilateral pulmonary vein isolation; 40 mm Atriclip to L atril appendage; endoscopic L GSV harvest; ICNB x 5 levels bilat; sternal plate x 1 to manubrium performed by Shirin Hoff MD at [address] NASAL POLYP SURGERY  x2    NASAL SEPTUM SURGERY         Family History   Problem Relation Age of Onset    Dementia Mother         Vascular    Alzheimer's Disease Mother     Heart Attack Father     Diabetes Paternal Aunt         reports that he has never smoked. He has never used smokeless tobacco. He reports that he does not currently use alcohol. He reports that he does not use drugs.     Allergies:  Codeine and Aspartame and phenylalanine    Current Medications:    predniSONE (DELTASONE) tablet 20 mg, Daily  furosemide (LASIX) tablet 80 mg, BID  benzonatate (TESSALON) capsule 200 mg, TID PRN  albuterol (PROVENTIL) nebulizer solution 2.5 mg, Q4H PRN  guaiFENesin (MUCINEX) extended release tablet 600 mg, BID  insulin lispro (1 Unit Dial) (HUMALOG/ADMELOG) pen 3 Units, TID WC  insulin glargine (LANTUS;BASAGLAR) injection pen 5 Units, Nightly  cefdinir (OMNICEF) capsule 300 mg, 2 times per day  oseltamivir (TAMIFLU) capsule 75 mg, BID  amiodarone (CORDARONE) tablet 200 mg, Daily  apixaban (ELIQUIS) tablet 5 mg, BID  aspirin chewable tablet 81 mg, Daily  metoprolol tartrate (LOPRESSOR) tablet 25 mg, BID  rosuvastatin (CRESTOR) tablet 20 mg, Daily  insulin lispro (1 Unit Dial) (HUMALOG/ADMELOG) pen 0-8 Units, TID WC  insulin lispro (1 Unit Dial) (HUMALOG/ADMELOG) pen 0-4 Units, Nightly  sodium chloride flush 0.9 % injection 5-40 mL, 2 times per day  sodium chloride flush 0.9 % injection 5-40 mL, PRN  0.9 % sodium chloride infusion, PRN  ondansetron (ZOFRAN-ODT) disintegrating tablet 4 mg, Q8H PRN   Or  ondansetron (ZOFRAN) injection 4 mg, Q6H PRN  polyethylene glycol (GLYCOLAX) packet 17 g, Daily PRN  acetaminophen (TYLENOL) tablet 650 mg, Q6H PRN   Or  acetaminophen (TYLENOL) suppository 650 mg, Q6H PRN  potassium chloride (KLOR-CON M) extended release tablet 40 mEq, PRN   Or  potassium bicarb-citric acid (EFFER-K) effervescent tablet 40 mEq, PRN   Or  potassium chloride 10 mEq/100 mL IVPB (Peripheral Line), PRN  magnesium sulfate 2000 mg in 50 mL IVPB premix, PRN  labetalol (NORMODYNE;TRANDATE) injection 10 mg, Q6H PRN  hydrALAZINE (APRESOLINE) injection 10 mg, Q6H PRN  sodium chloride flush 0.9 % injection 5-40 mL, 2 times per day  sodium chloride flush 0.9 % injection 5-40 mL, PRN  0.9 % sodium chloride infusion, PRN  glucose chewable tablet 16 g, PRN  dextrose bolus 10% 125 mL, PRN   Or  dextrose bolus 10% 250 mL, PRN  glucagon (rDNA) injection 1 mg, PRN  dextrose 10 % infusion, Continuous PRN  nystatin (MYCOSTATIN) cream, BID        Review of Systems:   14 point ROS obtained but were negative except mentioned in HPI      Physical exam:     Vitals:  /63   Pulse 71   Temp 97.7 °F (36.5 °C) (Oral)   Resp 18   Ht 6' 1\" (1.854 m)   Wt 267 lb 6.7 oz (121.3 kg)   SpO2 95%   BMI 35.28 kg/m²   Constitutional:  OAA X3 NAD  Skin: no rash, turgor wnl  Heent:  eomi, mmm  Neck: no bruits or jvd noted  Cardiovascular:  S1, S2 without m/r/g  Respiratory: CTA B without w/r/r  Abdomen:  +bs, soft, nt, nd  Ext: + lower extremity edema  Psychiatric: mood and affect appropriate  Musculoskeletal:  Rom, muscular strength intact    Data:   Labs:  CBC:   Recent Labs     12/17/22  0629 12/18/22  1031 12/19/22  0605   WBC 5.8 6.0 7.5   HGB 12.9* 14.0 15.0    170 184     BMP:    Recent Labs     12/17/22  0629 12/18/22  1031 12/19/22  0606   * 137 137   K 4.5 4.2 3.7   CL 99 99 96*   CO2 23 27 27   BUN 46* 38* 40*   CREATININE 1.7* 1.5* 1.3   GLUCOSE 191* 186* 145*     Ca/Mg/Phos:   Recent Labs     12/17/22  0629 12/18/22  1031 12/19/22  0606   CALCIUM 8.5 8.9 9.1     Hepatic:   Recent Labs     12/17/22  0629 12/18/22  1031 12/19/22  0606   AST 26 33 37   ALT 12 18 25   BILITOT 0.4 0.5 0.8   ALKPHOS 77 78 81     Troponin: No results for input(s): TROPONINI in the last 72 hours. BNP: No results for input(s): BNP in the last 72 hours.   Lipids: No results for input(s): CHOL, TRIG, HDL, LDLCALC, LABVLDL in the last 72 hours.  ABGs: No results for input(s): PHART, PO2ART, STM3FCL in the last 72 hours. INR: No results for input(s): INR in the last 72 hours. UA:  Recent Labs     12/17/22  1730   URINETYPE Other      Urine Microscopic: No results for input(s): LABCAST, BACTERIA, COMU, HYALCAST, WBCUA, RBCUA, EPIU in the last 72 hours. Urine Culture: No results for input(s): LABURIN in the last 72 hours. Urine Chemistry: No results for input(s): Bevely Bolds, PROTEINUR, NAUR in the last 72 hours. IMAGING:  XR FOOT RIGHT (MIN 3 VIEWS)   Final Result   Impression:   1. No acute fracture or dislocation. XR FOOT LEFT (MIN 3 VIEWS)   Final Result   Impression:   1. No acute fracture or dislocation. XR CHEST PORTABLE   Final Result      Marked prominence of the central bronchovascular markings most compatible with moderate pulmonary edema. Atypical pneumonia is less likely. Cardiomegaly status post CABG with left chest ICD. Assessment/Plan   NANETTE     2. HTN    3. Anemia    4. Acid- base/ Electrolyte imbalance     5.  Ac resp failure     Plan   - cont diuresis   - Ur studies  - BNP better   - monitor UO and renal function   - labs in am       Recommend to dose adjust all medications  based on renal functions  Maintain SBP> 90 mmHg   Daily weights   AVOID NSAIDs  Avoid Nephrotoxins  Monitor Intake/Output  Call if significant decrease in urine output               Thank you for allowing us to participate in care of Brando Forrester MD  Feel free to contact me   [facility] [address]  Office : [phone]  Fax :[phone]

## 2022-12-18 NOTE — PROGRESS NOTES
Hospitalist Progress Note      PCP: yCnthia Nicole MD    Date of Admission: 12/15/2022    Chief Complaint: worsening sob    Hospital Course:   Omar Ford 81 y.o. male CAD s/p CABG, PM, sCHF, a-fib, dm atrial fibrillation, coronary artery disease s/p CABG, p/ to ED after 3 days of worsening shortness of breath and productive cough, worse reported as prohibitive and limiting.      - he was hypoxic 86% on RA, work up +ve for Influenza A and his chest x-ray also revealed some pulmonary congestion. The patient was given Tamiflu and a one-time dose of Lasix. Of note, this patient also showed evidence of acute cystitis with a positive urinalysis and was given IV antibiotics with reflexive cultures. Patient has required 2 L of oxygen to bring his oxygen saturation up to 93%. He was also noted to have a BNP of 4713. This is up slightly from his baseline of approximately 3000. He did have an echocardiogram in August of this year showing an ejection fraction of 50%, improved from prior. He is on Eliquis at home and endorses compliance. Internal Medicine was asked to admit him.     Subjective:   Was seen and examined at the bedside  Still has chest wheezing added p.o. steroid  Wean off O2   Switched to oral lasix     Medications:  Reviewed    Infusion Medications    sodium chloride      sodium chloride Stopped (12/16/22 1450)    dextrose       Scheduled Medications    predniSONE  20 mg Oral Daily    furosemide  80 mg Oral BID    guaiFENesin  600 mg Oral BID    insulin lispro  3 Units SubCUTAneous TID WC    insulin glargine  5 Units SubCUTAneous Nightly    cefdinir  300 mg Oral 2 times per day    oseltamivir  75 mg Oral BID    amiodarone  200 mg Oral Daily    apixaban  5 mg Oral BID    aspirin  81 mg Oral Daily    metoprolol tartrate  25 mg Oral BID    rosuvastatin  20 mg Oral Daily    insulin lispro  0-8 Units SubCUTAneous TID WC    insulin lispro  0-4 Units SubCUTAneous Nightly    sodium chloride flush  5-40 mL IntraVENous 2 times per day    sodium chloride flush  5-40 mL IntraVENous 2 times per day    nystatin   Topical BID     PRN Meds: benzonatate, albuterol, sodium chloride flush, sodium chloride, ondansetron **OR** ondansetron, polyethylene glycol, acetaminophen **OR** acetaminophen, potassium chloride **OR** potassium alternative oral replacement **OR** potassium chloride, magnesium sulfate, labetalol, hydrALAZINE, sodium chloride flush, sodium chloride, glucose, dextrose bolus **OR** dextrose bolus, glucagon (rDNA), dextrose      Intake/Output Summary (Last 24 hours) at 12/18/2022 1003  Last data filed at 12/18/2022 0451  Gross per 24 hour   Intake 700.12 ml   Output 800 ml   Net -99.88 ml         Physical Exam Performed:    /70   Pulse 74   Temp 97.4 °F (36.3 °C) (Oral)   Resp 18   Ht 6' 1\" (1.854 m)   Wt 266 lb 12.1 oz (121 kg)   SpO2 95%   BMI 35.19 kg/m²     General appearance: No apparent distress, appears stated age and cooperative. HEENT: Pupils equal, round, and reactive to light. Conjunctivae/corneas clear. Neck: Supple, with full range of motion. No jugular venous distention. Trachea midline. Respiratory:  Normal respiratory effort. Clear to auscultation, bilaterally without Rales/Wheezes/Rhonchi. Cardiovascular: Regular rate and rhythm with normal S1/S2 without murmurs, rubs or gallops. Abdomen: Soft, non-tender, non-distended with normal bowel sounds. Musculoskeletal: No clubbing, cyanosis or edema bilaterally. Full range of motion without deformity. Skin: Skin color, texture, turgor normal.  No rashes or lesions. Neurologic:  Neurovascularly intact without any focal sensory/motor deficits.  Cranial nerves: II-XII intact, grossly non-focal.  Psychiatric: Alert and oriented, thought content appropriate, normal insight  Capillary Refill: Brisk,< 3 seconds   Peripheral Pulses: +2 palpable, equal bilaterally       Labs:   Recent Labs     12/15/22  1219 12/16/22  2018 12/17/22  0629   WBC 5.4 3.6* 5.8   HGB 13.9 12.4* 12.9*   HCT 41.7 37.8* 38.4*    134* 154       Recent Labs     12/15/22  1219 12/16/22  0456 12/17/22  0629    135* 132*   K 4.3 4.5 4.5    100 99   CO2 25 26 23   BUN 24* 29* 46*   CREATININE 1.3 1.6* 1.7*   CALCIUM 9.6 8.7 8.5       Recent Labs     12/16/22  0456 12/17/22  0629   AST 21 26   ALT 10 12   BILITOT 0.6 0.4   ALKPHOS 73 77       No results for input(s): INR in the last 72 hours. Recent Labs     12/15/22  1219 12/17/22  0629   CKTOTAL  --  175   TROPONINI <0.01  --          Urinalysis:      Lab Results   Component Value Date/Time    NITRU Negative 12/15/2022 01:44 PM    WBCUA  12/15/2022 01:44 PM    BACTERIA 2+ 12/15/2022 01:44 PM    RBCUA 5-10 12/15/2022 01:44 PM    BLOODU LARGE 12/15/2022 01:44 PM    SPECGRAV 1.025 12/15/2022 01:44 PM    GLUCOSEU Negative 12/15/2022 01:44 PM       Radiology:  XR FOOT RIGHT (MIN 3 VIEWS)   Final Result   Impression:   1. No acute fracture or dislocation. XR FOOT LEFT (MIN 3 VIEWS)   Final Result   Impression:   1. No acute fracture or dislocation. XR CHEST PORTABLE   Final Result      Marked prominence of the central bronchovascular markings most compatible with moderate pulmonary edema. Atypical pneumonia is less likely. Cardiomegaly status post CABG with left chest ICD.               Assessment/Plan:    Active Hospital Problems    Diagnosis Date Noted    Acute respiratory failure ([facility] [J96.00] 12/15/2022     Priority: Medium    Influenza A [J10.1] 12/15/2022     Priority: Medium    Malignant hypertension [I10] 12/15/2022     Priority: Medium    Paroxysmal A-fib ([address] [I48.0] 08/27/2022     Priority: Medium    Acute on chronic combined systolic (congestive) and diastolic (congestive) heart failure ([facility] [I50.43] 08/22/2022     Priority: Medium    Uncontrolled type 2 diabetes mellitus with hyperglycemia ([facility] [E11.65] 08/22/2022     Priority: Medium    Acute cystitis [N30.00] 03/02/2020    CAD, multiple vessel [I25.10]      Acute resp failure with hypoxemia   - needing 2 L NC oxygen to maintain o2 sats, noted to be panting per H&P note, tachypnea. Multifactorial including influenza and probable CHF exacerbation  -Oxygen support with Tamiflu therapy; increase activity. - Wean off oxygen for SpO2 92% or above  Add Po steroid due to chest wheezing      Influenza a infection  -Tamiflu, supportive care, add mucinex  -Droplet isolation precautions    NANETTE, Azotemia  - check UA, urine sodum, BNP  - previously at home was on lasix but stopped as didn't want to go to bathroom so many time  - started on  lasix 80 mg bid PO, his prior home dose  - Nephrology consulted    Acute on chronic combined congestive heart failure  -No need to repeat echocardiogram.  - he was give IV lasix on admission and cr went up, then start home dose of lasix  - repeat proBNP  -Continue cardioprotective medications  Serum creatinine today 1.7    Acute cystitis with a concern for urinary tract infection  - cefidinir bid for now  - Follow-up on urinary cultures  -Monitor for any urine retention and treat accordingly    Malignant hypertension  -Continue home blood pressure medications and cover with as needed labetalol and hydralazine with parameters as written    Hyperglycemia with type 2 diabetes mellitus  -Correctional coverage for now  -Last hemoglobin A1c was August 2022 at a level of 7.2. Well-controlled. Continue to monitor. Chronic atrial fibrillation  - Continue amiodarone, metoprolol and Eliquis  - ok to monitor off tele as chronic problem    LE wounds, podiatary consulted for evaluaiton and recommendations, Diabetic foot ulcerations, bilateral foot    DVT Prophylaxis: apixaban  Diet: ADULT DIET; Regular; 5 carb choices (75 gm/meal);  Low Fat/Low Chol/High Fiber/2 gm Na  Code Status: Full Code    PT/OT Eval Status: evaluate daily order if needed    Dispo - continue inpatient care, nephrology consulted for evaluation and recommendations  Wean off O2 , pt/ot sanam Hess MD  Internal Medicine Hospitalist

## 2022-12-18 NOTE — PROGRESS NOTES
Patient is A&O x4.  O2 1.5L, sat 95%. No complaints of pain or SOB. Respirations appear to easy and unlabored. Lungs clear. Respirations easy with no complaints of cough. No complaints of nausea/vomiting/diarrhea. Up ad chaitanya to the bathroom as needed. Right AC PIV intact and flushed. Tolerating regular diet. Plan of care and safety measures reviewed with the patient. Call light in reach. Will continue to monitor.   Electronically signed by Eli Serrano RN on 12/18/2022 at 12:12 AM

## 2022-12-19 VITALS
RESPIRATION RATE: 18 BRPM | DIASTOLIC BLOOD PRESSURE: 63 MMHG | WEIGHT: 267.42 LBS | OXYGEN SATURATION: 95 % | HEIGHT: 73 IN | TEMPERATURE: 97.7 F | SYSTOLIC BLOOD PRESSURE: 101 MMHG | BODY MASS INDEX: 35.44 KG/M2 | HEART RATE: 71 BPM

## 2022-12-19 PROBLEM — J11.1 INFLUENZA WITH RESPIRATORY MANIFESTATION OTHER THAN PNEUMONIA: Status: ACTIVE | Noted: 2022-12-15

## 2022-12-19 PROBLEM — R09.02 HYPOXIA: Status: ACTIVE | Noted: 2022-12-19

## 2022-12-19 PROBLEM — N17.9 AKI (ACUTE KIDNEY INJURY) (HCC): Status: ACTIVE | Noted: 2022-12-19

## 2022-12-19 PROBLEM — I10 MALIGNANT HYPERTENSION: Status: RESOLVED | Noted: 2022-12-15 | Resolved: 2022-12-19

## 2022-12-19 PROBLEM — N17.9 AKI (ACUTE KIDNEY INJURY) (HCC): Status: RESOLVED | Noted: 2022-12-19 | Resolved: 2022-12-19

## 2022-12-19 PROBLEM — N39.0 URINARY TRACT INFECTION WITHOUT HEMATURIA: Status: ACTIVE | Noted: 2022-12-19

## 2022-12-19 PROBLEM — J96.00 ACUTE RESPIRATORY FAILURE (HCC): Status: RESOLVED | Noted: 2022-12-15 | Resolved: 2022-12-19

## 2022-12-19 PROBLEM — R09.02 HYPOXIA: Status: RESOLVED | Noted: 2022-12-19 | Resolved: 2022-12-19

## 2022-12-19 LAB
A/G RATIO: 0.9 (ref 1.1–2.2)
ALBUMIN SERPL-MCNC: 3.7 G/DL (ref 3.4–5)
ALP BLD-CCNC: 81 U/L (ref 40–129)
ALT SERPL-CCNC: 25 U/L (ref 10–40)
ANION GAP SERPL CALCULATED.3IONS-SCNC: 14 MMOL/L (ref 3–16)
AST SERPL-CCNC: 37 U/L (ref 15–37)
BASOPHILS ABSOLUTE: 0 K/UL (ref 0–0.2)
BASOPHILS RELATIVE PERCENT: 0.5 %
BILIRUB SERPL-MCNC: 0.8 MG/DL (ref 0–1)
BLOOD CULTURE, ROUTINE: NORMAL
BUN BLDV-MCNC: 40 MG/DL (ref 7–20)
CALCIUM SERPL-MCNC: 9.1 MG/DL (ref 8.3–10.6)
CHLORIDE BLD-SCNC: 96 MMOL/L (ref 99–110)
CO2: 27 MMOL/L (ref 21–32)
CREAT SERPL-MCNC: 1.3 MG/DL (ref 0.8–1.3)
CULTURE, BLOOD 2: NORMAL
EOSINOPHILS ABSOLUTE: 0.1 K/UL (ref 0–0.6)
EOSINOPHILS RELATIVE PERCENT: 1.1 %
GFR SERPL CREATININE-BSD FRML MDRD: 59 ML/MIN/{1.73_M2}
GLUCOSE BLD-MCNC: 145 MG/DL (ref 70–99)
GLUCOSE BLD-MCNC: 195 MG/DL (ref 70–99)
HCT VFR BLD CALC: 44.3 % (ref 40.5–52.5)
HEMOGLOBIN: 15 G/DL (ref 13.5–17.5)
LYMPHOCYTES ABSOLUTE: 1.4 K/UL (ref 1–5.1)
LYMPHOCYTES RELATIVE PERCENT: 18.2 %
MCH RBC QN AUTO: 29.4 PG (ref 26–34)
MCHC RBC AUTO-ENTMCNC: 33.9 G/DL (ref 31–36)
MCV RBC AUTO: 86.7 FL (ref 80–100)
MONOCYTES ABSOLUTE: 0.8 K/UL (ref 0–1.3)
MONOCYTES RELATIVE PERCENT: 10.5 %
NEUTROPHILS ABSOLUTE: 5.2 K/UL (ref 1.7–7.7)
NEUTROPHILS RELATIVE PERCENT: 69.7 %
PDW BLD-RTO: 14.2 % (ref 12.4–15.4)
PERFORMED ON: ABNORMAL
PLATELET # BLD: 184 K/UL (ref 135–450)
PMV BLD AUTO: 9 FL (ref 5–10.5)
POTASSIUM REFLEX MAGNESIUM: 3.7 MMOL/L (ref 3.5–5.1)
RBC # BLD: 5.11 M/UL (ref 4.2–5.9)
SODIUM BLD-SCNC: 137 MMOL/L (ref 136–145)
TOTAL PROTEIN: 7.7 G/DL (ref 6.4–8.2)
WBC # BLD: 7.5 K/UL (ref 4–11)

## 2022-12-19 PROCEDURE — 97162 PT EVAL MOD COMPLEX 30 MIN: CPT

## 2022-12-19 PROCEDURE — 97165 OT EVAL LOW COMPLEX 30 MIN: CPT

## 2022-12-19 PROCEDURE — 99233 SBSQ HOSP IP/OBS HIGH 50: CPT | Performed by: INTERNAL MEDICINE

## 2022-12-19 PROCEDURE — 6370000000 HC RX 637 (ALT 250 FOR IP): Performed by: INTERNAL MEDICINE

## 2022-12-19 PROCEDURE — 97116 GAIT TRAINING THERAPY: CPT

## 2022-12-19 PROCEDURE — 80053 COMPREHEN METABOLIC PANEL: CPT

## 2022-12-19 PROCEDURE — 6370000000 HC RX 637 (ALT 250 FOR IP): Performed by: HOSPITALIST

## 2022-12-19 PROCEDURE — 97535 SELF CARE MNGMENT TRAINING: CPT

## 2022-12-19 PROCEDURE — 36415 COLL VENOUS BLD VENIPUNCTURE: CPT

## 2022-12-19 PROCEDURE — 85025 COMPLETE CBC W/AUTO DIFF WBC: CPT

## 2022-12-19 RX ORDER — POTASSIUM CHLORIDE 750 MG/1
10 TABLET, EXTENDED RELEASE ORAL 2 TIMES DAILY
Qty: 60 TABLET | Refills: 0 | Status: SHIPPED | OUTPATIENT
Start: 2022-12-19

## 2022-12-19 RX ORDER — FUROSEMIDE 80 MG
80 TABLET ORAL 2 TIMES DAILY
Qty: 180 TABLET | Refills: 0 | Status: SHIPPED | OUTPATIENT
Start: 2022-12-19

## 2022-12-19 RX ORDER — ALBUTEROL SULFATE 2.5 MG/3ML
2.5 SOLUTION RESPIRATORY (INHALATION) EVERY 4 HOURS PRN
Qty: 120 EACH | Refills: 0 | Status: SHIPPED | OUTPATIENT
Start: 2022-12-19

## 2022-12-19 RX ORDER — OSELTAMIVIR PHOSPHATE 75 MG/1
75 CAPSULE ORAL 2 TIMES DAILY
Qty: 1 CAPSULE | Refills: 0 | Status: SHIPPED | OUTPATIENT
Start: 2022-12-19 | End: 2022-12-20

## 2022-12-19 RX ORDER — CEFDINIR 300 MG/1
300 CAPSULE ORAL EVERY 12 HOURS SCHEDULED
Qty: 9 CAPSULE | Refills: 0 | Status: SHIPPED | OUTPATIENT
Start: 2022-12-19 | End: 2022-12-24

## 2022-12-19 RX ORDER — PREDNISONE 20 MG/1
20 TABLET ORAL DAILY
Qty: 4 TABLET | Refills: 0 | Status: SHIPPED | OUTPATIENT
Start: 2022-12-20 | End: 2022-12-24

## 2022-12-19 RX ADMIN — GUAIFENESIN 600 MG: 600 TABLET ORAL at 09:46

## 2022-12-19 RX ADMIN — FUROSEMIDE 80 MG: 80 TABLET ORAL at 09:51

## 2022-12-19 RX ADMIN — ROSUVASTATIN CALCIUM 20 MG: 20 TABLET, FILM COATED ORAL at 09:49

## 2022-12-19 RX ADMIN — PREDNISONE 20 MG: 20 TABLET ORAL at 09:47

## 2022-12-19 RX ADMIN — BENZONATATE 200 MG: 100 CAPSULE ORAL at 09:51

## 2022-12-19 RX ADMIN — AMIODARONE HYDROCHLORIDE 200 MG: 200 TABLET ORAL at 09:47

## 2022-12-19 RX ADMIN — APIXABAN 5 MG: 5 TABLET, FILM COATED ORAL at 09:47

## 2022-12-19 RX ADMIN — CEFDINIR 300 MG: 300 CAPSULE ORAL at 09:48

## 2022-12-19 RX ADMIN — METOPROLOL TARTRATE 25 MG: 25 TABLET, FILM COATED ORAL at 09:46

## 2022-12-19 RX ADMIN — ASPIRIN 81 MG: 81 TABLET, CHEWABLE ORAL at 09:46

## 2022-12-19 RX ADMIN — INSULIN LISPRO 3 UNITS: 100 INJECTION, SOLUTION INTRAVENOUS; SUBCUTANEOUS at 12:50

## 2022-12-19 RX ADMIN — OSELTAMIVIR PHOSPHATE 75 MG: 75 CAPSULE ORAL at 09:47

## 2022-12-19 RX ADMIN — INSULIN LISPRO 3 UNITS: 100 INJECTION, SOLUTION INTRAVENOUS; SUBCUTANEOUS at 09:55

## 2022-12-19 ASSESSMENT — PAIN SCALES - GENERAL
PAINLEVEL_OUTOF10: 0
PAINLEVEL_OUTOF10: 0

## 2022-12-19 NOTE — CARE COORDINATION
Case Management Assessment            Discharge Note                    Date / Time of Note: 12/19/2022 2:40 PM                  Discharge Note Completed by: EYAL Ramos    Patient Name: Elijah Dimas   YOB: 1950  Diagnosis: Acute respiratory failure ([address] [J96.00]  Hypoxia [R09.02]  Influenza with respiratory manifestation other than pneumonia [J11.1]  Urinary tract infection without hematuria, site unspecified [N39.0]   Date / Time: 12/15/2022 11:57 AM    Current PCP: Naya Griggs MD  Clinic patient: No    Hospitalization in the last 30 days: No    Advance Directives:  Code Status: Full Code  [place] DNR form completed and on chart: Not Indicated    Financial:  Payor: Lacy  / Plan: Joshua Salvage / Product Type: *No Product type* /      Pharmacy:    [facility] [address] [phone]  [facility]  Phone: [phone] Fax: [phone]      Assistance purchasing medications?: Potential Assistance Purchasing Medications: No  Assistance provided by Case Management: None at this time    Does patient want to participate in local refill/ meds to beds program?:      Meds To Beds General Rules:  1. Can ONLY be done Monday- Friday between 8:30am-5pm  2. Prescription(s) must be in pharmacy by 3pm to be filled same day  3. Copy of patient's insurance/ prescription drug card and patient face sheet must be sent along with the prescription(s)  4. Cost of Rx cannot be added to hospital bill. If financial assistance is needed, please contact unit  or ;  or  CANNOT provide pharmacy voucher for patients co-pays  5.  Patients can then  the prescription on their way out of the hospital at discharge, or pharmacy can deliver to the bedside if staff is available. (payment due at time of pick-up or delivery - cash, check, or card accepted) Able to afford home medications/ co-pay costs: Yes    ADLS:  Current PT AM-PAC Score: 24 /24  Current OT AM-PAC Score: 24 /24      DISCHARGE Disposition: Home- No Services Needed    LOC at discharge: Not Applicable  SAMINA Completed: Not Indicated    Notification completed in HENS/PAS?:  Not Applicable    IMM Completed:   Not Indicated    Transportation:  Transportation PLAN for discharge: self   Mode of Transport: Private Car  Reason for medical transport: Not Applicable  Name of Transport Company: Not Applicable  Time of Transport: n/a    Transport form completed: Not Indicated    Home Care:  [address] ordered at discharge: Not [address]: Not Applicable  Orders faxed: No    Durable Medical Equipment:  DME Provider: none  Equipment obtained during hospitalization: none    Referrals made at [facility] for outpatient continued care:  Not Applicable    Additional CM Notes: CM met with pt at bedside. Pt reports he recently sold his home and purchased an RV where he is living. Pt reports that he moves around various locations in [place] with his RV including several [employer] parking lots. PT/OT note pt to be at baseline and make no recs for [facility] or [facility]. Pt agrees that he will return to his RV at discharge with no C, DME or CM needs. Pt reports his RV is currently parked at [place] [address] and thus transportation will not be an issue for the pt.      The Plan for Transition of Care is related to the following treatment goals of Acute respiratory failure ([facility] [J96.00]  Hypoxia [R09.02]  Influenza with respiratory manifestation other than pneumonia [J11.1]  Urinary tract infection without hematuria, site unspecified [N39.0]    The Patient and/or patient representative SURG[facility] and his family were provided with a choice of provider and agrees with the discharge plan Yes    Freedom of choice list was provided with basic dialogue that supports the patient's individualized plan of care/goals and shares the quality data associated with the providers.  Yes    Care Transitions patient: Yes    EYAL Small  [facility]  Case Management Department  Ph: [phone]  Fax: [phone]

## 2022-12-19 NOTE — PROGRESS NOTES
Pt discharged home. Pt verbalized understanding of medication schedule, new medications and follow up appointments.

## 2022-12-19 NOTE — DISCHARGE SUMMARY
Hospital Medicine Discharge Summary    Patient ID: Michi Nieto      Patient's PCP: Karsten Baird MD    Admit Date: 12/15/2022     Discharge Date: 12/19/2022      Admitting Provider: Merced Molina MD     Discharge Provider: Corinne Grace, MD     Discharge Diagnoses: Active Hospital Problems    Diagnosis     Urinary tract infection without hematuria [N39.0]      Priority: Medium    Influenza with respiratory manifestation other than pneumonia [J11.1]      Priority: Medium    Paroxysmal A-fib (HCC) [I48.0]      Priority: Medium    Acute on chronic combined systolic (congestive) and diastolic (congestive) heart failure (HCC) [I50.43]      Priority: Medium    Uncontrolled type 2 diabetes mellitus with hyperglycemia (HCC) [E11.65]      Priority: Medium    Acute cystitis [N30.00]     CAD, multiple vessel [I25.10]        The patient was seen and examined on day of discharge and this discharge summary is in conjunction with any daily progress note from day of discharge. Hospital Course: Adam Chaidez 81 y.o. male w/ CAD s/p CABG, PM, sCHF, a-fib, dm who presented with 3 days of worsening shortness of breath and productive cough. Found to be hypoxic 86% on RA, work up + for Influenza A and his chest x-ray also revealed some pulmonary congestion. The patient was given Tamiflu and a one-time dose of Lasix and admitted for further care. Restarted on his prior home dose of Lasix at 80 mg bid which he tolerated well. Symptoms improved with symptomatic care. Able to wean off oxygen and stable on room air for ~24 hours prior to discharge. Given a short course of prednisone due to audible wheezing on exam with improvement. Also showed evidence of acute cystitis with a positive urinalysis and was given IV antibiotics with clinical improvement and transitioned to PO cefdinir on discharge to complete course. Nephrology consulted for mild NANETTE on CKD and Cr improved to baseline with diuresis.  Resumed prior home dose of Lasix 80 mg PO bid on discharge: to be adjusted as necessary by outpatient providers. Physical Exam Performed:     /63   Pulse 71   Temp 97.7 °F (36.5 °C) (Oral)   Resp 18   Ht 6' 1\" (1.854 m)   Wt 267 lb 6.7 oz (121.3 kg)   SpO2 95%   BMI 35.28 kg/m²       General appearance:  No apparent distress, appears stated age and cooperative. HEENT:  Normal cephalic, atraumatic without obvious deformity. Pupils equal, round, and reactive to light. Extra ocular muscles intact. Conjunctivae/corneas clear. Neck: Supple, with full range of motion. No jugular venous distention. Trachea midline. Respiratory:  Normal respiratory effort on room air. Some mild scattered wheezes but no rales or rhonchi  Cardiovascular:  Regular rate and rhythm with normal S1/S2 without murmurs, rubs or gallops. Abdomen: Soft, non-tender, non-distended with normal bowel sounds. Musculoskeletal:  No clubbing, cyanosis or edema bilaterally. Full range of motion without deformity. Skin: Skin color, texture, turgor normal.  No rashes or lesions. Neurologic:  Neurovascularly intact without any focal sensory/motor deficits. Cranial nerves: II-XII intact, grossly non-focal.  Psychiatric:  Alert and oriented, thought content appropriate, normal insight  Capillary Refill: Brisk,< 3 seconds   Peripheral Pulses: +2 palpable, equal bilaterally       Labs:  For convenience and continuity at follow-up the following most recent labs are provided:      CBC:    Lab Results   Component Value Date/Time    WBC 7.5 12/19/2022 06:05 AM    HGB 15.0 12/19/2022 06:05 AM    HCT 44.3 12/19/2022 06:05 AM     12/19/2022 06:05 AM       Renal:    Lab Results   Component Value Date/Time     12/19/2022 06:06 AM    K 3.7 12/19/2022 06:06 AM    CL 96 12/19/2022 06:06 AM    CO2 27 12/19/2022 06:06 AM    BUN 40 12/19/2022 06:06 AM    CREATININE 1.3 12/19/2022 06:06 AM    CALCIUM 9.1 12/19/2022 06:06 AM    PHOS 4.2 08/26/2022 06:50 AM Significant Diagnostic Studies    Radiology:   XR FOOT RIGHT (MIN 3 VIEWS)   Final Result   Impression:   1. No acute fracture or dislocation. XR FOOT LEFT (MIN 3 VIEWS)   Final Result   Impression:   1. No acute fracture or dislocation. XR CHEST PORTABLE   Final Result      Marked prominence of the central bronchovascular markings most compatible with moderate pulmonary edema. Atypical pneumonia is less likely. Cardiomegaly status post CABG with left chest ICD.              Consults:     IP CONSULT TO HOSPITALIST  IP CONSULT TO PODIATRY  IP CONSULT TO NEPHROLOGY    Disposition:  Home     Condition at Discharge: Stable    Discharge Instructions/Follow-up:  PCP follow-up in 1-2 weeks for routine hospital follow-up    Code Status:  Full Code     Activity: activity as tolerated    Diet: diabetic diet      Discharge Medications:     Discharge Medication List as of 12/19/2022  3:01 PM             Details   albuterol (PROVENTIL) (2.5 MG/3ML) 0.083% nebulizer solution Take 3 mLs by nebulization every 4 hours as needed for Wheezing, Disp-120 each, R-0Normal      oseltamivir (TAMIFLU) 75 MG capsule Take 1 capsule by mouth 2 times daily for 1 dose, Disp-1 capsule, R-0Normal      cefdinir (OMNICEF) 300 MG capsule Take 1 capsule by mouth every 12 hours for 9 doses, Disp-9 capsule, R-0Normal      predniSONE (DELTASONE) 20 MG tablet Take 1 tablet by mouth daily for 4 days, Disp-4 tablet, R-0Normal                Details   furosemide (LASIX) 80 MG tablet Take 1 tablet by mouth in the morning and 1 tablet in the evening., Disp-180 tablet, R-0Normal      potassium chloride (KLOR-CON M) 10 MEQ extended release tablet Take 1 tablet by mouth 2 times daily, Disp-60 tablet, R-0Normal                Details   rosuvastatin (CRESTOR) 20 MG tablet Take 1 tablet by mouth daily, Disp-90 tablet, R-3Normal      amiodarone (CORDARONE) 200 MG tablet Take 1 tablet by mouth daily, Disp-30 tablet, R-0Normal      EPINEPHrine (PRIMATENE MIST) 0.125 MG/ACT AERO Inhale 2 puffs into the lungs onceHistorical Med      metoprolol tartrate (LOPRESSOR) 25 MG tablet TAKE 1 TABLET BY MOUTH TWICE DAILYHistorical Med      aspirin 81 MG chewable tablet Take 1 tablet by mouth daily, Disp-30 tablet, R-3Normal      apixaban (ELIQUIS) 5 MG TABS tablet Take 1 tablet by mouth 2 times daily, Disp-60 tablet, R-5Print      Handicap Placard [facility] Starting Tue 1/28/2020, Disp-1 each, R-0, PrintPt cannot walk more that 250 feet without becoming SOB. Dx: CAD, Cardiomyopathy, Atrial Fibrilation   Not to exceed 5 years             Time Spent on discharge: 25 minutes in the examination, evaluation, counseling and review of medications and discharge plan. Signed:    Lyle Kong MD   12/19/2022      Thank you Monique Stanley MD for the opportunity to be involved in this patient's care. If you have any questions or concerns, please feel free to contact me at [phone].

## 2022-12-19 NOTE — PROGRESS NOTES
Physical Therapy  Facility/Department: [facility] [address]  Physical Therapy Initial Assessment, Tx & DC Summary     Name: Corey Cuello  :   MRN: [id number]  Date of Service: 2022    Discharge Recommendations:  Corey Cuello scored a 46/94 on the AM-PAC short mobility form. At this time, no further PT is recommended upon discharge due to functioning/mobilizing independently. Recommend patient returns to prior setting with prior services. PT Equipment Recommendations  Equipment Needed: No      Patient Diagnosis(es): The primary encounter diagnosis was Influenza with respiratory manifestation other than pneumonia. Diagnoses of Hypoxia, Urinary tract infection without hematuria, site unspecified, and Acute on chronic combined systolic (congestive) and diastolic (congestive) heart failure ([facility] [address] were also pertinent to this visit. Past Medical History:  has a past medical history of A-fib ([facility] [address] CAD, multiple vessel, Coronary artery disease involving coronary bypass graft, Diabetes mellitus ([facility] [address] Enlarged prostate, Environmental allergies, Kidney stones, Pacemaker, and Systolic CHF ([facility] [address] Past Surgical History:  has a past surgical history that includes Cardiac catheterization (12/10/2019); Nasal polyp surgery (x2); Nasal septum surgery; and Coronary artery bypass graft (N/A, 3/2/2020). Assessment   Assessment: Pt presents with SOB & found to be flu (+). He tolerated PT assessment/tx today with no adverse events. Overall, pt functioning at his baseline/mobilizing independently on level surfaces. Pt plans to return home to his RV & reports no questions and/or concerns in regards to his mobility or self care. Pt demo's no deficits in his strength, balance, mobility or activity tolerance. He will be DC'd from PT services 2/2 no needs. Recommend PRN assist upon returning home with no additional PT services warranted. Please re-refer should new needs arise during pt's hospital stay.   Treatment Diagnosis: dec'd mobility  Therapy Prognosis: Good  Decision Making: Medium Complexity  Barriers to Learning: n/a  Requires PT Follow-Up: No  Activity Tolerance  Activity Tolerance: Patient tolerated evaluation without incident     Plan   Physcial Therapy Plan  General Plan: Discharge with evaluation only  Safety Devices  Type of Devices: Left in chair, Call light within reach, Nurse notified, Chair alarm in place  Restraints  Restraints Initially in Place: No     Restrictions  Position Activity Restriction  Other position/activity restrictions: up with assistance     Subjective   General  Chart Reviewed: Yes  Patient assessed for rehabilitation services?: Yes  Additional Pertinent Hx: 82 y/o M presents with SOB & productive cough. Testing postive for flu.  PMH: coronary artery disease, pacemaker placement, CHF, atrial fibrillation, and diabetes  Response To Previous Treatment: Not applicable  Family / Caregiver Present: No  Referring Practitioner: Trinity Sosa MD  Referral Date : 12/18/22  Diagnosis: acute respiratory failure  Follows Commands: Within Functional Limits  General Comment  Comments: pt resting in bed upon PT arrival  Subjective  Subjective: Pt agreeable to PT evaluation/tx         Social/Functional History  Social/Functional History  Lives With: Alone  Type of Home:  (lives in motor home)  Home Layout: One level  Home Access: Stairs to enter with rails  Entrance Stairs - Number of Steps: 3ste  Entrance Stairs - Rails: Left  Bathroom Shower/Tub: Walk-in shower  Bathroom Toilet: Standard  Home Equipment: Cane  Has the patient had two or more falls in the past year or any fall with injury in the past year?: No  ADL Assistance: Independent  Homemaking Assistance: Independent  Ambulation Assistance: Independent (\"uses the cane every now and then\")  Transfer Assistance: Independent  Active : Yes  Occupation: Retired  Type of Occupation: real [employer]  Leisure & Hobbies: likes to read and surf the internet  Vision/Hearing  Vision  Vision: Impaired  Vision Exceptions: Wears glasses at all times  Hearing  Hearing: Within functional limits    Cognition   Orientation  Overall Orientation Status: Within Normal Limits     Objective   Heart Rate: 71  Heart Rate Source: Monitor  BP: 101/63  BP Location: Right upper arm  BP Method: Automatic  Patient Position: Semi fowlers  MAP (Calculated): 76  Resp: 18  SpO2: 95 %  O2 Device: None (Room air)     Observation/Palpation  Posture: Fair        AROM RLE (degrees)  RLE AROM: WFL  AROM LLE (degrees)  LLE AROM : WFL  Strength RLE  Strength RLE: WFL  Strength LLE  Strength LLE: WFL        Balance  Sitting: Intact  Standing: Intact  Gait  Overall Level of Assistance: Independent (Pt demo independent functional mobility to/from bathroom and ~140ft in hallway w/o AD)  Bed mobility  Supine to Sit: Modified independent (with HOB elevated  Simultaneous filing. User may not have seen previous data.)  Transfers  Sit to Stand: Independent  Stand to Sit: Independent  Ambulation  WB Status: n/a  Ambulation  Surface: Level tile  Device: No Device  Assistance: Independent  Quality of Gait: CHRISTIAN WNL with a slow step through gait pattern. No LOB. No noted LEE  Distance: short distances in hospital room & 130'     Balance  Posture: Fair  Sitting - Static:  (I)  Sitting - Dynamic:  (I)  Standing - Static:  (i)  Standing - Dynamic:  (I)                               AM-PAC Score  AM-PAC Inpatient Mobility Raw Score : 24 (12/19/22 1138)  AM-PAC Inpatient T-Scale Score : 61.14 (12/19/22 1138)  Mobility Inpatient CMS 0-100% Score: 0 (12/19/22 1138)  Mobility Inpatient CMS G-Code Modifier : [address] (12/19/22 113)          Goals  Short Term Goals  Time Frame for Short Term Goals: n/a 2/2 pt with no acute care PT needs.   Patient Goals   Patient Goals : to return home       Education  Patient Education  Education Given To: Patient  Education Provided: Role of Therapy  Education Method: Verbal  Barriers to Learning: None  Education Outcome: Verbalized understanding;Demonstrated understanding      Therapy Time   Individual Concurrent Group Co-treatment   Time In 0855         Time Out 0926         Minutes 31         Timed Code Treatment Minutes: Diandra Hatch PT

## 2022-12-19 NOTE — CARE COORDINATION
CM following: CM met with pt at bedside. Pt reports he recently sold his home and purchased an RV where he is living. Pt reports that he moves around various locations in [place] with his RV including several [employer] parking lots. PT/OT note pt to be at baseline and make no recs for [facility] or DME. Pt agrees that he will return to his RV at discharge with no [facility], DME or CM needs. Pt reports his RV is currently parked at [facility] and thus transportation will not be an issue for the pt.    Electronically signed by EYAL Nguyen on 12/19/2022 at 1:16 PM  [phone]

## 2022-12-19 NOTE — PROGRESS NOTES
Patient is A&O x4.  RA, sat 95%. No complaints of pain or SOB. Respirations appear to easy and unlabored. Lungs clear. Respirations easy with no complaints of cough. No complaints of nausea/vomiting/diarrhea. Up ad chaitanya to the bathroom as needed. Right AC PIV intact and flushed. Tolerating regular diet. Plan of care and safety measures reviewed with the patient. Call light in reach. Will continue to monitor.   Electronically signed by Zeferino Lozada RN on 12/18/2022 at 11:13 PM

## 2022-12-19 NOTE — PROGRESS NOTES
Occupational Therapy  Facility/Department: [address] [place] [address] Initial Assessment/Treatment    Name: Omar Ford  :   MRN: [id number]  Date of Service: 2022    Discharge Recommendations:  Omar Ford scored a 42/10 on the -[facility] ADL Inpatient form. At this time, no further OT is recommended upon discharge. Recommend patient returns to prior setting with prior services. OT Equipment Recommendations  Equipment Needed: No       Patient Diagnosis(es): The primary encounter diagnosis was Influenza with respiratory manifestation other than pneumonia. Diagnoses of Hypoxia, Urinary tract infection without hematuria, site unspecified, and Acute on chronic combined systolic (congestive) and diastolic (congestive) heart failure ([facility] [address] were also pertinent to this visit. Past Medical History:  has a past medical history of A-fib ([facility] [address] CAD, multiple vessel, Coronary artery disease involving coronary bypass graft, Diabetes mellitus ([facility] [address] Enlarged prostate, Environmental allergies, Kidney stones, Pacemaker, and Systolic CHF ([facility] [address] Past Surgical History:  has a past surgical history that includes Cardiac catheterization (12/10/2019); Nasal polyp surgery (x2); Nasal septum surgery; and Coronary artery bypass graft (N/A, 3/2/2020). Treatment Diagnosis: impaired ADLs and functional mobility/transfers      Assessment   Performance deficits / Impairments: Decreased functional mobility ; Decreased ADL status  Assessment: Pt is a 80 y/o M who presents from home alone. Pt reports being independent w/ all ADLs, IADLs, functional mobility w/o AD, and transfers pta. Currently,pt demo bed mobility and functional mobility independently w/o AD. Pt also demo LE dressing and toileting independently this date. Pt [place] at [place] and denies any concersn w/ return to daily tasks/obligations. Rec pt return home at [place]. No additional OT needs identified, will sign off.   Treatment Diagnosis: impaired ADLs and functional mobility/transfers  Prognosis: Good  Decision Making: Low Complexity  REQUIRES OT FOLLOW-UP: No  Activity Tolerance  Activity Tolerance: Patient Tolerated treatment well        Plan   Occupational Therapy Plan  Times Per Week: dc acute OT     Restrictions  Position Activity Restriction  Other position/activity restrictions: up with assistance    Subjective   General  Chart Reviewed: Yes  Patient assessed for rehabilitation services?: Yes  Additional Pertinent Hx: 81 y.o. male w/ CAD s/p CABG, PM, sCHF, a-fib, dm who presented with 3 days of worsening shortness of breath and productive cough. Found to be hypoxic 86% on RA, work up +ve for Influenza A and his chest x-ray also revealed some pulmonary congestion. The patient was given Tamiflu and a one-time dose of Lasix and admitted for further care. Also showed evidence of acute cystitis with a positive urinalysis and was given IV antibiotics with reflexive cultures.   Family / Caregiver Present: No  Referring Practitioner: Radha Urbina MD  Diagnosis: Acute respiratory failure  Subjective  Subjective: Pt in bed upon arrival, pleasant and agreeable to OT eval and treat     Social/Functional History  Social/Functional History  Lives With: Alone  Type of Home:  (lives in motor home)  Home Layout: One level  Home Access: Stairs to enter with rails  Entrance Stairs - Number of Steps: 3ste  Entrance Stairs - Rails: Left  Bathroom Shower/Tub: Walk-in shower  Bathroom Toilet: Standard  Home Equipment: Cane  Has the patient had two or more falls in the past year or any fall with injury in the past year?: No  ADL Assistance: Independent  Homemaking Assistance: Independent  Ambulation Assistance: Independent (\"uses the cane every now and then\")  Transfer Assistance: Independent  Active : Yes  Occupation: Retired  Type of Occupation: real estate [employer]  Leisure & Hobbies: likes to read and surf the internet       Objective Heart Rate: 71  Heart Rate Source: Monitor  BP: 101/63  BP Location: Right upper arm  BP Method: Automatic  Patient Position: Semi fowlers  MAP (Calculated): 76  Resp: 18  SpO2: 95 %  O2 Device: None (Room air)          Observation/Palpation  Posture: Fair  Safety Devices  Type of Devices: Left in chair;Call light within reach;Nurse notified; Chair alarm in place  Restraints  Restraints Initially in Place: No  Balance  Sitting: Intact  Standing: Intact  Gait  Overall Level of Assistance: Independent (Pt demo independent functional mobility to/from bathroom and ~140ft in hallway w/o AD)  Toilet Transfers  Toilet - Technique: Ambulating  Equipment Used: Standard toilet  Toilet Transfer: Independent  AROM: Within functional limits  PROM: Within functional limits  Strength: Within functional limits  Coordination: Within functional limits  Tone: Normal  Sensation: Intact  ADL  Feeding: Independent; Beverage management  Grooming: Independent  LE Dressing: Independent  Toileting: Independent  Toileting Skilled Clinical Factors: simulated, denied need to void during session     Activity Tolerance  Activity Tolerance: Patient tolerated evaluation without incident  Bed mobility  Scooting: Independent  Transfers  Sit to stand: Independent  Stand to sit: Independent  Vision  Vision: Impaired  Vision Exceptions: Wears glasses at all times  Hearing  Hearing: Within functional limits  Cognition  Overall Cognitive Status: WFL  Orientation  Overall Orientation Status: Within Normal Limits  Orientation Level: Oriented X4  Perception  Overall Perceptual Status: WFL               Education Given To: Patient  Education Provided: Role of Therapy;Plan of Care;Transfer Training;ADL Adaptive Strategies; Energy Conservation  Education Method: Verbal  Barriers to Learning: None  Education Outcome: Verbalized understanding  LUE AROM (degrees)  LUE AROM : WFL  Left Hand AROM (degrees)  Left Hand AROM: WFL  RUE AROM (degrees)  RUE AROM : WFL  Right Hand AROM (degrees)  Right Hand AROM: [facility]                     AM-PAC Score        AM-PAC Inpatient Daily Activity Raw Score: 24 (12/19/22 1139)  AM-PAC Inpatient ADL T-Scale Score : 57.54 (12/19/22 1139)  ADL Inpatient CMS 0-100% Score: 0 (12/19/22 1139)  ADL Inpatient CMS G-Code Modifier : [facility] (12/19/22 1139)        Therapy Time   Individual Concurrent Group Co-treatment   Time In 0853         Time Out 0926         Minutes 33         Timed Code Treatment Minutes: [facility], OT

## 2022-12-20 ENCOUNTER — CARE COORDINATION (OUTPATIENT)
Dept: CASE MANAGEMENT | Age: 72
End: 2022-12-20

## 2022-12-20 NOTE — PROGRESS NOTES
Nephrology Note                                                                                                                                                                                                                                                                                                                                                               Office : [phone]     Fax :[phone]              Patient's Name: Blane Merrill    [birth year]     Reason for Consult: NANETTE   Requesting Physician:  Dieter Kong MD      Chief Complaint:  SOB       Feels better   No N/V/D     Past Medical History:   Diagnosis Date    A-fib ([facility] 2020    CAD, multiple vessel 12/2019    Coronary artery disease involving coronary bypass graft     Diabetes mellitus ([facility]     diet controlled     Enlarged prostate     Environmental allergies     Kidney stones     Pacemaker     Systolic CHF [facility])        Past Surgical History:   Procedure Laterality Date    CARDIAC CATHETERIZATION  12/10/2019    Dr. Rufina Valadez - severe multi-vessel disease    CORONARY ARTERY BYPASS GRAFT N/A 3/2/2020    WAYNE; TCPB; CABG x 3, LIMA to LAD w/ long segment endarterectomy (4 cm); bilateral pulmonary vein isolation; 40 mm Atriclip to L atril appendage; endoscopic L GSV harvest; ICNB x 5 levels bilat; sternal plate x 1 to manubrium performed by Maci Blount MD at [employer]  x2    NASAL SEPTUM SURGERY         Family History   Problem Relation Age of Onset    Dementia Mother         Vascular    Alzheimer's Disease Mother     Heart Attack Father     Diabetes Paternal Aunt         reports that he has never smoked. He has never used smokeless tobacco. He reports that he does not currently use alcohol. He reports that he does not use drugs. Allergies:  Codeine and Aspartame and phenylalanine    Current Medications:    No current facility-administered medications for this encounter.       Review of Systems:   14 point ROS obtained but were negative except mentioned in HPI      Physical exam:     Vitals:  /63   Pulse 71   Temp 97.7 °F (36.5 °C) (Oral)   Resp 18   Ht 6' 1\" (1.854 m)   Wt 267 lb 6.7 oz (121.3 kg)   SpO2 95%   BMI 35.28 kg/m²   Constitutional:  OAA X3 NAD  Skin: no rash, turgor wnl  Heent:  eomi, mmm  Neck: no bruits or jvd noted  Cardiovascular:  S1, S2 without m/r/g  Respiratory: CTA B without w/r/r  Abdomen:  +bs, soft, nt, nd  Ext: + lower extremity edema  Psychiatric: mood and affect appropriate  Musculoskeletal:  Rom, muscular strength intact    Data:   Labs:  CBC:   Recent Labs     12/18/22  1031 12/19/22  0605   WBC 6.0 7.5   HGB 14.0 15.0    184       BMP:    Recent Labs     12/18/22  1031 12/19/22  0606    137   K 4.2 3.7   CL 99 96*   CO2 27 27   BUN 38* 40*   CREATININE 1.5* 1.3   GLUCOSE 186* 145*       Ca/Mg/Phos:   Recent Labs     12/18/22  1031 12/19/22  0606   CALCIUM 8.9 9.1       Hepatic:   Recent Labs     12/18/22  1031 12/19/22  0606   AST 33 37   ALT 18 25   BILITOT 0.5 0.8   ALKPHOS 78 81       Troponin: No results for input(s): TROPONINI in the last 72 hours. BNP: No results for input(s): BNP in the last 72 hours. Lipids: No results for input(s): CHOL, TRIG, HDL, LDLCALC, LABVLDL in the last 72 hours. ABGs: No results for input(s): PHART, PO2ART, OME0IRR in the last 72 hours. INR: No results for input(s): INR in the last 72 hours. UA:  Recent Labs     12/17/22  1730   URINETYPE Other        Urine Microscopic: No results for input(s): LABCAST, BACTERIA, COMU, HYALCAST, WBCUA, RBCUA, EPIU in the last 72 hours. Urine Culture: No results for input(s): LABURIN in the last 72 hours. Urine Chemistry: No results for input(s): RACH Pedroza NAUR in the last 72 hours. IMAGING:  XR FOOT RIGHT (MIN 3 VIEWS)   Final Result   Impression:   1. No acute fracture or dislocation. XR FOOT LEFT (MIN 3 VIEWS)   Final Result   Impression:   1.   No acute fracture or dislocation. XR CHEST PORTABLE   Final Result      Marked prominence of the central bronchovascular markings most compatible with moderate pulmonary edema. Atypical pneumonia is less likely. Cardiomegaly status post CABG with left chest ICD. Assessment/Plan   NANETTE     2. HTN    3. Anemia    4. Acid- base/ Electrolyte imbalance     5.  Ac resp failure     Plan   - Cr better   - cont diuresis   - Ur studies - reviewed   - BNP better   - monitor UO and renal function   - labs in am       Recommend to dose adjust all medications  based on renal functions  Maintain SBP> 90 mmHg   Daily weights   AVOID NSAIDs  Avoid Nephrotoxins  Monitor Intake/Output  Call if significant decrease in urine output               Thank you for allowing us to participate in care of Juan Delgado MD  Feel free to contact me   [facility] [address]  Office : [phone]  Fax :[phone]

## 2022-12-20 NOTE — CARE COORDINATION
Date/Time:  12/20/2022 12:16 PM  Attempted to reach patient by telephone. Call within 2 business days of discharge: Yes,  Left HIPPA compliant message requesting a return call. Will attempt to reach patient again.     Thank Debbie Winchester RN  Care Transition Coordinator  [facility][phone]

## 2022-12-21 ENCOUNTER — CARE COORDINATION (OUTPATIENT)
Dept: CASE MANAGEMENT | Age: 72
End: 2022-12-21

## 2022-12-21 DIAGNOSIS — J10.1 INFLUENZA A: Primary | ICD-10-CM

## 2022-12-21 NOTE — CARE COORDINATION
[facility] Care Transitions Initial Follow Up Call    Call within 2 business days of discharge: Yes    Care Transition Nurse contacted the patient by telephone to perform post hospital discharge assessment. Verified name and  with patient as identifiers. Provided introduction to self, and explanation of the Care Transition Nurse role. Patient: Mario Stapleton Patient :    MRN: [id number]   Reason for Admission: Influenza A  Discharge Date: 22 RARS: Readmission Risk Score: 12.4      Last Discharge  Street       Date Complaint Diagnosis Description Type Department Provider    12/15/22 Cough; Shortness of Breath Influenza with respiratory manifestation other than pneumonia . .. ED to Hosp-Admission (Discharged) (ADMITTED) [address] JACKY MD; Eduardo Glass. .. Was this an external facility discharge? No     Challenges to be reviewed by the provider   Additional needs identified to be addressed with provider: No  none               Method of communication with provider: none. CTN spoke with patient this am for initial 24 hour discharge follow up CTN call. Patient states he is doing well, reporting no complaints of any fever, chills, nausea, vomiting, chest pain, SOB or cough. Denies any congestion, pain, difficulty emptying bladder, LE edema, feeling lightheaded, dizziness, and heart palpitations. CTN and Pt reviewed meds and CTN completed the 1111f. Care Transition Nurse reviewed discharge instructions, medical action plan, and red flags with patient who verbalized understanding. The patient was given an opportunity to ask questions and does not have any further questions or concerns at this time. Were discharge instructions available to patient? Yes. Reviewed appropriate site of care based on symptoms and resources available to patient including: PCP  Specialist  Urgent care clinics  [facility]   When to call 911.  The patient agrees to contact the PCP office for questions related to their healthcare. Advance Care Planning:   Does patient have an Advance Directive: not on file. Medication reconciliation was performed with patient, who verbalizes understanding of administration of home medications. Medications reviewed, 1111F entered: yes    Was patient discharged with a pulse oximeter? no    Non-face-to-face services provided:  Obtained and reviewed discharge summary and/or continuity of care documents  Education of patient/family/caregiver/guardian to support self-management-CTN encouraged patient to continue to monitor for any of the above noted s/s, reporting any that may present to MD immediately. Patient also instructed to take all PO ABX's as prescribed and until completely gone. Assessment and support for treatment adherence and medication management-Medication Review Completed with patient. Offered patient enrollment in the Remote Patient Monitoring (RPM) program for in-home monitoring: Patient is not eligible for RPM program.    Care Transitions 24 Hour Call    Schedule Follow Up Appointment with PCP: Shellie Waldron you have a copy of your discharge instructions?: Yes  Do you have all of your prescriptions and are they filled?: Yes  Have you been contacted by a [facility] Pharmacist?: No  Have you scheduled your follow up appointment?: No  Do you have support at home?: Alone  Do you feel like you have everything you need to keep you well at home?: Yes  Are you an active caregiver in your home?: No  Care Transitions Interventions  No Identified Needs         Follow Up  Future Appointments   Date Time Provider Rai Aburto   12/21/2022  2:00 PM MD ANGELINA [address]  [place] - [place]   12/29/2022  2:30 PM EVELYN Gordon - CNP [facility]   2/1/2023  2:30 PM SCHEDULE, [address]       Care Transition Nurse provided contact information.   Plan for follow-up call in 5-7 days based on severity of symptoms and risk factors. Plan for next call: Any issues or concerns that may arise.       Thank Darryle Keener, RN  Care Transition Coordinator  Contact [phone]

## 2022-12-29 ENCOUNTER — OFFICE VISIT (OUTPATIENT)
Dept: CARDIOLOGY CLINIC | Age: 72
End: 2022-12-29
Payer: MEDICAID

## 2022-12-29 ENCOUNTER — CARE COORDINATION (OUTPATIENT)
Dept: CASE MANAGEMENT | Age: 72
End: 2022-12-29

## 2022-12-29 VITALS
HEART RATE: 90 BPM | BODY MASS INDEX: 34.88 KG/M2 | OXYGEN SATURATION: 99 % | SYSTOLIC BLOOD PRESSURE: 132 MMHG | HEIGHT: 73 IN | WEIGHT: 263.2 LBS | DIASTOLIC BLOOD PRESSURE: 78 MMHG

## 2022-12-29 DIAGNOSIS — Z95.1 HX OF CABG: ICD-10-CM

## 2022-12-29 DIAGNOSIS — I50.32 CHRONIC HEART FAILURE WITH PRESERVED EJECTION FRACTION (HCC): Primary | ICD-10-CM

## 2022-12-29 DIAGNOSIS — E78.2 MIXED HYPERLIPIDEMIA: ICD-10-CM

## 2022-12-29 DIAGNOSIS — I48.0 PAROXYSMAL ATRIAL FIBRILLATION (HCC): ICD-10-CM

## 2022-12-29 DIAGNOSIS — I25.10 CAD IN NATIVE ARTERY: ICD-10-CM

## 2022-12-29 DIAGNOSIS — Z95.810 ICD (IMPLANTABLE CARDIOVERTER-DEFIBRILLATOR) IN PLACE: ICD-10-CM

## 2022-12-29 DIAGNOSIS — Z09 HOSPITAL DISCHARGE FOLLOW-UP: ICD-10-CM

## 2022-12-29 PROCEDURE — 99214 OFFICE O/P EST MOD 30 MIN: CPT | Performed by: NURSE PRACTITIONER

## 2022-12-29 PROCEDURE — 1111F DSCHRG MED/CURRENT MED MERGE: CPT | Performed by: NURSE PRACTITIONER

## 2022-12-29 NOTE — PROGRESS NOTES
CC CHF hospital follow up     HPI:  81 y.o. patient of Patti Sanders and Rob Srinivasan with CAD/CABG, HFrEF (20-25%), ICD, pAF, HLD, and DM who is here for CHF hospital follow up. Recently hospitalized with SOB, influenza, and UTI. Breathing is much better. Denies sob, orthopnea, PND, abdominal bloating or early satiety. Minimal LE edema. He denies cp, LH/dizziness, palpitations, syncope or falls. No n/v/d, fever or GI/ bleeding. C/o constipation. Past Medical History:   Diagnosis Date    A-fib [facility]) 2020    CAD, multiple vessel 12/2019    Coronary artery disease involving coronary bypass graft     Diabetes mellitus ([address]     diet controlled     Enlarged prostate     Environmental allergies     Kidney stones     Pacemaker     Systolic CHF [facility])      Past Surgical History:   Procedure Laterality Date    CARDIAC CATHETERIZATION  12/10/2019    Dr. Francetta Phalen - severe multi-vessel disease    CORONARY ARTERY BYPASS GRAFT N/A 3/2/2020    WAYNE; TCPB; CABG x 3, LIMA to LAD w/ long segment endarterectomy (4 cm); bilateral pulmonary vein isolation; 40 mm Atriclip to L atril appendage; endoscopic L GSV harvest; ICNB x 5 levels bilat; sternal plate x 1 to manubrium performed by Alfredo Osborne MD at [email or web address]  x2    NASAL SEPTUM SURGERY       Family History   Problem Relation Age of Onset    Dementia Mother         Vascular    Alzheimer's Disease Mother     Heart Attack Father     Diabetes Paternal Aunt      Social History     Tobacco Use    Smoking status: Never    Smokeless tobacco: Never   Substance Use Topics    Alcohol use: Not Currently     Comment: rare    Drug use: Never     Allergies:Codeine and Aspartame and phenylalanine    Review of Systems  General: No changes in weight, fatigue, or night sweats. HEENT: No blurry or decreased vision. No changes in hearing, nasal discharge or sore throat. Cardiovascular:  See HPI. Respiratory: No cough, hemoptysis, or wheezing.    Gastrointestinal:  No abdominal pain, hematochezia, melana, diarrhea, or history of GI ulcers. Genito-Urinary: No dysuria or hematuria. No urgency or polyuria. Musculoskeletal:  No complaints of joint pain, joint swelling or muscular weakness/soreness. Neurological:  No dizziness, headaches, numbness/tingling, speech problems or weakness. Psychological:  No anxiety or depression. Hematological and Lymphatic: No abnormal bleeding or bruising, blood clots, jaundice or swollen lymph nodes. Endocrine:   No malaise/lethargy, palpitations, polydipsia/polyuria, temperature intolerance or unexpected weight changes  Skin:  No rashes or non-healing ulcers. Physical Exam:  /78   Pulse 90   Ht 6' 1\" (1.854 m)   Wt 263 lb 3.2 oz (119.4 kg)   SpO2 99%   BMI 34.73 kg/m²    General (appearance):  No acute distress  Eyes: anicteric   Neck: soft, No JVD  Ears/Nose/Mouth/Thorat: No cyanosis  CV: RRR   Respiratory:  Clear, normal effort  GI: soft, non-tender, non-distended  Skin: Warm, dry. No rashes + ecchymosis   Neuro/Psych: Alert and oriented x 3. Appropriate behavior  Ext:  No c/c. Mild non-pitting LE edema and venostasis discoloration   Pulses:  2+ radial     Weight  Wt Readings from Last 3 Encounters:   12/29/22 263 lb 3.2 oz (119.4 kg)   12/19/22 267 lb 6.7 oz (121.3 kg)   10/12/22 272 lb (123.4 kg)          CBC:   Lab Results   Component Value Date    WBC 7.5 12/19/2022    HGB 15.0 12/19/2022    HCT 44.3 12/19/2022    MCV 86.7 12/19/2022     12/19/2022     BMP:  Lab Results   Component Value Date    CREATININE 1.3 12/19/2022    BUN 40 (H) 12/19/2022     12/19/2022    K 3.7 12/19/2022    CL 96 (L) 12/19/2022    CO2 27 12/19/2022   Estimated Creatinine Clearance: 72 mL/min (based on SCr of 1.3 mg/dL).     Mag:   Lab Results   Component Value Date/Time    MG 2.20 08/26/2022 06:50 AM     LIVER PROFILE:   Lab Results   Component Value Date    ALT 25 12/19/2022    AST 37 12/19/2022    ALKPHOS 81 12/19/2022    BILITOT 0.8 2022     PT/INR:   Lab Results   Component Value Date    INR 1.19 (H) 2022    INR 1.41 (H) 2021    INR 1.77 (H) 2021    PROTIME 15.0 (H) 2022    PROTIME 16.2 (H) 2021    PROTIME 20.5 (H) 2021     Pro-BNP   Lab Results   Component Value Date/Time    PROBNP 2,115 2022 10:31 AM    PROBNP 5,732 2022 06:29 AM    PROBNP 4,713 12/15/2022 12:19 PM     LIPIDS:  No components found for: CHLPL  Lab Results   Component Value Date    TRIG 236 (H) 2020    TRIG 220 (H) 2019     Lab Results   Component Value Date    HDL 30 (L) 2022    HDL 31 (L) 2020    HDL 33 (L) 2019     Lab Results   Component Value Date    LDLCALC 109 (H) 2022    LDLCALC 94 2020    LDLCALC 83 2019     Lab Results   Component Value Date    LABVLDL 43 2022    LABVLDL 47 2020    LABVLDL 44 2019     TSH:No results found for: TSH, G4SHUWP, K5NSHKC, THYROIDAB    IMAGIN2022 Echo:   Normal left ventricular size. Mild concentric left ventricular hypertrophy. Left ventricular systolic function appears at the lower limits of normal   with an estimated ejection fraction of 50%. No gross regional wall motion abnormalities were noted. Indeterminate diastolic function. The right ventricle is normal in size with preserved function. Pacer / ICD wire is visualized in the right ventricle. Trace pericardial effusion noted anteriorly. Definity contrast agent was used to help visualize endocardial borders     Echo:   Normal left ventricular size. Moderate concentric left ventricular hypertrophy. Severely decreased left ventricular systolic function with an estimated   ejection fraction of 20-25%. Severe global hypokinesis. Indeterminate diastolic function. Normal right ventricular size with decreased systolic function. Tapse: 0.56 cm   RV s: 6.31 cm/s   The aortic root is dilated measuring 3.8 cm.    The pulmonic valve is not well visualized. IVC not well visualized. 2019 coronary angiogram  HEMODYNAMICS:  Left ventricular end-diastolic pressure equals 20. There was no significant gradient across the aortic valve by pullback  post cineangiography. LEFT VENTRICULOGRAM:  Left ventriculogram demonstrates global  hypokinesis. Estimated ejection fraction is 25%. LEFT MAIN:  Left main is free of significant obstructive disease. LEFT ANTERIOR DESCENDING:  The LAD courses to and wraps around the apex. It gives off a relatively large bifurcating first diagonal branch and a  moderate-size second diagonal branch. The large first diagonal branch  had an 80% proximal stenosis. The LAD proper had severe mid vessel  disease with a very irregular area of subtotal stenosis in the mid  vessel. It then coursed on towards the apex. There is a 90% stenosis  at the apex. LEFT CIRCUMFLEX:  Circumflex gives rise to a small-to-moderate first  marginal branch, a large second marginal branch, and terminates in  moderate-size posterolateral branch. There appears to be ostial disease  which is difficult to quantitate but appears to be in excess of 70%. The circumflex then has 90% stenosis before the distal posterolateral  branch. RIGHT CORONARY ARTERY:  Right coronary artery is a dominant vessel. It  gives off a moderate-size PDA and two posterolateral branches. There is  diffuse disease throughout the body of the right coronary artery and 90%  stenosis in the PDA. 2019 CTA neck/head  1. Minimal atherosclerotic changes in the carotid bifurcations. No significant vascular abnormality otherwise. No significant arterial stenosis. 2. Incidental 5 mm nodule in the right upper lobe and 4 mm nodule in the left upper lobe. Following the Fleischner Society 2017 guidelines for single solid nodules <6 mm, if patient is low risk no routine follow-up is suggested unless suspicious    morphology or upper lobe location.   If patient is high risk, then optional CT at 12 months is suggested. qzxv        1. No evidence of large vessel occlusion or significant intracranial vascular abnormality. 2. No acute intracranial abnormality. 3. Rightward deviation of the nasal septum with bilateral maxillary sinus small mucous retention cysts or polyps. 4. Incidental pulmonary nodules as described in the neck portion of the dictation         Medications:   Current Outpatient Medications   Medication Sig Dispense Refill    albuterol (PROVENTIL) (2.5 MG/3ML) 0.083% nebulizer solution Take 3 mLs by nebulization every 4 hours as needed for Wheezing 120 each 0    furosemide (LASIX) 80 MG tablet Take 1 tablet by mouth in the morning and 1 tablet in the evening. 180 tablet 0    potassium chloride (KLOR-CON M) 10 MEQ extended release tablet Take 1 tablet by mouth 2 times daily 60 tablet 0    rosuvastatin (CRESTOR) 20 MG tablet Take 1 tablet by mouth daily 90 tablet 3    EPINEPHrine (PRIMATENE MIST) 0.125 MG/ACT AERO Inhale 2 puffs into the lungs once      metoprolol tartrate (LOPRESSOR) 25 MG tablet TAKE 1 TABLET BY MOUTH TWICE DAILY      aspirin 81 MG chewable tablet Take 1 tablet by mouth daily 30 tablet 3    apixaban (ELIQUIS) 5 MG TABS tablet Take 1 tablet by mouth 2 times daily 60 tablet 5    Handicap Placard MISC by Does not apply route Pt cannot walk more that 250 feet without becoming SOB. Dx: CAD, Cardiomyopathy, Atrial Fibrilation    Not to exceed 5 years (Patient taking differently: by Does not apply route Pt cannot walk more that 250 feet without becoming SOB. Dx: CAD, Cardiomyopathy, Atrial Fibrilation    Not to exceed 5 years) 1 each 0    amiodarone (CORDARONE) 200 MG tablet Take 1 tablet by mouth daily 30 tablet 0     No current facility-administered medications for this visit. Assessment:  1. Chronic heart failure with preserved ejection fraction ([address]    2. ICD (implantable cardioverter-defibrillator) in place    3. CAD in native artery    4.  Hx of CABG    5. Paroxysmal atrial fibrillation (HCC)    6.  Mixed hyperlipidemia            Plan:  HFpEF (EF 50%): chronic and stable    Cont lasix 80 mg po bid   Daily weights, low salt diet   Decrease fluid intake to < 2 Liters a day   Metoprolol   Had ischemic cardiomyopathy EF 20-25 and now EF 50%  ICD; stable   Follow up with EP  CAD/CABG; stable   Asa, statin   Metoprolol 25 mg po bid   PAF; stable   Follow up with EP   Metoprolol 25 mg po bid    Eliquis 5 mg po bid    Amio 200 mg qday   HLD; stable    Lipitor 40 mg po daily           Follow up with Patti Davis   Follow up with Mary Grace in 3 month     Reviewed most recent: CBC, BMP, LFT, Lipids, Mag, PT/INR, BNP, TSH  Reviewed most recent: ECG, Echo, Nuc stress test,  CTA, LHC

## 2022-12-29 NOTE — CARE COORDINATION
[address] Care Transitions Follow Up Call    Care Transition Nurse contacted the patient by telephone to follow up after admission on 2022. Verified name and  with patient as identifiers. Patient: Allean Harada  Patient : 1950   MRN: [id number]   Reason for Admission: Influenza A, COVID -19 Monitoring   Discharge Date: 22 RARS: Readmission Risk Score: 12.4      Needs to be reviewed by the provider   Additional needs identified to be addressed with provider: No  none             Method of communication with provider: none. CTN spoke with patient this am for follow up CTN call. Patient states he is doing well, no reports of any fever, chills, nausea, vomiting, chest pain, SOB or cough. Patient denies having any dizziness, lightheadedness, heart palpitations or other pain. Patient has follow up with Cardiologist today. Addressed changes since last contact:  none  Discussed follow-up appointments. If no appointment was previously scheduled, appointment scheduling offered: Yes. Is follow up appointment scheduled within 7 days of discharge? Yes. Follow Up  Future Appointments   Date Time Provider Rai Aburto   2022  2:30 PM EVELYN Avila - CNP [facility]   1/3/2023  2:30 PM Mani Hopson MD [facility] [address] [place] - D[facility]   2023  2:30 PM SCHEDULE, [facility] REMOTE TRANSMISSION [facility]       Care Transition Nurse reviewed red flags with patient and discussed any barriers to care and/or understanding of plan of care after discharge. Discussed appropriate site of care based on symptoms and resources available to patient including: PCP  Specialist  Urgent care clinics  [facility]   When to call 911. The patient agrees to contact the PCP office for questions related to their healthcare. Advance Care Planning:   not on file.      Patients top risk factors for readmission: medical condition-Influenza A  Interventions to address risk factors: Education of patient/family/caregiver/guardian to support self-management-Patient instructed to continue to monitor for any of the above noted s/s, reporting to MD immediately. Offered patient enrollment in the Remote Patient Monitoring (RPM) program for in-home monitoring: Patient is not eligible for RPM program.     Care Transitions Subsequent and Final Call    Schedule Follow Up Appointment with PCP: Declined  Subsequent and Final Calls  Do you have any ongoing symptoms?: No  Have your medications changed?: No  Do you have any questions related to your medications?: No  Do you currently have any active services?: No  Do you have any needs or concerns that I can assist you with?: No  Identified Barriers: None  Care Transitions Interventions  No Identified Needs  Other Interventions:             Care Transition Nurse provided contact information for future needs. Plan for follow-up call in 5-7 days based on severity of symptoms and risk factors. Plan for next call: Any issues or concerns that may arise.     Thank Hersey Curling, RN  Care Transition Coordinator  Contact PUSOZY:[phone]

## 2023-01-04 ENCOUNTER — CARE COORDINATION (OUTPATIENT)
Dept: CASE MANAGEMENT | Age: 73
End: 2023-01-04

## 2023-01-04 NOTE — CARE COORDINATION
[address] Care Transitions Follow Up Call    Care Transition Nurse contacted the patient by telephone to follow up after admission on 2022. Verified name and  with patient as identifiers. Patient: Sarah Sosa  Patient : 1950   MRN: [id number]   Reason for Admission: COVID -19 Monitoring, Influenza A  Discharge Date: 22 RARS: Readmission Risk Score: 12.4      Needs to be reviewed by the provider   Additional needs identified to be addressed with provider: No  none             Method of communication with provider: none. CTN spoke with patient this am for follow up CTN call. Patient states he is doing well, reporting no complaints of any fever, chills, nausea, vomiting, chest pain, SOB or cough. Patient has follow up with PCP scheduled for 01/10/2023. Addressed changes since last contact:  none  Discussed follow-up appointments. If no appointment was previously scheduled, appointment scheduling offered: Yes. Is follow up appointment scheduled within 7 days of discharge? No.    Follow Up  Future Appointments   Date Time Provider Rai Aburto   1/10/2023  2:30 PM Andrea Duran MD [facility] [address]  [place] - DYD   2023  2:30 PM SCHEDULE, [employer] REMOTE TRANSMISSION [employer] [facility]   3/29/2023  1:15 PM Pierre Celestin MD [address] [facility]       Care Transition Nurse reviewed red flags with patient and discussed any barriers to care and/or understanding of plan of care after discharge. Discussed appropriate site of care based on symptoms and resources available to patient including: PCP  Specialist  Urgent care clinics  [facility]   When to call 911. The patient agrees to contact the PCP office for questions related to their healthcare. Advance Care Planning:   not on file.      Patients top risk factors for readmission: medical condition-Influenza A  Interventions to address risk factors: Education of patient/family/caregiver/guardian to support self-management-Patient instructed to continue to monitor for any of the above noted s//s, as well as any returning SOB or SOBE, reporting to MD immediately. Offered patient enrollment in the Remote Patient Monitoring (RPM) program for in-home monitoring: Patient is not eligible for RPM program.     Care Transitions Subsequent and Final Call    Schedule Follow Up Appointment with PCP: Declined  Subsequent and Final Calls  Do you have any ongoing symptoms?: No  Have your medications changed?: No  Do you have any questions related to your medications?: No  Do you currently have any active services?: No  Do you have any needs or concerns that I can assist you with?: No  Identified Barriers: None  Care Transitions Interventions  No Identified Needs  Other Interventions:             Care Transition Nurse provided contact information for future needs. Plan for follow-up call in 5-7 days based on severity of symptoms and risk factors. Plan for next call: Any issues or concerns that may arise.     Thank Michell Murphy RN  Care Transition Coordinator  Contact [phone]

## 2023-01-10 ENCOUNTER — CARE COORDINATION (OUTPATIENT)
Dept: CASE MANAGEMENT | Age: 73
End: 2023-01-10

## 2023-01-10 ENCOUNTER — OFFICE VISIT (OUTPATIENT)
Dept: FAMILY MEDICINE CLINIC | Age: 73
End: 2023-01-10
Payer: MEDICAID

## 2023-01-10 VITALS
BODY MASS INDEX: 34.99 KG/M2 | TEMPERATURE: 97.8 F | HEART RATE: 94 BPM | WEIGHT: 264 LBS | DIASTOLIC BLOOD PRESSURE: 74 MMHG | OXYGEN SATURATION: 98 % | HEIGHT: 73 IN | SYSTOLIC BLOOD PRESSURE: 138 MMHG

## 2023-01-10 DIAGNOSIS — Z09 HOSPITAL DISCHARGE FOLLOW-UP: ICD-10-CM

## 2023-01-10 DIAGNOSIS — I48.0 PAROXYSMAL ATRIAL FIBRILLATION (HCC): ICD-10-CM

## 2023-01-10 DIAGNOSIS — E11.65 UNCONTROLLED TYPE 2 DIABETES MELLITUS WITH HYPERGLYCEMIA (HCC): Primary | ICD-10-CM

## 2023-01-10 DIAGNOSIS — I50.22 CHRONIC SYSTOLIC (CONGESTIVE) HEART FAILURE (HCC): ICD-10-CM

## 2023-01-10 DIAGNOSIS — I10 PRIMARY HYPERTENSION: ICD-10-CM

## 2023-01-10 PROBLEM — I50.43 ACUTE ON CHRONIC COMBINED SYSTOLIC (CONGESTIVE) AND DIASTOLIC (CONGESTIVE) HEART FAILURE (HCC): Status: RESOLVED | Noted: 2022-08-22 | Resolved: 2023-01-10

## 2023-01-10 PROBLEM — N39.0 URINARY TRACT INFECTION WITHOUT HEMATURIA: Status: RESOLVED | Noted: 2022-12-19 | Resolved: 2023-01-10

## 2023-01-10 PROBLEM — N32.0 BLADDER OBSTRUCTION: Status: RESOLVED | Noted: 2019-11-21 | Resolved: 2023-01-10

## 2023-01-10 PROBLEM — N30.00 ACUTE CYSTITIS: Status: RESOLVED | Noted: 2020-03-02 | Resolved: 2023-01-10

## 2023-01-10 PROBLEM — J11.1 INFLUENZA WITH RESPIRATORY MANIFESTATION OTHER THAN PNEUMONIA: Status: RESOLVED | Noted: 2022-12-15 | Resolved: 2023-01-10

## 2023-01-10 PROCEDURE — 1111F DSCHRG MED/CURRENT MED MERGE: CPT | Performed by: FAMILY MEDICINE

## 2023-01-10 PROCEDURE — 99214 OFFICE O/P EST MOD 30 MIN: CPT | Performed by: FAMILY MEDICINE

## 2023-01-10 PROCEDURE — 1036F TOBACCO NON-USER: CPT | Performed by: FAMILY MEDICINE

## 2023-01-10 PROCEDURE — G8484 FLU IMMUNIZE NO ADMIN: HCPCS | Performed by: FAMILY MEDICINE

## 2023-01-10 PROCEDURE — 1123F ACP DISCUSS/DSCN MKR DOCD: CPT | Performed by: FAMILY MEDICINE

## 2023-01-10 PROCEDURE — G8427 DOCREV CUR MEDS BY ELIG CLIN: HCPCS | Performed by: FAMILY MEDICINE

## 2023-01-10 PROCEDURE — 3075F SYST BP GE 130 - 139MM HG: CPT | Performed by: FAMILY MEDICINE

## 2023-01-10 PROCEDURE — 3046F HEMOGLOBIN A1C LEVEL >9.0%: CPT | Performed by: FAMILY MEDICINE

## 2023-01-10 PROCEDURE — G8417 CALC BMI ABV UP PARAM F/U: HCPCS | Performed by: FAMILY MEDICINE

## 2023-01-10 PROCEDURE — 3078F DIAST BP <80 MM HG: CPT | Performed by: FAMILY MEDICINE

## 2023-01-10 PROCEDURE — 3017F COLORECTAL CA SCREEN DOC REV: CPT | Performed by: FAMILY MEDICINE

## 2023-01-10 PROCEDURE — 2022F DILAT RTA XM EVC RTNOPTHY: CPT | Performed by: FAMILY MEDICINE

## 2023-01-10 ASSESSMENT — PATIENT HEALTH QUESTIONNAIRE - PHQ9
SUM OF ALL RESPONSES TO PHQ QUESTIONS 1-9: 0
1. LITTLE INTEREST OR PLEASURE IN DOING THINGS: 0
2. FEELING DOWN, DEPRESSED OR HOPELESS: 0
SUM OF ALL RESPONSES TO PHQ QUESTIONS 1-9: 0
SUM OF ALL RESPONSES TO PHQ9 QUESTIONS 1 & 2: 0

## 2023-01-10 NOTE — CARE COORDINATION
[facility] Care Transitions Follow Up Call    Care Transition Nurse contacted the patient by telephone to follow up after admission on 2022. Verified name and  with patient as identifiers. Patient: Jay Jay Avelar  Patient : 1950   MRN: [id number]   Reason for Admission: COVID -19 Monitoring, Influenza A  Discharge Date: 22 RARS: Readmission Risk Score: 12.4      Needs to be reviewed by the provider   Additional needs identified to be addressed with provider: No  none             Method of communication with provider: none. CTN spoke with patient this am for follow up CTN call. Patient states he is doing well, no reports of any fever, chills, nausea, vomiting, chest pain, SOB or cough. Patient denies having any congestion, pain, difficulty emptying bladder, LE edema, feeling lightheaded, dizziness, and heart palpitations. Patient has follow up with PCP later today. Addressed changes since last contact:  none  Discussed follow-up appointments. If no appointment was previously scheduled, appointment scheduling offered: Yes. Is follow up appointment scheduled within 7 days of discharge? No.    Follow Up  Future Appointments   Date Time Provider Rai Aburto   1/10/2023  2:30 PM Dilcia Vega MD [facility]  [place] - DYD   2023  2:30 PM SCHEDULE, [employer] REMOTE TRANSMISSION [place] [facility]   3/29/2023  1:15 PM Colton Best MD [facility]       Care Transition Nurse reviewed red flags with patient and discussed any barriers to care and/or understanding of plan of care after discharge. Discussed appropriate site of care based on symptoms and resources available to patient including: PCP  Specialist  Urgent care clinics  [address]   When to call 911. The patient agrees to contact the PCP office for questions related to their healthcare. Advance Care Planning:   not on file.      Patients top risk factors for readmission: medical condition-Influenza A, COVID-19 Monitoring   Interventions to address risk factors: Education of patient/family/caregiver/guardian to support self-management-Patient instructed to continue to monitor for any of the above noted s/s, reporting to MD immediately. Care Transitions Subsequent and Final Call    Schedule Follow Up Appointment with PCP: Declined  Subsequent and Final Calls  Do you have any ongoing symptoms?: No  Have your medications changed?: No  Do you have any questions related to your medications?: No  Do you currently have any active services?: No  Do you have any needs or concerns that I can assist you with?: No  Identified Barriers: None  Care Transitions Interventions  No Identified Needs  Other Interventions:             Care Transition Nurse provided contact information for future needs. Plan for follow-up call in 5-7 days based on severity of symptoms and risk factors. Plan for next call: Any issues or concerns that may arise.     Thank Vasu Johnson RN  Care Transition Coordinator  Contact [facility]:[phone]

## 2023-01-10 NOTE — PATIENT INSTRUCTIONS
Patient Education        Learning About Carbohydrate (Carb) Counting and Eating Out When You Have Diabetes  Why plan your meals? Meal planning can be a key part of managing diabetes. Planning meals and snacks with the right balance of carbohydrate, protein, and fat can help you keep your blood sugar at the target level you set with your doctor. You don't have to eat special foods. You can eat what your family eats, including sweets once in a while. But you do have to pay attention to how often you eat and how much you eat of certain foods. You may want to work with a dietitian or a diabetes educator. They can give you tips and meal ideas and can answer your questions about meal planning. This health professional can also help you reach a healthy weight if that is one of your goals. What should you know about eating carbs? Managing the amount of carbohydrate (carbs) you eat is an important part of healthy meals when you have diabetes. Carbohydrate is found in many foods. Learn which foods have carbs. And learn the amounts of carbs in different foods. Bread, cereal, pasta, and rice have about 15 grams of carbs in a serving. A serving is 1 slice of bread (1 ounce), ½ cup of cooked cereal, or 1/3 cup of cooked pasta or rice. Fruits have 15 grams of carbs in a serving. A serving is 1 small fresh fruit, such as an apple or orange; ½ of a banana; ½ cup of cooked or canned fruit; ½ cup of fruit juice; 1 cup of melon or raspberries; or 2 tablespoons of dried fruit. Milk and no-sugar-added yogurt have 15 grams of carbs in a serving. A serving is 1 cup of milk or 3/4 cup (6 oz) of no-sugar-added yogurt. Starchy vegetables have 15 grams of carbs in a serving. A serving is ½ cup of mashed potatoes or sweet potato; 1 cup winter squash; ½ of a small baked potato; ½ cup of cooked beans; or ½ cup cooked corn or green peas.   Learn how much carbs to eat each day and at each meal. A dietitian or certified diabetes educator can teach you how to keep track of the amount of carbs you eat. This is called carbohydrate counting. If you are not sure how to count carbohydrate grams, use the plate method to plan meals. It is a quick way to make sure that you have a balanced meal. It also can help you manage the amount of carbohydrate you eat at meals. Divide your plate by types of foods. Put non-starchy vegetables on half the plate, meat or other protein food on one-quarter of the plate, and a grain or starchy vegetable in the final quarter of the plate. To this you can add a small piece of fruit and 1 cup of milk or yogurt, depending on how many carbs you are supposed to eat at a meal.  Try to eat about the same amount of carbs at each meal. Do not \"save up\" your daily allowance of carbs to eat at one meal.  Proteins have very little or no carbs. Examples of proteins are beef, chicken, turkey, fish, eggs, tofu, cheese, cottage cheese, and peanut butter. How can you eat out and still eat healthy? Learn to estimate the serving sizes of foods that have carbohydrate. If you measure food at home, it will be easier to estimate the amount in a serving of restaurant food. If the meal you order has too much carbohydrate (such as potatoes, corn, or baked beans), ask to have a low-carbohydrate food instead. Ask for a salad or non-starchy vegetables like broccoli, cauliflower, green beans, or peppers. If you eat more carbohydrate at a meal than you had planned, take a walk or do other exercise. This will help lower your blood sugar. What are some tips for eating healthy? Limit saturated fat, such as the fat from meat and dairy products. This is a healthy choice because people who have diabetes are at higher risk of heart disease. So choose lean cuts of meat and nonfat or low-fat dairy products. Use olive or canola oil instead of butter or shortening when cooking. Don't skip meals.  Your blood sugar may drop too low if you skip meals and take insulin or certain medicines for diabetes. Check with your doctor before you drink alcohol. Alcohol can cause your blood sugar to drop too low. Alcohol can also cause a bad reaction if you take certain diabetes medicines. Follow-up care is a key part of your treatment and safety. Be sure to make and go to all appointments, and call your doctor if you are having problems. It's also a good idea to know your test results and keep a list of the medicines you take. Where can you learn more? Go to http://www.woods.com/ and enter I147 to learn more about \"Learning About Carbohydrate (Carb) Counting and Eating Out When You Have Diabetes. \"  Current as of: May 9, 2022               Content Version: 13.5  © 2006-2022 [employer]. Care instructions adapted under license by [facility] ([facility]). If you have questions about a medical condition or this instruction, always ask your healthcare professional. [address] any warranty or liability for your use of this information.

## 2023-01-16 ENCOUNTER — CARE COORDINATION (OUTPATIENT)
Dept: CASE MANAGEMENT | Age: 73
End: 2023-01-16

## 2023-01-16 NOTE — CARE COORDINATION
[facility] Care Transitions Follow Up Call    Care Transition Nurse contacted the patient by telephone to follow up after admission on 2022. Verified name and  with patient as identifiers. Patient: Karen Gerardo  Patient : 1950   MRN: [id number]   Reason for Admission: Influenza A, UTI, COVID -19 Monitoring   Discharge Date: 22 RARS: Readmission Risk Score: 12.4      Needs to be reviewed by the provider   Additional needs identified to be addressed with provider: No  none             Method of communication with provider: none. CTN spoke with patient this afternoon for final follow up CTN call. Patient states he is not having any fever, chills, nausea, vomiting, chest pain, SOB or cough. Patient with no congestion, pain, difficulty emptying bladder, LE edema, feeling lightheaded, dizziness, and heart palpitations. Patient had follow up with PCP as well, started on PO Potassium, Nebulizer Solution. No other issues or concerns, no changed medications at this time. Addressed changes since last contact:  none  Discussed follow-up appointments. If no appointment was previously scheduled, appointment scheduling offered: Yes. Is follow up appointment scheduled within 7 days of discharge? Yes. Follow Up  Future Appointments   Date Time Provider Rai Aburto   2023  2:30 PM SCHEDULE, [employer] REMOTE TRANSMISSION [place] [facility]   3/29/2023  1:15 PM Tori Bennett MD [facility]       Care Transition Nurse reviewed red flags with patient and discussed any barriers to care and/or understanding of plan of care after discharge. Discussed appropriate site of care based on symptoms and resources available to patient including: PCP  Specialist  Urgent care clinics  [facility]   When to call 911. The patient agrees to contact the PCP office for questions related to their healthcare. Advance Care Planning:   not on file.      Patients top risk factors for readmission: medical condition-COVID -19 Monitoring, Influenza A, UTI  Interventions to address risk factors: Education of patient/family/caregiver/guardian to support self-management-Patient instructed to take all medications as prescribed, follow up with [facility] as ordered, continue to monitor for any returning SOB/SOBE, reporting to MD immediately. Care Transitions Subsequent and Final Call    Schedule Follow Up Appointment with PCP: Declined  Subsequent and Final Calls  Do you have any ongoing symptoms?: No  Have your medications changed?: No  Do you have any questions related to your medications?: No  Do you currently have any active services?: No  Do you have any needs or concerns that I can assist you with?: No  Identified Barriers: None  Care Transitions Interventions  No Identified Needs  Other Interventions:             Care Transition Nurse provided contact information for future needs. No further follow-up call indicated based on severity of symptoms and risk factors.     Thank Norberto Amaral RN  Care Transition Coordinator  Contact [facility]:[phone]

## 2023-01-27 ENCOUNTER — OFFICE VISIT (OUTPATIENT)
Dept: INTERNAL MEDICINE CLINIC | Age: 73
End: 2023-01-27
Payer: MEDICAID

## 2023-01-27 VITALS — TEMPERATURE: 97.9 F

## 2023-01-27 DIAGNOSIS — E11.65 UNCONTROLLED TYPE 2 DIABETES MELLITUS WITH HYPERGLYCEMIA (HCC): Primary | ICD-10-CM

## 2023-01-27 DIAGNOSIS — L84 CORNS AND CALLOSITIES: ICD-10-CM

## 2023-01-27 PROCEDURE — 11042 DBRDMT SUBQ TIS 1ST 20SQCM/<: CPT

## 2023-01-27 PROCEDURE — 99213 OFFICE O/P EST LOW 20 MIN: CPT

## 2023-01-27 PROCEDURE — 11721 DEBRIDE NAIL 6 OR MORE: CPT

## 2023-01-28 NOTE — PROGRESS NOTES
Department of Podiatry  Resident Progress Note    Name: Rohan Douglass  Allergies: Codeine, Aspartame, and Phenylalanine    SUBJECTIVE  The patient is a 81 y.o. male who presents for diabetic foot exam and post-hospitalization follow up. Patient states that he does not routinely follow with a podiatrist.  Patient states his toenails are elongated, however he does not have the mobility to cut them himself. Patient states that he has not been dressing his hyperkeratotic lesions. Patient denies any other pedal complaints. Patient denies N/V/D/C/SOB/CP    Past Medical History:        Diagnosis Date    A-fib [facility]) 2020    CAD, multiple vessel 12/2019    Coronary artery disease involving coronary bypass graft     Diabetes mellitus ([facility] [address]     diet controlled     Enlarged prostate     Environmental allergies     Kidney stones     Pacemaker     Systolic CHF ([facility] [address]        REVIEW OF SYSTEMS:  A 12 point review of systems is unremarkable with the exception of the chief complaint. Patient specifically denies nausea, vomiting, fever, chills, shortness of breath, chest pain, abdominal pain, constipation, or difficulty urinating. OBJECTIVE  Patient presents ambulating unassisted and unaccompanied in tennis shoes. Patient is AOx3 and NAD. VASCULAR: DP and PT pulses are lightly-palpable +1/4 b/l. CFT is brisk to the digits of the foot b/l. Skin temperature is warm to cool from proximal to distal with no focal calor noted. No edema noted. No pain with calf compression b/l. NEUROLOGIC: Gross and epicritic sensation is intact b/l. Protective sensation is appreciated at all pedal sites b/l. DERMATOLOGIC: Nails 1-5 b/l are elongated however within normal limits of thickness, and color. Webspaces 1-4 b/l are clean, dry, and intact. Right lower extremity:  Verbal consent obtained for photos today:      Hyperkeratosis noted to the dorsal aspect of hallux just proximal to the nail. No open wounds noted.  No fluctuance, crepitus, purulence, or malodor noted    Left lower extremity:      Hyperkeratotic tissue noted to the plantar aspect of left hallux along the sulcus and plantar aspect of 1st MPJ extending into the first webspace. No open wounds noted. No purulence, fluctuance, crepitus, or malodor noted. MUSCULOSKELETAL: Muscle strength is 5/5 for all pedal groups tested. No pain with palpation of the foot or ankle b/l. Ankle joint ROM is decreased in dorsiflexion with the knee extended. No obvious biomechanical abnormalities. ASSESSMENT  1. Diabetes mellitus type II  2. Hyperkeratotic lesions b/l LE    PLAN  - Evaluation and management x 15 minutes and greater than 50% of the time spent explaining the etiology and treatment with the patient.   -Utilizing sterile nail nippers, nails 1-5 b/l were debrided in thickness and length to patients satisfaction without incident. Patient tolerated procedure well  -Once verbal consent was obtained, a sterile #15 blade was utilized to excisionally debride down to and including superficial dermal tissue. No drainage noted. Patient handled debridement well   -Patient educated about the importance of diabetic foot care consisting of but not limited to daily foot inspections, wearing supportive shoes and inserts at all times, applying lotion to feet while sparing webspaces, and routinely checking blood sugar.   -Instructed patient to watch for signs and symptoms of infection including but not limited to fever, chills, feeling ill, redness around the wounds, redness streaking up the legs, purulent drainage.  Instructed patient to call the clinic or present to the nearest emergency department if they notice these signs or symptoms     -Instructed patient to follow up in 1 month for wound re-check      Gregorio Matos DPM  Podiatric Resident PGY-1  Pager [phone] or Perfect Serve

## 2023-02-06 ENCOUNTER — APPOINTMENT (OUTPATIENT)
Dept: GENERAL RADIOLOGY | Age: 73
End: 2023-02-06
Payer: MEDICAID

## 2023-02-06 ENCOUNTER — APPOINTMENT (OUTPATIENT)
Dept: CT IMAGING | Age: 73
End: 2023-02-06
Payer: MEDICAID

## 2023-02-06 ENCOUNTER — HOSPITAL ENCOUNTER (EMERGENCY)
Age: 73
Discharge: HOME | End: 2023-02-07
Attending: EMERGENCY MEDICINE
Payer: MEDICAID
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
## 2023-02-06 DIAGNOSIS — S22.41XA: Primary | ICD-10-CM

## 2023-02-06 LAB
ANION GAP SERPL CALCULATED.3IONS-SCNC: 10 MMOL/L (ref 3–16)
BASOPHILS ABSOLUTE: 0 K/UL (ref 0–0.2)
BASOPHILS RELATIVE PERCENT: 0.5 %
BUN BLDV-MCNC: 19 MG/DL (ref 7–20)
CALCIUM SERPL-MCNC: 9.1 MG/DL (ref 8.3–10.6)
CHLORIDE BLD-SCNC: 100 MMOL/L (ref 99–110)
CO2: 22 MMOL/L (ref 21–32)
CREAT SERPL-MCNC: 1.3 MG/DL (ref 0.8–1.3)
EOSINOPHILS ABSOLUTE: 0.2 K/UL (ref 0–0.6)
EOSINOPHILS RELATIVE PERCENT: 3.4 %
GFR SERPL CREATININE-BSD FRML MDRD: 59 ML/MIN/{1.73_M2}
GLUCOSE BLD-MCNC: 140 MG/DL (ref 70–99)
HCT VFR BLD CALC: 40.3 % (ref 40.5–52.5)
HEMOGLOBIN: 13.3 G/DL (ref 13.5–17.5)
LYMPHOCYTES ABSOLUTE: 1.2 K/UL (ref 1–5.1)
LYMPHOCYTES RELATIVE PERCENT: 16.3 %
MCH RBC QN AUTO: 29.2 PG (ref 26–34)
MCHC RBC AUTO-ENTMCNC: 33.1 G/DL (ref 31–36)
MCV RBC AUTO: 88.2 FL (ref 80–100)
MONOCYTES ABSOLUTE: 0.7 K/UL (ref 0–1.3)
MONOCYTES RELATIVE PERCENT: 9.5 %
NEUTROPHILS ABSOLUTE: 5.1 K/UL (ref 1.7–7.7)
NEUTROPHILS RELATIVE PERCENT: 70.3 %
PDW BLD-RTO: 15.9 % (ref 12.4–15.4)
PLATELET # BLD: 191 K/UL (ref 135–450)
PMV BLD AUTO: 8.3 FL (ref 5–10.5)
POTASSIUM REFLEX MAGNESIUM: 4.3 MMOL/L (ref 3.5–5.1)
PRO-BNP: 2520 PG/ML (ref 0–124)
RBC # BLD: 4.57 M/UL (ref 4.2–5.9)
SODIUM BLD-SCNC: 132 MMOL/L (ref 136–145)
TROPONIN: <0.01 NG/ML
WBC # BLD: 7.3 K/UL (ref 4–11)

## 2023-02-06 PROCEDURE — 84484 ASSAY OF TROPONIN QUANT: CPT

## 2023-02-06 PROCEDURE — 6360000004 HC RX CONTRAST MEDICATION: Performed by: EMERGENCY MEDICINE

## 2023-02-06 PROCEDURE — 70450 CT HEAD/BRAIN W/O DYE: CPT

## 2023-02-06 PROCEDURE — 71101 X-RAY EXAM UNILAT RIBS/CHEST: CPT

## 2023-02-06 PROCEDURE — 93005 ELECTROCARDIOGRAM TRACING: CPT | Performed by: EMERGENCY MEDICINE

## 2023-02-06 PROCEDURE — 99285 EMERGENCY DEPT VISIT HI MDM: CPT

## 2023-02-06 PROCEDURE — 83880 ASSAY OF NATRIURETIC PEPTIDE: CPT

## 2023-02-06 PROCEDURE — 80048 BASIC METABOLIC PNL TOTAL CA: CPT

## 2023-02-06 PROCEDURE — 71260 CT THORAX DX C+: CPT

## 2023-02-06 PROCEDURE — 85025 COMPLETE CBC W/AUTO DIFF WBC: CPT

## 2023-02-06 PROCEDURE — 6370000000 HC RX 637 (ALT 250 FOR IP): Performed by: EMERGENCY MEDICINE

## 2023-02-06 PROCEDURE — 72125 CT NECK SPINE W/O DYE: CPT

## 2023-02-06 RX ORDER — LIDOCAINE 4 G/G
1 PATCH TOPICAL DAILY
Status: DISCONTINUED | OUTPATIENT
Start: 2023-02-07 | End: 2023-02-07 | Stop reason: HOSPADM

## 2023-02-06 RX ORDER — OXYCODONE HYDROCHLORIDE 5 MG/1
5 TABLET ORAL ONCE
Status: COMPLETED | OUTPATIENT
Start: 2023-02-06 | End: 2023-02-06

## 2023-02-06 RX ADMIN — IOPAMIDOL 80 ML: 755 INJECTION, SOLUTION INTRAVENOUS at 23:53

## 2023-02-06 RX ADMIN — OXYCODONE HYDROCHLORIDE 5 MG: 5 TABLET ORAL at 23:00

## 2023-02-06 ASSESSMENT — PAIN DESCRIPTION - ONSET
ONSET: ON-GOING
ONSET: ON-GOING

## 2023-02-06 ASSESSMENT — PAIN DESCRIPTION - LOCATION
LOCATION: RIB CAGE
LOCATION: FOOT;RIB CAGE
LOCATION: RIB CAGE

## 2023-02-06 ASSESSMENT — PAIN DESCRIPTION - ORIENTATION
ORIENTATION: MID
ORIENTATION: RIGHT
ORIENTATION: MID

## 2023-02-06 ASSESSMENT — PAIN - FUNCTIONAL ASSESSMENT
PAIN_FUNCTIONAL_ASSESSMENT: ACTIVITIES ARE NOT PREVENTED
PAIN_FUNCTIONAL_ASSESSMENT: 0-10
PAIN_FUNCTIONAL_ASSESSMENT: ACTIVITIES ARE NOT PREVENTED

## 2023-02-06 ASSESSMENT — PAIN SCALES - GENERAL
PAINLEVEL_OUTOF10: 7
PAINLEVEL_OUTOF10: 6
PAINLEVEL_OUTOF10: 3

## 2023-02-06 ASSESSMENT — PAIN DESCRIPTION - FREQUENCY
FREQUENCY: CONTINUOUS
FREQUENCY: CONTINUOUS

## 2023-02-06 ASSESSMENT — LIFESTYLE VARIABLES
HOW MANY STANDARD DRINKS CONTAINING ALCOHOL DO YOU HAVE ON A TYPICAL DAY: PATIENT DOES NOT DRINK
HOW OFTEN DO YOU HAVE A DRINK CONTAINING ALCOHOL: NEVER

## 2023-02-06 ASSESSMENT — PAIN DESCRIPTION - DESCRIPTORS
DESCRIPTORS: DISCOMFORT
DESCRIPTORS: ACHING;DISCOMFORT
DESCRIPTORS: DISCOMFORT

## 2023-02-06 ASSESSMENT — PAIN DESCRIPTION - PAIN TYPE: TYPE: ACUTE PAIN

## 2023-02-06 NOTE — ED TRIAGE NOTES
Patient arrived in the ER with c/o right side rib pain. Patient states that he fell about 4ft yesterday. He was able get him self up and he felt fine. However, patient states that today his right side hurt and he becomes sob when ambulating.

## 2023-02-07 VITALS
HEART RATE: 92 BPM | SYSTOLIC BLOOD PRESSURE: 141 MMHG | BODY MASS INDEX: 35.68 KG/M2 | DIASTOLIC BLOOD PRESSURE: 81 MMHG | OXYGEN SATURATION: 96 % | RESPIRATION RATE: 16 BRPM | HEIGHT: 74 IN | WEIGHT: 278 LBS | TEMPERATURE: 97.5 F

## 2023-02-07 LAB
EKG ATRIAL RATE: 94 BPM
EKG DIAGNOSIS: NORMAL
EKG P AXIS: 82 DEGREES
EKG P-R INTERVAL: 200 MS
EKG Q-T INTERVAL: 402 MS
EKG QRS DURATION: 88 MS
EKG QTC CALCULATION (BAZETT): 502 MS
EKG R AXIS: 93 DEGREES
EKG T AXIS: 80 DEGREES
EKG VENTRICULAR RATE: 94 BPM

## 2023-02-07 RX ORDER — OXYCODONE HYDROCHLORIDE 5 MG/1
5 TABLET ORAL EVERY 8 HOURS PRN
Qty: 9 TABLET | Refills: 0 | Status: SHIPPED | OUTPATIENT
Start: 2023-02-07 | End: 2023-02-10

## 2023-02-07 RX ORDER — LIDOCAINE 4 G/G
1 PATCH TOPICAL DAILY
Qty: 30 PATCH | Refills: 0 | Status: SHIPPED | OUTPATIENT
Start: 2023-02-07 | End: 2023-03-09

## 2023-02-07 ASSESSMENT — PAIN - FUNCTIONAL ASSESSMENT
PAIN_FUNCTIONAL_ASSESSMENT: ACTIVITIES ARE NOT PREVENTED
PAIN_FUNCTIONAL_ASSESSMENT: 0-10

## 2023-02-07 ASSESSMENT — PAIN DESCRIPTION - PAIN TYPE: TYPE: ACUTE PAIN

## 2023-02-07 ASSESSMENT — PAIN DESCRIPTION - DESCRIPTORS: DESCRIPTORS: DISCOMFORT

## 2023-02-07 ASSESSMENT — PAIN DESCRIPTION - LOCATION: LOCATION: RIB CAGE

## 2023-02-07 ASSESSMENT — PAIN SCALES - GENERAL: PAINLEVEL_OUTOF10: 3

## 2023-02-07 ASSESSMENT — PAIN DESCRIPTION - ORIENTATION: ORIENTATION: RIGHT

## 2023-02-07 NOTE — DISCHARGE INSTRUCTIONS
You were seen in the emergency department for a fall with pain in your chest and belly. You have 3 broken ribs on the right side which will heal with time. Take Tylenol for baseline pain. You may also apply heat or ice to the area and use lidocaine patches as prescribed. You may take the oxycodone we have prescribed for up to 3 days for acute pain not controlled by Tylenol. Use the incentive spirometer breathing device we have given you at least twice every hour while you are awake to help keep your lungs expanded and prevent pneumonia. Call 911 or go to the nearest Emergency Department immediately for worsening symptoms, difficulty breathing, chest pain, lightheadedness, difficulty moving or talking, sensory loss, difficulty controlling your bowel or bladder function, altered level of consciousness, neck stiffness, uncontrollable nausea or vomiting, uncontrollable pain, inability to tolerate oral intake, fever, chills, severe bleeding, signs of infection (spreading redness, area feels very warm, swelling, pain, foul-smelling drainage), suicidal or homicidal ideation, or any other concerns. If we have prescribed you any new medications, please take them as prescribed and read the drug package insert carefully. Do not drink alcohol, drive, or operate machinery if you are taking narcotic pain medications. Your condition requires follow-up with your primary care provider, Jose Girard MD, within 3 days, please see above for details. Bring these discharge instructions with you when you see your doctor. Avoid all strenuous activity until you have checked with your primary care provider.

## 2023-02-07 NOTE — ED PROVIDER NOTES
[address] ENCOUNTER          ATTENDING PHYSICIAN NOTE       Date of evaluation: 2/6/2023    Chief Complaint     Rib Pain (injury) and Shortness of Breath      History of Present Illness     Leonarda Ward is a 81 y.o. male with history of severe systolic CHF, dilated cardiomyopathy, A-fib on Eliquis, diabetes, multiple other comorbidities presented to the emergency department today for fall and shortness of breath. Patient states that he fell from an elevated position yesterday, approximately 4 feet above where he landed. Did not have any preceding lightheadedness or dizziness. States he has chronic neuropathy in his feet and is at times unstable. He notes pain in his upper abdomen centrally and favoring the right side as well as his right lower ribs. He feels as though he is having difficulty breathing secondary to this. Denies hitting his head or losing consciousness. Has been taking his Eliquis as prescribed. Denies fluid overload symptoms such as weight gain or worsening lower extremity edema. Physical Exam     INITIAL VITALS: BP: (!) 160/91, Temp: 97.6 °F (36.4 °C), Heart Rate: (!) 104, Resp: 18, SpO2: 94 %   Physical Exam      Nursing note and vitals reviewed. General:  Adult male, alert and appropriately interactive. In no distress. HENT: Normocephalic and atraumatic. External ears normal. Nose appears normal externally. Eyes: Conjunctivae normal. No scleral icterus. Neck: Neck supple. No tracheal deviation present. CV: Normal rate. Regular rhythm. S1/S2 auscultated. No murmurs, gallops or rubs. Pulm: Effort normal on room air. Faint bibasilar crackles. No wheezes or prolonged expiratory phase. Tenderness to right anterior ribs at costal margin inferiorly. GI: Soft. No distension. Mild right upper quadrant, epigastric, and right flank tenderness without ecchymosis. No rebound or guarding. No masses. No peritoneal signs.     Musculoskeletal: 1+ BLE edema. No gross deformities. Neurological: Alert and appropriately interactive. Face symmetric, speech without dysarthria or obvious aphasia. Moving all extremities spontaneously. Skin: Warm, dry. No rash. No diaphoresis or erythema. Macerated skin of the left heel without erythema, warmth, crepitus, exposed subcutaneous tissue, or drainage. Psychiatric: Calm and cooperative with appropriate mood and affect. ASSESSMENT / PLAN  ([address])   Isai Pickett is a 81 y.o. male who presents to the emergency department today for a fall over 24 hours ago with right lower rib pain and shortness of breath. On arrival, he is in no distress and vital signs are reassuring. He does have some anterior chest wall tenderness without crepitus or deformity. Some upper abdominal tenderness as well. He is noted to be on anticoagulation. Initial chest x-ray obtained in triage is without acute traumatic findings but does show some mild edematous changes. However given his significant symptoms, age, and anticoagulation CT imaging was pursued which does demonstrate 3 closed rib fractures, 6-8, on the right anteriorly. No other acute traumatic injuries he was given pain control with improvement in his symptoms. He does have chronically elevated BNP but otherwise reassuring labs. He is able to inspire over 2000mL on I-S after pain control. Given that this occurred greater than 24 hours ago I do feel he is stable for discharge with close outpatient follow-up. Short course of oxycodone and Lidoderm patches prescribed. Advised ongoing IS use to which he verbalized understanding. Discharged in stable condition with written and verbal instructions for which to return to the ED.     Medical Decision Making  Problems Addressed:  Fall, initial encounter: acute illness or injury  Fracture of three ribs, right, closed, initial encounter: acute illness or injury    Amount and/or Complexity of Data Reviewed  External Data Reviewed: notes. Labs: ordered. Radiology: ordered. ECG/medicine tests: ordered. Risk  OTC drugs. Prescription drug management. Clinical Impression     1. Fracture of three ribs, right, closed, initial encounter    2. Fall, initial encounter        Disposition     PATIENT REFERRED TO:  [facility] Emergency Department  [address]  [address]  [phone]    If symptoms worsen    MD Mauro Rabago Aniceto [address] [place] [address] Way  [phone]    In 3 days  For reexamination    DISCHARGE MEDICATIONS:  New Prescriptions    LIDOCAINE 4 % EXTERNAL PATCH    Place 1 patch onto the skin daily    OXYCODONE (ROXICODONE) 5 MG IMMEDIATE RELEASE TABLET    Take 1 tablet by mouth every 8 hours as needed for Pain for up to 3 days. WARNING:  May cause drowsiness. Do not use in combination with alcohol. Max Daily Amount: 15 mg       DISPOSITION Decision To Discharge 02/07/2023 01:04:07 AM        Merna Rider MD                 Diagnostic Results and Other Data     EKG   Interpreted by: MD Gonzalo  Sinus rhythm, ventricular rate 94. QTc prolonged at 502 MS, other intervals normal.  Axis slight right deviated. No ST or T wave changes suggestive of acute ischemia      RADIOLOGY:  CT CHEST ABDOMEN PELVIS W CONTRAST Additional Contrast? None   Final Result      CHEST:   Acute nondisplaced right anterior sixth through eighth rib fractures. No other acute intrathoracic abnormality. ABDOMEN AND PELVIS:   No acute abdominopelvic abnormality. Cholelithiasis. Moderate noninflamed diverticulosis. Unchanged mild-to-moderate bilateral hydroureteronephrosis. Unchanged large bladder calculus. Stable bladder wall thickening and trabeculation which measures suggests chronic bladder outlet obstruction in the setting of prostatomegaly. CT CSpine W/O Contrast   Final Result      Multilevel spondylosis without acute osseous abnormality. CT Head W/O Contrast   Final Result      No acute intracranial hemorrhage or mass effect. Chronic right frontal lobe infarct. XR RIBS RIGHT INCLUDE CHEST (MIN 3 VIEWS)   Final Result   1. No displaced rib fractures. 2.  Cardiomegaly with findings of mild interstitial edema. LABS:   Results for orders placed or performed during the hospital encounter of 02/06/23   BMP w/ Reflex to MG   Result Value Ref Range    Sodium 132 (L) 136 - 145 mmol/L    Potassium reflex Magnesium 4.3 3.5 - 5.1 mmol/L    Chloride 100 99 - 110 mmol/L    CO2 22 21 - 32 mmol/L    Anion Gap 10 3 - 16    Glucose 140 (H) 70 - 99 mg/dL    BUN 19 7 - 20 mg/dL    Creatinine 1.3 0.8 - 1.3 mg/dL    Est, Glom Filt Rate 59 (A) >60    Calcium 9.1 8.3 - 10.6 mg/dL   CBC with Auto Differential   Result Value Ref Range    WBC 7.3 4.0 - 11.0 K/uL    RBC 4.57 4.20 - 5.90 M/uL    Hemoglobin 13.3 (L) 13.5 - 17.5 g/dL    Hematocrit 40.3 (L) 40.5 - 52.5 %    MCV 88.2 80.0 - 100.0 fL    MCH 29.2 26.0 - 34.0 pg    MCHC 33.1 31.0 - 36.0 g/dL    RDW 15.9 (H) 12.4 - 15.4 %    Platelets 576 595 - 164 K/uL    MPV 8.3 5.0 - 10.5 fL    Neutrophils % 70.3 %    Lymphocytes % 16.3 %    Monocytes % 9.5 %    Eosinophils % 3.4 %    Basophils % 0.5 %    Neutrophils Absolute 5.1 1.7 - 7.7 K/uL    Lymphocytes Absolute 1.2 1.0 - 5.1 K/uL    Monocytes Absolute 0.7 0.0 - 1.3 K/uL    Eosinophils Absolute 0.2 0.0 - 0.6 K/uL    Basophils Absolute 0.0 0.0 - 0.2 K/uL   Brain Natriuretic Peptide   Result Value Ref Range    Pro-BNP 2,520 (H) 0 - 124 pg/mL   Troponin   Result Value Ref Range    Troponin <0.01 <0.01 ng/mL       MOST RECENT VITALS:  BP: (!) 141/81,Temp: 97.5 °F (36.4 °C), Heart Rate: 92, Resp: 16, SpO2: 96 %     Procedures     Procedures    ED Course     Nursing Notes, Past Medical Hx, Past Surgical Hx, Social Hx,Allergies, and Family Hx were reviewed.          The patient was given the following medications:  Orders Placed This Encounter   Medications lidocaine 4 % external patch 1 patch    oxyCODONE (ROXICODONE) immediate release tablet 5 mg    iopamidol (ISOVUE-370) 76 % injection 80 mL    lidocaine 4 % external patch     Sig: Place 1 patch onto the skin daily     Dispense:  30 patch     Refill:  0    oxyCODONE (ROXICODONE) 5 MG immediate release tablet     Sig: Take 1 tablet by mouth every 8 hours as needed for Pain for up to 3 days. WARNING:  May cause drowsiness. Do not use in combination with alcohol. Max Daily Amount: 15 mg     Dispense:  9 tablet     Refill:  0       CONSULTS:  None    Review of Systems     Pertinent positive and negative findings as documented in the HPI. Past Medical, Surgical, Family, and Social History     He has a past medical history of A-fib [facility]), CAD, multiple vessel, Coronary artery disease involving coronary bypass graft, Diabetes mellitus ([facility] [address] Enlarged prostate, Environmental allergies, Kidney stones, Pacemaker, and Systolic CHF ([facility] [address] He has a past surgical history that includes Cardiac catheterization (12/10/2019); Nasal polyp surgery (x2); Nasal septum surgery; and Coronary artery bypass graft (N/A, 3/2/2020). His family history includes Alzheimer's Disease in his mother; Dementia in his mother; Diabetes in his paternal aunt; Heart Attack in his father. He reports that he has never smoked. He has never used smokeless tobacco. He reports that he does not currently use alcohol. He reports that he does not use drugs.     Medications     Previous Medications    ALBUTEROL (PROVENTIL) (2.5 MG/3ML) 0.083% NEBULIZER SOLUTION    Take 3 mLs by nebulization every 4 hours as needed for Wheezing    AMIODARONE (CORDARONE) 200 MG TABLET    Take 1 tablet by mouth daily    APIXABAN (ELIQUIS) 5 MG TABS TABLET    Take 1 tablet by mouth 2 times daily    ASPIRIN 81 MG CHEWABLE TABLET    Take 1 tablet by mouth daily    EPINEPHRINE (PRIMATENE MIST) 0.125 MG/ACT AERO    Inhale 2 puffs into the lungs once    FUROSEMIDE (LASIX) 80 MG TABLET    Take 1 tablet by mouth in the morning and 1 tablet in the evening. HANDICAP PLACARD MISC    by Does not apply route Pt cannot walk more that 250 feet without becoming SOB. Dx: CAD, Cardiomyopathy, Atrial Fibrilation    Not to exceed 5 years    METOPROLOL TARTRATE (LOPRESSOR) 25 MG TABLET    TAKE 1 TABLET BY MOUTH TWICE DAILY    POTASSIUM CHLORIDE (KLOR-CON M) 10 MEQ EXTENDED RELEASE TABLET    Take 1 tablet by mouth 2 times daily    ROSUVASTATIN (CRESTOR) 20 MG TABLET    Take 1 tablet by mouth daily       Allergies     He is allergic to aspartame and phenylalanine and codeine.                     Tamara Mary MD  02/07/23 [id number]

## 2023-02-07 NOTE — ED NOTES
Patient ambulated to bathroom with steady gait. Reports pain is improving after medication.       Jeremías Vela RN  02/06/23 9527

## 2023-02-16 ENCOUNTER — TELEPHONE (OUTPATIENT)
Dept: CARDIOLOGY CLINIC | Age: 73
End: 2023-02-16

## 2023-02-16 ENCOUNTER — OFFICE VISIT (OUTPATIENT)
Dept: FAMILY MEDICINE CLINIC | Age: 73
End: 2023-02-16
Payer: MEDICAID

## 2023-02-16 VITALS
SYSTOLIC BLOOD PRESSURE: 126 MMHG | OXYGEN SATURATION: 98 % | TEMPERATURE: 96.9 F | DIASTOLIC BLOOD PRESSURE: 80 MMHG | HEART RATE: 97 BPM | BODY MASS INDEX: 35.97 KG/M2 | WEIGHT: 271.4 LBS | HEIGHT: 73 IN

## 2023-02-16 DIAGNOSIS — Z09 HOSPITAL DISCHARGE FOLLOW-UP: ICD-10-CM

## 2023-02-16 DIAGNOSIS — S22.41XD CLOSED FRACTURE OF THREE RIBS OF RIGHT SIDE WITH ROUTINE HEALING, SUBSEQUENT ENCOUNTER: Primary | ICD-10-CM

## 2023-02-16 PROCEDURE — 3074F SYST BP LT 130 MM HG: CPT | Performed by: FAMILY MEDICINE

## 2023-02-16 PROCEDURE — 99213 OFFICE O/P EST LOW 20 MIN: CPT | Performed by: FAMILY MEDICINE

## 2023-02-16 PROCEDURE — G8484 FLU IMMUNIZE NO ADMIN: HCPCS | Performed by: FAMILY MEDICINE

## 2023-02-16 PROCEDURE — 1123F ACP DISCUSS/DSCN MKR DOCD: CPT | Performed by: FAMILY MEDICINE

## 2023-02-16 PROCEDURE — 1036F TOBACCO NON-USER: CPT | Performed by: FAMILY MEDICINE

## 2023-02-16 PROCEDURE — 1111F DSCHRG MED/CURRENT MED MERGE: CPT | Performed by: FAMILY MEDICINE

## 2023-02-16 PROCEDURE — G8427 DOCREV CUR MEDS BY ELIG CLIN: HCPCS | Performed by: FAMILY MEDICINE

## 2023-02-16 PROCEDURE — G8417 CALC BMI ABV UP PARAM F/U: HCPCS | Performed by: FAMILY MEDICINE

## 2023-02-16 PROCEDURE — 3017F COLORECTAL CA SCREEN DOC REV: CPT | Performed by: FAMILY MEDICINE

## 2023-02-16 PROCEDURE — 3079F DIAST BP 80-89 MM HG: CPT | Performed by: FAMILY MEDICINE

## 2023-02-16 SDOH — ECONOMIC STABILITY: INCOME INSECURITY: HOW HARD IS IT FOR YOU TO PAY FOR THE VERY BASICS LIKE FOOD, HOUSING, MEDICAL CARE, AND HEATING?: NOT VERY HARD

## 2023-02-16 SDOH — ECONOMIC STABILITY: HOUSING INSECURITY
IN THE LAST 12 MONTHS, WAS THERE A TIME WHEN YOU DID NOT HAVE A STEADY PLACE TO SLEEP OR SLEPT IN A SHELTER (INCLUDING NOW)?: NO

## 2023-02-16 SDOH — ECONOMIC STABILITY: FOOD INSECURITY: WITHIN THE PAST 12 MONTHS, YOU WORRIED THAT YOUR FOOD WOULD RUN OUT BEFORE YOU GOT MONEY TO BUY MORE.: NEVER TRUE

## 2023-02-16 SDOH — ECONOMIC STABILITY: FOOD INSECURITY: WITHIN THE PAST 12 MONTHS, THE FOOD YOU BOUGHT JUST DIDN'T LAST AND YOU DIDN'T HAVE MONEY TO GET MORE.: NEVER TRUE

## 2023-02-16 ASSESSMENT — PATIENT HEALTH QUESTIONNAIRE - PHQ9
1. LITTLE INTEREST OR PLEASURE IN DOING THINGS: 0
SUM OF ALL RESPONSES TO PHQ9 QUESTIONS 1 & 2: 0
SUM OF ALL RESPONSES TO PHQ QUESTIONS 1-9: 0
2. FEELING DOWN, DEPRESSED OR HOPELESS: 0

## 2023-02-16 NOTE — PROGRESS NOTES
Subjective    Sangita Jaramillo is a 81 y.o. Male who came into the clinic today for his post ER recheck and for appropriate   management. The patient went to the ER on 2/7/2023 after he had a fall at home. The patient was found   to have rib fracture in the right 6/7 and eighth rib. The patient informs me that he is doing much better as   compared to when he went to the hospital.  His pain is getting better every day and now he is not taking any   medication for the pain. I also counseled the patient regarding appropriate care needed and concerns to his   multiple rib fractures and advised him to avoid lifting heavy things, doing any strenuous physical activity. I   also advised him mild breathing exercises as well. The patient verbalized understanding and agreed to the   plan. Otherwise today he did not have any other questions or concerns and all the question concerns were   appropriately answered.     I reviewed and discussed below mentioned labs with the patient today:    Lab Results   Component Value Date/Time    WBC 7.3 02/06/2023 10:37 PM    RBC 4.57 02/06/2023 10:37 PM    MCV 88.2 02/06/2023 10:37 PM    MCHC 33.1 02/06/2023 10:37 PM     Lab Results   Component Value Date/Time    ANIONGAP 10 02/06/2023 10:37 PM    GLUCOSE 140 02/06/2023 10:37 PM    BUN 19 02/06/2023 10:37 PM    CREATININE 1.3 02/06/2023 10:37 PM    AGRATIO 0.9 12/19/2022 06:06 AM    CALCIUM 9.1 02/06/2023 10:37 PM     Past Medical History:   Diagnosis Date    A-fib ([address] 2020    CAD, multiple vessel 12/2019    Coronary artery disease involving coronary bypass graft     Diabetes mellitus ([address]     diet controlled     Enlarged prostate     Environmental allergies     Kidney stones     Pacemaker     Systolic CHF [facility])        Patient Active Problem List   Diagnosis    Dilated cardiomyopathy ([address]    Paroxysmal atrial fibrillation (HCC)    S/P coronary angiogram    Monilial cystitis    Urinary tract obstruction    CAD, multiple vessel    ICD (implantable cardioverter-defibrillator) in place-[employer]    Ischemic cardiomyopathy    AICD (automatic cardioverter/defibrillator) present    Mixed hyperlipidemia    Gastroesophageal reflux disease without esophagitis    Uncontrolled type 2 diabetes mellitus with hyperglycemia (HCC)    Acute kidney injury (NANETTE) with acute tubular necrosis (ATN) (HCC)    Primary hypertension    Chronic systolic (congestive) heart failure (HCC)       Past Surgical History:   Procedure Laterality Date    CARDIAC CATHETERIZATION  12/10/2019    Dr. Dima Iqbal - severe multi-vessel disease    CORONARY ARTERY BYPASS GRAFT N/A 3/2/2020    WAYNE; TCPB; CABG x 3, LIMA to LAD w/ long segment endarterectomy (4 cm); bilateral pulmonary vein isolation; 40 mm Atriclip to L atril appendage; endoscopic L GSV harvest; ICNB x 5 levels bilat; sternal plate x 1 to manubrium performed by Bk Lugo MD at [address]  x2    NASAL SEPTUM SURGERY         Family History   Problem Relation Age of Onset    Dementia Mother         Vascular    Alzheimer's Disease Mother     Heart Attack Father     Diabetes Paternal Aunt        Social History     Tobacco Use    Smoking status: Never    Smokeless tobacco: Never   Substance Use Topics    Alcohol use: Not Currently     Comment: rare       Current Outpatient Medications on File Prior to Visit   Medication Sig Dispense Refill    lidocaine 4 % external patch Place 1 patch onto the skin daily 30 patch 0    albuterol (PROVENTIL) (2.5 MG/3ML) 0.083% nebulizer solution Take 3 mLs by nebulization every 4 hours as needed for Wheezing 120 each 0    furosemide (LASIX) 80 MG tablet Take 1 tablet by mouth in the morning and 1 tablet in the evening.  180 tablet 0    potassium chloride (KLOR-CON M) 10 MEQ extended release tablet Take 1 tablet by mouth 2 times daily 60 tablet 0    rosuvastatin (CRESTOR) 20 MG tablet Take 1 tablet by mouth daily 90 tablet 3    amiodarone (CORDARONE) 200 MG tablet Take 1 tablet by mouth daily 30 tablet 0    EPINEPHrine (PRIMATENE MIST) 0.125 MG/ACT AERO Inhale 2 puffs into the lungs once      metoprolol tartrate (LOPRESSOR) 25 MG tablet TAKE 1 TABLET BY MOUTH TWICE DAILY      aspirin 81 MG chewable tablet Take 1 tablet by mouth daily 30 tablet 3    apixaban (ELIQUIS) 5 MG TABS tablet Take 1 tablet by mouth 2 times daily 60 tablet 5    Handicap Placard MISC by Does not apply route Pt cannot walk more that 250 feet without becoming SOB. Dx: CAD, Cardiomyopathy, Atrial Fibrilation    Not to exceed 5 years (Patient taking differently: by Does not apply route Pt cannot walk more that 250 feet without becoming SOB. Dx: CAD, Cardiomyopathy, Atrial Fibrilation    Not to exceed 5 years) 1 each 0    [DISCONTINUED] famotidine (PEPCID) 20 MG tablet Take 1 tablet by mouth daily (Patient not taking: Reported on 8/22/2022) 60 tablet 3    [DISCONTINUED] insulin lispro, 1 Unit Dial, 100 UNIT/ML SOPN Inject 3 Units into the skin 3 times daily (with meals) (Patient not taking: No sig reported) 1 pen 0     No current facility-administered medications on file prior to visit. Allergies   Allergen Reactions    Aspartame And Phenylalanine Headaches and Myalgia    Codeine Seizure     Seizures       REVIEW OF SYSTEMS:   CONSTITUTIONAL: No weight loss, fever, chills, weakness or fatigue. HEENT: Eyes: No visual loss, blurred vision, double vision or yellow sclerae. Ears, Nose, Throat: No hearing loss, sneezing, congestion, runny nose or sore throat. SKIN: No rash or itching. CARDIOVASCULAR: No chest pain, chest pressure or chest discomfort. No palpitations or edema. Right side chest wall pain. RESPIRATORY: No shortness of breath, cough or sputum. GASTROINTESTINAL: No anorexia, nausea, vomiting, diarrhea or constipation. No abdominal pain, hematochezia or melena. GENITOURINARY:No dysuria, urgency, frequency, hematuria.   NEUROLOGICAL: No headache, dizziness, syncope, paralysis, ataxia,   numbness or tingling in the extremities. No change in bowel or bladder control. MUSCULOSKELETAL: No muscle pain, back pain, joint pain or stiffness. PSYCHIATRIC: No history of depression or anxiety. ENDOCRINOLOGIC: No reports of sweating, cold or heat intolerance. No polyuria or polydipsia. Objective     . /80   Pulse 97   Temp 96.9 °F (36.1 °C)   Ht 6' 1\" (1.854 m)   Wt 271 lb 6.4 oz (123.1 kg)   SpO2 98%   BMI 35.81 kg/m²     GENERAL:  Dontae Burroughs is a 81 y.o.  male who is not in any acute distress. He was well attired and well groomed and was speaking in full sentences. LUNGS:  Normal ventilatory breath sounds are heard bilaterally. No crackles   or wheezes heard. CARDIOVASCULAR:  Normal S1, S2 heard. No murmurs heard. No JVD. ICD   felt to below the left clavicle. Tenderness on palpation noted in the right lateral chest wall. EXTREMITIES:  All 4 extremities were moving fine. Full range of motion is   present. No deformity noted. Peripheral pulses were felt. No lower   extremity edema. Neurovascular integrity maintained. Assessment/Plan     Diagnoses and all orders for this visit:    Closed fracture of three ribs of right side with routine healing, subsequent encounter    Hospital discharge follow-up  -     AK DISCHARGE MEDS RECONCILED W/ CURRENT OUTPATIENT MED LIST      Advise given:  - Take all prescription medication as prescribed. - Take precaution during ambulation, do not lift heavy things or do any strenuous physical activity. - The importance of keeping the blood pressure monitoring to prevent stroke, heart attacks, kidney failure, blindness, and loss of limbs was reviewed. - Appropriate handout were given to the patient. -  All the questions and concerns were appropriately answered. - Patient / family member / caregiver verbalized understanding of patient instructions from today's visit.    - The patient was advised to follow up with me in 3 months for recheck or can call me before if has any other questions or concerns.

## 2023-02-16 NOTE — TELEPHONE ENCOUNTER
Patient needs a refill on the following medication:    metoprolol tartrate (LOPRESSOR) 25 MG tablet [[id number]]     Order Details  Dose, Route, Frequency: As Directed   Dispense Quantity: -- Refills: --          Sig: TAKE 1 TABLET BY MOUTH TWICE DAILY       Sung [facility] [id number] [place] [address] [address] [place] [phone]    Lov 12/29/22  Nov 3/29/23

## 2023-02-21 ENCOUNTER — APPOINTMENT (OUTPATIENT)
Dept: CT IMAGING | Age: 73
End: 2023-02-21
Payer: MEDICAID

## 2023-02-21 ENCOUNTER — APPOINTMENT (OUTPATIENT)
Dept: GENERAL RADIOLOGY | Age: 73
End: 2023-02-21
Payer: MEDICAID

## 2023-02-21 ENCOUNTER — HOSPITAL ENCOUNTER (EMERGENCY)
Age: 73
Discharge: HOME | End: 2023-02-21
Attending: STUDENT IN AN ORGANIZED HEALTH CARE EDUCATION/TRAINING PROGRAM
Payer: MEDICAID

## 2023-02-21 VITALS
HEART RATE: 93 BPM | TEMPERATURE: 98.3 F | WEIGHT: 277 LBS | RESPIRATION RATE: 22 BRPM | BODY MASS INDEX: 38.78 KG/M2 | HEIGHT: 71 IN | SYSTOLIC BLOOD PRESSURE: 138 MMHG | OXYGEN SATURATION: 100 % | DIASTOLIC BLOOD PRESSURE: 87 MMHG

## 2023-02-21 DIAGNOSIS — R06.00 DYSPNEA, UNSPECIFIED TYPE: ICD-10-CM

## 2023-02-21 DIAGNOSIS — N21.0 BLADDER STONE: ICD-10-CM

## 2023-02-21 DIAGNOSIS — R07.9 CHEST PAIN, UNSPECIFIED TYPE: Primary | ICD-10-CM

## 2023-02-21 LAB
BASE EXCESS VENOUS: -0.1 MMOL/L (ref -2–3)
BASOPHILS ABSOLUTE: 0 K/UL (ref 0–0.2)
BASOPHILS RELATIVE PERCENT: 0.6 %
CARBOXYHEMOGLOBIN: 1.3 % (ref 0–1.5)
EKG ATRIAL RATE: 95 BPM
EKG DIAGNOSIS: NORMAL
EKG P AXIS: 76 DEGREES
EKG P-R INTERVAL: 214 MS
EKG Q-T INTERVAL: 390 MS
EKG QRS DURATION: 84 MS
EKG QTC CALCULATION (BAZETT): 490 MS
EKG R AXIS: 97 DEGREES
EKG T AXIS: 79 DEGREES
EKG VENTRICULAR RATE: 95 BPM
EOSINOPHILS ABSOLUTE: 0.2 K/UL (ref 0–0.6)
EOSINOPHILS RELATIVE PERCENT: 3.7 %
HCO3 VENOUS: 26.5 MMOL/L (ref 24–28)
HCT VFR BLD CALC: 42.8 % (ref 40.5–52.5)
HEMOGLOBIN, VEN, REDUCED: 67 %
HEMOGLOBIN: 14 G/DL (ref 13.5–17.5)
LACTIC ACID: 1.4 MMOL/L (ref 0.4–2)
LYMPHOCYTES ABSOLUTE: 1 K/UL (ref 1–5.1)
LYMPHOCYTES RELATIVE PERCENT: 16.1 %
MCH RBC QN AUTO: 28.7 PG (ref 26–34)
MCHC RBC AUTO-ENTMCNC: 32.7 G/DL (ref 31–36)
MCV RBC AUTO: 88 FL (ref 80–100)
METHEMOGLOBIN VENOUS: 0.5 % (ref 0–1.5)
MONOCYTES ABSOLUTE: 0.6 K/UL (ref 0–1.3)
MONOCYTES RELATIVE PERCENT: 10.3 %
NEUTROPHILS ABSOLUTE: 4.1 K/UL (ref 1.7–7.7)
NEUTROPHILS RELATIVE PERCENT: 69.3 %
O2 SAT, VEN: 32 %
PCO2, VEN: 49.7 MMHG (ref 41–51)
PDW BLD-RTO: 15.9 % (ref 12.4–15.4)
PH VENOUS: 7.34 (ref 7.35–7.45)
PLATELET # BLD: 187 K/UL (ref 135–450)
PMV BLD AUTO: 8.5 FL (ref 5–10.5)
PO2, VEN: <30 MMHG (ref 25–40)
PRO-BNP: 1792 PG/ML (ref 0–124)
RAPID INFLUENZA  B AGN: NEGATIVE
RAPID INFLUENZA A AGN: NEGATIVE
RBC # BLD: 4.86 M/UL (ref 4.2–5.9)
SARS-COV-2, NAAT: NOT DETECTED
TCO2 CALC VENOUS: 28 MMOL/L
TROPONIN: <0.01 NG/ML
TROPONIN: <0.01 NG/ML
WBC # BLD: 6 K/UL (ref 4–11)

## 2023-02-21 PROCEDURE — 87804 INFLUENZA ASSAY W/OPTIC: CPT

## 2023-02-21 PROCEDURE — 93005 ELECTROCARDIOGRAM TRACING: CPT | Performed by: STUDENT IN AN ORGANIZED HEALTH CARE EDUCATION/TRAINING PROGRAM

## 2023-02-21 PROCEDURE — 83605 ASSAY OF LACTIC ACID: CPT

## 2023-02-21 PROCEDURE — 87635 SARS-COV-2 COVID-19 AMP PRB: CPT

## 2023-02-21 PROCEDURE — 85025 COMPLETE CBC W/AUTO DIFF WBC: CPT

## 2023-02-21 PROCEDURE — 82803 BLOOD GASES ANY COMBINATION: CPT

## 2023-02-21 PROCEDURE — 83880 ASSAY OF NATRIURETIC PEPTIDE: CPT

## 2023-02-21 PROCEDURE — 36415 COLL VENOUS BLD VENIPUNCTURE: CPT

## 2023-02-21 PROCEDURE — 84484 ASSAY OF TROPONIN QUANT: CPT

## 2023-02-21 PROCEDURE — 74174 CTA ABD&PLVS W/CONTRAST: CPT

## 2023-02-21 PROCEDURE — 80053 COMPREHEN METABOLIC PANEL: CPT

## 2023-02-21 PROCEDURE — 6360000004 HC RX CONTRAST MEDICATION: Performed by: STUDENT IN AN ORGANIZED HEALTH CARE EDUCATION/TRAINING PROGRAM

## 2023-02-21 PROCEDURE — 71046 X-RAY EXAM CHEST 2 VIEWS: CPT

## 2023-02-21 PROCEDURE — 99285 EMERGENCY DEPT VISIT HI MDM: CPT

## 2023-02-21 RX ADMIN — IOPAMIDOL 75 ML: 755 INJECTION, SOLUTION INTRAVENOUS at 13:45

## 2023-02-21 ASSESSMENT — ENCOUNTER SYMPTOMS
COUGH: 1
ABDOMINAL PAIN: 1
SHORTNESS OF BREATH: 1

## 2023-02-21 NOTE — ED PROVIDER NOTES
[address] [facility] [address] ENCOUNTER          ATTENDING PHYSICIAN NOTE       Date of evaluation: 2/21/2023    Chief Complaint     Chest Pain and Shortness of Breath (C/o SOB and CP this morning after waking up. )      History of Present Illness     Cuauhtemoc Howell is a 81 y.o. male with a history of type 2 diabetes uncontrolled, chronic systolic heart failure, paroxysmal atrial fibrillation, CAD, ICD in place [employer], ischemic cardiomyopathy who presents to the hospital for evaluation of shortness of breath. The patient states that he had chest pain that started about couple days ago and has been worsening today. He feels like his shortness of breath that is worse when he lays flat. He feels better when he sits up. He has had some intermittent shortness of breath with ambulation as well which is new. He denies any fevers or chills. He occasional cough that is nonproductive. He states that he has swelling in his lower extremities and he has been off his diuretic for the past week because it is making him urinate more frequently than usual.    ASSESSMENT / PLAN  (MEDICAL DECISION MAKING)     INITIAL VITALS: BP: (!) 149/93, Temp: 98.3 °F (36.8 °C), Heart Rate: 97, Resp: 19, SpO2: 868 %      Cuauhtemoc Howell is a 81 y.o. male presenting to the hospital for evaluation of shortness of breath and chest pain    Medical Decision Making  The patient is ambulatory here in the department. Without any respiratory distress. He was feeling much better. He has had extensive work-up at this time and no acute pathologies have been noted. He is very well-established with his primary care provider as well as his surgical subspecialist.  The patient was encouraged to follow-up with his urologist for his kidney stone. He was encouraged follow-up with his primary care doctor this week to just make sure everything is progressing appropriately.   He had a monitor read his respiratory rate of 30 and on my assessment this was not the case and we repeated this and is in the 20s and he denies any shortness of breath. I have low concern for aortic dissection, pulmonary embolism, ACS at this time. He was encouraged to take his diuretics to help with his fluid status. Problems Addressed:  Bladder stone: chronic illness or injury  Chest pain, unspecified type: acute illness or injury  Dyspnea, unspecified type: self-limited or minor problem    Amount and/or Complexity of Data Reviewed  External Data Reviewed: labs, radiology, ECG and notes. Labs: ordered. Decision-making details documented in ED Course. Radiology: ordered. Decision-making details documented in ED Course. ECG/medicine tests: ordered and independent interpretation performed. Decision-making details documented in ED Course. Risk  Prescription drug management. Clinical Impression     1. Chest pain, unspecified type    2. Dyspnea, unspecified type    3. Bladder stone        Disposition     PATIENT REFERRED TO:  Loretha Kawasaki, MD  [address]  [phone]    Schedule an appointment as soon as possible for a visit in 1 day  For follow-up    Urologist    Call in 1 day  For follow-up    DISCHARGE MEDICATIONS:  New Prescriptions    No medications on file       DISPOSITION Decision To Discharge 02/21/2023 02:37:45 PM        Diagnostic Results and Other Data       RADIOLOGY:  CTA CHEST ABDOMEN PELVIS W CONTRAST   Final Result   1. No acute changes from prior study. No evidence of aortic aneurysm or dissection   *  Nondisplaced right anterior sixth through eighth rib fractures. *  No active airspace disease or effusion    *  No acute abdominopelvic abnormality. *  Cholelithiasis. *  Sigmoid diverticulosis. *  Significant bilateral hydroureteronephrosis.  Obstruction at the level of the urinary bladder with severe bladder wall wall thickening and trabeculation and large intravesical stone      XR CHEST (2 VW)   Final Result   Impression: Mild interstitial edema.           LABS:   Results for orders placed or performed during the hospital encounter of 02/21/23   COVID-19, Rapid    Specimen: Nasopharyngeal Swab   Result Value Ref Range    SARS-CoV-2, NAAT Not Detected Not Detected   Rapid Flu Swab    Specimen: Nasopharyngeal   Result Value Ref Range    Rapid Influenza A Ag Negative Negative    Rapid Influenza B Ag Negative Negative   CBC with Auto Differential   Result Value Ref Range    WBC 6.0 4.0 - 11.0 K/uL    RBC 4.86 4.20 - 5.90 M/uL    Hemoglobin 14.0 13.5 - 17.5 g/dL    Hematocrit 42.8 40.5 - 52.5 %    MCV 88.0 80.0 - 100.0 fL    MCH 28.7 26.0 - 34.0 pg    MCHC 32.7 31.0 - 36.0 g/dL    RDW 15.9 (H) 12.4 - 15.4 %    Platelets 171 699 - 423 K/uL    MPV 8.5 5.0 - 10.5 fL    Neutrophils % 69.3 %    Lymphocytes % 16.1 %    Monocytes % 10.3 %    Eosinophils % 3.7 %    Basophils % 0.6 %    Neutrophils Absolute 4.1 1.7 - 7.7 K/uL    Lymphocytes Absolute 1.0 1.0 - 5.1 K/uL    Monocytes Absolute 0.6 0.0 - 1.3 K/uL    Eosinophils Absolute 0.2 0.0 - 0.6 K/uL    Basophils Absolute 0.0 0.0 - 0.2 K/uL   Troponin   Result Value Ref Range    Troponin <0.01 <0.01 ng/mL   Brain Natriuretic Peptide   Result Value Ref Range    Pro-BNP 1,792 (H) 0 - 124 pg/mL   Blood gas, venous (Lab)   Result Value Ref Range    pH, Erick 7.336 (L) 7.350 - 7.450    pCO2, Erick 49.7 41.0 - 51.0 mmHg    pO2, Erick <30.0 25.0 - 40.0 mmHg    HCO3, Venous 26.5 24.0 - 28.0 mmol/L    Base Excess, Erick -0.1 -2.0 - 3.0 mmol/L    O2 Sat, Erick 32 Not established %    Carboxyhemoglobin 1.3 0.0 - 1.5 %    MetHgb, Erick 0.5 0.0 - 1.5 %    TC02 (Calc), Erick 28 mmol/L    Hemoglobin, Erick, Reduced 67.00 %   Lactic Acid   Result Value Ref Range    Lactic Acid 1.4 0.4 - 2.0 mmol/L   CMP w/ Reflex to MG   Result Value Ref Range    Sodium 135 (L) 136 - 145 mmol/L    Chloride 101 99 - 110 mmol/L    CO2 23 21 - 32 mmol/L    Anion Gap 11 3 - 16    Glucose 158 (H) 70 - 99 mg/dL BUN 24 (H) 7 - 20 mg/dL    Creatinine 1.4 (H) 0.8 - 1.3 mg/dL    Est, Glom Filt Rate 54 (A) >60    Calcium 9.3 8.3 - 10.6 mg/dL    Total Protein 7.8 6.4 - 8.2 g/dL    Albumin 4.0 3.4 - 5.0 g/dL    Albumin/Globulin Ratio 1.1 1.1 - 2.2    Total Bilirubin 0.8 0.0 - 1.0 mg/dL    Alkaline Phosphatase 97 40 - 129 U/L    ALT 11 10 - 40 U/L    AST 16 15 - 37 U/L   Troponin   Result Value Ref Range    Troponin <0.01 <0.01 ng/mL   EKG 12 Lead   Result Value Ref Range    Ventricular Rate 95 BPM    Atrial Rate 95 BPM    P-R Interval 214 ms    QRS Duration 84 ms    Q-T Interval 390 ms    QTc Calculation (Bazett) 490 ms    P Axis 76 degrees    R Axis 97 degrees    T Axis 79 degrees    Diagnosis       EKG performed in ER and to be interpreted by ER physician. Confirmed by MD, ER ([id number]),  Krystal Sanders [phone]) on 2/21/2023 12:26:48 PM     EKG   Indication shortness of breath. Heart rate 95. No ST segment elevation or depression. QTc 490 ms. First-degree AV block noted. Unchanged from EKG from 2/6/23    ED BEDSIDE ULTRASOUND:  No results found. MOST RECENT VITALS:  BP: 138/87,Temp: 98.3 °F (36.8 °C), Heart Rate: 93, Resp: 22, SpO2: 100 %     Procedures         ED Course     Nursing Notes, Past Medical Hx, Past Surgical Hx, Social Hx,Allergies, and Family Hx were reviewed. ED Course as of 02/21/23 1443   Tue Feb 21, 2023   1238 Patient's work-up at this time shows no evidence of a lactate. He is flu and COVID-19 test were negative here in the department at this time. He had a CBC and a blood gas that were otherwise unremarkable his troponin was negative as well. [EI]   1239 He is renal panel and CBC otherwise stable at this time. Given his pain being anticoagulated, hypertensive and the recent trauma CT scan was obtained of the chest and abdomen to assess for any traumatic injuries [EI]   1239 His BNP is not significantly elevated above his baseline.  [EI]   4102 Patient CT scan of the chest and abdomen showed diverticulosis as well as intravesicular stone. He does have some hydroureteronephrosis that appears chronic and his renal function is unchanged and he denies any significant difficulty urinating. He has an established urologist that he was encouraged to follow-up with. At this time and I believe the patient needs to be admitted. [EI]      ED Course User Index  [EI] Bora Cruz MD       The patient was given the following medications:  Orders Placed This Encounter   Medications    iopamidol (ISOVUE-370) 76 % injection 75 mL         CONSULTS:  None    Review of Systems     Review of Systems   Constitutional:  Negative for chills and fever. Respiratory:  Positive for cough and shortness of breath. Cardiovascular:  Positive for chest pain. Gastrointestinal:  Positive for abdominal pain. Skin:  Negative for wound. Neurological:  Negative for syncope and headaches. Past Medical, Surgical, Family, and Social History     He has a past medical history of A-fib [facility]), CAD, multiple vessel, Coronary artery disease involving coronary bypass graft, Diabetes mellitus ([facility] [address] Enlarged prostate, Environmental allergies, Kidney stones, Pacemaker, and Systolic CHF ([facility] [address] He has a past surgical history that includes Cardiac catheterization (12/10/2019); Nasal polyp surgery (x2); Nasal septum surgery; and Coronary artery bypass graft (N/A, 3/2/2020). His family history includes Alzheimer's Disease in his mother; Dementia in his mother; Diabetes in his paternal aunt; Heart Attack in his father. He reports that he has never smoked. He has never used smokeless tobacco. He reports that he does not currently use alcohol. He reports that he does not use drugs.     Medications     Previous Medications    ALBUTEROL (PROVENTIL) (2.5 MG/3ML) 0.083% NEBULIZER SOLUTION    Take 3 mLs by nebulization every 4 hours as needed for Wheezing    AMIODARONE (CORDARONE) 200 MG TABLET    Take 1 tablet by mouth daily    APIXABAN (ELIQUIS) 5 MG TABS TABLET    Take 1 tablet by mouth 2 times daily    ASPIRIN 81 MG CHEWABLE TABLET    Take 1 tablet by mouth daily    EPINEPHRINE (PRIMATENE MIST) 0.125 MG/ACT AERO    Inhale 2 puffs into the lungs once    FUROSEMIDE (LASIX) 80 MG TABLET    Take 1 tablet by mouth in the morning and 1 tablet in the evening. HANDICAP PLACARD MISC    by Does not apply route Pt cannot walk more that 250 feet without becoming SOB. Dx: CAD, Cardiomyopathy, Atrial Fibrilation    Not to exceed 5 years    LIDOCAINE 4 % EXTERNAL PATCH    Place 1 patch onto the skin daily    METOPROLOL TARTRATE (LOPRESSOR) 25 MG TABLET    TAKE 1 TABLET BY MOUTH TWICE DAILY    POTASSIUM CHLORIDE (KLOR-CON M) 10 MEQ EXTENDED RELEASE TABLET    Take 1 tablet by mouth 2 times daily    ROSUVASTATIN (CRESTOR) 20 MG TABLET    Take 1 tablet by mouth daily       Allergies     He is allergic to aspartame and phenylalanine and codeine. Physical Exam     INITIAL VITALS: BP: (!) 149/93, Temp: 98.3 °F (36.8 °C), Heart Rate: 97, Resp: 19, SpO2: 100 %   Physical Exam  Vitals and nursing note reviewed. Constitutional:       Appearance: He is well-developed. He is obese. He is not ill-appearing or diaphoretic. HENT:      Head: Normocephalic and atraumatic. Mouth/Throat:      Mouth: Mucous membranes are moist.   Eyes:      Pupils: Pupils are equal, round, and reactive to light. Cardiovascular:      Rate and Rhythm: Normal rate and regular rhythm. Heart sounds: Normal heart sounds. Pulmonary:      Effort: Pulmonary effort is normal.      Breath sounds: Normal breath sounds. Abdominal:      General: Bowel sounds are normal. There is no distension. Palpations: Abdomen is soft. Tenderness: There is no abdominal tenderness. Musculoskeletal:         General: No tenderness. Normal range of motion. Cervical back: Neck supple. Right lower leg: Edema present. Left lower leg: Edema present.    Skin:     General: Skin is warm and dry. Capillary Refill: Capillary refill takes less than 2 seconds. Comments: Old healed mid sternotomy scar   Neurological:      General: No focal deficit present. Mental Status: He is alert and oriented to person, place, and time.                   Keara Hodge MD  02/21/23 3703

## 2023-02-21 NOTE — ED TRIAGE NOTES
Pt c/o episode of CP and SOB this morning once he woke up. Had fall 10 days ago and has been having intermittent pain since then and got worse today.

## 2023-02-21 NOTE — DISCHARGE INSTRUCTIONS
You were seen in the Emergency Department today for chest pain. Return to the Emergency Department if:    you have new, worse or continued chest pain    you experience shortness of breath, nausea or vomiting, chest pain with exertion, or sweating with chest pain    or if you have any other concerns   Please follow up with your physician or clinic within the next 2-3 days.   If you have difficulty urinating, fevers, chills or any other concerns please return to the hospital.

## 2023-02-22 LAB
A/G RATIO: 1.1 (ref 1.1–2.2)
ALBUMIN SERPL-MCNC: 4 G/DL (ref 3.4–5)
ALP BLD-CCNC: 97 U/L (ref 40–129)
ALT SERPL-CCNC: 11 U/L (ref 10–40)
ANION GAP SERPL CALCULATED.3IONS-SCNC: 11 MMOL/L (ref 3–16)
AST SERPL-CCNC: 16 U/L (ref 15–37)
BILIRUB SERPL-MCNC: 0.8 MG/DL (ref 0–1)
BUN BLDV-MCNC: 24 MG/DL (ref 7–20)
CALCIUM SERPL-MCNC: 9.3 MG/DL (ref 8.3–10.6)
CHLORIDE BLD-SCNC: 101 MMOL/L (ref 99–110)
CO2: 23 MMOL/L (ref 21–32)
CREAT SERPL-MCNC: 1.4 MG/DL (ref 0.8–1.3)
GFR SERPL CREATININE-BSD FRML MDRD: 54 ML/MIN/{1.73_M2}
GLUCOSE BLD-MCNC: 158 MG/DL (ref 70–99)
POTASSIUM REFLEX MAGNESIUM: 5.4 MMOL/L (ref 3.5–5.1)
SODIUM BLD-SCNC: 135 MMOL/L (ref 136–145)
TOTAL PROTEIN: 7.8 G/DL (ref 6.4–8.2)

## 2023-02-24 ENCOUNTER — OFFICE VISIT (OUTPATIENT)
Dept: INTERNAL MEDICINE CLINIC | Age: 73
End: 2023-02-24
Payer: MEDICAID

## 2023-02-24 VITALS — TEMPERATURE: 97.7 F

## 2023-02-24 DIAGNOSIS — L84 CORNS AND CALLOSITIES: ICD-10-CM

## 2023-02-24 DIAGNOSIS — E11.65 UNCONTROLLED TYPE 2 DIABETES MELLITUS WITH HYPERGLYCEMIA (HCC): Primary | ICD-10-CM

## 2023-02-24 DIAGNOSIS — L97.521 ULCER OF LEFT FOOT, LIMITED TO BREAKDOWN OF SKIN (HCC): ICD-10-CM

## 2023-02-24 PROCEDURE — 99213 OFFICE O/P EST LOW 20 MIN: CPT

## 2023-02-24 RX ORDER — AMMONIUM LACTATE 12 G/100G
CREAM TOPICAL
Qty: 385 G | Refills: 2 | Status: SHIPPED | OUTPATIENT
Start: 2023-02-24 | End: 2023-03-26

## 2023-02-24 NOTE — PATIENT INSTRUCTIONS
Keep feet clean   Elevate legs and feet while sitting or lying  Wear tubagauze daily; may remove at night

## 2023-02-26 NOTE — PROGRESS NOTES
Department of Podiatry  Resident Progress Note    Name: Leonarda Ward  Allergies: Codeine, Aspartame, and Phenylalanine    SUBJECTIVE  The patient is a 81 y.o. male who presents for diabetic foot exam and callus care. Patient states that he has not been applying lotion as previously instructed since he does not have any. Patient is requesting to be prescribed lotion. Patient states that his calluses are painful and provide relief from last professional debridement. Patient denies any other pedal complaints. Patient denies N/V/D/C/SOB/CP    Past Medical History:        Diagnosis Date    A-fib [facility]) 2020    CAD, multiple vessel 12/2019    Coronary artery disease involving coronary bypass graft     Diabetes mellitus ([facility] [address]     diet controlled     Enlarged prostate     Environmental allergies     Kidney stones     Pacemaker     Systolic CHF ([facility] [address]        REVIEW OF SYSTEMS:  A 12 point review of systems is unremarkable with the exception of the chief complaint. Patient specifically denies nausea, vomiting, fever, chills, shortness of breath, chest pain, abdominal pain, constipation, or difficulty urinating. OBJECTIVE  Patient presents ambulating unassisted and unaccompanied in tennis shoes. Patient is AOx3 and NAD. VASCULAR: DP and PT pulses are lightly-palpable +1/4 b/l. CFT is brisk to the digits of the foot b/l. Skin temperature is warm to cool from proximal to distal with no focal calor noted. No edema noted. No pain with calf compression b/l. NEUROLOGIC: Gross and epicritic sensation is intact b/l. Protective sensation is appreciated at all pedal sites b/l. DERMATOLOGIC: Nails 1-5 b/l are elongated however within normal limits of thickness, and color. Webspaces 1-4 b/l are clean, dry, and intact. Right lower extremity:  Hyperkeratosis noted to the dorsal aspect of hallux just proximal to the nail. No open wounds noted.  No fluctuance, crepitus, purulence, or malodor noted    Left lower extremity:  Hyperkeratotic tissue noted to the plantar aspect of left hallux along the sulcus and plantar aspect of 1st MPJ extending into the first webspace. No open wounds noted. No purulence, fluctuance, crepitus, or malodor noted. Partial thickness ulceration noted to medial aspect of midfoot. Wound bed is noted to be dermis. No purulence, fluctuance, crepitus, or malodor noted. MUSCULOSKELETAL: Muscle strength is 5/5 for all pedal groups tested. No pain with palpation of the foot or ankle b/l. Ankle joint ROM is decreased in dorsiflexion with the knee extended. No obvious biomechanical abnormalities. ASSESSMENT  1. Diabetes mellitus type II  2. Hyperkeratotic lesions b/l LE  3. Partial thickness ulceration left medial midfoot    PLAN  - Evaluation and management x 15 minutes and greater than 50% of the time spent explaining the etiology and treatment with the patient.   -Once verbal consent was obtained, a sterile #15 blade was utilized to excisionally debride down to and including superficial dermal tissue. No drainage noted. Patient handled debridement well   -Ulceration was dressed with triple antibiotic ointment and bandaid  -Prescription was ordered for Lac-hydrin. Advised patient to apply lotion daily while sparring all webspaces  -Patient educated about the importance of diabetic foot care consisting of but not limited to daily foot inspections, wearing supportive shoes and inserts at all times, applying lotion to feet while sparing webspaces, and routinely checking blood sugar.   -Instructed patient to watch for signs and symptoms of infection including but not limited to fever, chills, feeling ill, redness around the wounds, redness streaking up the legs, purulent drainage.  Instructed patient to call the clinic or present to the nearest emergency department if they notice these signs or symptoms     -Instructed patient to follow up in 1 month for wound re-check      Kelsie Pugh DPM  Podiatric Resident PGY-1  Pager [phone] or Perfect Serve

## 2023-03-24 ENCOUNTER — OFFICE VISIT (OUTPATIENT)
Dept: INTERNAL MEDICINE CLINIC | Age: 73
DRG: 380 | End: 2023-03-24
Payer: MEDICAID

## 2023-03-24 VITALS — TEMPERATURE: 98.8 F

## 2023-03-24 DIAGNOSIS — L03.115 CELLULITIS OF RIGHT LOWER EXTREMITY: ICD-10-CM

## 2023-03-24 DIAGNOSIS — E11.42 TYPE 2 DIABETES MELLITUS WITH PERIPHERAL NEUROPATHY (HCC): Primary | ICD-10-CM

## 2023-03-24 DIAGNOSIS — L97.521 ULCER OF LEFT FOOT, LIMITED TO BREAKDOWN OF SKIN (HCC): ICD-10-CM

## 2023-03-24 PROCEDURE — 99213 OFFICE O/P EST LOW 20 MIN: CPT

## 2023-03-24 PROCEDURE — 11042 DBRDMT SUBQ TIS 1ST 20SQCM/<: CPT

## 2023-03-24 RX ORDER — CEPHALEXIN 500 MG/1
500 CAPSULE ORAL 4 TIMES DAILY
Qty: 40 CAPSULE | Refills: 0 | Status: SHIPPED | OUTPATIENT
Start: 2023-03-24 | End: 2023-04-03

## 2023-03-24 NOTE — PATIENT INSTRUCTIONS
Wear compression stockings daily when up.  May remove at night when asleep and legs are elevated    Return in one week

## 2023-03-25 ENCOUNTER — APPOINTMENT (OUTPATIENT)
Dept: GENERAL RADIOLOGY | Age: 73
DRG: 380 | End: 2023-03-25
Payer: MEDICAID

## 2023-03-25 ENCOUNTER — HOSPITAL ENCOUNTER (INPATIENT)
Age: 73
LOS: 3 days | Discharge: HOME | DRG: 380 | End: 2023-03-28
Attending: EMERGENCY MEDICINE | Admitting: HOSPITALIST
Payer: MEDICAID

## 2023-03-25 DIAGNOSIS — L03.115 CELLULITIS OF RIGHT LOWER EXTREMITY: Primary | ICD-10-CM

## 2023-03-25 PROBLEM — L03.90 CELLULITIS: Status: ACTIVE | Noted: 2023-03-25

## 2023-03-25 LAB
ALBUMIN SERPL-MCNC: 3.5 G/DL (ref 3.4–5)
ALBUMIN/GLOB SERPL: 0.8 {RATIO} (ref 1.1–2.2)
ALP SERPL-CCNC: 93 U/L (ref 40–129)
ALT SERPL-CCNC: 11 U/L (ref 10–40)
ANION GAP SERPL CALCULATED.3IONS-SCNC: 8 MMOL/L (ref 3–16)
ANTI-XA UNFRAC HEPARIN: 0.81 IU/ML (ref 0.3–0.7)
AST SERPL-CCNC: 21 U/L (ref 15–37)
BASOPHILS # BLD: 0 K/UL (ref 0–0.2)
BASOPHILS NFR BLD: 0.7 %
BILIRUB SERPL-MCNC: 0.6 MG/DL (ref 0–1)
BUN SERPL-MCNC: 22 MG/DL (ref 7–20)
CALCIUM SERPL-MCNC: 8.9 MG/DL (ref 8.3–10.6)
CHLORIDE SERPL-SCNC: 102 MMOL/L (ref 99–110)
CO2 SERPL-SCNC: 26 MMOL/L (ref 21–32)
CREAT SERPL-MCNC: 1.4 MG/DL (ref 0.8–1.3)
CRP SERPL-MCNC: 4.8 MG/L (ref 0–5.1)
DEPRECATED RDW RBC AUTO: 15.6 % (ref 12.4–15.4)
EOSINOPHIL # BLD: 0.2 K/UL (ref 0–0.6)
EOSINOPHIL NFR BLD: 3.1 %
ERYTHROCYTE [SEDIMENTATION RATE] IN BLOOD BY WESTERGREN METHOD: 52 MM/HR (ref 0–20)
GFR SERPLBLD CREATININE-BSD FMLA CKD-EPI: 54 ML/MIN/{1.73_M2}
GLUCOSE BLD-MCNC: 136 MG/DL (ref 70–99)
GLUCOSE BLD-MCNC: 146 MG/DL (ref 70–99)
GLUCOSE BLD-MCNC: 168 MG/DL (ref 70–99)
GLUCOSE SERPL-MCNC: 141 MG/DL (ref 70–99)
HCT VFR BLD AUTO: 39.5 % (ref 40.5–52.5)
HGB BLD-MCNC: 13 G/DL (ref 13.5–17.5)
LYMPHOCYTES # BLD: 0.8 K/UL (ref 1–5.1)
LYMPHOCYTES NFR BLD: 12.3 %
MCH RBC QN AUTO: 28.8 PG (ref 26–34)
MCHC RBC AUTO-ENTMCNC: 32.9 G/DL (ref 31–36)
MCV RBC AUTO: 87.4 FL (ref 80–100)
MONOCYTES # BLD: 0.7 K/UL (ref 0–1.3)
MONOCYTES NFR BLD: 10.9 %
NEUTROPHILS # BLD: 4.8 K/UL (ref 1.7–7.7)
NEUTROPHILS NFR BLD: 73 %
PERFORMED ON: ABNORMAL
PLATELET # BLD AUTO: 227 K/UL (ref 135–450)
PMV BLD AUTO: 7.6 FL (ref 5–10.5)
POTASSIUM SERPL-SCNC: 4.2 MMOL/L (ref 3.5–5.1)
PROT SERPL-MCNC: 7.7 G/DL (ref 6.4–8.2)
RBC # BLD AUTO: 4.51 M/UL (ref 4.2–5.9)
SODIUM SERPL-SCNC: 136 MMOL/L (ref 136–145)
WBC # BLD AUTO: 6.6 K/UL (ref 4–11)

## 2023-03-25 PROCEDURE — 85520 HEPARIN ASSAY: CPT

## 2023-03-25 PROCEDURE — 6370000000 HC RX 637 (ALT 250 FOR IP): Performed by: HOSPITALIST

## 2023-03-25 PROCEDURE — 96374 THER/PROPH/DIAG INJ IV PUSH: CPT

## 2023-03-25 PROCEDURE — 99285 EMERGENCY DEPT VISIT HI MDM: CPT

## 2023-03-25 PROCEDURE — 73590 X-RAY EXAM OF LOWER LEG: CPT

## 2023-03-25 PROCEDURE — 85025 COMPLETE CBC W/AUTO DIFF WBC: CPT

## 2023-03-25 PROCEDURE — 2580000003 HC RX 258: Performed by: STUDENT IN AN ORGANIZED HEALTH CARE EDUCATION/TRAINING PROGRAM

## 2023-03-25 PROCEDURE — 6360000002 HC RX W HCPCS: Performed by: EMERGENCY MEDICINE

## 2023-03-25 PROCEDURE — 6360000002 HC RX W HCPCS: Performed by: STUDENT IN AN ORGANIZED HEALTH CARE EDUCATION/TRAINING PROGRAM

## 2023-03-25 PROCEDURE — 86140 C-REACTIVE PROTEIN: CPT

## 2023-03-25 PROCEDURE — 36415 COLL VENOUS BLD VENIPUNCTURE: CPT

## 2023-03-25 PROCEDURE — 2580000003 HC RX 258: Performed by: HOSPITALIST

## 2023-03-25 PROCEDURE — 1200000000 HC SEMI PRIVATE

## 2023-03-25 PROCEDURE — 73630 X-RAY EXAM OF FOOT: CPT

## 2023-03-25 PROCEDURE — 6360000002 HC RX W HCPCS: Performed by: HOSPITALIST

## 2023-03-25 PROCEDURE — 80053 COMPREHEN METABOLIC PANEL: CPT

## 2023-03-25 PROCEDURE — 85652 RBC SED RATE AUTOMATED: CPT

## 2023-03-25 PROCEDURE — 6370000000 HC RX 637 (ALT 250 FOR IP): Performed by: EMERGENCY MEDICINE

## 2023-03-25 RX ORDER — SODIUM CHLORIDE 9 MG/ML
INJECTION, SOLUTION INTRAVENOUS PRN
Status: DISCONTINUED | OUTPATIENT
Start: 2023-03-25 | End: 2023-03-28 | Stop reason: HOSPADM

## 2023-03-25 RX ORDER — DEXTROSE MONOHYDRATE 100 MG/ML
INJECTION, SOLUTION INTRAVENOUS CONTINUOUS PRN
Status: DISCONTINUED | OUTPATIENT
Start: 2023-03-25 | End: 2023-03-28 | Stop reason: HOSPADM

## 2023-03-25 RX ORDER — ASPIRIN 81 MG/1
81 TABLET, CHEWABLE ORAL DAILY
Status: DISCONTINUED | OUTPATIENT
Start: 2023-03-25 | End: 2023-03-28 | Stop reason: HOSPADM

## 2023-03-25 RX ORDER — FUROSEMIDE 10 MG/ML
40 INJECTION INTRAMUSCULAR; INTRAVENOUS ONCE
Status: DISCONTINUED | OUTPATIENT
Start: 2023-03-25 | End: 2023-03-25

## 2023-03-25 RX ORDER — SODIUM CHLORIDE 0.9 % (FLUSH) 0.9 %
5-40 SYRINGE (ML) INJECTION PRN
Status: DISCONTINUED | OUTPATIENT
Start: 2023-03-25 | End: 2023-03-28 | Stop reason: HOSPADM

## 2023-03-25 RX ORDER — POLYETHYLENE GLYCOL 3350 17 G/17G
17 POWDER, FOR SOLUTION ORAL DAILY PRN
Status: DISCONTINUED | OUTPATIENT
Start: 2023-03-25 | End: 2023-03-28 | Stop reason: HOSPADM

## 2023-03-25 RX ORDER — SODIUM CHLORIDE 0.9 % (FLUSH) 0.9 %
5-40 SYRINGE (ML) INJECTION EVERY 12 HOURS SCHEDULED
Status: DISCONTINUED | OUTPATIENT
Start: 2023-03-25 | End: 2023-03-28 | Stop reason: HOSPADM

## 2023-03-25 RX ORDER — ONDANSETRON 2 MG/ML
4 INJECTION INTRAMUSCULAR; INTRAVENOUS EVERY 6 HOURS PRN
Status: DISCONTINUED | OUTPATIENT
Start: 2023-03-25 | End: 2023-03-28 | Stop reason: HOSPADM

## 2023-03-25 RX ORDER — FUROSEMIDE 10 MG/ML
60 INJECTION INTRAMUSCULAR; INTRAVENOUS ONCE
Status: COMPLETED | OUTPATIENT
Start: 2023-03-25 | End: 2023-03-25

## 2023-03-25 RX ORDER — INSULIN LISPRO 100 [IU]/ML
0-4 INJECTION, SOLUTION INTRAVENOUS; SUBCUTANEOUS NIGHTLY
Status: DISCONTINUED | OUTPATIENT
Start: 2023-03-25 | End: 2023-03-28 | Stop reason: HOSPADM

## 2023-03-25 RX ORDER — GLUCAGON 1 MG/ML
1 KIT INJECTION PRN
Status: DISCONTINUED | OUTPATIENT
Start: 2023-03-25 | End: 2023-03-28 | Stop reason: HOSPADM

## 2023-03-25 RX ORDER — OXYCODONE HYDROCHLORIDE AND ACETAMINOPHEN 5; 325 MG/1; MG/1
1 TABLET ORAL ONCE
Status: COMPLETED | OUTPATIENT
Start: 2023-03-25 | End: 2023-03-25

## 2023-03-25 RX ORDER — ROSUVASTATIN CALCIUM 20 MG/1
20 TABLET, COATED ORAL DAILY
Status: DISCONTINUED | OUTPATIENT
Start: 2023-03-25 | End: 2023-03-28 | Stop reason: HOSPADM

## 2023-03-25 RX ORDER — INSULIN LISPRO 100 [IU]/ML
0-8 INJECTION, SOLUTION INTRAVENOUS; SUBCUTANEOUS
Status: DISCONTINUED | OUTPATIENT
Start: 2023-03-25 | End: 2023-03-28 | Stop reason: HOSPADM

## 2023-03-25 RX ORDER — ACETAMINOPHEN 325 MG/1
650 TABLET ORAL EVERY 6 HOURS PRN
Status: DISCONTINUED | OUTPATIENT
Start: 2023-03-25 | End: 2023-03-28 | Stop reason: HOSPADM

## 2023-03-25 RX ORDER — ACETAMINOPHEN 650 MG/1
650 SUPPOSITORY RECTAL EVERY 6 HOURS PRN
Status: DISCONTINUED | OUTPATIENT
Start: 2023-03-25 | End: 2023-03-28 | Stop reason: HOSPADM

## 2023-03-25 RX ORDER — FUROSEMIDE 10 MG/ML
40 INJECTION INTRAMUSCULAR; INTRAVENOUS 2 TIMES DAILY
Status: DISCONTINUED | OUTPATIENT
Start: 2023-03-25 | End: 2023-03-28 | Stop reason: HOSPADM

## 2023-03-25 RX ADMIN — METOPROLOL TARTRATE 25 MG: 25 TABLET, FILM COATED ORAL at 20:08

## 2023-03-25 RX ADMIN — APIXABAN 5 MG: 5 TABLET, FILM COATED ORAL at 20:09

## 2023-03-25 RX ADMIN — ASPIRIN 81 MG: 81 TABLET, CHEWABLE ORAL at 11:42

## 2023-03-25 RX ADMIN — METOPROLOL TARTRATE 25 MG: 25 TABLET, FILM COATED ORAL at 11:42

## 2023-03-25 RX ADMIN — ACETAMINOPHEN 650 MG: 325 TABLET ORAL at 18:55

## 2023-03-25 RX ADMIN — VANCOMYCIN HYDROCHLORIDE 750 MG: 10 INJECTION, POWDER, LYOPHILIZED, FOR SOLUTION INTRAVENOUS at 20:07

## 2023-03-25 RX ADMIN — CEFEPIME 2000 MG: 2 INJECTION, POWDER, FOR SOLUTION INTRAVENOUS at 12:33

## 2023-03-25 RX ADMIN — FUROSEMIDE 40 MG: 10 INJECTION, SOLUTION INTRAMUSCULAR; INTRAVENOUS at 16:42

## 2023-03-25 RX ADMIN — FUROSEMIDE 60 MG: 10 INJECTION, SOLUTION INTRAMUSCULAR; INTRAVENOUS at 07:10

## 2023-03-25 RX ADMIN — ROSUVASTATIN 20 MG: 20 TABLET, FILM COATED ORAL at 12:34

## 2023-03-25 RX ADMIN — OXYCODONE AND ACETAMINOPHEN 1 TABLET: 5; 325 TABLET ORAL at 07:10

## 2023-03-25 RX ADMIN — APIXABAN 5 MG: 5 TABLET, FILM COATED ORAL at 11:42

## 2023-03-25 RX ADMIN — CEFEPIME 2000 MG: 2 INJECTION, POWDER, FOR SOLUTION INTRAVENOUS at 22:07

## 2023-03-25 RX ADMIN — VANCOMYCIN HYDROCHLORIDE 2500 MG: 10 INJECTION, POWDER, LYOPHILIZED, FOR SOLUTION INTRAVENOUS at 09:15

## 2023-03-25 ASSESSMENT — PAIN DESCRIPTION - LOCATION
LOCATION: FOOT
LOCATION: LEG

## 2023-03-25 ASSESSMENT — PAIN DESCRIPTION - ORIENTATION
ORIENTATION: RIGHT

## 2023-03-25 ASSESSMENT — ENCOUNTER SYMPTOMS
SHORTNESS OF BREATH: 0
DIARRHEA: 0
COUGH: 1
VOMITING: 0
NAUSEA: 0
ABDOMINAL PAIN: 0

## 2023-03-25 ASSESSMENT — PAIN DESCRIPTION - FREQUENCY: FREQUENCY: CONTINUOUS

## 2023-03-25 ASSESSMENT — PAIN SCALES - GENERAL
PAINLEVEL_OUTOF10: 3
PAINLEVEL_OUTOF10: 7
PAINLEVEL_OUTOF10: 4
PAINLEVEL_OUTOF10: 3

## 2023-03-25 ASSESSMENT — PAIN DESCRIPTION - PAIN TYPE: TYPE: ACUTE PAIN;CHRONIC PAIN

## 2023-03-25 NOTE — H&P
39.5*        Recent Labs     03/25/23  0709      K 4.2      CO2 26   BUN 22*   CREATININE 1.4*   CALCIUM 8.9     Recent Labs     03/25/23  0709   AST 21   ALT 11   BILITOT 0.6   ALKPHOS 93     No results for input(s): INR in the last 72 hours. No results for input(s): [facility] in the last 72 hours. Urinalysis:      Lab Results   Component Value Date/Time    NITRU Negative 12/15/2022 01:44 PM    WBCUA  12/15/2022 01:44 PM    BACTERIA 2+ 12/15/2022 01:44 PM    RBCUA 5-10 12/15/2022 01:44 PM    BLOODU LARGE 12/15/2022 01:44 PM    SPECGRAV 1.025 12/15/2022 01:44 PM    GLUCOSEU Negative 12/15/2022 01:44 PM         ASSESSMENT:      Right lower extremity cellulitis associated with diabetes  Chronic leg ulcers  Chronic venous insufficiency  Podiatry consult appreciated  Empiric antibiotics--continue vancomycin and cefepime, follow-up on culture data  Ace wraps  Diuretics-IV Lasix  Leg ultrasound rule out DVT  Wound care    DM type II--appears controlled, last hemoglobin A1c was 7.2 on 8/22/22-states he uses short acting insulin 3 times daily-obtain hemoglobin A1c sliding scale insulin    Paroxysmal atrial fibrillation  Chronic combined systolic, diastolic heart failure previously EF 20 to 25% with recovery to 50% on most recent echo s/p AICD--compensated--continue metoprolol and Lasix  CAD s/p DYBA-rybcjm-mpffdfvy aspirin, statin, metoprolol  Essential hypertension-stable-continue metoprolol  CKD stage III-stable-continue to monitor  Hyperlipidemia-continue statin  Noncompliance with medications--- education provided            DVT Prophylaxis: Eliquis  Diet: Cardiac, diabetic diet  Code Status: Full Code             Carrington Juarez MD    Thank you Christa Salazar MD for the opportunity to be involved in this patient's care. If you have any questions or concerns please feel free to contact me at [phone].

## 2023-03-25 NOTE — ED PROVIDER NOTES
appointment yesterday. On arrival, he was hemodynamically stable and nontoxic in appearance. He had asymmetric pitting edema of his right leg with some blistering and weeping of the skin. There was erythema over the anterior aspect of his shin. He had numerous foot wounds that did not appear clinically infected. His labs were consistent with his known baseline. CBC showed a mild anemia with a hemoglobin of 13.0. CMP showed a creatinine of 1.4, consistent with prior values in our system. He was given IV Lasix by the off going provider, and I do concur that there is a component of fluid retention contributing to his symptoms, especially in the setting of nonadherence to his prescribed diuretic. Clinically, he also has an overlying cellulitis. DVT is an additional consideration given the asymmetric nature of his swelling. He reported to me that he is taking his Eliquis, though he told the off going provider that he was not. Podiatry evaluated the patient at bedside and recommended admission for IV antibiotics; I started him on IV vancomycin. I also sent in anti-Xa level to attempt to gauge his compliance with his Eliquis, and conveyed to the admitting team that I had clinical concern for DVT with a recommendation for a duplex ultrasound to evaluate for a DVT in his right leg. At this time, patient has been admitted to the hospitalist for further evaluation and management of right lower extremity cellulitis with additional concern for DVT. He will continue to be monitored in the emergency department until he is moved to his new treatment location. Is this patient to be included in the SEP-1 core measure due to severe sepsis or septic shock? No Exclusion criteria - the patient is NOT to be included for SEP-1 Core Measure due to: 2+ SIRS criteria are not met    Clinical Impression     1.  Cellulitis of right lower extremity        Disposition     PATIENT REFERRED TO:  No follow-up provider
Edema (4+) present. Left lower leg: Edema (3+) present. Skin:     General: Skin is warm and dry. Findings: Erythema (Both lower extremities right greater than left) present. Neurological:      Mental Status: He is alert and oriented to person, place, and time.    Psychiatric:         Behavior: Behavior normal.                  Taty Small MD  03/25/23 6199

## 2023-03-25 NOTE — ED NOTES
Breakfast tray ordered for pt     Dalia Salazar RN  03/25/23 4374
Pt to ED with c/ of increase in pain to R foot. Is currently being seen by podiatry for multiple wounds to both feet. Pt was seen yesterday in the office for increased redness and swelling. Was prescribed keflex at that time and has started antibiotic but sts that pain has increased since appt.       Jacqulynn Buerger, RN  03/25/23 8757
Vitals:    03/25/23 0637 03/25/23 0700 03/25/23 0710 03/25/23 0800   BP: (!) 132/90 120/72 120/72 139/89   Pulse: 95   75   Resp: 18   18   Temp: 97.9 °F (36.6 °C)      TempSrc: Oral      SpO2: 96% 96%  96%   Weight: 281 lb (127.5 kg)      Height: 6' 2\" (1.88 m)        FiO2 (%): none  O2 Flow Rate: O2 Device: None (Room air)    Cardiac Rhythm:    Pain Assessment:  [] Verbal [] Clive Lakshmi Scale  Pain Scale: Pain Assessment  Pain Assessment: 0-10  Pain Level: 7  Pain Location: Foot  Pain Orientation: Right  Pain Type: Acute pain, Chronic pain  Pain Frequency: Continuous  Last documented pain score (0-10 scale) Pain Level: 7  Last documented pain medication administered: percocet at 0710  Mental Status: oriented and alert  Orientation Level:    NIH Score:    C-SSRS: Risk of Suicide: No Risk  Bedside swallow:    Davilla Coma Scale (GCS): Monie Coma Scale  Eye Opening: Spontaneous  Best Verbal Response: Oriented  Best Motor Response: Obeys commands  Monie Coma Scale Score: 15  Active LDA's:   Peripheral IV 03/25/23 Right Wrist (Active)   Site Assessment Clean, dry & intact 03/25/23 0706     PO Status: no diet order yet  Pertinent or High Risk Medications/Drips: no   If Yes, please provide details:   Pending Blood Product Administration: no       You may also review the ED PT Care Timeline found under the Summary Nursing Index tab. Recommendation    Pending orders no   Plan for Discharge (if known):    Additional Comments: no   If any further questions, please call Sending RN at [phone]    Electronically signed by: Electronically signed by Carissa Diana, NAVDEEP on 3/25/2023 at 9:06 AM       Carissa Diana, [facility]  03/25/23 0163

## 2023-03-25 NOTE — CONSULTS
Clinical Pharmacy Progress Note    Pharmacy is consulted to dose Vancomycin x1 dose in ED per Dr. Enrique Samano. Ht Readings from Last 1 Encounters:   03/25/23 6' 2\" (1.88 m)     Wt Readings from Last 1 Encounters:   03/25/23 281 lb (127.5 kg)       Assessment/Plan:  Will order Vancomycin 2500mg (~20mg/kg) IV x1 for administration in ED per ER pharmacy consult. If Vancomycin is to continue on admission and pharmacy is to manage dosing, please re-consult with admission orders.       Please call with questions--  Thanks--  Tri Lechuga, PharmD, BCPS, BCGP  [id number] (hospitals)   3/25/2023 7:59 AM
Clinical Pharmacy Progress Note    Vancomycin - Management by Pharmacy    Consult Date(s): 3/25/23  Consulting Provider(s): Dr. Collins Lynette / Plan  1)  RLE cellulitis with chronic leg ulcers - Vancomycin  Concurrent Antimicrobials: Cefepime  Day of Vanc Therapy / Ordered Duration: 1 of 7  Current Dosing Method: Bayesian-Guided AUC Dosing  Therapeutic Goal: AUC < 500 mg/L*hr  Current Dose / Plan:   Pt with CKD - SCr appears to be at baseline. Received 2500mg IV x1 loading dose in ED this AM.  Will continue with 750mg IV q12h. Regimen predicts an AUC = 425 with trough = 15. Random level is ordered for 3/26 AM to further evaluate above regimen. Will continue to monitor clinical condition and make adjustments to regimen as appropriate. Please call with questions--  Thanks--  Bridgette Flores, PharmD, BCPS, BCGP  [id number] ([facility])   3/25/2023 12:21 PM      Subjective/Objective:   Silvio Blackman is a 81 y.o. male with a PMHx significant for DM 2, CKD 3, pA.fib, HF, CAD s/p CABG, HTN, HLD, and AICD palcement who is admitted with RLE cellulitis with chronic leg ulcers and chronic venous insufficiency. Pharmacy is consulted to dose Vancomycin. Ht Readings from Last 1 Encounters:   03/25/23 6' 2\" (1.88 m)     Wt Readings from Last 1 Encounters:   03/25/23 281 lb (127.5 kg)     Current & Prior Antimicrobial Regimen(s):  Cefepime (3/25-current)  Vancomycin - Pharmacy to dose  2500mg IV x1 3/25 09:00  750mg IV q12h (3/25-current)    Vancomycin Level(s) / Doses:    Date Time Dose Type of Level / Level Interpretation   3/26 06:00 750mg q12h Random = ordered           Note: Serum levels collected for AUC-based dosing may be high if collected in close proximity to the dose administered. This is not necessarily indicative of toxicity.     Cultures & Sensitivities:    Date Site Micro Susceptibility / Result   N/a              Recent Labs     03/25/23  0709   CREATININE 1.4*   BUN 22*   WBC 6.6
ordered right lower extremity rule out DVT  -Debridement not performed as the wounds were debrided in the clinical setting yesterday  -Dressing to the right lower extremity consisting of Betadine, gauze, Kerlix, Ace for gentle compression  -Begin broad-spectrum IV antibiotics at this time, IV vancomycin, IV cefepime  -Weightbearing as tolerated to the bilateral lower extremity      DISPO: Cellulitis versus DVT right lower extremity. Multiple superficial ulcerations to the right lower extremity. Due to increasing pain, erythema, warmth to the right lower extremity recommend the patient be admitted for a course of IV antibiotics for concern of cellulitis. X-rays ordered. Venous Doppler ordered to rule out DVT. Concern for patient compliance in the outpatient setting with oral antibiotics, recommend for course of IV antibiotics to manage symptoms. We will continue to follow the patient while in house and perform local wound care.     - The patient will be staffed with Dr. Rachel Del Angel DPM   Podiatric Resident PGY1  Pager [phone] or [place]  3/25/2023, 8:11 AM

## 2023-03-26 LAB
ANION GAP SERPL CALCULATED.3IONS-SCNC: 11 MMOL/L (ref 3–16)
BASOPHILS # BLD: 0 K/UL (ref 0–0.2)
BASOPHILS NFR BLD: 0.7 %
BUN SERPL-MCNC: 26 MG/DL (ref 7–20)
CALCIUM SERPL-MCNC: 8.8 MG/DL (ref 8.3–10.6)
CHLORIDE SERPL-SCNC: 98 MMOL/L (ref 99–110)
CO2 SERPL-SCNC: 26 MMOL/L (ref 21–32)
CREAT SERPL-MCNC: 1.5 MG/DL (ref 0.8–1.3)
DEPRECATED RDW RBC AUTO: 15.5 % (ref 12.4–15.4)
EOSINOPHIL # BLD: 0.2 K/UL (ref 0–0.6)
EOSINOPHIL NFR BLD: 3.5 %
GFR SERPLBLD CREATININE-BSD FMLA CKD-EPI: 50 ML/MIN/{1.73_M2}
GLUCOSE BLD-MCNC: 132 MG/DL (ref 70–99)
GLUCOSE BLD-MCNC: 174 MG/DL (ref 70–99)
GLUCOSE BLD-MCNC: 206 MG/DL (ref 70–99)
GLUCOSE SERPL-MCNC: 182 MG/DL (ref 70–99)
HCT VFR BLD AUTO: 41.3 % (ref 40.5–52.5)
HGB BLD-MCNC: 13.1 G/DL (ref 13.5–17.5)
LYMPHOCYTES # BLD: 0.9 K/UL (ref 1–5.1)
LYMPHOCYTES NFR BLD: 14.8 %
MCH RBC QN AUTO: 28.4 PG (ref 26–34)
MCHC RBC AUTO-ENTMCNC: 31.8 G/DL (ref 31–36)
MCV RBC AUTO: 89.5 FL (ref 80–100)
MONOCYTES # BLD: 0.6 K/UL (ref 0–1.3)
MONOCYTES NFR BLD: 10.7 %
NEUTROPHILS # BLD: 4.2 K/UL (ref 1.7–7.7)
NEUTROPHILS NFR BLD: 70.3 %
PERFORMED ON: ABNORMAL
PLATELET # BLD AUTO: 223 K/UL (ref 135–450)
PMV BLD AUTO: 7.7 FL (ref 5–10.5)
POTASSIUM SERPL-SCNC: 4.2 MMOL/L (ref 3.5–5.1)
RBC # BLD AUTO: 4.62 M/UL (ref 4.2–5.9)
SODIUM SERPL-SCNC: 135 MMOL/L (ref 136–145)
WBC # BLD AUTO: 6 K/UL (ref 4–11)

## 2023-03-26 PROCEDURE — 0JBQ0ZZ EXCISION OF RIGHT FOOT SUBCUTANEOUS TISSUE AND FASCIA, OPEN APPROACH: ICD-10-PCS | Performed by: HOSPITALIST

## 2023-03-26 PROCEDURE — 6370000000 HC RX 637 (ALT 250 FOR IP): Performed by: HOSPITALIST

## 2023-03-26 PROCEDURE — 85025 COMPLETE CBC W/AUTO DIFF WBC: CPT

## 2023-03-26 PROCEDURE — 2580000003 HC RX 258: Performed by: HOSPITALIST

## 2023-03-26 PROCEDURE — 0JBR0ZZ EXCISION OF LEFT FOOT SUBCUTANEOUS TISSUE AND FASCIA, OPEN APPROACH: ICD-10-PCS | Performed by: HOSPITALIST

## 2023-03-26 PROCEDURE — 6360000002 HC RX W HCPCS: Performed by: HOSPITALIST

## 2023-03-26 PROCEDURE — 1200000000 HC SEMI PRIVATE

## 2023-03-26 PROCEDURE — 36415 COLL VENOUS BLD VENIPUNCTURE: CPT

## 2023-03-26 PROCEDURE — 36592 COLLECT BLOOD FROM PICC: CPT

## 2023-03-26 PROCEDURE — 83036 HEMOGLOBIN GLYCOSYLATED A1C: CPT

## 2023-03-26 PROCEDURE — 80048 BASIC METABOLIC PNL TOTAL CA: CPT

## 2023-03-26 RX ADMIN — SODIUM CHLORIDE, PRESERVATIVE FREE 10 ML: 5 INJECTION INTRAVENOUS at 21:00

## 2023-03-26 RX ADMIN — METOPROLOL TARTRATE 25 MG: 25 TABLET, FILM COATED ORAL at 08:14

## 2023-03-26 RX ADMIN — SODIUM CHLORIDE, PRESERVATIVE FREE 10 ML: 5 INJECTION INTRAVENOUS at 08:14

## 2023-03-26 RX ADMIN — APIXABAN 5 MG: 5 TABLET, FILM COATED ORAL at 08:14

## 2023-03-26 RX ADMIN — FUROSEMIDE 40 MG: 10 INJECTION, SOLUTION INTRAMUSCULAR; INTRAVENOUS at 18:23

## 2023-03-26 RX ADMIN — FUROSEMIDE 40 MG: 10 INJECTION, SOLUTION INTRAMUSCULAR; INTRAVENOUS at 08:14

## 2023-03-26 RX ADMIN — VANCOMYCIN HYDROCHLORIDE 750 MG: 10 INJECTION, POWDER, LYOPHILIZED, FOR SOLUTION INTRAVENOUS at 09:34

## 2023-03-26 RX ADMIN — INSULIN LISPRO 2 UNITS: 100 INJECTION, SOLUTION INTRAVENOUS; SUBCUTANEOUS at 18:15

## 2023-03-26 RX ADMIN — ASPIRIN 81 MG: 81 TABLET, CHEWABLE ORAL at 08:14

## 2023-03-26 RX ADMIN — METOPROLOL TARTRATE 25 MG: 25 TABLET, FILM COATED ORAL at 21:06

## 2023-03-26 RX ADMIN — CEFEPIME 2000 MG: 2 INJECTION, POWDER, FOR SOLUTION INTRAVENOUS at 12:47

## 2023-03-26 RX ADMIN — ROSUVASTATIN 20 MG: 20 TABLET, FILM COATED ORAL at 08:14

## 2023-03-26 RX ADMIN — APIXABAN 5 MG: 5 TABLET, FILM COATED ORAL at 21:06

## 2023-03-26 RX ADMIN — CEFEPIME 2000 MG: 2 INJECTION, POWDER, FOR SOLUTION INTRAVENOUS at 23:43

## 2023-03-26 RX ADMIN — VANCOMYCIN HYDROCHLORIDE 750 MG: 10 INJECTION, POWDER, LYOPHILIZED, FOR SOLUTION INTRAVENOUS at 21:09

## 2023-03-26 ASSESSMENT — PAIN DESCRIPTION - PAIN TYPE: TYPE: ACUTE PAIN;CHRONIC PAIN

## 2023-03-26 ASSESSMENT — PAIN SCALES - GENERAL
PAINLEVEL_OUTOF10: 3
PAINLEVEL_OUTOF10: 3

## 2023-03-26 ASSESSMENT — PAIN DESCRIPTION - FREQUENCY: FREQUENCY: CONTINUOUS

## 2023-03-26 NOTE — PLAN OF CARE
Problem: Discharge Planning  Goal: Discharge to home or other facility with appropriate resources  3/26/2023 0017 by Roberta Salazar RN  Outcome: Progressing   Patient to be discharged when medically stable. Problem: Pain  Goal: Verbalizes/displays adequate comfort level or baseline comfort level  3/26/2023 0017 by Roberta Salazar RN  Outcome: Progressing   Denies pain. Will monitor. Problem: Safety - Adult  Goal: Free from fall injury  3/26/2023 0017 by Roberta Salazar RN  Outcome: Progressing   Patient does not meet fall criteria. Gait steady. Independent at home without issues. Continue to monitor safety. Patient encouraged to call with needs and concerns. Frequent rounding to assess needs. Problem: Respiratory - Adult  Goal: Achieves optimal ventilation and oxygenation  Outcome: Progressing   Lungs with fine crackles LLL. Denies sob. RA. Problem: Skin/Tissue Integrity - Adult  Goal: Incisions, wounds, or drain sites healing without S/S of infection  Outcome: Progressing   Podiatry following and changing RLE dressing. LLE red/nat. Patient encouraged to reposition to prevent skin breakdown. Problem: Musculoskeletal - Adult  Goal: Return mobility to safest level of function  Outcome: Progressing   Patient able to complete adl's independently. Patient encouraged to call nurse for assistance at anytime assistance is needed. Problem: Infection - Adult  Goal: Absence of infection at discharge  Outcome: Progressing    Patient receiving IV antibiotics. Podiatry following for RLE.

## 2023-03-26 NOTE — PLAN OF CARE
Problem: Pain  Goal: Verbalizes/displays adequate comfort level or baseline comfort level  3/26/2023 0831 by Jaime Hughes RN  Outcome: Progressing    Problem: Skin/Tissue Integrity - Adult  Goal: Skin integrity remains intact  Outcome: Progressing

## 2023-03-26 NOTE — PROGRESS NOTES
betadine, gauze, kerlix, and ACE  -Rx given for 10 day course of Keflex.   -Patient educated about the importance of diabetic foot care consisting of but not limited to daily foot inspections, wearing supportive shoes and inserts at all times, applying lotion to feet while sparing webspaces, and routinely checking blood sugar.   -Instructed patient to watch for signs and symptoms of infection including but not limited to fever, chills, feeling ill, redness around the wounds, redness streaking up the legs, purulent drainage. Instructed patient to call the clinic or present to the nearest emergency department if they notice these signs or symptoms     -Instructed patient to follow up in 1 week or present to the ED if erythema or pain worsens. Assessment and plan discussed with YOLANDA Villaseñor DPM   Podiatric Resident PGY1  Pager [phone] or [place]  3/26/2023, 9:57 AM     Pt seen,examined and evaluated. I have reviewed the current history, physical findings, labs and assessment and plan and agree with note as documented by resident physician.      Dr. Jeanette Perez [facility]   Office: [phone]  Cell: [phone]

## 2023-03-27 ENCOUNTER — APPOINTMENT (OUTPATIENT)
Dept: VASCULAR LAB | Age: 73
DRG: 380 | End: 2023-03-27
Payer: MEDICAID

## 2023-03-27 LAB
ANION GAP SERPL CALCULATED.3IONS-SCNC: 10 MMOL/L (ref 3–16)
BUN SERPL-MCNC: 25 MG/DL (ref 7–20)
CALCIUM SERPL-MCNC: 8.9 MG/DL (ref 8.3–10.6)
CHLORIDE SERPL-SCNC: 102 MMOL/L (ref 99–110)
CO2 SERPL-SCNC: 26 MMOL/L (ref 21–32)
CREAT SERPL-MCNC: 1.5 MG/DL (ref 0.8–1.3)
EST. AVERAGE GLUCOSE BLD GHB EST-MCNC: 159.9 MG/DL
GFR SERPLBLD CREATININE-BSD FMLA CKD-EPI: 50 ML/MIN/{1.73_M2}
GLUCOSE BLD-MCNC: 116 MG/DL (ref 70–99)
GLUCOSE BLD-MCNC: 129 MG/DL (ref 70–99)
GLUCOSE BLD-MCNC: 165 MG/DL (ref 70–99)
GLUCOSE BLD-MCNC: 174 MG/DL (ref 70–99)
GLUCOSE SERPL-MCNC: 132 MG/DL (ref 70–99)
HBA1C MFR BLD: 7.2 %
PERFORMED ON: ABNORMAL
POTASSIUM SERPL-SCNC: 4.3 MMOL/L (ref 3.5–5.1)
SODIUM SERPL-SCNC: 138 MMOL/L (ref 136–145)
VANCOMYCIN TROUGH SERPL-MCNC: 15.5 UG/ML (ref 10–20)

## 2023-03-27 PROCEDURE — 2580000003 HC RX 258: Performed by: HOSPITALIST

## 2023-03-27 PROCEDURE — 80202 ASSAY OF VANCOMYCIN: CPT

## 2023-03-27 PROCEDURE — 1200000000 HC SEMI PRIVATE

## 2023-03-27 PROCEDURE — 6360000002 HC RX W HCPCS: Performed by: HOSPITALIST

## 2023-03-27 PROCEDURE — 6370000000 HC RX 637 (ALT 250 FOR IP): Performed by: STUDENT IN AN ORGANIZED HEALTH CARE EDUCATION/TRAINING PROGRAM

## 2023-03-27 PROCEDURE — 36415 COLL VENOUS BLD VENIPUNCTURE: CPT

## 2023-03-27 PROCEDURE — 6370000000 HC RX 637 (ALT 250 FOR IP): Performed by: HOSPITALIST

## 2023-03-27 PROCEDURE — 80048 BASIC METABOLIC PNL TOTAL CA: CPT

## 2023-03-27 PROCEDURE — 93971 EXTREMITY STUDY: CPT

## 2023-03-27 RX ORDER — OXYCODONE HYDROCHLORIDE 5 MG/1
5 TABLET ORAL
Status: COMPLETED | OUTPATIENT
Start: 2023-03-27 | End: 2023-03-27

## 2023-03-27 RX ADMIN — ASPIRIN 81 MG: 81 TABLET, CHEWABLE ORAL at 09:39

## 2023-03-27 RX ADMIN — CEFEPIME 2000 MG: 2 INJECTION, POWDER, FOR SOLUTION INTRAVENOUS at 13:07

## 2023-03-27 RX ADMIN — SODIUM CHLORIDE, PRESERVATIVE FREE 10 ML: 5 INJECTION INTRAVENOUS at 20:52

## 2023-03-27 RX ADMIN — VANCOMYCIN HYDROCHLORIDE 750 MG: 10 INJECTION, POWDER, LYOPHILIZED, FOR SOLUTION INTRAVENOUS at 20:57

## 2023-03-27 RX ADMIN — SODIUM CHLORIDE, PRESERVATIVE FREE 10 ML: 5 INJECTION INTRAVENOUS at 09:38

## 2023-03-27 RX ADMIN — FUROSEMIDE 40 MG: 10 INJECTION, SOLUTION INTRAMUSCULAR; INTRAVENOUS at 09:39

## 2023-03-27 RX ADMIN — APIXABAN 5 MG: 5 TABLET, FILM COATED ORAL at 09:39

## 2023-03-27 RX ADMIN — FUROSEMIDE 40 MG: 10 INJECTION, SOLUTION INTRAMUSCULAR; INTRAVENOUS at 18:03

## 2023-03-27 RX ADMIN — SODIUM CHLORIDE: 9 INJECTION, SOLUTION INTRAVENOUS at 13:05

## 2023-03-27 RX ADMIN — CEFEPIME 2000 MG: 2 INJECTION, POWDER, FOR SOLUTION INTRAVENOUS at 22:33

## 2023-03-27 RX ADMIN — APIXABAN 5 MG: 5 TABLET, FILM COATED ORAL at 20:51

## 2023-03-27 RX ADMIN — METOPROLOL TARTRATE 25 MG: 25 TABLET, FILM COATED ORAL at 09:39

## 2023-03-27 RX ADMIN — METOPROLOL TARTRATE 25 MG: 25 TABLET, FILM COATED ORAL at 20:51

## 2023-03-27 RX ADMIN — OXYCODONE HYDROCHLORIDE 5 MG: 5 TABLET ORAL at 20:51

## 2023-03-27 RX ADMIN — VANCOMYCIN HYDROCHLORIDE 750 MG: 10 INJECTION, POWDER, LYOPHILIZED, FOR SOLUTION INTRAVENOUS at 09:52

## 2023-03-27 RX ADMIN — SODIUM CHLORIDE: 9 INJECTION, SOLUTION INTRAVENOUS at 09:51

## 2023-03-27 ASSESSMENT — PAIN DESCRIPTION - PAIN TYPE: TYPE: ACUTE PAIN;CHRONIC PAIN

## 2023-03-27 ASSESSMENT — PAIN DESCRIPTION - DESCRIPTORS: DESCRIPTORS: ACHING;BURNING;DISCOMFORT

## 2023-03-27 ASSESSMENT — PAIN SCALES - GENERAL
PAINLEVEL_OUTOF10: 3
PAINLEVEL_OUTOF10: 3
PAINLEVEL_OUTOF10: 5
PAINLEVEL_OUTOF10: 0
PAINLEVEL_OUTOF10: 3
PAINLEVEL_OUTOF10: 3
PAINLEVEL_OUTOF10: 0

## 2023-03-27 ASSESSMENT — PAIN - FUNCTIONAL ASSESSMENT: PAIN_FUNCTIONAL_ASSESSMENT: ACTIVITIES ARE NOT PREVENTED

## 2023-03-27 ASSESSMENT — PAIN DESCRIPTION - FREQUENCY: FREQUENCY: CONTINUOUS

## 2023-03-27 ASSESSMENT — PAIN DESCRIPTION - ONSET: ONSET: ON-GOING

## 2023-03-27 ASSESSMENT — PAIN DESCRIPTION - ORIENTATION: ORIENTATION: RIGHT

## 2023-03-27 ASSESSMENT — PAIN DESCRIPTION - LOCATION: LOCATION: LEG

## 2023-03-27 NOTE — CARE COORDINATION
Case Management Assessment  Initial Evaluation    Date/Time of Evaluation: 3/27/2023 11:45 AM  Assessment Completed by: Chase Baca RN    If patient is discharged prior to next notation, then this note serves as note for discharge by case management. Patient Name: Quinton Buck                   YOB: 1950  Diagnosis: Cellulitis [L03.90]  Cellulitis of right lower extremity [E89.909]                   Date / Time: 3/25/2023  6:20 AM    Patient Admission Status: Inpatient   Readmission Risk (Low < 19, Mod (19-27), High > 27): Readmission Risk Score: 15.7    Current PCP: Kemi Perez MD  PCP verified by CM? Yes    Chart Reviewed: Yes      History Provided by: Patient  Patient Orientation: Alert and Oriented    Patient Cognition: Alert    Hospitalization in the last 30 days (Readmission):  No    If yes, Readmission Assessment in CM Navigator will be completed. Advance Directives:      Code Status: Full Code   Patient's Primary Decision Maker is: Legal Next of Kin    Primary Decision Maker: TRUDY SHAH - Niece/Nephew - [phone]    Discharge Planning:    Patient lives with: Alone Type of Home: Other (Comment) (RV)  Primary Care Giver: Self  Patient Support Systems include: None   Current Financial resources: None  Current community resources: None  Current services prior to admission: None            Current DME:              Type of Home Care services:  None    ADLS  Prior functional level: Independent in ADLs/IADLs  Current functional level:      PT AM-PAC:   /24  OT AM-PAC:   /24    Family can provide assistance at DC: No  Would you like Case Management to discuss the discharge plan with any other family members/significant others, and if so, who? No  Plans to Return to Present Housing: Yes  Other Identified Issues/Barriers to RETURNING to current housing: none  Potential Assistance needed at discharge: N/A            Potential DME:    Patient expects to discharge to:  Other (comment)

## 2023-03-27 NOTE — DISCHARGE INSTRUCTIONS
Extra Heart Failure sites:     [email or web address].com/   --- this is American Heart Association interactive Healthier Living with Heart Failure guidebook. Please click hyperlink or copy / paste link into search bar. Use your mouse to scroll through the pages. Lots of information about weight monitoring, diet tips, activity, meds, etc    HF [place] kyle  -- this is a free smart phone kyle available for iPhone and Android download. Use your phone to track sodium / fluid intake, zone tool symptom tracking, weights, medications, etc. Click on this hyperlink  HF [place] Kyle   for QR code for easy download. DASH (Dietary Approach to Stop Hypertension) diet --  [email or web address].no -- this diet is a flexible eating plan that promotes heart healthy eating style. Click on hyperlink or copy / paste link into search bar. Lots of low sodium recipes and tips.     [email or web address].ca  -- more free recipes         Podiatry Wound Care Discharge Instructions  Please perform every other day dressing changes to right lower extremity as follows  -Apply betadine to the wound on the  right lower leg  -Next apply gauze to the top of the right foot, ankle, and leg  -Next loosely wrap the right lower extremity with Kerlix starting from just in front of the toes and ending just below the knee  -Next gently wrap the right foot with 4 inch Ace bandage starting from just in front of the toes and ending just above the ankle  -Next gently wrap the right leg with 6 inch Ace bandage starting from just above the ankle and ending just below the knee  Patient is full-weightbearing Right lower extremity  Please follow-up with Dr. Gallito Meade DPM for wound care

## 2023-03-27 NOTE — PLAN OF CARE
Problem: Chronic Conditions and Co-morbidities  Goal: Patient's chronic conditions and co-morbidity symptoms are monitored and maintained or improved  3/26/2023 2120 by Soy Barba RN  Outcome: Progressing     Problem: Discharge Planning  Goal: Discharge to home or other facility with appropriate resources  3/26/2023 2120 by Soy Barba RN  Outcome: Progressing     Problem: Pain  Goal: Verbalizes/displays adequate comfort level or baseline comfort level  3/26/2023 2120 by Soy Barba RN  Outcome: Progressing     Problem: Safety - Adult  Goal: Free from fall injury  3/26/2023 2120 by Soy Barba RN  Outcome: Progressing     Problem: Respiratory - Adult  Goal: Achieves optimal ventilation and oxygenation  3/26/2023 2120 by Soy Barba RN  Outcome: Progressing     Problem: Skin/Tissue Integrity - Adult  Goal: Skin integrity remains intact  3/26/2023 2120 by Soy Barba RN  Outcome: Progressing     Problem: Skin/Tissue Integrity - Adult  Goal: Incisions, wounds, or drain sites healing without S/S of infection  3/26/2023 2120 by Soy Barba RN  Outcome: Progressing     Problem: Musculoskeletal - Adult  Goal: Return mobility to safest level of function  3/26/2023 2120 by Soy Barba RN  Outcome: Progressing     Problem: Infection - Adult  Goal: Absence of infection at discharge  3/26/2023 2120 by Soy Barba RN  Outcome: Progressing     Problem: Metabolic/Fluid and Electrolytes - Adult  Goal: Electrolytes maintained within normal limits  3/26/2023 2120 by oSy Barba RN  Outcome: Progressing

## 2023-03-28 VITALS
RESPIRATION RATE: 16 BRPM | WEIGHT: 265 LBS | TEMPERATURE: 97.9 F | HEIGHT: 74 IN | OXYGEN SATURATION: 95 % | DIASTOLIC BLOOD PRESSURE: 87 MMHG | BODY MASS INDEX: 34.01 KG/M2 | SYSTOLIC BLOOD PRESSURE: 130 MMHG | HEART RATE: 80 BPM

## 2023-03-28 LAB
GLUCOSE BLD-MCNC: 136 MG/DL (ref 70–99)
GLUCOSE BLD-MCNC: 150 MG/DL (ref 70–99)
PERFORMED ON: ABNORMAL
PERFORMED ON: ABNORMAL

## 2023-03-28 PROCEDURE — 6360000002 HC RX W HCPCS: Performed by: HOSPITALIST

## 2023-03-28 PROCEDURE — 6370000000 HC RX 637 (ALT 250 FOR IP): Performed by: HOSPITALIST

## 2023-03-28 PROCEDURE — 2580000003 HC RX 258: Performed by: HOSPITALIST

## 2023-03-28 RX ORDER — INSULIN LISPRO 100 [IU]/ML
3 INJECTION, SOLUTION INTRAVENOUS; SUBCUTANEOUS
COMMUNITY
Start: 2023-03-28

## 2023-03-28 RX ADMIN — SODIUM CHLORIDE, PRESERVATIVE FREE 10 ML: 5 INJECTION INTRAVENOUS at 08:10

## 2023-03-28 RX ADMIN — ROSUVASTATIN 20 MG: 20 TABLET, FILM COATED ORAL at 08:09

## 2023-03-28 RX ADMIN — APIXABAN 5 MG: 5 TABLET, FILM COATED ORAL at 08:10

## 2023-03-28 RX ADMIN — VANCOMYCIN HYDROCHLORIDE 750 MG: 10 INJECTION, POWDER, LYOPHILIZED, FOR SOLUTION INTRAVENOUS at 08:17

## 2023-03-28 RX ADMIN — CEFEPIME 2000 MG: 2 INJECTION, POWDER, FOR SOLUTION INTRAVENOUS at 10:01

## 2023-03-28 RX ADMIN — METOPROLOL TARTRATE 25 MG: 25 TABLET, FILM COATED ORAL at 08:10

## 2023-03-28 RX ADMIN — FUROSEMIDE 40 MG: 10 INJECTION, SOLUTION INTRAMUSCULAR; INTRAVENOUS at 08:09

## 2023-03-28 RX ADMIN — ASPIRIN 81 MG: 81 TABLET, CHEWABLE ORAL at 08:09

## 2023-03-28 NOTE — PLAN OF CARE
Problem: Chronic Conditions and Co-morbidities  Goal: Patient's chronic conditions and co-morbidity symptoms are monitored and maintained or improved  Outcome: Adequate for Discharge     Problem: Discharge Planning  Goal: Discharge to home or other facility with appropriate resources  Outcome: Adequate for Discharge     Problem: Pain  Goal: Verbalizes/displays adequate comfort level or baseline comfort level  Outcome: Adequate for Discharge     Problem: Safety - Adult  Goal: Free from fall injury  Outcome: Adequate for Discharge     Problem: Respiratory - Adult  Goal: Achieves optimal ventilation and oxygenation  Outcome: Adequate for Discharge     Problem: Skin/Tissue Integrity - Adult  Goal: Skin integrity remains intact  Outcome: Adequate for Discharge  Goal: Incisions, wounds, or drain sites healing without S/S of infection  Outcome: Adequate for Discharge     Problem: Musculoskeletal - Adult  Goal: Return mobility to safest level of function  Outcome: Adequate for Discharge     Problem: Infection - Adult  Goal: Absence of infection at discharge  Outcome: Adequate for Discharge     Problem: Metabolic/Fluid and Electrolytes - Adult  Goal: Electrolytes maintained within normal limits  Outcome: Adequate for Discharge

## 2023-03-28 NOTE — DISCHARGE SUMMARY
Hospital Discharge Summary    Patient's PCP: Mimi Hwang MD  Admit Date: 3/25/2023   Discharge Date: 3/28/2023    Admitting Physician: Dr. Castro Crabtree MD  Discharge Physician: Dr. Castro Crabtree MD   Consults: Podiatry    Brief HPI:     81 y.o. male with history of PAF, CAD s/p CABG, ischemic cardiomyopathy/CHF with reduced ejection fraction, hypertension, chronic leg edema with leg ulcers,DM type II presented with increased redness, pain and swelling for the right lower extremity. .  Patient's symptoms have been worsening over the past 24 hours. He was seen by podiatry in the outpatient clinic yesterday and prescribed Keflex but reported worsening symptoms and so decided come to the ED. Henrique Camargo No fevers, chills or rigors. Upon arrival to the ED, BP was 142/90, pulse 95, respirations 18, temperature 97.9. .  WBC was 13.0..  Podiatry was consulted.   Patient was started on IV vancomycin and cefepime    Brief hospital course:     Right lower extremity cellulitis associated with diabetes  Diabetic leg ulcers  Chronic venous insufficiency  Clinically improved with treatment  Podiatry consult appreciated  Empiric antibiotics--treated with vancomycin and cefepime-transition to Keflex on discharge  Ace wraps  Diuretics-given V Lasix, resume p.o. on discharge  No evidence of DVT on ultrasound  Wound care     DM type II--appears controlled, last hemoglobin A1c was 7.2 on 8/22/22-states he uses short acting insulin 3 times daily-continue SSI follow-up on hemoglobin A1c     Paroxysmal atrial fibrillation  Chronic combined systolic, diastolic heart failure previously EF 20 to 25% with recovery to 50% on most recent echo s/p AICD--compensated--continue metoprolol and Lasix  CAD s/p ZKNV-jeazsg-mixykayl aspirin, statin, metoprolol  Essential hypertension-stable-continue metoprolol  CKD stage III-stable-continue to monitor  Hyperlipidemia-continue statin  Noncompliance with medications--- education provided

## 2023-03-28 NOTE — PROGRESS NOTES
4 Eyes Admission Assessment     I agree as the admission nurse that 2 RN's have performed a thorough Head to Toe Skin Assessment on the patient. ALL assessment sites listed below have been assessed on admission. Areas assessed by both nurses:   [x]   Head, Face, and Ears   [x]   Shoulders, Back, and Chest  [x]   Arms, Elbows, and Hands   [x]   Coccyx, Sacrum, and Ischium  [x]   Legs, Feet, and Heels        Does the Patient have Skin Breakdown?   No         Damaso Prevention initiated:  Yes   Wound Care Orders initiated:  NA      [facility] nurse consulted for Pressure Injury (Stage 3,4, Unstageable, DTI, NWPT, and Complex wounds) or Damaso score 18 or lower:  No      Nurse 1 eSignature: Electronically signed by Lynn Coronado RN on 3/25/23 at 4:20 PM EDT    **SHARE this note so that the co-signing nurse is able to place an eSignature**    Nurse 2 eSignature: Electronically signed by Norberto Burciaga RN on 3/25/23 at 4:21 PM EDT
A&Ox4; VSS. ABX administered. Room safety measures in place. Will continue to monitor.
Pharmacy Note - Extended Infusion Beta-Lactam Adjustment    Cefepime ordered for treatment of SSTI. Per [facility] Extended Infusion Beta-Lactam Policy, dosing will be changed to 2g load over 30 min, followed by 2g (extended infusion over 240 min) IV q12h    Estimated Creatinine Clearance: Estimated Creatinine Clearance: 70 mL/min (A) (based on SCr of 1.4 mg/dL (H)). BMI: Body mass index is 36.08 kg/m². Rationale for Adjustment: Agent demonstrates time-dependent effect on bacterial eradication. Extended-infusion dosing strategy aims to enhance microbiologic and clinical efficacy. Pharmacy will continue to monitor renal function, cultures and sensitivities (where available) and adjust dose as necessary. Please call with any questions    Arnel Meredith Dorado  1-2014 ([address])
Physician Progress Note      PATIENT:               Manny Padilla  CSN #:                  [id number]  :                       1950  ADMIT DATE:       3/25/2023 6:20 AM  DISCH DATE:        3/28/2023 1:37 PM  RESPONDING  PROVIDER #:        Matthew Chu MD          QUERY TEXT:    Pt admitted with cellulitis. Pt noted to have a history of CHF. If possible,   please document in progress notes and discharge summary if you are evaluating   and/or treating any of the following: The medical record reflects the following:  Risk Factors: 80 yo w/ hx of systolic CHF  Clinical Indicators: Per PMH:  Systolic CHF. Per ECHO : EF 50%. No gross   regional wall motion abnormalities were noted. Indeterminate diastolic   function. Treatment: Home dose of Metoprolol 25mg PO 2X daily, 40 mg IV Lasix 2X daily. Options provided:  -- Chronic Systolic CHF/HFrEF  -- Other - I will add my own diagnosis  -- Disagree - Not applicable / Not valid  -- Disagree - Clinically unable to determine / Unknown  -- Refer to Clinical Documentation Reviewer    PROVIDER RESPONSE TEXT:    This patient has chronic systolic CHF/HFrEF. Query created by: Ilda Cervantes on 3/28/2023 7:21 AM      Electronically signed by:   Matthew Chu MD 3/28/2023 1:49 PM
Podiatric Surgery Daily Progress Note  Veronica Dm      Subjective :   Patient seen and examined this am at the bedside. Patient denies any acute overnight events. Patient states that there is a continued throbbing to the RLE and that the compression dressing became uncomfortable and again removed it over night. Patient denies N/V/F/C/SOB. Patient denies calf pain, thigh pain, chest pain. Review of Systems: A 12 point review of symptoms is unremarkable with the exception of the chief complaint. Patient specifically denies nausea, fever, vomiting, chills, shortness of breath, chest pain, abdominal pain, constipation or difficulty urinating. Objective     /87   Pulse 80   Temp 97.9 °F (36.6 °C) (Oral)   Resp 16   Ht 6' 2\" (1.88 m)   Wt 273 lb 13 oz (124.2 kg)   SpO2 95%   BMI 35.16 kg/m²      I/O:  Intake/Output Summary (Last 24 hours) at 3/28/2023 0749  Last data filed at 3/27/2023 2051  Gross per 24 hour   Intake 600 ml   Output --   Net 600 ml              Wt Readings from Last 3 Encounters:   03/27/23 273 lb 13 oz (124.2 kg)   02/21/23 277 lb (125.6 kg)   02/16/23 271 lb 6.4 oz (123.1 kg)       LABS:    Recent Labs     03/26/23  1207   WBC 6.0   HGB 13.1*   HCT 41.3           Recent Labs     03/27/23  0730      K 4.3      CO2 26   BUN 25*   CREATININE 1.5*        No results for input(s): PROT, INR, APTT in the last 72 hours. LOWER EXTREMITY EXAMINATION    Dressing to right  LE was removed by patient. VASCULAR: DP and PT pulses faintly palpable 1/4 B/L. On hand-held Doppler examination DP and PT were noted to be biphasic to the bilateral lower extremity. CFT is brisk to the digits of the foot b/l. Skin temperature is warm to cool from proximal to distal with no focal calor noted lower extremity. Skin temperature is warm to cool from proximal to distal with focal calor to the anterior leg and dorsum of the foot of the right lower extremity.   No edema noted
Pt d/c'd 3/28/23. IV access removed with no complications. Reviewed d/c instructions, home meds, and f/u information utilizing teach-back method. Patient verbalized understanding. Patient assisted to lobby in wheelchair and left with all documented belongings.
Pt resting with no complaints at this time. RR easy and unlabored. VSS. Pts RLE dressing came off overnight and has been refusing to get it put back on. Bed locked and in lowest position. Pt updated on care plan/status. Will continue to monitor.
No edema noted to the left lower extremity. Nonpitting edema appreciated to the right lower extremity. Mild pain with  calf compression right lower extremity, no pain with calf compression left lower extremity. NEUROLOGIC: Gross and epicritic sensation is diminished b/l. Protective sensation is diminished at all pedal sites b/l. DERMATOLOGIC:   Right lower extremity:     Superficial ulceration noted to the DIPJ of the second right digit. The wound base is granular without evidence of purulent drainage, crepitus, fluctuance or malodor. There is a bulla appreciated to the medial aspect of the right hallux without underlying fluctuance or crepitus. Attempted draining the bulla yesterday resulted in no exudate. Superficial ulceration noted to the dorsum of the right foot with well adhered eschar. There is periwound erythema however, no fluctuance, crepitus, purulence or malodor. There are no lesions that tunnel, track or probe to bone. On the plantar aspect of the fourth digit there is a hemorrhagic bulla appreciated. Deepening of erythema along the anterior shin with several small bulla with serous drainage appreciated. However erythema is not progressing proximally. Left lower extremity:  Verbal consent obtained prior to photos taken today:     Left lower extremity has several small scattered superficial abrasions with well adhered eschar is appreciated, there is no periwound erythema. No fluctuance, crepitus, purulence or malodor. All of the lesions appear to be well healing without evidence of tunneling, tracking, probe to bone     MUSCULOSKELETAL: Muscle strength is 5/5 for all pedal groups tested. No tenderness with palpation of the foot or ankle b/l. Ankle joint ROM is decreased in dorsiflexion with the knee extended. No obvious biomechanical abnormalities. IMAGING:    TIB/FIB right:  Narrative   2 VIEWS OF THE RIGHT TIBIA AND FIBULA.        HISTORY: Chronic wounds       FINDINGS:
Sensitivities:    Date Site Micro Susceptibility / Result   N/a              Recent Labs     03/25/23  0709   CREATININE 1.4*   BUN 22*   WBC 6.6         Estimated Creatinine Clearance: 69 mL/min (A) (based on SCr of 1.4 mg/dL (H)). Additional Lab Values / Findings of Note:    No results for input(s): PROCAL in the last 72 hours.
deformity. Skin: Skin color, texture, turgor normal.  No rashes or lesions. Neurologic:  Neurovascularly intact without any focal sensory/motor deficits. Cranial nerves: II-XII intact, grossly non-focal.  Psychiatric: Alert and oriented, thought content appropriate, normal insight  Capillary Refill: Brisk, 3 seconds, normal   Peripheral Pulses: +2 palpable, equal bilaterally       Labs:   Recent Labs     03/25/23  0709 03/26/23  1207   WBC 6.6 6.0   HGB 13.0* 13.1*   HCT 39.5* 41.3    223       Recent Labs     03/25/23  0709 03/26/23  1207    135*   K 4.2 4.2    98*   CO2 26 26   BUN 22* 26*   CREATININE 1.4* 1.5*   CALCIUM 8.9 8.8       Recent Labs     03/25/23  0709   AST 21   ALT 11   BILITOT 0.6   ALKPHOS 93       No results for input(s): INR in the last 72 hours. No results for input(s): Aline Horn in the last 72 hours. Urinalysis:      Lab Results   Component Value Date/Time    NITRU Negative 12/15/2022 01:44 PM    WBCUA  12/15/2022 01:44 PM    BACTERIA 2+ 12/15/2022 01:44 PM    RBCUA 5-10 12/15/2022 01:44 PM    BLOODU LARGE 12/15/2022 01:44 PM    SPECGRAV 1.025 12/15/2022 01:44 PM    GLUCOSEU Negative 12/15/2022 01:44 PM       Radiology:  XR FOOT RIGHT (MIN 3 VIEWS)   Final Result      1. No discrete bony abnormality within the right tibia or fibula. 2.  Soft tissue edema within the right lower extremity. No radiopaque foreign body or gas in the soft tissues identified. 3 VIEWS OF THE RIGHT FOOT      HISTORY: Wound second digit      FINDINGS: There is no fracture or dislocation. Joint spaces are well-maintained. No focal bony erosive process or periosteal new bone. No discrete soft tissue abnormality identified. No opaque foreign body seen. There is soft tissue swelling over    the dorsum of the foot overlying the metatarsals. Plantar calcaneal spur identified. IMPRESSION:      1. Soft tissue swelling over the dorsum of the foot.       2.  No
lesions. Neurologic:  Neurovascularly intact without any focal sensory/motor deficits. Cranial nerves: II-XII intact, grossly non-focal.  Psychiatric: Alert and oriented, thought content appropriate, normal insight  Capillary Refill: Brisk, 3 seconds, normal   Peripheral Pulses: +2 palpable, equal bilaterally       Labs:   Recent Labs     03/25/23  0709   WBC 6.6   HGB 13.0*   HCT 39.5*        Recent Labs     03/25/23  0709      K 4.2      CO2 26   BUN 22*   CREATININE 1.4*   CALCIUM 8.9     Recent Labs     03/25/23  0709   AST 21   ALT 11   BILITOT 0.6   ALKPHOS 93     No results for input(s): INR in the last 72 hours. No results for input(s): Patrisha Meigs in the last 72 hours. Urinalysis:      Lab Results   Component Value Date/Time    NITRU Negative 12/15/2022 01:44 PM    WBCUA  12/15/2022 01:44 PM    BACTERIA 2+ 12/15/2022 01:44 PM    RBCUA 5-10 12/15/2022 01:44 PM    BLOODU LARGE 12/15/2022 01:44 PM    SPECGRAV 1.025 12/15/2022 01:44 PM    GLUCOSEU Negative 12/15/2022 01:44 PM       Radiology:  XR FOOT RIGHT (MIN 3 VIEWS)   Final Result      1. No discrete bony abnormality within the right tibia or fibula. 2.  Soft tissue edema within the right lower extremity. No radiopaque foreign body or gas in the soft tissues identified. 3 VIEWS OF THE RIGHT FOOT      HISTORY: Wound second digit      FINDINGS: There is no fracture or dislocation. Joint spaces are well-maintained. No focal bony erosive process or periosteal new bone. No discrete soft tissue abnormality identified. No opaque foreign body seen. There is soft tissue swelling over    the dorsum of the foot overlying the metatarsals. Plantar calcaneal spur identified. IMPRESSION:      1. Soft tissue swelling over the dorsum of the foot. 2.  No discrete bony abnormality identified. XR TIBIA FIBULA RIGHT (2 VIEWS)   Final Result      1.   No discrete bony abnormality within the right tibia
q12h Trough = 15.5 mcg/mL Drawn ~10.5h after 5th total dose  Calculated AUC = 447 with trough = 15.9  Continue same regimen. Note: Serum levels collected for AUC-based dosing may be high if collected in close proximity to the dose administered. This is not necessarily indicative of toxicity. Cultures & Sensitivities:    Date Site Micro Susceptibility / Result   N/a              Recent Labs     03/26/23  1207 03/27/23  0730   CREATININE 1.5* 1.5*   BUN 26* 25*   WBC 6.0  --          Estimated Creatinine Clearance: 64 mL/min (A) (based on SCr of 1.5 mg/dL (H)). Additional Lab Values / Findings of Note:    No results for input(s): PROCAL in the last 72 hours.
same regimen. Note: Serum levels collected for AUC-based dosing may be high if collected in close proximity to the dose administered. This is not necessarily indicative of toxicity. Cultures & Sensitivities:    Date Site Micro Susceptibility / Result   N/a              Recent Labs     03/25/23  0709 03/26/23  1207 03/27/23  0730   CREATININE 1.4* 1.5* 1.5*   BUN 22* 26* 25*   WBC 6.6 6.0  --          Estimated Creatinine Clearance: 64 mL/min (A) (based on SCr of 1.5 mg/dL (H)). Additional Lab Values / Findings of Note:    No results for input(s): PROCAL in the last 72 hours.
There is no fracture or dislocation. Visualized knee and ankle joints are intact. No focal bony erosive process or periosteal new bone. Mild edematous change in the right lower extremity. No radiopaque foreign body seen. No definitive gas    in the soft tissue seen. Impression       1. No discrete bony abnormality within the right tibia or fibula. 2.  Soft tissue edema within the right lower extremity. No radiopaque foreign body or gas in the soft tissues identified. 3 VIEWS OF THE RIGHT FOOT       HISTORY: Wound second digit       FINDINGS: There is no fracture or dislocation. Joint spaces are well-maintained. No focal bony erosive process or periosteal new bone. No discrete soft tissue abnormality identified. No opaque foreign body seen. There is soft tissue swelling over    the dorsum of the foot overlying the metatarsals. Plantar calcaneal spur identified. IMPRESSION:       1. Soft tissue swelling over the dorsum of the foot. 2.  No discrete bony abnormality identified. XR FOOT right:  Narrative   2 VIEWS OF THE RIGHT TIBIA AND FIBULA. HISTORY: Chronic wounds       FINDINGS: There is no fracture or dislocation. Visualized knee and ankle joints are intact. No focal bony erosive process or periosteal new bone. Mild edematous change in the right lower extremity. No radiopaque foreign body seen. No definitive gas    in the soft tissue seen. Impression       1. No discrete bony abnormality within the right tibia or fibula. 2.  Soft tissue edema within the right lower extremity. No radiopaque foreign body or gas in the soft tissues identified. 3 VIEWS OF THE RIGHT FOOT       HISTORY: Wound second digit       FINDINGS: There is no fracture or dislocation. Joint spaces are well-maintained. No focal bony erosive process or periosteal new bone. No discrete soft tissue abnormality identified.   No opaque foreign body
ordered right lower extremity rule out DVT  -Debridement not performed as the wounds were debrided in the clinical setting yesterday  -Dressing to the right lower extremity consisting of Betadine, gauze, Kerlix, Ace for gentle compression  -Begin broad-spectrum IV antibiotics at this time, IV vancomycin, IV cefepime  -Weightbearing as tolerated to the bilateral lower extremity      DISPO: Cellulitis versus DVT right lower extremity. Multiple superficial ulcerations to the right lower extremity. Due to increasing pain, erythema, warmth to the right lower extremity recommend the patient be admitted for a course of IV antibiotics for concern of cellulitis. X-rays ordered. Venous Doppler ordered to rule out DVT. Concern for patient compliance in the outpatient setting with oral antibiotics, recommend for course of IV antibiotics to manage symptoms. She will continue to follow the patient while in house and perform local wound care.     - The patient will be staffed with Dr. Escobar Wood DPM   Podiatric Resident PGY1  Pager [phone] or Isabelle  3/25/2023, 8:11 AM

## 2023-03-29 ENCOUNTER — CARE COORDINATION (OUTPATIENT)
Dept: CASE MANAGEMENT | Age: 73
End: 2023-03-29

## 2023-03-29 NOTE — CARE COORDINATION
[facility] Care Transitions Initial Follow Up Call    Call within 2 business days of discharge: Yes        Patient: Devan Marrow Patient : [id number]   MRN: [id number]  Reason for Admission: Cellulitis  Discharge Date: 3/28/23 RARS: Readmission Risk Score: 15.4      Last Discharge [address]       Date Complaint Diagnosis Description Type Department Provider    3/25/23 Foot Problem Cellulitis of right lower extremity ED to Hosp-Admission (Discharged) (ADMITTED) [address] Andrew Baez MD; Mary Rendon... 1st attempt to reach for Care Transition discharge call unsuccessful. HIPAA compliant message left requesting call back.          Follow Up  Future Appointments   Date Time Provider Rai Aburto   3/31/2023  2:30 PM PODIATRY CLINIC [address] [place] [address]   2023  1:30 PM SCHEDULE, [place] [address] [facility] JOSHUA Watson RN

## 2023-03-30 ENCOUNTER — CARE COORDINATION (OUTPATIENT)
Dept: CASE MANAGEMENT | Age: 73
End: 2023-03-30

## 2023-03-30 NOTE — CARE COORDINATION
caregiver in your home?: No  Care Transitions Interventions  No Identified Needs         Discussed follow-up appointments. If no appointment was previously scheduled, appointment scheduling offered: Yes. Is follow up appointment scheduled within 7 days of discharge? Yes. Follow Up  Future Appointments   Date Time Provider Rai Aburto   3/31/2023  2:30 PM PODIATRY CLINIC [address] [place] [address]   5/17/2023  1:30 PM SCHEDULE, [place] [facility] [employer]       LPN Care Coordinator provided contact information. Plan for follow-up call in 1-2 days based on severity of symptoms and risk factors.   Plan for next call: symptom management-,  self management-,  follow-up appointment-,  medication management-.    Андрей Astudillo LPN

## 2023-03-30 NOTE — TELEPHONE ENCOUNTER
Please schedule the patient for his TCM appointment tomorrow morning.   We will discuss more with him at the appointment

## 2023-03-31 ENCOUNTER — OFFICE VISIT (OUTPATIENT)
Dept: FAMILY MEDICINE CLINIC | Age: 73
End: 2023-03-31

## 2023-03-31 ENCOUNTER — CARE COORDINATION (OUTPATIENT)
Dept: CASE MANAGEMENT | Age: 73
End: 2023-03-31

## 2023-03-31 ENCOUNTER — OFFICE VISIT (OUTPATIENT)
Dept: INTERNAL MEDICINE CLINIC | Age: 73
End: 2023-03-31
Payer: MEDICAID

## 2023-03-31 VITALS — TEMPERATURE: 99 F

## 2023-03-31 VITALS
WEIGHT: 277.4 LBS | SYSTOLIC BLOOD PRESSURE: 120 MMHG | BODY MASS INDEX: 35.6 KG/M2 | OXYGEN SATURATION: 96 % | DIASTOLIC BLOOD PRESSURE: 80 MMHG | HEART RATE: 94 BPM | HEIGHT: 74 IN

## 2023-03-31 DIAGNOSIS — L03.115 CELLULITIS OF RIGHT LOWER EXTREMITY: Primary | ICD-10-CM

## 2023-03-31 DIAGNOSIS — Z09 HOSPITAL DISCHARGE FOLLOW-UP: ICD-10-CM

## 2023-03-31 DIAGNOSIS — I50.22 CHRONIC SYSTOLIC (CONGESTIVE) HEART FAILURE (HCC): ICD-10-CM

## 2023-03-31 DIAGNOSIS — I10 PRIMARY HYPERTENSION: ICD-10-CM

## 2023-03-31 DIAGNOSIS — I48.0 PAROXYSMAL ATRIAL FIBRILLATION (HCC): ICD-10-CM

## 2023-03-31 DIAGNOSIS — L97.521 ULCER OF LEFT FOOT, LIMITED TO BREAKDOWN OF SKIN (HCC): Primary | ICD-10-CM

## 2023-03-31 DIAGNOSIS — E11.42 TYPE 2 DIABETES MELLITUS WITH PERIPHERAL NEUROPATHY (HCC): ICD-10-CM

## 2023-03-31 DIAGNOSIS — R60.0 BILATERAL LOWER EXTREMITY EDEMA: ICD-10-CM

## 2023-03-31 PROCEDURE — 99213 OFFICE O/P EST LOW 20 MIN: CPT

## 2023-03-31 RX ORDER — GABAPENTIN 100 MG/1
100 CAPSULE ORAL 3 TIMES DAILY
Qty: 270 CAPSULE | Refills: 0 | Status: SHIPPED | OUTPATIENT
Start: 2023-03-31 | End: 2023-06-29

## 2023-03-31 NOTE — PROGRESS NOTES
Nose, Throat: No hearing loss, sneezing, congestion, runny nose or sore throat. SKIN: Erythema right lower leg   CARDIOVASCULAR: No chest pain, chest pressure or chest discomfort. No  palpitations. Complains of edema. RESPIRATORY: No shortness of breath, cough or sputum. GASTROINTESTINAL: No anorexia, nausea, vomiting, diarrhea or constipation. No abdominal pain, hematochezia or melena. GERD. GENITOURINARY:No dysuria, urgency, frequency. Complains of hematuria. NEUROLOGICAL: No headache, dizziness, syncope, paralysis, ataxia,   numbness or tingling in the extremities. No change in bowel or bladder control. MUSCULOSKELETAL: No muscle pain, back pain, joint pain or stiffness. PSYCHIATRIC: No history of depression or anxiety. ENDOCRINOLOGIC: No reports of sweating, cold or heat intolerance. No polyuria or polydipsia. Objective     . /80   Pulse 94   Ht 6' 2\" (1.88 m)   Wt 277 lb 6.4 oz (125.8 kg)   SpO2 96%   BMI 35.62 kg/m²     GENERAL:  Keeley Regalado is a 81 y.o.  male who is not in any acute distress. He was well attired and well groomed and was speaking in full sentences. LUNGS:  Normal ventilatory breath sounds are heard bilaterally. No crackles   or wheezes heard. CARDIOVASCULAR:  Normal S1, S2 heard. No murmurs heard. No JVD. ICD   felt to below the left clavicle     EXTREMITIES:  All 4 extremities were moving fine. Full range of motion is   present. No deformity noted. Peripheral pulses were felt. Bilateral 1+ lower   extremity edema. Neurovascular integrity maintained. NEUROLOGICAL:  The patient was alert, conscious, and cooperative. Oriented   to time, place, and person. Muscle power in all 4 limbs is 5/5. Cranial   nerves II thru XII are grossly intact. No sensory or motor loss.     Assessment/Plan     Diagnoses and all orders for this visit:    Cellulitis of right lower extremity    Primary hypertension    Chronic systolic (congestive) heart failure

## 2023-03-31 NOTE — CARE COORDINATION
Advance Care Planning:   not on file. Patients top risk factors for readmission: medical condition-. Patient Active Problem List   Diagnosis    Dilated cardiomyopathy (HCC)    Paroxysmal atrial fibrillation (HCC)    S/P coronary angiogram    Monilial cystitis    Urinary tract obstruction    CAD, multiple vessel    ICD (implantable cardioverter-defibrillator) in place-[employer]    Ischemic cardiomyopathy    AICD (automatic cardioverter/defibrillator) present    Mixed hyperlipidemia    Gastroesophageal reflux disease without esophagitis    Uncontrolled type 2 diabetes mellitus with hyperglycemia (HCC)    Acute kidney injury (NANETTE) with acute tubular necrosis (ATN) ([facility])    Primary hypertension    Chronic systolic (congestive) heart failure ([facility] [address]    Cellulitis       Interventions to address risk factors: Assessment and support for treatment adherence and medication management-. Offered patient enrollment in the Remote Patient Monitoring (RPM) program for in-home monitoring:  offer at next call patient out and about . Care Transitions Subsequent and Final Call    Subsequent and Final Calls  Do you have any ongoing symptoms?: No  Have your medications changed?: No  Do you have any questions related to your medications?: No  Do you currently have any active services?: No  Do you have any needs or concerns that I can assist you with?: No  Care Transitions Interventions  No Identified Needs  Other Interventions:             LPN Care Coordinator provided contact information for future needs. Plan for follow-up call in 5-7 days based on severity of symptoms and risk factors.   Plan for next call: symptom management-,  self management-, Offer RPM program for HTN and DM    Raquel England LPN

## 2023-04-02 NOTE — PROGRESS NOTES
Superficial ulceration noted to the DIPJ of the second right digit. The wound base is granular without evidence of purulent drainage, crepitus, fluctuance or malodor. Partial-thickness ulceration noted to the dorsomedial aspect of hallux. No fluctuance, crepitus, purulence or malodor. There are no lesions that tunnel, track or probe to bone. Erythema has noted to be resolving since hospitalization. Left lower extremity:  Verbal consent obtained prior to photos taken today:       Left lower extremity has partial-thickness ulceration with overlying hyperkeratosis noted to medial aspect of midfoot. Wound does not probe, tunnel, or track. No fluctuance, purulence, crepitus, or malodor noted. In addition, several small scattered superficial abrasions with well adhered eschar is appreciated, there is no periwound erythema. No fluctuance, crepitus, purulence or malodor. All of the lesions appear to be well healing without evidence of tunneling, tracking, probe to bone     MUSCULOSKELETAL: Muscle strength is 5/5 for all pedal groups tested. No tenderness with palpation of the foot or ankle b/l. Ankle joint ROM is decreased in dorsiflexion with the knee extended. No obvious biomechanical abnormalities. ASSESSMENT  1. Diabetes mellitus type II  2. Superficial ulcerations b/l LE  3. Cellulitis RLE (Resolving)    PLAN  - Evaluation and management x 15 minutes and greater than 50% of the time spent explaining the etiology and treatment with the patient.   -Once verbal consent was obtained, a sterile #15 blade was utilized to excisionally debride down to and including superficial dermal tissue and wounds down to and including subcutaneous tissue. Sanguinous drainage appreciated. Patient tolerated ebridement well   -Dressing applied to B/L LE consisting of compressive Tubigrip. Additional Tubigrip was dispensed to patient. Advised patient to wear Tubigrip at all times when awake.   -Prescription was written

## 2023-04-04 ENCOUNTER — HOSPITAL ENCOUNTER (EMERGENCY)
Age: 73
Discharge: HOME | End: 2023-04-05
Attending: EMERGENCY MEDICINE
Payer: MEDICAID

## 2023-04-04 DIAGNOSIS — M79.89 RIGHT LEG SWELLING: Primary | ICD-10-CM

## 2023-04-05 ENCOUNTER — TELEPHONE (OUTPATIENT)
Dept: OTHER | Facility: CLINIC | Age: 73
End: 2023-04-05

## 2023-04-05 VITALS
WEIGHT: 283.4 LBS | OXYGEN SATURATION: 98 % | HEIGHT: 74 IN | DIASTOLIC BLOOD PRESSURE: 62 MMHG | TEMPERATURE: 98.2 F | BODY MASS INDEX: 36.37 KG/M2 | SYSTOLIC BLOOD PRESSURE: 111 MMHG | HEART RATE: 88 BPM | RESPIRATION RATE: 18 BRPM

## 2023-04-05 LAB
ANION GAP SERPL CALCULATED.3IONS-SCNC: 11 MMOL/L (ref 3–16)
BASOPHILS # BLD: 0 K/UL (ref 0–0.2)
BASOPHILS NFR BLD: 0.5 %
BUN SERPL-MCNC: 32 MG/DL (ref 7–20)
CALCIUM SERPL-MCNC: 9.3 MG/DL (ref 8.3–10.6)
CHLORIDE SERPL-SCNC: 98 MMOL/L (ref 99–110)
CO2 SERPL-SCNC: 27 MMOL/L (ref 21–32)
CREAT SERPL-MCNC: 1.8 MG/DL (ref 0.8–1.3)
DEPRECATED RDW RBC AUTO: 15.6 % (ref 12.4–15.4)
EOSINOPHIL # BLD: 0.3 K/UL (ref 0–0.6)
EOSINOPHIL NFR BLD: 3.8 %
GFR SERPLBLD CREATININE-BSD FMLA CKD-EPI: 40 ML/MIN/{1.73_M2}
GLUCOSE SERPL-MCNC: 180 MG/DL (ref 70–99)
HCT VFR BLD AUTO: 38.7 % (ref 40.5–52.5)
HGB BLD-MCNC: 12.8 G/DL (ref 13.5–17.5)
LYMPHOCYTES # BLD: 1 K/UL (ref 1–5.1)
LYMPHOCYTES NFR BLD: 14.8 %
MCH RBC QN AUTO: 29.2 PG (ref 26–34)
MCHC RBC AUTO-ENTMCNC: 33 G/DL (ref 31–36)
MCV RBC AUTO: 88.5 FL (ref 80–100)
MONOCYTES # BLD: 0.7 K/UL (ref 0–1.3)
MONOCYTES NFR BLD: 10.2 %
NEUTROPHILS # BLD: 4.7 K/UL (ref 1.7–7.7)
NEUTROPHILS NFR BLD: 70.7 %
PLATELET # BLD AUTO: 176 K/UL (ref 135–450)
PMV BLD AUTO: 9.1 FL (ref 5–10.5)
POTASSIUM SERPL-SCNC: 4.7 MMOL/L (ref 3.5–5.1)
RBC # BLD AUTO: 4.37 M/UL (ref 4.2–5.9)
SODIUM SERPL-SCNC: 136 MMOL/L (ref 136–145)
WBC # BLD AUTO: 6.7 K/UL (ref 4–11)

## 2023-04-05 PROCEDURE — 6360000002 HC RX W HCPCS: Performed by: EMERGENCY MEDICINE

## 2023-04-05 PROCEDURE — 85025 COMPLETE CBC W/AUTO DIFF WBC: CPT

## 2023-04-05 PROCEDURE — 80048 BASIC METABOLIC PNL TOTAL CA: CPT

## 2023-04-05 RX ORDER — FUROSEMIDE 10 MG/ML
80 INJECTION INTRAMUSCULAR; INTRAVENOUS ONCE
Status: COMPLETED | OUTPATIENT
Start: 2023-04-05 | End: 2023-04-05

## 2023-04-05 RX ADMIN — FUROSEMIDE 80 MG: 10 INJECTION, SOLUTION INTRAMUSCULAR; INTRAVENOUS at 01:48

## 2023-04-05 ASSESSMENT — ENCOUNTER SYMPTOMS
SORE THROAT: 0
SHORTNESS OF BREATH: 1
CONSTIPATION: 0
BACK PAIN: 1
WHEEZING: 0
DIARRHEA: 0
ABDOMINAL PAIN: 0
CHEST TIGHTNESS: 0

## 2023-04-05 ASSESSMENT — PAIN - FUNCTIONAL ASSESSMENT
PAIN_FUNCTIONAL_ASSESSMENT: NONE - DENIES PAIN
PAIN_FUNCTIONAL_ASSESSMENT: NONE - DENIES PAIN

## 2023-04-05 NOTE — DISCHARGE INSTRUCTIONS
Continue your current medications. Keep legs elevated when possible. Follow-up with your primary care provider for repeat evaluation and further testing if symptoms continue. Turn to the emergency department for shortness of breath, decreased urine output, or any other concerns.

## 2023-04-05 NOTE — ED PROVIDER NOTES
1 [facility]  EMERGENCY DEPARTMENT ENCOUNTER          [address] RESIDENT NOTE       Date of evaluation: 4/4/2023    Chief Complaint     Leg Swelling (Pt reports leg swelling that is of and on for a couple weeks. Right leg is more swollen then the left)      History of Present Illness     Cassius Soto is a 81 y.o. male with pmh of PAF, CAD s/p CABG, ischemic cardiomyopathy/CHF with reduced ejection fraction, HTN, chronic leg edema with leg ulcers, DM type II who p/w complaints of R leg swelling and throbbing With the inability to sleep. Patient states that he just left the hospital a few days ago and was seen by his podiatrist and primary care physician as follow-up after the hospital.  Patient states that his podiatrist is taking care of his legs and just taking gabapentin for his nerve pain. Patient states that he is not short of breath but was when he was coming into the hospital because he had to walk very fast to the bed. Patient denies chest pain, shortness of breath, headache, vision changes, weakness, abdominal pain, constipation, diarrhea, nausea, vomiting. Patient states that his leg swelling is a chronic condition and that the weeping fluid on his right leg is a new finding. ASSESSMENT / PLAN  (MEDICAL DECISION MAKING)     INITIAL VITALS: BP: 131/80, Temp: 98.2 °F (36.8 °C), Heart Rate: 86, Resp: 16, SpO2: 96 %     Cassius Soto is a 81 y.o. male with pmh of PAF, CAD s/p CABG, ischemic cardiomyopathy/CHF with reduced ejection fraction, HTN, chronic leg edema with leg ulcers, DM type II who p/w complaints of R leg swelling and throbbing With the inability to sleep. Patient states that he just left the hospital a few days ago and was seen by his podiatrist and primary care physician as follow-up after the hospital.  Patient states that his podiatrist is taking care of his legs and just taking gabapentin for his nerve pain.   Patient states that he is not short of breath but was when he was
2343

## 2023-04-05 NOTE — TELEPHONE ENCOUNTER
Writer contacted ED provider to inform of 30 day readmission risk. No Decision on disposition at this time.     Call Back: If you need to call back to inform of disposition you can contact me at [phone]

## 2023-04-06 ENCOUNTER — CARE COORDINATION (OUTPATIENT)
Dept: CASE MANAGEMENT | Age: 73
End: 2023-04-06

## 2023-04-06 NOTE — CARE COORDINATION
Date/Time:  4/6/2023 1:51 PM  Attempted to reach patient by telephone. Call within 2 business days of discharge: Yes,  Left HIPPA compliant message requesting a return call. Will attempt to reach patient again.     Thank Marisa Mcguire RN  Care Transition Coordinator  Contact ROSA MARIAE:[phone]

## 2023-04-14 ENCOUNTER — OFFICE VISIT (OUTPATIENT)
Dept: INTERNAL MEDICINE CLINIC | Age: 73
End: 2023-04-14
Payer: MEDICAID

## 2023-04-14 VITALS — TEMPERATURE: 97.3 F

## 2023-04-14 DIAGNOSIS — L97.511 FOOT ULCERATION, RIGHT, LIMITED TO BREAKDOWN OF SKIN (HCC): ICD-10-CM

## 2023-04-14 DIAGNOSIS — E11.42 TYPE 2 DIABETES MELLITUS WITH PERIPHERAL NEUROPATHY (HCC): ICD-10-CM

## 2023-04-14 DIAGNOSIS — R60.0 BILATERAL LOWER EXTREMITY EDEMA: Primary | ICD-10-CM

## 2023-04-14 PROCEDURE — 99213 OFFICE O/P EST LOW 20 MIN: CPT

## 2023-04-14 PROCEDURE — 11042 DBRDMT SUBQ TIS 1ST 20SQCM/<: CPT

## 2023-04-20 ENCOUNTER — CARE COORDINATION (OUTPATIENT)
Dept: CASE MANAGEMENT | Age: 73
End: 2023-04-20

## 2023-04-20 NOTE — CARE COORDINATION
site of care based on symptoms and resources available to patient including: PCP  Specialist  Urgent care clinics  When to call 911. The patient agrees to contact the PCP office for questions related to their healthcare. Advance Care Planning:   not on file. Patients top risk factors for readmission: medical condition-COVID -19 Monitoring, I&D right 2nd toe  Interventions to address risk factors: Education of patient/family/caregiver/guardian to support self-management-Patient instructed to continue to monitor incision for signs of infection, continue to monitor for any of the other above noted s/s, monitor weight and take all medications as prescribed. Make sure to report any s/s that may present, to MD immediately. Patient also instructed to wear TRUMAN Hose and/or elevate leg on pillows, above heart level, while sitting or lying. Care Transitions Subsequent and Final Call    Schedule Follow Up Appointment with PCP: Declined  Subsequent and Final Calls  Do you have any ongoing symptoms?: Yes  Onset of Patient-reported symptoms: Today  Patient-reported symptoms: Other  Have your medications changed?: Yes  Patient Reports: Started on PO ABX  Do you have any questions related to your medications?: No  Do you currently have any active services?: No  Do you have any needs or concerns that I can assist you with?: No  Identified Barriers: None  Care Transitions Interventions  No Identified Needs  Other Interventions:           Care Transition Nurse provided contact information for future needs. Plan for follow-up call in 5-7 days based on severity of symptoms and risk factors. Plan for next call: symptom management-Any worsening swelling of right foot. CHF education and wound monitoring.     Thank Flynn Donahue RN  Care Transition Coordinator  Contact LDHYOY:[phone]

## 2023-04-21 ENCOUNTER — NURSE ONLY (OUTPATIENT)
Dept: CARDIOLOGY CLINIC | Age: 73
End: 2023-04-21

## 2023-04-21 ENCOUNTER — OFFICE VISIT (OUTPATIENT)
Dept: CARDIOLOGY CLINIC | Age: 73
End: 2023-04-21

## 2023-04-21 VITALS
HEART RATE: 68 BPM | BODY MASS INDEX: 34.41 KG/M2 | SYSTOLIC BLOOD PRESSURE: 118 MMHG | DIASTOLIC BLOOD PRESSURE: 70 MMHG | WEIGHT: 268 LBS

## 2023-04-21 DIAGNOSIS — Z95.810 ICD (IMPLANTABLE CARDIOVERTER-DEFIBRILLATOR) IN PLACE: Primary | ICD-10-CM

## 2023-04-21 DIAGNOSIS — I25.5 ISCHEMIC CARDIOMYOPATHY: ICD-10-CM

## 2023-04-21 DIAGNOSIS — I25.10 CAD IN NATIVE ARTERY: ICD-10-CM

## 2023-04-21 DIAGNOSIS — I50.22 CHRONIC SYSTOLIC (CONGESTIVE) HEART FAILURE (HCC): ICD-10-CM

## 2023-04-21 DIAGNOSIS — I42.0 DILATED CARDIOMYOPATHY (HCC): ICD-10-CM

## 2023-04-21 DIAGNOSIS — I48.0 PAROXYSMAL ATRIAL FIBRILLATION (HCC): ICD-10-CM

## 2023-04-21 DIAGNOSIS — Z95.810 ICD (IMPLANTABLE CARDIOVERTER-DEFIBRILLATOR) IN PLACE: ICD-10-CM

## 2023-04-21 DIAGNOSIS — Z95.1 HX OF CABG: ICD-10-CM

## 2023-04-21 DIAGNOSIS — I50.22 CHRONIC SYSTOLIC CONGESTIVE HEART FAILURE (HCC): Primary | ICD-10-CM

## 2023-04-21 ASSESSMENT — ENCOUNTER SYMPTOMS
COUGH: 0
ABDOMINAL DISTENTION: 0
CHEST TIGHTNESS: 0
CHOKING: 0
SHORTNESS OF BREATH: 0
APNEA: 0

## 2023-04-21 NOTE — PROGRESS NOTES
Subjective:      Patient ID: Evens Cobian is a 81 y.o. male. Follow up CAD/CABG/afib/cardiomyop/abn ekg. No complaints. Swelling reasonable. No chest pain. No tachycardia/syncope. Chronic LE edema. No pnd or orthopnea. Wt up. Feeling good. Taking Eliquis. No bleeding issues. Rhythm stable.      Past Medical History:   Diagnosis Date    A-fib [facility]) 2020    CAD, multiple vessel 12/2019    Coronary artery disease involving coronary bypass graft     Diabetes mellitus ([address]     diet controlled     Enlarged prostate     Environmental allergies     Kidney stones     Pacemaker     Systolic CHF [facility])      Past Surgical History:   Procedure Laterality Date    CARDIAC CATHETERIZATION  12/10/2019    Dr. Rita Pitts - severe multi-vessel disease    CORONARY ARTERY BYPASS GRAFT N/A 3/2/2020    WAYNE; TCPB; CABG x 3, LIMA to LAD w/ long segment endarterectomy (4 cm); bilateral pulmonary vein isolation; 40 mm Atriclip to L atril appendage; endoscopic L GSV harvest; ICNB x 5 levels bilat; sternal plate x 1 to manubrium performed by Rafael Thorpe MD at [address] [employer]  x2    NASAL SEPTUM SURGERY       Social History     Socioeconomic History    Marital status: Single     Spouse name: Not on file    Number of children: Not on file    Years of education: Not on file    Highest education level: Not on file   Occupational History    Not on file   Tobacco Use    Smoking status: Never    Smokeless tobacco: Never   Substance and Sexual Activity    Alcohol use: Not Currently     Comment: rare    Drug use: Never    Sexual activity: Not on file   Other Topics Concern    Not on file   Social History Narrative    Not on file     Social Determinants of Health     Financial Resource Strain: Low Risk     Difficulty of Paying Living Expenses: Not very hard   Food Insecurity: No Food Insecurity    Worried About Running Out of Food in the Last Year: Never true    Ran Out of Food in the Last Year: Never true   Transportation

## 2023-04-21 NOTE — PROGRESS NOTES
Patient comes in for programming evaluation on their Medtronic DC ICD. Last remote 12.09.2022-discussed disconnected monitor. Pt's living arrangements have changed. He will reconnect. [address] setup instructions provided today. Last Echo 8.25.2022-estimated ejection fraction of 50%. Last in office check on 09.02.2021    All sensing and pacing parameters appear unchanged since last remote. 9.3 years estimated until VELIA/RRt. AP 0.9%.  0.5%. AT/AF noted and appears ongoing for >/= 39 days. 100% according to cardiac compass. V rates do not appear controlled. Scheduled a OV w/EPNP/Device to further discuss. Updated template and EGM source. Patient remains on eliquis, furosemide,   Please see interrogation for more detail. Optivol is within normal range. Patient will see Dr. Addis Lee today. We will monitor remotely once reconnection is established.

## 2023-04-24 ENCOUNTER — CARE COORDINATION (OUTPATIENT)
Dept: CASE MANAGEMENT | Age: 73
End: 2023-04-24

## 2023-04-24 NOTE — CARE COORDINATION
[facility] Care Transitions Follow Up Call    Patient Current Location:  Home: [address] [facility]    Care Transition Nurse contacted the patient by telephone to follow up after admission on 2023. Verified name and  with patient as identifiers. Patient: Alice Lawrence  Patient : 1950   MRN: [id number]   Reason for Admission: COVID 19 Monitoring, I&D 2nd Toe, Right  Discharge Date: 23 RARS: Readmission Risk Score: 15.4      Needs to be reviewed by the provider   Additional needs identified to be addressed with provider: No  none             Method of communication with provider: none. CTN spoke with patient this afternoon for final follow up CTN call. Patient states he is doing better. No reports of any fever, chills, nausea, vomiting, chest pain, SOB or cough. Patient with no congestion, pain, difficulty emptying bladder, feeling lightheaded, dizziness, and heart palpitations. BLE Edema is not present today, but states it comes and goes. No reports of any signs of infection at incision site, healing well. Patient had follow up with Cardiologist, no new or changed medications. Addressed changes since last contact:  none  Discussed follow-up appointments. If no appointment was previously scheduled, appointment scheduling offered: Yes. Is follow up appointment scheduled within 7 days of discharge? Yes.     Follow Up  Future Appointments   Date Time Provider Rai Aburto   2023 12:00 PM PODIATRY CLINIC [address] [place] [address]   2023  1:30 PM SCHEDULE, Edwardo Arndt8 Dom [facility]   2023  2:00 PM EVELYN Knox CNPwood Card [facility]   2023  9:00 AM SCHEDULE, MÓNICA REMOTE TRANSMISSION [place] Card [facility]   2023  1:15 PM MD Fito [place] Card [facility]   2023  1:30 PM SCHEDULE, Edwardo Arndt8 Card [facility]       Care Transition Nurse reviewed red flags with patient and discussed any barriers to care and/or

## 2023-05-05 ENCOUNTER — OFFICE VISIT (OUTPATIENT)
Dept: CARDIOLOGY CLINIC | Age: 73
End: 2023-05-05

## 2023-05-05 ENCOUNTER — OFFICE VISIT (OUTPATIENT)
Dept: INTERNAL MEDICINE CLINIC | Age: 73
End: 2023-05-05
Payer: MEDICAID

## 2023-05-05 ENCOUNTER — NURSE ONLY (OUTPATIENT)
Dept: CARDIOLOGY CLINIC | Age: 73
End: 2023-05-05

## 2023-05-05 VITALS
WEIGHT: 272 LBS | BODY MASS INDEX: 34.92 KG/M2 | DIASTOLIC BLOOD PRESSURE: 82 MMHG | HEART RATE: 87 BPM | SYSTOLIC BLOOD PRESSURE: 128 MMHG

## 2023-05-05 VITALS — TEMPERATURE: 97.9 F

## 2023-05-05 DIAGNOSIS — I25.5 ISCHEMIC CARDIOMYOPATHY: ICD-10-CM

## 2023-05-05 DIAGNOSIS — I42.0 DILATED CARDIOMYOPATHY (HCC): ICD-10-CM

## 2023-05-05 DIAGNOSIS — I73.9 PVD (PERIPHERAL VASCULAR DISEASE) (HCC): ICD-10-CM

## 2023-05-05 DIAGNOSIS — I50.22 CHRONIC SYSTOLIC (CONGESTIVE) HEART FAILURE (HCC): ICD-10-CM

## 2023-05-05 DIAGNOSIS — Z95.810 ICD (IMPLANTABLE CARDIOVERTER-DEFIBRILLATOR) IN PLACE: ICD-10-CM

## 2023-05-05 DIAGNOSIS — R60.1 GENERALIZED EDEMA: Primary | ICD-10-CM

## 2023-05-05 DIAGNOSIS — I48.0 PAROXYSMAL ATRIAL FIBRILLATION (HCC): ICD-10-CM

## 2023-05-05 DIAGNOSIS — I48.19 PERSISTENT ATRIAL FIBRILLATION (HCC): Primary | ICD-10-CM

## 2023-05-05 DIAGNOSIS — Z95.810 ICD (IMPLANTABLE CARDIOVERTER-DEFIBRILLATOR) IN PLACE: Primary | ICD-10-CM

## 2023-05-05 DIAGNOSIS — E11.42 TYPE 2 DIABETES MELLITUS WITH PERIPHERAL NEUROPATHY (HCC): ICD-10-CM

## 2023-05-05 DIAGNOSIS — I25.10 CORONARY ARTERY DISEASE INVOLVING NATIVE CORONARY ARTERY OF NATIVE HEART WITHOUT ANGINA PECTORIS: ICD-10-CM

## 2023-05-05 PROCEDURE — 99213 OFFICE O/P EST LOW 20 MIN: CPT

## 2023-05-05 RX ORDER — METOPROLOL TARTRATE 50 MG/1
TABLET, FILM COATED ORAL
Qty: 180 TABLET | Refills: 3 | Status: SHIPPED | OUTPATIENT
Start: 2023-05-05

## 2023-05-05 NOTE — PROGRESS NOTES
Patient comes in for programming evaluation on their Medtronic DC ICD. Last remote 12.09.2022-discussed disconnected monitor on 4.21.2023 and again today. Pt currently lives in an RV. Having RV battery issues. Once resolved, will reconnect. Last Echo 8.25.2022-estimated ejection fraction of 50%. All sensing and pacing parameters appear unchanged since last in office check on 04.21.2023. 9.3 years estimated until VELIA/RRt. AP <0.1%.  3.3%. Ongoing AT/AF since 03.13.2023 with a 100% burden. V rates do not appear controlled. Patient remains on eliquis, furosemide,   Please see interrogation for more detail. Optivol is within normal range. Patient will see NPFW today. We will monitor remotely once reconnection is established.

## 2023-05-05 NOTE — PROGRESS NOTES
[address]   Electrophysiology  Office Visit  Date: 5/5/2023    Chief Complaint   Patient presents with    Atrial Fibrillation    Congestive Heart Failure    Device Check    Cardiomyopathy    Coronary Artery Disease       Cardiac HX: Joshua Craft is a 81 y.o. man w/ PMH DM, pAF (dx'd 12/2019), on Eliquis, CMP EF 20-25%, CAD, s/p CABG w/ SHANTI clip (3/2020), placed on amiodarone (3/2020), repeat echo (10/2020) showed EF 20-25%w/ LAE, severe global hypokinesis, s/p dual ch ICD (3/12/21, Dr. Cipriano Matt), Jordon Living stopped (9/2021) d/t ineffectiveness    Interval History/HPI: Patient is here for follow up for CMP, ICD mgmt, AF. Patient last saw EP in September 2021. Dr. Cipriano Matt stopped patient's amiodarone due to recurrence of atrial fibrillation with plan to admit patient for Tikosyn loading however patient did not schedule. Device check today shows  3.3%, AP less than 0.1%, 100% AF burden ongoing since March 13, 2023 with elevated V rates at times, no other arrhythmias noted, VELIA 9.3 years. Denies any CP, palpitations, SOB, dizziness, lightheadedness. Has ongoing issues with BLE edema and has been hospitalized recently for cellulitis. Home medications:   Current Outpatient Medications on File Prior to Visit   Medication Sig Dispense Refill    albuterol (PROVENTIL) (2.5 MG/3ML) 0.083% nebulizer solution Take 3 mLs by nebulization every 4 hours as needed for Wheezing 120 each 0    furosemide (LASIX) 80 MG tablet Take 1 tablet by mouth in the morning and 1 tablet in the evening.  180 tablet 0    potassium chloride (KLOR-CON M) 10 MEQ extended release tablet Take 1 tablet by mouth 2 times daily 60 tablet 0    EPINEPHrine (PRIMATENE MIST) 0.125 MG/ACT AERO Inhale 2 puffs into the lungs once      aspirin 81 MG chewable tablet Take 1 tablet by mouth daily 30 tablet 3    apixaban (ELIQUIS) 5 MG TABS tablet Take 1 tablet by mouth 2 times daily 60 tablet 5    Handicap Placard MISC by Does not apply route Pt cannot walk

## 2023-05-26 ENCOUNTER — OFFICE VISIT (OUTPATIENT)
Dept: INTERNAL MEDICINE CLINIC | Age: 73
End: 2023-05-26
Payer: MEDICAID

## 2023-05-26 VITALS — TEMPERATURE: 98.4 F

## 2023-05-26 DIAGNOSIS — E11.42 TYPE 2 DIABETES MELLITUS WITH PERIPHERAL NEUROPATHY (HCC): Primary | ICD-10-CM

## 2023-05-26 DIAGNOSIS — L97.511 FOOT ULCERATION, RIGHT, LIMITED TO BREAKDOWN OF SKIN (HCC): ICD-10-CM

## 2023-05-26 DIAGNOSIS — R60.1 GENERALIZED EDEMA: ICD-10-CM

## 2023-05-26 DIAGNOSIS — E11.65 UNCONTROLLED TYPE 2 DIABETES MELLITUS WITH HYPERGLYCEMIA (HCC): ICD-10-CM

## 2023-05-26 PROCEDURE — 99213 OFFICE O/P EST LOW 20 MIN: CPT

## 2023-05-26 PROCEDURE — 11042 DBRDMT SUBQ TIS 1ST 20SQCM/<: CPT

## 2023-06-02 ENCOUNTER — OFFICE VISIT (OUTPATIENT)
Dept: INTERNAL MEDICINE CLINIC | Age: 73
End: 2023-06-02
Payer: MEDICAID

## 2023-06-02 VITALS — TEMPERATURE: 98.1 F

## 2023-06-02 DIAGNOSIS — L97.511 FOOT ULCERATION, RIGHT, LIMITED TO BREAKDOWN OF SKIN (HCC): Primary | ICD-10-CM

## 2023-06-02 DIAGNOSIS — E11.42 TYPE 2 DIABETES MELLITUS WITH PERIPHERAL NEUROPATHY (HCC): ICD-10-CM

## 2023-06-02 DIAGNOSIS — R60.1 GENERALIZED EDEMA: ICD-10-CM

## 2023-06-02 PROCEDURE — 99213 OFFICE O/P EST LOW 20 MIN: CPT

## 2023-06-02 PROCEDURE — 11042 DBRDMT SUBQ TIS 1ST 20SQCM/<: CPT

## 2023-06-06 ENCOUNTER — TELEPHONE (OUTPATIENT)
Dept: CARDIOLOGY CLINIC | Age: 73
End: 2023-06-06

## 2023-06-06 RX ORDER — POTASSIUM CHLORIDE 750 MG/1
10 TABLET, EXTENDED RELEASE ORAL 2 TIMES DAILY
Qty: 180 TABLET | Refills: 3 | Status: SHIPPED | OUTPATIENT
Start: 2023-06-06 | End: 2023-06-08 | Stop reason: SDUPTHER

## 2023-06-06 RX ORDER — FUROSEMIDE 80 MG
80 TABLET ORAL 2 TIMES DAILY
Qty: 180 TABLET | Refills: 3 | Status: SHIPPED | OUTPATIENT
Start: 2023-06-06 | End: 2023-06-08 | Stop reason: SDUPTHER

## 2023-06-06 NOTE — TELEPHONE ENCOUNTER
[facility] Medication Refills:    potassium chloride (KLOR-CON M) 10 MEQ extended release tablet [[id number]]     Order Details  Dose: 10 mEq Route: Oral Frequency: 2 TIMES DAILY   Dispense Quantity: 60 tablet Refills: 0          Sig: Take 1 tablet by mouth 2 times daily           furosemide (LASIX) 80 MG tablet [[id number]]     Order Details  Dose: 80 mg Route: Oral Frequency: 2 TIMES DAILY   Dispense Quantity: 180 tablet Refills: 0          Sig: Take 1 tablet by mouth in the morning and 1 tablet in the evening.      apixaban (ELIQUIS) 5 MG TABS tablet [[id number]]     Order Details  Dose: 5 mg Route: Oral Frequency: 2 TIMES DAILY   Dispense Quantity: 60 tablet Refills: 5          Sig: Take 1 tablet by mouth 2 times daily     [address] [address] Is[address]as,[address], [address] [facility] [address] [facility]   Phone:  [phone]  Fax:  [phone]       Last Office Visit: 05/05/23    Next Office Visit: 09/07/23    Last Refill: 12/19/22    Last Labs: 04/05/23

## 2023-06-06 NOTE — TELEPHONE ENCOUNTER
Requested Prescriptions     Pending Prescriptions Disp Refills    furosemide (LASIX) 80 MG tablet 180 tablet 3     Sig: Take 1 tablet by mouth in the morning and 1 tablet in the evening.     potassium chloride (KLOR-CON M) 10 MEQ extended release tablet 180 tablet 3     Sig: Take 1 tablet by mouth 2 times daily    apixaban (ELIQUIS) 5 MG TABS tablet 180 tablet 3     Sig: Take 1 tablet by mouth 2 times daily            Last Office Visit: 05.05.2023    Next Office Visit: 07.26.2023      Last Labs: [phone]

## 2023-06-07 ENCOUNTER — TELEPHONE (OUTPATIENT)
Dept: CARDIOLOGY CLINIC | Age: 73
End: 2023-06-07

## 2023-06-07 NOTE — TELEPHONE ENCOUNTER
Patient needs his refills sent to [facility] on [address] and not [place]. The prescription refills were for Eliquis 5 mg., Potassium Chloride 10 meq., and Furodemide 80 mg.    I could not find [facility] in the system so I am copy/pasting the address via google.     [address] [place] [address], [address] [place] [address]   [phone]

## 2023-06-08 RX ORDER — FUROSEMIDE 80 MG
80 TABLET ORAL 2 TIMES DAILY
Qty: 180 TABLET | Refills: 3 | Status: SHIPPED | OUTPATIENT
Start: 2023-06-08

## 2023-06-08 RX ORDER — POTASSIUM CHLORIDE 750 MG/1
10 TABLET, EXTENDED RELEASE ORAL 2 TIMES DAILY
Qty: 180 TABLET | Refills: 3 | Status: SHIPPED | OUTPATIENT
Start: 2023-06-08

## 2023-06-16 ENCOUNTER — OFFICE VISIT (OUTPATIENT)
Dept: INTERNAL MEDICINE CLINIC | Age: 73
End: 2023-06-16
Payer: MEDICAID

## 2023-06-16 VITALS — TEMPERATURE: 98 F

## 2023-06-16 DIAGNOSIS — I73.9 PVD (PERIPHERAL VASCULAR DISEASE) (HCC): ICD-10-CM

## 2023-06-16 DIAGNOSIS — L97.521 ULCER OF LEFT FOOT, LIMITED TO BREAKDOWN OF SKIN (HCC): ICD-10-CM

## 2023-06-16 DIAGNOSIS — E11.42 TYPE 2 DIABETES MELLITUS WITH PERIPHERAL NEUROPATHY (HCC): ICD-10-CM

## 2023-06-16 DIAGNOSIS — R60.1 GENERALIZED EDEMA: ICD-10-CM

## 2023-06-16 DIAGNOSIS — L97.511 FOOT ULCERATION, RIGHT, LIMITED TO BREAKDOWN OF SKIN (HCC): Primary | ICD-10-CM

## 2023-06-16 PROCEDURE — 11042 DBRDMT SUBQ TIS 1ST 20SQCM/<: CPT

## 2023-06-16 PROCEDURE — 99213 OFFICE O/P EST LOW 20 MIN: CPT

## 2023-06-16 NOTE — PLAN OF CARE
Problem: Safety - Adult  Goal: Free from fall injury  Outcome: Progressing  Flowsheets (Taken 12/16/2022 0800)  Free From Fall Injury: Instruct family/caregiver on patient safety     Problem: Respiratory - Adult  Goal: Achieves optimal ventilation and oxygenation  12/16/2022 1028 by Nadya Covarrubias RN  Outcome: Progressing  Flowsheets (Taken 12/16/2022 0855)  Achieves optimal ventilation and oxygenation: Assess for changes in respiratory status     Problem: Cardiovascular - Adult  Goal: Maintains optimal cardiac output and hemodynamic stability  Outcome: Progressing  Flowsheets (Taken 12/16/2022 0855)  Maintains optimal cardiac output and hemodynamic stability: Monitor blood pressure and heart rate     Problem: Skin/Tissue Integrity - Adult  Goal: Skin integrity remains intact  Outcome: Progressing  Flowsheets  Taken 12/16/2022 0855  Skin Integrity Remains Intact: Monitor for areas of redness and/or skin breakdown  Taken 12/16/2022 0800  Skin Integrity Remains Intact: Monitor for areas of redness and/or skin breakdown     Problem: Musculoskeletal - Adult  Goal: Return mobility to safest level of function  Outcome: Progressing  Flowsheets (Taken 12/16/2022 0855)  Return Mobility to Safest Level of Function: Assess patient stability and activity tolerance for standing, transferring and ambulating with or without assistive devices     Problem: Infection - Adult  Goal: Absence of infection during hospitalization  12/16/2022 1028 by Nadya Covarrubias RN  Outcome: Progressing  Flowsheets (Taken 12/16/2022 0855)  Absence of infection during hospitalization: Assess and monitor for signs and symptoms of infection     Problem: Discharge Planning  Goal: Discharge to home or other facility with appropriate resources  Outcome: Progressing  Flowsheets  Taken 12/16/2022 1028  Discharge to home or other facility with appropriate resources:   Identify discharge learning needs (meds, wound care, etc)   Identify barriers to discharge with patient and caregiver  Taken 12/16/2022 0855  Discharge to home or other facility with appropriate resources: Identify barriers to discharge with patient and caregiver     Problem: ABCDS Injury Assessment  Goal: Absence of physical injury  Outcome: Progressing  Flowsheets  Taken 12/16/2022 1028  Absence of Physical Injury: Implement safety measures based on patient assessment  Taken 12/16/2022 0800  Absence of Physical Injury: Implement safety measures based on patient assessment Low Dose Naltrexone Pregnancy And Lactation Text: Naltrexone is pregnancy category C.  There have been no adequate and well-controlled studies in pregnant women.  It should be used in pregnancy only if the potential benefit justifies the potential risk to the fetus.   Limited data indicates that naltrexone is minimally excreted into breastmilk.

## 2023-06-29 ENCOUNTER — HOSPITAL ENCOUNTER (OUTPATIENT)
Dept: VASCULAR LAB | Age: 73
Discharge: HOME | End: 2023-06-29
Payer: MEDICAID

## 2023-06-29 DIAGNOSIS — R60.1 GENERALIZED EDEMA: ICD-10-CM

## 2023-06-29 PROCEDURE — 93970 EXTREMITY STUDY: CPT

## 2023-06-30 ENCOUNTER — OFFICE VISIT (OUTPATIENT)
Dept: INTERNAL MEDICINE CLINIC | Age: 73
End: 2023-06-30
Payer: MEDICAID

## 2023-06-30 VITALS — TEMPERATURE: 98 F

## 2023-06-30 DIAGNOSIS — R60.1 GENERALIZED EDEMA: ICD-10-CM

## 2023-06-30 DIAGNOSIS — E11.42 TYPE 2 DIABETES MELLITUS WITH PERIPHERAL NEUROPATHY (HCC): ICD-10-CM

## 2023-06-30 DIAGNOSIS — L97.511 FOOT ULCERATION, RIGHT, LIMITED TO BREAKDOWN OF SKIN (HCC): Primary | ICD-10-CM

## 2023-06-30 PROCEDURE — 99213 OFFICE O/P EST LOW 20 MIN: CPT

## 2023-07-03 NOTE — PROGRESS NOTES
granular tissue. Wound does not probe to bone, tunnel, or track. Sanguinous drainage noted. No purulence, fluctuance, crepitus noted. partial-thickness ulceration noted to the sub 5th metatarsal head. The wound base is noted to be granular, with a hyperkeratotic wound base. There is no purulent drainage expressed from the wound. It does not tunnel, track or probe to bone present. eschars noted to sub 1st MPJ. Left lower extremity:    Eschar noted to the distal tuft of 2nd digit. Wound does not probe to bone, tunnel, or track. No purulence, fluctuance, crepitus noted. MUSCULOSKELETAL: Muscle strength is 5/5 for all pedal groups tested. No tenderness with palpation of the foot or ankle b/l. Ankle joint ROM is decreased in dorsiflexion with the knee extended. No obvious biomechanical abnormalities. IMAGING:    Right  Technically difficult study due to patient movement, involuntary muscle  spasms, body habitus, and edema. The involuntary muscle spasms of the bilateral legs may impact the findings of  this venous insufficiency study. There is no evidence of deep or superficial venous thrombosis in the right  lower extremity. There is no evidence of clinically significant deep or superficial venous  reflux in the right lower extremity. Pulsatile venous flow most commonly associated with fluid volume overload  bilaterally. Left  There is no evidence of deep or superficial venous thrombosis in the left  lower extremity. There is no evidence of clinically significant deep or superficial venous  reflux in the left lower extremity. No previous studies for comparison. Right  Technically difficult study due to patient movement, involuntary muscle  spasms, body habitus, and edema. The involuntary muscle spasms of the bilateral legs may impact the findings of  this venous insufficiency study. There is no evidence of deep or superficial venous thrombosis in the right  lower extremity.   There is no

## 2023-07-26 ENCOUNTER — NURSE ONLY (OUTPATIENT)
Dept: CARDIOLOGY CLINIC | Age: 73
End: 2023-07-26
Payer: MEDICAID

## 2023-07-26 ENCOUNTER — OFFICE VISIT (OUTPATIENT)
Dept: CARDIOLOGY CLINIC | Age: 73
End: 2023-07-26
Payer: MEDICAID

## 2023-07-26 VITALS
WEIGHT: 266.2 LBS | HEART RATE: 86 BPM | SYSTOLIC BLOOD PRESSURE: 110 MMHG | BODY MASS INDEX: 34.18 KG/M2 | DIASTOLIC BLOOD PRESSURE: 70 MMHG | OXYGEN SATURATION: 97 %

## 2023-07-26 DIAGNOSIS — I25.5 ISCHEMIC CARDIOMYOPATHY: ICD-10-CM

## 2023-07-26 DIAGNOSIS — I42.0 DILATED CARDIOMYOPATHY (HCC): ICD-10-CM

## 2023-07-26 DIAGNOSIS — I48.0 PAROXYSMAL ATRIAL FIBRILLATION (HCC): ICD-10-CM

## 2023-07-26 DIAGNOSIS — I25.10 CAD IN NATIVE ARTERY: Primary | ICD-10-CM

## 2023-07-26 DIAGNOSIS — Z95.1 HX OF CABG: ICD-10-CM

## 2023-07-26 DIAGNOSIS — Z95.810 ICD (IMPLANTABLE CARDIOVERTER-DEFIBRILLATOR) IN PLACE: Primary | ICD-10-CM

## 2023-07-26 DIAGNOSIS — Z95.810 ICD (IMPLANTABLE CARDIOVERTER-DEFIBRILLATOR) IN PLACE: ICD-10-CM

## 2023-07-26 DIAGNOSIS — I50.22 CHRONIC SYSTOLIC CONGESTIVE HEART FAILURE (HCC): ICD-10-CM

## 2023-07-26 DIAGNOSIS — I50.22 CHRONIC SYSTOLIC (CONGESTIVE) HEART FAILURE (HCC): ICD-10-CM

## 2023-07-26 PROCEDURE — 3017F COLORECTAL CA SCREEN DOC REV: CPT | Performed by: INTERNAL MEDICINE

## 2023-07-26 PROCEDURE — G8427 DOCREV CUR MEDS BY ELIG CLIN: HCPCS | Performed by: INTERNAL MEDICINE

## 2023-07-26 PROCEDURE — 93283 PRGRMG EVAL IMPLANTABLE DFB: CPT | Performed by: INTERNAL MEDICINE

## 2023-07-26 PROCEDURE — G8417 CALC BMI ABV UP PARAM F/U: HCPCS | Performed by: INTERNAL MEDICINE

## 2023-07-26 PROCEDURE — 3074F SYST BP LT 130 MM HG: CPT | Performed by: INTERNAL MEDICINE

## 2023-07-26 PROCEDURE — 99214 OFFICE O/P EST MOD 30 MIN: CPT | Performed by: INTERNAL MEDICINE

## 2023-07-26 PROCEDURE — 1123F ACP DISCUSS/DSCN MKR DOCD: CPT | Performed by: INTERNAL MEDICINE

## 2023-07-26 PROCEDURE — 93290 INTERROG DEV EVAL ICPMS IP: CPT | Performed by: INTERNAL MEDICINE

## 2023-07-26 PROCEDURE — 3078F DIAST BP <80 MM HG: CPT | Performed by: INTERNAL MEDICINE

## 2023-07-26 PROCEDURE — 1036F TOBACCO NON-USER: CPT | Performed by: INTERNAL MEDICINE

## 2023-07-26 ASSESSMENT — ENCOUNTER SYMPTOMS
ABDOMINAL DISTENTION: 0
APNEA: 0
SHORTNESS OF BREATH: 0
COUGH: 0
CHEST TIGHTNESS: 0
CHOKING: 0

## 2023-07-26 NOTE — PROGRESS NOTES
Patient comes in for programming evaluation on their Medtronic DC ICD. OV Checks only at this time d/t current living conditions. Last Echo 8.25.2022-estimated ejection fraction of 50%. All sensing and pacing parameters appear unchanged since last in office check on 05.05.2023  8.3 years estimated until VELIA/RRt. AP <0.1%.  5%. Ongoing AT/AF w/100% burden. V rates do not appear controlled. Patient remains on eliquis, furosemide, metoprolol, asa 81 mg. MDT FCA 5.11.2023-Recommend programming ALL HV therapy pathways B>AX. Completed today. Please see interrogation for more detail. Optivol is within normal range. Patient will see DDW today. Patient will follow up as scheduled. We will monitor remotely once reconnection is established.

## 2023-07-26 NOTE — PROGRESS NOTES
Transportation Needs: Unknown    Lack of Transportation (Medical): Not on file    Lack of Transportation (Non-Medical): No   Physical Activity: Not on file   Stress: Not on file   Social Connections: Not on file   Intimate Partner Violence: Not on file   Housing Stability: Unknown    Unable to Pay for Housing in the Last Year: Not on file    Number of Places Lived in the Last Year: Not on file    Unstable Housing in the Last Year: No      FH MI in [address] in 62s. Vitals:    07/26/23 1307   Pulse: 86   SpO2: 97%     Wt 266 (down 2 lbs)    Review of Systems   Constitutional:  Negative for activity change and fatigue. Respiratory:  Negative for apnea, cough, choking, chest tightness and shortness of breath. Cardiovascular:  Negative for chest pain, palpitations and leg swelling. No PND or orthopnea. No tachycardia. Gastrointestinal:  Negative for abdominal distention. Genitourinary:  Negative for hematuria. Musculoskeletal:  Negative for myalgias. Neurological:  Negative for dizziness, syncope and light-headedness. Psychiatric/Behavioral:  Negative for agitation, behavioral problems and confusion. All other systems reviewed and are negative. Objective:   Physical Exam  Constitutional:       General: He is not in acute distress. Appearance: Normal appearance. He is well-developed. He is obese. HENT:      Head: Normocephalic and atraumatic. Right Ear: External ear normal.      Left Ear: External ear normal.      Nose: Nose normal.   Neck:      Vascular: No JVD. Cardiovascular:      Rate and Rhythm: Normal rate and regular rhythm. Heart sounds: Normal heart sounds. No gallop. Pulmonary:      Effort: Pulmonary effort is normal. No respiratory distress. Breath sounds: Normal breath sounds. No wheezing or rales. Abdominal:      General: Bowel sounds are normal.      Palpations: Abdomen is soft. Tenderness: There is no abdominal tenderness.    Musculoskeletal:

## 2023-07-28 ENCOUNTER — OFFICE VISIT (OUTPATIENT)
Dept: INTERNAL MEDICINE CLINIC | Age: 73
End: 2023-07-28
Payer: MEDICAID

## 2023-07-28 VITALS — TEMPERATURE: 98 F

## 2023-07-28 DIAGNOSIS — E11.42 TYPE 2 DIABETES MELLITUS WITH PERIPHERAL NEUROPATHY (HCC): ICD-10-CM

## 2023-07-28 DIAGNOSIS — L97.511 FOOT ULCERATION, RIGHT, LIMITED TO BREAKDOWN OF SKIN (HCC): Primary | ICD-10-CM

## 2023-07-28 PROCEDURE — 99213 OFFICE O/P EST LOW 20 MIN: CPT

## 2023-07-28 NOTE — PROGRESS NOTES
residence is acquired, And home health care becomes an option.     Assessment and plan discussed with YOLANDA King DPM   Podiatric Resident PGY1  Pager [phone] or Isabelle  7/28/2023, 1:06 PM

## 2023-07-31 ENCOUNTER — OFFICE VISIT (OUTPATIENT)
Dept: INTERNAL MEDICINE CLINIC | Age: 73
End: 2023-07-31
Payer: MEDICAID

## 2023-07-31 VITALS — TEMPERATURE: 98 F

## 2023-07-31 DIAGNOSIS — L97.511 FOOT ULCERATION, RIGHT, LIMITED TO BREAKDOWN OF SKIN (HCC): Primary | ICD-10-CM

## 2023-07-31 DIAGNOSIS — I73.9 PVD (PERIPHERAL VASCULAR DISEASE) (HCC): ICD-10-CM

## 2023-07-31 DIAGNOSIS — R60.1 GENERALIZED EDEMA: ICD-10-CM

## 2023-07-31 DIAGNOSIS — E11.42 TYPE 2 DIABETES MELLITUS WITH PERIPHERAL NEUROPATHY (HCC): ICD-10-CM

## 2023-07-31 PROCEDURE — 99213 OFFICE O/P EST LOW 20 MIN: CPT

## 2023-07-31 NOTE — PROGRESS NOTES
Department of Podiatry  Resident Progress Note    Name: Chemo Carranza  Allergies: Codeine, Aspartame, and Phenylalanine    SUBJECTIVE  The patient is a 72 y.o. male who presents for wound care . Patient states the he has been compliant with keeping the dressing on his legs. Patient states that he is not able to apply dressing to legs 2/2 immobility. Patient has a meeting with his  tomorrow to discuss acquiring a permanent address. He also relates strict compliance with taking his diuretic. Patient denies any other pedal complaints at this time. Denies F/C/N/V. Past Medical History:        Diagnosis Date    A-fib [facility]) 2020    CAD, multiple vessel 12/2019    Coronary artery disease involving coronary bypass graft     Diabetes mellitus ([address])     diet controlled     Enlarged prostate     Environmental allergies     Kidney stones     Pacemaker     Systolic CHF ([address])        REVIEW OF SYSTEMS:  A 12 point review of systems is unremarkable with the exception of the chief complaint. Patient specifically denies nausea, vomiting, fever, chills, shortness of breath, chest pain, abdominal pain, constipation, or difficulty urinating. OBJECTIVE  Patient presents ambulating unassisted and unaccompanied in slide on shoes with dressings on. Patient is AOx3 and NAD. VASCULAR: DP and PT pulses faintly palpable 1/4 B/L. CFT is brisk to the digits of the foot b/l. Skin temperature is warm to warm from proximal to distal with no focal calor noted b/ LE. Moderate non-pitting edema appreciated b/l LE. No pain with calf compression. NEUROLOGIC: Gross and epicritic sensation is diminished b/l. Protective sensation is diminished at all pedal sites b/l. DERMATOLOGIC:    Bilateral lower extremity skin is noted to be taut and shiny. Right lower extremity:  Multiple partial-thickness ulcerations noted to the anterior and posterior shin, wound bases are a mix of fibrotic and granular tissue.  There is mild global

## 2023-08-04 ENCOUNTER — OFFICE VISIT (OUTPATIENT)
Dept: INTERNAL MEDICINE CLINIC | Age: 73
End: 2023-08-04
Payer: MEDICAID

## 2023-08-04 VITALS — TEMPERATURE: 97 F

## 2023-08-04 DIAGNOSIS — R60.1 GENERALIZED EDEMA: ICD-10-CM

## 2023-08-04 DIAGNOSIS — L98.499 SUPERFICIAL ULCERATIVE LESION (HCC): Primary | ICD-10-CM

## 2023-08-04 DIAGNOSIS — E11.42 TYPE 2 DIABETES MELLITUS WITH PERIPHERAL NEUROPATHY (HCC): ICD-10-CM

## 2023-08-04 PROCEDURE — 99213 OFFICE O/P EST LOW 20 MIN: CPT

## 2023-08-05 NOTE — PROGRESS NOTES
Department of Podiatry  Resident Progress Note    Name: Gerson Flores  Allergies: Codeine, Aspartame, and Phenylalanine    SUBJECTIVE  The patient is a 72 y.o. male who presents for wound care . Patient states the he has been compliant with keeping the dressing on his legs. Patient states that he is not able to apply dressing to legs 2/2 immobility. Patient had a conversation with his  and since he is still unable to obtain permanent address and is therefore not a candidate for home health care. He also relates strict compliance with taking his diuretic. Patient denies any other pedal complaints at this time. Denies F/C/N/V. Past Medical History:        Diagnosis Date    A-fib [facility]) 2020    CAD, multiple vessel 12/2019    Coronary artery disease involving coronary bypass graft     Diabetes mellitus ([address])     diet controlled     Enlarged prostate     Environmental allergies     Kidney stones     Pacemaker     Systolic CHF ([address])        REVIEW OF SYSTEMS:  A 12 point review of systems is unremarkable with the exception of the chief complaint. Patient specifically denies nausea, vomiting, fever, chills, shortness of breath, chest pain, abdominal pain, constipation, or difficulty urinating. OBJECTIVE  Patient presents ambulating unassisted and unaccompanied in slide on shoes with dressings on but dirty. Patient is AOx3 and NAD. VASCULAR: DP and PT pulses faintly palpable 1/4 B/L. CFT is brisk to the digits of the foot b/l. Skin temperature is warm to warm from proximal to distal with no focal calor noted b/ LE. Moderate non-pitting edema appreciated b/l LE. No pain with calf compression. NEUROLOGIC: Gross and epicritic sensation is diminished b/l. Protective sensation is diminished at all pedal sites b/l. DERMATOLOGIC:    Bilateral lower extremity skin is noted to be taut and shiny. Right lower extremity:  Multiple pre-ulcerative lesions noted to the anterior and posterior shin.   There

## 2023-08-07 ENCOUNTER — OFFICE VISIT (OUTPATIENT)
Dept: INTERNAL MEDICINE CLINIC | Age: 73
End: 2023-08-07
Payer: MEDICAID

## 2023-08-07 VITALS — TEMPERATURE: 97 F

## 2023-08-07 DIAGNOSIS — R60.1 GENERALIZED EDEMA: ICD-10-CM

## 2023-08-07 DIAGNOSIS — E11.42 TYPE 2 DIABETES MELLITUS WITH PERIPHERAL NEUROPATHY (HCC): Primary | ICD-10-CM

## 2023-08-07 DIAGNOSIS — L98.499 SUPERFICIAL ULCERATIVE LESION (HCC): ICD-10-CM

## 2023-08-07 PROCEDURE — 99213 OFFICE O/P EST LOW 20 MIN: CPT

## 2023-08-07 NOTE — PROGRESS NOTES
Department of Podiatry  Resident Progress Note    Name: Chemo Carranza  Allergies: Codeine, Aspartame, and Phenylalanine    SUBJECTIVE  The patient is a 72 y.o. male who presents for wound care . Patient states the he has been compliant with keeping the dressing on his legs. Patient states that he is not able to apply dressing to legs 2/2 immobility. Patient had a conversation with his  and he is now on a list to obtain a  apartment. States he has been walking outdoors without his surgical shoes on. He also relates strict compliance with taking his diuretic. Patient denies any other pedal complaints at this time. Denies F/C/N/V. Past Medical History:        Diagnosis Date    A-fib [facility]) 2020    CAD, multiple vessel 12/2019    Coronary artery disease involving coronary bypass graft     Diabetes mellitus ([address])     diet controlled     Enlarged prostate     Environmental allergies     Kidney stones     Pacemaker     Systolic CHF ([address])        REVIEW OF SYSTEMS:  A 12 point review of systems is unremarkable with the exception of the chief complaint. Patient specifically denies nausea, vomiting, fever, chills, shortness of breath, chest pain, abdominal pain, constipation, or difficulty urinating. OBJECTIVE  Patient presents ambulating unassisted and unaccompanied in slide on shoes with dressings on but dirty. Patient is AOx3 and NAD. VASCULAR: DP and PT pulses faintly palpable 1/4 B/L. CFT is brisk to the digits of the foot b/l. Skin temperature is warm to warm from proximal to distal with no focal calor noted b/ LE. Moderate non-pitting edema appreciated b/l LE. No pain with calf compression. NEUROLOGIC: Gross and epicritic sensation is diminished b/l. Protective sensation is diminished at all pedal sites b/l. DERMATOLOGIC:    Bilateral lower extremity skin is noted to be taut and shiny. Right lower extremity:  Multiple pre-ulcerative lesions noted to the anterior and posterior shin.

## 2023-08-11 ENCOUNTER — OFFICE VISIT (OUTPATIENT)
Dept: INTERNAL MEDICINE CLINIC | Age: 73
End: 2023-08-11
Payer: MEDICAID

## 2023-08-11 VITALS — TEMPERATURE: 98 F

## 2023-08-11 DIAGNOSIS — L98.499 SUPERFICIAL ULCERATIVE LESION (HCC): ICD-10-CM

## 2023-08-11 DIAGNOSIS — E11.42 TYPE 2 DIABETES MELLITUS WITH PERIPHERAL NEUROPATHY (HCC): ICD-10-CM

## 2023-08-11 DIAGNOSIS — R60.1 GENERALIZED EDEMA: Primary | ICD-10-CM

## 2023-08-11 PROCEDURE — 99213 OFFICE O/P EST LOW 20 MIN: CPT

## 2023-08-12 NOTE — PROGRESS NOTES
Summary      No evidence of bilateral superficial or deep venous thrombosis. No evidence of bilateral superficial or deep venous reflux. ASSESSMENT  1. Diabetes mellitus type II complicated by peripheral neuropathy  2. Edema b/l improving  3. Partial thickness ulceration anterior shin, right (samuels 1)  3. PVD   4. Chronic dermatologic changes b/l LE 2/2 venous insufficiency. 5.  Pre-ulcerative lesions bilateral lower extremities, improving(Samuels stage 0)  6. Partial thickness ulceration 5th toe. Right foot. (Samuels 1)    PLAN  - Evaluation and management x 15 minutes and greater than 50% of the time spent explaining the etiology and treatment with the patient.   -Dressing applied to the RLE consisting of betadine,gauze, kerlix, ACE.  - Tubigrip applied to LLE  -Encouraged patient to elevate legs and to take his water pill as prescribed as the constant fluid load on the lower extremities is cause ulcerations of the limbs which in turn can result in increased risk of infection.   -Instructed patient to watch for signs and symptoms of infection including but not limited to fever, chills, feeling ill, redness around the wounds, redness streaking up the legs, purulent drainage. Instructed patient to call the clinic or present to the nearest emergency department if they notice these signs or symptoms   -Patient educated about the importance of diabetic foot care consisting of but not limited to daily foot inspections, wearing supportive shoes and inserts at all times, applying lotion to feet while sparing webspaces, and routinely checking blood sugar.   - Stressed the importance of keeping legs elevated and keeping his surgical shoes on while walking, especially outside.  -Patient to follow-up every Friday until a permanent residence is acquired.     Assessment and plan discussed with Dr. Crystal Bazzi DPM   Podiatric Resident PGY1  Pager [phone] or Isabelle  8/12/2023, 12:34

## 2023-08-18 ENCOUNTER — OFFICE VISIT (OUTPATIENT)
Dept: INTERNAL MEDICINE CLINIC | Age: 73
End: 2023-08-18
Payer: MEDICAID

## 2023-08-18 VITALS — TEMPERATURE: 98.2 F

## 2023-08-18 DIAGNOSIS — L98.499 SUPERFICIAL ULCERATIVE LESION (HCC): ICD-10-CM

## 2023-08-18 DIAGNOSIS — R60.1 GENERALIZED EDEMA: Primary | ICD-10-CM

## 2023-08-18 DIAGNOSIS — E11.42 TYPE 2 DIABETES MELLITUS WITH PERIPHERAL NEUROPATHY (HCC): ICD-10-CM

## 2023-08-18 PROCEDURE — 99213 OFFICE O/P EST LOW 20 MIN: CPT

## 2023-08-20 NOTE — PROGRESS NOTES
Department of Podiatry  Resident Progress Note    Name: Alana Madera  Allergies: Codeine, Aspartame, and Phenylalanine    SUBJECTIVE  The patient is a 72 y.o. male who presents for wound care . Patient states the he has been compliant with keeping the dressing on his legs. Patient states that he is not able to apply dressing to legs 2/2 immobility. States he has been more compliant with wearing his shoes while walking outside. Does admit to bumping his right leg on his bed frame which caused an open wound. He denies any pain, swelling, or drainage from this. He also relates strict compliance with taking his diuretic. Patient denies any other pedal complaints at this time. Denies F/C/N/V. Past Medical History:        Diagnosis Date    A-fib [facility]) 2020    CAD, multiple vessel 12/2019    Coronary artery disease involving coronary bypass graft     Diabetes mellitus ([address])     diet controlled     Enlarged prostate     Environmental allergies     Kidney stones     Pacemaker     Systolic CHF ([address])        REVIEW OF SYSTEMS:  Review of Systems: Pertinent positive and negative findings as documented in the HPI, otherwise all other systems were reviewed and were negative. OBJECTIVE  Patient presents ambulating unassisted and unaccompanied in crocs with dressings on but slightly soiled Patient is AOx3 and NAD. VASCULAR: DP and PT pulses faintly palpable 1/4 B/L. CFT is brisk to the digits of the foot b/l. Skin temperature is warm to warm from proximal to distal with no focal calor noted b/ LE. Moderate non-pitting edema appreciated b/l LE, improving. No pain with calf compression. NEUROLOGIC: Gross and epicritic sensation is diminished b/l. Protective sensation is diminished at all pedal sites b/l. DERMATOLOGIC:    Bilateral lower extremity skin is noted to be taut, improving. Right lower extremity:  Multiple pre-ulcerative lesions noted to the anterior and posterior shin.  Well adhered eschars noted to

## 2023-08-27 NOTE — TELEPHONE ENCOUNTER
Requested Prescriptions     Pending Prescriptions Disp Refills    rosuvastatin (CRESTOR) 20 MG tablet [Pharmacy Med Name: Rosuvastatin Calcium 20 MG Oral Tablet] 90 tablet 0     Sig: Take 1 tablet by mouth once daily          Number: 90    Refills: 3    Last Office Visit: 7/26/2023     Next Office Visit: 9/7/2023

## 2023-08-28 RX ORDER — ROSUVASTATIN CALCIUM 20 MG/1
TABLET, COATED ORAL
Qty: 90 TABLET | Refills: 3 | Status: SHIPPED | OUTPATIENT
Start: 2023-08-28

## 2023-09-01 ENCOUNTER — OFFICE VISIT (OUTPATIENT)
Dept: INTERNAL MEDICINE CLINIC | Age: 73
End: 2023-09-01
Payer: MEDICAID

## 2023-09-01 VITALS — TEMPERATURE: 98 F

## 2023-09-01 DIAGNOSIS — E11.42 TYPE 2 DIABETES MELLITUS WITH PERIPHERAL NEUROPATHY (HCC): Primary | ICD-10-CM

## 2023-09-01 DIAGNOSIS — R60.0 BILATERAL LOWER EXTREMITY EDEMA: ICD-10-CM

## 2023-09-01 DIAGNOSIS — I73.9 PVD (PERIPHERAL VASCULAR DISEASE) (HCC): ICD-10-CM

## 2023-09-01 PROCEDURE — 99213 OFFICE O/P EST LOW 20 MIN: CPT

## 2023-09-03 NOTE — PROGRESS NOTES
Department of Podiatry  Resident Progress Note    Name: Sally Urena  Allergies: Codeine, Aspartame, and Phenylalanine    SUBJECTIVE  The patient is a 72 y.o. male who presents for wound care  Patient states the he has been compliant with keeping the dressing on his legs. Patient states that he is not able to apply dressing to legs 2/2 immobility. States he has been more compliant with wearing his shoes while walking outside. Does admit to bumping his right leg on his bed frame which caused an open wound. He denies any pain, swelling, or drainage from this. He also relates strict compliance with taking his diuretic. Patient denies any other pedal complaints at this time. Denies F/C/N/V. Past Medical History:        Diagnosis Date    A-fib [facility]) 2020    CAD, multiple vessel 12/2019    Coronary artery disease involving coronary bypass graft     Diabetes mellitus ([address])     diet controlled     Enlarged prostate     Environmental allergies     Kidney stones     Pacemaker     Systolic CHF ([address])        REVIEW OF SYSTEMS:  Review of Systems: Pertinent positive and negative findings as documented in the HPI, otherwise all other systems were reviewed and were negative. OBJECTIVE  Patient presents ambulating unassisted and unaccompanied in [facility] with no dressings. Patient is AOx3 and NAD. VASCULAR: DP and PT pulses faintly palpable 1/4 B/L. CFT is brisk to the digits of the foot b/l. Skin temperature is warm to warm from proximal to distal with no focal calor noted b/ LE. Moderate non-pitting edema appreciated b/l LE, improving. No pain with calf compression. NEUROLOGIC: Gross and epicritic sensation is diminished b/l. Protective sensation is diminished at all pedal sites b/l. DERMATOLOGIC:   Diffuse dermatologic changes noted to b/l LE. Skin is noted to be taut and discolored 2/2 venous insufficiency. B/L lower extremity ulcerations are noted to well epithelized and healed ulceration sites.  No open wounds

## 2023-09-07 ENCOUNTER — OFFICE VISIT (OUTPATIENT)
Dept: CARDIOLOGY CLINIC | Age: 73
End: 2023-09-07
Payer: MEDICAID

## 2023-09-07 ENCOUNTER — NURSE ONLY (OUTPATIENT)
Dept: CARDIOLOGY CLINIC | Age: 73
End: 2023-09-07

## 2023-09-07 ENCOUNTER — TELEPHONE (OUTPATIENT)
Dept: CARDIOLOGY CLINIC | Age: 73
End: 2023-09-07

## 2023-09-07 VITALS
SYSTOLIC BLOOD PRESSURE: 130 MMHG | HEART RATE: 91 BPM | DIASTOLIC BLOOD PRESSURE: 84 MMHG | WEIGHT: 265 LBS | BODY MASS INDEX: 34.02 KG/M2

## 2023-09-07 DIAGNOSIS — I50.22 CHRONIC SYSTOLIC (CONGESTIVE) HEART FAILURE (HCC): ICD-10-CM

## 2023-09-07 DIAGNOSIS — Z95.810 ICD (IMPLANTABLE CARDIOVERTER-DEFIBRILLATOR) IN PLACE: Primary | ICD-10-CM

## 2023-09-07 DIAGNOSIS — I48.19 PERSISTENT ATRIAL FIBRILLATION (HCC): Primary | ICD-10-CM

## 2023-09-07 DIAGNOSIS — I42.0 DILATED CARDIOMYOPATHY (HCC): ICD-10-CM

## 2023-09-07 DIAGNOSIS — I48.0 PAROXYSMAL ATRIAL FIBRILLATION (HCC): Primary | ICD-10-CM

## 2023-09-07 DIAGNOSIS — I25.5 ISCHEMIC CARDIOMYOPATHY: ICD-10-CM

## 2023-09-07 DIAGNOSIS — I48.0 PAROXYSMAL ATRIAL FIBRILLATION (HCC): ICD-10-CM

## 2023-09-07 PROCEDURE — G8427 DOCREV CUR MEDS BY ELIG CLIN: HCPCS | Performed by: INTERNAL MEDICINE

## 2023-09-07 PROCEDURE — 1036F TOBACCO NON-USER: CPT | Performed by: INTERNAL MEDICINE

## 2023-09-07 PROCEDURE — 3017F COLORECTAL CA SCREEN DOC REV: CPT | Performed by: INTERNAL MEDICINE

## 2023-09-07 PROCEDURE — 1123F ACP DISCUSS/DSCN MKR DOCD: CPT | Performed by: INTERNAL MEDICINE

## 2023-09-07 PROCEDURE — 93000 ELECTROCARDIOGRAM COMPLETE: CPT | Performed by: INTERNAL MEDICINE

## 2023-09-07 PROCEDURE — 99215 OFFICE O/P EST HI 40 MIN: CPT | Performed by: INTERNAL MEDICINE

## 2023-09-07 PROCEDURE — G8417 CALC BMI ABV UP PARAM F/U: HCPCS | Performed by: INTERNAL MEDICINE

## 2023-09-07 PROCEDURE — 3074F SYST BP LT 130 MM HG: CPT | Performed by: INTERNAL MEDICINE

## 2023-09-07 PROCEDURE — 3078F DIAST BP <80 MM HG: CPT | Performed by: INTERNAL MEDICINE

## 2023-09-07 NOTE — PROGRESS NOTES
[facility]   Cardiac Electrophysiology Consultation   Date: 9/7/2023  Reason for Consultation:   Consult Requesting Physician: No att. providers found     Chief Complaint:   Chief Complaint   Patient presents with    Follow-up     4 Month follow up         HPI: Ariella Jara is a 72 y.o.  male with PMH significant for DM, PAF (dx'ed 12/2019) on Eliquis, CMP with EF 20-25% (11/2019), CAD s/p CABG with SHANTI clip (3/2020). Amiodarone was started 3/2020 during hospitalization post CABG. S/p dual chamber ICD (3/12/21), amiodarone stopped due to ineffectiveness (9/2021), admission for dofetilide loading discussed, but pt never scheduled. EF has improved to 50% (per echo, 8/2022). Interval History: Today, he is here for 4 mo f/u. He continues to have symptoms of shortness of breath secondary to atrial fibrillation. His device interrogation is consistent with ongoing atrial fibrillation, 100%. ,  With poorly controlled ventricular rate. He is a retired . All sensing and pacing parameters appear unchanged since last in office check on 07.26.2023 7.5 years estimated until VELIA/RRt. AP <0.1%.  5.6%. Ongoing AT/AF w/100% burden. V rates do not appear controlled. Assessment:   1. Persistent AF, on Eliquis  2. ICMP, EF 50% on Toprol, no ACE/ARB due to CKD, follows Dr. Meryle Jenkins  3. S/p dual chamber ICD  4.  HFrEF  5.  CAD, s/p CABG, on ASA  6. HLD, on statin  7. NSVT, on Toprol     Plan:  Discussed about atrial fibrillation ablation. Patient is interested in proceeding  Postprocedure, plan to start him on amiodarone. Patient does have left atrial appendage clipped during his CABG. Currently, he is on Eliquis 5 mg p.o. twice daily. Post ablation, after 3 months, we should be able to stop his oral anticoagulation. We educated the patient that atrial fibrillation and atrial flutter are both progressive diseases, with more frequent episodes that will ensue.   Subsequent episodes

## 2023-09-07 NOTE — H&P (VIEW-ONLY)
[address] [facility]   Cardiac Electrophysiology Consultation   Date: 9/7/2023  Reason for Consultation:   Consult Requesting Physician: No att. providers found     Chief Complaint:   Chief Complaint   Patient presents with    Follow-up     4 Month follow up         HPI: Hilary Carpenter is a 72 y.o.  male with PMH significant for DM, PAF (dx'ed 12/2019) on Eliquis, CMP with EF 20-25% (11/2019), CAD s/p CABG with SHANTI clip (3/2020). Amiodarone was started 3/2020 during hospitalization post CABG. S/p dual chamber ICD (3/12/21), amiodarone stopped due to ineffectiveness (9/2021), admission for dofetilide loading discussed, but pt never scheduled. EF has improved to 50% (per echo, 8/2022). Interval History: Today, he is here for 4 mo f/u. He continues to have symptoms of shortness of breath secondary to atrial fibrillation. His device interrogation is consistent with ongoing atrial fibrillation, 100%. ,  With poorly controlled ventricular rate. He is a retired . All sensing and pacing parameters appear unchanged since last in office check on 07.26.2023 7.5 years estimated until VELIA/RRt. AP <0.1%.  5.6%. Ongoing AT/AF w/100% burden. V rates do not appear controlled. Assessment:   1. Persistent AF, on Eliquis  2. ICMP, EF 50% on Toprol, no ACE/ARB due to CKD, follows Dr. Aric Pop  3. S/p dual chamber ICD  4.  HFrEF  5.  CAD, s/p CABG, on ASA  6. HLD, on statin  7. NSVT, on Toprol     Plan:  Discussed about atrial fibrillation ablation. Patient is interested in proceeding  Postprocedure, plan to start him on amiodarone. Patient does have left atrial appendage clipped during his CABG. Currently, he is on Eliquis 5 mg p.o. twice daily. Post ablation, after 3 months, we should be able to stop his oral anticoagulation. We educated the patient that atrial fibrillation and atrial flutter are both progressive diseases, with more frequent episodes that will ensue.   Subsequent episodes Gastrointestinal ROS: negative for - abdominal pain, blood in stools, diarrhea, hematemesis, melena, nausea/vomiting or swallowing difficulty/pain  Genito-Urinary ROS: negative for - dysuria or incontinence  Musculoskeletal ROS: negative for - joint swelling or muscle pain  Neurological ROS: negative for - confusion, dizziness, gait disturbance, headaches, numbness/tingling, seizures, speech problems, tremors, visual changes or weakness  Dermatological ROS: negative for - rash    Physical Examination:  Vitals:    09/07/23 0909   BP: 130/84   Pulse: 91       Constitutional: Oriented. No distress. Head: Normocephalic and atraumatic. Mouth/Throat: Oropharynx is clear and moist.   Eyes: Conjunctivae normal. EOM are normal.   Neck: Normal range of motion. Neck supple. No rigidity. No JVD present. Cardiovascular: Normal rate, regular rhythm, S1&S2 and intact distal pulses. Pulmonary/Chest: Bilateral respiratory sounds. No wheezes. No rhonchi. Abdominal: Soft. Bowel sounds present. No distension, No tenderness. Musculoskeletal: No tenderness. No edema    Lymphadenopathy: Has no cervical adenopathy. Neurological: Alert and oriented. Cranial nerve appears intact, No Gross deficit   Skin: Skin is warm and dry. No rash noted. Psychiatric: Has a normal mood, affect and behavior     Labs:  Reviewed. ECG: reviewed, A-fib with suboptimal ventricular rate control    Studies:   1.  Echo 8/26/22:  Summary   Normal left ventricular size. Mild concentric left ventricular hypertrophy. Left ventricular systolic function appears at the lower limits of normal   with an estimated ejection fraction of 50%. No gross regional wall motion abnormalities were noted. Indeterminate diastolic function. The right ventricle is normal in size with preserved function. Pacer / ICD wire is visualized in the right ventricle. Trace pericardial effusion noted anteriorly.    Definity contrast agent was used to help

## 2023-09-07 NOTE — PATIENT INSTRUCTIONS
Electrophysiology Study and Atrial Fibrillation (AFib) Ablation    Date of Procedure:     Time of Arrival:     Cardiologist performing procedure: Dr. Wilton Phalen    Do not eat or drink anything after midnight the night before the procedure. You may brush your teeth and rinse the morning of the procedure. Do NOT stop taking Eliquis (any blood thinners) leading up to the procedure. However, do not take any blood thinners the morning of the procedure. Take all your other routine medications the morning of the procedure with the following exceptions: If you are taking diabetic medications, please HOLD on the day of the procedure (including insulin). If you take Lantus insulin, take HALF of your usual dose the night before. If you take a water pill, please HOLD on the day of the procedure. If you take a blood thinner, please HOLD on the day of the procedure. Hold any other medications as instructed above. Do not apply any lotion, powder, or deodorant the morning of the procedure. Please bring a list of your medications to the hospital with you. CT scan of the chest is due within a week of the procedure. Someone will call you with the date/time of the CT scan. Lab work is due within a week of the procedure (can be done the same day as the CT scan). You do not need to be fasting for these labs. This can be done at the [address] lab at [facility]. [address] [place] [address]. You must have someone available to drive you home the same day. It is recommended that you do not drive for 48 hours after the procedure. You will also need someone with you at home the night of the procedure. If you are unable to make this appointment, please call [address] , at [phone].

## 2023-09-07 NOTE — TELEPHONE ENCOUNTER
Schedule pt for AF ablation at [facility]. EP form attached. Pt will need scheduled for CTA the week before. Instructions given to pt during OV today.

## 2023-09-11 NOTE — TELEPHONE ENCOUNTER
Procedure:  EPS/Afib Ablation  Doctor:  Dr. Kenton Avila  Date:  10/3/23  Time:  8am  Arrival:  6:30am  Reps:  Carto  Anesthesia:  Yes      Spoke with patient. 9/26/23 @ Mike Bledsoe arrive @ 10:30am  VANESSA CTA CARDIAC VEIN MAPPING  PREPS:  - Nothing by mouth (NPO) 4 hours prior.   - Patient may take medications with small sip of water     Please have patient arrive to the main entrance of [facility] ([facility], [address] [place] [address]) and check in with the registration desk. They will be directed to the Cath Lab. Remind patient to be NPO after midnight (8 hours prior). Do not apply lotions/creams on skin the day of procedure.

## 2023-09-12 ENCOUNTER — PREP FOR PROCEDURE (OUTPATIENT)
Dept: CARDIOLOGY CLINIC | Age: 73
End: 2023-09-12

## 2023-09-12 RX ORDER — SODIUM CHLORIDE 0.9 % (FLUSH) 0.9 %
5-40 SYRINGE (ML) INJECTION PRN
Status: CANCELLED | OUTPATIENT
Start: 2023-09-12

## 2023-09-12 RX ORDER — SODIUM CHLORIDE 9 MG/ML
INJECTION, SOLUTION INTRAVENOUS PRN
Status: CANCELLED | OUTPATIENT
Start: 2023-09-12

## 2023-09-12 RX ORDER — SODIUM CHLORIDE 0.9 % (FLUSH) 0.9 %
5-40 SYRINGE (ML) INJECTION EVERY 12 HOURS SCHEDULED
Status: CANCELLED | OUTPATIENT
Start: 2023-09-12

## 2023-09-26 ENCOUNTER — HOSPITAL ENCOUNTER (OUTPATIENT)
Dept: CT IMAGING | Age: 73
Discharge: HOME | End: 2023-09-26
Payer: MEDICAID

## 2023-09-26 DIAGNOSIS — I48.19 PERSISTENT ATRIAL FIBRILLATION (HCC): ICD-10-CM

## 2023-09-26 LAB
PERFORMED ON: NORMAL
POC CREATININE: 1.2 MG/DL (ref 0.8–1.3)
POC SAMPLE TYPE: NORMAL

## 2023-09-26 PROCEDURE — 82565 ASSAY OF CREATININE: CPT

## 2023-09-26 PROCEDURE — 75574 CT ANGIO HRT W/3D IMAGE: CPT

## 2023-09-26 PROCEDURE — 6360000004 HC RX CONTRAST MEDICATION: Performed by: INTERNAL MEDICINE

## 2023-09-26 RX ADMIN — IOPAMIDOL 100 ML: 755 INJECTION, SOLUTION INTRAVENOUS at 11:08

## 2023-09-26 NOTE — PROGRESS NOTES
[address] [facility]   Electrophysiology  Office Visit  Date: 10/10/2023    Chief Complaint   Patient presents with    Atrial Fibrillation    Atrial Flutter    Congestive Heart Failure    Edema    Hypertension    Shortness of Breath    Coronary Artery Disease       Cardiac HX: Isa Pleitez is a 72 y.o. man with a h/o HTN, DM, CAD, s/p CABG w/ SHANTI clip (3/2020), follows w/ Dr. Joelle Tena, pAF post CABG, placed on amio and Eliquis, ICMP, EF 20-25%, s/p dual ch MDT ICD (3/12/2021, Dr. Indira Andrews), amio d/c'd d/t refractory AF, admission for dofetilide discussed but not scheduled, + s/s AF, s/p RFA/PVI of AF/tAFL/PACs (10/3/2023, Dr. Indira Andrews), placed on amio 200 mg QD post procedure. Interval History/HPI: Patient is here for follow-up for chronic persistent atrial fibrillation, ICMP, chronic systolic CHF and ICD management. Patient with a longstanding history of paroxysmal atrial fibrillation. He was diagnosed after his CABG in March 2020. He initially was placed on amiodarone and Eliquis. He did have a left atrial appendage clip placed at the same time. He remained in atrial fibrillation and amiodarone was eventually discontinued due to ineffectiveness. Admission for dofetilide was discussed with the patient however this was never scheduled. He also has a history of an ischemic cardiomyopathy. EF was 20 to 25%. He underwent a dual-chamber MDT ICD in March 2021. He has remained in atrial fibrillation 100% of the time. He does complain of shortness of breath, weakness and fatigue. He underwent an RFA/PVI of AF, typical atrial flutter and focal PACs triggering into an AT. Today he presents in SR. Review of his device today shows 95.2% AP, 0.2% , no AF since RFA on device, other parameters stable. Patient states that he initially felt improvement after he returned to sinus rhythm. Now he states he has not sure if he is much different. He does get short of breath upon exertion.   He does have chronic bilateral

## 2023-10-03 ENCOUNTER — ANESTHESIA EVENT (OUTPATIENT)
Dept: CARDIAC CATH/INVASIVE PROCEDURES | Age: 73
End: 2023-10-03
Payer: MEDICAID

## 2023-10-03 ENCOUNTER — HOSPITAL ENCOUNTER (OUTPATIENT)
Dept: CARDIAC CATH/INVASIVE PROCEDURES | Age: 73
Setting detail: OBSERVATION
Discharge: HOME | End: 2023-10-04
Attending: INTERNAL MEDICINE | Admitting: INTERNAL MEDICINE
Payer: MEDICAID

## 2023-10-03 ENCOUNTER — ANESTHESIA (OUTPATIENT)
Dept: CARDIAC CATH/INVASIVE PROCEDURES | Age: 73
End: 2023-10-03
Payer: MEDICAID

## 2023-10-03 DIAGNOSIS — I25.5 ISCHEMIC CARDIOMYOPATHY: ICD-10-CM

## 2023-10-03 DIAGNOSIS — Z86.79 STATUS POST ABLATION OF ATRIAL FIBRILLATION: ICD-10-CM

## 2023-10-03 DIAGNOSIS — I48.11 LONGSTANDING PERSISTENT ATRIAL FIBRILLATION (HCC): ICD-10-CM

## 2023-10-03 DIAGNOSIS — I50.22 CHRONIC SYSTOLIC (CONGESTIVE) HEART FAILURE (HCC): ICD-10-CM

## 2023-10-03 DIAGNOSIS — Z95.810 ICD (IMPLANTABLE CARDIOVERTER-DEFIBRILLATOR) IN PLACE: Primary | ICD-10-CM

## 2023-10-03 DIAGNOSIS — I42.0 DILATED CARDIOMYOPATHY (HCC): ICD-10-CM

## 2023-10-03 DIAGNOSIS — Z98.890 STATUS POST ABLATION OF ATRIAL FIBRILLATION: ICD-10-CM

## 2023-10-03 LAB
ANION GAP SERPL CALCULATED.3IONS-SCNC: 11 MMOL/L (ref 3–16)
BUN SERPL-MCNC: 31 MG/DL (ref 7–20)
CALCIUM SERPL-MCNC: 9.5 MG/DL (ref 8.3–10.6)
CHLORIDE SERPL-SCNC: 97 MMOL/L (ref 99–110)
CO2 SERPL-SCNC: 29 MMOL/L (ref 21–32)
CREAT SERPL-MCNC: 1.4 MG/DL (ref 0.8–1.3)
DEPRECATED RDW RBC AUTO: 14.7 % (ref 12.4–15.4)
EKG ATRIAL RATE: 326 BPM
EKG DIAGNOSIS: NORMAL
EKG Q-T INTERVAL: 406 MS
EKG QRS DURATION: 96 MS
EKG QTC CALCULATION (BAZETT): 493 MS
EKG R AXIS: 83 DEGREES
EKG T AXIS: 48 DEGREES
EKG VENTRICULAR RATE: 89 BPM
GFR SERPLBLD CREATININE-BSD FMLA CKD-EPI: 54 ML/MIN/{1.73_M2}
GLUCOSE SERPL-MCNC: 151 MG/DL (ref 70–99)
HCT VFR BLD AUTO: 38.9 % (ref 40.5–52.5)
HGB BLD-MCNC: 13.1 G/DL (ref 13.5–17.5)
INR PPP: 1.26 (ref 0.84–1.16)
MCH RBC QN AUTO: 30.7 PG (ref 26–34)
MCHC RBC AUTO-ENTMCNC: 33.7 G/DL (ref 31–36)
MCV RBC AUTO: 91 FL (ref 80–100)
PLATELET # BLD AUTO: 188 K/UL (ref 135–450)
PMV BLD AUTO: 8 FL (ref 5–10.5)
POTASSIUM SERPL-SCNC: 4.1 MMOL/L (ref 3.5–5.1)
PROTHROMBIN TIME: 15.8 SEC (ref 11.5–14.8)
RBC # BLD AUTO: 4.27 M/UL (ref 4.2–5.9)
SODIUM SERPL-SCNC: 137 MMOL/L (ref 136–145)
SPECIMEN STATUS: NORMAL
WBC # BLD AUTO: 10.4 K/UL (ref 4–11)

## 2023-10-03 PROCEDURE — 80048 BASIC METABOLIC PNL TOTAL CA: CPT

## 2023-10-03 PROCEDURE — 6370000000 HC RX 637 (ALT 250 FOR IP): Performed by: INTERNAL MEDICINE

## 2023-10-03 PROCEDURE — C1766 INTRO/SHEATH,STRBLE,NON-PEEL: HCPCS | Performed by: INTERNAL MEDICINE

## 2023-10-03 PROCEDURE — 93010 ELECTROCARDIOGRAM REPORT: CPT | Performed by: INTERNAL MEDICINE

## 2023-10-03 PROCEDURE — G0269 OCCLUSIVE DEVICE IN VEIN ART: HCPCS

## 2023-10-03 PROCEDURE — C1760 CLOSURE DEV, VASC: HCPCS

## 2023-10-03 PROCEDURE — 85610 PROTHROMBIN TIME: CPT

## 2023-10-03 PROCEDURE — 93656 COMPRE EP EVAL ABLTJ ATR FIB: CPT

## 2023-10-03 PROCEDURE — 6360000002 HC RX W HCPCS

## 2023-10-03 PROCEDURE — 2720000010 HC SURG SUPPLY STERILE: Performed by: INTERNAL MEDICINE

## 2023-10-03 PROCEDURE — 93657 TX L/R ATRIAL FIB ADDL: CPT | Performed by: INTERNAL MEDICINE

## 2023-10-03 PROCEDURE — 2709999900 HC NON-CHARGEABLE SUPPLY: Performed by: INTERNAL MEDICINE

## 2023-10-03 PROCEDURE — 93622 COMP EP EVAL L VENTR PAC&REC: CPT

## 2023-10-03 PROCEDURE — 7100000000 HC PACU RECOVERY - FIRST 15 MIN: Performed by: ANESTHESIOLOGY

## 2023-10-03 PROCEDURE — C1894 INTRO/SHEATH, NON-LASER: HCPCS | Performed by: INTERNAL MEDICINE

## 2023-10-03 PROCEDURE — 36415 COLL VENOUS BLD VENIPUNCTURE: CPT

## 2023-10-03 PROCEDURE — 2500000003 HC RX 250 WO HCPCS: Performed by: NURSE ANESTHETIST, CERTIFIED REGISTERED

## 2023-10-03 PROCEDURE — 93656 COMPRE EP EVAL ABLTJ ATR FIB: CPT | Performed by: INTERNAL MEDICINE

## 2023-10-03 PROCEDURE — 2500000003 HC RX 250 WO HCPCS

## 2023-10-03 PROCEDURE — C1759 CATH, INTRA ECHOCARDIOGRAPHY: HCPCS | Performed by: INTERNAL MEDICINE

## 2023-10-03 PROCEDURE — 3700000001 HC ADD 15 MINUTES (ANESTHESIA): Performed by: ANESTHESIOLOGY

## 2023-10-03 PROCEDURE — 6360000002 HC RX W HCPCS: Performed by: NURSE ANESTHETIST, CERTIFIED REGISTERED

## 2023-10-03 PROCEDURE — C1730 CATH, EP, 19 OR FEW ELECT: HCPCS | Performed by: INTERNAL MEDICINE

## 2023-10-03 PROCEDURE — 3700000000 HC ANESTHESIA ATTENDED CARE: Performed by: ANESTHESIOLOGY

## 2023-10-03 PROCEDURE — 2580000003 HC RX 258: Performed by: INTERNAL MEDICINE

## 2023-10-03 PROCEDURE — 93005 ELECTROCARDIOGRAM TRACING: CPT | Performed by: INTERNAL MEDICINE

## 2023-10-03 PROCEDURE — 93655 ICAR CATH ABLTJ DSCRT ARRHYT: CPT | Performed by: INTERNAL MEDICINE

## 2023-10-03 PROCEDURE — G0378 HOSPITAL OBSERVATION PER HR: HCPCS

## 2023-10-03 PROCEDURE — 7100000001 HC PACU RECOVERY - ADDTL 15 MIN: Performed by: ANESTHESIOLOGY

## 2023-10-03 PROCEDURE — 93623 PRGRMD STIMJ&PACG IV RX NFS: CPT | Performed by: INTERNAL MEDICINE

## 2023-10-03 PROCEDURE — 93655 ICAR CATH ABLTJ DSCRT ARRHYT: CPT

## 2023-10-03 PROCEDURE — 85347 COAGULATION TIME ACTIVATED: CPT

## 2023-10-03 PROCEDURE — 2709999900 HC NON-CHARGEABLE SUPPLY

## 2023-10-03 PROCEDURE — 93622 COMP EP EVAL L VENTR PAC&REC: CPT | Performed by: INTERNAL MEDICINE

## 2023-10-03 PROCEDURE — 93657 TX L/R ATRIAL FIB ADDL: CPT

## 2023-10-03 PROCEDURE — 93623 PRGRMD STIMJ&PACG IV RX NFS: CPT

## 2023-10-03 PROCEDURE — 85027 COMPLETE CBC AUTOMATED: CPT

## 2023-10-03 PROCEDURE — C1732 CATH, EP, DIAG/ABL, 3D/VECT: HCPCS | Performed by: INTERNAL MEDICINE

## 2023-10-03 RX ORDER — ROCURONIUM BROMIDE 10 MG/ML
INJECTION, SOLUTION INTRAVENOUS PRN
Status: DISCONTINUED | OUTPATIENT
Start: 2023-10-03 | End: 2023-10-03 | Stop reason: SDUPTHER

## 2023-10-03 RX ORDER — SODIUM CHLORIDE 0.9 % (FLUSH) 0.9 %
5-40 SYRINGE (ML) INJECTION PRN
Status: DISCONTINUED | OUTPATIENT
Start: 2023-10-03 | End: 2023-10-04 | Stop reason: HOSPADM

## 2023-10-03 RX ORDER — LIDOCAINE HYDROCHLORIDE 20 MG/ML
INJECTION, SOLUTION INFILTRATION; PERINEURAL PRN
Status: DISCONTINUED | OUTPATIENT
Start: 2023-10-03 | End: 2023-10-03 | Stop reason: SDUPTHER

## 2023-10-03 RX ORDER — PROPOFOL 10 MG/ML
INJECTION, EMULSION INTRAVENOUS PRN
Status: DISCONTINUED | OUTPATIENT
Start: 2023-10-03 | End: 2023-10-03 | Stop reason: SDUPTHER

## 2023-10-03 RX ORDER — PROCHLORPERAZINE EDISYLATE 5 MG/ML
5 INJECTION INTRAMUSCULAR; INTRAVENOUS
Status: DISPENSED | OUTPATIENT
Start: 2023-10-03 | End: 2023-10-04

## 2023-10-03 RX ORDER — PHENYLEPHRINE HCL IN 0.9% NACL 1 MG/10 ML
SYRINGE (ML) INTRAVENOUS PRN
Status: DISCONTINUED | OUTPATIENT
Start: 2023-10-03 | End: 2023-10-03 | Stop reason: SDUPTHER

## 2023-10-03 RX ORDER — FUROSEMIDE 10 MG/ML
INJECTION INTRAMUSCULAR; INTRAVENOUS PRN
Status: DISCONTINUED | OUTPATIENT
Start: 2023-10-03 | End: 2023-10-03 | Stop reason: SDUPTHER

## 2023-10-03 RX ORDER — OXYCODONE HYDROCHLORIDE 5 MG/1
10 TABLET ORAL PRN
Status: DISCONTINUED | OUTPATIENT
Start: 2023-10-03 | End: 2023-10-04 | Stop reason: HOSPADM

## 2023-10-03 RX ORDER — FUROSEMIDE 80 MG
80 TABLET ORAL 2 TIMES DAILY
Status: DISCONTINUED | OUTPATIENT
Start: 2023-10-03 | End: 2023-10-04 | Stop reason: HOSPADM

## 2023-10-03 RX ORDER — DEXAMETHASONE SODIUM PHOSPHATE 4 MG/ML
INJECTION, SOLUTION INTRA-ARTICULAR; INTRALESIONAL; INTRAMUSCULAR; INTRAVENOUS; SOFT TISSUE PRN
Status: DISCONTINUED | OUTPATIENT
Start: 2023-10-03 | End: 2023-10-03 | Stop reason: SDUPTHER

## 2023-10-03 RX ORDER — SODIUM CHLORIDE 0.9 % (FLUSH) 0.9 %
5-40 SYRINGE (ML) INJECTION EVERY 12 HOURS SCHEDULED
Status: DISCONTINUED | OUTPATIENT
Start: 2023-10-03 | End: 2023-10-04 | Stop reason: HOSPADM

## 2023-10-03 RX ORDER — KETAMINE HCL IN NACL, ISO-OSM 100MG/10ML
SYRINGE (ML) INJECTION PRN
Status: DISCONTINUED | OUTPATIENT
Start: 2023-10-03 | End: 2023-10-03 | Stop reason: SDUPTHER

## 2023-10-03 RX ORDER — PROTAMINE SULFATE 10 MG/ML
INJECTION, SOLUTION INTRAVENOUS PRN
Status: DISCONTINUED | OUTPATIENT
Start: 2023-10-03 | End: 2023-10-03 | Stop reason: SDUPTHER

## 2023-10-03 RX ORDER — PANTOPRAZOLE SODIUM 40 MG/1
40 TABLET, DELAYED RELEASE ORAL
Status: DISCONTINUED | OUTPATIENT
Start: 2023-10-04 | End: 2023-10-04 | Stop reason: HOSPADM

## 2023-10-03 RX ORDER — OXYCODONE HYDROCHLORIDE 5 MG/1
5 TABLET ORAL
Status: DISCONTINUED | OUTPATIENT
Start: 2023-10-03 | End: 2023-10-04 | Stop reason: HOSPADM

## 2023-10-03 RX ORDER — ONDANSETRON 2 MG/ML
4 INJECTION INTRAMUSCULAR; INTRAVENOUS
Status: DISCONTINUED | OUTPATIENT
Start: 2023-10-03 | End: 2023-10-04 | Stop reason: HOSPADM

## 2023-10-03 RX ORDER — MIDAZOLAM HYDROCHLORIDE 1 MG/ML
INJECTION INTRAMUSCULAR; INTRAVENOUS PRN
Status: DISCONTINUED | OUTPATIENT
Start: 2023-10-03 | End: 2023-10-03 | Stop reason: SDUPTHER

## 2023-10-03 RX ORDER — METOPROLOL TARTRATE 50 MG/1
50 TABLET, FILM COATED ORAL 2 TIMES DAILY
Status: DISCONTINUED | OUTPATIENT
Start: 2023-10-03 | End: 2023-10-04 | Stop reason: HOSPADM

## 2023-10-03 RX ORDER — SODIUM CHLORIDE 9 MG/ML
INJECTION, SOLUTION INTRAVENOUS PRN
Status: DISCONTINUED | OUTPATIENT
Start: 2023-10-03 | End: 2023-10-04 | Stop reason: HOSPADM

## 2023-10-03 RX ORDER — ONDANSETRON 2 MG/ML
INJECTION INTRAMUSCULAR; INTRAVENOUS PRN
Status: DISCONTINUED | OUTPATIENT
Start: 2023-10-03 | End: 2023-10-03 | Stop reason: SDUPTHER

## 2023-10-03 RX ORDER — ALBUTEROL SULFATE 2.5 MG/3ML
2.5 SOLUTION RESPIRATORY (INHALATION) EVERY 4 HOURS PRN
Status: DISCONTINUED | OUTPATIENT
Start: 2023-10-03 | End: 2023-10-04 | Stop reason: HOSPADM

## 2023-10-03 RX ORDER — IPRATROPIUM BROMIDE AND ALBUTEROL SULFATE 2.5; .5 MG/3ML; MG/3ML
1 SOLUTION RESPIRATORY (INHALATION)
Status: DISCONTINUED | OUTPATIENT
Start: 2023-10-03 | End: 2023-10-04 | Stop reason: HOSPADM

## 2023-10-03 RX ORDER — MEPERIDINE HYDROCHLORIDE 50 MG/ML
12.5 INJECTION INTRAMUSCULAR; INTRAVENOUS; SUBCUTANEOUS EVERY 5 MIN PRN
Status: DISCONTINUED | OUTPATIENT
Start: 2023-10-03 | End: 2023-10-04 | Stop reason: HOSPADM

## 2023-10-03 RX ORDER — AMIODARONE HYDROCHLORIDE 200 MG/1
200 TABLET ORAL DAILY
Status: DISCONTINUED | OUTPATIENT
Start: 2023-10-03 | End: 2023-10-04 | Stop reason: HOSPADM

## 2023-10-03 RX ORDER — POTASSIUM CHLORIDE 750 MG/1
10 TABLET, EXTENDED RELEASE ORAL 2 TIMES DAILY
Status: DISCONTINUED | OUTPATIENT
Start: 2023-10-03 | End: 2023-10-04 | Stop reason: HOSPADM

## 2023-10-03 RX ORDER — ACETAMINOPHEN 325 MG/1
650 TABLET ORAL EVERY 4 HOURS PRN
Status: DISCONTINUED | OUTPATIENT
Start: 2023-10-03 | End: 2023-10-04 | Stop reason: HOSPADM

## 2023-10-03 RX ADMIN — FUROSEMIDE 80 MG: 80 TABLET ORAL at 19:10

## 2023-10-03 RX ADMIN — ONDANSETRON 4 MG: 2 INJECTION INTRAMUSCULAR; INTRAVENOUS at 08:21

## 2023-10-03 RX ADMIN — ROCURONIUM BROMIDE 30 MG: 50 INJECTION, SOLUTION INTRAVENOUS at 09:12

## 2023-10-03 RX ADMIN — Medication 100 MCG: at 08:53

## 2023-10-03 RX ADMIN — FUROSEMIDE 20 MG: 10 INJECTION, SOLUTION INTRAMUSCULAR; INTRAVENOUS at 10:41

## 2023-10-03 RX ADMIN — ROCURONIUM BROMIDE 10 MG: 50 INJECTION, SOLUTION INTRAVENOUS at 09:43

## 2023-10-03 RX ADMIN — SUGAMMADEX 100 MG: 100 INJECTION, SOLUTION INTRAVENOUS at 10:47

## 2023-10-03 RX ADMIN — LIDOCAINE HYDROCHLORIDE 100 MG: 20 INJECTION, SOLUTION INFILTRATION; PERINEURAL at 08:03

## 2023-10-03 RX ADMIN — AMIODARONE HYDROCHLORIDE 200 MG: 200 TABLET ORAL at 16:38

## 2023-10-03 RX ADMIN — ROCURONIUM BROMIDE 10 MG: 50 INJECTION, SOLUTION INTRAVENOUS at 09:32

## 2023-10-03 RX ADMIN — Medication 25 MG: at 08:04

## 2023-10-03 RX ADMIN — PROPOFOL 30 MG: 10 INJECTION, EMULSION INTRAVENOUS at 10:44

## 2023-10-03 RX ADMIN — PROPOFOL 30 MG: 10 INJECTION, EMULSION INTRAVENOUS at 09:12

## 2023-10-03 RX ADMIN — MIDAZOLAM 1 MG: 1 INJECTION INTRAMUSCULAR; INTRAVENOUS at 08:00

## 2023-10-03 RX ADMIN — POTASSIUM CHLORIDE 10 MEQ: 750 TABLET, EXTENDED RELEASE ORAL at 21:14

## 2023-10-03 RX ADMIN — SUGAMMADEX 100 MG: 100 INJECTION, SOLUTION INTRAVENOUS at 10:50

## 2023-10-03 RX ADMIN — ROCURONIUM BROMIDE 50 MG: 50 INJECTION, SOLUTION INTRAVENOUS at 08:04

## 2023-10-03 RX ADMIN — ROCURONIUM BROMIDE 10 MG: 50 INJECTION, SOLUTION INTRAVENOUS at 10:01

## 2023-10-03 RX ADMIN — SODIUM CHLORIDE: 9 INJECTION, SOLUTION INTRAVENOUS at 07:58

## 2023-10-03 RX ADMIN — PROTAMINE SULFATE 40 MG: 10 INJECTION, SOLUTION INTRAVENOUS at 10:36

## 2023-10-03 RX ADMIN — METOPROLOL TARTRATE 50 MG: 50 TABLET, FILM COATED ORAL at 16:38

## 2023-10-03 RX ADMIN — SODIUM CHLORIDE: 9 INJECTION, SOLUTION INTRAVENOUS at 10:48

## 2023-10-03 RX ADMIN — DEXAMETHASONE SODIUM PHOSPHATE 10 MG: 4 INJECTION, SOLUTION INTRAMUSCULAR; INTRAVENOUS at 08:21

## 2023-10-03 RX ADMIN — PROPOFOL 20 MG: 10 INJECTION, EMULSION INTRAVENOUS at 10:47

## 2023-10-03 RX ADMIN — APIXABAN 5 MG: 5 TABLET, FILM COATED ORAL at 21:14

## 2023-10-03 RX ADMIN — PROPOFOL 50 MG: 10 INJECTION, EMULSION INTRAVENOUS at 08:04

## 2023-10-03 RX ADMIN — ROCURONIUM BROMIDE 10 MG: 50 INJECTION, SOLUTION INTRAVENOUS at 10:20

## 2023-10-03 RX ADMIN — POTASSIUM CHLORIDE 10 MEQ: 750 TABLET, EXTENDED RELEASE ORAL at 16:38

## 2023-10-03 RX ADMIN — METOPROLOL TARTRATE 50 MG: 50 TABLET, FILM COATED ORAL at 21:14

## 2023-10-03 RX ADMIN — APIXABAN 5 MG: 5 TABLET, FILM COATED ORAL at 16:38

## 2023-10-03 NOTE — ANESTHESIA PRE PROCEDURE
No results for input(s): \"POCGLU\", \"POCNA\", \"POCK\", \"POCCL\", \"POCBUN\", \"POCHEMO\", \"POCHCT\" in the last 72 hours. Coags:   Lab Results   Component Value Date/Time    PROTIME 15.0 08/17/2022 09:59 PM    INR 1.19 08/17/2022 09:59 PM    APTT 37.2 03/02/2020 01:47 PM       HCG (If Applicable): No results found for: \"PREGTESTUR\", \"PREGSERUM\", \"HCG\", \"HCGQUANT\"     ABGs:   Lab Results   Component Value Date/Time    PHART 7.367 03/02/2020 07:35 PM    PO2ART 84.2 03/02/2020 07:35 PM    MZL7ZII 40.8 03/02/2020 07:35 PM    HZD6ECR 23.4 03/02/2020 07:35 PM    BEART -2 03/02/2020 07:35 PM    P8RUBLOZ 96 03/02/2020 07:35 PM        Type & Screen (If Applicable):  No results found for: \"LABABO\", \"LABRH\"    Drug/Infectious Status (If Applicable):  No results found for: \"HIV\", \"HEPCAB\"    COVID-19 Screening (If Applicable):   Lab Results   Component Value Date/Time    COVID19 Not Detected 02/21/2023 11:40 AM    COVID19 Not Detected 09/11/2021 06:21 PM    COVID19 Not Detected 03/08/2021 12:13 PM           Anesthesia Evaluation  Patient summary reviewed no history of anesthetic complications:   Airway: Mallampati: II  TM distance: >3 FB     Mouth opening: > = 3 FB   Dental:          Pulmonary:Negative Pulmonary ROS and normal exam                               Cardiovascular:    (+) hypertension:, pacemaker ([employer]): AICD, CAD:, CABG/stent (CABG with SHANTI clip 85/2166):, CHF: systolic, hyperlipidemia               ROS comment: 08/2022 TTE:   Normal left ventricular size. Mild concentric left ventricular hypertrophy. Left ventricular systolic function appears at the lower limits of normal with an estimated ejection fraction of 50%. No gross regional wall motion abnormalities were noted. Indeterminate diastolic function. The right ventricle is normal in size with preserved function. Pacer / ICD wire is visualized in the right ventricle. Trace pericardial effusion noted anteriorly.   .     Neuro/Psych:   Negative

## 2023-10-03 NOTE — CARE COORDINATION
Case Management Assessment  Initial Evaluation    Date/Time of Evaluation: 10/3/2023 2:30 PM  Assessment Completed by: Jacky Lew RN    If patient is discharged prior to next notation, then this note serves as note for discharge by case management. Patient Name: Nia Lo                   YOB: 1950  Diagnosis: Other persistent atrial fibrillation [I48.19]  Status post ablation of atrial fibrillation [Z98.890, Z86.79]  Longstanding persistent atrial fibrillation ([address]) [I48.11]                   Date / Time: 10/3/2023  6:19 AM    Patient Admission Status: Observation   Readmission Risk (Low < 19, Mod (19-27), High > 27): Readmission Risk Score: 12.8    Current PCP: Nash Woods MD  PCP verified by CM? Yes    Chart Reviewed: Yes      History Provided by: Patient  Patient Orientation: Alert and Oriented, Person, Place, Situation, Self    Patient Cognition: Alert    Hospitalization in the last 30 days (Readmission):  No    If yes, Readmission Assessment in CM Navigator will be completed. Advance Directives:      Code Status: Full Code   Patient's Primary Decision Maker is: Legal Next of Kin (nephew Kareem Avelar)    Primary Decision Maker: TRUDY SHAH - Niece/Nephew - [phone]    Discharge Planning:    Patient lives with: Alone Type of Home: Other (Comment) (Lives in RV on wheels)  Primary Care Giver: Self  Patient Support Systems include: Friends/Neighbors (no spouse or children- only a nephew and friend)   Current Financial resources: Medicaid  Current community resources: None  Current services prior to admission: None            Current DME:              Type of Home Care services:  None    ADLS  Prior functional level: Independent in ADLs/IADLs  Current functional level:  Independent in ADLs/IADLs    PT AM-PAC:   /24  OT AM-PAC:   /24    Family can provide assistance at DC: No  Would you like Case Management to discuss the discharge plan with any other family members/significant others, Z86.79]  Longstanding persistent atrial fibrillation (HCC) [L21.57]    IF APPLICABLE: The Patient and/or patient representative [facility] and his family were provided with a choice of provider and agrees with the discharge plan. Freedom of choice list with basic dialogue that supports the patient's individualized plan of care/goals and shares the quality data associated with the providers was provided to:     Patient Representative Name:       The Patient and/or Patient Representative Agree with the Discharge Plan?       Wei Pinedo RN  Case Management Department

## 2023-10-03 NOTE — INTERVAL H&P NOTE
Update History & Physical    The patient's History and Physical of September 7, was reviewed with the patient and I examined the patient. There was no change. The surgical site was confirmed by the patient and me. Plan: The risks, benefits, expected outcome, and alternative to the recommended procedure have been discussed with the patient. Patient understands and wants to proceed with the procedure.      Electronically signed by Edwin Ochoa MD on 10/3/2023 at 7:53 AM

## 2023-10-03 NOTE — ACP (ADVANCE CARE PLANNING)
Advance Care Planning     Advance Care Planning Inpatient Note  Spiritual Care Department    Today's Date: 10/3/2023  Unit: [facility] C4 PCU    Received request from IDT Member. Upon review of chart and communication with care team, patient's decision making abilities are not in question. . Patient was/were present in the room during visit. Goals of ACP Conversation:  Discuss advance care planning documents    Health Care Decision Makers:     No healthcare decision makers have been documented. Click here to complete [address] including selection of the Healthcare Decision Maker Relationship (ie \"Primary\")  Summary:  No Decision Maker named by patient at this time  Patient only has 2 nephews in the [place] decision making hierarchy. He has not seen them in a few years and says they call \"when they want money. \"  He does have a good friend, Melissa Sawyer, who he has known for 20 years who he trusts to act in his best interest.  [address] (Patient Wishes):  None     Assessment:  Patient states he would not want resuscitation if there was no hope of meaningful recovery. He is open to \"trying. \"  He values his independence and recently bought a motor home after selling his house. He plans to travel soon. He has a keen awareness that life is \"short. \"  He uses his sense of humor to cope. Interventions:  Provided education on documents for clarity and greater understanding  Discussed and provided education on state decision maker hierarchy  Encouraged ongoing ACP conversation with future decision makers and loved ones    Care Preferences Communicated:   Ventilation:   If the patient, in their present state of health, suddenly became very ill and unable to breathe on their own,     He would likely want a limited code.     If their health worsens and it becomes clear that the change of recovery is unlikely,     the patient would NOT desire the use of a ventilator (breathing

## 2023-10-03 NOTE — DISCHARGE INSTRUCTIONS
least 24 hours after your ablation. Ask your caregiver if you may shower 24 hours after your ablation. (You may need to wait up to a week to take a tub bath, swim, or soak in water.) Once it is OK to shower, gently wash the catheter site with soap and water. If the site is oozing or bleeding slightly, place a small bandage over it to protect your clothes. Change the bandage daily, and more often if it gets wet or dirty. Once the site has stopped oozing, you may leave it uncovered. Do not lift anything weighing over ten pounds for five days after the ablation. __X__You may shower in 24 hours. Do not rub, pat the area dry. Do not submerge in water for 7 days or until the site heals. It is normal to have a bruise and soreness where the ablation catheter went in. Draw a line with a pen around the edges of the bruise. This will show you if the bruise starts to get bigger. Gravity may cause the bruise color to travel down your leg. If this happens, call your caregiver. If the catheter site starts to bleed, use your hand to put pressure on the bandage. If you do not have a bandage, use a clean cloth to hold pressure over and just above the puncture site. It is better if someone else holds pressure for you. While holding pressure, call your caregiver. Hold the pressure for 30 minutes, even after the bleeding has stopped. After the bleeding has stopped, lie flat for at least an hour. If the bleeding does not stop within 15 minutes of holding pressure, go to the nearest hospital or clinic. Do not walk, and do not drive yourself. If you are bleeding a lot, dial 911 or 0 () to call an ambulance. Do not strain while having a bowel movement. If you are constipated, take an over the counter stool softener such as Metamucil or Citracel. Food and liquids: You may eat your usual diet when you get home. Drink plenty of liquids. Most people should drink at least eight (8-ounce) glasses of water each day.  Limit the amount of caffeine you drink such as coffee, tea, and soda. Do not drink alcohol for 24 hours after the test. Follow your caregiver's advice if you need to change the amount of liquids that you drink. __X__You may resume your regular diet. Please avoid spicy foods for 1 month. CONTACT A CAREGIVER IF:   You have a fever (increased body temperature). The catheter site becomes red, or has pus or foul-smelling drainage coming from it. This may mean it is infected. You have increasing pain at the catheter site. (It is normal to have some soreness, but this should get better, not worse.)    You have questions or concerns about your ablation, medicines, or health condition. SEEK CARE IMMEDIATELY IF:   Your leg or arm becomes numb (loses feeling), is very painful, or changes color. The bruise at the catheter site starts to get bigger or the area has new swelling. If you have any of the following, it is an emergency ! Call 911 or 0 () to get to the nearest hospital or clinic. Do not drive yourself! The ablation catheter site is bleeding a lot or you cannot stop the bleeding. You have signs of a stroke (brain attack). Having new weakness or trouble moving one side of your face or body may be signs of a stroke. Other signs include new trouble thinking or speaking clearly, and new changes in vision. Medicines:   Keep a list of your medicines: Keep a written list of the medicines you take, the amounts, and when and why you take them. Bring the list of your medicines or the pill bottles when you see your caregivers. Do not take any medicines, over-the-counter drugs, vitamins, herbs, or food supplements without first talking to caregivers. Take your medicine as directed: Always take your medicine as directed by caregivers. Call your caregiver if you think your medicines are not helping or if you feel you are having side effects.  Do not quit taking your medicines until you

## 2023-10-03 NOTE — PROCEDURES
[facility]     Electrophysiology Procedure Note       Date of Procedure: 10/3/2023  Patient's Name: Kirsty Mathew  YOB: 1950   Medical Record Number: [id number]  Referring Physician: Morteza Fernandes MD  Procedure Performed by: Kimberly Hughes MD    Procedure performed:  Electrophysiology study with radiofrequency ablation of atrial fibrillation and pulmonary vein isolation   Additional focal ablation for Atrial fibrillation, outside the pulmonary vein - LA ridge, anterior wall and junction between RA / SVC. Typical atrial flutter ablation  Focal PACs triggering in to AT - from floor of the LA  3-D electroanatomical mapping of the left atrium    Transseptal puncture through an intact septum under intracardiac ultrasound guidance   Intracardiac echocardiography. Left ventricular pacing and recording  Drug infusion with an attempt to induce arrhythmia and pacing  Anesthesia: General anesthesia provided by the Anesthesia service  Procedure was unusually complex and prolonged due to several factors, including challenging trans-septal access due to rotated heart and presence of DC ICD, elevation in esophageal temperature requiring esophageal manipulation with esosure, mapping of PACs that triggers in to AT. EBL < 30 cc      Indications for procedure: Symptomatic atrial fibrillation, failed antiarrhythmic therapy and cardioversion in the past     Kirsty Mathew is a 72 y.o. male who has a history of longstanding persistent atrial fibrillation who is symptomatic with symptoms of dyspnea with minimal exertion and fatigue who has failed antiarrhythmic therapy in the past is now here for an ablation for longstanding persistent atrial fibrillation. Details of Procedure: The risks, benefits and alternatives of the ablation procedure were discussed with the patient.  The risks including, but not limited to, the risks of bleeding, infection, radiation exposure, injury to vascular, cardiac 1:1 retrograde conduction over AV node (VA wenckebach) was >600 msec    All catheters and sheaths were removed to the right atrium. The heparin was stopped. The Thermocool Smart Touch ablation catheter was advanced over the cavotricuspid isthmus to the tricuspid annulus in an area with a large ventricular electrogram and a small atrial electrogram. Linear ablation was performed from this area to the inferior vena cava. Bidirectional block across the CTI was confirmed with differential pacing and activation mapping. Bidirectional block was confirmed. The ICE catheter was placed in the RV. There was no pericardial effusion. Protamine 40 mg was given. When the ACT was appropriate, catheters and sheaths were removed and hemostasis was obtained via Vascade MVP venous closure device. No complications noted. Procedure was unusually complex and prolonged due to several factors, including challenging trans-septal access due to rotated heart and presence of DC ICD, elevation in esophageal temperature requiring esophageal manipulation with esosure, mapping of PACs that triggers in to AT. Conclusion:   - Pre- and post-procedure diagnoses were longstanding persistent atrial fibrillation   - Pulmonary vein isolations using wide area circumferential radiofrequency ablation   - Additional focal ablation for atrial fibrillation, outside the pulmonary veins - LA ridge, anterior wall and junction between RA / SVC. -Typical flutter ablation  - Focal PACs from floor of LA, triggering in to AT  No dormant conduction with Adenosine  Isuprel infusion, with an attempt to induce arrhythmia    Plan:  Resume OAC    PPI for 30 days  Start amiodarone 200 mg p.o. daily for 3 months  Lasix 20mg IV given in EP lab;   Continue home dose of Lasix  Bedrest for 2 hours  Patient lives by himself and hence, will need to be monitored in the hospital over the night, after GA.    Keep him under observation and expect DC tomorrow. Thank you for allowing us to participate in the care of your patient. If you have any questions or concerns, please do not hesitate to contact me.     Minoo Bright MD   Cardiac Electrophysiology  [facility]  Office [phone]  [facility]

## 2023-10-03 NOTE — PROGRESS NOTES
Pt brought to PACU. Report obtained from OR RN and anesthesia.  Pt placed on monitor A-Paced and O2 at 4L NC.

## 2023-10-03 NOTE — ANESTHESIA POSTPROCEDURE EVALUATION
Department of Anesthesiology  Postprocedure Note    Patient: Macy Cheung  MRN: [id number]  YOB: 1950  Date of evaluation: 10/3/2023      Procedure Summary     Date: 10/03/23 [facility]    Anesthesia Start: 0758 Anesthesia Stop: 1112    Procedure: ABLATION Diagnosis:       Other persistent atrial fibrillation      Status post ablation of atrial fibrillation      Longstanding persistent atrial fibrillation ([address])    Scheduled Providers: Karlee Emerson MD Responsible Provider: Karlee Emerson MD    Anesthesia Type: general ASA Status: 3          Anesthesia Type: No value filed.     Esmer Phase I: Esmer Score: 9    Esmer Phase II:        Anesthesia Post Evaluation    Patient location during evaluation: bedside  Patient participation: complete - patient participated  Level of consciousness: awake and alert  Airway patency: patent  Nausea & Vomiting: no nausea and no vomiting  Complications: no  Cardiovascular status: hemodynamically stable  Respiratory status: acceptable  Hydration status: euvolemic  Pain management: adequate

## 2023-10-04 VITALS
OXYGEN SATURATION: 98 % | SYSTOLIC BLOOD PRESSURE: 118 MMHG | DIASTOLIC BLOOD PRESSURE: 68 MMHG | WEIGHT: 265.2 LBS | TEMPERATURE: 97.6 F | RESPIRATION RATE: 18 BRPM | HEART RATE: 75 BPM | BODY MASS INDEX: 34.03 KG/M2 | HEIGHT: 74 IN

## 2023-10-04 PROCEDURE — 6370000000 HC RX 637 (ALT 250 FOR IP): Performed by: INTERNAL MEDICINE

## 2023-10-04 PROCEDURE — G0378 HOSPITAL OBSERVATION PER HR: HCPCS

## 2023-10-04 PROCEDURE — 99239 HOSP IP/OBS DSCHRG MGMT >30: CPT

## 2023-10-04 PROCEDURE — 2580000003 HC RX 258: Performed by: INTERNAL MEDICINE

## 2023-10-04 RX ORDER — AMIODARONE HYDROCHLORIDE 200 MG/1
200 TABLET ORAL DAILY
Qty: 90 TABLET | Refills: 0 | Status: SHIPPED | OUTPATIENT
Start: 2023-10-04

## 2023-10-04 RX ORDER — PANTOPRAZOLE SODIUM 40 MG/1
40 TABLET, DELAYED RELEASE ORAL
Qty: 30 TABLET | Refills: 3 | Status: SHIPPED | OUTPATIENT
Start: 2023-10-05

## 2023-10-04 RX ADMIN — Medication 10 ML: at 10:52

## 2023-10-04 RX ADMIN — AMIODARONE HYDROCHLORIDE 200 MG: 200 TABLET ORAL at 10:51

## 2023-10-04 RX ADMIN — POTASSIUM CHLORIDE 10 MEQ: 750 TABLET, EXTENDED RELEASE ORAL at 10:51

## 2023-10-04 RX ADMIN — FUROSEMIDE 80 MG: 80 TABLET ORAL at 10:51

## 2023-10-04 RX ADMIN — APIXABAN 5 MG: 5 TABLET, FILM COATED ORAL at 10:51

## 2023-10-04 RX ADMIN — METOPROLOL TARTRATE 50 MG: 50 TABLET, FILM COATED ORAL at 10:51

## 2023-10-04 RX ADMIN — PANTOPRAZOLE SODIUM 40 MG: 40 TABLET, DELAYED RELEASE ORAL at 05:53

## 2023-10-04 NOTE — PLAN OF CARE
Problem: Chronic Conditions and Co-morbidities  Goal: Patient's chronic conditions and co-morbidity symptoms are monitored and maintained or improved  10/4/2023 0251 by Juan Carlos Scales RN  Outcome: Progressing  10/3/2023 1953 by Sherlyn Dakins, RN  Outcome: Progressing     Problem: Discharge Planning  Goal: Discharge to home or other facility with appropriate resources  10/4/2023 0251 by Juan Carlos Scales RN  Outcome: Progressing  10/3/2023 1953 by Sherlyn Dakins, RN  Outcome: Progressing     Problem: Safety - Adult  Goal: Free from fall injury  Outcome: Progressing

## 2023-10-04 NOTE — PROGRESS NOTES
Pt ok for discharge per MD. Discharge instructions given. Medications picked up from outpatient pharmacy. IV removed. Telemetry monitor removed and CMU notified. No questions or concerns at this time. Transported by wheelchair to personal car with all belongings.

## 2023-10-04 NOTE — PLAN OF CARE
Problem: Chronic Conditions and Co-morbidities  Goal: Patient's chronic conditions and co-morbidity symptoms are monitored and maintained or improved  10/4/2023 1334 by Mohsen Veronica RN  Outcome: Adequate for Discharge  10/4/2023 0251 by Makenna Uriostegui RN  Outcome: Progressing     Problem: Discharge Planning  Goal: Discharge to home or other facility with appropriate resources  10/4/2023 1334 by Mohsen Veronica RN  Outcome: Adequate for Discharge  10/4/2023 0251 by Makenna Uriostegui RN  Outcome: Progressing     Problem: Safety - Adult  Goal: Free from fall injury  10/4/2023 1334 by Mohsen Veronica RN  Outcome: Adequate for Discharge  10/4/2023 0251 by Makenna Uriostegui RN  Outcome: Progressing

## 2023-10-04 NOTE — CARE COORDINATION
CASE MANAGEMENT DISCHARGE SUMMARY      Discharge to: Home no needs    Precertification completed: NA  Hospital Exemption Notification (HENS) completed: NA    IMM given: (date) Today    New Durable Medical Equipment ordered/agency: None    Transportation:    Family/car:on arrival       Confirmed discharge plan with:MD/RN     Patient: yes     Family: Patient notified family        RN, name: Benjy Andrew

## 2023-10-04 NOTE — PROGRESS NOTES
Pt transferred to A1 for D/C. Bedside report given to A1 RN. Prescriptions and personal belongings with patient. [facility] notified.

## 2023-10-05 ENCOUNTER — CARE COORDINATION (OUTPATIENT)
Dept: CASE MANAGEMENT | Age: 73
End: 2023-10-05

## 2023-10-05 NOTE — CARE COORDINATION
Care Transitions Outreach Attempt    Call within 2 business days of discharge: Yes   Attempted to reach patient for transitions of care follow up. Unable to reach patient. LVM. Patient: Zeke Chavez Patient : [birth year] MRN: [id number]    Last Discharge Facility       Date Complaint Diagnosis Description Type Department Provider    10/3/23   Admission (Discharged) from Kamar Thakur MD              Was this an external facility discharge? No Discharge Facility: n.a    Noted following upcoming appointments from discharge chart review:   [facility] follow up appointment(s):   Future Appointments   Date Time Provider [address]   10/10/2023  3:00 PM [place], [address] Card [facility]   10/10/2023  3:30 PM EVELYN Smith - FER [place] Card [facility]   10/25/2023  1:45 PM Monica Benito MD [address] [place] [address] Dr LEWIS   10/25/2023  2:00 PM SCHEDULE, [address] Card [facility]   11/3/2023  1:00 PM [facility]AT[facility] CLINIC [address] [place] [address]   2023  9:15 AM SCHEDULE, [address] Card [facility]   2023  9:45 AM Papo Wright MD [place] Card [facility]     Non-[facility] follow up appointment(s):  Mahad WASSERMAN, RN, [facility]  Care Transition Nurse  [phone] mobile

## 2023-10-06 ENCOUNTER — CARE COORDINATION (OUTPATIENT)
Dept: CASE MANAGEMENT | Age: 73
End: 2023-10-06

## 2023-10-06 NOTE — CARE COORDINATION
Care Transitions Outreach Attempt    Call within 2 business days of discharge: Yes   Attempted to reach patient for transitions of care follow up. Unable to reach patient. LVM. Patient: Toan Majano Patient :  MRN: [id number]    Last Discharge Facility       Date Complaint Diagnosis Description Type Department Provider    10/3/23   Admission (Discharged) from Gege Tobias MD              Was this an external facility discharge? No Discharge Facility Name: n.a    Noted following upcoming appointments from discharge chart review:   [facility] follow up appointment(s):   Future Appointments   Date Time Provider [address]   10/10/2023  3:00 PM [place], [address] Card [facility]   10/10/2023  3:30 PM EVELYN Garrett - CNP [place] Card [facility]   10/25/2023  1:45 PM Humberto Peterson MD [address] [place] [address]  [facility]   10/25/2023  2:00 PM SCHEDULE, [address] Card [facility]   11/3/2023  1:00 PM PODIATRY CLINIC [address] [place] [address]   2023  9:15 AM SCHEDULE, [address] Card [facility]   2023  9:45 AM Agapito Cervantes MD [place] Card [facility]     Non-[facility]  follow up appointment(s):  Mary WASSERMAN, RN, [facility]  Care Transition Nurse  [phone] mobile

## 2023-10-10 ENCOUNTER — NURSE ONLY (OUTPATIENT)
Dept: CARDIOLOGY CLINIC | Age: 73
End: 2023-10-10

## 2023-10-10 ENCOUNTER — OFFICE VISIT (OUTPATIENT)
Dept: CARDIOLOGY CLINIC | Age: 73
End: 2023-10-10
Payer: MEDICAID

## 2023-10-10 VITALS
SYSTOLIC BLOOD PRESSURE: 120 MMHG | DIASTOLIC BLOOD PRESSURE: 60 MMHG | WEIGHT: 264.4 LBS | BODY MASS INDEX: 33.95 KG/M2 | HEART RATE: 82 BPM

## 2023-10-10 DIAGNOSIS — Z79.899 ON AMIODARONE THERAPY: ICD-10-CM

## 2023-10-10 DIAGNOSIS — I50.22 CHRONIC SYSTOLIC CHF (CONGESTIVE HEART FAILURE) (HCC): ICD-10-CM

## 2023-10-10 DIAGNOSIS — I48.20 CHRONIC ATRIAL FIBRILLATION (HCC): Primary | ICD-10-CM

## 2023-10-10 DIAGNOSIS — Z86.79 STATUS POST ABLATION OF ATRIAL FIBRILLATION: ICD-10-CM

## 2023-10-10 DIAGNOSIS — I50.22 CHRONIC SYSTOLIC (CONGESTIVE) HEART FAILURE (HCC): ICD-10-CM

## 2023-10-10 DIAGNOSIS — I25.10 CORONARY ARTERY DISEASE INVOLVING NATIVE CORONARY ARTERY OF NATIVE HEART WITHOUT ANGINA PECTORIS: ICD-10-CM

## 2023-10-10 DIAGNOSIS — Z98.890 STATUS POST ABLATION OF ATRIAL FIBRILLATION: ICD-10-CM

## 2023-10-10 DIAGNOSIS — I25.5 ISCHEMIC CARDIOMYOPATHY: ICD-10-CM

## 2023-10-10 DIAGNOSIS — Z79.01 ON CONTINUOUS ORAL ANTICOAGULATION: ICD-10-CM

## 2023-10-10 DIAGNOSIS — I42.0 DILATED CARDIOMYOPATHY (HCC): ICD-10-CM

## 2023-10-10 DIAGNOSIS — Z95.810 ICD (IMPLANTABLE CARDIOVERTER-DEFIBRILLATOR), DUAL, IN SITU: ICD-10-CM

## 2023-10-10 DIAGNOSIS — Z95.810 ICD (IMPLANTABLE CARDIOVERTER-DEFIBRILLATOR) IN PLACE: Primary | ICD-10-CM

## 2023-10-10 DIAGNOSIS — I48.11 LONGSTANDING PERSISTENT ATRIAL FIBRILLATION (HCC): ICD-10-CM

## 2023-10-10 PROCEDURE — G8427 DOCREV CUR MEDS BY ELIG CLIN: HCPCS | Performed by: NURSE PRACTITIONER

## 2023-10-10 PROCEDURE — 3074F SYST BP LT 130 MM HG: CPT | Performed by: NURSE PRACTITIONER

## 2023-10-10 PROCEDURE — 93000 ELECTROCARDIOGRAM COMPLETE: CPT | Performed by: NURSE PRACTITIONER

## 2023-10-10 PROCEDURE — 99215 OFFICE O/P EST HI 40 MIN: CPT | Performed by: NURSE PRACTITIONER

## 2023-10-10 PROCEDURE — 3078F DIAST BP <80 MM HG: CPT | Performed by: NURSE PRACTITIONER

## 2023-10-10 PROCEDURE — 1123F ACP DISCUSS/DSCN MKR DOCD: CPT | Performed by: NURSE PRACTITIONER

## 2023-10-10 PROCEDURE — G8417 CALC BMI ABV UP PARAM F/U: HCPCS | Performed by: NURSE PRACTITIONER

## 2023-10-10 PROCEDURE — G8484 FLU IMMUNIZE NO ADMIN: HCPCS | Performed by: NURSE PRACTITIONER

## 2023-10-10 PROCEDURE — 1036F TOBACCO NON-USER: CPT | Performed by: NURSE PRACTITIONER

## 2023-10-10 PROCEDURE — 3017F COLORECTAL CA SCREEN DOC REV: CPT | Performed by: NURSE PRACTITIONER

## 2023-10-12 ENCOUNTER — APPOINTMENT (OUTPATIENT)
Dept: GENERAL RADIOLOGY | Age: 73
End: 2023-10-12
Payer: MEDICAID

## 2023-10-12 ENCOUNTER — TELEPHONE (OUTPATIENT)
Dept: OTHER | Facility: CLINIC | Age: 73
End: 2023-10-12

## 2023-10-12 ENCOUNTER — HOSPITAL ENCOUNTER (EMERGENCY)
Age: 73
Discharge: HOME | End: 2023-10-12
Attending: STUDENT IN AN ORGANIZED HEALTH CARE EDUCATION/TRAINING PROGRAM
Payer: MEDICAID

## 2023-10-12 VITALS
HEIGHT: 74 IN | OXYGEN SATURATION: 99 % | DIASTOLIC BLOOD PRESSURE: 74 MMHG | BODY MASS INDEX: 33.92 KG/M2 | WEIGHT: 264.33 LBS | TEMPERATURE: 99.1 F | HEART RATE: 85 BPM | RESPIRATION RATE: 21 BRPM | SYSTOLIC BLOOD PRESSURE: 113 MMHG

## 2023-10-12 DIAGNOSIS — R13.10 DYSPHAGIA, UNSPECIFIED TYPE: Primary | ICD-10-CM

## 2023-10-12 LAB
ANION GAP SERPL CALCULATED.3IONS-SCNC: 11 MMOL/L (ref 3–16)
BASOPHILS # BLD: 0.1 K/UL (ref 0–0.2)
BASOPHILS NFR BLD: 0.7 %
BUN SERPL-MCNC: 38 MG/DL (ref 7–20)
CALCIUM SERPL-MCNC: 8.7 MG/DL (ref 8.3–10.6)
CHLORIDE SERPL-SCNC: 93 MMOL/L (ref 99–110)
CO2 SERPL-SCNC: 26 MMOL/L (ref 21–32)
CREAT SERPL-MCNC: 1.7 MG/DL (ref 0.8–1.3)
DEPRECATED RDW RBC AUTO: 14.2 % (ref 12.4–15.4)
EKG ATRIAL RATE: 89 BPM
EKG DIAGNOSIS: NORMAL
EKG Q-T INTERVAL: 434 MS
EKG QRS DURATION: 94 MS
EKG QTC CALCULATION (BAZETT): 522 MS
EKG R AXIS: 74 DEGREES
EKG T AXIS: 81 DEGREES
EKG VENTRICULAR RATE: 87 BPM
EOSINOPHIL # BLD: 0.4 K/UL (ref 0–0.6)
EOSINOPHIL NFR BLD: 3.9 %
FLUAV RNA RESP QL NAA+PROBE: NOT DETECTED
FLUBV RNA RESP QL NAA+PROBE: NOT DETECTED
GFR SERPLBLD CREATININE-BSD FMLA CKD-EPI: 42 ML/MIN/{1.73_M2}
GLUCOSE SERPL-MCNC: 151 MG/DL (ref 70–99)
HCT VFR BLD AUTO: 36.6 % (ref 40.5–52.5)
HGB BLD-MCNC: 12.7 G/DL (ref 13.5–17.5)
LYMPHOCYTES # BLD: 1.2 K/UL (ref 1–5.1)
LYMPHOCYTES NFR BLD: 12.5 %
MCH RBC QN AUTO: 31.2 PG (ref 26–34)
MCHC RBC AUTO-ENTMCNC: 34.7 G/DL (ref 31–36)
MCV RBC AUTO: 89.9 FL (ref 80–100)
MONOCYTES # BLD: 1 K/UL (ref 0–1.3)
MONOCYTES NFR BLD: 10.1 %
NEUTROPHILS # BLD: 6.9 K/UL (ref 1.7–7.7)
NEUTROPHILS NFR BLD: 72.8 %
PLATELET # BLD AUTO: 219 K/UL (ref 135–450)
PMV BLD AUTO: 8.1 FL (ref 5–10.5)
POTASSIUM SERPL-SCNC: 4.2 MMOL/L (ref 3.5–5.1)
RBC # BLD AUTO: 4.08 M/UL (ref 4.2–5.9)
SARS-COV-2 RNA RESP QL NAA+PROBE: NOT DETECTED
SODIUM SERPL-SCNC: 130 MMOL/L (ref 136–145)
TROPONIN, HIGH SENSITIVITY: 43 NG/L (ref 0–22)
TROPONIN, HIGH SENSITIVITY: 45 NG/L (ref 0–22)
WBC # BLD AUTO: 9.5 K/UL (ref 4–11)

## 2023-10-12 PROCEDURE — 6360000002 HC RX W HCPCS: Performed by: STUDENT IN AN ORGANIZED HEALTH CARE EDUCATION/TRAINING PROGRAM

## 2023-10-12 PROCEDURE — 99285 EMERGENCY DEPT VISIT HI MDM: CPT

## 2023-10-12 PROCEDURE — 71046 X-RAY EXAM CHEST 2 VIEWS: CPT

## 2023-10-12 PROCEDURE — 93005 ELECTROCARDIOGRAM TRACING: CPT | Performed by: STUDENT IN AN ORGANIZED HEALTH CARE EDUCATION/TRAINING PROGRAM

## 2023-10-12 PROCEDURE — 84484 ASSAY OF TROPONIN QUANT: CPT

## 2023-10-12 PROCEDURE — 36415 COLL VENOUS BLD VENIPUNCTURE: CPT

## 2023-10-12 PROCEDURE — 87636 SARSCOV2 & INF A&B AMP PRB: CPT

## 2023-10-12 PROCEDURE — 80048 BASIC METABOLIC PNL TOTAL CA: CPT

## 2023-10-12 PROCEDURE — 85025 COMPLETE CBC W/AUTO DIFF WBC: CPT

## 2023-10-12 RX ORDER — DEXAMETHASONE 4 MG/1
6 TABLET ORAL ONCE
Status: COMPLETED | OUTPATIENT
Start: 2023-10-12 | End: 2023-10-12

## 2023-10-12 RX ADMIN — DEXAMETHASONE 6 MG: 4 TABLET ORAL at 21:32

## 2023-10-12 ASSESSMENT — PAIN SCALES - GENERAL
PAINLEVEL_OUTOF10: 0
PAINLEVEL_OUTOF10: 0

## 2023-10-12 ASSESSMENT — PAIN - FUNCTIONAL ASSESSMENT: PAIN_FUNCTIONAL_ASSESSMENT: 0-10

## 2023-10-12 NOTE — ED TRIAGE NOTES
Pt states he had ablation on 10/10, has been experiencing SOB and difficulty swallowing d/t coughing up phlegm since then.  Denies chest pain

## 2023-10-13 NOTE — DISCHARGE INSTRUCTIONS
You are seen in the emergency department today for a feeling of fullness in your throat which we think is caused by irritation from the device they used to help support your breathing during your recent procedure. You did have testing of your heart today as well as an EKG and chest x-ray which are reassuring. You were given 1 dose of steroids which should help some of the discomfort. If your symptoms are not improved by tomorrow, please call your primary care physician or return to the emergency department. If it anytime, you feel like you are about to pass out, feel like you are unable to tolerate your saliva and are unable to drink or eat enough to feel well, or you have severe difficulty breathing or severe neck pain, please return immediately to the emergency department.

## 2023-10-13 NOTE — TELEPHONE ENCOUNTER
Writer contacted Dr. Yariel Putnam to inform of 30 day readmission risk. Dr. Yariel Putnam informed writer of intention to discharge and recommended follow up with primary care physician within the next few days.

## 2023-10-16 ENCOUNTER — OFFICE VISIT (OUTPATIENT)
Dept: FAMILY MEDICINE CLINIC | Age: 73
End: 2023-10-16
Payer: MEDICAID

## 2023-10-16 VITALS
WEIGHT: 261.4 LBS | HEART RATE: 83 BPM | DIASTOLIC BLOOD PRESSURE: 86 MMHG | SYSTOLIC BLOOD PRESSURE: 110 MMHG | TEMPERATURE: 97.1 F | OXYGEN SATURATION: 98 % | HEIGHT: 74 IN | BODY MASS INDEX: 33.55 KG/M2

## 2023-10-16 DIAGNOSIS — Z09 HOSPITAL DISCHARGE FOLLOW-UP: ICD-10-CM

## 2023-10-16 DIAGNOSIS — I50.22 CHRONIC SYSTOLIC (CONGESTIVE) HEART FAILURE (HCC): ICD-10-CM

## 2023-10-16 DIAGNOSIS — I48.0 PAROXYSMAL ATRIAL FIBRILLATION (HCC): ICD-10-CM

## 2023-10-16 DIAGNOSIS — K21.9 GASTROESOPHAGEAL REFLUX DISEASE WITHOUT ESOPHAGITIS: ICD-10-CM

## 2023-10-16 DIAGNOSIS — I10 PRIMARY HYPERTENSION: Primary | ICD-10-CM

## 2023-10-16 PROBLEM — N17.0 ACUTE KIDNEY INJURY (AKI) WITH ACUTE TUBULAR NECROSIS (ATN) (HCC): Status: RESOLVED | Noted: 2022-08-25 | Resolved: 2023-10-16

## 2023-10-16 PROBLEM — B37.41: Status: RESOLVED | Noted: 2020-03-02 | Resolved: 2023-10-16

## 2023-10-16 PROBLEM — L03.90 CELLULITIS: Status: RESOLVED | Noted: 2023-03-25 | Resolved: 2023-10-16

## 2023-10-16 PROBLEM — Z95.810 AICD (AUTOMATIC CARDIOVERTER/DEFIBRILLATOR) PRESENT: Status: RESOLVED | Noted: 2021-03-13 | Resolved: 2023-10-16

## 2023-10-16 PROBLEM — N13.9 URINARY TRACT OBSTRUCTION: Status: RESOLVED | Noted: 2020-03-02 | Resolved: 2023-10-16

## 2023-10-16 PROCEDURE — 3074F SYST BP LT 130 MM HG: CPT | Performed by: FAMILY MEDICINE

## 2023-10-16 PROCEDURE — 1111F DSCHRG MED/CURRENT MED MERGE: CPT | Performed by: FAMILY MEDICINE

## 2023-10-16 PROCEDURE — 1036F TOBACCO NON-USER: CPT | Performed by: FAMILY MEDICINE

## 2023-10-16 PROCEDURE — G8427 DOCREV CUR MEDS BY ELIG CLIN: HCPCS | Performed by: FAMILY MEDICINE

## 2023-10-16 PROCEDURE — 1123F ACP DISCUSS/DSCN MKR DOCD: CPT | Performed by: FAMILY MEDICINE

## 2023-10-16 PROCEDURE — 99214 OFFICE O/P EST MOD 30 MIN: CPT | Performed by: FAMILY MEDICINE

## 2023-10-16 PROCEDURE — 3017F COLORECTAL CA SCREEN DOC REV: CPT | Performed by: FAMILY MEDICINE

## 2023-10-16 PROCEDURE — G8484 FLU IMMUNIZE NO ADMIN: HCPCS | Performed by: FAMILY MEDICINE

## 2023-10-16 PROCEDURE — G8417 CALC BMI ABV UP PARAM F/U: HCPCS | Performed by: FAMILY MEDICINE

## 2023-10-16 PROCEDURE — 3078F DIAST BP <80 MM HG: CPT | Performed by: FAMILY MEDICINE

## 2023-10-16 RX ORDER — BENZONATATE 200 MG/1
200 CAPSULE ORAL 3 TIMES DAILY PRN
Qty: 30 CAPSULE | Refills: 0 | Status: SHIPPED | OUTPATIENT
Start: 2023-10-16 | End: 2023-10-26

## 2023-10-16 RX ORDER — ALBUTEROL SULFATE 2.5 MG/3ML
2.5 SOLUTION RESPIRATORY (INHALATION) EVERY 4 HOURS PRN
Qty: 120 EACH | Refills: 0 | Status: SHIPPED | OUTPATIENT
Start: 2023-10-16

## 2023-10-16 NOTE — PROGRESS NOTES
Talon Pratt is a 72 y.o. Male who came into the clinic today for his posthospitalization recheck and for   appropriate management. The patient was admitted to the [facility] on 10/3/2023 and   was discharged on 10/4/2023. The patient had an ablation done on 10/3/2023 for atrial fibrillation. The   patient informs me that he has been doing well post ablation and also followed up with the cardiologist   office as well. The patient did mention to me today that he has been noticing on and off cough since his   discharge from the hospital and was wondering if he could get any cough medication for it. The patient also   informs me that he went to the ER on 10/12/2023 for concerns of cough and mild dysphagia due to excessive   phlegm. Otherwise today the patient did not have any other questions or concerns and all the question and   concerns were appropriately answered.     I reviewed and discussed below mentioned labs with the patient today:    Lab Results   Component Value Date/Time    WBC 9.5 10/12/2023 06:34 PM    RBC 4.08 10/12/2023 06:34 PM    MCV 89.9 10/12/2023 06:34 PM    MCHC 34.7 10/12/2023 06:34 PM     Lab Results   Component Value Date/Time    ANIONGAP 11 10/12/2023 06:34 PM    GLUCOSE 151 10/12/2023 06:34 PM    BUN 38 10/12/2023 06:34 PM    CREATININE 1.7 10/12/2023 06:34 PM    AGRATIO 0.8 03/25/2023 07:09 AM    CALCIUM 8.7 10/12/2023 06:34 PM     Past Medical History:   Diagnosis Date    A-fib ([address]) 2020    CAD, multiple vessel 12/2019    Coronary artery disease involving coronary bypass graft     Diabetes mellitus ([address])     diet controlled     Enlarged prostate     Environmental allergies     Kidney stones     Pacemaker     Systolic CHF [facility])        Patient Active Problem List   Diagnosis    Dilated cardiomyopathy ([address])    Paroxysmal atrial fibrillation (HCC)    S/P coronary angiogram    CAD, multiple vessel    ICD (implantable cardioverter-defibrillator) in

## 2023-10-18 LAB
POC ACT LR: 285 SEC
POC ACT LR: 287 SEC
POC ACT LR: 297 SEC
POC ACT LR: 331 SEC
POC ACT LR: 332 SEC

## 2023-10-25 ENCOUNTER — OFFICE VISIT (OUTPATIENT)
Dept: CARDIOLOGY CLINIC | Age: 73
End: 2023-10-25
Payer: MEDICAID

## 2023-10-25 VITALS
WEIGHT: 266 LBS | HEART RATE: 60 BPM | SYSTOLIC BLOOD PRESSURE: 110 MMHG | DIASTOLIC BLOOD PRESSURE: 70 MMHG | BODY MASS INDEX: 34.15 KG/M2

## 2023-10-25 DIAGNOSIS — I25.5 ISCHEMIC CARDIOMYOPATHY: ICD-10-CM

## 2023-10-25 DIAGNOSIS — I25.10 CAD IN NATIVE ARTERY: Primary | ICD-10-CM

## 2023-10-25 DIAGNOSIS — Z86.79 S/P ABLATION OF ATRIAL FIBRILLATION: ICD-10-CM

## 2023-10-25 DIAGNOSIS — I48.19 PERSISTENT ATRIAL FIBRILLATION (HCC): ICD-10-CM

## 2023-10-25 DIAGNOSIS — Z98.890 S/P ABLATION OF ATRIAL FIBRILLATION: ICD-10-CM

## 2023-10-25 DIAGNOSIS — Z95.810 ICD (IMPLANTABLE CARDIOVERTER-DEFIBRILLATOR) IN PLACE: ICD-10-CM

## 2023-10-25 DIAGNOSIS — I50.22 CHRONIC SYSTOLIC CONGESTIVE HEART FAILURE (HCC): ICD-10-CM

## 2023-10-25 DIAGNOSIS — Z95.1 HX OF CABG: ICD-10-CM

## 2023-10-25 PROCEDURE — G8427 DOCREV CUR MEDS BY ELIG CLIN: HCPCS | Performed by: INTERNAL MEDICINE

## 2023-10-25 PROCEDURE — 99214 OFFICE O/P EST MOD 30 MIN: CPT | Performed by: INTERNAL MEDICINE

## 2023-10-25 PROCEDURE — G8417 CALC BMI ABV UP PARAM F/U: HCPCS | Performed by: INTERNAL MEDICINE

## 2023-10-25 PROCEDURE — 3017F COLORECTAL CA SCREEN DOC REV: CPT | Performed by: INTERNAL MEDICINE

## 2023-10-25 PROCEDURE — G8484 FLU IMMUNIZE NO ADMIN: HCPCS | Performed by: INTERNAL MEDICINE

## 2023-10-25 PROCEDURE — 3074F SYST BP LT 130 MM HG: CPT | Performed by: INTERNAL MEDICINE

## 2023-10-25 PROCEDURE — 1123F ACP DISCUSS/DSCN MKR DOCD: CPT | Performed by: INTERNAL MEDICINE

## 2023-10-25 PROCEDURE — 1036F TOBACCO NON-USER: CPT | Performed by: INTERNAL MEDICINE

## 2023-10-25 PROCEDURE — 3078F DIAST BP <80 MM HG: CPT | Performed by: INTERNAL MEDICINE

## 2023-10-25 ASSESSMENT — ENCOUNTER SYMPTOMS
APNEA: 0
ABDOMINAL DISTENTION: 0
COUGH: 0
CHOKING: 0
SHORTNESS OF BREATH: 0
CHEST TIGHTNESS: 0

## 2023-10-25 NOTE — PROGRESS NOTES
Subjective:      Patient ID: Alex Wheeler is a 72 y.o. male. Follow up CAD/CABG/afib/cardiomyop/abn ekg. Cough since ablation. Saw PCP. Swelling unchanged. No chest pain. No tachycardia/syncope. Chronic LE edema. No pnd or orthopnea. Wt down. Feeling good. Taking Eliquis. No bleeding issues. Rhythm stable.      Past Medical History:   Diagnosis Date    A-fib [facility]) 2020    CAD, multiple vessel 12/2019    Coronary artery disease involving coronary bypass graft     Diabetes mellitus ([address])     diet controlled     Enlarged prostate     Environmental allergies     Kidney stones     Pacemaker     Systolic CHF [facility])      Past Surgical History:   Procedure Laterality Date    CARDIAC CATHETERIZATION  12/10/2019    Dr. Rea Mora - severe multi-vessel disease    CORONARY ARTERY BYPASS GRAFT N/A 3/2/2020    WAYNE; TCPB; CABG x 3, LIMA to LAD w/ long segment endarterectomy (4 cm); bilateral pulmonary vein isolation; 40 mm Atriclip to L atril appendage; endoscopic L GSV harvest; ICNB x 5 levels bilat; sternal plate x 1 to manubrium performed by Lion Fowler MD at [address]  x2    NASAL SEPTUM SURGERY       Social History     Socioeconomic History    Marital status: Single     Spouse name: Not on file    Number of children: Not on file    Years of education: Not on file    Highest education level: Not on file   Occupational History    Not on file   Tobacco Use    Smoking status: Never    Smokeless tobacco: Never   Substance and Sexual Activity    Alcohol use: Not Currently     Comment: rare    Drug use: Never    Sexual activity: Not on file   Other Topics Concern    Not on file   Social History Narrative    Not on file     Social Determinants of Health     Financial Resource Strain: Low Risk  (2/16/2023)    Overall Financial Resource Strain (CARDIA)     Difficulty of Paying Living Expenses: Not very hard   Food Insecurity: No Food Insecurity (2/16/2023)    Hunger Vital Sign     Worried About

## 2023-11-03 ENCOUNTER — OFFICE VISIT (OUTPATIENT)
Dept: INTERNAL MEDICINE CLINIC | Age: 73
End: 2023-11-03
Payer: MEDICAID

## 2023-11-03 VITALS — TEMPERATURE: 97.4 F

## 2023-11-03 DIAGNOSIS — I73.9 PVD (PERIPHERAL VASCULAR DISEASE) (HCC): ICD-10-CM

## 2023-11-03 DIAGNOSIS — E11.42 TYPE 2 DIABETES MELLITUS WITH PERIPHERAL NEUROPATHY (HCC): ICD-10-CM

## 2023-11-03 DIAGNOSIS — R60.0 BILATERAL LOWER EXTREMITY EDEMA: ICD-10-CM

## 2023-11-03 DIAGNOSIS — S91.109A OPEN TOE WOUND, INITIAL ENCOUNTER: Primary | ICD-10-CM

## 2023-11-03 PROCEDURE — 99213 OFFICE O/P EST LOW 20 MIN: CPT

## 2023-11-03 NOTE — PROGRESS NOTES
Department of Podiatry  Resident Progress Note    Name: Ryan Zhang  Allergies: Codeine, Aspartame, and Phenylalanine    SUBJECTIVE  The patient is a 72 y.o. male who presents for a new wound. Patient notes he did an excessive amount of walking a week ago and noticed new wounds on his big toe and 2nd toe on his left foot. He notes some drainage in his sock and a slight malodor. He  relates strict compliance with taking his diuretic. Patient denies any other pedal complaints at this time. Denies F/C/N/V. Past Medical History:        Diagnosis Date    A-fib [facility]) 2020    CAD, multiple vessel 12/2019    Coronary artery disease involving coronary bypass graft     Diabetes mellitus ([address])     diet controlled     Enlarged prostate     Environmental allergies     Kidney stones     Pacemaker     Systolic CHF ([address])        REVIEW OF SYSTEMS:  Review of Systems: Pertinent positive and negative findings as documented in the HPI, otherwise all other systems were reviewed and were negative. OBJECTIVE  Patient presents ambulating unassisted and unaccompanied in crocs with no dressings. Patient is AOx3 and NAD. VASCULAR: DP and PT pulses faintly palpable 1/4 B/L. CFT is brisk to the digits of the foot b/l. Skin temperature is warm to warm from proximal to distal with no focal calor noted b/ LE. Moderate non-pitting edema appreciated b/l LE, improving. No pain with calf compression. NEUROLOGIC: Gross and epicritic sensation is diminished b/l. Protective sensation is diminished at all pedal sites b/l. DERMATOLOGIC:   Patient provided verbal consent for photos to be taken today    Left lower extremity:    Full thickness ulceration noted to the distal medial hallux. Wound bed is granular in nature with a hyperkeratotic and erythematous periwound. No evidence purulence, fluctuance, or crepitations. Wound does not probe to bone, tunnel, or track. Full thickness ulceration noted to the distal 2nd digit.  Wound bed is

## 2023-11-10 ENCOUNTER — OFFICE VISIT (OUTPATIENT)
Dept: INTERNAL MEDICINE CLINIC | Age: 73
End: 2023-11-10
Payer: MEDICAID

## 2023-11-10 VITALS — TEMPERATURE: 97.4 F

## 2023-11-10 DIAGNOSIS — S91.109D OPEN TOE WOUND, SUBSEQUENT ENCOUNTER: Primary | ICD-10-CM

## 2023-11-10 DIAGNOSIS — Z91.199 NONCOMPLIANCE: ICD-10-CM

## 2023-11-10 DIAGNOSIS — E11.42 TYPE 2 DIABETES MELLITUS WITH PERIPHERAL NEUROPATHY (HCC): ICD-10-CM

## 2023-11-10 PROCEDURE — 99213 OFFICE O/P EST LOW 20 MIN: CPT

## 2023-11-10 PROCEDURE — 11042 DBRDMT SUBQ TIS 1ST 20SQCM/<: CPT

## 2023-11-14 NOTE — PROGRESS NOTES
[facility]   Cardiac Electrophysiology Consultation   Date: 11/29/2023  Reason for Consultation:   Consult Requesting Physician: No att. providers found     Chief Complaint:   Chief Complaint   Patient presents with    Follow-up      HPI: Janae Cordero is a 72 y.o.  male with PMH significant for DM, PAF (dx'ed 12/2019) on Eliquis, CMP with EF 20-25% (11/2019), CAD, s/p CABG with SHANTI clip (3/2020). Amiodarone was started 3/2020 during hospitalization post CABG. S/p dual chamber ICD (3/12/21), amiodarone stopped due to ineffectiveness (9/2021), admission for dofetilide loading discussed, but pt never scheduled. EF has improved to 50% (per echo, 8/2022), s/p RFA/PVI of AF/AT/tAFL (10/3/23, myself), placed on amiodarone post ablation. Interval History: Today, he is here for f/u post ablation. Denies SOB, palpitations, chest discomfort, eating difficulties, odynophagia, fever or chills since the ablation. All sensing and pacing parameters appear unchanged since last in office check on 10.10.2023. 7.6 years estimated until VELIA/RRT. AP 95%.  0.1%. PVC 0.4/hr      He is a retired . Assessment:   1. Persistent AF, s/p AF ablation (10/2023), on Eliquis, amiodarone  2. ICMP, EF 50%, on Toprol, no ACE/ARB due to CKD, follows Dr. Smith Page  3. S/p dual chamber ICD  4.  HFrEF  5.  CAD, s/p CABG, on ASA  6. HLD, on statin  7. NSVT, on Lopressor     Plan:  Patient does have left atrial appendage clipped during his CABG. Currently, he is on Eliquis 5 mg p.o. twice daily. During the next office visit, if he does not have any further recurrence of atrial fibrillation, then it is reasonable to stop his oral anticoagulation.   No changes in medications today  Follow up with EP NP in 6 months    Past Medical History:   Diagnosis Date    A-fib [facility]) 2020    CAD, multiple vessel 12/2019    Coronary artery disease involving coronary bypass graft     Diabetes mellitus ([address])     diet controlled

## 2023-11-15 NOTE — CARE COORDINATION
Case Management Assessment           Daily Note                 Date/ Time of Note: 3/4/2020 5:21 PM         Note completed by: Susannaa [place] Day    Patient Name: Jennifer Briscoe  YOB: 1950    Diagnosis:CAD in native artery [I25.10]  Patient Admission Status: Inpatient    Date of Admission:3/2/2020  5:05 AM Length of Stay: 2 GLOS:        Current Plan of Care: CABG POD2.   ________________________________________________________________________________________  PT AM-PAC: 15 / 24 per last evaluation on: 3/4    OT AM-PAC: 15 / 24 per last evaluation on: 3/4    DME Needs for discharge: defer  ________________________________________________________________________________________  Discharge Plan: To Be Determined DUE TO: SNF vs. ARU vs. HHC    Tentative discharge date: 3/7    Current barriers to discharge: Medical clearance, placement     Referrals completed: SNF: List provded send referral in AM (start pre-cert)    Resources/ information provided: SNF List  ________________________________________________________________________________________  Case Management Notes: SW rounded on this date. SW provided patient with [facility] list and medicare rating for review. Patient to review and SW to follow up in AM with patient decision on top 3 choices. Referrals to be sent and pre-cert initiated. SW allowing patient 24 hours to review list and make choices on post op day 2. Patient PT/OT scores increased. SW provided contact information to patient/family and placed information on white board in room should needs arise. No other needs noted at this time. Neela Wilks and his family were provided with choice of provider; he and his family are in agreement with the discharge plan.     Care Transition Patient: EYAL Snider  The [address] [place] [address] ICU  Case Management Department  Ph: [phone] Moderate

## 2023-11-17 ENCOUNTER — OFFICE VISIT (OUTPATIENT)
Dept: INTERNAL MEDICINE CLINIC | Age: 73
End: 2023-11-17
Payer: MEDICAID

## 2023-11-17 VITALS — TEMPERATURE: 98 F

## 2023-11-17 DIAGNOSIS — E11.42 TYPE 2 DIABETES MELLITUS WITH PERIPHERAL NEUROPATHY (HCC): ICD-10-CM

## 2023-11-17 DIAGNOSIS — Z91.199 NONCOMPLIANCE: ICD-10-CM

## 2023-11-17 DIAGNOSIS — S91.109D OPEN TOE WOUND, SUBSEQUENT ENCOUNTER: Primary | ICD-10-CM

## 2023-11-17 PROCEDURE — 99213 OFFICE O/P EST LOW 20 MIN: CPT

## 2023-11-17 PROCEDURE — 11042 DBRDMT SUBQ TIS 1ST 20SQCM/<: CPT

## 2023-11-17 NOTE — PROGRESS NOTES
greater than 50% of the time spent explaining the etiology and treatment with the patient.   -following verbal consent the patients left distal hallux and second digit were debrided down to and including subcutaneous tissue to healthy bleeding tissue, patient tolerated well. 2 cc bleeding appreciated hemostasis achieved with direct compression for 2 minutes. -  Encouraged patient to continue with the walking boot and wear at all times when ambulating. Patient relates understanding  -Dressing applied to left lower extremity with Adaptic, gauze, Kerlix, Ace, and Coban. Patient to leave this dressing clean dry and intact until next visit.  -Instructed patient to watch for signs and symptms of infection including but not limited to fever, chills, feeling ill, redness around the wounds, redness streaking up the legs, purulent drainage. Instructed patient to call the clinic or present to the nearest emergency department if they notice these signs or symptoms   -Patient educated about the importance of diabetic foot care consisting of but not limited to daily foot inspections, wearing supportive shoes and inserts at all times, applying lotion to feet while sparing webspaces, and routinely checking blood sugar. Follow up in 10 days for follow up wound care.     Assessment and plan discussed with Dr. Crystal Bazzi DPM   Podiatric Resident PGY1  Pager [phone] or Isabelle  11/17/2023, 11:58 AM

## 2023-11-27 ENCOUNTER — OFFICE VISIT (OUTPATIENT)
Dept: INTERNAL MEDICINE CLINIC | Age: 73
End: 2023-11-27
Payer: MEDICAID

## 2023-11-27 VITALS — TEMPERATURE: 98 F

## 2023-11-27 DIAGNOSIS — S91.109D OPEN TOE WOUND, SUBSEQUENT ENCOUNTER: Primary | ICD-10-CM

## 2023-11-27 DIAGNOSIS — E11.42 TYPE 2 DIABETES MELLITUS WITH PERIPHERAL NEUROPATHY (HCC): ICD-10-CM

## 2023-11-27 DIAGNOSIS — S91.109A OPEN TOE WOUND, INITIAL ENCOUNTER: ICD-10-CM

## 2023-11-27 DIAGNOSIS — Z91.199 NONCOMPLIANCE: ICD-10-CM

## 2023-11-27 PROCEDURE — 99213 OFFICE O/P EST LOW 20 MIN: CPT

## 2023-11-27 PROCEDURE — 11042 DBRDMT SUBQ TIS 1ST 20SQCM/<: CPT

## 2023-11-27 NOTE — PROGRESS NOTES
Department of Podiatry  Resident Progress Note    Name: María Figueroa  Allergies: Codeine, Aspartame, and Phenylalanine    SUBJECTIVE  The patient is a 72 y.o. male who presents for a wound check. Patient notes he did an excessive amount of walking a few weeks ago and noticed new wounds on his big toe and 2nd toe on his left foot. Patient denies pain to the area 2/2 peripheral neuropathy. Patient admits to not using the walking boot since last visit and removing the dressing. Patient denies any other pedal complaints at this time. Denies F/C/N/V. Past Medical History:        Diagnosis Date    A-fib [facility]) 2020    CAD, multiple vessel 12/2019    Coronary artery disease involving coronary bypass graft     Diabetes mellitus ([address])     diet controlled     Enlarged prostate     Environmental allergies     Kidney stones     Pacemaker     Systolic CHF ([address])        REVIEW OF SYSTEMS:  Review of Systems: Pertinent positive and negative findings as documented in the HPI, otherwise all other systems were reviewed and were negative. OBJECTIVE  Patient presents ambulating with a surgical boot to his left foot with disheveled dressing on the left foot. Patient is AOx3 and NAD. VASCULAR: DP and PT pulses faintly palpable 1/4 B/L. CFT is brisk to the digits of the foot b/l. Skin temperature is warm to warm from proximal to distal with no focal calor noted b/ LE. Moderate non-pitting edema appreciated b/l LE, improving. No pain with calf compression. NEUROLOGIC: Gross and epicritic sensation is diminished b/l. Protective sensation is diminished at all pedal sites b/l. DERMATOLOGIC:   Diffuse dermatologic changes noted to b/l LE. Skin is noted to be taut and discolored 2/2 venous insufficiency. Patient provided verbal consent for photos to be taken today    Left lower extremity:    Full thickness ulceration noted to the distal medial hallux.  Wound bed is granular in nature with a macerated and erythematous

## 2023-11-28 PROBLEM — E78.2 MIXED HYPERLIPIDEMIA: Chronic | Status: ACTIVE | Noted: 2022-08-22

## 2023-11-28 PROBLEM — E66.811 CLASS 1 OBESITY DUE TO EXCESS CALORIES WITH SERIOUS COMORBIDITY AND BODY MASS INDEX (BMI) OF 34.0 TO 34.9 IN ADULT: Chronic | Status: ACTIVE | Noted: 2023-11-28

## 2023-11-28 PROBLEM — I50.22 CHRONIC SYSTOLIC (CONGESTIVE) HEART FAILURE (HCC): Chronic | Status: ACTIVE | Noted: 2023-01-10

## 2023-11-28 PROBLEM — E11.65 UNCONTROLLED TYPE 2 DIABETES MELLITUS WITH HYPERGLYCEMIA (HCC): Chronic | Status: ACTIVE | Noted: 2022-08-22

## 2023-11-28 PROBLEM — I48.11 LONGSTANDING PERSISTENT ATRIAL FIBRILLATION (HCC): Chronic | Status: ACTIVE | Noted: 2023-10-03

## 2023-11-28 PROBLEM — K21.9 GASTROESOPHAGEAL REFLUX DISEASE WITHOUT ESOPHAGITIS: Chronic | Status: ACTIVE | Noted: 2022-08-22

## 2023-11-28 PROBLEM — E66.09 CLASS 1 OBESITY DUE TO EXCESS CALORIES WITH SERIOUS COMORBIDITY AND BODY MASS INDEX (BMI) OF 34.0 TO 34.9 IN ADULT: Chronic | Status: ACTIVE | Noted: 2023-11-28

## 2023-11-28 PROBLEM — I48.0 PAROXYSMAL ATRIAL FIBRILLATION (HCC): Chronic | Status: ACTIVE | Noted: 2019-12-10

## 2023-11-29 ENCOUNTER — TELEPHONE (OUTPATIENT)
Dept: SLEEP CENTER | Age: 73
End: 2023-11-29

## 2023-11-29 ENCOUNTER — NURSE ONLY (OUTPATIENT)
Dept: CARDIOLOGY CLINIC | Age: 73
End: 2023-11-29

## 2023-11-29 ENCOUNTER — OFFICE VISIT (OUTPATIENT)
Dept: CARDIOLOGY CLINIC | Age: 73
End: 2023-11-29
Payer: MEDICAID

## 2023-11-29 VITALS
HEART RATE: 82 BPM | BODY MASS INDEX: 34.15 KG/M2 | SYSTOLIC BLOOD PRESSURE: 100 MMHG | WEIGHT: 266 LBS | DIASTOLIC BLOOD PRESSURE: 60 MMHG

## 2023-11-29 DIAGNOSIS — Z95.810 ICD (IMPLANTABLE CARDIOVERTER-DEFIBRILLATOR) IN PLACE: ICD-10-CM

## 2023-11-29 DIAGNOSIS — I48.19 PERSISTENT ATRIAL FIBRILLATION (HCC): Primary | ICD-10-CM

## 2023-11-29 DIAGNOSIS — Z95.810 ICD (IMPLANTABLE CARDIOVERTER-DEFIBRILLATOR) IN PLACE: Primary | ICD-10-CM

## 2023-11-29 DIAGNOSIS — I25.5 ISCHEMIC CARDIOMYOPATHY: ICD-10-CM

## 2023-11-29 DIAGNOSIS — I47.29 NSVT (NONSUSTAINED VENTRICULAR TACHYCARDIA) (HCC): ICD-10-CM

## 2023-11-29 DIAGNOSIS — E78.2 MIXED HYPERLIPIDEMIA: ICD-10-CM

## 2023-11-29 DIAGNOSIS — I42.0 DILATED CARDIOMYOPATHY (HCC): ICD-10-CM

## 2023-11-29 DIAGNOSIS — I48.11 LONGSTANDING PERSISTENT ATRIAL FIBRILLATION (HCC): Chronic | ICD-10-CM

## 2023-11-29 DIAGNOSIS — I48.0 PAROXYSMAL ATRIAL FIBRILLATION (HCC): ICD-10-CM

## 2023-11-29 DIAGNOSIS — I25.10 CAD IN NATIVE ARTERY: ICD-10-CM

## 2023-11-29 DIAGNOSIS — I50.22 CHRONIC SYSTOLIC CONGESTIVE HEART FAILURE (HCC): ICD-10-CM

## 2023-11-29 DIAGNOSIS — I50.22 CHRONIC SYSTOLIC (CONGESTIVE) HEART FAILURE (HCC): Chronic | ICD-10-CM

## 2023-11-29 PROCEDURE — G8484 FLU IMMUNIZE NO ADMIN: HCPCS | Performed by: INTERNAL MEDICINE

## 2023-11-29 PROCEDURE — G8417 CALC BMI ABV UP PARAM F/U: HCPCS | Performed by: INTERNAL MEDICINE

## 2023-11-29 PROCEDURE — 99214 OFFICE O/P EST MOD 30 MIN: CPT | Performed by: INTERNAL MEDICINE

## 2023-11-29 PROCEDURE — 1123F ACP DISCUSS/DSCN MKR DOCD: CPT | Performed by: INTERNAL MEDICINE

## 2023-11-29 PROCEDURE — 3074F SYST BP LT 130 MM HG: CPT | Performed by: INTERNAL MEDICINE

## 2023-11-29 PROCEDURE — 93000 ELECTROCARDIOGRAM COMPLETE: CPT | Performed by: INTERNAL MEDICINE

## 2023-11-29 PROCEDURE — 3078F DIAST BP <80 MM HG: CPT | Performed by: INTERNAL MEDICINE

## 2023-11-29 PROCEDURE — G8427 DOCREV CUR MEDS BY ELIG CLIN: HCPCS | Performed by: INTERNAL MEDICINE

## 2023-11-29 PROCEDURE — 1036F TOBACCO NON-USER: CPT | Performed by: INTERNAL MEDICINE

## 2023-11-29 PROCEDURE — 3017F COLORECTAL CA SCREEN DOC REV: CPT | Performed by: INTERNAL MEDICINE

## 2023-12-04 ENCOUNTER — APPOINTMENT (OUTPATIENT)
Dept: GENERAL RADIOLOGY | Age: 73
End: 2023-12-04
Payer: MEDICAID

## 2023-12-04 ENCOUNTER — HOSPITAL ENCOUNTER (EMERGENCY)
Age: 73
Discharge: HOME | End: 2023-12-04
Attending: EMERGENCY MEDICINE
Payer: MEDICAID

## 2023-12-04 ENCOUNTER — OFFICE VISIT (OUTPATIENT)
Dept: INTERNAL MEDICINE CLINIC | Age: 73
End: 2023-12-04
Payer: MEDICAID

## 2023-12-04 VITALS
HEART RATE: 90 BPM | HEIGHT: 74 IN | WEIGHT: 274.03 LBS | TEMPERATURE: 97.6 F | OXYGEN SATURATION: 99 % | BODY MASS INDEX: 35.17 KG/M2 | SYSTOLIC BLOOD PRESSURE: 173 MMHG | RESPIRATION RATE: 16 BRPM | DIASTOLIC BLOOD PRESSURE: 81 MMHG

## 2023-12-04 VITALS — TEMPERATURE: 97.5 F

## 2023-12-04 DIAGNOSIS — L03.115 CELLULITIS OF RIGHT LOWER EXTREMITY: ICD-10-CM

## 2023-12-04 DIAGNOSIS — E11.621 DIABETIC ULCER OF TOE OF LEFT FOOT ASSOCIATED WITH TYPE 2 DIABETES MELLITUS, LIMITED TO BREAKDOWN OF SKIN (HCC): Primary | ICD-10-CM

## 2023-12-04 DIAGNOSIS — L97.511 FOOT ULCERATION, RIGHT, LIMITED TO BREAKDOWN OF SKIN (HCC): ICD-10-CM

## 2023-12-04 DIAGNOSIS — Z79.01 ANTICOAGULATED: ICD-10-CM

## 2023-12-04 DIAGNOSIS — L97.521 DIABETIC ULCER OF TOE OF LEFT FOOT ASSOCIATED WITH TYPE 2 DIABETES MELLITUS, LIMITED TO BREAKDOWN OF SKIN (HCC): Primary | ICD-10-CM

## 2023-12-04 DIAGNOSIS — I73.9 PVD (PERIPHERAL VASCULAR DISEASE) (HCC): ICD-10-CM

## 2023-12-04 DIAGNOSIS — E11.42 TYPE 2 DIABETES MELLITUS WITH PERIPHERAL NEUROPATHY (HCC): ICD-10-CM

## 2023-12-04 DIAGNOSIS — S91.109D OPEN TOE WOUND, SUBSEQUENT ENCOUNTER: Primary | ICD-10-CM

## 2023-12-04 DIAGNOSIS — R60.1 GENERALIZED EDEMA: ICD-10-CM

## 2023-12-04 DIAGNOSIS — R60.0 BILATERAL LOWER EXTREMITY EDEMA: ICD-10-CM

## 2023-12-04 DIAGNOSIS — Z91.199 NONCOMPLIANCE: ICD-10-CM

## 2023-12-04 LAB
ANION GAP SERPL CALCULATED.3IONS-SCNC: 11 MMOL/L (ref 3–16)
BASOPHILS # BLD: 0.1 K/UL (ref 0–0.2)
BASOPHILS NFR BLD: 0.7 %
BUN SERPL-MCNC: 24 MG/DL (ref 7–20)
CALCIUM SERPL-MCNC: 8.8 MG/DL (ref 8.3–10.6)
CHLORIDE SERPL-SCNC: 103 MMOL/L (ref 99–110)
CO2 SERPL-SCNC: 24 MMOL/L (ref 21–32)
CREAT SERPL-MCNC: 1.7 MG/DL (ref 0.8–1.3)
CRP SERPL-MCNC: 13.3 MG/L (ref 0–5.1)
DEPRECATED RDW RBC AUTO: 13.8 % (ref 12.4–15.4)
EOSINOPHIL # BLD: 0.3 K/UL (ref 0–0.6)
EOSINOPHIL NFR BLD: 4.5 %
ERYTHROCYTE [SEDIMENTATION RATE] IN BLOOD BY WESTERGREN METHOD: 31 MM/HR (ref 0–20)
GFR SERPLBLD CREATININE-BSD FMLA CKD-EPI: 42 ML/MIN/{1.73_M2}
GLUCOSE SERPL-MCNC: 178 MG/DL (ref 70–99)
HCT VFR BLD AUTO: 34.4 % (ref 40.5–52.5)
HGB BLD-MCNC: 12.3 G/DL (ref 13.5–17.5)
LYMPHOCYTES # BLD: 1 K/UL (ref 1–5.1)
LYMPHOCYTES NFR BLD: 13.1 %
MCH RBC QN AUTO: 32.9 PG (ref 26–34)
MCHC RBC AUTO-ENTMCNC: 35.8 G/DL (ref 31–36)
MCV RBC AUTO: 92.1 FL (ref 80–100)
MONOCYTES # BLD: 0.7 K/UL (ref 0–1.3)
MONOCYTES NFR BLD: 9.1 %
NEUTROPHILS # BLD: 5.3 K/UL (ref 1.7–7.7)
NEUTROPHILS NFR BLD: 72.6 %
PLATELET # BLD AUTO: 190 K/UL (ref 135–450)
PMV BLD AUTO: 7.9 FL (ref 5–10.5)
POTASSIUM SERPL-SCNC: 4.5 MMOL/L (ref 3.5–5.1)
RBC # BLD AUTO: 3.74 M/UL (ref 4.2–5.9)
SODIUM SERPL-SCNC: 138 MMOL/L (ref 136–145)
WBC # BLD AUTO: 7.3 K/UL (ref 4–11)

## 2023-12-04 PROCEDURE — 99284 EMERGENCY DEPT VISIT MOD MDM: CPT

## 2023-12-04 PROCEDURE — 99213 OFFICE O/P EST LOW 20 MIN: CPT

## 2023-12-04 PROCEDURE — 11042 DBRDMT SUBQ TIS 1ST 20SQCM/<: CPT

## 2023-12-04 PROCEDURE — 6370000000 HC RX 637 (ALT 250 FOR IP): Performed by: PHYSICIAN ASSISTANT

## 2023-12-04 PROCEDURE — 80048 BASIC METABOLIC PNL TOTAL CA: CPT

## 2023-12-04 PROCEDURE — 85652 RBC SED RATE AUTOMATED: CPT

## 2023-12-04 PROCEDURE — 86140 C-REACTIVE PROTEIN: CPT

## 2023-12-04 PROCEDURE — 73630 X-RAY EXAM OF FOOT: CPT

## 2023-12-04 PROCEDURE — 36415 COLL VENOUS BLD VENIPUNCTURE: CPT

## 2023-12-04 PROCEDURE — 85025 COMPLETE CBC W/AUTO DIFF WBC: CPT

## 2023-12-04 RX ORDER — AMOXICILLIN AND CLAVULANATE POTASSIUM 875; 125 MG/1; MG/1
1 TABLET, FILM COATED ORAL 2 TIMES DAILY
Qty: 13 TABLET | Refills: 0 | Status: SHIPPED | OUTPATIENT
Start: 2023-12-04 | End: 2023-12-11

## 2023-12-04 RX ORDER — AMOXICILLIN AND CLAVULANATE POTASSIUM 875; 125 MG/1; MG/1
1 TABLET, FILM COATED ORAL ONCE
Status: COMPLETED | OUTPATIENT
Start: 2023-12-04 | End: 2023-12-04

## 2023-12-04 RX ADMIN — AMOXICILLIN AND CLAVULANATE POTASSIUM 1 TABLET: 875; 125 TABLET, FILM COATED ORAL at 16:14

## 2023-12-04 ASSESSMENT — ENCOUNTER SYMPTOMS
NAUSEA: 0
VOMITING: 0
COLOR CHANGE: 1

## 2023-12-04 NOTE — ED TRIAGE NOTES
Coming from podiatry for infection in L foot. Denies pain but has hx diabetes and neuropathy. Was told to come here for atb.

## 2023-12-04 NOTE — PATIENT INSTRUCTIONS
Go immediately to [address] [place] [address] Emergency room   Report has been called . THEy  know you are coming

## 2023-12-04 NOTE — PROGRESS NOTES
Department of Podiatry  Resident Progress Note    Name: Sandra Becerra  Allergies: Codeine, Aspartame, and Phenylalanine    SUBJECTIVE  The patient is a 72 y.o. male who presents for a wound check. Patient notes that his dressing has fallen off and it does not stay on his foot well. He states that he has not been wearing his CAM boot because \"it is hard to put on\". He has noticed some increase in redness but does not endorse any pain due to peripheral neuropathy. Patient is noted to have dressing hanging off of his foot at the time of his appointment. Patient denies any other pedal complaints at this time. Denies F/C/N/V. Past Medical History:        Diagnosis Date    A-fib [facility]) 2020    CAD, multiple vessel 12/2019    Coronary artery disease involving coronary bypass graft     Diabetes mellitus ([address])     diet controlled     Enlarged prostate     Environmental allergies     Kidney stones     Pacemaker     Systolic CHF ([address])        REVIEW OF SYSTEMS:  A 12 point review of systems is unremarkable with the exception of the chief complaint. Patient specifically denies nausea, vomiting, fever, chills, shortness of breath, chest pain, abdominal pain, constipation, or difficulty urinating. OBJECTIVE  Patient presents ambulating with a surgical boot to his left foot with disheveled dressing on the left foot. Patient is AOx3 and NAD. VASCULAR: DP and PT pulses faintly palpable 1/4 B/L. CFT is brisk to the digits of the foot b/l. Skin temperature is warm to warm from proximal to distal with mild focal calor noted to the right LE. Erythema noted to surround the hallux and second digit of the left foot extending onto the dorsal midfoot. Moderate non-pitting edema appreciated b/l LE. No pain with calf compression. NEUROLOGIC: Gross and epicritic sensation is diminished b/l. Protective sensation is diminished at all pedal sites b/l. DERMATOLOGIC:   Diffuse dermatologic changes noted to b/l LE.  Skin is noted to

## 2023-12-04 NOTE — CONSULTS
Department of Podiatry Consult Note  Resident       Reason for Consult:  Infection Left 1st and 2nd toes  Requesting Physician:  Dr. George Gallo:  Left 1st and 2nd digit ulcerations    HISTORY OF PRESENT ILLNESS:    The patient is a 72 y.o. male with significant past medical history as listed below who is consulted to podiatry for left first and second digit ulcerations. Of note, patient is well-known to the podiatry service and follows with Dr. Lorie Ovalle and Dr. Deirdre Andujar in the outpatient [facility] podiatry clinic. Patient states that he follows with with the clinic weekly where they do serial debridements and apply dressings. He states during the last clinic appointment they gave him a cam boot and states that he took it off after 2 days due to it not being comfortable and was not able to put it back on. He states that he does not change his dressings at all. He denies any pain to his left lower extremity due to neuropathy. He denies any increase in drainage to his wounds. He denies any other pedal complaints at this time. He denies any nausea, vomiting, fever, chills, or shortness of breath.     Past Medical History:        Diagnosis Date    A-fib [facility]) 2020    CAD, multiple vessel 12/2019    Coronary artery disease involving coronary bypass graft     Diabetes mellitus ([address])     diet controlled     Enlarged prostate     Environmental allergies     Kidney stones     Pacemaker     Systolic CHF [facility])        Past Surgical History:        Procedure Laterality Date    CARDIAC CATHETERIZATION  12/10/2019    Dr. Miladis Cason - severe multi-vessel disease    CORONARY ARTERY BYPASS GRAFT N/A 03/02/2020    [facility]NE; TCPB; CABG x 3, LIMA to LAD w/ long segment endarterectomy (4 cm); bilateral pulmonary vein isolation; 40 mm Atriclip to L atril appendage; endoscopic L GSV harvest; ICNB x 5 levels bilat; sternal plate x 1 to manubrium performed by Keyur Hernandez MD at [address]  x2

## 2023-12-05 ENCOUNTER — TELEPHONE (OUTPATIENT)
Dept: INTERNAL MEDICINE CLINIC | Age: 73
End: 2023-12-05

## 2023-12-08 ENCOUNTER — OFFICE VISIT (OUTPATIENT)
Dept: INTERNAL MEDICINE CLINIC | Age: 73
End: 2023-12-08
Payer: MEDICAID

## 2023-12-08 VITALS — TEMPERATURE: 97.9 F

## 2023-12-08 DIAGNOSIS — E11.42 TYPE 2 DIABETES MELLITUS WITH PERIPHERAL NEUROPATHY (HCC): ICD-10-CM

## 2023-12-08 DIAGNOSIS — S91.109D OPEN TOE WOUND, SUBSEQUENT ENCOUNTER: Primary | ICD-10-CM

## 2023-12-08 DIAGNOSIS — R60.0 BILATERAL LOWER EXTREMITY EDEMA: ICD-10-CM

## 2023-12-08 DIAGNOSIS — Z91.199 NONCOMPLIANCE: ICD-10-CM

## 2023-12-08 PROCEDURE — 99213 OFFICE O/P EST LOW 20 MIN: CPT

## 2023-12-08 PROCEDURE — 11042 DBRDMT SUBQ TIS 1ST 20SQCM/<: CPT

## 2023-12-08 NOTE — PROGRESS NOTES
onto the dorsal midfoot. Moderate non-pitting edema appreciated b/l LE. No pain with calf compression. NEUROLOGIC: Gross and epicritic sensation is diminished b/l. Protective sensation is diminished at all pedal sites b/l. DERMATOLOGIC:   Diffuse dermatologic changes noted to b/l LE. Skin is noted to be taut and discolored 2/2 venous insufficiency. Patient provided verbal consent for photos to be taken today:    Left lower extremity:    Full thickness ulceration noted to the distal medial hallux. Wound bed is fibrotic in nature with a macerated and erythematous periwound. Fluid filled blistering noted to the dorsal and plantar aspect of the hallux. Purulence noted with yellow drainage present. Mild fluctuance noted to the proximal nail bed. No crepitations noted. Mild mal odor noted. Wound does not probe to bone, tunnel, or track. Dried sanguinous crust noted subungual.    Full thickness ulceration noted to the distal 2nd digit. Wound bed is fibrotic in nature with a macerated periwound. Fluid filled blistering noted to the dorsal aspect of the digit just proximal to the nail bed. No evidence purulence, fluctuance, or crepitations with yellow drainage noted. Mild malodor noted. Wound does not probe to bone, tunnel, or track. Right lower extremity:  There is a partial-thickness ulceration noted to the distal tuft of the hallux. Wound bed is granular in nature with an overlying layer of desquamated epidermis. No evidence of purulence, fluctuance, or crepitations. Wound does not probe to bone, tunnel, or track. MUSCULOSKELETAL: Muscle strength is 5/5 for all pedal groups tested. No tenderness with palpation of the foot or ankle b/l. Ankle joint ROM is decreased in dorsiflexion with the knee extended. No obvious biomechanical abnormalities.     IMAGING:  VL reflux venous insufficiency study bilateral(07/05)  Impressions  Right Impression  Technically difficult study due to patient movement, involuntary

## 2023-12-11 ENCOUNTER — OFFICE VISIT (OUTPATIENT)
Dept: INTERNAL MEDICINE CLINIC | Age: 73
End: 2023-12-11
Payer: MEDICAID

## 2023-12-11 VITALS — TEMPERATURE: 97.3 F

## 2023-12-11 DIAGNOSIS — R60.0 BILATERAL LOWER EXTREMITY EDEMA: ICD-10-CM

## 2023-12-11 DIAGNOSIS — I73.9 PVD (PERIPHERAL VASCULAR DISEASE) (HCC): ICD-10-CM

## 2023-12-11 DIAGNOSIS — E11.42 TYPE 2 DIABETES MELLITUS WITH PERIPHERAL NEUROPATHY (HCC): ICD-10-CM

## 2023-12-11 DIAGNOSIS — Z91.199 NONCOMPLIANCE: ICD-10-CM

## 2023-12-11 DIAGNOSIS — S91.109D OPEN TOE WOUND, SUBSEQUENT ENCOUNTER: Primary | ICD-10-CM

## 2023-12-11 PROCEDURE — 11042 DBRDMT SUBQ TIS 1ST 20SQCM/<: CPT

## 2023-12-11 PROCEDURE — 99213 OFFICE O/P EST LOW 20 MIN: CPT

## 2023-12-11 NOTE — PROGRESS NOTES
Department of Podiatry  Resident Progress Note    Name: Adelia Owens  Allergies: Codeine, Aspartame, and Phenylalanine    SUBJECTIVE  The patient is a 72 y.o. male who presents for a wound check. Patient states that he has left the dressing clean, dry, and intact since his last visit. He states that he has a hard time wearing the CAM boot so he has switched to wearing the surgical shoe he was given in the hospital. Patient admits to finishing all of his antibiotics. Patient denies any pain in his feet. Patient denies any other pedal complaints at this time. Denies F/C/N/V. Past Medical History:        Diagnosis Date    A-fib [facility]) 2020    CAD, multiple vessel 12/2019    Coronary artery disease involving coronary bypass graft     Diabetes mellitus ([address])     diet controlled     Enlarged prostate     Environmental allergies     Kidney stones     Pacemaker     Systolic CHF ([address])        REVIEW OF SYSTEMS:  A 12 point review of systems is unremarkable with the exception of the chief complaint. Patient specifically denies nausea, vomiting, fever, chills, shortness of breath, chest pain, abdominal pain, constipation, or difficulty urinating. OBJECTIVE  [facility] dressing on the left foot. Patient is AOx3 and NAD. VASCULAR: DP and PT pulses faintly palpable 1/4 B/L. CFT is brisk to the digits of the foot b/l. Skin temperature is warm to warm from proximal to distal with mild focal calor noted to the right LE. Erythema noted to surround the hallux and second digit of the left foot extending onto the dorsal midfoot. Moderate non-pitting edema appreciated b/l LE. No pain with calf compression. NEUROLOGIC: Gross and epicritic sensation is diminished b/l. Protective sensation is diminished at all pedal sites b/l. DERMATOLOGIC:   Diffuse dermatologic changes noted to b/l LE. Skin is noted to be taut and discolored 2/2 venous insufficiency.    Patient provided verbal consent for

## 2024-01-24 ENCOUNTER — OFFICE VISIT (OUTPATIENT)
Dept: CARDIOLOGY CLINIC | Age: 74
End: 2024-01-24
Payer: MEDICAID

## 2024-01-24 VITALS
SYSTOLIC BLOOD PRESSURE: 132 MMHG | DIASTOLIC BLOOD PRESSURE: 66 MMHG | HEART RATE: 68 BPM | WEIGHT: 271.2 LBS | BODY MASS INDEX: 34.82 KG/M2

## 2024-01-24 DIAGNOSIS — I48.19 PERSISTENT ATRIAL FIBRILLATION (HCC): ICD-10-CM

## 2024-01-24 DIAGNOSIS — Z95.1 HX OF CABG: ICD-10-CM

## 2024-01-24 DIAGNOSIS — Z86.79 S/P ABLATION OF ATRIAL FIBRILLATION: ICD-10-CM

## 2024-01-24 DIAGNOSIS — I25.10 CAD IN NATIVE ARTERY: ICD-10-CM

## 2024-01-24 DIAGNOSIS — I50.22 CHRONIC SYSTOLIC CONGESTIVE HEART FAILURE (HCC): Primary | ICD-10-CM

## 2024-01-24 DIAGNOSIS — I25.5 ISCHEMIC CARDIOMYOPATHY: ICD-10-CM

## 2024-01-24 DIAGNOSIS — Z98.890 S/P ABLATION OF ATRIAL FIBRILLATION: ICD-10-CM

## 2024-01-24 DIAGNOSIS — Z95.810 ICD (IMPLANTABLE CARDIOVERTER-DEFIBRILLATOR) IN PLACE: ICD-10-CM

## 2024-01-24 PROCEDURE — 99214 OFFICE O/P EST MOD 30 MIN: CPT | Performed by: INTERNAL MEDICINE

## 2024-01-24 PROCEDURE — 1123F ACP DISCUSS/DSCN MKR DOCD: CPT | Performed by: INTERNAL MEDICINE

## 2024-01-24 PROCEDURE — 1036F TOBACCO NON-USER: CPT | Performed by: INTERNAL MEDICINE

## 2024-01-24 PROCEDURE — 3078F DIAST BP <80 MM HG: CPT | Performed by: INTERNAL MEDICINE

## 2024-01-24 PROCEDURE — 3017F COLORECTAL CA SCREEN DOC REV: CPT | Performed by: INTERNAL MEDICINE

## 2024-01-24 PROCEDURE — G8484 FLU IMMUNIZE NO ADMIN: HCPCS | Performed by: INTERNAL MEDICINE

## 2024-01-24 PROCEDURE — 3075F SYST BP GE 130 - 139MM HG: CPT | Performed by: INTERNAL MEDICINE

## 2024-01-24 PROCEDURE — G8427 DOCREV CUR MEDS BY ELIG CLIN: HCPCS | Performed by: INTERNAL MEDICINE

## 2024-01-24 PROCEDURE — G8417 CALC BMI ABV UP PARAM F/U: HCPCS | Performed by: INTERNAL MEDICINE

## 2024-01-24 RX ORDER — BENZONATATE 200 MG/1
CAPSULE ORAL PRN
COMMUNITY

## 2024-01-24 ASSESSMENT — ENCOUNTER SYMPTOMS
CHOKING: 0
APNEA: 0
ABDOMINAL DISTENTION: 0
COUGH: 0
CHEST TIGHTNESS: 0
SHORTNESS OF BREATH: 0

## 2024-01-24 NOTE — PROGRESS NOTES
Subjective:      Patient ID: Veronica Chaidez is a 73 y.o. male.      Follow up CAD/CABG/afib/cardiomyop/abn ekg. Cough since ablation. Saw PCP. Swelling unchanged.   No chest pain.  No tachycardia/syncope.  Chronic LE edema.   No pnd or orthopnea. Wt down. Feeling good.  Taking Eliquis.  No bleeding issues.  Rhythm stable.     Past Medical History:   Diagnosis Date    A-fib ([facility]) 2020    CAD, multiple vessel 12/2019    Coronary artery disease involving coronary bypass graft     Diabetes mellitus ([facility])     diet controlled     Enlarged prostate     Environmental allergies     Kidney stones     Pacemaker     Systolic CHF ([facility])      Past Surgical History:   Procedure Laterality Date    CARDIAC CATHETERIZATION  12/10/2019    Dr. Shaver - severe multi-vessel disease    CORONARY ARTERY BYPASS GRAFT N/A 03/02/2020    WAYNE; TCPB; CABG x 3, LIMA to LAD w/ long segment endarterectomy (4 cm); bilateral pulmonary vein isolation; 40 mm Atriclip to L atril appendage; endoscopic L GSV harvest; ICNB x 5 levels bilat; sternal plate x 1 to manubrium performed by Sohail Rhoades MD at [facility] OR    NASAL POLYP SURGERY  x2    NASAL SEPTUM SURGERY       Social History     Socioeconomic History    Marital status: Single     Spouse name: Not on file    Number of children: Not on file    Years of education: Not on file    Highest education level: Not on file   Occupational History    Not on file   Tobacco Use    Smoking status: Never    Smokeless tobacco: Never   Substance and Sexual Activity    Alcohol use: Not Currently     Comment: rare    Drug use: Never    Sexual activity: Not on file   Other Topics Concern    Not on file   Social History Narrative    Not on file     Social Determinants of Health     Financial Resource Strain: Low Risk  (2/16/2023)    Overall Financial Resource Strain (CARDIA)     Difficulty of Paying Living Expenses: Not very hard   Food Insecurity: Not on file (2/16/2023)   Transportation Needs: Unknown (2/16/2023)

## 2024-04-12 ENCOUNTER — OFFICE VISIT (OUTPATIENT)
Dept: INTERNAL MEDICINE CLINIC | Age: 74
End: 2024-04-12
Payer: MEDICAID

## 2024-04-12 ENCOUNTER — HOSPITAL ENCOUNTER (OUTPATIENT)
Dept: GENERAL RADIOLOGY | Age: 74
Discharge: HOME | End: 2024-04-12
Payer: MEDICAID

## 2024-04-12 DIAGNOSIS — Z91.199 NONCOMPLIANCE: ICD-10-CM

## 2024-04-12 DIAGNOSIS — E11.42 TYPE 2 DIABETES MELLITUS WITH PERIPHERAL NEUROPATHY (HCC): ICD-10-CM

## 2024-04-12 DIAGNOSIS — M86.272 SUBACUTE OSTEOMYELITIS OF LEFT FOOT (HCC): Primary | ICD-10-CM

## 2024-04-12 DIAGNOSIS — S91.109D OPEN TOE WOUND, SUBSEQUENT ENCOUNTER: ICD-10-CM

## 2024-04-12 DIAGNOSIS — I73.9 PVD (PERIPHERAL VASCULAR DISEASE) (HCC): ICD-10-CM

## 2024-04-12 DIAGNOSIS — M86.272 SUBACUTE OSTEOMYELITIS OF LEFT FOOT (HCC): ICD-10-CM

## 2024-04-12 DIAGNOSIS — R60.0 BILATERAL LOWER EXTREMITY EDEMA: ICD-10-CM

## 2024-04-12 PROCEDURE — 73630 X-RAY EXAM OF FOOT: CPT

## 2024-04-12 PROCEDURE — 99213 OFFICE O/P EST LOW 20 MIN: CPT

## 2024-04-12 PROCEDURE — 11042 DBRDMT SUBQ TIS 1ST 20SQCM/<: CPT

## 2024-04-12 RX ORDER — DOXYCYCLINE HYCLATE 100 MG
100 TABLET ORAL 2 TIMES DAILY
Qty: 20 TABLET | Refills: 0 | Status: ON HOLD | OUTPATIENT
Start: 2024-04-12 | End: 2024-04-22

## 2024-04-12 SDOH — ECONOMIC STABILITY: FOOD INSECURITY: WITHIN THE PAST 12 MONTHS, YOU WORRIED THAT YOUR FOOD WOULD RUN OUT BEFORE YOU GOT MONEY TO BUY MORE.: SOMETIMES TRUE

## 2024-04-12 SDOH — ECONOMIC STABILITY: INCOME INSECURITY: HOW HARD IS IT FOR YOU TO PAY FOR THE VERY BASICS LIKE FOOD, HOUSING, MEDICAL CARE, AND HEATING?: SOMEWHAT HARD

## 2024-04-12 SDOH — ECONOMIC STABILITY: FOOD INSECURITY: WITHIN THE PAST 12 MONTHS, THE FOOD YOU BOUGHT JUST DIDN'T LAST AND YOU DIDN'T HAVE MONEY TO GET MORE.: SOMETIMES TRUE

## 2024-04-12 SDOH — ECONOMIC STABILITY: TRANSPORTATION INSECURITY
IN THE PAST 12 MONTHS, HAS LACK OF TRANSPORTATION KEPT YOU FROM MEETINGS, WORK, OR FROM GETTING THINGS NEEDED FOR DAILY LIVING?: YES

## 2024-04-12 ASSESSMENT — PATIENT HEALTH QUESTIONNAIRE - PHQ9
SUM OF ALL RESPONSES TO PHQ9 QUESTIONS 1 & 2: 0
SUM OF ALL RESPONSES TO PHQ9 QUESTIONS 1 & 2: 0
1. LITTLE INTEREST OR PLEASURE IN DOING THINGS: NOT AT ALL
SUM OF ALL RESPONSES TO PHQ QUESTIONS 1-9: 0
1. LITTLE INTEREST OR PLEASURE IN DOING THINGS: NOT AT ALL
2. FEELING DOWN, DEPRESSED OR HOPELESS: NOT AT ALL
2. FEELING DOWN, DEPRESSED OR HOPELESS: NOT AT ALL

## 2024-04-12 NOTE — PROGRESS NOTES
Department of Podiatry  Resident Progress Note    Veronica Chaidez  Allergies: Aspartame and phenylalanine, Codeine, and Msg    SUBJECTIVE  The patient is a 73 y.o. male who presents to clinic today for evaluation of wounds on his left foot.  We have been following patient in clinic for similar wounds however patient was lost to follow-up.  Patient states that he has not been dressing the wounds on his first and second toes on his right foot and has instead been wearing slide on's around his RV and will often go barefoot outside AGAINST MEDICAL ADVICE.  Patient comes to clinic today with complaints of swollen first and second toes.  He states that they have been grossly swollen over the past 2 weeks and have been getting slightly worse.  Equally patient states that his bilateral legs have been increasing in size due to swelling he thinks because he has not been taking his diuretic as prescribed. Patient denies any other pedal complaints at this time. Denies F/C/N/V.      Past Medical History:        Diagnosis Date    A-fib ([facility]) 2020    CAD, multiple vessel 12/2019    Coronary artery disease involving coronary bypass graft     Diabetes mellitus ([facility])     diet controlled     Enlarged prostate     Environmental allergies     Kidney stones     Pacemaker     Systolic CHF ([facility])        REVIEW OF SYSTEMS:  Review of Systems: Pertinent positive and negative findings as documented in the HPI, otherwise all other systems were reviewed and were negative.       OBJECTIVE    Patient presents today unassisted and ambulating in slight onset.  Patient is AAOx3 and NAD.    VASCULAR: DP and PT pulses are palpable 2/4 b/l. CFT is brisk to the digits of the foot b/l. Skin temperature is warm to cool from proximal to distal with focal calor noted to the distal left forefoot. No edema noted. No pain with calf compression b/l.    NEUROLOGIC: Gross and epicritic sensation is diminished b/l. Protective sensation is diminished at all pedal

## 2024-04-12 NOTE — PATIENT INSTRUCTIONS
Leave dressing clean, dry and intact.  Get xray before next visit   Return in 1 week  Get doxycycline filled and start today

## 2024-04-19 ENCOUNTER — HOSPITAL ENCOUNTER (INPATIENT)
Age: 74
LOS: 7 days | Discharge: NURSING, REHAB OR LONG-TERM CARE | DRG: 305 | End: 2024-04-26
Attending: STUDENT IN AN ORGANIZED HEALTH CARE EDUCATION/TRAINING PROGRAM | Admitting: INTERNAL MEDICINE
Payer: MEDICAID

## 2024-04-19 ENCOUNTER — OFFICE VISIT (OUTPATIENT)
Dept: INTERNAL MEDICINE CLINIC | Age: 74
End: 2024-04-19
Payer: MEDICAID

## 2024-04-19 DIAGNOSIS — S91.109D OPEN TOE WOUND, SUBSEQUENT ENCOUNTER: Primary | ICD-10-CM

## 2024-04-19 DIAGNOSIS — R60.0 BILATERAL LOWER EXTREMITY EDEMA: ICD-10-CM

## 2024-04-19 DIAGNOSIS — Z91.199 NONCOMPLIANCE: ICD-10-CM

## 2024-04-19 DIAGNOSIS — L08.9 DIABETIC FOOT INFECTION (HCC): ICD-10-CM

## 2024-04-19 DIAGNOSIS — E11.628 DIABETIC FOOT INFECTION (HCC): ICD-10-CM

## 2024-04-19 DIAGNOSIS — I73.9 PVD (PERIPHERAL VASCULAR DISEASE) (HCC): ICD-10-CM

## 2024-04-19 DIAGNOSIS — M86.272 SUBACUTE OSTEOMYELITIS OF LEFT FOOT (HCC): ICD-10-CM

## 2024-04-19 DIAGNOSIS — E11.42 TYPE 2 DIABETES MELLITUS WITH PERIPHERAL NEUROPATHY (HCC): ICD-10-CM

## 2024-04-19 DIAGNOSIS — M86.9 OSTEOMYELITIS OF LEFT FOOT, UNSPECIFIED TYPE (HCC): Primary | ICD-10-CM

## 2024-04-19 LAB
ALBUMIN SERPL-MCNC: 3.6 G/DL (ref 3.4–5)
ALBUMIN/GLOB SERPL: 0.7 {RATIO} (ref 1.1–2.2)
ALP SERPL-CCNC: 82 U/L (ref 40–129)
ALT SERPL-CCNC: 8 U/L (ref 10–40)
ANION GAP SERPL CALCULATED.3IONS-SCNC: 13 MMOL/L (ref 3–16)
AST SERPL-CCNC: 15 U/L (ref 15–37)
BASOPHILS # BLD: 0.1 K/UL (ref 0–0.2)
BASOPHILS NFR BLD: 0.7 %
BILIRUB SERPL-MCNC: 0.5 MG/DL (ref 0–1)
BUN SERPL-MCNC: 49 MG/DL (ref 7–20)
CALCIUM SERPL-MCNC: 9.2 MG/DL (ref 8.3–10.6)
CHLORIDE SERPL-SCNC: 93 MMOL/L (ref 99–110)
CO2 SERPL-SCNC: 25 MMOL/L (ref 21–32)
CREAT SERPL-MCNC: 1.8 MG/DL (ref 0.8–1.3)
CRP SERPL-MCNC: <3 MG/L (ref 0–5.1)
DEPRECATED RDW RBC AUTO: 15.3 % (ref 12.4–15.4)
EOSINOPHIL # BLD: 0.2 K/UL (ref 0–0.6)
EOSINOPHIL NFR BLD: 2.7 %
ERYTHROCYTE [SEDIMENTATION RATE] IN BLOOD BY WESTERGREN METHOD: 98 MM/HR (ref 0–20)
GFR SERPLBLD CREATININE-BSD FMLA CKD-EPI: 40 ML/MIN/{1.73_M2}
GLUCOSE BLD-MCNC: 121 MG/DL (ref 70–99)
GLUCOSE BLD-MCNC: 152 MG/DL (ref 70–99)
GLUCOSE SERPL-MCNC: 173 MG/DL (ref 70–99)
HCT VFR BLD AUTO: 37.8 % (ref 40.5–52.5)
HGB BLD-MCNC: 12.4 G/DL (ref 13.5–17.5)
LYMPHOCYTES # BLD: 1 K/UL (ref 1–5.1)
LYMPHOCYTES NFR BLD: 12.8 %
MCH RBC QN AUTO: 28 PG (ref 26–34)
MCHC RBC AUTO-ENTMCNC: 32.9 G/DL (ref 31–36)
MCV RBC AUTO: 85.2 FL (ref 80–100)
MONOCYTES # BLD: 0.6 K/UL (ref 0–1.3)
MONOCYTES NFR BLD: 7.9 %
NEUTROPHILS # BLD: 5.8 K/UL (ref 1.7–7.7)
NEUTROPHILS NFR BLD: 75.9 %
PERFORMED ON: ABNORMAL
PERFORMED ON: ABNORMAL
PLATELET # BLD AUTO: 237 K/UL (ref 135–450)
PMV BLD AUTO: 8.3 FL (ref 5–10.5)
POTASSIUM SERPL-SCNC: 4 MMOL/L (ref 3.5–5.1)
PROT SERPL-MCNC: 9 G/DL (ref 6.4–8.2)
RBC # BLD AUTO: 4.43 M/UL (ref 4.2–5.9)
SODIUM SERPL-SCNC: 131 MMOL/L (ref 136–145)
WBC # BLD AUTO: 7.7 K/UL (ref 4–11)

## 2024-04-19 PROCEDURE — 1200000000 HC SEMI PRIVATE

## 2024-04-19 PROCEDURE — 80053 COMPREHEN METABOLIC PANEL: CPT

## 2024-04-19 PROCEDURE — 6360000002 HC RX W HCPCS: Performed by: NURSE PRACTITIONER

## 2024-04-19 PROCEDURE — 2580000003 HC RX 258: Performed by: INTERNAL MEDICINE

## 2024-04-19 PROCEDURE — 2580000003 HC RX 258: Performed by: NURSE PRACTITIONER

## 2024-04-19 PROCEDURE — 2580000003 HC RX 258: Performed by: STUDENT IN AN ORGANIZED HEALTH CARE EDUCATION/TRAINING PROGRAM

## 2024-04-19 PROCEDURE — 99213 OFFICE O/P EST LOW 20 MIN: CPT

## 2024-04-19 PROCEDURE — 86140 C-REACTIVE PROTEIN: CPT

## 2024-04-19 PROCEDURE — 96365 THER/PROPH/DIAG IV INF INIT: CPT

## 2024-04-19 PROCEDURE — 99285 EMERGENCY DEPT VISIT HI MDM: CPT

## 2024-04-19 PROCEDURE — 85652 RBC SED RATE AUTOMATED: CPT

## 2024-04-19 PROCEDURE — 6360000002 HC RX W HCPCS: Performed by: STUDENT IN AN ORGANIZED HEALTH CARE EDUCATION/TRAINING PROGRAM

## 2024-04-19 PROCEDURE — 6370000000 HC RX 637 (ALT 250 FOR IP): Performed by: INTERNAL MEDICINE

## 2024-04-19 PROCEDURE — 85025 COMPLETE CBC W/AUTO DIFF WBC: CPT

## 2024-04-19 RX ORDER — POTASSIUM CHLORIDE 7.45 MG/ML
10 INJECTION INTRAVENOUS PRN
Status: DISCONTINUED | OUTPATIENT
Start: 2024-04-19 | End: 2024-04-26 | Stop reason: HOSPADM

## 2024-04-19 RX ORDER — POTASSIUM CHLORIDE 750 MG/1
10 TABLET, EXTENDED RELEASE ORAL 2 TIMES DAILY
Status: DISCONTINUED | OUTPATIENT
Start: 2024-04-19 | End: 2024-04-26 | Stop reason: HOSPADM

## 2024-04-19 RX ORDER — AMIODARONE HYDROCHLORIDE 200 MG/1
200 TABLET ORAL DAILY
Status: DISCONTINUED | OUTPATIENT
Start: 2024-04-19 | End: 2024-04-26 | Stop reason: HOSPADM

## 2024-04-19 RX ORDER — POTASSIUM CHLORIDE 20 MEQ/1
40 TABLET, EXTENDED RELEASE ORAL PRN
Status: DISCONTINUED | OUTPATIENT
Start: 2024-04-19 | End: 2024-04-26 | Stop reason: HOSPADM

## 2024-04-19 RX ORDER — ACETAMINOPHEN 325 MG/1
650 TABLET ORAL EVERY 6 HOURS PRN
Status: DISCONTINUED | OUTPATIENT
Start: 2024-04-19 | End: 2024-04-26 | Stop reason: HOSPADM

## 2024-04-19 RX ORDER — MAGNESIUM SULFATE IN WATER 40 MG/ML
2000 INJECTION, SOLUTION INTRAVENOUS PRN
Status: DISCONTINUED | OUTPATIENT
Start: 2024-04-19 | End: 2024-04-26 | Stop reason: HOSPADM

## 2024-04-19 RX ORDER — INSULIN LISPRO 100 [IU]/ML
0-4 INJECTION, SOLUTION INTRAVENOUS; SUBCUTANEOUS NIGHTLY
Status: DISCONTINUED | OUTPATIENT
Start: 2024-04-19 | End: 2024-04-26 | Stop reason: HOSPADM

## 2024-04-19 RX ORDER — SODIUM CHLORIDE 0.9 % (FLUSH) 0.9 %
5-40 SYRINGE (ML) INJECTION PRN
Status: DISCONTINUED | OUTPATIENT
Start: 2024-04-19 | End: 2024-04-26 | Stop reason: HOSPADM

## 2024-04-19 RX ORDER — SODIUM CHLORIDE 9 MG/ML
INJECTION, SOLUTION INTRAVENOUS PRN
Status: DISCONTINUED | OUTPATIENT
Start: 2024-04-19 | End: 2024-04-26 | Stop reason: HOSPADM

## 2024-04-19 RX ORDER — FUROSEMIDE 80 MG
80 TABLET ORAL 2 TIMES DAILY
Status: DISCONTINUED | OUTPATIENT
Start: 2024-04-19 | End: 2024-04-26 | Stop reason: HOSPADM

## 2024-04-19 RX ORDER — INSULIN LISPRO 100 [IU]/ML
0-4 INJECTION, SOLUTION INTRAVENOUS; SUBCUTANEOUS
Status: DISCONTINUED | OUTPATIENT
Start: 2024-04-19 | End: 2024-04-26 | Stop reason: HOSPADM

## 2024-04-19 RX ORDER — PANTOPRAZOLE SODIUM 40 MG/1
40 TABLET, DELAYED RELEASE ORAL
Status: DISCONTINUED | OUTPATIENT
Start: 2024-04-20 | End: 2024-04-26 | Stop reason: HOSPADM

## 2024-04-19 RX ORDER — ACETAMINOPHEN 650 MG/1
650 SUPPOSITORY RECTAL EVERY 6 HOURS PRN
Status: DISCONTINUED | OUTPATIENT
Start: 2024-04-19 | End: 2024-04-26 | Stop reason: HOSPADM

## 2024-04-19 RX ORDER — POLYETHYLENE GLYCOL 3350 17 G/17G
17 POWDER, FOR SOLUTION ORAL DAILY PRN
Status: DISCONTINUED | OUTPATIENT
Start: 2024-04-19 | End: 2024-04-26 | Stop reason: HOSPADM

## 2024-04-19 RX ORDER — SODIUM CHLORIDE 0.9 % (FLUSH) 0.9 %
5-40 SYRINGE (ML) INJECTION EVERY 12 HOURS SCHEDULED
Status: DISCONTINUED | OUTPATIENT
Start: 2024-04-19 | End: 2024-04-26 | Stop reason: HOSPADM

## 2024-04-19 RX ORDER — ALBUTEROL SULFATE 2.5 MG/3ML
2.5 SOLUTION RESPIRATORY (INHALATION) EVERY 4 HOURS PRN
Status: DISCONTINUED | OUTPATIENT
Start: 2024-04-19 | End: 2024-04-26 | Stop reason: HOSPADM

## 2024-04-19 RX ADMIN — SODIUM CHLORIDE, PRESERVATIVE FREE 10 ML: 5 INJECTION INTRAVENOUS at 22:29

## 2024-04-19 RX ADMIN — APIXABAN 5 MG: 5 TABLET, FILM COATED ORAL at 22:24

## 2024-04-19 RX ADMIN — VANCOMYCIN HYDROCHLORIDE 2500 MG: 500 INJECTION, POWDER, LYOPHILIZED, FOR SOLUTION INTRAVENOUS at 16:12

## 2024-04-19 RX ADMIN — PIPERACILLIN AND TAZOBACTAM 3375 MG: 3; .375 INJECTION, POWDER, LYOPHILIZED, FOR SOLUTION INTRAVENOUS at 15:40

## 2024-04-19 RX ADMIN — POTASSIUM CHLORIDE 10 MEQ: 750 TABLET, EXTENDED RELEASE ORAL at 22:26

## 2024-04-19 RX ADMIN — FUROSEMIDE 80 MG: 80 TABLET ORAL at 18:45

## 2024-04-19 RX ADMIN — METOPROLOL TARTRATE 50 MG: 50 TABLET, FILM COATED ORAL at 22:23

## 2024-04-19 RX ADMIN — AMIODARONE HYDROCHLORIDE 200 MG: 200 TABLET ORAL at 18:45

## 2024-04-19 ASSESSMENT — LIFESTYLE VARIABLES
HOW OFTEN DO YOU HAVE A DRINK CONTAINING ALCOHOL: NEVER
HOW MANY STANDARD DRINKS CONTAINING ALCOHOL DO YOU HAVE ON A TYPICAL DAY: PATIENT DOES NOT DRINK

## 2024-04-19 ASSESSMENT — PAIN - FUNCTIONAL ASSESSMENT: PAIN_FUNCTIONAL_ASSESSMENT: NONE - DENIES PAIN

## 2024-04-19 NOTE — H&P
Hospital Medicine History & Physical      Date of Admission: 4/19/2024    Date of Service:  Pt seen/examined on 4/19/2024    [x]Admitted to Inpatient with expected LOS greater than two midnights due to medical therapy.  []Placed in Observation status.    Chief Admission Complaint: Osteomyelitis left second toe    Presenting Admission History:      73 y.o. male who presented to [facility] with infection of left second toe from podiatry office.  PMHx significant for diabetes mellitus type 2 diet-controlled, A-fib on Eliquis, hypertension, chronic kidney disease stage III, diabetic neuropathy, CAD, pacemaker, systolic congestive heart failure chronic.  Patient presented to the emergency room after a visit to primary podiatrist today for a follow-up on and ulcer on his left foot, patient is known to be diabetic and used to be on insulin but stopped that and has been only trying to control his glucose with diet, he had an ulcer on his left foot but has not been having any pain due to having diabetic neuropathy denies any fever or chills, had a regular follow-up with podiatry today and was found to have an osteomyelitis of 2 toes and was sent to the hospital for IV antibiotic and possible amputation early next week.  He denied any having any chest pain no shortness of breath no nausea no vomiting no urinary symptoms but has been complaining of some constipation, patient lives by himself at home, has chronic edema lower extremity but noticed increased erythema and warmness of the left foot left lower leg.    Vital signs were within normal limit, labs showed sodium of 131, creatinine of 1.8 which is around his baseline glucose 173, white blood cells of 7.7, x-ray of left foot which was done on 4/12/2024 showed osteolysis involving the first proximal and distal phalanx and second distal phalanx suspicious for osteomyelitis with diffuse soft tissue edema.    Assessment/Plan:      Current Principal Problem:   morning and 1 tablet in the evening. 6/8/23   Aline Macdonald APRN - CNP   potassium chloride (KLOR-CON M) 10 MEQ extended release tablet Take 1 tablet by mouth 2 times daily 6/8/23   Aline Macdonald APRN - CNP   apixaban (ELIQUIS) 5 MG TABS tablet Take 1 tablet by mouth 2 times daily 6/8/23   Aline Macdonald APRN - CNP   metoprolol tartrate (LOPRESSOR) 50 MG tablet TAKE 1 TABLET BY MOUTH TWICE DAILY 5/5/23   Corrine Larios APRN - CNP   EPINEPHrine (PRIMATENE MIST) 0.125 MG/ACT AERO Inhale 2 puffs into the lungs once    Provider, Historical, MD   Handicap Placard MISC by Does not apply route Pt cannot walk more that 250 feet without becoming SOB.  Dx: CAD, Cardiomyopathy, Atrial Fibrilation    Not to exceed 5 years  Patient taking differently: by Does not apply route Pt cannot walk more that 250 feet without becoming SOB.  Dx: CAD, Cardiomyopathy, Atrial Fibrilation    Not to exceed 5 years 1/28/20   Choco Brody MD       Labs: Personally reviewed and interpreted for clinical significance.   Recent Labs     04/19/24  1536   WBC 7.7   HGB 12.4*   HCT 37.8*        Recent Labs     04/19/24  1536   *   K 4.0   CL 93*   CO2 25   BUN 49*   CREATININE 1.8*   CALCIUM 9.2       Recent Labs     04/19/24  1536   AST 15   ALT 8*   BILITOT 0.5   ALKPHOS 82     No results for input(s): \"INR\", \"LACTA\", \"TSH\" in the last 72 hours.     Nay Lindquist MD

## 2024-04-19 NOTE — PROGRESS NOTES
4 Eyes Skin Assessment     NAME:  Veronica Chaidez  YOB: 1950  MEDICAL RECORD NUMBER:  [id number]    The patient is being assessed for  Admission    I agree that at least one RN has performed a thorough Head to Toe Skin Assessment on the patient. ALL assessment sites listed below have been assessed.      Areas assessed by both nurses:    Head, Face, Ears, Shoulders, Back, Chest, Arms, Elbows, Hands, Sacrum. Buttock, Coccyx, Ischium, Legs. Feet and Heels, and Under Medical Devices         Does the Patient have a Wound? Yes wound(s) were present on assessment. LDA wound assessment was Initiated and completed by RN     Diabetic wounds on L toes  Redness, scattered abrasions BLE  Scattered scarring    Damaso Prevention initiated by RN: No  Wound Care Orders initiated by RN: No    Pressure Injury (Stage 3,4, Unstageable, DTI, NWPT, and Complex wounds) if present, place Wound referral order by RN under : No    New Ostomies, if present place, Ostomy referral order under : No     Nurse 1 eSignature: Electronically signed by Katherine Douglas RN on 4/19/24 at 7:51 PM EDT    **SHARE this note so that the co-signing nurse can place an eSignature**    Nurse 2 eSignature: Electronically signed by Shane Carmona RN on 4/20/24 at 9:52 AM EDT

## 2024-04-19 NOTE — ED PROVIDER NOTES
THE [facility]  EMERGENCY DEPARTMENT ENCOUNTER          NURSE PRACTITIONER NOTE     Date of evaluation: 4/19/2024    Chief Complaint     Foot Pain (Had a pediatry appointment, 2 toes need to be removed on L foot dr told him to come here, had diabetes and sores wouldn't heal, since he's here R eye is clogged)      History of Present Illness     Veronica Chaidez is a 73 y.o. male with a past medical history of A-fib, CAD, diabetes, heart failure with reduced ejection fraction who presents to the emergency department for evaluation and treatment of full-thickness ulceration of the left second toe concerning for possible osteomyelitis.  Patient is coming in from his podiatry office with recommendation for medical admission and possible surgical management of suspected osteomyelitis.  Here, patient reports, progressive drainage and swelling of the toes.  He denies associated fevers, chills, nausea, vomiting.    With the exception of the above, there are no aggravating or alleviating factors.    Review of Systems     Pertinent positive and negative findings as documented above in HPI, otherwise all other systems were reviewed and negative     Past Medical, Surgical, Family, and Social History     Medical History:   Past Medical History:   Diagnosis Date    A-fib (HCC) 2020    CAD, multiple vessel 12/2019    Coronary artery disease involving coronary bypass graft     Diabetes mellitus (HCC)     diet controlled     Enlarged prostate     Environmental allergies     Kidney stones     Pacemaker     Systolic CHF (HCC)        Surgical History:   Past Surgical History:   Procedure Laterality Date    CARDIAC CATHETERIZATION  12/10/2019    Dr. Shaver - severe multi-vessel disease    CORONARY ARTERY BYPASS GRAFT N/A 03/02/2020    [facility]; TCPB; CABG x 3, LIMA to LAD w/ long segment endarterectomy (4 cm); bilateral pulmonary vein isolation; 40 mm Atriclip to L atril appendage; endoscopic L GSV harvest; ICNB x 5 levels bilat;  of infection including but not limited to fever, chills, feeling ill, redness around the wounds, redness streaking up the legs, purulent drainage. Instructed patient to call the clinic or present to the nearest emergency department if they notice these signs or symptoms   -Patient educated about the importance of diabetic foot care consisting of but not limited to daily foot inspections, wearing supportive shoes and inserts at all times, applying lotion to feet while sparing webspaces, and routinely checking blood sugar. .          The patient was evaluated by myself and the ED Attending Physician, Dr. Hilliard All management and disposition plans were discussed and agreed upon.     Clinical Impression     1. Osteomyelitis of left foot, unspecified type (HCC)        Disposition     admit      EVELYN Briggs - CNP, CNP  Department of Emergency Medicine     Alicja Still APRN - CNP  04/19/24 [id number]

## 2024-04-19 NOTE — ED NOTES
ED TO INPATIENT SBAR HANDOFF    Patient Name: Veronica Chaidez   :  1950  73 y.o.   MRN:  [id number]  Preferred Name    ED Room #:  A04/A04-04  Family/Caregiver Present no   Restraints no   Sitter no   Sepsis Risk Score Sepsis Risk Score: 4.98    Situation  Code Status: Prior     Allergies: Aspartame and phenylalanine, Codeine, and Msg  Weight: Patient Vitals for the past 96 hrs (Last 3 readings):   Weight   24 1504 120.5 kg (265 lb 11.2 oz)     Arrived from: home  Chief Complaint:   Chief Complaint   Patient presents with    Foot Pain     Had a pediatry appointment, 2 toes need to be removed on L foot dr told him to come here, had diabetes and sores wouldn't heal, since he's here R eye is clogged     Hospital Problem/Diagnosis:  Active Problems:    * No active hospital problems. *  Resolved Problems:    * No resolved hospital problems. *    Imaging:   No orders to display     Abnormal labs: Abnormal Labs Reviewed - No abnormal labs to display  Critical values: no     Abnormal Assessment Findings:     Background  History:   Past Medical History:   Diagnosis Date    A-fib ([facility])     CAD, multiple vessel 2019    Coronary artery disease involving coronary bypass graft     Diabetes mellitus ([facility])     diet controlled     Enlarged prostate     Environmental allergies     Kidney stones     Pacemaker     Systolic CHF ([facility])        Assessment    Vitals/MEWS: MEWS Score: 1  Level of Consciousness: Alert (0)   Vitals:    24 1504   BP: 129/70   Pulse: 97   Resp: 16   Temp: 98.8 °F (37.1 °C)   TempSrc: Oral   SpO2: 100%   Weight: 120.5 kg (265 lb 11.2 oz)   Height: 1.88 m (6' 2\")     FiO2 (%):   O2 Flow Rate: O2 Device: None (Room air)    Cardiac Rhythm:    Pain Assessment: [x] Verbal [] Valdivia Thornton Scale  Pain Scale: Pain Assessment  Pain Assessment: None - Denies Pain  Last documented pain score (0-10 scale)    Last documented pain medication administered: Check MAR   Mental Status: oriented and

## 2024-04-19 NOTE — PROGRESS NOTES
[facility] - Clinical Pharmacy Note    Vancomycin - Management by Pharmacy    Consult Date: 4/19/24  Consulting Provider: Dr. Charlton    A vancomycin loading dose of 2500 mg x1 has been ordered for the patient.     If vancomycin is desired to continue on admission, a new consult must be placed for pharmacy to continue dosing.     Thank you for consulting pharmacy,    Sarmad Sarah, PharmD  Main Pharmacy: [id number]

## 2024-04-19 NOTE — ED PROVIDER NOTES
ED Attending Attestation Note     Date of evaluation: 4/19/2024    This patient was seen by the advanced practice provider.  I have seen and examined the patient, agree with the workup, evaluation, management and diagnosis. The care plan has been discussed.  My assessment reveals a pleasant gentleman sent in from podiatry clinic for concern for Left toe osteo. He has no complaints for me. On exam,     Vitals:    04/19/24 1504   BP: 129/70   Pulse: 97   Resp: 16   Temp: 98.8 °F (37.1 °C)   TempSrc: Oral   SpO2: 100%   Weight: 120.5 kg (265 lb 11.2 oz)   Height: 1.88 m (6' 2\")       General:  Well appearing. No acute distress.  Non-toxic appearing     Eyes:  Pupils equally round . No discharge from eyes.    ENT:  No discharge from nose. OP clear. Right ear with occlusion by cerumen, no blood.  Neck:  Supple. Trachea midline.    Pulmonary:   Non-labored breathing.    Symmetric chest wall excursion   Cardiac:  Regular rhythm. Normal  rate.   Abdomen:  Soft. Non-tender. Non-distended. No masses.     Musculoskeletal:  No long bone deformity.  No ankle or wrist deformity.  Vascular:  Extremities warm and perfused.     Skin:  No rash. Warm.    Neuro: Alert and conversant. CN II-XII grossly intact. Speech and mentation normal.   SARAH  Sensation grossly intact to light touch.     Extremities:  No peripheral edema. LE symmetric.    Plan for vanc/zosyn, admission for likely OR for osteo     Zoltan Hilliard MD  04/19/24 [id number]

## 2024-04-19 NOTE — PROGRESS NOTES
Department of Podiatry  Resident Progress Note    Veronica Chaidez  Allergies: Aspartame and phenylalanine, Codeine, and Msg    SUBJECTIVE  The patient is a 73 y.o. male who presents to clinic today for follow up evaluation of wounds on his left foot. Patient presents with bandage not intact to the left foot and walking on the foot AGAINST MEDICAL ADVICE. Patient was aware that from his last visit if his toes were looking worse he was to present to the ED for admission and surgical intervention. Patient is in agreement. Patient denies any other pedal complaints at this time. Denies F/C/N/V.    Past Medical History:        Diagnosis Date    A-fib ([facility]) 2020    CAD, multiple vessel 12/2019    Coronary artery disease involving coronary bypass graft     Diabetes mellitus ([facility])     diet controlled     Enlarged prostate     Environmental allergies     Kidney stones     Pacemaker     Systolic CHF ([facility])        REVIEW OF SYSTEMS:  Review of Systems: Pertinent positive and negative findings as documented in the HPI, otherwise all other systems were reviewed and were negative.     OBJECTIVE    Patient presents today unassisted and ambulating on his left foot AGAINST MEDICAL ADVICE with a disheveled and dirty dressing to the left foot.  Patient is AAOx3 and NAD.    VASCULAR: DP and PT pulses are palpable 2/4 b/l. CFT is brisk to the digits of the foot b/l. Skin temperature is warm to cool from proximal to distal with focal calor noted to the distal left forefoot. No edema noted. No pain with calf compression b/l.    NEUROLOGIC: Gross and epicritic sensation is diminished b/l. Protective sensation is diminished at all pedal sites b/l.    DERMATOLOGIC:   Left lower extremity:  Full-thickness ulceration noted to the distal tuft of the second toe.  Wound bed appears to be granular in nature with a hyperkeratotic periwound.  No evidence of purulence, fluctuance, or crepitations however serosanguineous drainage is appreciated.

## 2024-04-20 LAB
ANION GAP SERPL CALCULATED.3IONS-SCNC: 13 MMOL/L (ref 3–16)
BASOPHILS # BLD: 0.1 K/UL (ref 0–0.2)
BASOPHILS NFR BLD: 1 %
BUN SERPL-MCNC: 52 MG/DL (ref 7–20)
CALCIUM SERPL-MCNC: 9 MG/DL (ref 8.3–10.6)
CHLORIDE SERPL-SCNC: 98 MMOL/L (ref 99–110)
CO2 SERPL-SCNC: 26 MMOL/L (ref 21–32)
CREAT SERPL-MCNC: 1.7 MG/DL (ref 0.8–1.3)
DEPRECATED RDW RBC AUTO: 15.4 % (ref 12.4–15.4)
EOSINOPHIL # BLD: 0.3 K/UL (ref 0–0.6)
EOSINOPHIL NFR BLD: 4.1 %
GFR SERPLBLD CREATININE-BSD FMLA CKD-EPI: 42 ML/MIN/{1.73_M2}
GLUCOSE BLD-MCNC: 143 MG/DL (ref 70–99)
GLUCOSE BLD-MCNC: 145 MG/DL (ref 70–99)
GLUCOSE BLD-MCNC: 148 MG/DL (ref 70–99)
GLUCOSE BLD-MCNC: 159 MG/DL (ref 70–99)
GLUCOSE SERPL-MCNC: 125 MG/DL (ref 70–99)
HCT VFR BLD AUTO: 35.3 % (ref 40.5–52.5)
HGB BLD-MCNC: 11.7 G/DL (ref 13.5–17.5)
LYMPHOCYTES # BLD: 1.3 K/UL (ref 1–5.1)
LYMPHOCYTES NFR BLD: 16.8 %
MCH RBC QN AUTO: 28.1 PG (ref 26–34)
MCHC RBC AUTO-ENTMCNC: 33.1 G/DL (ref 31–36)
MCV RBC AUTO: 84.9 FL (ref 80–100)
MONOCYTES # BLD: 0.8 K/UL (ref 0–1.3)
MONOCYTES NFR BLD: 10.3 %
NEUTROPHILS # BLD: 5.3 K/UL (ref 1.7–7.7)
NEUTROPHILS NFR BLD: 67.8 %
PERFORMED ON: ABNORMAL
PLATELET # BLD AUTO: 219 K/UL (ref 135–450)
PMV BLD AUTO: 8.5 FL (ref 5–10.5)
POTASSIUM SERPL-SCNC: 3.9 MMOL/L (ref 3.5–5.1)
RBC # BLD AUTO: 4.16 M/UL (ref 4.2–5.9)
SODIUM SERPL-SCNC: 137 MMOL/L (ref 136–145)
WBC # BLD AUTO: 7.8 K/UL (ref 4–11)

## 2024-04-20 PROCEDURE — 80048 BASIC METABOLIC PNL TOTAL CA: CPT

## 2024-04-20 PROCEDURE — 6370000000 HC RX 637 (ALT 250 FOR IP): Performed by: INTERNAL MEDICINE

## 2024-04-20 PROCEDURE — 85025 COMPLETE CBC W/AUTO DIFF WBC: CPT

## 2024-04-20 PROCEDURE — 2580000003 HC RX 258: Performed by: INTERNAL MEDICINE

## 2024-04-20 PROCEDURE — 83036 HEMOGLOBIN GLYCOSYLATED A1C: CPT

## 2024-04-20 PROCEDURE — 1200000000 HC SEMI PRIVATE

## 2024-04-20 PROCEDURE — 6360000002 HC RX W HCPCS: Performed by: NURSE PRACTITIONER

## 2024-04-20 PROCEDURE — 6360000002 HC RX W HCPCS: Performed by: INTERNAL MEDICINE

## 2024-04-20 PROCEDURE — 36415 COLL VENOUS BLD VENIPUNCTURE: CPT

## 2024-04-20 RX ORDER — CEFEPIME 1 G/50ML
2000 INJECTION, SOLUTION INTRAVENOUS EVERY 12 HOURS
Status: DISCONTINUED | OUTPATIENT
Start: 2024-04-20 | End: 2024-04-23

## 2024-04-20 RX ORDER — CEFEPIME 1 G/50ML
2000 INJECTION, SOLUTION INTRAVENOUS ONCE
Status: COMPLETED | OUTPATIENT
Start: 2024-04-20 | End: 2024-04-20

## 2024-04-20 RX ADMIN — CEFEPIME 2000 MG: 1 INJECTION, SOLUTION INTRAVENOUS at 13:19

## 2024-04-20 RX ADMIN — SODIUM CHLORIDE 1250 MG: 9 INJECTION, SOLUTION INTRAVENOUS at 16:58

## 2024-04-20 RX ADMIN — SODIUM CHLORIDE, PRESERVATIVE FREE 10 ML: 5 INJECTION INTRAVENOUS at 10:03

## 2024-04-20 RX ADMIN — APIXABAN 5 MG: 5 TABLET, FILM COATED ORAL at 21:22

## 2024-04-20 RX ADMIN — POTASSIUM CHLORIDE 10 MEQ: 750 TABLET, EXTENDED RELEASE ORAL at 21:22

## 2024-04-20 RX ADMIN — METOPROLOL TARTRATE 50 MG: 50 TABLET, FILM COATED ORAL at 10:03

## 2024-04-20 RX ADMIN — AMIODARONE HYDROCHLORIDE 200 MG: 200 TABLET ORAL at 10:03

## 2024-04-20 RX ADMIN — PANTOPRAZOLE SODIUM 40 MG: 40 TABLET, DELAYED RELEASE ORAL at 06:02

## 2024-04-20 RX ADMIN — METOPROLOL TARTRATE 50 MG: 50 TABLET, FILM COATED ORAL at 21:22

## 2024-04-20 RX ADMIN — FUROSEMIDE 80 MG: 80 TABLET ORAL at 18:05

## 2024-04-20 RX ADMIN — APIXABAN 5 MG: 5 TABLET, FILM COATED ORAL at 10:03

## 2024-04-20 RX ADMIN — POTASSIUM CHLORIDE 10 MEQ: 750 TABLET, EXTENDED RELEASE ORAL at 10:03

## 2024-04-20 RX ADMIN — FUROSEMIDE 80 MG: 80 TABLET ORAL at 10:03

## 2024-04-20 RX ADMIN — SODIUM CHLORIDE, PRESERVATIVE FREE 10 ML: 5 INJECTION INTRAVENOUS at 21:22

## 2024-04-20 NOTE — PROGRESS NOTES
[facility] - Clinical Pharmacy Note - Renal Dosing    Cefepime ordered for treatment of OM. Per [facility] Renal Dose Adjustment Policy, cefepime 2000 mg q8h will be changed to 2000 mg q12h.     Estimated Creatinine Clearance: Estimated Creatinine Clearance: 55 mL/min (A) (based on SCr of 1.7 mg/dL (H)).  Dialysis Status, NANETTE, CKD: yes  BMI: Body mass index is 34.11 kg/m².    Rationale for Adjustment: Agent is renally eliminated.    Pharmacy will continue to monitor renal function and adjust dose as necessary.      Please call with any questions.  Mery Dos Santos, PharmD, BCPS  Main pharmacy: [id number]  4/20/2024 10:16 AM

## 2024-04-20 NOTE — PLAN OF CARE
Podiatric Surgery Plan of Care Note  Veronica Dm      Podiatry consult received. Patient was seen in the office today and evaluated by Dr. Downye. Podiatry resident to assess patient in the morning. Recommend patient be started on broad spectrum antibiotics and remain non-weightbearing to the left foot at this time. Patient will likely undergo podiatric surgical intervention early next week consisting of left foot transmetatarsal amputation. Patient will need placement post surgical intervention.      Discussed with Dr. Chandana Downey DPM.    Ric Mcmahon DPM   Podiatric Resident PGY1  [employer]  Pager number [phone]  4/19/2024, 8:48 PM

## 2024-04-20 NOTE — PROGRESS NOTES
V2.0    [facility] Progress Note      Name:  Veronica Chaidez /Age/Sex: 1950  (73 y.o. male)   MRN & CSN:  [id number] & [id number] Encounter Date/Time: 2024 10:26 AM EDT   Location:  6328/6328-01 PCP: Diego Christian MD     Attending:Ministerio Medley MD       Hospital Day: 2    Assessment and Recommendations   Veronica Chaidez is a 73 y.o. male who presented to the emergency room from his podiatrist office due to concern for osteomyelitis of the left second toe.    Patient was started on broad-spectrum antibiotics and podiatry was consulted.    Plan:   Diabetic foot wound-left foot second toe-concern for osteomyelitis.  Zosyn changed to cefepime to be given in concurrence with vancomycin.  Plan for podiatric surgery next week timing to be determined  Diabetes with neuropathy-patient has not been on medications at home for some time.  Sliding scale insulin and diabetic diet  Chronic kidney disease stage III-creatinine 1 8 which is close to patient's baseline.  Change Zosyn to cefepime.  Coronary artery disease-continue metoprolol   A-fib-rate controlled with amiodarone 200 oral daily and Eliquis 5 mg twice daily.      Diet ADULT DIET; Regular; 4 carb choices (60 gm/meal); Low Sodium (2 gm)   DVT Prophylaxis [] Lovenox, []  Heparin, [] SCDs, [] Ambulation,  [] Eliquis, [] Xarelto  [] Coumadin   Code Status Full Code   Disposition From: Home  Expected Disposition: Skilled nursing facility  Estimated Date of Discharge: To be determined after surgery  Patient requires continued admission due to concern for osteomyelitis   Surrogate Decision Maker/ POA  Per EMR     Personally reviewed Lab Studies and Imaging   2024  Point-of-care glucose, CBC, BMP reviewed      On 2024 drugs that require monitoring for toxicity include insulin and the method of monitoring was POCT glucose to evaluate for hypoglycemia.     On 2024 drugs that require monitoring for toxicity include vancomycin and the method of

## 2024-04-20 NOTE — CONSULTS
[facility] -  Clinical Pharmacy Note    Vancomycin - Management by Pharmacy    Consult Date(s): 4/19/24  Consulting Provider(s): Nay Lindquist MD     Assessment / Plan  Osteomyelitis - Vancomycin  Concurrent Antimicrobials: Zosyn  Day of Vanc Therapy / Ordered Duration: 2   Current Dosing Method: Bayesian-Guided AUC Dosing  Therapeutic Goal: -600 mg/L*hr  Current Dose / Plan:   Scr today is 1.7, appears to be more or less patient's baseline and he doesn't appear to have an NANETTE, just CKD  Vanc 2500 mg loading dose given yesterday afternoon in ED  Order placed for vanc 1250 mg q24h to begin this afternoon  Predicted AUC = 494 mg/L*hr, Tr = 15 mg/L  Level ordered for tomorrow AM to assist with kinetics  Will continue to monitor clinical condition and make adjustments to regimen as appropriate.      Marlo Martínez, PharmD  PGY1 Pharmacy Resident  Phone: [phone]  Main Pharmacy: [id number]  4/20/2024 9:04 AM        Interval update:  therapy initiation  - appears pt will be going for amputation this week     Subjective/Objective:   Veronica Chaidez is a 73 y.o. male with a PMHx significant for diabetes mellitus type 2 diet-controlled, A-fib on Eliquis, hypertension, chronic kidney disease stage III, diabetic neuropathy, CAD, pacemaker, systolic congestive heart failure chronic who is admitted with osteomyelitis of 2 toes.     Ht Readings from Last 1 Encounters:   04/19/24 1.88 m (6' 2\")     Wt Readings from Last 1 Encounters:   04/19/24 120.5 kg (265 lb 11.2 oz)     Current & Prior Antimicrobial Regimen(s):  Zosyn (4/19 - current)  Vancomycin  2500 mg x 1 (4/19 afternoon)  1250 mg q24h (4/20 - current)    Previous Vancomycin History:  3/25/23 - 3/28/23:  Pt therapeutic on 2500 mg load followed by 750 mg q12h    Vancomycin Level(s) / Doses:    Date Time Dose Type of Level / Level Interpretation   4/21 0600 1250 mg q24h Random = ordered           Note: Serum levels collected for AUC-based dosing may be high if  collected in close proximity to the dose administered. This is not necessarily indicative of toxicity.    Cultures & Sensitivities:    Date Site Micro Susceptibility / Result                 Recent Labs     04/19/24  1536 04/20/24  0436   CREATININE 1.8* 1.7*   BUN 49* 52*   WBC 7.7 7.8       Estimated Creatinine Clearance: 55 mL/min (A) (based on SCr of 1.7 mg/dL (H)).    Additional Lab Values / Findings of Note:    No results for input(s): \"PROCAL\" in the last 72 hours.

## 2024-04-20 NOTE — CONSULTS
Department of Podiatry Consult Note  Resident      Reason for Consult: Osteomyelitis left second toe  Requesting Physician:  Dr. Nay Lindquist MD    CHIEF COMPLAINT: Osteomyelitis left foot    HISTORY OF PRESENT ILLNESS:    The patient is a 73 y.o. male with significant past medical history as listed below who is consulted to podiatry for osteomyelitis of his left second toe.  Patient is well-known to the podiatry service.  Patient was seen in the outpatient clinic yesterday afternoon and he was referred to the emergency department to be admitted for IV antibiotics and podiatric surgical intervention. Patient is understanding that he will need to go to a nursing facility after surgery and states that he would like to return to the nursing home he was at last time. Patient denies any pain in his feet currently.  Patient denies any N/V/F/SOB/CP. Patient denies any other pedal complaints today.    Past Medical History:        Diagnosis Date    A-fib ([facility]) 2020    CAD, multiple vessel 12/2019    Coronary artery disease involving coronary bypass graft     Diabetes mellitus ([facility])     diet controlled     Enlarged prostate     Environmental allergies     Kidney stones     Pacemaker     Systolic CHF ([facility])        Past Surgical History:        Procedure Laterality Date    CARDIAC CATHETERIZATION  12/10/2019    Dr. Shaver - severe multi-vessel disease    CORONARY ARTERY BYPASS GRAFT N/A 03/02/2020    [facility]; TCPB; CABG x 3, LIMA to LAD w/ long segment endarterectomy (4 cm); bilateral pulmonary vein isolation; 40 mm Atriclip to L atril appendage; endoscopic L GSV harvest; ICNB x 5 levels bilat; sternal plate x 1 to manubrium performed by Sohail Rhoades MD at [facility] OR    NASAL POLYP SURGERY  x2    NASAL SEPTUM SURGERY         Allergies:   Aspartame and phenylalanine, Codeine, and Msg    Medications:   Home Meds  No current facility-administered medications on file prior to encounter.     Current Outpatient Medications on File  the foot b/l. Skin temperature is warm to cool from proximal to distal with focal calor noted to the distal left forefoot. No edema noted. No pain with calf compression b/l.     NEUROLOGIC: Gross and epicritic sensation is diminished b/l. Protective sensation is diminished at all pedal sites b/l.     DERMATOLOGIC:   Patient provided verbal consent for photos to be taken today:    Left lower extremity:  Full-thickness ulceration noted to the distal tuft of the second toe.  Wound bed appears to be granular in nature with a hyperkeratotic periwound.  No evidence of purulence, fluctuance, or crepitations however serosanguineous drainage is appreciated.  Notably, digits 1 and 2 are grossly edematous and erythematous.  Hallucal nail is noted to be avulsed with a a granular nail bed. Several eschar scattered throughout the patient's lower leg with underlying epithelialized preulcerative lesions.         Right lower extremity: Several eschars littered throughout the patient's lower extremity with underlying epithelialized preulcerative lesions. Eschar noted to the lateral aspect of the 3rd DIPJ. No evidence of purulence, fluctuance, or crepitations however serosanguineous drainage is appreciated. Otherwise a closed soft tissue envelope is appreciated without acute signs of infection.     MUSCULOSKELETAL: Muscle strength is 5/5 for all pedal groups tested. No pain with palpation of the foot or ankle b/l. Ankle joint ROM is decreased in dorsiflexion with the knee extended. No obvious biomechanical abnormalities.      IMAGING:  XR left foot (4/12/2024)  FINDINGS:     Osteolysis of the first proximal and distal phalanx which is new from prior. In  addition osteolysis involving the second distal phalanx. Joint spaces and  alignment are preserved. Diffuse soft tissue edema throughout the foot     IMPRESSION:     Osteolysis involving the first proximal and distal phalanges and second distal  phalanx suspicious for osteomyelitis.

## 2024-04-20 NOTE — PLAN OF CARE
Problem: Discharge Planning  Goal: Discharge to home or other facility with appropriate resources  Outcome: Progressing     Problem: Safety - Adult  Goal: Free from fall injury  Outcome: Progressing  Note: Pt remains free from falls. Fall precautions in place. Pt has history of recent fall (1 week ago). Bed alarm on. Bed locked and in lowest position. Bedside table and call light within reach. Pt alert and oriented, able to use call light appropriately.      Problem: ABCDS Injury Assessment  Goal: Absence of physical injury  Outcome: Progressing

## 2024-04-21 ENCOUNTER — APPOINTMENT (OUTPATIENT)
Dept: GENERAL RADIOLOGY | Age: 74
DRG: 305 | End: 2024-04-21
Payer: MEDICAID

## 2024-04-21 LAB
ABO + RH BLD: NORMAL
ALBUMIN SERPL-MCNC: 3.5 G/DL (ref 3.4–5)
ALBUMIN/GLOB SERPL: 0.7 {RATIO} (ref 1.1–2.2)
ALP SERPL-CCNC: 76 U/L (ref 40–129)
ALT SERPL-CCNC: 7 U/L (ref 10–40)
ANION GAP SERPL CALCULATED.3IONS-SCNC: 15 MMOL/L (ref 3–16)
AST SERPL-CCNC: 16 U/L (ref 15–37)
BILIRUB SERPL-MCNC: 0.5 MG/DL (ref 0–1)
BLD GP AB SCN SERPL QL: NORMAL
BUN SERPL-MCNC: 50 MG/DL (ref 7–20)
CALCIUM SERPL-MCNC: 9.2 MG/DL (ref 8.3–10.6)
CHLORIDE SERPL-SCNC: 94 MMOL/L (ref 99–110)
CO2 SERPL-SCNC: 24 MMOL/L (ref 21–32)
CREAT SERPL-MCNC: 2 MG/DL (ref 0.8–1.3)
EST. AVERAGE GLUCOSE BLD GHB EST-MCNC: 134.1 MG/DL
GFR SERPLBLD CREATININE-BSD FMLA CKD-EPI: 35 ML/MIN/{1.73_M2}
GLUCOSE BLD-MCNC: 129 MG/DL (ref 70–99)
GLUCOSE BLD-MCNC: 150 MG/DL (ref 70–99)
GLUCOSE BLD-MCNC: 155 MG/DL (ref 70–99)
GLUCOSE BLD-MCNC: 179 MG/DL (ref 70–99)
GLUCOSE SERPL-MCNC: 143 MG/DL (ref 70–99)
HBA1C MFR BLD: 6.3 %
INR PPP: 1.44 (ref 0.85–1.15)
PERFORMED ON: ABNORMAL
POTASSIUM SERPL-SCNC: 4 MMOL/L (ref 3.5–5.1)
PROT SERPL-MCNC: 8.3 G/DL (ref 6.4–8.2)
PROTHROMBIN TIME: 17.7 SEC (ref 11.9–14.9)
SODIUM SERPL-SCNC: 133 MMOL/L (ref 136–145)
VANCOMYCIN SERPL-MCNC: 23 UG/ML

## 2024-04-21 PROCEDURE — 2580000003 HC RX 258: Performed by: STUDENT IN AN ORGANIZED HEALTH CARE EDUCATION/TRAINING PROGRAM

## 2024-04-21 PROCEDURE — 71045 X-RAY EXAM CHEST 1 VIEW: CPT

## 2024-04-21 PROCEDURE — 86850 RBC ANTIBODY SCREEN: CPT

## 2024-04-21 PROCEDURE — 6370000000 HC RX 637 (ALT 250 FOR IP): Performed by: NURSE PRACTITIONER

## 2024-04-21 PROCEDURE — 1200000000 HC SEMI PRIVATE

## 2024-04-21 PROCEDURE — 36415 COLL VENOUS BLD VENIPUNCTURE: CPT

## 2024-04-21 PROCEDURE — 86900 BLOOD TYPING SEROLOGIC ABO: CPT

## 2024-04-21 PROCEDURE — 93005 ELECTROCARDIOGRAM TRACING: CPT

## 2024-04-21 PROCEDURE — 80202 ASSAY OF VANCOMYCIN: CPT

## 2024-04-21 PROCEDURE — 2580000003 HC RX 258: Performed by: INTERNAL MEDICINE

## 2024-04-21 PROCEDURE — 85610 PROTHROMBIN TIME: CPT

## 2024-04-21 PROCEDURE — 6370000000 HC RX 637 (ALT 250 FOR IP): Performed by: INTERNAL MEDICINE

## 2024-04-21 PROCEDURE — 80053 COMPREHEN METABOLIC PANEL: CPT

## 2024-04-21 PROCEDURE — 6360000002 HC RX W HCPCS: Performed by: NURSE PRACTITIONER

## 2024-04-21 PROCEDURE — 6360000002 HC RX W HCPCS: Performed by: STUDENT IN AN ORGANIZED HEALTH CARE EDUCATION/TRAINING PROGRAM

## 2024-04-21 PROCEDURE — 86901 BLOOD TYPING SEROLOGIC RH(D): CPT

## 2024-04-21 RX ADMIN — POTASSIUM CHLORIDE 10 MEQ: 750 TABLET, EXTENDED RELEASE ORAL at 19:38

## 2024-04-21 RX ADMIN — PANTOPRAZOLE SODIUM 40 MG: 40 TABLET, DELAYED RELEASE ORAL at 05:07

## 2024-04-21 RX ADMIN — FUROSEMIDE 80 MG: 80 TABLET ORAL at 16:56

## 2024-04-21 RX ADMIN — CARBAMIDE PEROXIDE 6.5% 5 DROP: 6.5 LIQUID AURICULAR (OTIC) at 19:38

## 2024-04-21 RX ADMIN — CARBAMIDE PEROXIDE 6.5% 5 DROP: 6.5 LIQUID AURICULAR (OTIC) at 15:51

## 2024-04-21 RX ADMIN — CEFEPIME 2000 MG: 1 INJECTION, SOLUTION INTRAVENOUS at 23:24

## 2024-04-21 RX ADMIN — POTASSIUM CHLORIDE 10 MEQ: 750 TABLET, EXTENDED RELEASE ORAL at 08:28

## 2024-04-21 RX ADMIN — AMIODARONE HYDROCHLORIDE 200 MG: 200 TABLET ORAL at 08:28

## 2024-04-21 RX ADMIN — CEFEPIME 2000 MG: 1 INJECTION, SOLUTION INTRAVENOUS at 11:56

## 2024-04-21 RX ADMIN — SODIUM CHLORIDE, PRESERVATIVE FREE 10 ML: 5 INJECTION INTRAVENOUS at 08:29

## 2024-04-21 RX ADMIN — VANCOMYCIN HYDROCHLORIDE 1000 MG: 1 INJECTION, POWDER, LYOPHILIZED, FOR SOLUTION INTRAVENOUS at 16:56

## 2024-04-21 RX ADMIN — APIXABAN 5 MG: 5 TABLET, FILM COATED ORAL at 08:28

## 2024-04-21 RX ADMIN — METOPROLOL TARTRATE 50 MG: 50 TABLET, FILM COATED ORAL at 08:28

## 2024-04-21 RX ADMIN — FUROSEMIDE 80 MG: 80 TABLET ORAL at 08:28

## 2024-04-21 RX ADMIN — CEFEPIME 2000 MG: 1 INJECTION, SOLUTION INTRAVENOUS at 00:44

## 2024-04-21 NOTE — PROGRESS NOTES
Pre-Operative Note  Resident Note     Patient: Veronica Chaidez     Procedure: Left foot transmetatarsal amputation with tendoachilles lengthening    Clearance for surgery: Yes, per Internal Medicine    Consent: Procedure was discussed at length with patient and all questions were answered to completion, consent obtained and placed in chart     Labs:        Recent Labs     04/19/24  1536 04/20/24  0436   WBC 7.7 7.8   HGB 12.4* 11.7*   HCT 37.8* 35.3*   MCV 85.2 84.9    219        Recent Labs     04/20/24  0436 04/21/24  0513    133*   K 3.9 4.0   CL 98* 94*   CO2 26 24   BUN 52* 50*   CREATININE 1.7* 2.0*        Recent Labs     04/19/24  1536 04/21/24  0513   AST 15 16   ALT 8* 7*   BILITOT 0.5 0.5   ALKPHOS 82 76      No results for input(s): \"LIPASE\", \"AMYLASE\" in the last 72 hours.     Recent Labs     04/19/24  1536 04/21/24  0513 04/21/24  0907   PROT 9.0* 8.3*  --    INR  --   --  1.44*      No results for input(s): \"CKTOTAL\", \"CKMB\", \"CKMBINDEX\", \"TROPONINI\" in the last 72 hours.    Pre-op Medications:   Current Facility-Administered Medications   Medication Dose Route Frequency Provider Last Rate Last Admin    vancomycin (VANCOCIN) 1,000 mg in sodium chloride 0.9 % 250 mL IVPB (Sudg0Amf)  1,000 mg IntraVENous Q24H Ministerio Medley MD   Stopped at 04/21/24 1937    cefepime (MAXIPIME) IVPB 2,000 mg  2,000 mg IntraVENous Q12H Mitch Fernando, APRN - CNP   Stopped at 04/22/24 0506    albuterol (PROVENTIL) (2.5 MG/3ML) 0.083% nebulizer solution 2.5 mg  2.5 mg Nebulization Q4H PRN Nay Lindquist MD        amiodarone (CORDARONE) tablet 200 mg  200 mg Oral Daily Nay Lindquist MD   200 mg at 04/21/24 0828    [Held by provider] apixaban (ELIQUIS) tablet 5 mg  5 mg Oral BID Nay Lindquist MD   5 mg at 04/21/24 0828    furosemide (LASIX) tablet 80 mg  80 mg Oral BID Nay Lindquist MD   80 mg at 04/21/24 1656    metoprolol tartrate (LOPRESSOR) tablet 50 mg  50 mg Oral BID Nay Lindquist  mellitus      Anesthesia to see patient.    Ric Mcmahon DPM  04/22/24  6:20 AM

## 2024-04-21 NOTE — PROGRESS NOTES
[facility] -  Clinical Pharmacy Note    Vancomycin - Management by Pharmacy    Consult Date(s): 4/19/24  Consulting Provider(s): Nay Lindquist MD     Assessment / Plan  Osteomyelitis - Vancomycin  Concurrent Antimicrobials: Zosyn  Day of Vanc Therapy / Ordered Duration: 3  Current Dosing Method: Bayesian-Guided AUC Dosing  Therapeutic Goal: -600 mg/L*hr  Current Dose / Plan:   Scr 1.7 ? 2.0. He seems to have been roughly at baseline at admission, now with worsening renal function.   UOP not measured  Random level this AM = 23 mg/L  Current vanc dose is 1250 mg q24h  AUCss = 678 mg/L*hr, Tr = 22.2 mg/L  Decrease dose to 1000 mg q24h  AUCss = 550 mg/L*hr, Tr = 18 mg/L  Random level ordered with AM labs tomorrow  Will continue to monitor clinical condition and make adjustments to regimen as appropriate.      Marlo Martínez, PharmD  PGY1 Pharmacy Resident  Phone: [phone]  Main Pharmacy: [id number]  4/21/2024 9:52 AM      Interval update:  Appears pt will be going for amputation this week with discharge to SNF afterwards    Subjective/Objective:   Veronica Chaidez is a 73 y.o. male with a PMHx significant for diabetes mellitus type 2 diet-controlled, A-fib on Eliquis, hypertension, chronic kidney disease stage III, diabetic neuropathy, CAD, pacemaker, systolic congestive heart failure chronic who is admitted with osteomyelitis of 2 toes.     Ht Readings from Last 1 Encounters:   04/19/24 1.88 m (6' 2\")     Wt Readings from Last 1 Encounters:   04/19/24 120.5 kg (265 lb 11.2 oz)     Current & Prior Antimicrobial Regimen(s):  Zosyn (4/19 - current)  Vancomycin  2500 mg x 1 (4/19 afternoon)  1250 mg q24h (4/20)  1000 mg q24h (4/21 - current)    Previous Vancomycin History:  3/25/23 - 3/28/23:  Pt therapeutic on 2500 mg load followed by 750 mg q12h    Vancomycin Level(s) / Doses:    Date Time Dose Type of Level / Level Interpretation   4/21 0513 1250 mg q24h Random = 23 12 hr level - supratherapeutic   Decrease to

## 2024-04-21 NOTE — DISCHARGE INSTRUCTIONS
[facility]  [address] [place] [address]  Phone: [phone]    Dr. Chandana Downey                                             Post Operative Instructions    1.  Have Prescriptions filled and take as directed.  All medications should be taken with food or milk.    2.  Keep foot elevated six inches above the level of the heart.  Support feet, legs, and knees with pillows.    3.  KEEP FOOT ELEVATED AS MUCH AS POSSIBLE UNTIL YOUR NEXT VISIT    4.  Place an ice pack on the bandaged site for 15 minutes every hour while awake    5.  Keep dressing clean, dry, and intact.  DO NOT REMOVE DRESSING.  CALL THE OFFICE IF BANDAGE COMES OFF.    6.  For the first 7 days after surgery, take temperature by mouth three times a day.  Call the office if greater than 101F.    7.  Ambulate with NON-weight bearing to the LEFT lower extremity with surgical shoe or crutches / walker.    8.  All instructions are to be followed until otherwise instructed by your surgeon.    9.  Call our office if you have any concerns or questions which arise. [phone]    10.  Your first post-operative appointment is scheduled, call the office for appointment date and time if not known prior to today.            Extra Heart Failure sites:     [email or web address]   --- this is American Heart Association interactive Healthier Living with Heart Failure guidebook.  Please click hyperlink or copy / paste link into search bar. Use your mouse to scroll through the pages.  Lots of information about weight monitoring, diet tips, activity, meds, etc     HF [place] kyle  -- this is a free smart phone kyle available for iPhone and Android download.  Use your phone to track sodium / fluid intake, zone tool symptom tracking, weights, medications, etc. Click on this hyperlink  HF [place] Kyle   for QR code for easy download.      DASH (Dietary Approach to Stop Hypertension) diet --

## 2024-04-21 NOTE — PROGRESS NOTES
V2.0    [facility] [place]      Name:  Veronica Chaidez /Age/Sex: 1950  (73 y.o. male)   MRN & CSN:  [id number] & [id number] Encounter Date/Time: 2024 10:26 AM EDT   Location:  6328/6328-01 PCP: Diego Christian MD     Attending:Ministerio Medley MD       Hospital Day: 3    Assessment and Recommendations   Veronica Chaidez is a 73 y.o. male who presented to the emergency room from his podiatrist office due to concern for osteomyelitis of the left second toe.    Patient was started on broad-spectrum antibiotics and podiatry was consulted.    Plan:   Diabetic foot wound-left foot second toe-concern for osteomyelitis.  Zosyn changed to cefepime to be given in concurrence with vancomycin.  Plan for podiatric surgery next week timing to be determined  Diabetes with neuropathy-patient has not been on medications at home for some time.  Sliding scale insulin and diabetic diet  Chronic kidney disease stage III-creatinine 1 8 which is close to patient's baseline.  Changed Zosyn to cefepime.  Coronary artery disease-continue metoprolol   S-vsi-iyozzrffa in junctional rythm with amiodarone 200 oral daily and Eliquis 5 mg twice daily.    Patient with multiple medical comorbidities including chronic CHF/CAD/CABG/AFIB and cardiomyopathy. He denies orthopnea. His LE swelling is stable per review of notes. He denies chest pain or SOB and his CKD is at his baseline.  He represents an increased but not prohibitive risk for the proposed surgery.    Diet ADULT DIET; Regular; 4 carb choices (60 gm/meal); Low Sodium (2 gm)  Diet NPO Exceptions are: Sips of Water with Meds   DVT Prophylaxis [] Lovenox, []  Heparin, [] SCDs, [] Ambulation,  [] Eliquis, [] Xarelto  [] Coumadin   Code Status Full Code   Disposition From: Home  Expected Disposition: Skilled nursing facility  Estimated Date of Discharge: To be determined after surgery  Patient requires continued admission due to concern for osteomyelitis   Surrogate Decision Maker/  PM    UROBILINOGEN 0.2 12/15/2022 01:44 PM    BILIRUBINUR Negative 12/15/2022 01:44 PM    BLOODU LARGE 12/15/2022 01:44 PM    GLUCOSEU Negative 12/15/2022 01:44 PM    KETUA Negative 12/15/2022 01:44 PM     Urine Cultures:   Lab Results   Component Value Date/Time    LABURIN No growth at 18 to 36 hours 11/11/2022 06:27 PM     Blood Cultures:   Lab Results   Component Value Date/Time    BC No Growth after 4 days of incubation. 12/15/2022 07:35 PM     Lab Results   Component Value Date/Time    BLOODCULT2 No Growth after 4 days of incubation. 12/15/2022 07:35 PM     Organism:   Lab Results   Component Value Date/Time    ORG Escherichia coli 08/26/2021 12:51 PM         Electronically signed by EVELYN Monzon CNP on 4/21/2024 at 12:10 PM

## 2024-04-21 NOTE — PROGRESS NOTES
Podiatric Surgery Daily Progress Note  Veronica Dm      Subjective :   Patient seen and examined this am at the bedside. Patient denies any acute overnight events. Patient denies N/V/F/C/SOB. Patient denies calf pain, thigh pain, chest pain.     Review of Systems: A 12 point review of symptoms is unremarkable with the exception of the chief complaint. Patient specifically denies nausea, fever, vomiting, chills, shortness of breath, chest pain, abdominal pain, constipation or difficulty urinating.       Objective     /69   Pulse 69   Temp 97.8 °F (36.6 °C) (Oral)   Resp 18   Ht 1.88 m (6' 2\")   Wt 120.5 kg (265 lb 11.2 oz)   SpO2 97%   BMI 34.11 kg/m²      I/O:  Intake/Output Summary (Last 24 hours) at 4/21/2024 1127  Last data filed at 4/20/2024 1426  Gross per 24 hour   Intake 240 ml   Output --   Net 240 ml              Wt Readings from Last 3 Encounters:   04/19/24 120.5 kg (265 lb 11.2 oz)   01/24/24 123 kg (271 lb 3.2 oz)   12/04/23 124.3 kg (274 lb 0.5 oz)       LABS:    Recent Labs     04/19/24  1536 04/20/24  0436   WBC 7.7 7.8   HGB 12.4* 11.7*   HCT 37.8* 35.3*    219        Recent Labs     04/21/24  0513   *   K 4.0   CL 94*   CO2 24   BUN 50*   CREATININE 2.0*        Recent Labs     04/19/24  1536 04/21/24  0513 04/21/24  0907   PROT 9.0* 8.3*  --    INR  --   --  1.44*           LOWER EXTREMITY EXAMINATION    Dressing to left LE intact. No strikethrough noted to the external dressing. Mild sanguinous drainage noted to the internal layers of the dressing.     VASCULAR: DP and PT pulses are palpable 2/4 b/l. CFT is brisk to the digits of the foot b/l. Skin temperature is warm to cool from proximal to distal with focal calor noted to the distal left forefoot. No edema noted. No pain with calf compression b/l.     NEUROLOGIC: Gross and epicritic sensation is diminished b/l. Protective sensation is diminished at all pedal sites b/l.     DERMATOLOGIC:   Patient provided verbal    IMPRESSION:  No acute osseous abnormality.    ASSESSMENT/PLAN  -Full-thickness ulceration, left foot second toe  -Significant nonpitting edema, hallux and second toe, left foot  -Diabetes mellitus type 2 complicated by peripheral neuropathy  -Noncompliance  -Probable osteomyelitis     -Patient seen and examined at bedside this a.m.  -VSS, no AM CBC  -ESR 98 and CRP <3.0  -HbA1c 6.3  -Images reviewed, impression noted above  -Wound culture not obtained at this time 2/2 no active drainage  -Continue IV antibiotics per primary team; Cefepime and vancomycin  -Left lower extremity dressed with saline soaked gauze, dry gauze, Kerlix, Ace  -Patient is to be non-weightbearing to left LE with surgical shoe for protection  -Lengthy discussion with patient regarding infection and the need for podiatric surgical intervention consisting of left foot transmetatarsal amputation with tendo-achilles lengthening.  Surgery will be planned for Monday afternoon pending attending and OR availability.  -Social work consulted.  Patient will likely need placement to nursing facility postsurgical intervention.  -Discussed surgical intervention with patient at bedside. All potential risks, benefits, and complications were discussed with the patient prior to the scheduling of surgery. No guarantees or promises were made to what the outcomes will be. The patient wished to proceed with surgery, and informed written consent was obtained, signed, and placed on the patient's chart.   -NPO at midnight   -Will hold morning anticoagulation    -Preop labs ordered   -Would appreciate medical clearance for OR      DISPO: Full-thickness ulceration with probable osteomyelitis, left hallux and second toe.  All labs and imaging reviewed, impression as noted above.  Recommend patient continue IV antibiotics per primary team; Zosyn and vancomycin.  Patient is to be nonweightbearing to left lower extremity in surgical shoe.  Podiatric surgical intervention

## 2024-04-22 ENCOUNTER — APPOINTMENT (OUTPATIENT)
Dept: GENERAL RADIOLOGY | Age: 74
DRG: 305 | End: 2024-04-22
Payer: MEDICAID

## 2024-04-22 ENCOUNTER — ANESTHESIA (OUTPATIENT)
Dept: OPERATING ROOM | Age: 74
End: 2024-04-22
Payer: MEDICAID

## 2024-04-22 ENCOUNTER — ANESTHESIA EVENT (OUTPATIENT)
Dept: OPERATING ROOM | Age: 74
End: 2024-04-22
Payer: MEDICAID

## 2024-04-22 LAB
EKG DIAGNOSIS: NORMAL
EKG Q-T INTERVAL: 458 MS
EKG QRS DURATION: 88 MS
EKG QTC CALCULATION (BAZETT): 480 MS
EKG R AXIS: 78 DEGREES
EKG T AXIS: 65 DEGREES
EKG VENTRICULAR RATE: 66 BPM
GLUCOSE BLD-MCNC: 114 MG/DL (ref 70–99)
GLUCOSE BLD-MCNC: 120 MG/DL (ref 70–99)
GLUCOSE BLD-MCNC: 138 MG/DL (ref 70–99)
GLUCOSE BLD-MCNC: 232 MG/DL (ref 70–99)
PERFORMED ON: ABNORMAL
VANCOMYCIN SERPL-MCNC: 22.6 UG/ML

## 2024-04-22 PROCEDURE — 2500000003 HC RX 250 WO HCPCS: Performed by: PODIATRIST

## 2024-04-22 PROCEDURE — 2709999900 HC NON-CHARGEABLE SUPPLY: Performed by: PODIATRIST

## 2024-04-22 PROCEDURE — 80202 ASSAY OF VANCOMYCIN: CPT

## 2024-04-22 PROCEDURE — 0Y6N0ZD DETACHMENT AT LEFT FOOT, PARTIAL 4TH RAY, OPEN APPROACH: ICD-10-PCS | Performed by: PODIATRIST

## 2024-04-22 PROCEDURE — 2580000003 HC RX 258: Performed by: INTERNAL MEDICINE

## 2024-04-22 PROCEDURE — 6370000000 HC RX 637 (ALT 250 FOR IP): Performed by: PODIATRIST

## 2024-04-22 PROCEDURE — 2580000003 HC RX 258: Performed by: NURSE ANESTHETIST, CERTIFIED REGISTERED

## 2024-04-22 PROCEDURE — 6360000002 HC RX W HCPCS: Performed by: NURSE ANESTHETIST, CERTIFIED REGISTERED

## 2024-04-22 PROCEDURE — 6360000002 HC RX W HCPCS

## 2024-04-22 PROCEDURE — 6370000000 HC RX 637 (ALT 250 FOR IP)

## 2024-04-22 PROCEDURE — 88305 TISSUE EXAM BY PATHOLOGIST: CPT

## 2024-04-22 PROCEDURE — 0L8P0ZZ DIVISION OF LEFT LOWER LEG TENDON, OPEN APPROACH: ICD-10-PCS | Performed by: PODIATRIST

## 2024-04-22 PROCEDURE — A4217 STERILE WATER/SALINE, 500 ML: HCPCS | Performed by: PODIATRIST

## 2024-04-22 PROCEDURE — 6360000002 HC RX W HCPCS: Performed by: NURSE PRACTITIONER

## 2024-04-22 PROCEDURE — 3600000014 HC SURGERY LEVEL 4 ADDTL 15MIN: Performed by: PODIATRIST

## 2024-04-22 PROCEDURE — 3600000004 HC SURGERY LEVEL 4 BASE: Performed by: PODIATRIST

## 2024-04-22 PROCEDURE — 2580000003 HC RX 258: Performed by: PODIATRIST

## 2024-04-22 PROCEDURE — 0Y6N0Z9 DETACHMENT AT LEFT FOOT, PARTIAL 1ST RAY, OPEN APPROACH: ICD-10-PCS | Performed by: PODIATRIST

## 2024-04-22 PROCEDURE — 6370000000 HC RX 637 (ALT 250 FOR IP): Performed by: INTERNAL MEDICINE

## 2024-04-22 PROCEDURE — 0Y6N0ZF DETACHMENT AT LEFT FOOT, PARTIAL 5TH RAY, OPEN APPROACH: ICD-10-PCS | Performed by: PODIATRIST

## 2024-04-22 PROCEDURE — 2580000003 HC RX 258

## 2024-04-22 PROCEDURE — 7100000001 HC PACU RECOVERY - ADDTL 15 MIN: Performed by: PODIATRIST

## 2024-04-22 PROCEDURE — 6360000002 HC RX W HCPCS: Performed by: PODIATRIST

## 2024-04-22 PROCEDURE — 0Y6N0ZC DETACHMENT AT LEFT FOOT, PARTIAL 3RD RAY, OPEN APPROACH: ICD-10-PCS | Performed by: PODIATRIST

## 2024-04-22 PROCEDURE — 3700000001 HC ADD 15 MINUTES (ANESTHESIA): Performed by: PODIATRIST

## 2024-04-22 PROCEDURE — 93010 ELECTROCARDIOGRAM REPORT: CPT | Performed by: INTERNAL MEDICINE

## 2024-04-22 PROCEDURE — 1200000000 HC SEMI PRIVATE

## 2024-04-22 PROCEDURE — 7100000000 HC PACU RECOVERY - FIRST 15 MIN: Performed by: PODIATRIST

## 2024-04-22 PROCEDURE — 73630 X-RAY EXAM OF FOOT: CPT

## 2024-04-22 PROCEDURE — 2W3RX1Z IMMOBILIZATION OF LEFT LOWER LEG USING SPLINT: ICD-10-PCS | Performed by: PODIATRIST

## 2024-04-22 PROCEDURE — 3700000000 HC ANESTHESIA ATTENDED CARE: Performed by: PODIATRIST

## 2024-04-22 PROCEDURE — 0Y6N0ZB DETACHMENT AT LEFT FOOT, PARTIAL 2ND RAY, OPEN APPROACH: ICD-10-PCS | Performed by: PODIATRIST

## 2024-04-22 PROCEDURE — 94150 VITAL CAPACITY TEST: CPT

## 2024-04-22 PROCEDURE — 36415 COLL VENOUS BLD VENIPUNCTURE: CPT

## 2024-04-22 RX ORDER — METOCLOPRAMIDE HYDROCHLORIDE 5 MG/ML
10 INJECTION INTRAMUSCULAR; INTRAVENOUS
Status: DISCONTINUED | OUTPATIENT
Start: 2024-04-22 | End: 2024-04-22 | Stop reason: HOSPADM

## 2024-04-22 RX ORDER — SODIUM CHLORIDE 0.9 % (FLUSH) 0.9 %
5-40 SYRINGE (ML) INJECTION PRN
Status: DISCONTINUED | OUTPATIENT
Start: 2024-04-22 | End: 2024-04-22 | Stop reason: HOSPADM

## 2024-04-22 RX ORDER — SODIUM CHLORIDE 9 MG/ML
INJECTION, SOLUTION INTRAVENOUS PRN
Status: DISCONTINUED | OUTPATIENT
Start: 2024-04-22 | End: 2024-04-22 | Stop reason: HOSPADM

## 2024-04-22 RX ORDER — HYDROMORPHONE HYDROCHLORIDE 1 MG/ML
0.5 INJECTION, SOLUTION INTRAMUSCULAR; INTRAVENOUS; SUBCUTANEOUS EVERY 10 MIN PRN
Status: DISCONTINUED | OUTPATIENT
Start: 2024-04-22 | End: 2024-04-22 | Stop reason: HOSPADM

## 2024-04-22 RX ORDER — MAGNESIUM HYDROXIDE 1200 MG/15ML
LIQUID ORAL CONTINUOUS PRN
Status: DISCONTINUED | OUTPATIENT
Start: 2024-04-22 | End: 2024-04-22 | Stop reason: HOSPADM

## 2024-04-22 RX ORDER — FENTANYL CITRATE 50 UG/ML
INJECTION, SOLUTION INTRAMUSCULAR; INTRAVENOUS PRN
Status: DISCONTINUED | OUTPATIENT
Start: 2024-04-22 | End: 2024-04-22 | Stop reason: SDUPTHER

## 2024-04-22 RX ORDER — BUPIVACAINE HYDROCHLORIDE 5 MG/ML
INJECTION, SOLUTION EPIDURAL; INTRACAUDAL PRN
Status: DISCONTINUED | OUTPATIENT
Start: 2024-04-22 | End: 2024-04-22 | Stop reason: ALTCHOICE

## 2024-04-22 RX ORDER — SUCCINYLCHOLINE/SOD CL,ISO/PF 100 MG/5ML
SYRINGE (ML) INTRAVENOUS PRN
Status: DISCONTINUED | OUTPATIENT
Start: 2024-04-22 | End: 2024-04-22 | Stop reason: SDUPTHER

## 2024-04-22 RX ORDER — LIDOCAINE HYDROCHLORIDE 20 MG/ML
INJECTION, SOLUTION INTRAVENOUS PRN
Status: DISCONTINUED | OUTPATIENT
Start: 2024-04-22 | End: 2024-04-22 | Stop reason: SDUPTHER

## 2024-04-22 RX ORDER — MEPERIDINE HYDROCHLORIDE 25 MG/ML
12.5 INJECTION INTRAMUSCULAR; INTRAVENOUS; SUBCUTANEOUS EVERY 5 MIN PRN
Status: DISCONTINUED | OUTPATIENT
Start: 2024-04-22 | End: 2024-04-22 | Stop reason: HOSPADM

## 2024-04-22 RX ORDER — DIPHENHYDRAMINE HYDROCHLORIDE 50 MG/ML
12.5 INJECTION INTRAMUSCULAR; INTRAVENOUS
Status: DISCONTINUED | OUTPATIENT
Start: 2024-04-22 | End: 2024-04-22 | Stop reason: HOSPADM

## 2024-04-22 RX ORDER — BACITRACIN ZINC AND POLYMYXIN B SULFATE 500; 1000 [USP'U]/G; [USP'U]/G
OINTMENT TOPICAL PRN
Status: DISCONTINUED | OUTPATIENT
Start: 2024-04-22 | End: 2024-04-22 | Stop reason: ALTCHOICE

## 2024-04-22 RX ORDER — PROPOFOL 10 MG/ML
INJECTION, EMULSION INTRAVENOUS PRN
Status: DISCONTINUED | OUTPATIENT
Start: 2024-04-22 | End: 2024-04-22 | Stop reason: SDUPTHER

## 2024-04-22 RX ORDER — SODIUM CHLORIDE 0.9 % (FLUSH) 0.9 %
5-40 SYRINGE (ML) INJECTION EVERY 12 HOURS SCHEDULED
Status: DISCONTINUED | OUTPATIENT
Start: 2024-04-22 | End: 2024-04-22 | Stop reason: HOSPADM

## 2024-04-22 RX ORDER — NALOXONE HYDROCHLORIDE 0.4 MG/ML
INJECTION, SOLUTION INTRAMUSCULAR; INTRAVENOUS; SUBCUTANEOUS PRN
Status: DISCONTINUED | OUTPATIENT
Start: 2024-04-22 | End: 2024-04-22 | Stop reason: HOSPADM

## 2024-04-22 RX ORDER — ONDANSETRON 2 MG/ML
INJECTION INTRAMUSCULAR; INTRAVENOUS PRN
Status: DISCONTINUED | OUTPATIENT
Start: 2024-04-22 | End: 2024-04-22 | Stop reason: SDUPTHER

## 2024-04-22 RX ORDER — HYDROMORPHONE HYDROCHLORIDE 1 MG/ML
0.5 INJECTION, SOLUTION INTRAMUSCULAR; INTRAVENOUS; SUBCUTANEOUS EVERY 5 MIN PRN
Status: DISCONTINUED | OUTPATIENT
Start: 2024-04-22 | End: 2024-04-22 | Stop reason: HOSPADM

## 2024-04-22 RX ORDER — HYDRALAZINE HYDROCHLORIDE 20 MG/ML
10 INJECTION INTRAMUSCULAR; INTRAVENOUS
Status: DISCONTINUED | OUTPATIENT
Start: 2024-04-22 | End: 2024-04-22 | Stop reason: HOSPADM

## 2024-04-22 RX ORDER — SODIUM CHLORIDE, SODIUM LACTATE, POTASSIUM CHLORIDE, CALCIUM CHLORIDE 600; 310; 30; 20 MG/100ML; MG/100ML; MG/100ML; MG/100ML
INJECTION, SOLUTION INTRAVENOUS CONTINUOUS PRN
Status: DISCONTINUED | OUTPATIENT
Start: 2024-04-22 | End: 2024-04-22 | Stop reason: SDUPTHER

## 2024-04-22 RX ORDER — LABETALOL HYDROCHLORIDE 5 MG/ML
10 INJECTION, SOLUTION INTRAVENOUS
Status: DISCONTINUED | OUTPATIENT
Start: 2024-04-22 | End: 2024-04-22 | Stop reason: HOSPADM

## 2024-04-22 RX ORDER — LIDOCAINE HYDROCHLORIDE 10 MG/ML
INJECTION, SOLUTION EPIDURAL; INFILTRATION; INTRACAUDAL; PERINEURAL PRN
Status: DISCONTINUED | OUTPATIENT
Start: 2024-04-22 | End: 2024-04-22 | Stop reason: HOSPADM

## 2024-04-22 RX ADMIN — VANCOMYCIN HYDROCHLORIDE 1000 MG: 1 INJECTION, POWDER, LYOPHILIZED, FOR SOLUTION INTRAVENOUS at 20:53

## 2024-04-22 RX ADMIN — FENTANYL CITRATE 100 MCG: 50 INJECTION, SOLUTION INTRAMUSCULAR; INTRAVENOUS at 15:11

## 2024-04-22 RX ADMIN — SODIUM CHLORIDE, PRESERVATIVE FREE 10 ML: 5 INJECTION INTRAVENOUS at 20:41

## 2024-04-22 RX ADMIN — PHENYLEPHRINE HYDROCHLORIDE 300 MCG: 10 INJECTION INTRAVENOUS at 15:32

## 2024-04-22 RX ADMIN — LIDOCAINE HYDROCHLORIDE 100 MG: 20 INJECTION, SOLUTION INTRAVENOUS at 15:15

## 2024-04-22 RX ADMIN — Medication 100 MG: at 15:15

## 2024-04-22 RX ADMIN — CEFEPIME 2000 MG: 1 INJECTION, SOLUTION INTRAVENOUS at 11:05

## 2024-04-22 RX ADMIN — METOPROLOL TARTRATE 50 MG: 50 TABLET, FILM COATED ORAL at 11:06

## 2024-04-22 RX ADMIN — PHENYLEPHRINE HYDROCHLORIDE 100 MCG: 10 INJECTION INTRAVENOUS at 16:10

## 2024-04-22 RX ADMIN — ONDANSETRON 4 MG: 2 INJECTION INTRAMUSCULAR; INTRAVENOUS at 15:10

## 2024-04-22 RX ADMIN — FUROSEMIDE 80 MG: 80 TABLET ORAL at 08:40

## 2024-04-22 RX ADMIN — SODIUM CHLORIDE, PRESERVATIVE FREE 10 ML: 5 INJECTION INTRAVENOUS at 08:41

## 2024-04-22 RX ADMIN — METOPROLOL TARTRATE 50 MG: 50 TABLET, FILM COATED ORAL at 20:54

## 2024-04-22 RX ADMIN — FUROSEMIDE 80 MG: 80 TABLET ORAL at 18:28

## 2024-04-22 RX ADMIN — POTASSIUM CHLORIDE 10 MEQ: 750 TABLET, EXTENDED RELEASE ORAL at 08:40

## 2024-04-22 RX ADMIN — AMIODARONE HYDROCHLORIDE 200 MG: 200 TABLET ORAL at 08:40

## 2024-04-22 RX ADMIN — POTASSIUM CHLORIDE 10 MEQ: 750 TABLET, EXTENDED RELEASE ORAL at 20:54

## 2024-04-22 RX ADMIN — PROPOFOL 200 MG: 10 INJECTION, EMULSION INTRAVENOUS at 15:15

## 2024-04-22 RX ADMIN — PHENYLEPHRINE HYDROCHLORIDE 300 MCG: 10 INJECTION INTRAVENOUS at 15:53

## 2024-04-22 RX ADMIN — CEFEPIME 2000 MG: 1 INJECTION, SOLUTION INTRAVENOUS at 23:04

## 2024-04-22 RX ADMIN — PANTOPRAZOLE SODIUM 40 MG: 40 TABLET, DELAYED RELEASE ORAL at 05:05

## 2024-04-22 RX ADMIN — PHENYLEPHRINE HYDROCHLORIDE 300 MCG: 10 INJECTION INTRAVENOUS at 15:41

## 2024-04-22 RX ADMIN — SODIUM CHLORIDE, SODIUM LACTATE, POTASSIUM CHLORIDE, AND CALCIUM CHLORIDE: .6; .31; .03; .02 INJECTION, SOLUTION INTRAVENOUS at 15:07

## 2024-04-22 RX ADMIN — PHENYLEPHRINE HYDROCHLORIDE 400 MCG: 10 INJECTION INTRAVENOUS at 15:21

## 2024-04-22 ASSESSMENT — PAIN SCALES - GENERAL: PAINLEVEL_OUTOF10: 0

## 2024-04-22 NOTE — PROGRESS NOTES
[facility] -  Clinical Pharmacy Note    Vancomycin - Management by Pharmacy    Consult Date(s): 4/19/24  Consulting Provider(s): Dr. Lindquist    Assessment / Plan  1)  Left diabetic foot infection with likely osteomyelitis - Vancomycin  Concurrent Antimicrobials: Cefepime  Day of Vanc Therapy / Ordered Duration: 4  Current Dosing Method: Bayesian-Guided AUC Dosing  Therapeutic Goal: -600 mg/L*hr  Current Dose / Plan:   Pt with CKD - per notes, SCr ~1.8 at baseline.  SCr up to 2.0 yesterday; not checked today.  Currently on 1000mg IV q24h.  Random level this AM = 22.6 mcg/mL (drawn ~12 hr after prior dose given).  Calculated AUC = 564 with trough = 18.5 mcg/mL.  Continue same regimen for now.  Will order BMP for tomorrow AM.  Will continue to monitor clinical condition and make adjustments to regimen as appropriate.    Please call with questions--  Thanks--  Jeanne Zhang, PharmD, BCPS, BCGP  [id number] ([facility])   4/22/2024 9:11 AM      Interval update:  Podiatry planning for left TMA today.    Subjective/Objective:   Veronica Chaidez is a 73 y.o. male with a PMHx significant for DM 2, A-fib on Eliquis, hypertension, chronic kidney disease stage III, diabetic neuropathy, CAD, pacemaker, and HF who is admitted with left foot diabetic foot infection with suspected OM.    Pharmacy is consulted to dose Vancomycin     Ht Readings from Last 1 Encounters:   04/19/24 1.88 m (6' 2\")     Wt Readings from Last 1 Encounters:   04/19/24 120.5 kg (265 lb 11.2 oz)     Prior Vancomycin dosing:  March 2023 - Pt was on 750mg IV q12h, which resulted in AUC = 449 based on level drawn ~48 hrs into therapy.  Wt 124.2 kg and SCr 1.5 during that admission.    Current & Prior Antimicrobial Regimen(s):   (4/19 - 4/20)  Cefepime (4/20-current)  Vancomycin - Pharmacy to dose  2500 mg x 1 4/19 16:00  1250 mg q24h (4/20 x1 dose)  1000 mg q24h (4/21 - current)    Vancomycin Level(s) / Doses:    Date Time Dose Type of Level /

## 2024-04-22 NOTE — ANESTHESIA POSTPROCEDURE EVALUATION
Department of Anesthesiology  Postprocedure Note    Patient: Veronica Chaidez  MRN: [id number]  YOB: 1950  Date of evaluation: 4/22/2024    Procedure Summary       Date: 04/22/24 Room / Location: [address]    Anesthesia Start: 1507 Anesthesia Stop: 1649    Procedure: LEFT FOOT TRANSMETATARSAL AMPUTATION WITH DAVID TENDON LENGTHENING (Left: Foot) Diagnosis:       Diabetic foot infection (HCC)      (Diabetic foot infection (HCC) [E11.628, L08.9])    Surgeons: Chandana Downey DPM Responsible Provider: Calvin Schultz MD    Anesthesia Type: general ASA Status: 3            Anesthesia Type: No value filed.    Esmer Phase I: Esmer Score: 10    Esmer Phase II:      Anesthesia Post Evaluation    Patient location during evaluation: PACU  Patient participation: complete - patient participated  Level of consciousness: awake and alert  Pain score: 0  Airway patency: patent  Nausea & Vomiting: no nausea and no vomiting  Cardiovascular status: hemodynamically stable  Respiratory status: acceptable  Hydration status: euvolemic  Pain management: adequate    No notable events documented.

## 2024-04-22 NOTE — BRIEF OP NOTE
Brief Postoperative Note      Patient: Veronica Chaidez  YOB: 1950  MRN: [id number]    Date of Procedure: 4/22/2024    Pre-Op Diagnosis Codes:     * Diabetic foot infection (HCC) [E11.628, L08.9]    Post-Op Diagnosis: Same       Procedure(s):  LEFT FOOT TRANSMETATARSAL AMPUTATION WITH DAVID TENDON LENGTHENING    Surgeon(s):  Chandana Downey DPM    Assistant:  Resident: Ric Mcmahon DPM    Anesthesia: General    Estimated Blood Loss (mL): less than 100     Complications: None    Hemostasis: anatomic dissection and electrocautery, esmark ankle tourniquet 27 minutes    Injectables: Pre-Op 20 cc of 1% Lidocaine plain and Post-Op 20 cc of 0.5 % Marcaine plain    Materials: 3-0 Vicryl, 3-0 Nylon    Implants: Surgicel hemostatic agent      Specimens:   ID Type Source Tests Collected by Time Destination   A : Left Foot Specimen Foot SURGICAL PATHOLOGY Chandana Downey DPM 4/22/2024 1545        Implants:  * No implants in log *      Drains: * No LDAs found *    Findings:  Infection Present At Time Of Surgery (PATOS) (choose all levels that have infection present):  - Organ Space infection (below fascia) present as evidenced by osteomyelitis  Other Findings: Osteomyelitis of the hallux and second toe of the left foot. Hard, white, healthy bone appreciated at amputation site. No purulence encountered. Procedure was felt to be definitive. Surgical site was fully closed.    DISPO: S/P I&D with transmetatarsal amputation with tendoachilles lengthening left foot. Procedure was felt to be definitive. Patient is to be strict non-weightbearing to the left foot. PT/OT consulted for post op evaluation, appreciate recommendations. Recommend patient be admitted to a SNF or ARU for post op rehab. Podiatry will continue to follow patient while in house.    Electronically signed by Ric Mcmahon DPM on 4/22/2024 at 4:45 PM

## 2024-04-22 NOTE — ANESTHESIA PRE PROCEDURE
Department of Anesthesiology  Preprocedure Note       Name:  Veronica Chaidez   Age:  73 y.o.  :  1950                                          MRN:  [id number]         Date:  2024      Surgeon: Surgeon(s):  Chandana Downey DPM    Procedure: Procedure(s):  LEFT FOOT TRANSMETATARSAL AMPUTATION WITH DAVID TENDON LENGTHENING    Medications prior to admission:   Prior to Admission medications    Medication Sig Start Date End Date Taking? Authorizing Provider   doxycycline hyclate (VIBRA-TABS) 100 MG tablet Take 1 tablet by mouth 2 times daily for 10 days 24  Evans Rivera DPM   benzonatate (TESSALON) 200 MG capsule as needed    ProviderLisa MD   albuterol (PROVENTIL) (2.5 MG/3ML) 0.083% nebulizer solution Take 3 mLs by nebulization every 4 hours as needed for Wheezing 10/16/23   Diego Christian MD   amiodarone (CORDARONE) 200 MG tablet Take 1 tablet by mouth daily 10/4/23   Annette Leonard APRN - CNP   pantoprazole (PROTONIX) 40 MG tablet Take 1 tablet by mouth every morning (before breakfast) 10/5/23   Annette Leonard APRN - CNP   furosemide (LASIX) 80 MG tablet Take 1 tablet by mouth in the morning and 1 tablet in the evening. 23   Aline Macdonald APRN - CNP   potassium chloride (KLOR-CON M) 10 MEQ extended release tablet Take 1 tablet by mouth 2 times daily 23   Aline Macdonald APRN - CNP   apixaban (ELIQUIS) 5 MG TABS tablet Take 1 tablet by mouth 2 times daily 23   Aline Macdonald APRN - CNP   metoprolol tartrate (LOPRESSOR) 50 MG tablet TAKE 1 TABLET BY MOUTH TWICE DAILY 23   Corrine Larios APRN - CNP   EPINEPHrine (PRIMATENE MIST) 0.125 MG/ACT AERO Inhale 2 puffs into the lungs once  Patient not taking: Reported on 2024    Provider, Historical, MD   Handicap Placard MISC by Does not apply route Pt cannot walk more that 250 feet without becoming SOB.  Dx: CAD, Cardiomyopathy, Atrial Fibrilation    Not to exceed 5 years  Patient taking

## 2024-04-22 NOTE — PROGRESS NOTES
From OR drowsy, denies any pain at this time, dressing with ace wrap CDI, VSS.    Report from CRNA and OR RN.    S/P LEFT FOOT TRANSMETATARSAL AMPUTATION WITH DAVID TENDON LENGTHENING

## 2024-04-22 NOTE — PROGRESS NOTES
V2.0    [facility] [place] Progress Note      Name:  Veronica Chaidez /Age/Sex: 1950  (73 y.o. male)   MRN & CSN:  [id number] & [id number] Encounter Date/Time: 2024 10:26 AM EDT   Location:  6328/6328-01 PCP: Diego Christian MD     Attending:Ministerio Medley MD       Hospital Day: 4    Assessment and Recommendations   Veronica Chaidez is a 73 y.o. male who presented to the emergency room from his podiatrist office due to concern for osteomyelitis of the left second toe.    Patient was started on broad-spectrum antibiotics and podiatry was consulted.    Plan:   Diabetic foot wound-left foot second toe-concern for osteomyelitis.  Zosyn changed to cefepime to be given in concurrence with vancomycin.  Plan for podiatric surgery next week timing to be determined  Diabetes with neuropathy-patient has not been on medications at home for some time.  Sliding scale insulin and diabetic diet  Chronic kidney disease stage III-creatinine 1 8 which is close to patient's baseline.  Changed Zosyn to cefepime.  Coronary artery disease-continue metoprolol   N-zmp-ktuudemgh in junctional rythm with amiodarone 200 oral daily and Eliquis 5 mg twice daily.    Patient to the OR today.  Diet Diet NPO Exceptions are: Sips of Water with Meds   DVT Prophylaxis [] Lovenox, []  Heparin, [] SCDs, [] Ambulation,  [] Eliquis, [] Xarelto  [] Coumadin   Code Status Full Code   Disposition From: Home  Expected Disposition: Skilled nursing facility  Estimated Date of Discharge: To be determined after surgery  Patient requires continued admission due to concern for osteomyelitis   Surrogate Decision Maker/ POA  Per EMR     Personally reviewed Lab Studies and Imaging   2024  point-of-care glucose, vancomycin level reviewed.  BMP ordered and pending      On 2024 drugs that require monitoring for toxicity include insulin and the method of monitoring was POCT glucose to evaluate for hypoglycemia.     On 2024 drugs that require monitoring for  after 4 days of incubation. 12/15/2022 07:35 PM     Lab Results   Component Value Date/Time    BLOODCULT2 No Growth after 4 days of incubation. 12/15/2022 07:35 PM     Organism:   Lab Results   Component Value Date/Time    ORG Escherichia coli 08/26/2021 12:51 PM         Electronically signed by EVELYN Monzon CNP on 4/22/2024 at 10:42 AM

## 2024-04-22 NOTE — CARE COORDINATION
Case Management Assessment  Initial Evaluation    Date/Time of Evaluation: 4/22/2024 2:28 PM  Assessment Completed by: Denice Ramirez RN    If patient is discharged prior to next notation, then this note serves as note for discharge by case management.    Patient Name: Veronica Chaidez                   YOB: 1950  Diagnosis: Osteomyelitis of left foot, unspecified type (HCC) [M86.9]  Osteomyelitis of second toe of left foot (HCC) [M86.9]                   Date / Time: 4/19/2024  2:46 PM    Patient Admission Status: Inpatient   Readmission Risk (Low < 19, Mod (19-27), High > 27): Readmission Risk Score: 14.2    Current PCP: Diego Christian MD  PCP verified by CM? Yes    Chart Reviewed: Yes      History Provided by: Patient  Patient Orientation: Alert and Oriented, Person, Place, Situation, Self    Patient Cognition: Alert    Hospitalization in the last 30 days (Readmission):  No    If yes, Readmission Assessment in CM Navigator will be completed.    Advance Directives:      Code Status: Full Code   Patient's Primary Decision Maker is: Legal Next of Kin      Discharge Planning:    Patient lives with: Alone Type of Home: Trailer/Mobile Home (RV)  Primary Care Giver: Self  Patient Support Systems include: Family Members   Current Financial resources: Medicaid  Current community resources: None  Current services prior to admission: None            Current DME:              Type of Home Care services:  None    ADLS  Prior functional level: Independent in ADLs/IADLs  Current functional level:      PT AM-PAC:   /24  OT AM-PAC:   /24    Family can provide assistance at DC: No  Would you like Case Management to discuss the discharge plan with any other family members/significant others, and if so, who? No  Plans to Return to Present Housing: Unknown at present  Other Identified Issues/Barriers to RETURNING to current housing: none  Potential Assistance needed at discharge: Transitional Care            Potential

## 2024-04-22 NOTE — PROGRESS NOTES
PACU Transfer Note    Vitals:    04/22/24 1730   BP: 114/73   Pulse: 71   Resp: 15   Temp: 97.9 °F (36.6 °C)   SpO2: 99%       In: 110 [P.O.:50; I.V.:60]  Out: -     Pain assessment:    Pain Level: 0    Report given to Receiving unit NAVDEEP Valverde per phone.    Transported by NAVDEEP Dow    4/22/2024 5:38 PM

## 2024-04-22 NOTE — PROGRESS NOTES
Pt not able to receive Holy Communion today due to NPO status. Writer offered spiritual Communion. No other needs at this time.

## 2024-04-23 LAB
ANION GAP SERPL CALCULATED.3IONS-SCNC: 12 MMOL/L (ref 3–16)
BASOPHILS # BLD: 0.1 K/UL (ref 0–0.2)
BASOPHILS NFR BLD: 0.5 %
BUN SERPL-MCNC: 59 MG/DL (ref 7–20)
CALCIUM SERPL-MCNC: 9.2 MG/DL (ref 8.3–10.6)
CHLORIDE SERPL-SCNC: 98 MMOL/L (ref 99–110)
CO2 SERPL-SCNC: 27 MMOL/L (ref 21–32)
CREAT SERPL-MCNC: 2.3 MG/DL (ref 0.8–1.3)
DEPRECATED RDW RBC AUTO: 15.1 % (ref 12.4–15.4)
EOSINOPHIL # BLD: 0.2 K/UL (ref 0–0.6)
EOSINOPHIL NFR BLD: 2.1 %
GFR SERPLBLD CREATININE-BSD FMLA CKD-EPI: 29 ML/MIN/{1.73_M2}
GLUCOSE BLD-MCNC: 166 MG/DL (ref 70–99)
GLUCOSE BLD-MCNC: 178 MG/DL (ref 70–99)
GLUCOSE BLD-MCNC: 187 MG/DL (ref 70–99)
GLUCOSE SERPL-MCNC: 104 MG/DL (ref 70–99)
HCT VFR BLD AUTO: 34.6 % (ref 40.5–52.5)
HGB BLD-MCNC: 11.4 G/DL (ref 13.5–17.5)
LYMPHOCYTES # BLD: 0.9 K/UL (ref 1–5.1)
LYMPHOCYTES NFR BLD: 8.8 %
MCH RBC QN AUTO: 28.2 PG (ref 26–34)
MCHC RBC AUTO-ENTMCNC: 33 G/DL (ref 31–36)
MCV RBC AUTO: 85.4 FL (ref 80–100)
MONOCYTES # BLD: 0.9 K/UL (ref 0–1.3)
MONOCYTES NFR BLD: 8.8 %
NEUTROPHILS # BLD: 8.5 K/UL (ref 1.7–7.7)
NEUTROPHILS NFR BLD: 79.8 %
PERFORMED ON: ABNORMAL
PLATELET # BLD AUTO: 187 K/UL (ref 135–450)
PMV BLD AUTO: 9.3 FL (ref 5–10.5)
POTASSIUM SERPL-SCNC: 4.7 MMOL/L (ref 3.5–5.1)
RBC # BLD AUTO: 4.05 M/UL (ref 4.2–5.9)
SODIUM SERPL-SCNC: 137 MMOL/L (ref 136–145)
WBC # BLD AUTO: 10.7 K/UL (ref 4–11)

## 2024-04-23 PROCEDURE — 6370000000 HC RX 637 (ALT 250 FOR IP)

## 2024-04-23 PROCEDURE — 85025 COMPLETE CBC W/AUTO DIFF WBC: CPT

## 2024-04-23 PROCEDURE — 97530 THERAPEUTIC ACTIVITIES: CPT

## 2024-04-23 PROCEDURE — 2580000003 HC RX 258

## 2024-04-23 PROCEDURE — 36415 COLL VENOUS BLD VENIPUNCTURE: CPT

## 2024-04-23 PROCEDURE — 80048 BASIC METABOLIC PNL TOTAL CA: CPT

## 2024-04-23 PROCEDURE — 6360000002 HC RX W HCPCS

## 2024-04-23 PROCEDURE — 97535 SELF CARE MNGMENT TRAINING: CPT

## 2024-04-23 PROCEDURE — 97166 OT EVAL MOD COMPLEX 45 MIN: CPT

## 2024-04-23 PROCEDURE — 1200000000 HC SEMI PRIVATE

## 2024-04-23 PROCEDURE — 97162 PT EVAL MOD COMPLEX 30 MIN: CPT

## 2024-04-23 RX ADMIN — SODIUM CHLORIDE: 9 INJECTION, SOLUTION INTRAVENOUS at 10:41

## 2024-04-23 RX ADMIN — AMIODARONE HYDROCHLORIDE 200 MG: 200 TABLET ORAL at 08:43

## 2024-04-23 RX ADMIN — POTASSIUM CHLORIDE 10 MEQ: 750 TABLET, EXTENDED RELEASE ORAL at 08:43

## 2024-04-23 RX ADMIN — POTASSIUM CHLORIDE 10 MEQ: 750 TABLET, EXTENDED RELEASE ORAL at 21:18

## 2024-04-23 RX ADMIN — FUROSEMIDE 80 MG: 80 TABLET ORAL at 08:43

## 2024-04-23 RX ADMIN — PANTOPRAZOLE SODIUM 40 MG: 40 TABLET, DELAYED RELEASE ORAL at 06:53

## 2024-04-23 RX ADMIN — METOPROLOL TARTRATE 50 MG: 50 TABLET, FILM COATED ORAL at 21:18

## 2024-04-23 RX ADMIN — CEFEPIME 2000 MG: 1 INJECTION, SOLUTION INTRAVENOUS at 10:41

## 2024-04-23 RX ADMIN — APIXABAN 5 MG: 5 TABLET, FILM COATED ORAL at 08:43

## 2024-04-23 RX ADMIN — METOPROLOL TARTRATE 50 MG: 50 TABLET, FILM COATED ORAL at 08:43

## 2024-04-23 RX ADMIN — SODIUM CHLORIDE, PRESERVATIVE FREE 10 ML: 5 INJECTION INTRAVENOUS at 08:44

## 2024-04-23 RX ADMIN — SODIUM CHLORIDE, PRESERVATIVE FREE 10 ML: 5 INJECTION INTRAVENOUS at 21:18

## 2024-04-23 RX ADMIN — APIXABAN 5 MG: 5 TABLET, FILM COATED ORAL at 21:18

## 2024-04-23 NOTE — PLAN OF CARE
Problem: Discharge Planning  Goal: Discharge to home or other facility with appropriate resources  Outcome: Progressing    Patient is expected to discharge to SNF for rehab once medically stable.     Problem: Safety - Adult  Goal: Free from fall injury  Outcome: Progressing    Advised to use walker going to bathroom, NWB on left leg, patient indicate understanding.     Problem: Pain  Goal: Verbalizes/displays adequate comfort level or baseline comfort level  Outcome: Progressing    Patient denies pain. Will continue to monitor.

## 2024-04-23 NOTE — PROGRESS NOTES
04/23/24 1602   Encounter Summary   Encounter Overview/Reason  Spiritual/Emotional Needs   Service Provided For: Patient   Referral/Consult From: Other    Last Encounter  04/23/24   Complexity of Encounter Low   Begin Time 1522   End Time  1603   Total Time Calculated 41 min   Spiritual/Emotional needs   Type Spiritual Support   Rituals, Rites and Sacraments   Type Church Communion  ( offered Church Communion. Patient declined offered to ask [employer] Dist[employer] tomorrow to check in with patient.)   Assessment/Intervention/Outcome   Assessment Anxious;Concerns with suffering;Coping;Powerlessness;Fearful;Despair   Intervention Active listening;Confronted/Challenged;Discussed belief system/Restoration practices/domi;Explored/Affirmed feelings, thoughts, concerns;Explored Coping Skills/Resources;Sustaining Presence/Ministry of presence   Outcome Connection/Belonging;Coping;Encouraged;Engaged in conversation;Expressed feelings, needs, and concerns;Expressed Gratitude;Less anxious, Less agitated      Intern Denice Ruano EdD, SHARAN LINDER ([facility])

## 2024-04-23 NOTE — PROGRESS NOTES
Pt requested to see        04/23/24 0958   Encounter Summary   Encounter Overview/Reason  Follow-up   Service Provided For: Patient   Referral/Consult From: Rounding   Last Encounter  04/23/24   Complexity of Encounter Low   Begin Time 0955   End Time  0958   Total Time Calculated 3 min   Assessment/Intervention/Outcome   Assessment Anxious   Intervention Active listening;Discussed illness injury and it’s impact;Explored/Affirmed feelings, thoughts, concerns   Outcome Expressed feelings, needs, and concerns     Rabbi Lidia Funez,  Intern

## 2024-04-23 NOTE — CARE COORDINATION
Nestor spoke with Abdullahi at [facility]. They can accept. Waiting for PT/OT notes to submit for precert.

## 2024-04-23 NOTE — PROGRESS NOTES
Physical Therapy  Facility/Department: [address]  Physical Therapy Initial Assessment    Name: Veronica Chaidez  : 1950  MRN: [id number]  Date of Service: 2024    Discharge Recommendations:  Patient able to tolerate 3hrs of therapy a day, IP Rehab   PT Equipment Recommendations  Other: defer      At baseline this patient was Independent with functional mobility & ADL's. The current hospitalization has resulted in the patient now being limited with all aspects of mobility, negatively impacting the patient's functional independence, ability to safely access their home environment, and ability to complete valued daily tasks and life roles. Veronica Chaidez is motivated for recovery and demonstrates the ability to tolerate inpatient rehabilitation 3 hours/day, 5 days/week for optimal return to functional independence, quality of life, and decreased caregiver burden.      Patient Diagnosis(es): The primary encounter diagnosis was Osteomyelitis of left foot, unspecified type (HCC). A diagnosis of Diabetic foot infection (HCC) was also pertinent to this visit.  Past Medical History:  has a past medical history of A-fib (HCC), CAD, multiple vessel, Coronary artery disease involving coronary bypass graft, Diabetes mellitus (HCC), Enlarged prostate, Environmental allergies, Kidney stones, Pacemaker, and Systolic CHF (HCC).  Past Surgical History:  has a past surgical history that includes Cardiac catheterization (12/10/2019); Nasal polyp surgery (x2); Nasal septum surgery; Coronary artery bypass graft (N/A, 2020); and Foot surgery (Left, 2024).    Assessment   Body Structures, Functions, Activity Limitations Requiring Skilled Therapeutic Intervention: Decreased functional mobility ;Decreased strength;Decreased safe awareness;Decreased endurance;Decreased balance  Assessment: pt is a 74 yo male admitted with nonhealing LLE wounds and osteomyelitis s/p LEFT FOOT TRANSMETATARSAL AMPUTATION WITH

## 2024-04-23 NOTE — PROGRESS NOTES
Occupational Therapy  Facility/Department: [address]  Occupational Therapy Initial Assessment/Treatment     Name: Veronica Chaidez  : 1950  MRN: [id number]  Date of Service: 2024    Discharge Recommendations:  IP Rehab, 5-7 sessions per week, Patient would benefit from continued therapy after discharge  OT Equipment Recommendations  Equipment Needed: No  Other: defer to next level of care- if pt were to discharge home, pts home would not be condusive to pts current level of function 2/2 living in an RV and having very limited resources with steps in/out of RV     At baseline this patient was independent with functional mobility & ADL's. The current hospitalization has resulted in the patient now being limited with all aspects of mobility, negatively impacting the patient's functional independence, ability to safely access their home environment, and ability to complete valued daily tasks and life roles. Veronica Chaidez is motivated for recovery and demonstrates the ability to tolerate inpatient rehabilitation 3 hours/day, 5 days/week for optimal return to functional independence, quality of life, and decreased caregiver burden.    Patient Diagnosis(es): The primary encounter diagnosis was Osteomyelitis of left foot, unspecified type (HCC). A diagnosis of Diabetic foot infection (HCC) was also pertinent to this visit.  Past Medical History:  has a past medical history of A-fib (HCC), CAD, multiple vessel, Coronary artery disease involving coronary bypass graft, Diabetes mellitus (HCC), Enlarged prostate, Environmental allergies, Kidney stones, Pacemaker, and Systolic CHF (HCC).  Past Surgical History:  has a past surgical history that includes Cardiac catheterization (12/10/2019); Nasal polyp surgery (x2); Nasal septum surgery; Coronary artery bypass graft (N/A, 2020); and Foot surgery (Left, 2024).    Treatment Diagnosis: decreased independence with ADLs and fx mobility 2/2 NWB

## 2024-04-23 NOTE — PROGRESS NOTES
Podiatric Surgery Daily Progress Note  Veronica Chaidez      Subjective :   Patient seen and examined this am at the bedside. Patient denies any acute overnight events. Patient denies N/V/F/C/SOB. Patient denies calf pain, thigh pain, chest pain.     Review of Systems: A 12 point review of symptoms is unremarkable with the exception of the chief complaint. Patient specifically denies nausea, fever, vomiting, chills, shortness of breath, chest pain, abdominal pain, constipation or difficulty urinating.       Objective     /69   Pulse 82   Temp 98.1 °F (36.7 °C) (Oral)   Resp 18   Ht 1.88 m (6' 2\")   Wt 120.5 kg (265 lb 11.2 oz)   SpO2 99%   BMI 34.11 kg/m²      I/O:  Intake/Output Summary (Last 24 hours) at 4/23/2024 0659  Last data filed at 4/22/2024 2110  Gross per 24 hour   Intake 520 ml   Output --   Net 520 ml              Wt Readings from Last 3 Encounters:   04/19/24 120.5 kg (265 lb 11.2 oz)   01/24/24 123 kg (271 lb 3.2 oz)   12/04/23 124.3 kg (274 lb 0.5 oz)       LABS:    Recent Labs     04/23/24  0358   WBC 10.7   HGB 11.4*   HCT 34.6*           Recent Labs     04/23/24  0358      K 4.7   CL 98*   CO2 27   BUN 59*   CREATININE 2.3*        Recent Labs     04/21/24  0513 04/21/24  0907   PROT 8.3*  --    INR  --  1.44*           LOWER EXTREMITY EXAMINATION    Dressing to left lower extremity left clean, dry, and intact.  No strikethrough noted to external dressing. Posterior splint noted to be intact.    CFT unable to be assessed to distal limb 2/2 dressing from TMA.  No pain with calf compression bilateral.  Patient able to perform active range of motion to digits bilateral.       IMAGING:  XR Left foot post surgical (4/22/24)  Findings:  Transmetatarsal amputation in the interval since the last exam. No evidence suggesting complicating process. Mild diffuse soft tissue swelling.     IMPRESSION:  Interval transmetatarsal amputation left foot.    XR left foot (4/12/2024)  FINDINGS:    patient assessment and plan was discussed with Dr. Chandana Downey DPM.    Ric Mcmahon DPM   Podiatric Resident PGY1  [employer]  4/23/2024, 6:59 AM

## 2024-04-23 NOTE — CONSULTS
Clinical Pharmacy Progress Note    Vancomycin has been discontinued. Will sign off pharmacy to dose Vancomycin consult.  If medication is restarted and pharmacy is to manage dosing, please re-consult at that time.    Please call with questions--  Thanks--  Barbara GarciaD, BCPS, BCGP  [phone] ([facility])   4/23/2024 12:28 PM

## 2024-04-23 NOTE — PROGRESS NOTES
Maker/ POA  Per EMR     Personally reviewed Lab Studies and Imaging   4/23/2024  BMP, CBC and point-of-care glucose, reviewed.      On 4/23/2024 drugs that require monitoring for toxicity include insulin and the method of monitoring was POCT glucose to evaluate for hypoglycemia.             Subjective:     Chief Complaint: Left foot wound    Veronica Chaidez is a 73 y.o. male who presents with a past medical history as detailed above.     On 4/23/2024 patient is resting comfortably.  Post op pain is controlled    Review of Systems:      Pertinent positives and negatives discussed in HPI    Objective:     Intake/Output Summary (Last 24 hours) at 4/23/2024 1215  Last data filed at 4/23/2024 0932  Gross per 24 hour   Intake 720 ml   Output --   Net 720 ml        Vitals:   Vitals:    04/22/24 2305 04/23/24 0431 04/23/24 0817 04/23/24 1205   BP: 112/88 119/69 (!) 106/57 109/61   Pulse: 88 82 68 69   Resp: 18 18 17 18   Temp: 98.2 °F (36.8 °C) 98.1 °F (36.7 °C) 97.8 °F (36.6 °C) 98 °F (36.7 °C)   TempSrc: Oral Oral Oral Oral   SpO2: 100% 99% 95% 98%   Weight:       Height:             Physical Exam:      General: NAD  ENT: neck supple  Cardiovascular: Regular rate.  Respiratory: Clear to auscultation  Gastrointestinal: Soft, non tender  Genitourinary: no suprapubic tenderness  Musculoskeletal: Lower extremity edema, left lower extremity in posterior ankle splint with Ace wrap surrounding.  Clean dry and intact.  Neuro: Alert.  Psych: Mood appropriate.         Medications:   Medications:    vancomycin (VANCOCIN) intermittent dosing (placeholder)   Other RX Placeholder    vancomycin  750 mg IntraVENous Once    apixaban  5 mg Oral BID    cefepime  2,000 mg IntraVENous Q12H    amiodarone  200 mg Oral Daily    [Held by provider] furosemide  80 mg Oral BID    metoprolol tartrate  50 mg Oral BID    pantoprazole  40 mg Oral QAM AC    potassium chloride  10 mEq Oral BID    insulin lispro  0-4 Units SubCUTAneous TID WC    insulin  lispro  0-4 Units SubCUTAneous Nightly    sodium chloride flush  5-40 mL IntraVENous 2 times per day      Infusions:    sodium chloride 25 mL/hr at 04/23/24 1041     PRN Meds: albuterol, 2.5 mg, Q4H PRN  sodium chloride flush, 5-40 mL, PRN  sodium chloride, , PRN  potassium chloride, 40 mEq, PRN   Or  potassium alternative oral replacement, 40 mEq, PRN   Or  potassium chloride, 10 mEq, PRN  magnesium sulfate, 2,000 mg, PRN  polyethylene glycol, 17 g, Daily PRN  acetaminophen, 650 mg, Q6H PRN   Or  acetaminophen, 650 mg, Q6H PRN        Labs and Imaging   No results found.    CBC:   Recent Labs     04/23/24  0358   WBC 10.7   HGB 11.4*          BMP:    Recent Labs     04/21/24  0513 04/23/24  0358   * 137   K 4.0 4.7   CL 94* 98*   CO2 24 27   BUN 50* 59*   CREATININE 2.0* 2.3*   GLUCOSE 143* 104*       Hepatic:   Recent Labs     04/21/24  0513   AST 16   ALT 7*   BILITOT 0.5   ALKPHOS 76       Lipids:   Lab Results   Component Value Date/Time    CHOL 172 03/02/2020 06:18 AM    HDL 30 08/28/2022 10:14 AM    TRIG 236 03/02/2020 06:18 AM     Hemoglobin A1C:   Lab Results   Component Value Date/Time    LABA1C 6.3 04/20/2024 04:35 AM     TSH: No results found for: \"TSH\"  Troponin: No results found for: \"TROPONINT\"  Lactic Acid: No results for input(s): \"LACTA\" in the last 72 hours.  BNP: No results for input(s): \"PROBNP\" in the last 72 hours.  UA:  Lab Results   Component Value Date/Time    NITRU Negative 12/15/2022 01:44 PM    COLORU Yellow 12/15/2022 01:44 PM    PHUR 6.0 12/15/2022 01:44 PM    WBCUA  12/15/2022 01:44 PM    RBCUA 5-10 12/15/2022 01:44 PM    MUCUS 1+ 12/15/2022 01:44 PM    YEAST Present 11/20/2019 11:37 PM    BACTERIA 2+ 12/15/2022 01:44 PM    CLARITYU Clear 12/15/2022 01:44 PM    SPECGRAV 1.025 12/15/2022 01:44 PM    LEUKOCYTESUR LARGE 12/15/2022 01:44 PM    UROBILINOGEN 0.2 12/15/2022 01:44 PM    BILIRUBINUR Negative 12/15/2022 01:44 PM    BLOODU LARGE 12/15/2022 01:44 PM    GLUCOSEU

## 2024-04-23 NOTE — PROGRESS NOTES
[facility] -  Clinical Pharmacy Note    Vancomycin - Management by Pharmacy    Consult Date(s): 4/19/24  Consulting Provider(s): Dr. Lindquist    Assessment / Plan  1)  Left diabetic foot infection with likely osteomyelitis - Vancomycin  Concurrent Antimicrobials: Cefepime  Day of Vanc Therapy / Ordered Duration: 5  Current Dosing Method: Bayesian-Guided AUC Dosing  Therapeutic Goal: -600 mg/L*hr  Current Dose / Plan:   Pt with CKD - per notes, SCr ~1.8 at baseline.  SCr up to 2.3 today.  Currently on 1000mg IV q24h - received dose last evening.  Given further rise in SCr today, will change to intermittent dosing based on levels until SCr stabilizes back to baseline.  Will give 750mg IV x1 this afternoon.  Will check random level and f/u SCr in AM tomorrow to determine further dosing.  Will continue to monitor clinical condition and make adjustments to regimen as appropriate.    Please call with questions--  Thanks--  Jeanne Zhang, PharmD, BCPS, BCGP  [phone] ([facility])   4/23/2024 10:19 AM      Interval update:  Pt now s/p left TMA (done yesterday).  Per notes, procedure felt to be definitive / no further surgical intervention planned this admission.    Subjective/Objective:   Veronica Chaidez is a 73 y.o. male with a PMHx significant for DM 2, A-fib on Eliquis, hypertension, chronic kidney disease stage III, diabetic neuropathy, CAD, pacemaker, and HF who is admitted with left foot diabetic foot infection with suspected OM.    Pharmacy is consulted to dose Vancomycin     Ht Readings from Last 1 Encounters:   04/19/24 1.88 m (6' 2\")     Wt Readings from Last 1 Encounters:   04/19/24 120.5 kg (265 lb 11.2 oz)     Prior Vancomycin dosing:  March 2023 - Pt was on 750mg IV q12h, which resulted in AUC = 449 based on level drawn ~48 hrs into therapy.  Wt 124.2 kg and SCr 1.5 during that admission.    Current & Prior Antimicrobial Regimen(s):   (4/19 - 4/20)  Cefepime (4/20-current)  Vancomycin -

## 2024-04-23 NOTE — OP NOTE
Operative Note      Patient: Veronica Chaidez  YOB: 1950  MRN: [id number]     Date of Procedure: 4/22/2024     Pre-Op Diagnosis Codes:     * Diabetic foot infection (HCC) [E11.628, L08.9]     Post-Op Diagnosis: Same       Procedure(s):  LEFT FOOT TRANSMETATARSAL AMPUTATION WITH DAVID TENDON LENGTHENING     Surgeon(s):  Chandana Downey DPM     Assistant:  Resident: Ric Mcmahon DPM     Anesthesia: General     Estimated Blood Loss (mL): less than 100      Complications: None     Hemostasis: anatomic dissection and electrocautery, esmark ankle tourniquet 27 minutes     Injectables: Pre-Op 20 cc of 1% Lidocaine plain and Post-Op 20 cc of 0.5 % Marcaine plain     Materials: 3-0 Vicryl, 3-0 Nylon     Implants: Surgicel hemostatic agent       Specimens:   ID Type Source Tests Collected by Time Destination   A : Left Foot Specimen Foot SURGICAL PATHOLOGY Chandana Downey DPM 4/22/2024 1545           Implants:  * No implants in log *      Drains: * No LDAs found *     Findings:  Infection Present At Time Of Surgery (PATOS) (choose all levels that have infection present):  - Organ Space infection (below fascia) present as evidenced by osteomyelitis  Other Findings: Osteomyelitis of the hallux and second toe of the left foot. Hard, white, healthy bone appreciated at amputation site. No purulence encountered. Procedure was felt to be definitive. Surgical site was fully closed.     INDICATIONS FOR PROCEDURE: This patient has signs and symptoms clinically and radiographically consistent with the above mentioned preoperative diagnosis. Having failed conservative treatment, it was determined that the patient would benefit from surgical intervention. All potential risks, benefits, and complications were discussed with the patient prior to the scheduling of surgery. All the patient's questions were answered and no guarantees were given. The patient wished to proceed with surgery, and informed

## 2024-04-23 NOTE — PLAN OF CARE
Problem: Pain  Goal: Verbalizes/displays adequate comfort level or baseline comfort level  4/23/2024 1050 by Kristine Muse, RN  Outcome: Progressing   0 complaints of pain or discomfort voiced this shift.  Problem: Safety - Adult  Goal: Free from fall injury  4/23/2024 1050 by Kristine Muse, RN  Outcome: Progressing   Bed in lowest position, wheels locked, call light and bedside table within reach. Pt UAL.  Problem: Discharge Planning  Goal: Discharge to home or other facility with appropriate resources  4/23/2024 1050 by Kristine Muse, RN  Outcome: Progressing

## 2024-04-24 LAB
ANION GAP SERPL CALCULATED.3IONS-SCNC: 11 MMOL/L (ref 3–16)
BASOPHILS # BLD: 0.1 K/UL (ref 0–0.2)
BASOPHILS NFR BLD: 0.7 %
BUN SERPL-MCNC: 68 MG/DL (ref 7–20)
CALCIUM SERPL-MCNC: 9 MG/DL (ref 8.3–10.6)
CHLORIDE SERPL-SCNC: 97 MMOL/L (ref 99–110)
CO2 SERPL-SCNC: 25 MMOL/L (ref 21–32)
CREAT SERPL-MCNC: 2.8 MG/DL (ref 0.8–1.3)
DEPRECATED RDW RBC AUTO: 15.2 % (ref 12.4–15.4)
EOSINOPHIL # BLD: 0.5 K/UL (ref 0–0.6)
EOSINOPHIL NFR BLD: 5.4 %
GFR SERPLBLD CREATININE-BSD FMLA CKD-EPI: 23 ML/MIN/{1.73_M2}
GLUCOSE BLD-MCNC: 122 MG/DL (ref 70–99)
GLUCOSE BLD-MCNC: 156 MG/DL (ref 70–99)
GLUCOSE BLD-MCNC: 220 MG/DL (ref 70–99)
GLUCOSE BLD-MCNC: 221 MG/DL (ref 70–99)
GLUCOSE SERPL-MCNC: 145 MG/DL (ref 70–99)
HCT VFR BLD AUTO: 32.2 % (ref 40.5–52.5)
HGB BLD-MCNC: 11 G/DL (ref 13.5–17.5)
LYMPHOCYTES # BLD: 1.1 K/UL (ref 1–5.1)
LYMPHOCYTES NFR BLD: 12.5 %
MCH RBC QN AUTO: 28.9 PG (ref 26–34)
MCHC RBC AUTO-ENTMCNC: 34.1 G/DL (ref 31–36)
MCV RBC AUTO: 84.9 FL (ref 80–100)
MONOCYTES # BLD: 0.9 K/UL (ref 0–1.3)
MONOCYTES NFR BLD: 11.2 %
NEUTROPHILS # BLD: 5.9 K/UL (ref 1.7–7.7)
NEUTROPHILS NFR BLD: 70.2 %
PERFORMED ON: ABNORMAL
PLATELET # BLD AUTO: 155 K/UL (ref 135–450)
PMV BLD AUTO: 8.5 FL (ref 5–10.5)
POTASSIUM SERPL-SCNC: 4.3 MMOL/L (ref 3.5–5.1)
RBC # BLD AUTO: 3.8 M/UL (ref 4.2–5.9)
SODIUM SERPL-SCNC: 133 MMOL/L (ref 136–145)
WBC # BLD AUTO: 8.4 K/UL (ref 4–11)

## 2024-04-24 PROCEDURE — 2580000003 HC RX 258

## 2024-04-24 PROCEDURE — 85025 COMPLETE CBC W/AUTO DIFF WBC: CPT

## 2024-04-24 PROCEDURE — 6370000000 HC RX 637 (ALT 250 FOR IP)

## 2024-04-24 PROCEDURE — 1200000000 HC SEMI PRIVATE

## 2024-04-24 PROCEDURE — 80048 BASIC METABOLIC PNL TOTAL CA: CPT

## 2024-04-24 PROCEDURE — 2580000003 HC RX 258: Performed by: NURSE PRACTITIONER

## 2024-04-24 PROCEDURE — 36415 COLL VENOUS BLD VENIPUNCTURE: CPT

## 2024-04-24 RX ORDER — SODIUM CHLORIDE 9 MG/ML
INJECTION, SOLUTION INTRAVENOUS CONTINUOUS
Status: ACTIVE | OUTPATIENT
Start: 2024-04-24 | End: 2024-04-25

## 2024-04-24 RX ADMIN — APIXABAN 5 MG: 5 TABLET, FILM COATED ORAL at 19:38

## 2024-04-24 RX ADMIN — SODIUM CHLORIDE, PRESERVATIVE FREE 10 ML: 5 INJECTION INTRAVENOUS at 19:38

## 2024-04-24 RX ADMIN — POTASSIUM CHLORIDE 10 MEQ: 750 TABLET, EXTENDED RELEASE ORAL at 19:38

## 2024-04-24 RX ADMIN — METOPROLOL TARTRATE 50 MG: 50 TABLET, FILM COATED ORAL at 19:38

## 2024-04-24 RX ADMIN — PANTOPRAZOLE SODIUM 40 MG: 40 TABLET, DELAYED RELEASE ORAL at 06:30

## 2024-04-24 RX ADMIN — SODIUM CHLORIDE: 9 INJECTION, SOLUTION INTRAVENOUS at 15:12

## 2024-04-24 RX ADMIN — AMIODARONE HYDROCHLORIDE 200 MG: 200 TABLET ORAL at 08:24

## 2024-04-24 RX ADMIN — SODIUM CHLORIDE, PRESERVATIVE FREE 10 ML: 5 INJECTION INTRAVENOUS at 08:24

## 2024-04-24 RX ADMIN — POTASSIUM CHLORIDE 10 MEQ: 750 TABLET, EXTENDED RELEASE ORAL at 08:24

## 2024-04-24 RX ADMIN — APIXABAN 5 MG: 5 TABLET, FILM COATED ORAL at 08:24

## 2024-04-24 RX ADMIN — METOPROLOL TARTRATE 50 MG: 50 TABLET, FILM COATED ORAL at 08:24

## 2024-04-24 RX ADMIN — INSULIN LISPRO 1 UNITS: 100 INJECTION, SOLUTION INTRAVENOUS; SUBCUTANEOUS at 13:09

## 2024-04-24 ASSESSMENT — PAIN SCALES - GENERAL: PAINLEVEL_OUTOF10: 0

## 2024-04-24 NOTE — PROGRESS NOTES
Patient's sister Elpidio Foster called stated patient has been peeing a lot and she is concerned. You saw this patient yesterday and forgot to mention it to you. He is diabetic and on insulin. Please Advice. Podiatric Surgery Daily Progress Note  Veronica Chaidez      Subjective :   Patient seen and examined this am at the bedside. S/P TMA with JENA, left foot (DOS 4/22/24). Patient denies any acute overnight events. Patient denies N/V/F/C/SOB. Patient denies calf pain, thigh pain, chest pain.     Review of Systems: A 12 point review of symptoms is unremarkable with the exception of the chief complaint. Patient specifically denies nausea, fever, vomiting, chills, shortness of breath, chest pain, abdominal pain, constipation or difficulty urinating.       Objective     /61   Pulse 65   Temp 97.7 °F (36.5 °C) (Oral)   Resp 18   Ht 1.88 m (6' 2\")   Wt 120.5 kg (265 lb 11.2 oz)   SpO2 99%   BMI 34.11 kg/m²      I/O:  Intake/Output Summary (Last 24 hours) at 4/24/2024 1027  Last data filed at 4/24/2024 0632  Gross per 24 hour   Intake 980 ml   Output 670 ml   Net 310 ml              Wt Readings from Last 3 Encounters:   04/19/24 120.5 kg (265 lb 11.2 oz)   01/24/24 123 kg (271 lb 3.2 oz)   12/04/23 124.3 kg (274 lb 0.5 oz)       LABS:    Recent Labs     04/23/24  0358 04/24/24  0543   WBC 10.7 8.4   HGB 11.4* 11.0*   HCT 34.6* 32.2*    155        Recent Labs     04/24/24  0542   *   K 4.3   CL 97*   CO2 25   BUN 68*   CREATININE 2.8*        No results for input(s): \"PROT\", \"INR\", \"APTT\" in the last 72 hours.          LOWER EXTREMITY EXAMINATION    Dressing to left lower extremity left clean, dry, and intact.  No strikethrough noted to external dressing. Posterior splint noted to be intact.    CFT unable to be assessed to distal limb 2/2 dressing from TMA.  No pain with calf compression bilateral.  Patient able to perform active range of motion to digits bilateral.       IMAGING:  XR Left foot post surgical (4/22/24)  Findings:  Transmetatarsal amputation in the interval since the last exam. No evidence suggesting complicating process. Mild diffuse soft tissue swelling.     IMPRESSION:  Interval

## 2024-04-24 NOTE — PROGRESS NOTES
V2.0    [facility] [place]      Name:  Veronica Chaidez /Age/Sex: 1950  (73 y.o. male)   MRN & CSN:  [id number] & [id number] Encounter Date/Time: 2024 10:26 AM EDT   Location:  6328/6328-01 PCP: Diego Christian MD     Attending:Carlene Murillo MD       Hospital Day: 6    Assessment and Recommendations   Veronica Chaidez is a 73 y.o. male who presented to the emergency room from his podiatrist office due to concern for osteomyelitis of the left second toe.    Patient was started on broad-spectrum antibiotics and podiatry was consulted.    Plan:   Diabetic foot wound-left foot second toe-concern for osteomyelitis.  Patient status post left foot transmetatarsal amputation with Achilles tendon lengthening on   Diabetes with neuropathy-patient has not been on medications at home for some time.  Sliding scale insulin and diabetic diet  Chronic kidney disease stage III-creatinine 1 8 which is close to patient's baseline.    Coronary artery disease-continue metoprolol   H-xvm-dtpwscmiu in junctional rythm with amiodarone 200 oral daily and Eliquis 5 mg twice daily.  NANETTE-creatinine bumped up to 2.8 IVF monitor renal panel    Patient's status post left foot transmetatarsal amputation with Achilles tendon lengthening on . No antibiotics necessary from podiatric standpoint.  Patient is to be nonweightbearing to left lower extremity with posterior splint.  Procedure was felt to be definitive, no further podiatric surgical intervention at this time.  PT/OT following, Social work following for placement. Podiatry will continue to follow patient while in house.     Patient will need PT/OT renal panel with worsening creatinine up from 2.1-2.8 today we will continue IV fluids for hydration  DVT Prophylaxis [] Lovenox, []  Heparin, [] SCDs, [] Ambulation,  [x] Eliquis, [] Xarelto  [] Coumadin   Code Status Full Code   Disposition From: Home  Expected Disposition: Skilled nursing facility  Estimated Date  of Discharge: 44/25  Patient requires continued admission due to concern for osteomyelitis   Surrogate Decision Maker/ POA  Per EMR     Personally reviewed Lab Studies and Imaging   4/24/2024  BMP, CBC and point-of-care glucose, reviewed.      On 4/24/2024 drugs that require monitoring for toxicity include insulin and the method of monitoring was POCT glucose to evaluate for hypoglycemia.             Subjective:     Chief Complaint: Left foot wound    Veronica Chaidez is a 73 y.o. male who presents with a past medical history as detailed above.     On 4/24/2024 patient is resting comfortably.  Post op pain is controlled    Review of Systems:      Pertinent positives and negatives discussed in HPI    Objective:     Intake/Output Summary (Last 24 hours) at 4/24/2024 1423  Last data filed at 4/24/2024 0632  Gross per 24 hour   Intake 980 ml   Output 670 ml   Net 310 ml        Vitals:   Vitals:    04/23/24 2115 04/23/24 2355 04/24/24 0410 04/24/24 0823   BP: 122/65 137/63 128/65 115/61   Pulse: 69 74 73 65   Resp: 18 18 18 18   Temp: 98 °F (36.7 °C) 97.9 °F (36.6 °C) 97.8 °F (36.6 °C) 97.7 °F (36.5 °C)   TempSrc: Oral Oral Oral Oral   SpO2: 99% 97% 97% 99%   Weight:       Height:             Physical Exam:      General: NAD  ENT: neck supple  Cardiovascular: Regular rate.  Respiratory: Clear to auscultation  Gastrointestinal: Soft, non tender  Genitourinary: no suprapubic tenderness  Musculoskeletal: Lower extremity edema, left lower extremity in posterior ankle splint with Ace wrap surrounding.  Clean dry and intact.  Neuro: Alert.  Psych: Mood appropriate.         Medications:   Medications:    apixaban  5 mg Oral BID    amiodarone  200 mg Oral Daily    [Held by provider] furosemide  80 mg Oral BID    metoprolol tartrate  50 mg Oral BID    pantoprazole  40 mg Oral QAM AC    potassium chloride  10 mEq Oral BID    insulin lispro  0-4 Units SubCUTAneous TID WC    insulin lispro  0-4 Units SubCUTAneous Nightly    sodium

## 2024-04-24 NOTE — DISCHARGE INSTR - COC
Continuity of Care Form    Patient Name: Veronica Chaidez   :  1950  MRN:  [id number]    Admit date:  2024  Discharge date:  2024      Code Status Order: Full Code   Advance Directives:     Admitting Physician:  Nay Lindquist MD  PCP: Diego Christian MD    Discharging Nurse: Nurse Alan HURST RN  Discharging Hospital Unit/Room#: 6328/6328-01  Discharging Unit Phone Number: [phone]    Emergency Contact:   Extended Emergency Contact Information  Primary Emergency Contact: TRUDY SHAH  Home Phone: [phone]  Mobile Phone: [phone]  Relation: Niece/Nephew  Secondary Emergency Contact: Moody Caballero  Home Phone: [phone]  Relation: Friend    Past Surgical History:  Past Surgical History:   Procedure Laterality Date    CARDIAC CATHETERIZATION  12/10/2019    Dr. Shaver - severe multi-vessel disease    CORONARY ARTERY BYPASS GRAFT N/A 2020    WAYNE; TCPB; CABG x 3, LIMA to LAD w/ long segment endarterectomy (4 cm); bilateral pulmonary vein isolation; 40 mm Atriclip to L atril appendage; endoscopic L GSV harvest; ICNB x 5 levels bilat; sternal plate x 1 to manubrium performed by Sohail Rhoades MD at [facility] OR    FOOT SURGERY Left 2024    LEFT FOOT TRANSMETATARSAL AMPUTATION WITH DAVID TENDON LENGTHENING performed by Chandana Downey DPM at [facility] OR    NASAL POLYP SURGERY  x2    NASAL SEPTUM SURGERY         Immunization History:   Immunization History   Administered Date(s) Administered    TDaP, ADACEL (age 10y-64y), BOOSTRIX (age 10y+), IM, 0.5mL 2021       Active Problems:  Patient Active Problem List   Diagnosis Code    Dilated cardiomyopathy (HCC) I42.0    Paroxysmal atrial fibrillation (HCC) I48.0    S/P coronary angiogram Z98.890    CAD, multiple vessel I25.10    ICD (implantable cardioverter-defibrillator) in place-MEDTRONIC Z95.810    Ischemic cardiomyopathy I25.5    Mixed hyperlipidemia E78.2    Gastroesophageal reflux disease without esophagitis K21.9     for less 30 days.     Update Admission H&P: No change in H&P    PHYSICIAN SIGNATURE:  Electronically signed by EVELYN Stanley CNP on 4/26/24 at 1:38 PM [facility] [place] [facility]  [address] [place] [address]  Phone: [phone]     Dr. Chandana Downey                                               Post Operative Instructions     1.  Have Prescriptions filled and take as directed.  All medications should be taken with food or milk.     2.  Keep foot elevated six inches above the level of the heart.  Support feet, legs, and knees with pillows.     3.  KEEP FOOT ELEVATED AS MUCH AS POSSIBLE UNTIL YOUR NEXT VISIT     4.  Place an ice pack on the bandaged site for 15 minutes every hour while awake     5.  Keep dressing clean, dry, and intact.  DO NOT REMOVE DRESSING.  CALL THE OFFICE IF BANDAGE COMES OFF.     6.  For the first 7 days after surgery, take temperature by mouth three times a day.  Call the office if greater than 101F.     7.  Ambulate with NON-weight bearing to the LEFT lower extremity with surgical shoe or crutches / walker.     8.  All instructions are to be followed until otherwise instructed by your surgeon.     9.  Call our office if you have any concerns or questions which arise. [phone]     10.  Your first post-operative appointment is scheduled, call the office for appointment date and time if not known prior to today.

## 2024-04-24 NOTE — PROGRESS NOTES
Patient being discharged  to [address] [place] [address].  All belongings sent with patient.  Report called and given to Nurse jefferson.

## 2024-04-24 NOTE — CARE COORDINATION
CM continues to follow for DC planning and needs. Pre-cert pending for [facility]. Per MD, monitoring creatinine. Podiatry following for post op care and guidance.    CM will assist with further DC planning and transportation once pre-cert obtained.    Thank you,  Javan CHANGN, RN,   [phone]

## 2024-04-24 NOTE — PLAN OF CARE
Problem: Discharge Planning  Goal: Discharge to home or other facility with appropriate resources  4/24/2024 0950 by Kristine Muse, RN  Outcome: Progressing   Pt awaiting pre cert for rehab.  Problem: Safety - Adult  Goal: Free from fall injury  4/24/2024 0950 by Kristine Muse, RN  Outcome: Progressing   Bed in lowest position, wheels locked, bed alarm on, call light within reach. Pt educated to use call light before self transferring.  Problem: Skin/Tissue Integrity  Goal: Absence of new skin breakdown  Description: 1.  Monitor for areas of redness and/or skin breakdown  2.  Assess vascular access sites hourly  3.  Every 4-6 hours minimum:  Change oxygen saturation probe site  4.  Every 4-6 hours:  If on nasal continuous positive airway pressure, respiratory therapy assess nares and determine need for appliance change or resting period.  Outcome: Progressing   Pt educated to turn and reposition every 2 hours, pt verbalizes understanding. 0 new skin issues noted this shift.

## 2024-04-24 NOTE — PLAN OF CARE
Problem: Discharge Planning  Goal: Discharge to home or other facility with appropriate resources  4/23/2024 2225 by Yazmin Pal, RN  Outcome: Progressing     Problem: Safety - Adult  Goal: Free from fall injury  4/23/2024 2225 by Yazmin Pal, RN  Outcome: Progressing  Note: At risk for fall.  Call light within reach, instructed to call whenever he need assistance.  Bed maintain at it's lowest height, and locked.     Problem: ABCDS Injury Assessment  Goal: Absence of physical injury  Outcome: Progressing  Flowsheets (Taken 4/23/2024 2225)  Absence of Physical Injury: Implement safety measures based on patient assessment     Problem: Chronic Conditions and Co-morbidities  Goal: Patient's chronic conditions and co-morbidity symptoms are monitored and maintained or improved  Outcome: Progressing  Flowsheets (Taken 4/23/2024 2225)  Care Plan - Patient's Chronic Conditions and Co-Morbidity Symptoms are Monitored and Maintained or Improved:   Monitor and assess patient's chronic conditions and comorbid symptoms for stability, deterioration, or improvement   Collaborate with multidisciplinary team to address chronic and comorbid conditions and prevent exacerbation or deterioration   Update acute care plan with appropriate goals if chronic or comorbid symptoms are exacerbated and prevent overall improvement and discharge     Problem: Pain  Goal: Verbalizes/displays adequate comfort level or baseline comfort level  4/23/2024 2225 by Yazmin Pal, RN  Outcome: Progressing  Flowsheets (Taken 4/23/2024 2225)  Verbalizes/displays adequate comfort level or baseline comfort level:   Encourage patient to monitor pain and request assistance   Assess pain using appropriate pain scale   Administer analgesics based on type and severity of pain and evaluate response   Implement non-pharmacological measures as appropriate and evaluate response   Consider cultural and social influences on pain and pain management    Notify Licensed Independent Practitioner if interventions unsuccessful or patient reports new pain

## 2024-04-25 LAB
ANION GAP SERPL CALCULATED.3IONS-SCNC: 8 MMOL/L (ref 3–16)
BASOPHILS # BLD: 0.1 K/UL (ref 0–0.2)
BASOPHILS NFR BLD: 0.8 %
BUN SERPL-MCNC: 70 MG/DL (ref 7–20)
CALCIUM SERPL-MCNC: 9 MG/DL (ref 8.3–10.6)
CHLORIDE SERPL-SCNC: 98 MMOL/L (ref 99–110)
CO2 SERPL-SCNC: 26 MMOL/L (ref 21–32)
CREAT SERPL-MCNC: 2.6 MG/DL (ref 0.8–1.3)
DEPRECATED RDW RBC AUTO: 15.8 % (ref 12.4–15.4)
EOSINOPHIL # BLD: 0.5 K/UL (ref 0–0.6)
EOSINOPHIL NFR BLD: 6.1 %
GFR SERPLBLD CREATININE-BSD FMLA CKD-EPI: 25 ML/MIN/{1.73_M2}
GLUCOSE BLD-MCNC: 125 MG/DL (ref 70–99)
GLUCOSE BLD-MCNC: 137 MG/DL (ref 70–99)
GLUCOSE BLD-MCNC: 191 MG/DL (ref 70–99)
GLUCOSE BLD-MCNC: 197 MG/DL (ref 70–99)
GLUCOSE SERPL-MCNC: 120 MG/DL (ref 70–99)
HCT VFR BLD AUTO: 30.4 % (ref 40.5–52.5)
HGB BLD-MCNC: 10.3 G/DL (ref 13.5–17.5)
LYMPHOCYTES # BLD: 1.5 K/UL (ref 1–5.1)
LYMPHOCYTES NFR BLD: 18.7 %
MCH RBC QN AUTO: 28.5 PG (ref 26–34)
MCHC RBC AUTO-ENTMCNC: 33.8 G/DL (ref 31–36)
MCV RBC AUTO: 84.4 FL (ref 80–100)
MONOCYTES # BLD: 0.8 K/UL (ref 0–1.3)
MONOCYTES NFR BLD: 10.5 %
NEUTROPHILS # BLD: 5.1 K/UL (ref 1.7–7.7)
NEUTROPHILS NFR BLD: 63.9 %
PERFORMED ON: ABNORMAL
PLATELET # BLD AUTO: 150 K/UL (ref 135–450)
PMV BLD AUTO: 8.5 FL (ref 5–10.5)
POTASSIUM SERPL-SCNC: 4.3 MMOL/L (ref 3.5–5.1)
RBC # BLD AUTO: 3.6 M/UL (ref 4.2–5.9)
SODIUM SERPL-SCNC: 132 MMOL/L (ref 136–145)
SODIUM UR-SCNC: 62 MMOL/L
WBC # BLD AUTO: 8 K/UL (ref 4–11)

## 2024-04-25 PROCEDURE — 1200000000 HC SEMI PRIVATE

## 2024-04-25 PROCEDURE — 84300 ASSAY OF URINE SODIUM: CPT

## 2024-04-25 PROCEDURE — 84156 ASSAY OF PROTEIN URINE: CPT

## 2024-04-25 PROCEDURE — 99223 1ST HOSP IP/OBS HIGH 75: CPT | Performed by: INTERNAL MEDICINE

## 2024-04-25 PROCEDURE — 36415 COLL VENOUS BLD VENIPUNCTURE: CPT

## 2024-04-25 PROCEDURE — 6370000000 HC RX 637 (ALT 250 FOR IP): Performed by: NURSE PRACTITIONER

## 2024-04-25 PROCEDURE — 6370000000 HC RX 637 (ALT 250 FOR IP)

## 2024-04-25 PROCEDURE — 2580000003 HC RX 258

## 2024-04-25 PROCEDURE — 97535 SELF CARE MNGMENT TRAINING: CPT

## 2024-04-25 PROCEDURE — 97110 THERAPEUTIC EXERCISES: CPT

## 2024-04-25 PROCEDURE — 97530 THERAPEUTIC ACTIVITIES: CPT

## 2024-04-25 PROCEDURE — 80048 BASIC METABOLIC PNL TOTAL CA: CPT

## 2024-04-25 PROCEDURE — 82570 ASSAY OF URINE CREATININE: CPT

## 2024-04-25 PROCEDURE — 85025 COMPLETE CBC W/AUTO DIFF WBC: CPT

## 2024-04-25 RX ADMIN — POTASSIUM CHLORIDE 10 MEQ: 750 TABLET, EXTENDED RELEASE ORAL at 08:49

## 2024-04-25 RX ADMIN — SALINE NASAL SPRAY 2 SPRAY: 1.5 SOLUTION NASAL at 20:33

## 2024-04-25 RX ADMIN — POTASSIUM CHLORIDE 10 MEQ: 750 TABLET, EXTENDED RELEASE ORAL at 19:39

## 2024-04-25 RX ADMIN — APIXABAN 5 MG: 5 TABLET, FILM COATED ORAL at 19:39

## 2024-04-25 RX ADMIN — APIXABAN 5 MG: 5 TABLET, FILM COATED ORAL at 08:49

## 2024-04-25 RX ADMIN — POLYETHYLENE GLYCOL 3350 17 G: 17 POWDER, FOR SOLUTION ORAL at 19:39

## 2024-04-25 RX ADMIN — METOPROLOL TARTRATE 50 MG: 50 TABLET, FILM COATED ORAL at 19:39

## 2024-04-25 RX ADMIN — SODIUM CHLORIDE, PRESERVATIVE FREE 10 ML: 5 INJECTION INTRAVENOUS at 19:39

## 2024-04-25 RX ADMIN — SODIUM CHLORIDE, PRESERVATIVE FREE 10 ML: 5 INJECTION INTRAVENOUS at 08:49

## 2024-04-25 RX ADMIN — PANTOPRAZOLE SODIUM 40 MG: 40 TABLET, DELAYED RELEASE ORAL at 06:21

## 2024-04-25 RX ADMIN — AMIODARONE HYDROCHLORIDE 200 MG: 200 TABLET ORAL at 08:49

## 2024-04-25 NOTE — PROGRESS NOTES
V2.0    [facility] [place]      Name:  Veronica Chaidez /Age/Sex: 1950  (73 y.o. male)   MRN & CSN:  [id number] & [id number] Encounter Date/Time: 2024 10:26 AM EDT   Location:  [facility]/3304-45 PCP: Diego Christian MD     Attending:Carlene Murillo MD       Hospital Day: 7    Assessment and Recommendations   Veronica Chaidez is a 73 y.o. male who presented to the emergency room from his podiatrist office due to concern for osteomyelitis of the left second toe.    Patient was started on broad-spectrum antibiotics and podiatry was consulted.    Plan:   Diabetic foot wound-with acute OM s/p TMA with JENA.   on ; managed per podiatry  Diabetes with neuropathy-patient has not been on medications at home for some time.  Sliding scale insulin and diabetic diet  Chronic kidney disease stage III-creatinine 1 8 which is close to patient's baseline.    Coronary artery disease-continue metoprolol   H-toi-ofrzstqtc in junctional rythm with amiodarone 200 oral daily and Eliquis 5 mg twice daily.  Echo in  with EF 50% indeterminate diastolic function  NANETTE-creatinine bumped up to 2.8 IVF monitor renal panel; avoid nephrotoxins though creatinine remained at 2.6 consult nephrology.  You are studies pending strict HIEN's Daily weights      DVT Prophylaxis [] Lovenox, []  Heparin, [] SCDs, [] Ambulation,  [x] Eliquis, [] Xarelto  [] Coumadin   Code Status Full Code   Disposition From: Home  Expected Disposition: Subacute  Estimated Date of Discharge: Pending pre-CERT and approval  Patient requires continued admission due to concern for osteomyelitis   Surrogate Decision Maker/ POA  Per EMR     Personally reviewed Lab Studies and Imaging   2024   BMP, CBC and point-of-care glucose, reviewed.      On 2024 drugs that require monitoring for toxicity include insulin and the method of monitoring was POCT glucose to evaluate for hypoglycemia.             Subjective:     Chief Complaint: Left foot wound; s/p TMA    Recent Labs     04/23/24  0358 04/24/24  0543 04/25/24  0449   WBC 10.7 8.4 8.0   HGB 11.4* 11.0* 10.3*    155 150       BMP:    Recent Labs     04/23/24  0358 04/24/24  0542 04/25/24  0449    133* 132*   K 4.7 4.3 4.3   CL 98* 97* 98*   CO2 27 25 26   BUN 59* 68* 70*   CREATININE 2.3* 2.8* 2.6*   GLUCOSE 104* 145* 120*       Hepatic:   No results for input(s): \"AST\", \"ALT\", \"ALB\", \"BILITOT\", \"ALKPHOS\" in the last 72 hours.    Lipids:   Lab Results   Component Value Date/Time    CHOL 172 03/02/2020 06:18 AM    HDL 30 08/28/2022 10:14 AM    TRIG 236 03/02/2020 06:18 AM     Hemoglobin A1C:   Lab Results   Component Value Date/Time    LABA1C 6.3 04/20/2024 04:35 AM     TSH: No results found for: \"TSH\"  Troponin: No results found for: \"TROPONINT\"  Lactic Acid: No results for input(s): \"LACTA\" in the last 72 hours.  BNP: No results for input(s): \"PROBNP\" in the last 72 hours.  UA:  Lab Results   Component Value Date/Time    NITRU Negative 12/15/2022 01:44 PM    COLORU Yellow 12/15/2022 01:44 PM    PHUR 6.0 12/15/2022 01:44 PM    WBCUA  12/15/2022 01:44 PM    RBCUA 5-10 12/15/2022 01:44 PM    MUCUS 1+ 12/15/2022 01:44 PM    YEAST Present 11/20/2019 11:37 PM    BACTERIA 2+ 12/15/2022 01:44 PM    CLARITYU Clear 12/15/2022 01:44 PM    SPECGRAV 1.025 12/15/2022 01:44 PM    LEUKOCYTESUR LARGE 12/15/2022 01:44 PM    UROBILINOGEN 0.2 12/15/2022 01:44 PM    BILIRUBINUR Negative 12/15/2022 01:44 PM    BLOODU LARGE 12/15/2022 01:44 PM    GLUCOSEU Negative 12/15/2022 01:44 PM    KETUA Negative 12/15/2022 01:44 PM     Urine Cultures:   Lab Results   Component Value Date/Time    LABURIN No growth at 18 to 36 hours 11/11/2022 06:27 PM     Blood Cultures:   Lab Results   Component Value Date/Time    BC No Growth after 4 days of incubation. 12/15/2022 07:35 PM     Lab Results   Component Value Date/Time    BLOODCULT2 No Growth after 4 days of incubation. 12/15/2022 07:35 PM     Organism:   Lab Results   Component

## 2024-04-25 NOTE — PROGRESS NOTES
Podiatric Surgery Daily Progress Note  Veronica Dm      Subjective :   Patient seen and examined this am at the bedside. S/P TMA with JENA, left foot (DOS 4/22/24). Patient denies any acute overnight events. Patient denies N/V/F/C/SOB. Patient denies calf pain, thigh pain, chest pain.     Review of Systems: A 12 point review of symptoms is unremarkable with the exception of the chief complaint. Patient specifically denies nausea, fever, vomiting, chills, shortness of breath, chest pain, abdominal pain, constipation or difficulty urinating.       Objective     BP (!) 100/48   Pulse 70   Temp 98 °F (36.7 °C) (Oral)   Resp 16   Ht 1.88 m (6' 2\")   Wt 117 kg (257 lb 15 oz)   SpO2 97%   BMI 33.12 kg/m²      I/O:No intake or output data in the 24 hours ending 04/25/24 0949             Wt Readings from Last 3 Encounters:   04/25/24 117 kg (257 lb 15 oz)   01/24/24 123 kg (271 lb 3.2 oz)   12/04/23 124.3 kg (274 lb 0.5 oz)       LABS:    Recent Labs     04/24/24  0543 04/25/24  0449   WBC 8.4 8.0   HGB 11.0* 10.3*   HCT 32.2* 30.4*    150        Recent Labs     04/25/24  0449   *   K 4.3   CL 98*   CO2 26   BUN 70*   CREATININE 2.6*        No results for input(s): \"PROT\", \"INR\", \"APTT\" in the last 72 hours.          LOWER EXTREMITY EXAMINATION    Dressing to left lower extremity left clean, dry, and intact.  No strikethrough noted to external dressing. Posterior splint noted to be intact.    CFT unable to be assessed to distal limb 2/2 dressing from TMA.  No pain with calf compression bilateral.  Patient able to perform active range of motion to digits bilateral.       IMAGING:  XR Left foot post surgical (4/22/24)  Findings:  Transmetatarsal amputation in the interval since the last exam. No evidence suggesting complicating process. Mild diffuse soft tissue swelling.     IMPRESSION:  Interval transmetatarsal amputation left foot.    XR left foot (4/12/2024)  FINDINGS:     Osteolysis of the first  while in house with dressing change prior to patient's discharge.     Patient was seen and evaluated at bedside with Dr. Chandana Downey DPM.    Ric Mcmahon DPM   Podiatric Resident PGY1  [employer]  4/25/2024, 9:49 AM

## 2024-04-25 NOTE — CARE COORDINATION
UPDATE:    P2P is set for 1300 today with Dr. Murillo.  Patient provided with list of SNF facilities in the event P2P is rejected.    Electronically signed by LISY Iniguez, RN Case Manager on 4/26/2024 at 10:18 AM      Precert was denied and patient will need a P2P.  Waiting for call back from [facility] as the number given was incorrect to set up time for P2P.    [phone] is a number to [place].    Electronically signed by LISY Iniguez, RN Case Manager on 4/25/2024 at 3:21 PM          DISCHARGE PLANNING:    Chart reviewed.    Patient from home alone (RV).    Patient admitted with osteomyelitis of second toe of left foot.  Patient has podiatry outpatient and was told to go to ED.  Patient is S/P TMA with JENA - April 22.  He has HX: CKD 3, CAD, A-Fib and NANETTE.  Nephrology is following  and monitoring Cr.  Cr 2.6 today.  PT/OT recommend ARU before going home as patient lives alone.    Patient is accepted at [facility].  Precert as of today is still pending.    Will need transportation when discharged    Electronically signed by LISY Iniguez, RN Case Manager on 4/25/2024 at 2:28 PM

## 2024-04-25 NOTE — CONSULTS
Nephrology Consult Note                                                                                                                                                                                                                                                                                                                                                               Office : [phone]     Fax :[phone]              Patient's Name: Veronica Chaidez  11:28 AM  4/25/2024      Assessment/Plan   NANETTE on CKD Stage III sec to ATN likely   Cr. 2.6 - better   - s/p IV fluids   - ur studies pending  - strict I's/O's  - daily weights  - avoid nephrotoxic agents    2. Hyponatremia  Na 132  - monitor Na    3. S/p TMA with JENA, left foot (4/22/24)  - podiatry following    4. T2DM complicated with peripheral neuropathy  - management per primary team     5. Afib  - amiodarone 200 oral daily, metoprolol 50 mg BID, and eliquis 5 mg twice daily  - ECHO 2022: EF 50%, indeterminate diastolic function. Left ventricular systolic function appears at lower limits of normal.     Reason for Consult:  NANETTE on CKD   Requesting Physician:  Diego Christian MD      Chief Complaint:  Osteomyelitis left second toe      History of Present Ilness:    Veronica Chaidez is a 73 y.o. male with diabetes mellitus type 2 diet-controlled, A-fib on Eliquis, hypertension, chronic kidney disease stage III, diabetic neuropathy, CAD, pacemaker, systolic congestive heart failure chronic who presents from podiatry office for infection of left second toe.       Interval hx   BP Soft, Na 132, Cr 2.6.   Patient was seen and examined at bedside. Patient is doing well. He states sometimes he has low urine output 4 oz as compared to 8 oz and says that he still needs to go. Denies any hematuria, suprapubic pain, or dysuria. He states he has been hydrating himself and eating well.     Past Medical History:   Diagnosis Date    A-fib ([facility]) 2020    CAD, multiple  vessel 12/2019    Coronary artery disease involving coronary bypass graft     Diabetes mellitus (HCC)     diet controlled     Enlarged prostate     Environmental allergies     Kidney stones     Pacemaker     Systolic CHF (HCC)        Past Surgical History:   Procedure Laterality Date    CARDIAC CATHETERIZATION  12/10/2019    Dr. Shaver - severe multi-vessel disease    CORONARY ARTERY BYPASS GRAFT N/A 03/02/2020    WAYNE; TCPB; CABG x 3, LIMA to LAD w/ long segment endarterectomy (4 cm); bilateral pulmonary vein isolation; 40 mm Atriclip to L atril appendage; endoscopic L GSV harvest; ICNB x 5 levels bilat; sternal plate x 1 to manubrium performed by Sohail Rhoades MD at [facility] OR    FOOT SURGERY Left 4/22/2024    LEFT FOOT TRANSMETATARSAL AMPUTATION WITH DAVID TENDON LENGTHENING performed by Chandana Downey DPM at [facility] OR    NASAL POLYP SURGERY  x2    NASAL SEPTUM SURGERY         Family History   Problem Relation Age of Onset    Dementia Mother         Vascular    Alzheimer's Disease Mother     Heart Attack Father     Diabetes Paternal Aunt         reports that he has never smoked. He has never used smokeless tobacco. He reports that he does not currently use alcohol. He reports that he does not use drugs.    Allergies:  Aspartame and phenylalanine, Codeine, and Msg    Current Medications:    apixaban (ELIQUIS) tablet 5 mg, BID  albuterol (PROVENTIL) (2.5 MG/3ML) 0.083% nebulizer solution 2.5 mg, Q4H PRN  amiodarone (CORDARONE) tablet 200 mg, Daily  [Held by provider] furosemide (LASIX) tablet 80 mg, BID  metoprolol tartrate (LOPRESSOR) tablet 50 mg, BID  pantoprazole (PROTONIX) tablet 40 mg, QAM AC  potassium chloride (KLOR-CON M) extended release tablet 10 mEq, BID  insulin lispro (HUMALOG) injection vial 0-4 Units, TID WC  insulin lispro (HUMALOG) injection vial 0-4 Units, Nightly  sodium chloride flush 0.9 % injection 5-40 mL, 2 times per day  sodium chloride flush 0.9 % injection 5-40 mL, PRN  0.9 %

## 2024-04-25 NOTE — PLAN OF CARE
Problem: Safety - Adult  Goal: Free from fall injury  Outcome: Progressing   Pt refuses non weight baring status to left extremity despite use of alarms, attempts at teaching. Pt refuses alarm/to call for assistance with bathroom. Pt rounding frequently to compensate for safety. Pt is alert and oriented x4, is otherwise steady ambulating.       Problem: Discharge Planning  Goal: Discharge to home or other facility with appropriate resources  Outcome: Progressing     Problem: ABCDS Injury Assessment  Goal: Absence of physical injury  Outcome: Progressing   refuses assistance/refuses alarm/refuses NWB       Problem: Skin/Tissue Integrity  Goal: Absence of new skin breakdown  Description: 1.  Monitor for areas of redness and/or skin breakdown  2.  Assess vascular access sites hourly  3.  Every 4-6 hours minimum:  Change oxygen saturation probe site  4.  Every 4-6 hours:  If on nasal continuous positive airway pressure, respiratory therapy assess nares and determine need for appliance change or resting period.  Outcome: Progressing     Problem: Chronic Conditions and Co-morbidities  Goal: Patient's chronic conditions and co-morbidity symptoms are monitored and maintained or improved  Outcome: Progressing     Problem: Pain  Goal: Verbalizes/displays adequate comfort level or baseline comfort level  Outcome: Progressing     Problem: Respiratory - Adult  Goal: Achieves optimal ventilation and oxygenation  Outcome: Progressing     Problem: Cardiovascular - Adult  Goal: Maintains optimal cardiac output and hemodynamic stability  Outcome: Progressing     Problem: Metabolic/Fluid and Electrolytes - Adult  Goal: Electrolytes maintained within normal limits  Outcome: Progressing  Goal: Hemodynamic stability and optimal renal function maintained  Outcome: Progressing

## 2024-04-25 NOTE — PLAN OF CARE
Problem: Discharge Planning  Goal: Discharge to home or other facility with appropriate resources  Outcome: Progressing  Flowsheets (Taken 4/25/2024 0127)  Discharge to home or other facility with appropriate resources:   Identify barriers to discharge with patient and caregiver   Identify discharge learning needs (meds, wound care, etc)     Problem: Safety - Adult  Goal: Free from fall injury  Outcome: Progressing  Note: Pt remains free from falls through this shift, alert and oriented x4, fall protocol in place, bed in low, locked position, side rails elevated x2, nonskid footwear on. Call light and pt belongings within reach. Will continue to monitor for pt safety.         Problem: ABCDS Injury Assessment  Goal: Absence of physical injury  Flowsheets (Taken 4/25/2024 0127)  Absence of Physical Injury: Implement safety measures based on patient assessment     Problem: Skin/Tissue Integrity  Goal: Absence of new skin breakdown  Description: 1.  Monitor for areas of redness and/or skin breakdown  2.  Assess vascular access sites hourly  3.  Every 4-6 hours minimum:  Change oxygen saturation probe site  4.  Every 4-6 hours:  If on nasal continuous positive airway pressure, respiratory therapy assess nares and determine need for appliance change or resting period.  Outcome: Progressing     Problem: Pain  Goal: Verbalizes/displays adequate comfort level or baseline comfort level  Outcome: Progressing  Flowsheets (Taken 4/25/2024 0127)  Verbalizes/displays adequate comfort level or baseline comfort level:   Assess pain using appropriate pain scale   Encourage patient to monitor pain and request assistance   Administer analgesics based on type and severity of pain and evaluate response

## 2024-04-25 NOTE — PROGRESS NOTES
Physical Therapy  Daily Treatment Note    Discharge Recommendation:  Inpatient Rehab  Equipment Needs:  Defer to next level of care    Assessment:  Pt requiring 2 person assist for mobility at this time due to inability to maintain NWB L LE for more than a few seconds with transfers or in standing. Pt from home alone. He is currently functioning well below his baseline for mobility s/p L foot surgery with NWB restriction. At risk for falls and for not maintaining NWB if he is D/Constantino home. Would benefit from continued IP PT at D/C prior to returning home. Plan is for ARU.     At baseline this patient was Independent with functional mobility & ADL's. The current hospitalization has resulted in the patient now being limited with all aspects of mobility, negatively impacting the patient's functional independence, ability to safely access their home environment, and ability to complete valued daily tasks and life roles. Veronica Chaidez is motivated for recovery and demonstrates the ability to tolerate inpatient rehabilitation 3 hours/day, 5 days/week for optimal return to functional independence, quality of life, and decreased caregiver burden.    Chart Reviewed: Yes     Other position/activity restrictions: post op TMA & JENA, non-weightbearing L LE w/ post op shoe   Additional Pertinent Hx: pt is a 74 yo male admitted with nonhealing LLE wounds and osteomyelitis s/p LEFT FOOT TRANSMETATARSAL AMPUTATION WITH DAVID TENDON LENGTHENING      Diagnosis: osteomyelitiis   Treatment Diagnosis: dec functional mobility    Subjective: Pt in bed initially. Agreeable to working with therapy.     Pain: Denies. States L splint is \"uncomfortable\", but no pain.     Objective:    Bed mobility  Supine to sit: SBA, HOB up partially with use of rail  Scooting: SBA to EOB    Transfers  Sit to stand: Dependent (Min assist x 1 for lift + assist x 1 for maintaining NWB L LE) from bed to walker (once) and from chair to walker (once). Dependent  with CGA and LRAD   Short Term Goal 3: pt will ambulate 15' with LRAD and CGA maintaining NWB on LLE          Plan:  Plan: 2-5x/week  Current Treatment Recommendations: Strengthening, Balance training, Functional mobility training, Transfer training, Gait training, Therapeutic activities, Home exercise program, Safety education & training, Patient/Caregiver education & training, Endurance training    Therapy Time    Individual  Concurrent  Group  Co-treatment    Time In  1308            Time Out  1355            Minutes  47              Timed Code Treatment Minutes: 47  Total Treatment Minutes: 47    Will continue per plan of care.   If patient is discharged prior to next treatment, this note will serve as the discharge summary.    Oumou Arizmendi, PTA #[id number]

## 2024-04-25 NOTE — PROGRESS NOTES
Occupational Therapy  Facility/Department: [address]  Daily Treatment Note  NAME: Veronica Chaidez  : 1950  MRN: [id number]    Date of Service: 2024    Discharge Recommendations:  IP Rehab, 5-7 sessions per week, Patient would benefit from continued therapy after discharge  OT Equipment Recommendations  Equipment Needed: No  Other: defer to next level of care- if pt were to discharge home, pts home would not be condusive to pts current level of function 2/2 living in an RV and having very limited resources with steps in/out of RV      At baseline this patient was Independent with functional mobility & ADL's. The current hospitalization has resulted in the patient now being limited with all aspects of mobility, negatively impacting the patient's functional independence, ability to safely access their home environment, and ability to complete valued daily tasks and life roles. Veronica Chaidez is motivated for recovery and demonstrates the ability to tolerate inpatient rehabilitation 3 hours/day, 5 days/week for optimal return to functional independence, quality of life, and decreased caregiver burden.        Patient Diagnosis(es): The primary encounter diagnosis was Osteomyelitis of left foot, unspecified type (HCC). A diagnosis of Diabetic foot infection (HCC) was also pertinent to this visit.  Past Medical History:  has a past medical history of A-fib (HCC), CAD, multiple vessel, Coronary artery disease involving coronary bypass graft, Diabetes mellitus (HCC), Enlarged prostate, Environmental allergies, Kidney stones, Pacemaker, and Systolic CHF (HCC).  Past Surgical History:  has a past surgical history that includes Cardiac catheterization (12/10/2019); Nasal polyp surgery (x2); Nasal septum surgery; Coronary artery bypass graft (N/A, 2020); and Foot surgery (Left, 2024).    Assessment        Assessment: Pt tolerated session well, but still continues to not follow NWB precautions on LLE  maintaining NWB on LLE for stance within STEDY)      OT Exercises  A/AROM Exercises: x10 reps BUE- horizontal ab/adduction  Resistive Exercises: x10 reps BUE- elbow flex/ext             Safety Devices  Type of Devices: Left in chair;Call light within reach;Chair alarm in place;Nurse notified     Patient Education  Education Given To: Patient  Education Provided: Role of Therapy;Plan of Care;Transfer Training;Precautions;ADL Adaptive Strategies;Fall Prevention Strategies  Education Provided Comments: re inforcement needed for NWBing status  Education Method: Verbal;Demonstration  Education Outcome: Verbalized understanding;Continued education needed    Goals  Short Term Goals  Time Frame for Short Term Goals: by discharge  Short Term Goal 1: Pt will perform LB dressing with modified technique as needed with min Ax1- not met  Short Term Goal 2: Pt will perform toileting transfer with SBA- not met  Short Term Goal 3: Pt will perform toileting routine with CGA- not met  Short Term Goal 4: Pt will perform sit<>stand transfers with 100% compliance to NWB status on LLE with no verbal cues and no more than min Ax1- not met  Patient Goals   Patient goals : to have his left foot to heal    AM-[facility] - ADL  AM-PAC Daily Activity - Inpatient   How much help is needed for putting on and taking off regular lower body clothing?: A Lot  How much help is needed for bathing (which includes washing, rinsing, drying)?: A Lot  How much help is needed for toileting (which includes using toilet, bedpan, or urinal)?: A Lot  How much help is needed for putting on and taking off regular upper body clothing?: A Little  How much help is needed for taking care of personal grooming?: A Little  How much help for eating meals?: None  AM-[facility] Inpatient Daily Activity Raw Score: 16  AM-PAC Inpatient ADL T-Scale Score : 35.96  ADL Inpatient CMS 0-100% Score: 53.32  ADL Inpatient CMS G-Code Modifier : CK    Therapy Time   Individual Concurrent Group

## 2024-04-25 NOTE — ADT AUTH CERT
[facility] [address]  [facility] [address]  NPI: [id number]  TAX ID: [id number]    Auth number: [id number]  [id number] - (Medicaid Managed)    Return Contact Information:  Suzanne Pineda  PH: [phone]  FAX: [phone]     Patient Demographics    Name Patient ID SSN Gender Identity Birth Date  Veronica Chaidez [id number]  Male [birth year] (73 yrs)    Address Phone Email Employer   [address] [place] [address] [phone] (H)  [phone] (M) [email or web address] RETIRED     County Race Occupation Emp Status   [place] White (non-) -- Retired     Reg Status PCP Date Last Verified Next Review Date   Verified Diego Christian MD  [phone] 04/10/24 04/30/24     Admission Date Discharge Date Admitting Provider    04/19/24 -- Nay Lindquist MD      Marital Status Sabianist     Single L[employer]-day       Emergency Contact 1 Emergency Contact 2  [place] (E)  [phone] (H)  [phone] (M) Moody Caballero (Friend)  [phone] (H)    Physician Progress Notes  Notes from 04/22/24 through 04/25/24  Progress Notes by Ric Mcmahon DPM at 4/25/2024  7:30 AM    Author: Ric Mcmahon DPM Service: Podiatry Author Type: Resident  Filed: 4/25/2024  9:51 AM Date of Service: 4/25/2024  7:30 AM Status: Cosign Needed  : Ric Mcmahon DPM (Resident) Cosign Required: Yes  Podiatric Surgery Daily Progress Note  Veronica Carlottayulia        Subjective :   Patient seen and examined this am at the bedside. S/P TMA with JENA, left foot (DOS 4/22/24). Patient denies any acute overnight events. Patient denies N/V/F/C/SOB. Patient denies calf pain, thigh pain, chest pain.      Review of Systems: A 12 point review of symptoms is unremarkable with the exception of the chief complaint. Patient specifically denies nausea, fever, vomiting, chills, shortness of breath, chest pain, abdominal pain, constipation or difficulty  0900(s)  04/20/24 1003(a)  04/20/24 1003(r) 10 mL   IntraVENous      Pfeiffer, Toneisha L  20 Given 04/20/24 2100(s)  04/20/24 2122(a)  04/20/24 2122(r) 10 mL   IntraVENous      Lu, Alethea  21 New Bag 04/19/24 1530(s)  04/19/24 1540(a)  04/19/24 1541(r) 3,375 mg 100 mL/hr IntraVENous   30 Minutes  Harperink, Claudia  22 Stopped 04/19/24 1610(s)  04/19/24 1611(a)  04/19/24 1611(r) 0 mg 0 mL/hr IntraVENous   30 Minutes  Harperink, Claudia    Due (Stopped) 04/19/24 1610(s)  04/19/24 1541(r)     IntraVENous      Harperink, Claudia  23 New Bag 04/19/24 1530(s)  04/19/24 1612(a)  04/19/24 1612(r) 2,500 mg 166.7 mL/hr IntraVENous   180 Minutes  Harperink, Claudia  24 Stopped 04/19/24 1912(s)  04/19/24 1930(a)  04/19/24 2216(r) 0 mg 0 mL/hr IntraVENous   180 Minutes  Datt, Jessie    Due (Stopped) 04/19/24 1912(s)  04/19/24 1612(r)     IntraVENous      Harperink, Claudia  25 Given 04/19/24 2100(s)  04/19/24 2224(a)  04/19/24 2229(r) 5 mg   Oral      Datt, Jessie  26 Given 04/20/24 0900(s)  04/20/24 1003(a)  04/20/24 1003(r) 5 mg   Oral      Pfeiffer, Toneisha L  27 Given 04/20/24 2100(s)  04/20/24 2122(a)  04/20/24 2122(r) 5 mg   Oral      Lu, Alethea    (s)=scheduled time  (a)=action time  (r)=recorded time

## 2024-04-26 VITALS
WEIGHT: 257.94 LBS | DIASTOLIC BLOOD PRESSURE: 69 MMHG | TEMPERATURE: 97.5 F | BODY MASS INDEX: 33.1 KG/M2 | RESPIRATION RATE: 18 BRPM | HEART RATE: 74 BPM | OXYGEN SATURATION: 99 % | SYSTOLIC BLOOD PRESSURE: 121 MMHG | HEIGHT: 74 IN

## 2024-04-26 PROBLEM — N18.30 STAGE 3 CHRONIC KIDNEY DISEASE (HCC): Status: ACTIVE | Noted: 2024-04-26

## 2024-04-26 PROBLEM — E87.8 ELECTROLYTE IMBALANCE: Status: ACTIVE | Noted: 2024-04-26

## 2024-04-26 LAB
ALBUMIN SERPL-MCNC: 3.3 G/DL (ref 3.4–5)
ANION GAP SERPL CALCULATED.3IONS-SCNC: 8 MMOL/L (ref 3–16)
BASOPHILS # BLD: 0.1 K/UL (ref 0–0.2)
BASOPHILS NFR BLD: 0.8 %
BUN SERPL-MCNC: 67 MG/DL (ref 7–20)
CALCIUM SERPL-MCNC: 9.6 MG/DL (ref 8.3–10.6)
CHLORIDE SERPL-SCNC: 100 MMOL/L (ref 99–110)
CO2 SERPL-SCNC: 27 MMOL/L (ref 21–32)
CREAT SERPL-MCNC: 2.1 MG/DL (ref 0.8–1.3)
CREAT UR-MCNC: 13.4 MG/DL (ref 39–259)
CREAT UR-MCNC: 64.6 MG/DL (ref 39–259)
DEPRECATED RDW RBC AUTO: 15.2 % (ref 12.4–15.4)
EOSINOPHIL # BLD: 0.4 K/UL (ref 0–0.6)
EOSINOPHIL NFR BLD: 6.1 %
GFR SERPLBLD CREATININE-BSD FMLA CKD-EPI: 33 ML/MIN/{1.73_M2}
GLUCOSE BLD-MCNC: 128 MG/DL (ref 70–99)
GLUCOSE BLD-MCNC: 148 MG/DL (ref 70–99)
GLUCOSE BLD-MCNC: 162 MG/DL (ref 70–99)
GLUCOSE SERPL-MCNC: 121 MG/DL (ref 70–99)
HCT VFR BLD AUTO: 31.3 % (ref 40.5–52.5)
HGB BLD-MCNC: 10.6 G/DL (ref 13.5–17.5)
LYMPHOCYTES # BLD: 1.4 K/UL (ref 1–5.1)
LYMPHOCYTES NFR BLD: 20.7 %
MAGNESIUM SERPL-MCNC: 2.1 MG/DL (ref 1.8–2.4)
MCH RBC QN AUTO: 28.6 PG (ref 26–34)
MCHC RBC AUTO-ENTMCNC: 33.8 G/DL (ref 31–36)
MCV RBC AUTO: 84.7 FL (ref 80–100)
MONOCYTES # BLD: 0.7 K/UL (ref 0–1.3)
MONOCYTES NFR BLD: 10.6 %
NEUTROPHILS # BLD: 4.2 K/UL (ref 1.7–7.7)
NEUTROPHILS NFR BLD: 61.8 %
PERFORMED ON: ABNORMAL
PHOSPHATE SERPL-MCNC: 3.5 MG/DL (ref 2.5–4.9)
PLATELET # BLD AUTO: 171 K/UL (ref 135–450)
PMV BLD AUTO: 9.3 FL (ref 5–10.5)
POTASSIUM SERPL-SCNC: 4.4 MMOL/L (ref 3.5–5.1)
PROT UR-MCNC: 53 MG/DL
PROT/CREAT UR-RTO: 0.8 MG/DL
RBC # BLD AUTO: 3.69 M/UL (ref 4.2–5.9)
SODIUM SERPL-SCNC: 135 MMOL/L (ref 136–145)
WBC # BLD AUTO: 6.8 K/UL (ref 4–11)

## 2024-04-26 PROCEDURE — 83735 ASSAY OF MAGNESIUM: CPT

## 2024-04-26 PROCEDURE — 85025 COMPLETE CBC W/AUTO DIFF WBC: CPT

## 2024-04-26 PROCEDURE — 6370000000 HC RX 637 (ALT 250 FOR IP)

## 2024-04-26 PROCEDURE — 80069 RENAL FUNCTION PANEL: CPT

## 2024-04-26 PROCEDURE — 2580000003 HC RX 258

## 2024-04-26 PROCEDURE — 36415 COLL VENOUS BLD VENIPUNCTURE: CPT

## 2024-04-26 RX ORDER — FUROSEMIDE 80 MG
40 TABLET ORAL DAILY
Qty: 180 TABLET | Refills: 3 | Status: SHIPPED | OUTPATIENT
Start: 2024-04-26

## 2024-04-26 RX ORDER — INSULIN LISPRO 100 [IU]/ML
0-4 INJECTION, SOLUTION INTRAVENOUS; SUBCUTANEOUS
Qty: 10 ML | Refills: 0 | Status: SHIPPED | OUTPATIENT
Start: 2024-04-26

## 2024-04-26 RX ADMIN — APIXABAN 5 MG: 5 TABLET, FILM COATED ORAL at 09:54

## 2024-04-26 RX ADMIN — AMIODARONE HYDROCHLORIDE 200 MG: 200 TABLET ORAL at 09:54

## 2024-04-26 RX ADMIN — SODIUM CHLORIDE, PRESERVATIVE FREE 10 ML: 5 INJECTION INTRAVENOUS at 09:54

## 2024-04-26 RX ADMIN — PANTOPRAZOLE SODIUM 40 MG: 40 TABLET, DELAYED RELEASE ORAL at 06:25

## 2024-04-26 RX ADMIN — METOPROLOL TARTRATE 50 MG: 50 TABLET, FILM COATED ORAL at 09:55

## 2024-04-26 RX ADMIN — POTASSIUM CHLORIDE 10 MEQ: 750 TABLET, EXTENDED RELEASE ORAL at 09:54

## 2024-04-26 ASSESSMENT — PAIN SCALES - GENERAL: PAINLEVEL_OUTOF10: 0

## 2024-04-26 NOTE — PROGRESS NOTES
Renal Progress Note  Subjective:   Admit Date: 4/19/2024       Assessment/Plan   NANETTE on CKD Stage III sec to ATN likely   Cr. 2.1 - better   Protein/creatinine ratio 0.8. Intrinisic process.   - s/p IV fluids   - strict I's/O's  - daily weights  - avoid nephrotoxic agents     2. Hyponatremia  Na 135  - monitor Na     3. S/p TMA with JENA, left foot (4/22/24)  - podiatry following     4. T2DM complicated with peripheral neuropathy  - management per primary team      5. Afib  - amiodarone 200 oral daily, metoprolol 50 mg BID, and eliquis 5 mg twice daily  - ECHO 2022: EF 50%, indeterminate diastolic function. Left ventricular systolic function appears at lower limits of normal       Interval History:   Patient was seen and examined at bedside. He states he is doing well. He thinks he has swelling of his leg and usually takes lasix daily for them. He denies any dysuria, hematuria, or abdominal pain.    Cr. 2.1. Na 135. BP stable. Urine output 1,050.       DIET ADULT DIET; Regular; 4 carb choices (60 gm/meal); Low Sodium (2 gm)  ADULT ORAL NUTRITION SUPPLEMENT; Lunch, Dinner; Wound Healing Oral Supplement  Medications:   Scheduled Meds:   apixaban  5 mg Oral BID    amiodarone  200 mg Oral Daily    [Held by provider] furosemide  80 mg Oral BID    metoprolol tartrate  50 mg Oral BID    pantoprazole  40 mg Oral QAM AC    potassium chloride  10 mEq Oral BID    insulin lispro  0-4 Units SubCUTAneous TID WC    insulin lispro  0-4 Units SubCUTAneous Nightly    sodium chloride flush  5-40 mL IntraVENous 2 times per day     Continuous Infusions:   sodium chloride 25 mL/hr at 04/23/24 1041       Labs:  CBC:   Recent Labs     04/24/24  0543 04/25/24  0449 04/26/24  0501   WBC 8.4 8.0 6.8   HGB 11.0* 10.3* 10.6*    150 171     BMP:    Recent Labs     04/24/24  0542 04/25/24  0449 04/26/24  0501   * 132* 135*   K 4.3 4.3 4.4   CL 97* 98* 100   CO2 25 26 27   BUN 68* 70* 67*   CREATININE 2.8* 2.6* 2.1*   GLUCOSE 145*

## 2024-04-26 NOTE — CARE COORDINATION
Patient to [facility] today by [facility] at 5:30p  Nurse report: [phone]  Fax: [phone] (faxed AVS)  Electronically signed by Paz Bruce RN on 4/26/2024 at 2:12 PM

## 2024-04-26 NOTE — PLAN OF CARE
Problem: Discharge Planning  Goal: Discharge to home or other facility with appropriate resources  Outcome: Adequate for Discharge     Problem: Safety - Adult  Goal: Free from fall injury  Outcome: Adequate for Discharge     Problem: ABCDS Injury Assessment  Goal: Absence of physical injury  Outcome: Adequate for Discharge     Problem: Skin/Tissue Integrity  Goal: Absence of new skin breakdown  Description: 1.  Monitor for areas of redness and/or skin breakdown  2.  Assess vascular access sites hourly  3.  Every 4-6 hours minimum:  Change oxygen saturation probe site  4.  Every 4-6 hours:  If on nasal continuous positive airway pressure, respiratory therapy assess nares and determine need for appliance change or resting period.  Outcome: Adequate for Discharge     Problem: Chronic Conditions and Co-morbidities  Goal: Patient's chronic conditions and co-morbidity symptoms are monitored and maintained or improved  Outcome: Adequate for Discharge

## 2024-04-26 NOTE — PROGRESS NOTES
Podiatric Surgery Daily Progress Note  Veronica Chaidez      Subjective :   Patient seen and examined this am at the bedside. S/P TMA with JENA, left foot (DOS 4/22/24). Patient denies any acute overnight events. Patient denies N/V/F/C/SOB. Patient denies calf pain, thigh pain, chest pain.     Review of Systems: A 12 point review of symptoms is unremarkable with the exception of the chief complaint. Patient specifically denies nausea, fever, vomiting, chills, shortness of breath, chest pain, abdominal pain, constipation or difficulty urinating.       Objective     /62   Pulse 66   Temp 97.6 °F (36.4 °C) (Oral)   Resp 18   Ht 1.88 m (6' 2\")   Wt 117 kg (257 lb 15 oz)   SpO2 98%   BMI 33.12 kg/m²      I/O:  Intake/Output Summary (Last 24 hours) at 4/26/2024 0714  Last data filed at 4/25/2024 1921  Gross per 24 hour   Intake --   Output 1050 ml   Net -1050 ml                Wt Readings from Last 3 Encounters:   04/25/24 117 kg (257 lb 15 oz)   01/24/24 123 kg (271 lb 3.2 oz)   12/04/23 124.3 kg (274 lb 0.5 oz)       LABS:    Recent Labs     04/25/24  0449 04/26/24  0501   WBC 8.0 6.8   HGB 10.3* 10.6*   HCT 30.4* 31.3*    171        Recent Labs     04/26/24  0501   *   K 4.4      CO2 27   PHOS 3.5   BUN 67*   CREATININE 2.1*        No results for input(s): \"PROT\", \"INR\", \"APTT\" in the last 72 hours.          LOWER EXTREMITY EXAMINATION    Dressing to left lower extremity left clean, dry, and intact.  No strikethrough noted to external dressing. Posterior splint noted to be intact.    CFT unable to be assessed to distal limb 2/2 dressing from TMA.  No pain with calf compression bilateral.  Patient able to perform active range of motion to digits bilateral.       IMAGING:  XR Left foot post surgical (4/22/24)  Findings:  Transmetatarsal amputation in the interval since the last exam. No evidence suggesting complicating process. Mild diffuse soft tissue swelling.     IMPRESSION:  Interval

## 2024-04-26 NOTE — DISCHARGE SUMMARY
V2.0  Discharge Summary    Name:  Veronica Chaidez /Age/Sex: 1950 (73 y.o. male)   Admit Date: 2024  Discharge Date: 24    MRN & CSN:  [id number] & [id number] Encounter Date and Time 24 2:26 PM EDT    Attending:  Carlene Murillo MD Discharging Provider: EVELYN Stanley - CNP       Hospital Course:     Brief HPI: Veronica Chaidez is a 73 y.o. male who presented to the emergency room from his podiatrist office due to concern for osteomyelitis of the left second toe.     Patient was started on broad-spectrum antibiotics and podiatry was consulted.     Plan:   Diabetic foot wound-with acute OM s/p TMA with JENA.   on ; managed per podiatry;Patient is to be non-weightbearing to left LE with posterior splint. Discussed with patient that he must be non-weightbearing or he is at risk for rupture of achilles tendon.   Diabetes with neuropathy-patient has not been on medications at home for some time.  Sliding scale insulin and diabetic diet  Chronic kidney disease stage III-creatinine 1 8 which is close to patient's baseline.    Coronary artery disease-continue metoprolol   Z-[id number]-espymlnla in junctional rythm with amiodarone 200 oral daily and Eliquis 5 mg twice daily.  Echo in  with EF 50% indeterminate diastolic function  NANETTE-creatinine bumped up to 2.8 IVF monitor renal panel; avoid nephrotoxins though creatinine remained at 2.6 consult nephrology.  Protein to creatinine ratio 0.8.  Creatinine improved to 2.1 after IV fluids.  Currently 80 mg twice daily Lasix is on hold we will restart Lasix 40 mg daily; patient got approved for rehab he will get weekly BMP for further management.    The patient expressed appropriate understanding of, and agreement with the discharge recommendations, medications, and plan.     Consults this admission:  PHARMACY TO DOSE VANCOMYCIN  IP CONSULT TO PODIATRY  IP CONSULT TO SOCIAL WORK  IP CONSULT TO NEPHROLOGY    Discharge Diagnosis:   Osteomyelitis of

## 2024-04-26 NOTE — PROGRESS NOTES
Pt resting with no complaints at this time. RR easy and unlabored. VSS. LLE dressing remains CDI, no drainage noted. Pt noncompliant with NWB to LLE. Bed locked and in lowest position. Pt updated on care plan/status. P2P scheduled at 1300. Will continue to monitor.

## 2024-04-26 NOTE — PROGRESS NOTES
Comprehensive Nutrition Assessment    RECOMMENDATIONS:  PO Diet: Continue Regular; 4 carb choice; 2g Na diet  ONS: Modify wound healing supplement to BID   Nutrition Education: Education not indicated     NUTRITION ASSESSMENT:   Nutritional summary & status: LOS: Pt admitted w/ osteomyelitis of toe, followed pt podiatry who have cleared him for d/c. Currently pending appeal for ARU, pt lives alone and will need PT/OT prior to going home. On a Regular; 4 carb choice diet w/ intakes @ %, also receiving wound healing supplement TID, modified to BID per recommendations.   Admission // PMH: Osteomyelitis of toe//NANETTE on CKD, HLD, DM    MALNUTRITION ASSESSMENT  Context of Malnutrition: Acute Illness   Malnutrition Status: No malnutrition    NUTRITION DIAGNOSIS   Inadequate protein intake related to increase demand for energy/nutrients as evidenced by wounds    Nutrition Monitoring and Evaluation:   Food/Nutrient Intake Outcomes:  Food and Nutrient Intake, Supplement Intake  Physical Signs/Symptoms Outcomes:  Biochemical Data, Nutrition Focused Physical Findings, Weight, Skin     OBJECTIVE DATA: Significant to nutrition assessment  Nutrition Related Findings: Na 135. . +BM 4/25.  Wounds: Diabetic Ulcer  Nutrition Goals: Meet at least 75% of estimated needs, prior to discharge     CURRENT NUTRITION THERAPIES  ADULT DIET; Regular; 4 carb choices (60 gm/meal); Low Sodium (2 gm)  ADULT ORAL NUTRITION SUPPLEMENT; Lunch, Dinner; Wound Healing Oral Supplement  PO Intake: %   PO Supplement Intake:%  Additional Sources of Calories/IVF:N/A     COMPARATIVE STANDARDS  Energy (kcal):  0527-9294 (15-20 kcal/kg CBW)     Protein (g):  117-140 (1.0-1.2 g/kg CBW)       Fluid (ml/day):  1 mL/kcal or per MD    ANTHROPOMETRICS  Current Height: 188 cm (6' 2\")  Current Weight - Scale: 117 kg (257 lb 15 oz)    Admission weight: 120.5 kg (265 lb 11.2 oz)    The patient will be monitored per nutrition standards of care.  Consult dietitian if additional nutrition interventions are needed prior to RD reassessment.     Alethea Matson RD, LD  [place]:  [phone]

## 2024-04-26 NOTE — CARE COORDINATION
Case Management Assessment            Discharge Note                    Date / Time of Note: 4/26/2024 2:06 PM                  Discharge Note Completed by: Paz Bruce RN    Patient Name: Veronica Chaidez   YOB: 1950  Diagnosis: Osteomyelitis of left foot, unspecified type (HCC) [M86.9]  Osteomyelitis of second toe of left foot (HCC) [M86.9]   Date / Time: 4/19/2024  2:46 PM    Current PCP: Diego Christian MD  Clinic patient: No    Hospitalization in the last 30 days: No       Advance Directives:  Code Status: Full Code  [place] DNR form completed and on chart: Not Indicated    Financial:  Payor: [facility] MEDICAID / Plan: [employer] [place] MEDICA / Product Type: *No Product type* /      Pharmacy:    [facility] [id number] [facility] [address] [facility] -  [phone] - F [phone]  [address] [facility] [address]  Phone: [phone] Fax: [phone]    [facility] [address] [place] [address] - [address] - P [phone] - F [phone]  [address] [facility] [address]  Phone: [phone] Fax: [phone]      Assistance purchasing medications?: Potential Assistance Purchasing Medications: No  Assistance provided by Case Management: None at this time    Does patient want to participate in local refill/ meds to beds program?: Yes    Meds To Beds General Rules:  1. Can ONLY be done Monday- Friday between 8:30am-5pm  2. Prescription(s) must be in pharmacy by 3pm to be filled same day  3.Copy of patient's insurance/ prescription drug card and patient face sheet must be sent along with the prescription(s)  4. Cost of Rx cannot be added to hospital bill. If financial assistance is needed, please contact unit  or ;  or  CANNOT provide pharmacy voucher for patients co-pays  5. Patients can then  the prescription on their way out of the hospital at discharge, or pharmacy can deliver to

## 2024-05-01 NOTE — PROGRESS NOTES
[facility]   Electrophysiology  Office Visit  Date: 5/28/2024    Chief Complaint   Patient presents with    Atrial Fibrillation    Atrial Flutter    Cardiomyopathy    Congestive Heart Failure    Coronary Artery Disease    Hypertension     Cardiac HX: Veronica Chaidez is a 74 y.o. man with a h/o HTN, DM, CAD, pAF on Eliquis (12/2019), ICMP, EF 20-25% (12/2019), CAD, s/p CABG w/ SHANTI clip (3/2020), follows w/ Dr. Brody, pAF post CABG, placed on amio, s/p dual ch MDT ICD (3/12/2021, Dr. Salamanca), amio d/c'd d/t refractory AF, admission for dofetilide discussed but not scheduled, EF 50% (8/2022), recurrent AF, s/p RFA/PVI of AF/tAFL/PACs (10/3/2023, Dr. Salamanca), placed on amio 200 mg QD post procedure.     Interval History/HPI: Patient is here for follow-up for persistent atrial fibrillation, ICMP, chronic systolic CHF and ICD management.  Patient has a longstanding history of paroxysmal AF.  He was diagnosed in December 2019.  He was placed on Eliquis.  He had an echo at that time that showed an EF of 20 to 25%.  He underwent a left heart cath that showed severe multivessel CAD.  He then underwent a CABG with a left atrial appendage clip in March 2020.  He developed atrial fibrillation post CABG and was placed on amiodarone.  His EF remains low and he was implanted with a dual-chamber MDT ICD in March 2021.  He developed chronic persistent atrial fibrillation his amiodarone was discontinued.  An admission for dofetilide was discussed however never scheduled.  In August 2022 his EF was 50%.  He developed recurrent AF and underwent an RFA/PVI of AF, typical atrial flutter and PACs in October 2023.  Postprocedure he was placed on amiodarone 200 mg daily. He recently  was admitted to [facility] w/ a diabetic foot ulcer/osteo and had had all his toes amputated on his L foot.   Today he presents in AP, VS. No AF on device.   Review of his device today shows 93.3% AP, 0.1% , Optivol elevated and ongoing.  Other parameters

## 2024-05-03 ENCOUNTER — OFFICE VISIT (OUTPATIENT)
Dept: INTERNAL MEDICINE CLINIC | Age: 74
End: 2024-05-03
Payer: MEDICAID

## 2024-05-03 DIAGNOSIS — Z91.199 NONCOMPLIANCE: ICD-10-CM

## 2024-05-03 DIAGNOSIS — Z48.89 POSTOPERATIVE VISIT: Primary | ICD-10-CM

## 2024-05-03 DIAGNOSIS — E11.42 TYPE 2 DIABETES MELLITUS WITH PERIPHERAL NEUROPATHY (HCC): ICD-10-CM

## 2024-05-03 PROCEDURE — 99213 OFFICE O/P EST LOW 20 MIN: CPT

## 2024-05-05 NOTE — PROGRESS NOTES
Department of Podiatry  Resident Progress Note    Veronica Chaidez  Allergies: Aspartame and phenylalanine, Codeine, and Msg    SUBJECTIVE  The patient is a 73 y.o. male who presents to clinic today following a recent admission at [facility] from 04/19 - 04/26.  Patient was originally admitted for evaluation of probable osteomyelitis of multiple digits on the left foot.  During the course of the patient's admission patient underwent transmetatarsal amputation with tendo Achilles lengthening and splint placement.  Patient was subsequently discharged with strict instructions for nonweightbearing to the operative foot and given appointments for close follow-up.  Today patient is denying pain however thinks his foot looks odd.  Patient states that he has been weightbearing AGAINST MEDICAL ADVICE however has been able to limit his steps to less than 30/day. Patient denies any other pedal complaints at this time. Denies F/C/N/V.      Past Medical History:        Diagnosis Date    A-fib ([facility]) 2020    CAD, multiple vessel 12/2019    Coronary artery disease involving coronary bypass graft     Diabetes mellitus ([facility])     diet controlled     Enlarged prostate     Environmental allergies     Kidney stones     Pacemaker     Systolic CHF ([facility])        REVIEW OF SYSTEMS:  Review of Systems: Pertinent positive and negative findings as documented in the HPI, otherwise all other systems were reviewed and were negative.       OBJECTIVE    Patient presents today unassisted and in a wheelchair.  Patient is AAOx3 and NAD.    VASCULAR: DP and PT pulses are palpable 1/4 b/l. CFT is brisk to the digits of the foot b/l. Skin temperature is warm to cool from proximal to distal with no focal calor noted. No edema noted. No pain with calf compression b/l.    NEUROLOGIC: Gross and epicritic sensation is diminished b/l. Protective sensation is diminished at all pedal sites b/l.    DERMATOLOGIC:   Patient provided verbal consent for photos

## 2024-05-10 ENCOUNTER — OFFICE VISIT (OUTPATIENT)
Dept: INTERNAL MEDICINE CLINIC | Age: 74
End: 2024-05-10
Payer: MEDICAID

## 2024-05-10 DIAGNOSIS — E11.42 TYPE 2 DIABETES MELLITUS WITH PERIPHERAL NEUROPATHY (HCC): ICD-10-CM

## 2024-05-10 DIAGNOSIS — Z91.199 NONCOMPLIANCE: ICD-10-CM

## 2024-05-10 DIAGNOSIS — Z48.89 POSTOPERATIVE VISIT: Primary | ICD-10-CM

## 2024-05-10 DIAGNOSIS — I73.9 PVD (PERIPHERAL VASCULAR DISEASE) (HCC): ICD-10-CM

## 2024-05-10 PROCEDURE — 99213 OFFICE O/P EST LOW 20 MIN: CPT

## 2024-05-10 NOTE — PROGRESS NOTES
of less than 30 steps per day.  -Left lower extremity was dressed with Adaptic, Betadine, ABD pads, gauze, Kerlix, and Ace  -Patient to leave dressing clean dry and intact until next visit (written instructions dispensed to patient)  -Following verbal consent half of the sutures were removed from the TMA site. Incision is well coapted without signs of dehiscence.  -Patient to remain strictly nonweightbearing  -Instructed patient to watch for signs and symptoms of infection including but not limited to fever, chills, feeling ill, redness around the wounds, redness streaking up the legs, purulent drainage. Instructed patient to call the clinic or present to the nearest emergency department if they notice these signs or symptoms   -Patient educated about the importance of diabetic foot care consisting of but not limited to daily foot inspections, wearing supportive shoes and inserts at all times, applying lotion to feet while sparing webspaces, and routinely checking blood sugar.     RTC in 1 week for continued postoperative care and remaining suture removal    Patient assessment and plan discussed with YOLANDA Patel DPM   Podiatric Resident PGY1  Pager [phone] or [employer]  5/10/2024, 1:09 PM

## 2024-05-17 ENCOUNTER — OFFICE VISIT (OUTPATIENT)
Dept: INTERNAL MEDICINE CLINIC | Age: 74
End: 2024-05-17
Payer: MEDICAID

## 2024-05-17 DIAGNOSIS — Z89.432 S/P TRANSMETATARSAL AMPUTATION OF FOOT, LEFT (HCC): Primary | ICD-10-CM

## 2024-05-17 DIAGNOSIS — E11.42 TYPE 2 DIABETES MELLITUS WITH PERIPHERAL NEUROPATHY (HCC): ICD-10-CM

## 2024-05-17 DIAGNOSIS — Z91.199 NONCOMPLIANCE: ICD-10-CM

## 2024-05-17 DIAGNOSIS — I73.9 PVD (PERIPHERAL VASCULAR DISEASE) (HCC): ICD-10-CM

## 2024-05-17 PROCEDURE — 99213 OFFICE O/P EST LOW 20 MIN: CPT

## 2024-05-19 NOTE — PROGRESS NOTES
Department of Podiatry  Resident Progress Note    Veronica Chaidez  Allergies: Aspartame and phenylalanine, Codeine, and Msg    SUBJECTIVE  The patient is a 74 y.o. male who presents to clinic today for his third post op appointment following a transmetatarsal amputation with tendo Achilles lengthening and splint placement (DOS 4/22/24). Patient states that he has been weightbearing AGAINST MEDICAL ADVICE however has been able to limit his steps to less than 30/day. Patient states that the dressing has been left clean and intact since his last visit. He notes that the swelling has improved but does not some swelling remains. Patient denies any other pedal complaints at this time. Denies F/C/N/V.      Past Medical History:        Diagnosis Date    A-fib ([facility]) 2020    CAD, multiple vessel 12/2019    Coronary artery disease involving coronary bypass graft     Diabetes mellitus ([facility])     diet controlled     Enlarged prostate     Environmental allergies     Kidney stones     Pacemaker     Systolic CHF ([facility])        REVIEW OF SYSTEMS:  Review of Systems: Pertinent positive and negative findings as documented in the HPI, otherwise all other systems were reviewed and were negative.     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 Patient is noted to be walking on splint with walker against medical advice.  Patient is AAOx3 and NAD.    VASCULAR: DP and PT pulses are palpable 1/4 b/l. CFT is brisk to the digits of the foot b/l. Skin temperature is warm to cool from proximal to distal with no focal calor noted. No edema noted. No pain with calf compression b/l.    NEUROLOGIC: Gross and epicritic sensation is diminished b/l. Protective sensation is diminished at all pedal sites b/l.    DERMATOLOGIC:   Patient provided verbal consent for photos to be taken today:  Left lower extremity:    At the distal aspect of the foot there is a full-thickness surgical

## 2024-05-28 ENCOUNTER — OFFICE VISIT (OUTPATIENT)
Dept: CARDIOLOGY CLINIC | Age: 74
End: 2024-05-28
Payer: MEDICAID

## 2024-05-28 ENCOUNTER — NURSE ONLY (OUTPATIENT)
Dept: CARDIOLOGY CLINIC | Age: 74
End: 2024-05-28
Payer: MEDICAID

## 2024-05-28 VITALS
SYSTOLIC BLOOD PRESSURE: 138 MMHG | BODY MASS INDEX: 33.84 KG/M2 | HEART RATE: 58 BPM | WEIGHT: 263.6 LBS | DIASTOLIC BLOOD PRESSURE: 78 MMHG

## 2024-05-28 DIAGNOSIS — I25.5 ISCHEMIC CARDIOMYOPATHY: ICD-10-CM

## 2024-05-28 DIAGNOSIS — I48.0 PAROXYSMAL ATRIAL FIBRILLATION (HCC): ICD-10-CM

## 2024-05-28 DIAGNOSIS — Z79.01 ON CONTINUOUS ORAL ANTICOAGULATION: ICD-10-CM

## 2024-05-28 DIAGNOSIS — I50.22 CHRONIC SYSTOLIC CHF (CONGESTIVE HEART FAILURE) (HCC): ICD-10-CM

## 2024-05-28 DIAGNOSIS — I49.5 SSS (SICK SINUS SYNDROME) (HCC): ICD-10-CM

## 2024-05-28 DIAGNOSIS — Z95.810 ICD (IMPLANTABLE CARDIOVERTER-DEFIBRILLATOR) IN PLACE: Primary | ICD-10-CM

## 2024-05-28 DIAGNOSIS — Z79.899 ON AMIODARONE THERAPY: ICD-10-CM

## 2024-05-28 DIAGNOSIS — I50.22 CHRONIC SYSTOLIC (CONGESTIVE) HEART FAILURE (HCC): Chronic | ICD-10-CM

## 2024-05-28 DIAGNOSIS — Z95.810 ICD (IMPLANTABLE CARDIOVERTER-DEFIBRILLATOR), DUAL, IN SITU: ICD-10-CM

## 2024-05-28 DIAGNOSIS — I42.0 DILATED CARDIOMYOPATHY (HCC): ICD-10-CM

## 2024-05-28 DIAGNOSIS — I48.19 PERSISTENT ATRIAL FIBRILLATION (HCC): ICD-10-CM

## 2024-05-28 DIAGNOSIS — I48.19 PERSISTENT ATRIAL FIBRILLATION (HCC): Primary | ICD-10-CM

## 2024-05-28 PROCEDURE — 93283 PRGRMG EVAL IMPLANTABLE DFB: CPT | Performed by: INTERNAL MEDICINE

## 2024-05-28 PROCEDURE — 3017F COLORECTAL CA SCREEN DOC REV: CPT | Performed by: NURSE PRACTITIONER

## 2024-05-28 PROCEDURE — 1123F ACP DISCUSS/DSCN MKR DOCD: CPT | Performed by: NURSE PRACTITIONER

## 2024-05-28 PROCEDURE — 99215 OFFICE O/P EST HI 40 MIN: CPT | Performed by: NURSE PRACTITIONER

## 2024-05-28 PROCEDURE — 3078F DIAST BP <80 MM HG: CPT | Performed by: NURSE PRACTITIONER

## 2024-05-28 PROCEDURE — 3075F SYST BP GE 130 - 139MM HG: CPT | Performed by: NURSE PRACTITIONER

## 2024-05-28 PROCEDURE — G8427 DOCREV CUR MEDS BY ELIG CLIN: HCPCS | Performed by: NURSE PRACTITIONER

## 2024-05-28 PROCEDURE — 1036F TOBACCO NON-USER: CPT | Performed by: NURSE PRACTITIONER

## 2024-05-28 PROCEDURE — G8417 CALC BMI ABV UP PARAM F/U: HCPCS | Performed by: NURSE PRACTITIONER

## 2024-05-28 PROCEDURE — 93000 ELECTROCARDIOGRAM COMPLETE: CPT | Performed by: NURSE PRACTITIONER

## 2024-05-29 LAB — TSH SERPL DL<=0.005 MIU/L-ACNC: 3.83 UIU/ML (ref 0.27–4.2)

## 2024-05-31 ENCOUNTER — OFFICE VISIT (OUTPATIENT)
Dept: INTERNAL MEDICINE CLINIC | Age: 74
End: 2024-05-31
Payer: MEDICAID

## 2024-05-31 DIAGNOSIS — L85.3 XEROSIS CUTIS: ICD-10-CM

## 2024-05-31 DIAGNOSIS — Z89.432 S/P TRANSMETATARSAL AMPUTATION OF FOOT, LEFT (HCC): Primary | ICD-10-CM

## 2024-05-31 DIAGNOSIS — E11.42 TYPE 2 DIABETES MELLITUS WITH PERIPHERAL NEUROPATHY (HCC): ICD-10-CM

## 2024-05-31 DIAGNOSIS — Z91.199 NONCOMPLIANCE: ICD-10-CM

## 2024-05-31 DIAGNOSIS — S91.109D OPEN TOE WOUND, SUBSEQUENT ENCOUNTER: ICD-10-CM

## 2024-05-31 DIAGNOSIS — I73.9 PVD (PERIPHERAL VASCULAR DISEASE) (HCC): ICD-10-CM

## 2024-05-31 PROCEDURE — 99213 OFFICE O/P EST LOW 20 MIN: CPT

## 2024-05-31 RX ORDER — MINERAL OIL/HYDROPHIL PETROLAT
OINTMENT (GRAM) TOPICAL
Qty: 396 G | Refills: 2 | Status: SHIPPED | OUTPATIENT
Start: 2024-05-31

## 2024-06-02 NOTE — PROGRESS NOTES
to fever, chills, feeling ill, redness around the wounds, redness streaking up the legs, purulent drainage. Instructed patient to call the clinic or present to the nearest emergency department if they notice these signs or symptoms   -Patient educated about the importance of diabetic foot care consisting of but not limited to daily foot inspections, wearing supportive shoes and inserts at all times, applying lotion to feet while sparing webspaces, and routinely checking blood sugar.     RTC in 3 week for postoperative check    Patient assessment and plan discussed with YOLANDA Patel DPM   Podiatric Resident PGY1  [employer]  Pager number [phone]  6/2/2024, 1:19 PM

## 2024-06-20 NOTE — PROGRESS NOTES
Patient re- educated about sternal precautions. Patient not following precautions at all. Will continue to monitor. [FreeTextEntry1] : , , Send for CXR supportive care measures

## 2024-06-28 ENCOUNTER — HOSPITAL ENCOUNTER (EMERGENCY)
Age: 74
Discharge: HOME | End: 2024-06-28
Attending: EMERGENCY MEDICINE
Payer: MEDICAID

## 2024-06-28 ENCOUNTER — APPOINTMENT (OUTPATIENT)
Dept: GENERAL RADIOLOGY | Age: 74
End: 2024-06-28
Payer: MEDICAID

## 2024-06-28 VITALS
TEMPERATURE: 98.5 F | HEART RATE: 101 BPM | RESPIRATION RATE: 26 BRPM | OXYGEN SATURATION: 100 % | DIASTOLIC BLOOD PRESSURE: 80 MMHG | SYSTOLIC BLOOD PRESSURE: 132 MMHG

## 2024-06-28 DIAGNOSIS — G89.18 POST-OP PAIN: Primary | ICD-10-CM

## 2024-06-28 DIAGNOSIS — R06.09 DYSPNEA ON EXERTION: ICD-10-CM

## 2024-06-28 LAB
ANION GAP SERPL CALCULATED.3IONS-SCNC: 14 MMOL/L (ref 3–16)
BACTERIA URNS QL MICRO: ABNORMAL /HPF
BASOPHILS # BLD: 0 K/UL (ref 0–0.2)
BASOPHILS NFR BLD: 0.8 %
BILIRUB UR QL STRIP.AUTO: NEGATIVE
BUN SERPL-MCNC: 58 MG/DL (ref 7–20)
CALCIUM SERPL-MCNC: 9.2 MG/DL (ref 8.3–10.6)
CHLORIDE SERPL-SCNC: 101 MMOL/L (ref 99–110)
CLARITY UR: ABNORMAL
CO2 SERPL-SCNC: 25 MMOL/L (ref 21–32)
COLOR UR: YELLOW
CREAT SERPL-MCNC: 2.1 MG/DL (ref 0.8–1.3)
CRP SERPL-MCNC: 4.1 MG/L (ref 0–5.1)
DEPRECATED RDW RBC AUTO: 14.6 % (ref 12.4–15.4)
EKG ATRIAL RATE: 100 BPM
EKG DIAGNOSIS: NORMAL
EKG P-R INTERVAL: 184 MS
EKG Q-T INTERVAL: 406 MS
EKG QRS DURATION: 88 MS
EKG QTC CALCULATION (BAZETT): 523 MS
EKG R AXIS: 91 DEGREES
EKG T AXIS: 81 DEGREES
EKG VENTRICULAR RATE: 100 BPM
EOSINOPHIL # BLD: 0.3 K/UL (ref 0–0.6)
EOSINOPHIL NFR BLD: 4.9 %
EPI CELLS #/AREA URNS HPF: ABNORMAL /HPF (ref 0–5)
ERYTHROCYTE [SEDIMENTATION RATE] IN BLOOD BY WESTERGREN METHOD: 60 MM/HR (ref 0–20)
GFR SERPLBLD CREATININE-BSD FMLA CKD-EPI: 33 ML/MIN/{1.73_M2}
GLUCOSE SERPL-MCNC: 174 MG/DL (ref 70–99)
GLUCOSE UR STRIP.AUTO-MCNC: NEGATIVE MG/DL
HCT VFR BLD AUTO: 38.6 % (ref 40.5–52.5)
HGB BLD-MCNC: 12.9 G/DL (ref 13.5–17.5)
HGB UR QL STRIP.AUTO: ABNORMAL
KETONES UR STRIP.AUTO-MCNC: NEGATIVE MG/DL
LEUKOCYTE ESTERASE UR QL STRIP.AUTO: ABNORMAL
LYMPHOCYTES # BLD: 0.9 K/UL (ref 1–5.1)
LYMPHOCYTES NFR BLD: 14.8 %
MCH RBC QN AUTO: 29.3 PG (ref 26–34)
MCHC RBC AUTO-ENTMCNC: 33.4 G/DL (ref 31–36)
MCV RBC AUTO: 87.8 FL (ref 80–100)
MONOCYTES # BLD: 0.6 K/UL (ref 0–1.3)
MONOCYTES NFR BLD: 10.6 %
NEUTROPHILS # BLD: 4.2 K/UL (ref 1.7–7.7)
NEUTROPHILS NFR BLD: 68.9 %
NITRITE UR QL STRIP.AUTO: NEGATIVE
NT-PROBNP SERPL-MCNC: 1491 PG/ML (ref 0–124)
PH UR STRIP.AUTO: 6 [PH] (ref 5–8)
PLATELET # BLD AUTO: 200 K/UL (ref 135–450)
PMV BLD AUTO: 8.8 FL (ref 5–10.5)
POTASSIUM SERPL-SCNC: 4 MMOL/L (ref 3.5–5.1)
PROT UR STRIP.AUTO-MCNC: ABNORMAL MG/DL
RBC # BLD AUTO: 4.4 M/UL (ref 4.2–5.9)
RBC #/AREA URNS HPF: ABNORMAL /HPF (ref 0–4)
SODIUM SERPL-SCNC: 140 MMOL/L (ref 136–145)
SP GR UR STRIP.AUTO: 1.01 (ref 1–1.03)
TROPONIN, HIGH SENSITIVITY: 26 NG/L (ref 0–22)
TROPONIN, HIGH SENSITIVITY: 26 NG/L (ref 0–22)
UA COMPLETE W REFLEX CULTURE PNL UR: YES
UA DIPSTICK W REFLEX MICRO PNL UR: YES
URN SPEC COLLECT METH UR: ABNORMAL
UROBILINOGEN UR STRIP-ACNC: 0.2 E.U./DL
WBC # BLD AUTO: 6.1 K/UL (ref 4–11)
WBC #/AREA URNS HPF: ABNORMAL /HPF (ref 0–5)

## 2024-06-28 PROCEDURE — 87186 SC STD MICRODIL/AGAR DIL: CPT

## 2024-06-28 PROCEDURE — 86140 C-REACTIVE PROTEIN: CPT

## 2024-06-28 PROCEDURE — 71046 X-RAY EXAM CHEST 2 VIEWS: CPT

## 2024-06-28 PROCEDURE — 87077 CULTURE AEROBIC IDENTIFY: CPT

## 2024-06-28 PROCEDURE — 81001 URINALYSIS AUTO W/SCOPE: CPT

## 2024-06-28 PROCEDURE — 6360000002 HC RX W HCPCS: Performed by: PHYSICIAN ASSISTANT

## 2024-06-28 PROCEDURE — 96374 THER/PROPH/DIAG INJ IV PUSH: CPT

## 2024-06-28 PROCEDURE — 96375 TX/PRO/DX INJ NEW DRUG ADDON: CPT

## 2024-06-28 PROCEDURE — 2580000003 HC RX 258: Performed by: PHYSICIAN ASSISTANT

## 2024-06-28 PROCEDURE — 85025 COMPLETE CBC W/AUTO DIFF WBC: CPT

## 2024-06-28 PROCEDURE — 80048 BASIC METABOLIC PNL TOTAL CA: CPT

## 2024-06-28 PROCEDURE — 99285 EMERGENCY DEPT VISIT HI MDM: CPT

## 2024-06-28 PROCEDURE — 84484 ASSAY OF TROPONIN QUANT: CPT

## 2024-06-28 PROCEDURE — 85652 RBC SED RATE AUTOMATED: CPT

## 2024-06-28 PROCEDURE — 87086 URINE CULTURE/COLONY COUNT: CPT

## 2024-06-28 PROCEDURE — 73630 X-RAY EXAM OF FOOT: CPT

## 2024-06-28 PROCEDURE — 83880 ASSAY OF NATRIURETIC PEPTIDE: CPT

## 2024-06-28 RX ORDER — FUROSEMIDE 10 MG/ML
40 INJECTION INTRAMUSCULAR; INTRAVENOUS ONCE
Status: COMPLETED | OUTPATIENT
Start: 2024-06-28 | End: 2024-06-28

## 2024-06-28 RX ORDER — CEPHALEXIN 500 MG/1
500 CAPSULE ORAL 4 TIMES DAILY
Qty: 20 CAPSULE | Refills: 0 | Status: SHIPPED | OUTPATIENT
Start: 2024-06-28 | End: 2024-07-03

## 2024-06-28 RX ADMIN — FUROSEMIDE 40 MG: 10 INJECTION, SOLUTION INTRAMUSCULAR; INTRAVENOUS at 20:27

## 2024-06-28 RX ADMIN — WATER 1000 MG: 1 INJECTION INTRAMUSCULAR; INTRAVENOUS; SUBCUTANEOUS at 20:29

## 2024-06-28 ASSESSMENT — ENCOUNTER SYMPTOMS
DIARRHEA: 0
COUGH: 0
SHORTNESS OF BREATH: 1
NAUSEA: 0
ABDOMINAL PAIN: 0
CHEST TIGHTNESS: 0
VOMITING: 0

## 2024-06-28 NOTE — ED PROVIDER NOTES
dyspnea on exertion, urinary frequency.  He denies other symptoms.  He is alert and oriented x 4.  Heart rate is 101.  All other vital signs are within normal limits.  On exam he is nontoxic in appearance.  On examination of the left foot the patient's postop site appears well-healing without signs of infection including discharge, erythema.  There is very mild swelling present to the left foot diffusely.  No pitting present.  Dorsalis pedis pulse is intact in the left foot.  Patient has full sensation in his left lower extremity.  He has full range of motion of his left knee and left foot.  There is no tenderness to palpation of the left lower extremity including his calf.  No palpable cord present.     EKG shows normal sinus rhythm at a rate of 100 bpm.  No ST elevation or depression present.      CBC is unremarkable  BMP shows creatinine 2.1 which is below patient's baseline over the past 2 months  ESR 60, CRP  BNP 1,491 down from 1700 a year ago  UA is leukocyte positive with  WBCs  Initial high-sensitivity troponin 26, repeat high-sensitivity troponin      CXR, x-ray left foot:  XR CHEST (2 VW)   Final Result   1. Status post CABG with mild pulmonary vascular congestion   2. Nonspecific groundglass appearance right lower lung either related to alveolar edema from vascular congestion versus early pneumonia      Electronically signed by MD Juanjo Bernard      XR FOOT LEFT (MIN 3 VIEWS)   Final Result      Status post indentation with nonspecific fluffy periostitis involving the distal aspect of the first metatarsal amputation site involving the mid diaphysis for which I cannot exclude osteomyelitis. If there is a high degree of clinical suspicion, MRI of    the foot without and with contrast to be helpful.      Electronically signed by MD Juanjo Bernard      Vascular duplex lower extremity venous left    (Results Pending)     Following x-ray results I consulted podiatry.  After reviewing the x-ray their suspicion  Discharge 06/28/2024 08:11:29 PM        Irving Maradiaga, JA  06/28/24 2037

## 2024-06-28 NOTE — ED PROVIDER NOTES
ED Attending Attestation Note     Date of evaluation: 6/28/2024    This patient was seen by the advance practice provider.  I have seen and examined the patient, agree with the workup, evaluation, management and diagnosis. The care plan has been discussed.  I have reviewed the ECG and concur with the EUNICE's interpretation.  My assessment reveals a 74-year-old male who presents with a chief complaint of foot pain, shortness of breath.  Patient with left leg cramps for the last 2 to 3 days.  Also with increased shortness of breath over the last few days.  On exam is chronically ill-appearing but in no acute distress.    General: This is a wd/wn male  in no acute distress who appears their stated age.     HEENT: NC/AT. PERRL. OP clear. MMM.     Neck: Supple. Trachea midline.    Pulmonary: Normal work of breathing, not using accessory muscles or pursed lip breathing    Cardiac: RRR    Abdomen: S/NT/ND/+BS. No cva tenderness.     Musculoskeletal: Left foot toe amputations, well-healed surgical incision.  Small ulceration noted on right shin with no surrounding erythema.      Vascular: +2 radial and DP pulses.     Skin: Warm and dry with no lesions noted. Chronic skin discoloration of lower extremities    Neuro:  AAOx4.  Face is grossly symmetric.  Gait not assessed. Moving all 4 extremities equally and spontaneously       Dana Jeffrey MD  06/28/24 1492

## 2024-06-29 NOTE — DISCHARGE INSTRUCTIONS
As we discussed, follow-up with podiatry for reevaluation of your foot  Take Keflex as directed for UTI until course is complete.  Continue on your Eliquis and Lasix regimen at home as prescribed.  Call the number provided on your outpatient paperwork to schedule an ultrasound of your left leg for evaluation of blood clot.  Do this is soon as possible.  Follow-up with cardiology with regard to your test results today.  Return to the emergency department sooner with worsening pain, worsening shortness of breath, urinary symptoms that do not resolve or worsen, other concerning symptoms.

## 2024-06-29 NOTE — ED NOTES
Reviewed discharge instructions, medication reconciliation, and patient education information with patient. Patient stated understanding, and answered questions to satisfaction. Patient verbalized satisfaction of care. PIV removed at this time. Patient ambulated safely to exit with a steady gait in no obvious acute distress. Patient verbalized understanding of returning to ER if symptoms worsen, and to follow up with primary health care provider.        Mary Kay Monzon, NAVDEEP  06/28/24 3889

## 2024-06-30 LAB
BACTERIA UR CULT: ABNORMAL
ORGANISM: ABNORMAL

## 2024-07-01 ENCOUNTER — HOSPITAL ENCOUNTER (OUTPATIENT)
Age: 74
Discharge: HOME | End: 2024-07-03
Payer: MEDICAID

## 2024-07-01 VITALS
SYSTOLIC BLOOD PRESSURE: 132 MMHG | HEIGHT: 72 IN | BODY MASS INDEX: 35.62 KG/M2 | DIASTOLIC BLOOD PRESSURE: 80 MMHG | WEIGHT: 263 LBS

## 2024-07-01 DIAGNOSIS — I25.5 ISCHEMIC CARDIOMYOPATHY: ICD-10-CM

## 2024-07-01 LAB
ECHO AV MEAN GRADIENT: 4 MMHG
ECHO AV MEAN VELOCITY: 0.9 M/S
ECHO AV PEAK GRADIENT: 6 MMHG
ECHO AV PEAK VELOCITY: 1.2 M/S
ECHO AV VELOCITY RATIO: 0.75
ECHO AV VTI: 24.4 CM
ECHO BSA: 2.46 M2
ECHO EST RA PRESSURE: 5 MMHG
ECHO LA AREA 4C: 22.7 CM2
ECHO LA MAJOR AXIS: 6.6 CM
ECHO LA VOL MOD A4C: 67 ML (ref 18–58)
ECHO LA VOLUME INDEX MOD A4C: 28 ML/M2 (ref 16–34)
ECHO LV E' SEPTAL VELOCITY: 15 CM/S
ECHO LV FRACTIONAL SHORTENING: 26 % (ref 28–44)
ECHO LV INTERNAL DIMENSION DIASTOLE INDEX: 2.55 CM/M2
ECHO LV INTERNAL DIMENSION DIASTOLIC: 6.1 CM (ref 4.2–5.9)
ECHO LV INTERNAL DIMENSION SYSTOLIC INDEX: 1.88 CM/M2
ECHO LV INTERNAL DIMENSION SYSTOLIC: 4.5 CM
ECHO LV IVSD: 1.1 CM (ref 0.6–1)
ECHO LV MASS 2D: 237.7 G (ref 88–224)
ECHO LV MASS INDEX 2D: 99.5 G/M2 (ref 49–115)
ECHO LV POSTERIOR WALL DIASTOLIC: 0.8 CM (ref 0.6–1)
ECHO LV RELATIVE WALL THICKNESS RATIO: 0.26
ECHO LVOT AV VTI INDEX: 0.75
ECHO LVOT MEAN GRADIENT: 2 MMHG
ECHO LVOT PEAK GRADIENT: 3 MMHG
ECHO LVOT PEAK VELOCITY: 0.9 M/S
ECHO LVOT VTI: 18.2 CM
ECHO MV E VELOCITY: 1 M/S
ECHO MV E/E' SEPTAL: 6.67
ECHO MV LVOT VTI INDEX: 1.09
ECHO MV MAX VELOCITY: 1 M/S
ECHO MV MEAN GRADIENT: 2 MMHG
ECHO MV MEAN VELOCITY: 0.6 M/S
ECHO MV PEAK GRADIENT: 4 MMHG
ECHO MV VTI: 19.9 CM
ECHO RIGHT VENTRICULAR SYSTOLIC PRESSURE (RVSP): 45 MMHG
ECHO RV INTERNAL DIMENSION: 3.2 CM
ECHO TV REGURGITANT MAX VELOCITY: 3.18 M/S
ECHO TV REGURGITANT PEAK GRADIENT: 40 MMHG

## 2024-07-01 PROCEDURE — C8929 TTE W OR WO FOL WCON,DOPPLER: HCPCS

## 2024-07-01 PROCEDURE — 93306 TTE W/DOPPLER COMPLETE: CPT | Performed by: INTERNAL MEDICINE

## 2024-07-01 PROCEDURE — 6360000004 HC RX CONTRAST MEDICATION: Performed by: INTERNAL MEDICINE

## 2024-07-01 RX ADMIN — PERFLUTREN 1.5 ML: 6.52 INJECTION, SUSPENSION INTRAVENOUS at 14:02

## 2024-07-08 ENCOUNTER — HOSPITAL ENCOUNTER (EMERGENCY)
Age: 74
Discharge: HOME | End: 2024-07-08
Attending: EMERGENCY MEDICINE
Payer: MEDICAID

## 2024-07-08 ENCOUNTER — APPOINTMENT (OUTPATIENT)
Dept: VASCULAR LAB | Age: 74
End: 2024-07-08
Payer: MEDICAID

## 2024-07-08 ENCOUNTER — APPOINTMENT (OUTPATIENT)
Dept: GENERAL RADIOLOGY | Age: 74
End: 2024-07-08
Payer: MEDICAID

## 2024-07-08 VITALS
OXYGEN SATURATION: 98 % | HEIGHT: 72 IN | SYSTOLIC BLOOD PRESSURE: 130 MMHG | DIASTOLIC BLOOD PRESSURE: 72 MMHG | WEIGHT: 276.8 LBS | RESPIRATION RATE: 18 BRPM | HEART RATE: 89 BPM | BODY MASS INDEX: 37.49 KG/M2 | TEMPERATURE: 97.8 F

## 2024-07-08 DIAGNOSIS — M25.552 LEFT HIP PAIN: Primary | ICD-10-CM

## 2024-07-08 PROCEDURE — 99284 EMERGENCY DEPT VISIT MOD MDM: CPT

## 2024-07-08 PROCEDURE — 93971 EXTREMITY STUDY: CPT

## 2024-07-08 PROCEDURE — 73502 X-RAY EXAM HIP UNI 2-3 VIEWS: CPT

## 2024-07-08 PROCEDURE — 6370000000 HC RX 637 (ALT 250 FOR IP): Performed by: PHYSICIAN ASSISTANT

## 2024-07-08 RX ORDER — ACETAMINOPHEN 500 MG
1000 TABLET ORAL
Status: COMPLETED | OUTPATIENT
Start: 2024-07-08 | End: 2024-07-08

## 2024-07-08 RX ORDER — LIDOCAINE 4 G/G
1 PATCH TOPICAL DAILY
Status: DISCONTINUED | OUTPATIENT
Start: 2024-07-08 | End: 2024-07-08 | Stop reason: HOSPADM

## 2024-07-08 RX ADMIN — ACETAMINOPHEN 1000 MG: 500 TABLET ORAL at 11:59

## 2024-07-08 ASSESSMENT — PAIN DESCRIPTION - ORIENTATION: ORIENTATION: LEFT

## 2024-07-08 ASSESSMENT — PAIN DESCRIPTION - LOCATION: LOCATION: HIP

## 2024-07-08 ASSESSMENT — ENCOUNTER SYMPTOMS: BACK PAIN: 0

## 2024-07-08 ASSESSMENT — PAIN DESCRIPTION - DESCRIPTORS: DESCRIPTORS: ACHING

## 2024-07-08 ASSESSMENT — PAIN SCALES - GENERAL
PAINLEVEL_OUTOF10: 6
PAINLEVEL_OUTOF10: 0

## 2024-07-08 ASSESSMENT — PAIN - FUNCTIONAL ASSESSMENT: PAIN_FUNCTIONAL_ASSESSMENT: 0-10

## 2024-07-08 NOTE — ED NOTES
Patient prepared for and ready to be discharged. Patient discharged at this time to home in care of self in no acute distress after verbalizing understanding of discharge instructions. Patient left after receiving After Visit Summary instructions.      Yoana Duffy RN  07/08/24 5688

## 2024-07-08 NOTE — DISCHARGE INSTRUCTIONS
ED Course     Today, you were seen in the Emergency Department.  You have been diagnosed with:   1. Left hip pain      Disposition/Plan     IMPORTANT INSTRUCTIONS:  X-ray of your hip did not show any fracture.    Ultrasound of your leg did not show any underlying blood clot.    You can take Tylenol for pain as needed.    Try icing your leg on and off as well.  Follow-up with your primary care physician as soon as possible regarding your visit.    Be sure to return to the Emergency Department immediately if symptoms worsen or new symptoms occur as we discussed, such as inability to weight-bear on your left leg, fevers, difficulty walking.       PLEASE FOLLOW UP WITH:  Diego Christian MD  [address] [id number] [phone]    Schedule an appointment as soon as possible for a visit     Additional important information has been included with this packet, please make sure that you have read this information as it will better help you understand you illness/injury better.

## 2024-07-08 NOTE — ED PROVIDER NOTES
ED Attending Attestation Note     Date of evaluation: 7/8/2024    This patient was seen by the advance practice provider.  I have seen and examined the patient, agree with the workup, evaluation, management and diagnosis. The care plan has been discussed.      Briefly, Veronica Chaidez is a 74 y.o. male with a PMH inclusive of CAD, CHF, A-fib on Eliquis, diabetes who presents for evaluation of left hip pain. Started when getting out of bed this morning. No trauma.  No back pain or abdominal pain.    Notable exam findings include mild tenderness to palpation over left proximal femur.  Mild tenderness to palpation to left calf.  Left foot is warm and well-perfused.  2+ DP pulse symmetrical chronic skin changes to bilateral lower extremities    Assessment/ Medical Decision Making:     Hip pain is most consistent with trochanteric bursitis.  He has no back or abdominal symptoms, no radicular symptoms, no evidence of vascular compromise, and pain only present when walking or with palpation to that area.  Will obtain Doppler to ensure no clot given his calf tenderness although his pain is localized to his hip.  Will also obtain x-ray of the hip.  Tylenol for symptom control.  Pending results of testing and reassessment         Criselda Montero MD  07/08/24 2308    
morning (before breakfast), Disp-30 tablet, R-3Normal      potassium chloride (KLOR-CON M) 10 MEQ extended release tablet Take 1 tablet by mouth 2 times daily, Disp-180 tablet, R-3Normal      apixaban (ELIQUIS) 5 MG TABS tablet Take 1 tablet by mouth 2 times daily, Disp-180 tablet, R-3Normal      metoprolol tartrate (LOPRESSOR) 50 MG tablet TAKE 1 TABLET BY MOUTH TWICE DAILY, Disp-180 tablet, R-3Normal      EPINEPHrine (PRIMATENE MIST) 0.125 MG/ACT AERO Inhale 2 puffs into the lungs onceHistorical Med      [facility], Disp-1 each, R-0, PrintPt cannot walk more that 250 feet without becoming SOB. Dx: CAD, Cardiomyopathy, Atrial Fibrilation   Not to exceed 5 years             Allergies     He is allergic to aspartame and phenylalanine, codeine, and msg.     Love Mckeon PA  07/08/24 9892

## 2024-07-09 LAB — ECHO BSA: 2.53 M2

## 2024-07-09 PROCEDURE — 93971 EXTREMITY STUDY: CPT | Performed by: SURGERY

## 2024-07-29 DIAGNOSIS — L85.3 XEROSIS CUTIS: ICD-10-CM

## 2024-07-29 RX ORDER — MINERAL OIL/HYDROPHIL PETROLAT
OINTMENT (GRAM) TOPICAL
Qty: 396 G | Refills: 2 | Status: SHIPPED | OUTPATIENT
Start: 2024-07-29

## 2024-07-29 NOTE — TELEPHONE ENCOUNTER
Requested Prescriptions     Pending Prescriptions Disp Refills    mineral oil-hydrophilic petrolatum (AQUAPHOR) ointment 396 g 2     Sig: Apply topically as needed.       Last Clinic Visit:  5/31/2024     Next Clinic Appointment:  Visit date not found

## 2024-08-26 NOTE — TELEPHONE ENCOUNTER
Requested Prescriptions     Pending Prescriptions Disp Refills    apixaban (ELIQUIS) 5 MG TABS tablet 180 tablet 3     Sig: Take 1 tablet by mouth 2 times daily    furosemide (LASIX) 80 MG tablet 180 tablet 3     Sig: Take 1 tablet by mouth 2 times daily            Checked Correct Pharmacy: Yes    Any changes since last refill? No     Number: 180    Refills: 3    Last Office Visit: 5/28/2024     Next Office Visit: 12/5/2024         Last Labs: 06.28.2024

## 2024-08-26 NOTE — TELEPHONE ENCOUNTER
Requested Prescriptions     Pending Prescriptions Disp Refills    apixaban (ELIQUIS) 5 MG TABS tablet 180 tablet 3     Sig: Take 1 tablet by mouth 2 times daily    furosemide (LASIX) 80 MG tablet 180 tablet 3     Sig: Take 1 tablet by mouth 2 times daily    metoprolol tartrate (LOPRESSOR) 50 MG tablet 180 tablet 3     Sig: TAKE 1 TABLET BY MOUTH TWICE DAILY            Checked Correct Pharmacy: Yes    Any changes since last refill? No     Number: 180    Refills: 3    Last Office Visit: 5/28/2024     Next Office Visit: 12/5/2024         Last Labs: 06.28.2024

## 2024-08-26 NOTE — TELEPHONE ENCOUNTER
Requested Prescriptions     Pending Prescriptions Disp Refills    apixaban (ELIQUIS) 5 MG TABS tablet 180 tablet 3     Sig: Take 1 tablet by mouth 2 times daily    furosemide (LASIX) 80 MG tablet 180 tablet 3     Sig: Take 1 tablet by mouth 2 times daily    metoprolol tartrate (LOPRESSOR) 50 MG tablet 180 tablet 3     Sig: TAKE 1 TABLET BY MOUTH TWICE DAILY    potassium chloride (KLOR-CON M) 10 MEQ extended release tablet 180 tablet 3     Sig: Take 1 tablet by mouth 2 times daily            Checked Correct Pharmacy: Yes    Any changes since last refill? No     Number: 180    Refills: 3    Last Office Visit: 5/28/2024     Next Office Visit: 12/5/2024         Last Labs: 06.28.2024

## 2024-08-26 NOTE — TELEPHONE ENCOUNTER
Requested Prescriptions     Pending Prescriptions Disp Refills    apixaban (ELIQUIS) 5 MG TABS tablet 180 tablet 3     Sig: Take 1 tablet by mouth 2 times daily            Checked Correct Pharmacy: Yes    Any changes since last refill? No     Number: 180    Refills: 3    Last Office Visit: 5/28/2024     Next Office Visit: 12/5/2024         Last Labs: 06.28.2024

## 2024-08-27 RX ORDER — POTASSIUM CHLORIDE 750 MG/1
10 TABLET, EXTENDED RELEASE ORAL 2 TIMES DAILY
Qty: 180 TABLET | Refills: 3 | Status: SHIPPED | OUTPATIENT
Start: 2024-08-27

## 2024-08-27 RX ORDER — METOPROLOL TARTRATE 50 MG
TABLET ORAL
Qty: 180 TABLET | Refills: 3 | Status: SHIPPED | OUTPATIENT
Start: 2024-08-27

## 2024-08-27 RX ORDER — FUROSEMIDE 80 MG
80 TABLET ORAL 2 TIMES DAILY
Qty: 180 TABLET | Refills: 3 | Status: SHIPPED | OUTPATIENT
Start: 2024-08-27

## 2024-12-10 ENCOUNTER — OFFICE VISIT (OUTPATIENT)
Dept: FAMILY MEDICINE CLINIC | Age: 74
End: 2024-12-10
Payer: MEDICAID

## 2024-12-10 VITALS
SYSTOLIC BLOOD PRESSURE: 120 MMHG | DIASTOLIC BLOOD PRESSURE: 60 MMHG | HEART RATE: 97 BPM | OXYGEN SATURATION: 97 % | BODY MASS INDEX: 35.01 KG/M2 | HEIGHT: 74 IN | WEIGHT: 272.8 LBS

## 2024-12-10 DIAGNOSIS — I10 PRIMARY HYPERTENSION: Primary | ICD-10-CM

## 2024-12-10 DIAGNOSIS — Z11.4 ENCOUNTER FOR SCREENING FOR HIV: ICD-10-CM

## 2024-12-10 DIAGNOSIS — I48.0 PAROXYSMAL ATRIAL FIBRILLATION (HCC): ICD-10-CM

## 2024-12-10 DIAGNOSIS — I50.22 CHRONIC SYSTOLIC (CONGESTIVE) HEART FAILURE (HCC): ICD-10-CM

## 2024-12-10 DIAGNOSIS — Z11.59 NEED FOR HEPATITIS C SCREENING TEST: ICD-10-CM

## 2024-12-10 DIAGNOSIS — R05.9 COUGH, UNSPECIFIED TYPE: ICD-10-CM

## 2024-12-10 DIAGNOSIS — E11.65 UNCONTROLLED TYPE 2 DIABETES MELLITUS WITH HYPERGLYCEMIA (HCC): ICD-10-CM

## 2024-12-10 PROCEDURE — G8427 DOCREV CUR MEDS BY ELIG CLIN: HCPCS | Performed by: STUDENT IN AN ORGANIZED HEALTH CARE EDUCATION/TRAINING PROGRAM

## 2024-12-10 PROCEDURE — 3044F HG A1C LEVEL LT 7.0%: CPT | Performed by: STUDENT IN AN ORGANIZED HEALTH CARE EDUCATION/TRAINING PROGRAM

## 2024-12-10 PROCEDURE — 1123F ACP DISCUSS/DSCN MKR DOCD: CPT | Performed by: STUDENT IN AN ORGANIZED HEALTH CARE EDUCATION/TRAINING PROGRAM

## 2024-12-10 PROCEDURE — 2022F DILAT RTA XM EVC RTNOPTHY: CPT | Performed by: STUDENT IN AN ORGANIZED HEALTH CARE EDUCATION/TRAINING PROGRAM

## 2024-12-10 PROCEDURE — 99214 OFFICE O/P EST MOD 30 MIN: CPT | Performed by: STUDENT IN AN ORGANIZED HEALTH CARE EDUCATION/TRAINING PROGRAM

## 2024-12-10 PROCEDURE — G8484 FLU IMMUNIZE NO ADMIN: HCPCS | Performed by: STUDENT IN AN ORGANIZED HEALTH CARE EDUCATION/TRAINING PROGRAM

## 2024-12-10 PROCEDURE — G8417 CALC BMI ABV UP PARAM F/U: HCPCS | Performed by: STUDENT IN AN ORGANIZED HEALTH CARE EDUCATION/TRAINING PROGRAM

## 2024-12-10 PROCEDURE — 3074F SYST BP LT 130 MM HG: CPT | Performed by: STUDENT IN AN ORGANIZED HEALTH CARE EDUCATION/TRAINING PROGRAM

## 2024-12-10 PROCEDURE — 1036F TOBACCO NON-USER: CPT | Performed by: STUDENT IN AN ORGANIZED HEALTH CARE EDUCATION/TRAINING PROGRAM

## 2024-12-10 PROCEDURE — 3017F COLORECTAL CA SCREEN DOC REV: CPT | Performed by: STUDENT IN AN ORGANIZED HEALTH CARE EDUCATION/TRAINING PROGRAM

## 2024-12-10 PROCEDURE — 3078F DIAST BP <80 MM HG: CPT | Performed by: STUDENT IN AN ORGANIZED HEALTH CARE EDUCATION/TRAINING PROGRAM

## 2024-12-10 RX ORDER — GUAIFENESIN 600 MG/1
600 TABLET, EXTENDED RELEASE ORAL 2 TIMES DAILY
Qty: 30 TABLET | Refills: 0 | Status: SHIPPED | OUTPATIENT
Start: 2024-12-10 | End: 2024-12-25

## 2024-12-10 RX ORDER — LORATADINE 10 MG/1
10 TABLET ORAL DAILY
Qty: 30 TABLET | Refills: 2 | Status: SHIPPED | OUTPATIENT
Start: 2024-12-10 | End: 2025-03-10

## 2024-12-10 NOTE — PROGRESS NOTES
Chief Complaint   Patient presents with    Establish Care     New to provider. Patient c/o cold, cough, sneezing, runny nose for 2-3 days           HPI: Veronica Chaidez is a 74 y.o. male who presents for New Patient     #CAD  #Afib  #HTN  -Follows with cardiology, is on amiodarone 200 mg daily, Eliquis 5 mg twice daily, Lasix 80 mg twice daily, potassium chloride 10 mill equivalent twice daily, metoprolol tartrate 50 mg twice daily,  -Is also on insulin lispro sliding scale for diabetes, due for labs , follows with podiatry, needs ophthalmology referral  -Also states has cough, sneezing, runny nose for the past 2 to 3 days,  denies trouble with eating or drinking, has past intolerance to dextromethorphan, patient states is no longer on insulin,  is trying to control DM with diet     Lab Results   Component Value Date    CREATININE 2.1 (H) 06/28/2024    BUN 58 (H) 06/28/2024     06/28/2024    K 4.0 06/28/2024     06/28/2024    CO2 25 06/28/2024        Hemoglobin A1C   Date Value Ref Range Status   04/20/2024 6.3 See comment % Final     Comment:     Comment:  Diagnosis of Diabetes: > or = 6.5%  Increased risk of diabetes (Prediabetes): 5.7-6.4%  Glycemic Control: Nonpregnant Adults: <7.0%                    Pregnant: <6.0%            Past Medical History:   Diagnosis Date    A-fib ([facility]) 2020    CAD, multiple vessel 12/2019    Coronary artery disease involving coronary bypass graft     Diabetes mellitus (HCC)     diet controlled     Enlarged prostate     Environmental allergies     Kidney stones     Pacemaker     Systolic CHF (HCC)        Past Surgical History:   Procedure Laterality Date    CARDIAC CATHETERIZATION  12/10/2019    Dr. Shaver - severe multi-vessel disease    CORONARY ARTERY BYPASS GRAFT N/A 03/02/2020    WAYNE; TCPB; CABG x 3, LIMA to LAD w/ long segment endarterectomy (4 cm); bilateral pulmonary vein isolation; 40 mm Atriclip to L atril appendage; endoscopic L GSV harvest; ICNB x 5

## 2024-12-21 ENCOUNTER — APPOINTMENT (OUTPATIENT)
Dept: GENERAL RADIOLOGY | Age: 74
End: 2024-12-21
Payer: MEDICAID

## 2024-12-21 ENCOUNTER — HOSPITAL ENCOUNTER (INPATIENT)
Age: 74
LOS: 13 days | Discharge: NURSING, REHAB OR LONG-TERM CARE | End: 2025-01-03
Attending: EMERGENCY MEDICINE | Admitting: INTERNAL MEDICINE
Payer: MEDICAID

## 2024-12-21 DIAGNOSIS — S91.309A NON-HEALING OPEN WOUND OF HEEL, INITIAL ENCOUNTER: ICD-10-CM

## 2024-12-21 DIAGNOSIS — M79.605 PAIN IN LEFT LEG: ICD-10-CM

## 2024-12-21 DIAGNOSIS — M86.072 ACUTE HEMATOGENOUS OSTEOMYELITIS OF LEFT FOOT: ICD-10-CM

## 2024-12-21 DIAGNOSIS — N39.0 URINARY TRACT INFECTION WITHOUT HEMATURIA, SITE UNSPECIFIED: Primary | ICD-10-CM

## 2024-12-21 PROBLEM — E11.628 DIABETIC FOOT INFECTION (HCC): Status: ACTIVE | Noted: 2024-12-21

## 2024-12-21 PROBLEM — L08.9 DIABETIC FOOT INFECTION (HCC): Status: ACTIVE | Noted: 2024-12-21

## 2024-12-21 LAB
ANION GAP SERPL CALCULATED.3IONS-SCNC: 15 MMOL/L (ref 3–16)
BACTERIA URNS QL MICRO: ABNORMAL /HPF
BASOPHILS # BLD: 0.1 K/UL (ref 0–0.2)
BASOPHILS NFR BLD: 0.6 %
BILIRUB UR QL STRIP.AUTO: NEGATIVE
BUN SERPL-MCNC: 27 MG/DL (ref 7–20)
CALCIUM SERPL-MCNC: 8.9 MG/DL (ref 8.3–10.6)
CHLORIDE SERPL-SCNC: 101 MMOL/L (ref 99–110)
CLARITY UR: CLEAR
CO2 SERPL-SCNC: 20 MMOL/L (ref 21–32)
COLOR UR: YELLOW
CREAT SERPL-MCNC: 1.8 MG/DL (ref 0.8–1.3)
CRP SERPL-MCNC: 138 MG/L (ref 0–5.1)
DEPRECATED RDW RBC AUTO: 14.1 % (ref 12.4–15.4)
EOSINOPHIL # BLD: 0.1 K/UL (ref 0–0.6)
EOSINOPHIL NFR BLD: 1 %
EPI CELLS #/AREA URNS HPF: ABNORMAL /HPF (ref 0–5)
ERYTHROCYTE [SEDIMENTATION RATE] IN BLOOD BY WESTERGREN METHOD: 81 MM/HR (ref 0–20)
GFR SERPLBLD CREATININE-BSD FMLA CKD-EPI: 39 ML/MIN/{1.73_M2}
GLUCOSE BLD-MCNC: 136 MG/DL (ref 70–99)
GLUCOSE BLD-MCNC: 143 MG/DL (ref 70–99)
GLUCOSE BLD-MCNC: 184 MG/DL (ref 70–99)
GLUCOSE BLD-MCNC: 193 MG/DL (ref 70–99)
GLUCOSE SERPL-MCNC: 128 MG/DL (ref 70–99)
GLUCOSE UR STRIP.AUTO-MCNC: NEGATIVE MG/DL
HCT VFR BLD AUTO: 34.6 % (ref 40.5–52.5)
HGB BLD-MCNC: 11.4 G/DL (ref 13.5–17.5)
HGB UR QL STRIP.AUTO: ABNORMAL
KETONES UR STRIP.AUTO-MCNC: NEGATIVE MG/DL
LEUKOCYTE ESTERASE UR QL STRIP.AUTO: ABNORMAL
LYMPHOCYTES # BLD: 0.7 K/UL (ref 1–5.1)
LYMPHOCYTES NFR BLD: 5.2 %
MCH RBC QN AUTO: 29.2 PG (ref 26–34)
MCHC RBC AUTO-ENTMCNC: 32.9 G/DL (ref 31–36)
MCV RBC AUTO: 88.9 FL (ref 80–100)
MONOCYTES # BLD: 0.8 K/UL (ref 0–1.3)
MONOCYTES NFR BLD: 6.1 %
NEUTROPHILS # BLD: 11.3 K/UL (ref 1.7–7.7)
NEUTROPHILS NFR BLD: 87.1 %
NITRITE UR QL STRIP.AUTO: NEGATIVE
PERFORMED ON: ABNORMAL
PH UR STRIP.AUTO: 6 [PH] (ref 5–8)
PLATELET # BLD AUTO: 271 K/UL (ref 135–450)
PMV BLD AUTO: 8.3 FL (ref 5–10.5)
POTASSIUM SERPL-SCNC: 4.2 MMOL/L (ref 3.5–5.1)
PROCALCITONIN SERPL IA-MCNC: 0.17 NG/ML (ref 0–0.15)
PROT UR STRIP.AUTO-MCNC: 100 MG/DL
RBC # BLD AUTO: 3.9 M/UL (ref 4.2–5.9)
RBC #/AREA URNS HPF: ABNORMAL /HPF (ref 0–4)
RENAL EPI CELLS #/AREA UR COMP ASSIST: ABNORMAL /HPF (ref 0–1)
SODIUM SERPL-SCNC: 136 MMOL/L (ref 136–145)
SP GR UR STRIP.AUTO: 1.02 (ref 1–1.03)
UA COMPLETE W REFLEX CULTURE PNL UR: YES
UA DIPSTICK W REFLEX MICRO PNL UR: YES
URN SPEC COLLECT METH UR: ABNORMAL
UROBILINOGEN UR STRIP-ACNC: 0.2 E.U./DL
WBC # BLD AUTO: 13 K/UL (ref 4–11)
WBC #/AREA URNS HPF: >100 /HPF (ref 0–5)

## 2024-12-21 PROCEDURE — 6360000002 HC RX W HCPCS: Performed by: STUDENT IN AN ORGANIZED HEALTH CARE EDUCATION/TRAINING PROGRAM

## 2024-12-21 PROCEDURE — 87086 URINE CULTURE/COLONY COUNT: CPT

## 2024-12-21 PROCEDURE — 6360000002 HC RX W HCPCS: Performed by: INTERNAL MEDICINE

## 2024-12-21 PROCEDURE — 80048 BASIC METABOLIC PNL TOTAL CA: CPT

## 2024-12-21 PROCEDURE — 6370000000 HC RX 637 (ALT 250 FOR IP)

## 2024-12-21 PROCEDURE — 36415 COLL VENOUS BLD VENIPUNCTURE: CPT

## 2024-12-21 PROCEDURE — 87040 BLOOD CULTURE FOR BACTERIA: CPT

## 2024-12-21 PROCEDURE — 85025 COMPLETE CBC W/AUTO DIFF WBC: CPT

## 2024-12-21 PROCEDURE — 81001 URINALYSIS AUTO W/SCOPE: CPT

## 2024-12-21 PROCEDURE — 85652 RBC SED RATE AUTOMATED: CPT

## 2024-12-21 PROCEDURE — 6370000000 HC RX 637 (ALT 250 FOR IP): Performed by: STUDENT IN AN ORGANIZED HEALTH CARE EDUCATION/TRAINING PROGRAM

## 2024-12-21 PROCEDURE — 71045 X-RAY EXAM CHEST 1 VIEW: CPT

## 2024-12-21 PROCEDURE — 2580000003 HC RX 258: Performed by: INTERNAL MEDICINE

## 2024-12-21 PROCEDURE — 1200000000 HC SEMI PRIVATE

## 2024-12-21 PROCEDURE — 6370000000 HC RX 637 (ALT 250 FOR IP): Performed by: EMERGENCY MEDICINE

## 2024-12-21 PROCEDURE — 73620 X-RAY EXAM OF FOOT: CPT

## 2024-12-21 PROCEDURE — 86140 C-REACTIVE PROTEIN: CPT

## 2024-12-21 PROCEDURE — 84145 PROCALCITONIN (PCT): CPT

## 2024-12-21 PROCEDURE — 2500000003 HC RX 250 WO HCPCS: Performed by: INTERNAL MEDICINE

## 2024-12-21 PROCEDURE — 6370000000 HC RX 637 (ALT 250 FOR IP): Performed by: INTERNAL MEDICINE

## 2024-12-21 PROCEDURE — 99285 EMERGENCY DEPT VISIT HI MDM: CPT

## 2024-12-21 RX ORDER — OXYCODONE HYDROCHLORIDE 5 MG/1
5 TABLET ORAL EVERY 4 HOURS PRN
Status: DISCONTINUED | OUTPATIENT
Start: 2024-12-21 | End: 2024-12-21

## 2024-12-21 RX ORDER — METOPROLOL TARTRATE 25 MG/1
50 TABLET, FILM COATED ORAL 2 TIMES DAILY
Status: DISCONTINUED | OUTPATIENT
Start: 2024-12-21 | End: 2024-12-22

## 2024-12-21 RX ORDER — HYDROMORPHONE HYDROCHLORIDE 1 MG/ML
0.25 INJECTION, SOLUTION INTRAMUSCULAR; INTRAVENOUS; SUBCUTANEOUS
Status: DISCONTINUED | OUTPATIENT
Start: 2024-12-21 | End: 2024-12-21

## 2024-12-21 RX ORDER — AMIODARONE HYDROCHLORIDE 200 MG/1
200 TABLET ORAL DAILY
Status: DISCONTINUED | OUTPATIENT
Start: 2024-12-21 | End: 2025-01-03 | Stop reason: HOSPADM

## 2024-12-21 RX ORDER — LIDOCAINE HYDROCHLORIDE 20 MG/ML
JELLY TOPICAL ONCE
Status: COMPLETED | OUTPATIENT
Start: 2024-12-21 | End: 2024-12-21

## 2024-12-21 RX ORDER — VANCOMYCIN 1.5 G/300ML
1500 INJECTION, SOLUTION INTRAVENOUS EVERY 12 HOURS
Status: DISCONTINUED | OUTPATIENT
Start: 2024-12-22 | End: 2024-12-21

## 2024-12-21 RX ORDER — SODIUM CHLORIDE 9 MG/ML
INJECTION, SOLUTION INTRAVENOUS PRN
Status: DISCONTINUED | OUTPATIENT
Start: 2024-12-21 | End: 2025-01-03 | Stop reason: HOSPADM

## 2024-12-21 RX ORDER — DEXTROSE MONOHYDRATE 100 MG/ML
INJECTION, SOLUTION INTRAVENOUS CONTINUOUS PRN
Status: DISCONTINUED | OUTPATIENT
Start: 2024-12-21 | End: 2025-01-03 | Stop reason: HOSPADM

## 2024-12-21 RX ORDER — ACETAMINOPHEN 650 MG/1
650 SUPPOSITORY RECTAL EVERY 6 HOURS PRN
Status: DISCONTINUED | OUTPATIENT
Start: 2024-12-21 | End: 2025-01-03 | Stop reason: HOSPADM

## 2024-12-21 RX ORDER — ENOXAPARIN SODIUM 150 MG/ML
1 INJECTION SUBCUTANEOUS 2 TIMES DAILY
Status: DISCONTINUED | OUTPATIENT
Start: 2024-12-21 | End: 2025-01-03 | Stop reason: HOSPADM

## 2024-12-21 RX ORDER — GLUCAGON 1 MG/ML
1 KIT INJECTION PRN
Status: DISCONTINUED | OUTPATIENT
Start: 2024-12-21 | End: 2025-01-03 | Stop reason: HOSPADM

## 2024-12-21 RX ORDER — INSULIN LISPRO 100 [IU]/ML
0-8 INJECTION, SOLUTION INTRAVENOUS; SUBCUTANEOUS
Status: DISCONTINUED | OUTPATIENT
Start: 2024-12-21 | End: 2024-12-21

## 2024-12-21 RX ORDER — GUAIFENESIN 600 MG/1
600 TABLET, EXTENDED RELEASE ORAL 2 TIMES DAILY
Status: DISCONTINUED | OUTPATIENT
Start: 2024-12-21 | End: 2025-01-02

## 2024-12-21 RX ORDER — VANCOMYCIN 2 G/400ML
2000 INJECTION, SOLUTION INTRAVENOUS ONCE
Status: COMPLETED | OUTPATIENT
Start: 2024-12-21 | End: 2024-12-21

## 2024-12-21 RX ORDER — ONDANSETRON 2 MG/ML
4 INJECTION INTRAMUSCULAR; INTRAVENOUS EVERY 6 HOURS PRN
Status: DISCONTINUED | OUTPATIENT
Start: 2024-12-21 | End: 2025-01-03 | Stop reason: HOSPADM

## 2024-12-21 RX ORDER — POLYETHYLENE GLYCOL 3350 17 G/17G
17 POWDER, FOR SOLUTION ORAL DAILY PRN
Status: DISCONTINUED | OUTPATIENT
Start: 2024-12-21 | End: 2025-01-03 | Stop reason: HOSPADM

## 2024-12-21 RX ORDER — SODIUM CHLORIDE 0.9 % (FLUSH) 0.9 %
5-40 SYRINGE (ML) INJECTION PRN
Status: DISCONTINUED | OUTPATIENT
Start: 2024-12-21 | End: 2025-01-03 | Stop reason: HOSPADM

## 2024-12-21 RX ORDER — ONDANSETRON 4 MG/1
4 TABLET, ORALLY DISINTEGRATING ORAL EVERY 8 HOURS PRN
Status: DISCONTINUED | OUTPATIENT
Start: 2024-12-21 | End: 2025-01-03 | Stop reason: HOSPADM

## 2024-12-21 RX ORDER — POTASSIUM CHLORIDE 7.45 MG/ML
10 INJECTION INTRAVENOUS PRN
Status: DISCONTINUED | OUTPATIENT
Start: 2024-12-21 | End: 2024-12-21

## 2024-12-21 RX ORDER — ACETAMINOPHEN 325 MG/1
650 TABLET ORAL EVERY 6 HOURS PRN
Status: DISCONTINUED | OUTPATIENT
Start: 2024-12-21 | End: 2025-01-03 | Stop reason: HOSPADM

## 2024-12-21 RX ORDER — FUROSEMIDE 80 MG/1
80 TABLET ORAL 2 TIMES DAILY
Status: DISCONTINUED | OUTPATIENT
Start: 2024-12-21 | End: 2024-12-22

## 2024-12-21 RX ORDER — BENZONATATE 100 MG/1
100 CAPSULE ORAL 3 TIMES DAILY
Status: DISCONTINUED | OUTPATIENT
Start: 2024-12-21 | End: 2025-01-03 | Stop reason: HOSPADM

## 2024-12-21 RX ORDER — MAGNESIUM SULFATE IN WATER 40 MG/ML
2000 INJECTION, SOLUTION INTRAVENOUS PRN
Status: DISCONTINUED | OUTPATIENT
Start: 2024-12-21 | End: 2024-12-21

## 2024-12-21 RX ORDER — TAMSULOSIN HYDROCHLORIDE 0.4 MG/1
0.4 CAPSULE ORAL DAILY
Status: DISCONTINUED | OUTPATIENT
Start: 2024-12-21 | End: 2025-01-03 | Stop reason: HOSPADM

## 2024-12-21 RX ORDER — SODIUM CHLORIDE 0.9 % (FLUSH) 0.9 %
5-40 SYRINGE (ML) INJECTION EVERY 12 HOURS SCHEDULED
Status: DISCONTINUED | OUTPATIENT
Start: 2024-12-21 | End: 2025-01-03 | Stop reason: HOSPADM

## 2024-12-21 RX ORDER — INSULIN LISPRO 100 [IU]/ML
0-4 INJECTION, SOLUTION INTRAVENOUS; SUBCUTANEOUS
Status: DISCONTINUED | OUTPATIENT
Start: 2024-12-21 | End: 2025-01-03 | Stop reason: HOSPADM

## 2024-12-21 RX ORDER — OXYCODONE HYDROCHLORIDE 5 MG/1
10 TABLET ORAL EVERY 4 HOURS PRN
Status: DISCONTINUED | OUTPATIENT
Start: 2024-12-21 | End: 2024-12-21

## 2024-12-21 RX ORDER — POTASSIUM CHLORIDE 1500 MG/1
40 TABLET, EXTENDED RELEASE ORAL PRN
Status: DISCONTINUED | OUTPATIENT
Start: 2024-12-21 | End: 2024-12-21

## 2024-12-21 RX ORDER — VANCOMYCIN 1.5 G/300ML
1500 INJECTION, SOLUTION INTRAVENOUS EVERY 24 HOURS
Status: DISCONTINUED | OUTPATIENT
Start: 2024-12-22 | End: 2024-12-22

## 2024-12-21 RX ORDER — HYDROMORPHONE HYDROCHLORIDE 1 MG/ML
0.5 INJECTION, SOLUTION INTRAMUSCULAR; INTRAVENOUS; SUBCUTANEOUS
Status: DISCONTINUED | OUTPATIENT
Start: 2024-12-21 | End: 2024-12-21

## 2024-12-21 RX ADMIN — BENZONATATE 100 MG: 100 CAPSULE ORAL at 20:26

## 2024-12-21 RX ADMIN — INSULIN LISPRO 1 UNITS: 100 INJECTION, SOLUTION INTRAVENOUS; SUBCUTANEOUS at 20:36

## 2024-12-21 RX ADMIN — PIPERACILLIN AND TAZOBACTAM 3375 MG: 3; .375 INJECTION, POWDER, LYOPHILIZED, FOR SOLUTION INTRAVENOUS at 23:22

## 2024-12-21 RX ADMIN — TAMSULOSIN HYDROCHLORIDE 0.4 MG: 0.4 CAPSULE ORAL at 10:15

## 2024-12-21 RX ADMIN — SODIUM CHLORIDE, PRESERVATIVE FREE 10 ML: 5 INJECTION INTRAVENOUS at 20:27

## 2024-12-21 RX ADMIN — PIPERACILLIN AND TAZOBACTAM 4500 MG: 4; .5 INJECTION, POWDER, LYOPHILIZED, FOR SOLUTION INTRAVENOUS; PARENTERAL at 09:54

## 2024-12-21 RX ADMIN — BENZONATATE 100 MG: 100 CAPSULE ORAL at 13:15

## 2024-12-21 RX ADMIN — METOPROLOL TARTRATE 50 MG: 25 TABLET, FILM COATED ORAL at 13:14

## 2024-12-21 RX ADMIN — SODIUM CHLORIDE, PRESERVATIVE FREE 10 ML: 5 INJECTION INTRAVENOUS at 08:14

## 2024-12-21 RX ADMIN — COLLAGENASE SANTYL: 250 OINTMENT TOPICAL at 13:15

## 2024-12-21 RX ADMIN — VANCOMYCIN 2000 MG: 2 INJECTION, SOLUTION INTRAVENOUS at 11:54

## 2024-12-21 RX ADMIN — ACETAMINOPHEN 650 MG: 325 TABLET ORAL at 13:16

## 2024-12-21 RX ADMIN — FUROSEMIDE 80 MG: 80 TABLET ORAL at 20:26

## 2024-12-21 RX ADMIN — FUROSEMIDE 80 MG: 80 TABLET ORAL at 13:14

## 2024-12-21 RX ADMIN — GUAIFENESIN AND DEXTROMETHORPHAN HYDROBROMIDE 1 TABLET: 600; 30 TABLET, EXTENDED RELEASE ORAL at 04:42

## 2024-12-21 RX ADMIN — INSULIN LISPRO 1 UNITS: 100 INJECTION, SOLUTION INTRAVENOUS; SUBCUTANEOUS at 11:18

## 2024-12-21 RX ADMIN — METOPROLOL TARTRATE 50 MG: 25 TABLET, FILM COATED ORAL at 20:27

## 2024-12-21 RX ADMIN — PIPERACILLIN AND TAZOBACTAM 3375 MG: 3; .375 INJECTION, POWDER, LYOPHILIZED, FOR SOLUTION INTRAVENOUS at 17:09

## 2024-12-21 RX ADMIN — LIDOCAINE HYDROCHLORIDE: 20 JELLY TOPICAL at 03:51

## 2024-12-21 RX ADMIN — AMIODARONE HYDROCHLORIDE 200 MG: 200 TABLET ORAL at 13:15

## 2024-12-21 RX ADMIN — ENOXAPARIN SODIUM 120 MG: 150 INJECTION SUBCUTANEOUS at 21:32

## 2024-12-21 RX ADMIN — GUAIFENESIN 600 MG: 600 TABLET ORAL at 20:26

## 2024-12-21 RX ADMIN — ENOXAPARIN SODIUM 120 MG: 150 INJECTION SUBCUTANEOUS at 11:18

## 2024-12-21 ASSESSMENT — PAIN DESCRIPTION - LOCATION: LOCATION: GENERALIZED

## 2024-12-21 ASSESSMENT — PAIN SCALES - GENERAL
PAINLEVEL_OUTOF10: 0
PAINLEVEL_OUTOF10: 3
PAINLEVEL_OUTOF10: 5

## 2024-12-21 ASSESSMENT — PAIN DESCRIPTION - DESCRIPTORS: DESCRIPTORS: ACHING;DISCOMFORT

## 2024-12-21 NOTE — CONSULTS
[facility] -  Clinical Pharmacy Note    Vancomycin - Management by Pharmacy    Consult Date(s): 12/21/24   Consulting Provider(s): Dr. Chandler     Assessment / Plan  Bone and Joint Infection  - Vancomycin  Concurrent Antimicrobials: Zosyn  Day of Vanc Therapy / Ordered Duration: Day 1 of tbd  Current Dosing Method: Bayesian-Guided AUC Dosing  Therapeutic Goal: -600 mg/L*hr  Current Dose / Plan:   Patient with PMH of CKD, baseline Scr around 1.9-2.0 per chart review. Scr at 1.8 on admission.   Will order a loading dose of 2000mg IV x 1 dose (~17.36 mg/kg) followed by 1500 mg IV q24h   Predicted AUC = 531 with trough = 16  Of note, pt has been on vancomycin at [facility] in the past. Various doses used including 1000mg q24h and 750mg q12h. Monitor closely.   Given BMI >/= 30, risk of accumulation can occur.   Will continue to monitor clinical condition and make adjustments to regimen as appropriate.    Thank you for consulting pharmacy,  Wade Weber, PharmD  PGY1 Pharmacy Resident   Wireless: [id number]  12/21/24       Interval update:  therapy initiation     Subjective/Objective:   Veronica Chaidez is a 74 y.o. male with a PMHx significant for hypertension, persistent A-fib on Eliquis, coronary artery disease status post CABG with left atrial appendage clip, SSS dual-chamber Medtronic ICD, ischemic cardiomyopathy, HFpEF 7/2024 EF 55 to 60%, RVSP 45 mmHg, CKD 3A, insulin-dependent type 2 diabetes who is admitted with urinary retention and large wound of left heel.     Pharmacy is consulted to dose vancomycin.    Ht Readings from Last 1 Encounters:   12/21/24 1.88 m (6' 2\")     Wt Readings from Last 1 Encounters:   12/21/24 115.2 kg (254 lb)     Current & Prior Antimicrobial Regimen(s):  Zosyn (12/21-current)  Vancomycin   2000 mg IV x 1 dose (12/21)  1500 mg IV q24h (starting 12/22)    Vancomycin Level(s) / Doses:    Date Time Dose Type of Level / Level Interpretation                 Note: Serum levels collected for

## 2024-12-21 NOTE — PROGRESS NOTES
Patient admitted to room 3315 from ED.  Patient oriented to room, call light, bed rails, phone, lights and bathroom.  Patient instructed about the schedule of the day including: vital sign frequency, lab draws, possible tests, frequency of MD and staff rounds.  Patient instructed about prescribed diet, how to use dietary service, and television. Bed alarm is on. Bed locked, in lowest position, side rails up 2/4, call light within reach.  Will continue to monitor.

## 2024-12-21 NOTE — PLAN OF CARE
[facility] Hospitalist brief note  Consult received.  Case reviewed with ER physician- Diabetic foot infection    Full note to follow.    Good Renee MD    Thanks  CHRIS HORNE MD

## 2024-12-21 NOTE — PROGRESS NOTES
4 Eyes Skin Assessment     NAME:  Veronica Chaidez  YOB: 1950  MEDICAL RECORD NUMBER:  [id number]    The patient is being assessed for  Admission    I agree that at least one RN has performed a thorough Head to Toe Skin Assessment on the patient. ALL assessment sites listed below have been assessed.      Areas assessed by both nurses:    Head, Face, Ears, Shoulders, Back, Chest, Arms, Elbows, Hands, Sacrum. Buttock, Coccyx, Ischium, Legs. Feet and Heels, and Under Medical Devices         Does the Patient have a Wound? Yes wound(s) were present on assessment. LDA wound assessment was Initiated and completed by RN       Damaso Prevention initiated by RN: Yes  Wound Care Orders initiated by RN: Yes    Pressure Injury (Stage 3,4, Unstageable, DTI, NWPT, and Complex wounds) if present, place Wound referral order by RN under : Yes    New Ostomies, if present place, Ostomy referral order under : No     Nurse 1 eSignature: Electronically signed by Marci Love RN on 12/22/24 at 3:05 PM EST    **SHARE this note so that the co-signing nurse can place an eSignature**    Nurse 2 eSignature: Electronically signed by Ammy Meneses RN on 12/22/24 at 7:43 PM EST

## 2024-12-21 NOTE — ED PROVIDER NOTES
[facility]  EMERGENCY DEPARTMENT ENCOUNTER          ATTENDING PHYSICIAN NOTE       Date of evaluation: 12/21/2024    Chief Complaint     Urinary Retention (Difficulty urinating the last few days )      History of Present Illness     Veronica Chaidez is a 74 y.o. male who presents with a chief complaint of urinary retention.  Patient states he has had a cold for the past few days and his doctor started him on Mucinex and Claritin.  Since that time he feels like he has been having difficulty urinating.  States he can get a few drops out but feels like he is not fully emptying.  Has had prostate issues in the past but not for a while.  Denies any fevers.  States his cold symptoms are still ongoing, but have gotten a little bit better.  On chart review he also has a history of hypertension, systolic congestive heart failure, paroxysmal atrial fibrillation on blood thinners, type 2 diabetes.    Of note, a few months ago patient states he had amputation of several of his toes on the left foot.    ASSESSMENT / PLAN  (MEDICAL DECISION MAKING)     INITIAL VITALS: BP: 119/71, Temp: 98.1 °F (36.7 °C), Pulse: (!) 107, Respirations: 20, SpO2: 93 %      Veronica Chaidez is a 74 y.o. male who presents with a chief complaint of urinary retention.  Initial exam reveals a male who is disheveled but in no acute distress, mild tachycardia but otherwise normal vitals, afebrile.  Physical exam remarkable for large wound on left heel which when asked about patient did not know it was present.  Patient with no abdominal tenderness to palpation    Bedside ultrasound revealed a significant amount of urine in the bladder.  Caicedo ordered.  Workup initiated for cause of new urinary retention as well as workup for his left heel ulcer..    Caicedo catheter placed.  Urine output was significant and patient also noted to have urinary tract infection.  Will be treated with Rocephin.  Urine culture sent.  White count noted to be elevated at 13

## 2024-12-21 NOTE — ED NOTES
Bladder scan done highest retained output was around 100 ml     Escuadra, Marylee, RN  12/21/24 5723

## 2024-12-21 NOTE — ED NOTES
Patient Name: Veronica Chaidez  : 1950 74 y.o.  MRN: [id number]  ED Room #: A11/A11-11     Chief complaint:   Chief Complaint   Patient presents with    Urinary Retention     Difficulty urinating the last few days      Hospital Problem/Diagnosis:   Hospital Problems             Last Modified POA    * (Principal) Diabetic foot infection (HCC) 2024 Yes         O2 Flow Rate:O2 Device: Nasal cannula O2 Flow Rate (L/min): 2 L/min (if applicable)  Cardiac Rhythm:   (if applicable)  Active LDA's:   Peripheral IV 24 Right Antecubital (Active)   Site Assessment Clean, dry & intact 24      Urinary Catheter 24 Caicedo (Active)   $ Urethral catheter insertion $ Not inserted for procedure 24 035   Catheter Indications Urinary retention (acute or chronic), continuous bladder irrigation or bladder outlet obstruction 24 035   Urine Color Ciara 24   Urine Appearance Cloudy 24          How does patient ambulate? History of Lower Extremity Amputation ( toes on L foot) uses cane at home. Large diabetic ulcer on L heal. Required assistance to get out of wheelchair and into ED bed     2. How does patient take pills? Whole with Water    3. Is patient alert? Alert    4. Is patient oriented? To Person, To Place, To Time, To Situation, and Follows Commands    5.   Patient arrived from:  home  Facility Name: ___________________________________________    6. If patient is disoriented or from a Skill Nursing Facility has family been notified of admission? No    7. Patient belongings? Belongings: Cell Phone and Clothing, cane    Disposition of belongings? Kept with Patient     8. Any specific patient or family belongings/needs/dynamics?   a. Caicedo catheter placed for urinary retention per MD order, 1 L urine drained from bladder. Podiatry evaluated pt in ED for ulcer on L heal and post op shoe was placed on that foot.     9. Miscellaneous comments/pending orders?  a. Placed on

## 2024-12-21 NOTE — H&P
PRN  polyethylene glycol, 17 g, Daily PRN  acetaminophen, 650 mg, Q6H PRN   Or  acetaminophen, 650 mg, Q6H PRN  glucose, 4 tablet, PRN  dextrose bolus, 125 mL, PRN   Or  dextrose bolus, 250 mL, PRN  glucagon (rDNA), 1 mg, PRN  dextrose, , Continuous PRN        Labs      CBC:   Recent Labs     12/21/24 0318   WBC 13.0*   HGB 11.4*        BMP:    Recent Labs     12/21/24 0318      K 4.2      CO2 20*   BUN 27*   CREATININE 1.8*   GLUCOSE 128*     Hepatic: No results for input(s): \"AST\", \"ALT\", \"BILITOT\", \"ALKPHOS\" in the last 72 hours.    Invalid input(s): \"ALB\"  Lipids:   Lab Results   Component Value Date/Time    CHOL 172 03/02/2020 06:18 AM    HDL 30 08/28/2022 10:14 AM    TRIG 236 03/02/2020 06:18 AM     Hemoglobin A1C:   Lab Results   Component Value Date/Time    LABA1C 6.3 04/20/2024 04:35 AM     TSH:   Lab Results   Component Value Date/Time    TSH 3.83 05/28/2024 12:21 PM     Troponin: No results found for: \"TROPONINT\"  Lactic Acid: No results for input(s): \"LACTA\" in the last 72 hours.  BNP: No results for input(s): \"PROBNP\" in the last 72 hours.  UA:  Lab Results   Component Value Date/Time    NITRU Negative 12/21/2024 04:04 AM    COLORU Yellow 12/21/2024 04:04 AM    PHUR 6.0 12/21/2024 04:04 AM    PHUR 6.0 12/15/2022 01:44 PM    WBCUA >100 12/21/2024 04:04 AM    RBCUA 5-10 12/21/2024 04:04 AM    MUCUS 1+ 12/15/2022 01:44 PM    YEAST Present 11/20/2019 11:37 PM    BACTERIA 4+ 12/21/2024 04:04 AM    CLARITYU Clear 12/21/2024 04:04 AM    LEUKOCYTESUR LARGE 12/21/2024 04:04 AM    UROBILINOGEN 0.2 12/21/2024 04:04 AM    BILIRUBINUR Negative 12/21/2024 04:04 AM    BLOODU LARGE 12/21/2024 04:04 AM    GLUCOSEU Negative 12/21/2024 04:04 AM    KETUA Negative 12/21/2024 04:04 AM     Urine Cultures:   Lab Results   Component Value Date/Time    LABURIN >100,000 CFU/ml 06/28/2024 07:09 PM     Blood Cultures:   Lab Results   Component Value Date/Time    BC No Growth after 4 days of incubation.

## 2024-12-21 NOTE — CONSULTS
Department of Podiatry Consult Note  Resident       Reason for Consult:  Diabetic wound     Requesting Physician:  Dana Jeffrey     CHIEF COMPLAINT:  Left heel wound     HISTORY OF PRESENT ILLNESS:                The patient is a 74 y.o. male with significant past medical history as listed below. Podiatry was consulted for left posterior heel wound.  Patient states due to his diabetic neuropathy he had no idea that the wound was there.  Patient states that he noticed this a few days ago, however after discussing with the attending in the ED patient was unaware he had the wound until presenting today due to urinary retention symptoms.  Patient denies any drainage coming from the left heel wound, however he also has difficulty being able to see that far. Most recent A1c was 6.3 on 4/20/2024.  Patient denies fever, chills, nausea, vomiting, shortness of breath, chest pain.  Patient has no other pedal complaints at this time.    Past Medical History:        Diagnosis Date    A-fib ([facility]) 2020    CAD, multiple vessel 12/2019    Coronary artery disease involving coronary bypass graft     Diabetes mellitus ([facility])     diet controlled     Enlarged prostate     Environmental allergies     Kidney stones     Pacemaker     Systolic CHF ([facility])        Past Surgical History:        Procedure Laterality Date    CARDIAC CATHETERIZATION  12/10/2019    Dr. Shaver - severe multi-vessel disease    CORONARY ARTERY BYPASS GRAFT N/A 03/02/2020    WAYNE; TCPB; CABG x 3, LIMA to LAD w/ long segment endarterectomy (4 cm); bilateral pulmonary vein isolation; 40 mm Atriclip to L atril appendage; endoscopic L GSV harvest; ICNB x 5 levels bilat; sternal plate x 1 to manubrium performed by Sohail Rhoades MD at [facility]    FOOT SURGERY Left 4/22/2024    LEFT FOOT TRANSMETATARSAL AMPUTATION WITH DAVID TENDON LENGTHENING performed by Chandana Downey DPM at [facility] OR    NASAL POLYP SURGERY  x2    NASAL SEPTUM SURGERY         Allergies:   Aspartame and  of TMA, left foot  -History of noncompliance    -Patient examined and evaluated at the bedside   -Tachycardic otherwise VSS. Leukocytosis (WBC 13.0).  -ESR 81 .0   - All imaging reviewed and impressions as noted above   -Wound culture: Not obtained secondary to no purulent drainage  -LLE dressed with Betadine soaked gauze, gauze, Kerlix, Ace  -Antibiotics: Empiric ceftriaxone started in the ED to cover for SSTI as well as UTI infectious agents.  -NWB LLE in surgical shoe      - Given that Xray was negative for gas or OM, VSS, and minimal white count elevate recommend patient follow up closely in the outpatient setting for care of left decubitus heel ulcer upon discharge.  Recommend admission for IV antibiotics with close follow up for ELOS 48 hours depending on progression.  Will switch to Santyl dressing changes while patient is in house. May consider MRI if heel worsens or systemic systems persist.       DISPO: unstageable decubitus heel ulcer, left, all imaging and labs reviewed, NWB LLE in surgical shoe, empiric antibiotics started in ED: Rocephin, will continue with local wound care while patient is in house.  If regression occurs will consider advanced imaging in order to further determine extent of tissue damage.    - The patient will be staffed with Dr. Chandana Downey DPM.    Jalen Dickinson DPM, MS    Podiatric Resident, PGY-1   [employer]   Pager Number: [phone]  12/21/2024

## 2024-12-22 PROBLEM — N39.0 URINARY TRACT INFECTION WITHOUT HEMATURIA: Status: ACTIVE | Noted: 2024-12-22

## 2024-12-22 PROBLEM — N18.4 ACUTE KIDNEY INJURY SUPERIMPOSED ON STAGE 4 CHRONIC KIDNEY DISEASE (HCC): Status: ACTIVE | Noted: 2022-12-19

## 2024-12-22 PROBLEM — E11.21 DIABETIC NEPHROPATHY ASSOCIATED WITH TYPE 2 DIABETES MELLITUS (HCC): Status: ACTIVE | Noted: 2024-12-22

## 2024-12-22 LAB
ANION GAP SERPL CALCULATED.3IONS-SCNC: 12 MMOL/L (ref 3–16)
BACTERIA UR CULT: NORMAL
BASOPHILS # BLD: 0 K/UL (ref 0–0.2)
BASOPHILS NFR BLD: 0.4 %
BUN SERPL-MCNC: 40 MG/DL (ref 7–20)
CALCIUM SERPL-MCNC: 8.1 MG/DL (ref 8.3–10.6)
CHLORIDE SERPL-SCNC: 102 MMOL/L (ref 99–110)
CO2 SERPL-SCNC: 20 MMOL/L (ref 21–32)
CREAT SERPL-MCNC: 2.4 MG/DL (ref 0.8–1.3)
DEPRECATED RDW RBC AUTO: 14.5 % (ref 12.4–15.4)
EOSINOPHIL # BLD: 0.4 K/UL (ref 0–0.6)
EOSINOPHIL NFR BLD: 4.1 %
GFR SERPLBLD CREATININE-BSD FMLA CKD-EPI: 28 ML/MIN/{1.73_M2}
GLUCOSE BLD-MCNC: 129 MG/DL (ref 70–99)
GLUCOSE BLD-MCNC: 141 MG/DL (ref 70–99)
GLUCOSE BLD-MCNC: 147 MG/DL (ref 70–99)
GLUCOSE BLD-MCNC: 173 MG/DL (ref 70–99)
GLUCOSE SERPL-MCNC: 135 MG/DL (ref 70–99)
HCT VFR BLD AUTO: 30.5 % (ref 40.5–52.5)
HGB BLD-MCNC: 10 G/DL (ref 13.5–17.5)
LYMPHOCYTES # BLD: 0.9 K/UL (ref 1–5.1)
LYMPHOCYTES NFR BLD: 8.6 %
MCH RBC QN AUTO: 29 PG (ref 26–34)
MCHC RBC AUTO-ENTMCNC: 32.8 G/DL (ref 31–36)
MCV RBC AUTO: 88.4 FL (ref 80–100)
MONOCYTES # BLD: 0.9 K/UL (ref 0–1.3)
MONOCYTES NFR BLD: 8.5 %
NEUTROPHILS # BLD: 7.9 K/UL (ref 1.7–7.7)
NEUTROPHILS NFR BLD: 78.4 %
PERFORMED ON: ABNORMAL
PLATELET # BLD AUTO: 223 K/UL (ref 135–450)
PMV BLD AUTO: 8.7 FL (ref 5–10.5)
POTASSIUM SERPL-SCNC: 3.9 MMOL/L (ref 3.5–5.1)
RBC # BLD AUTO: 3.45 M/UL (ref 4.2–5.9)
SODIUM SERPL-SCNC: 134 MMOL/L (ref 136–145)
VANCOMYCIN SERPL-MCNC: 16.5 UG/ML
WBC # BLD AUTO: 10.1 K/UL (ref 4–11)

## 2024-12-22 PROCEDURE — 80048 BASIC METABOLIC PNL TOTAL CA: CPT

## 2024-12-22 PROCEDURE — 6360000002 HC RX W HCPCS: Performed by: STUDENT IN AN ORGANIZED HEALTH CARE EDUCATION/TRAINING PROGRAM

## 2024-12-22 PROCEDURE — 6370000000 HC RX 637 (ALT 250 FOR IP): Performed by: STUDENT IN AN ORGANIZED HEALTH CARE EDUCATION/TRAINING PROGRAM

## 2024-12-22 PROCEDURE — 2580000003 HC RX 258: Performed by: HOSPITALIST

## 2024-12-22 PROCEDURE — 6360000002 HC RX W HCPCS: Performed by: INTERNAL MEDICINE

## 2024-12-22 PROCEDURE — 6370000000 HC RX 637 (ALT 250 FOR IP)

## 2024-12-22 PROCEDURE — 2580000003 HC RX 258: Performed by: INTERNAL MEDICINE

## 2024-12-22 PROCEDURE — 2500000003 HC RX 250 WO HCPCS: Performed by: INTERNAL MEDICINE

## 2024-12-22 PROCEDURE — 2500000003 HC RX 250 WO HCPCS: Performed by: NURSE PRACTITIONER

## 2024-12-22 PROCEDURE — 80202 ASSAY OF VANCOMYCIN: CPT

## 2024-12-22 PROCEDURE — 99223 1ST HOSP IP/OBS HIGH 75: CPT | Performed by: HOSPITALIST

## 2024-12-22 PROCEDURE — 94640 AIRWAY INHALATION TREATMENT: CPT

## 2024-12-22 PROCEDURE — 36415 COLL VENOUS BLD VENIPUNCTURE: CPT

## 2024-12-22 PROCEDURE — 1200000000 HC SEMI PRIVATE

## 2024-12-22 PROCEDURE — 6370000000 HC RX 637 (ALT 250 FOR IP): Performed by: NURSE PRACTITIONER

## 2024-12-22 PROCEDURE — 85025 COMPLETE CBC W/AUTO DIFF WBC: CPT

## 2024-12-22 RX ORDER — VANCOMYCIN HCL IN 5 % DEXTROSE 1.25 G/25
1250 PLASTIC BAG, INJECTION (ML) INTRAVENOUS ONCE
Status: COMPLETED | OUTPATIENT
Start: 2024-12-22 | End: 2024-12-22

## 2024-12-22 RX ORDER — OXYCODONE HYDROCHLORIDE 5 MG/1
5 TABLET ORAL ONCE
Status: COMPLETED | OUTPATIENT
Start: 2024-12-22 | End: 2024-12-22

## 2024-12-22 RX ORDER — FUROSEMIDE 20 MG/1
20 TABLET ORAL 2 TIMES DAILY
Status: DISCONTINUED | OUTPATIENT
Start: 2024-12-22 | End: 2024-12-31

## 2024-12-22 RX ORDER — 0.9 % SODIUM CHLORIDE 0.9 %
500 INTRAVENOUS SOLUTION INTRAVENOUS ONCE
Status: COMPLETED | OUTPATIENT
Start: 2024-12-22 | End: 2024-12-22

## 2024-12-22 RX ORDER — ALBUTEROL SULFATE 0.83 MG/ML
2.5 SOLUTION RESPIRATORY (INHALATION) EVERY 6 HOURS PRN
Status: DISCONTINUED | OUTPATIENT
Start: 2024-12-22 | End: 2025-01-03 | Stop reason: HOSPADM

## 2024-12-22 RX ORDER — METOPROLOL TARTRATE 25 MG/1
25 TABLET, FILM COATED ORAL 2 TIMES DAILY
Status: DISCONTINUED | OUTPATIENT
Start: 2024-12-22 | End: 2024-12-31

## 2024-12-22 RX ADMIN — FUROSEMIDE 80 MG: 80 TABLET ORAL at 09:27

## 2024-12-22 RX ADMIN — ENOXAPARIN SODIUM 120 MG: 150 INJECTION SUBCUTANEOUS at 08:08

## 2024-12-22 RX ADMIN — SODIUM CHLORIDE 500 ML: 9 INJECTION, SOLUTION INTRAVENOUS at 18:27

## 2024-12-22 RX ADMIN — ALBUTEROL SULFATE 2.5 MG: 2.5 SOLUTION RESPIRATORY (INHALATION) at 16:34

## 2024-12-22 RX ADMIN — VANCOMYCIN HYDROCHLORIDE 1250 MG: 1.25 INJECTION, SOLUTION INTRAVITREAL at 13:35

## 2024-12-22 RX ADMIN — OXYCODONE 5 MG: 5 TABLET ORAL at 14:48

## 2024-12-22 RX ADMIN — SODIUM CHLORIDE, PRESERVATIVE FREE 10 ML: 5 INJECTION INTRAVENOUS at 08:10

## 2024-12-22 RX ADMIN — TAMSULOSIN HYDROCHLORIDE 0.4 MG: 0.4 CAPSULE ORAL at 08:08

## 2024-12-22 RX ADMIN — BENZONATATE 100 MG: 100 CAPSULE ORAL at 14:48

## 2024-12-22 RX ADMIN — GUAIFENESIN 600 MG: 600 TABLET ORAL at 08:08

## 2024-12-22 RX ADMIN — PIPERACILLIN AND TAZOBACTAM 3375 MG: 3; .375 INJECTION, POWDER, LYOPHILIZED, FOR SOLUTION INTRAVENOUS at 15:43

## 2024-12-22 RX ADMIN — ENOXAPARIN SODIUM 120 MG: 150 INJECTION SUBCUTANEOUS at 20:35

## 2024-12-22 RX ADMIN — GUAIFENESIN 600 MG: 600 TABLET ORAL at 20:35

## 2024-12-22 RX ADMIN — MICONAZOLE NITRATE: 20 POWDER TOPICAL at 20:35

## 2024-12-22 RX ADMIN — BENZOCAINE 6 MG-MENTHOL 10 MG LOZENGES 1 LOZENGE: at 04:28

## 2024-12-22 RX ADMIN — AMIODARONE HYDROCHLORIDE 200 MG: 200 TABLET ORAL at 09:27

## 2024-12-22 RX ADMIN — BENZONATATE 100 MG: 100 CAPSULE ORAL at 08:08

## 2024-12-22 RX ADMIN — MICONAZOLE NITRATE: 20 POWDER TOPICAL at 00:09

## 2024-12-22 RX ADMIN — PIPERACILLIN AND TAZOBACTAM 3375 MG: 3; .375 INJECTION, POWDER, LYOPHILIZED, FOR SOLUTION INTRAVENOUS at 07:04

## 2024-12-22 RX ADMIN — MICONAZOLE NITRATE: 20 POWDER TOPICAL at 11:05

## 2024-12-22 RX ADMIN — COLLAGENASE SANTYL: 250 OINTMENT TOPICAL at 10:51

## 2024-12-22 RX ADMIN — BENZONATATE 100 MG: 100 CAPSULE ORAL at 20:35

## 2024-12-22 ASSESSMENT — PAIN SCALES - GENERAL
PAINLEVEL_OUTOF10: 0
PAINLEVEL_OUTOF10: 0
PAINLEVEL_OUTOF10: 5

## 2024-12-22 NOTE — PLAN OF CARE
Problem: Discharge Planning  Goal: Discharge to home or other facility with appropriate resources  Outcome: Progressing   Patient wanting to d/c  to rehab facility.      Problem: Safety - Adult  Goal: Free from fall injury  Outcome: Progressing   Fall precautions in place.  Bed alarm activated, low position, wheels locked.  Call light and belongings within reach.      Problem: Skin/Tissue Integrity  Goal: Absence of new skin breakdown  Description: 1.  Monitor for areas of redness and/or skin breakdown  2.  Assess vascular access sites hourly  3.  Every 4-6 hours minimum:  Change oxygen saturation probe site  4.  Every 4-6 hours:  If on nasal continuous positive airway pressure, respiratory therapy assess nares and determine need for appliance change or resting period.  Outcome: Progressing   Patient repositioned with pillow support, heels elevated.  Barrier cream to coccyx, buttocks, brian.  Miconazole powder to groin, abdominal folds.  L foot with ace wrap per podiatry.

## 2024-12-22 NOTE — PROGRESS NOTES
[facility] Medicine Progress Note  V 10.25      Date of Admission: 12/21/2024    Hospital Day: 2      Chief Admission Complaint: Urinary retention    Subjective: Patient seen and evaluated at bedside.  Reports improvement in cough and shortness of breath.  Has Caicedo catheter in place no events overnight.    Presenting Admission History:       74-year-old patient with a past medical history of hypertension, persistent A-fib on Eliquis, coronary artery disease status post CABG with left atrial appendage clip, SSS dual-chamber Medtronic ICD, ischemic cardiomyopathy, HFpEF 7/2024 EF 55 to 60%, RVSP 45 mmHg, CKD 3 A baseline creatinine around 2.0, insulin-dependent type 2 diabetes, diabetic polyneuropathy with transmetatarsal amputation of the left foot, left diabetic heel wound who presented to the ER for evaluation of urinary retention and incomplete evacuation of bladder.     In the ER patient was hemodynamically stable.  Was placed on 2 L oxygen via nasal cannula for SpO2 of 87% on room air.  Labs showed a creatinine of 1.8, close to baseline.  , WBC 13.  Urinalysis showed large leukocyte esterase with WBCs and bacteria.  Chest x-ray was negative.  X-ray of the left heel without erosions to suggest osteomyelitis.  Podiatry was consulted, wound cultures not obtained since no purulent drainage noted.  Recommended empiric antibiotics, partial heel weightbearing to left lower extremity, elevation of left lower extremity, Santyl dressings, wound care.  X-ray directional pressures recommended further imaging.    Assessment/Plan:      Current Principal Problem:  Diabetic foot infection (HCC)    Acute urinary retention, acute UTI  Insulin-dependent type 2 diabetes, left diabetic foot ulcer, osteomyelitis?  Diabetic peripheral neuropathy  Hypoxia, likely has underlying KAREN,   Persistent A-fib on Eliquis, sick sinus syndrome, HFpEF 7/2024 EF 55 to 60%, RVSP 45 mmHg, coronary artery disease status post CABG, CKD 3b with  32.92 kg/m²       Diet: ADULT DIET; Regular; 3 carb choices (45 gm/meal); Low Sodium (2 gm); 1200 ml    DVT Prophylaxis: [x]PPx LMWH  []SQ Heparin  []IPC/SCDs  []Eliquis  []Xarelto  []Coumadin  [] Heparin Drip  []Other -      Code status: Limited    PT/OT Eval Status:   [x]NOT yet ordered  []Ordered and Pending   []Seen with Recommendations for:   []Home independently  []Home w/ assist  []HHC  []SNF  []Acute Rehab    Multi-Disciplinary Rounds with Case Management completed on 12/22/2024 with the following recommendations:  Anticipated Discharge Location: []Home w/ []HHC vs [x]SNF  []Acute Rehab  []LTAC  []Hospice  []Other -    Anticipated Discharge Day/Date: 5 days  Barriers to Discharge: Clinical improvement  --------------------------------------------------    MDM (any 2 required for High level billing)    A. Problems (any 1)  [x] Acute/Chronic Illness/injury posing ongoing threat to life and/or bodily function without ongoing treatment    [] Severe exacerbation of chronic illness    --------------------------------------------------  B. Risk of Treatment (any 1)    [x] Drugs/treatments that require intensive monitoring for toxicity    [x] IV ABX (Vancomycin, Aminoglycosides, etc)     [] Post-Cath/Contrast study requiring serial monitoring    [] IV Narcotic analgesia    [] Aggressive IV diuresis    [] Hypertonic Saline    [] Critical electrolyte abnormalities requiring IV replacement    [x] Insulin - Scheduled/SSI or Insulin gtt    [] Anticoagulation (Heparin gtt or Coumadin - other anticoagulants in special circumstances)    [] Cardiac Medications (IV Amiodarone/Diltiazem, Tikosyn, etc)    [] Hemodialysis    [] Other -    [] Change in code status    [] Decision to escalate care    [] Major surgery/procedure with associated risk factors    --------------------------------------------------  C. Data (any 2)    [x] Data Review (any 3)    [x] Consultant notes from yesterday/today    [x] All available current labs

## 2024-12-22 NOTE — CONSULTS
Urology Attending Consult Note      Reason for Consultation: urinary retention    History: 73 y/o male has a h/o BPH and sees Dr Bynum in [facility].  He is on Flomax as an outpatient.  He has a diabetic foot ulcer and is not ambulating.    Family History, Social History, Review of Systems:  Reviewed and agreed to as per chart    Vitals:  BP (!) 94/56   Pulse 70   Temp 98.6 °F (37 °C) (Oral)   Resp 22   Ht 1.88 m (6' 2\")   Wt 116.3 kg (256 lb 6.3 oz)   SpO2 94%   BMI 32.92 kg/m²   Temp  Av.3 °F (36.8 °C)  Min: 98 °F (36.7 °C)  Max: 98.6 °F (37 °C)    Intake/Output Summary (Last 24 hours) at 2024 1003  Last data filed at 2024 0810  Gross per 24 hour   Intake 840 ml   Output 1920 ml   Net -1080 ml         Physical:  Well developed, well nourished in no acute distress  Mood indicates no abnormalities. Pt doesn’t appear depressed  Orientated to time and place  Neck is supple, trachea is midline  Respiratory effort is normal  Cardiovascular show no extremity swelling  Abdomen no masses or hernias are palpated, there is no tenderness. Liver and Spleen appear normal.  Skin show no abnormal lesions  Lymph nodes are not palpated in the inguinal, neck, or axillary area.     Male :  Deferred due to know h/o BPH      Labs:  WBC:    Lab Results   Component Value Date/Time    WBC 10.1 2024 04:57 AM     Hemoglobin/Hematocrit:    Lab Results   Component Value Date/Time    HGB 10.0 2024 04:57 AM    HCT 30.5 2024 04:57 AM     BMP:    Lab Results   Component Value Date/Time     2024 04:57 AM    K 3.9 2024 04:57 AM     2024 04:57 AM    CO2 20 2024 04:57 AM    BUN 40 2024 04:57 AM    CREATININE 2.4 2024 04:57 AM    CALCIUM 8.1 2024 04:57 AM    GFRAA 52 2022 06:50 AM    LABGLOM 28 2024 04:57 AM    LABGLOM 33 2024 05:01 AM     PT/INR:    Lab Results   Component Value Date/Time    PROTIME 17.7 2024 09:07 AM

## 2024-12-22 NOTE — CONSULTS
Nephrology Consult Note                                                                                                                                                                                                                                                                                                                                                               Office : [phone]     Fax :[phone]    Patient's Name: Veronica Chaidez  8:01 PM  12/22/2024    Reason for Consult:  NANETTE   Requesting Physician:  Good Renee MD  Chief Complaint:    Chief Complaint   Patient presents with    Urinary Retention     Difficulty urinating the last few days        Assessment/Plan     NANETTE  Etiology of NANETTE seem to be obstructive in nature  In ER \"Bedside ultrasound revealed a significant amount of urine in the bladder.  Gómez catheter placed.  Urine output was significant\"  Urology on board   Continue GÓMEZ  Monitor RFP and urine output   CKD 3b/4   Baseline creatinine close to 2.0  Etiology from diabetic kidney disease, cardiorenal, obstructive   Probable UTI   Antibiotics   Diabetic foot   Podiatry on board   Diabetic kidney disease   Proteinuria present   We will follow him in our nephrology clinic   CAD/CABG  No signs of fluid overload     History of Present Ilness:    Veronica Chaidez is a 74 y.o. male with  a chief complaint of urinary retention.  Patient states he has had a cold for the past few days and his doctor started him on Mucinex and Claritin.  Since that time he feels like he has been having difficulty urinating.  States he can get a few drops out but feels like he is not fully emptying.  Has had prostate issues in the past but not for a while.  Denies any fevers.  States his cold symptoms are still ongoing, but have gotten a little bit better.      On chart review he has hypertension, persistent A-fib on Eliquis, coronary artery disease status post CABG with left atrial appendage clip, SSS  providers  Reviewed clinical lab tests  Reviewed radiology tests  Reviewed other diagnostic tests/interventions    Will be discussed w/  Primary team     Thank you for allowing us to participate in care of Veronica Chaidez   Feel free to contact me,     Khoi Coppola MD   [facility]  Office : [phone] or through [employer]  Fax :[phone]

## 2024-12-22 NOTE — PROGRESS NOTES
Contacted RT for breathing treatment per patients request.  Has wheezing scattered throughout, no crackles, denies SOB, chest pain.      Complaint of pain shooting from left foot up to knee.  Contacted podiatry.  Gave patient a one time dose of oxycodone. Order put in for Doppler to rule out DVT.

## 2024-12-22 NOTE — PROGRESS NOTES
Alert and oriented x 4.  Vitals stable, afebrile.  Denies pain.  Sob noted with exertion.  Lungs clear, diminished bases. Patient requesting multiple drinks overnight, educated patient regarding 1200 ml fluid restriction. + BLE edema, reddened.  L foot with ace wrap per podiatry.  Redness to L underarm, groin, abd fold, brian, scrotum, and buttocks.  Miconazole powder to folds and groin.  Barrier cream applied to buttocks, scrotum.  Patient repositioned overnight to prevent skin breakdown.  Refusing prevalon boots so multiple pillows under bilateral legs to keep feet elevated, foot of bed elevated as well.  Caicedo care and bath completed.  Caicedo draining elida, cloudy urine, + sediment.  Fall precautions in place.  Bed alarm activated.  Patient understands to call nurse to get oob r/t NWB L foot and multiple lines.  Call light reviewed and in reach.

## 2024-12-22 NOTE — CONSULTS
[facility] -  Clinical Pharmacy Note    Vancomycin - Management by Pharmacy    Consult Date(s): 12/21/24   Consulting Provider(s): Dr. Chandler     Assessment / Plan  Bone and Joint Infection  - Vancomycin  Concurrent Antimicrobials: Zosyn  Day of Vanc Therapy / Ordered Duration: Day 2 of tbd  Current Dosing Method: Trough-based dosing  Therapeutic Goal: 15-20  Current Dose / Plan:   Patient with PMH of CKD, baseline Scr around 1.9-2.0 per chart review. Scr increased to 2.4 today. Will switch over to intermittent dosing given increase in Scr since admission. Monitor closely  UOP in past 24 hours was 0.5 mL/kg/hr.  S/p loading dose of 2g IV x 1 yesterday afternoon.   Add on random level came back at 16.5 mg/L this morning.   Plan: will discontinue order for 1500 mg IV q24h and order a 1 time dose of 1250mg IV (~10.7 mg/kg) and vancomycin placeholder. Will order random vancomycin level for tomorrow AM to reassess.   Of note, pt has been on vancomycin at [facility] in the past. Various doses used including 1000mg q24h and 750mg q12h. Monitor closely.   Given BMI >/= 30, risk of accumulation can occur.   Will continue to monitor clinical condition and make adjustments to regimen as appropriate.    Thank you for consulting pharmacy,  Wade Weber, PharmD  PGY1 Pharmacy Resident   Wireless: [phone]  12/22/24       Interval update: Per urology consult, leave guo in for now and follow up OP in 1-2 if discharged. Per podiatry, patient will need MRI and based on results, potentially surgical intervention.    Subjective/Objective:   Veronica Chaidez is a 74 y.o. male with a PMHx significant for hypertension, persistent A-fib on Eliquis, coronary artery disease status post CABG with left atrial appendage clip, SSS dual-chamber Medtronic ICD, ischemic cardiomyopathy, HFpEF 7/2024 EF 55 to 60%, RVSP 45 mmHg, CKD 3A, insulin-dependent type 2 diabetes who is admitted with urinary retention and large wound of left heel.     Pharmacy is

## 2024-12-22 NOTE — PROGRESS NOTES
Podiatric Surgery Daily Progress Note  Veronica Chaidez      Subjective :   Patient seen and examined this am at the bedside. Patient denies any acute overnight events.  Patient affirms persistent cough, however denies constitutional symptoms at this time.  Patient further notes pain upon palpation to his left heel.  Per nursing staff patient noncompliant with weightbearing status despite being told multiple times that he needs to remain NWB to his LLE.  Patient denies N/V/F/C/SOB. Patient denies calf pain, thigh pain, chest pain.     Review of Systems: A 12 point review of symptoms is unremarkable with the exception of the chief complaint. Patient specifically denies nausea, fever, vomiting, chills, shortness of breath, chest pain, abdominal pain, constipation or difficulty urinating.       Objective     BP (!) 94/56   Pulse 70   Temp 98.6 °F (37 °C) (Oral)   Resp 22   Ht 1.88 m (6' 2\")   Wt 116.3 kg (256 lb 6.3 oz)   SpO2 94%   BMI 32.92 kg/m²      I/O:  Intake/Output Summary (Last 24 hours) at 12/22/2024 1101  Last data filed at 12/22/2024 0810  Gross per 24 hour   Intake 840 ml   Output 1920 ml   Net -1080 ml              Wt Readings from Last 3 Encounters:   12/22/24 116.3 kg (256 lb 6.3 oz)   12/10/24 123.7 kg (272 lb 12.8 oz)   07/08/24 125.6 kg (276 lb 12.8 oz)       LABS:    Recent Labs     12/21/24  0318 12/22/24  0457   WBC 13.0* 10.1   HGB 11.4* 10.0*   HCT 34.6* 30.5*    223        Recent Labs     12/22/24  0457   *   K 3.9      CO2 20*   BUN 40*   CREATININE 2.4*      No results for input(s): \"INR\", \"APTT\" in the last 72 hours.    Invalid input(s): \"PROT\"        LOWER EXTREMITY EXAMINATION    Dressing to left LE intact. No strikethrough noted to the external dressing.  Sanguinous drainage noted to the internal layers of the dressing.     VASCULAR: DP and PT pulses faintly palpable 1/2B/L upon prior Doppler ultrasound DP and PT pulses were found to be audibly biphasic B/L. CFT is

## 2024-12-23 ENCOUNTER — APPOINTMENT (OUTPATIENT)
Dept: MRI IMAGING | Age: 74
End: 2024-12-23
Payer: MEDICAID

## 2024-12-23 ENCOUNTER — HOSPITAL ENCOUNTER (INPATIENT)
Dept: VASCULAR LAB | Age: 74
Discharge: HOME | End: 2024-12-25
Payer: MEDICAID

## 2024-12-23 DIAGNOSIS — Z95.810 ICD (IMPLANTABLE CARDIOVERTER-DEFIBRILLATOR) IN PLACE: Primary | ICD-10-CM

## 2024-12-23 DIAGNOSIS — I42.0 DILATED CARDIOMYOPATHY (HCC): ICD-10-CM

## 2024-12-23 DIAGNOSIS — I25.5 ISCHEMIC CARDIOMYOPATHY: ICD-10-CM

## 2024-12-23 DIAGNOSIS — I48.11 LONGSTANDING PERSISTENT ATRIAL FIBRILLATION (HCC): Chronic | ICD-10-CM

## 2024-12-23 DIAGNOSIS — I48.0 PAROXYSMAL ATRIAL FIBRILLATION (HCC): ICD-10-CM

## 2024-12-23 DIAGNOSIS — I50.22 CHRONIC SYSTOLIC (CONGESTIVE) HEART FAILURE (HCC): Chronic | ICD-10-CM

## 2024-12-23 LAB
ANION GAP SERPL CALCULATED.3IONS-SCNC: 11 MMOL/L (ref 3–16)
BASOPHILS # BLD: 0.1 K/UL (ref 0–0.2)
BASOPHILS NFR BLD: 1.3 %
BUN SERPL-MCNC: 46 MG/DL (ref 7–20)
CALCIUM SERPL-MCNC: 8.2 MG/DL (ref 8.3–10.6)
CHLORIDE SERPL-SCNC: 102 MMOL/L (ref 99–110)
CO2 SERPL-SCNC: 22 MMOL/L (ref 21–32)
CREAT SERPL-MCNC: 2.5 MG/DL (ref 0.8–1.3)
DEPRECATED RDW RBC AUTO: 14.6 % (ref 12.4–15.4)
EOSINOPHIL # BLD: 0.3 K/UL (ref 0–0.6)
EOSINOPHIL NFR BLD: 3.6 %
GFR SERPLBLD CREATININE-BSD FMLA CKD-EPI: 27 ML/MIN/{1.73_M2}
GLUCOSE BLD-MCNC: 131 MG/DL (ref 70–99)
GLUCOSE BLD-MCNC: 136 MG/DL (ref 70–99)
GLUCOSE BLD-MCNC: 136 MG/DL (ref 70–99)
GLUCOSE BLD-MCNC: 154 MG/DL (ref 70–99)
GLUCOSE SERPL-MCNC: 120 MG/DL (ref 70–99)
HCT VFR BLD AUTO: 29.9 % (ref 40.5–52.5)
HGB BLD-MCNC: 9.6 G/DL (ref 13.5–17.5)
LYMPHOCYTES # BLD: 1.1 K/UL (ref 1–5.1)
LYMPHOCYTES NFR BLD: 11.5 %
MCH RBC QN AUTO: 28.4 PG (ref 26–34)
MCHC RBC AUTO-ENTMCNC: 32.3 G/DL (ref 31–36)
MCV RBC AUTO: 88.1 FL (ref 80–100)
MONOCYTES # BLD: 0.7 K/UL (ref 0–1.3)
MONOCYTES NFR BLD: 7.3 %
NEUTROPHILS # BLD: 7.2 K/UL (ref 1.7–7.7)
NEUTROPHILS NFR BLD: 76.3 %
PERFORMED ON: ABNORMAL
PLATELET # BLD AUTO: 270 K/UL (ref 135–450)
PMV BLD AUTO: 8.8 FL (ref 5–10.5)
POTASSIUM SERPL-SCNC: 3.9 MMOL/L (ref 3.5–5.1)
RBC # BLD AUTO: 3.39 M/UL (ref 4.2–5.9)
SODIUM SERPL-SCNC: 135 MMOL/L (ref 136–145)
VANCOMYCIN SERPL-MCNC: 18.9 UG/ML
WBC # BLD AUTO: 9.5 K/UL (ref 4–11)

## 2024-12-23 PROCEDURE — 1200000000 HC SEMI PRIVATE

## 2024-12-23 PROCEDURE — 87641 MR-STAPH DNA AMP PROBE: CPT

## 2024-12-23 PROCEDURE — 80048 BASIC METABOLIC PNL TOTAL CA: CPT

## 2024-12-23 PROCEDURE — 93971 EXTREMITY STUDY: CPT

## 2024-12-23 PROCEDURE — 6360000002 HC RX W HCPCS: Performed by: STUDENT IN AN ORGANIZED HEALTH CARE EDUCATION/TRAINING PROGRAM

## 2024-12-23 PROCEDURE — 73718 MRI LOWER EXTREMITY W/O DYE: CPT

## 2024-12-23 PROCEDURE — 85025 COMPLETE CBC W/AUTO DIFF WBC: CPT

## 2024-12-23 PROCEDURE — 2500000003 HC RX 250 WO HCPCS: Performed by: INTERNAL MEDICINE

## 2024-12-23 PROCEDURE — 6360000002 HC RX W HCPCS: Performed by: INTERNAL MEDICINE

## 2024-12-23 PROCEDURE — 99232 SBSQ HOSP IP/OBS MODERATE 35: CPT | Performed by: HOSPITALIST

## 2024-12-23 PROCEDURE — 36415 COLL VENOUS BLD VENIPUNCTURE: CPT

## 2024-12-23 PROCEDURE — 2580000003 HC RX 258: Performed by: INTERNAL MEDICINE

## 2024-12-23 PROCEDURE — 80202 ASSAY OF VANCOMYCIN: CPT

## 2024-12-23 PROCEDURE — 6370000000 HC RX 637 (ALT 250 FOR IP): Performed by: STUDENT IN AN ORGANIZED HEALTH CARE EDUCATION/TRAINING PROGRAM

## 2024-12-23 RX ORDER — VANCOMYCIN 750 MG/150ML
750 INJECTION, SOLUTION INTRAVENOUS ONCE
Status: COMPLETED | OUTPATIENT
Start: 2024-12-23 | End: 2024-12-23

## 2024-12-23 RX ORDER — OXYCODONE HYDROCHLORIDE 5 MG/1
5 TABLET ORAL EVERY 6 HOURS PRN
Status: DISCONTINUED | OUTPATIENT
Start: 2024-12-23 | End: 2025-01-03 | Stop reason: HOSPADM

## 2024-12-23 RX ADMIN — PIPERACILLIN AND TAZOBACTAM 3375 MG: 3; .375 INJECTION, POWDER, LYOPHILIZED, FOR SOLUTION INTRAVENOUS at 00:54

## 2024-12-23 RX ADMIN — ENOXAPARIN SODIUM 120 MG: 150 INJECTION SUBCUTANEOUS at 21:09

## 2024-12-23 RX ADMIN — MICONAZOLE NITRATE: 20 POWDER TOPICAL at 21:14

## 2024-12-23 RX ADMIN — SODIUM CHLORIDE, PRESERVATIVE FREE 10 ML: 5 INJECTION INTRAVENOUS at 21:15

## 2024-12-23 RX ADMIN — ENOXAPARIN SODIUM 120 MG: 150 INJECTION SUBCUTANEOUS at 12:52

## 2024-12-23 RX ADMIN — PIPERACILLIN AND TAZOBACTAM 3375 MG: 3; .375 INJECTION, POWDER, LYOPHILIZED, FOR SOLUTION INTRAVENOUS at 09:23

## 2024-12-23 RX ADMIN — BENZONATATE 100 MG: 100 CAPSULE ORAL at 16:57

## 2024-12-23 RX ADMIN — BENZONATATE 100 MG: 100 CAPSULE ORAL at 09:11

## 2024-12-23 RX ADMIN — GUAIFENESIN 600 MG: 600 TABLET ORAL at 21:07

## 2024-12-23 RX ADMIN — TAMSULOSIN HYDROCHLORIDE 0.4 MG: 0.4 CAPSULE ORAL at 09:11

## 2024-12-23 RX ADMIN — BENZONATATE 100 MG: 100 CAPSULE ORAL at 21:07

## 2024-12-23 RX ADMIN — PIPERACILLIN AND TAZOBACTAM 3375 MG: 3; .375 INJECTION, POWDER, LYOPHILIZED, FOR SOLUTION INTRAVENOUS at 17:02

## 2024-12-23 RX ADMIN — AMIODARONE HYDROCHLORIDE 200 MG: 200 TABLET ORAL at 09:11

## 2024-12-23 RX ADMIN — COLLAGENASE SANTYL: 250 OINTMENT TOPICAL at 09:00

## 2024-12-23 RX ADMIN — GUAIFENESIN 600 MG: 600 TABLET ORAL at 09:11

## 2024-12-23 RX ADMIN — SODIUM CHLORIDE, PRESERVATIVE FREE 10 ML: 5 INJECTION INTRAVENOUS at 09:12

## 2024-12-23 RX ADMIN — MICONAZOLE NITRATE: 20 POWDER TOPICAL at 09:12

## 2024-12-23 RX ADMIN — VANCOMYCIN 750 MG: 750 INJECTION, SOLUTION INTRAVENOUS at 13:48

## 2024-12-23 ASSESSMENT — PAIN SCALES - GENERAL
PAINLEVEL_OUTOF10: 0

## 2024-12-23 NOTE — PROGRESS NOTES
Pt BP after bolus was 113/54, medicine on call NP [place] was made aware, ok to hold lopressor. Med held as ordered.

## 2024-12-23 NOTE — ACP (ADVANCE CARE PLANNING)
Advance Care Planning     Advance Care Planning Inpatient Note  [facility] Department    Today's Date: 12/23/2024  Unit: [facility]    Received request from IDT Member.  Upon review of chart and communication with care team, patient's decision making abilities are not in question.. Patient was/were present in the room during visit.    Goals of ACP Conversation:  Discuss advance care planning documents    Health Care Decision Makers:       Primary Decision Maker: Moody Caballero - Friend - [phone]  Summary:  Verified Healthcare Decision Maker    Advance Care Planning Documents (Patient Wishes):  Healthcare Power of /Advance Directive Appointment of Health Care Agent  Living Will/Advance Directive     Assessment:  Pt has 2 nephews. He said, \"I have been arguing with them for some time now.\" Pt also has a friend, Moody, whom he wants to name as his decision maker. Pt does not have his friend's phone number on him. Pt wants to request for assistance in completing his Advanced Directives when he gets his friend's current phone number. Pt shared that he \"does not want to be resuscitated if it is not going to be beneficial\" to him.  gave pt a blank copy of Advanced Directives. Pt identified as [employer] but didn't have any Spiritual needs at this time. Pt uses humor to cope.    Interventions:  Provided education on documents for clarity and greater understanding  Reviewed but did not complete ACP document    Outcomes/Plan:  ACP Discussion: Postponed  Teach Back Method used to verify the patient's and/or Healthcare Decision Maker's understanding of key information in the advance directive documents    Electronically signed by Chaplain Minerva on 12/23/2024 at 10:56 AM

## 2024-12-23 NOTE — CARE COORDINATION
Case Management Assessment  Initial Evaluation    Date/Time of Evaluation: 12/23/2024 4:00 PM  Assessment Completed by: EYAL Barnhart    If patient is discharged prior to next notation, then this note serves as note for discharge by case management.    Patient Name: Veronica Chaidez                   YOB: 1950  Diagnosis: Diabetic foot infection (HCC) [E11.628, L08.9]  Urinary tract infection without hematuria, site unspecified [N39.0]  Non-healing open wound of heel, initial encounter [S91.309A]                   Date / Time: 12/21/2024  2:41 AM    Patient Admission Status: Inpatient   Readmission Risk (Low < 19, Mod (19-27), High > 27): Readmission Risk Score: 18.5    Current PCP: Good Renee MD  PCP verified by ? No    Chart Reviewed: Yes      History Provided by: Patient  Patient Orientation: Alert and Oriented    Patient Cognition: Alert    Hospitalization in the last 30 days (Readmission):  No    If yes, Readmission Assessment in  Navigator will be completed.    Advance Directives:      Code Status: Limited   Patient's Primary Decision Maker is: Patient Declined (Legal Next of Kin Remains as Decision Maker)    Primary Decision Maker: [facility] - [phone]    Discharge Planning:    Patient lives with: Alone Type of Home: House  Primary Care Giver: Self  Patient Support Systems include: None   Current Financial resources: None  Current community resources: None  Current services prior to admission: None            Current DME:              Type of Home Care services:  None    ADLS  Prior functional level: Assistance with the following:, Mobility  Current functional level: Assistance with the following:, Mobility    PT AM-PAC:   /24  OT AM-PAC:   /24    Family can provide assistance at DC: No  Would you like Case Management to discuss the discharge plan with any other family members/significant others, and if so, who? Yes  Plans to Return to Present Housing: Unknown at  individualized plan of care/goals and shares the quality data associated with the providers was provided to:     Patient Representative Name:       The Patient and/or Patient Representative Agree with the Discharge Plan?      EYAL Barnhart  Case Management Department  Ph: [phone] Fax: [phone]

## 2024-12-23 NOTE — PROGRESS NOTES
[facility] Medicine Progress Note  V 10.25      Date of Admission: 12/21/2024    Hospital Day: 3      Chief Admission Complaint: Urinary retention    Subjective: Patient seen and evaluated at bedside.  Reports improvement in cough, stable shortness of breath, some wheeze.  Has Caicedo catheter in place no events overnight.    Presenting Admission History:       74-year-old patient with a past medical history of hypertension, persistent A-fib on Eliquis, coronary artery disease status post CABG with left atrial appendage clip, SSS dual-chamber Medtronic ICD, ischemic cardiomyopathy, HFpEF 7/2024 EF 55 to 60%, RVSP 45 mmHg, CKD 3 A baseline creatinine around 2.0, insulin-dependent type 2 diabetes, diabetic polyneuropathy with transmetatarsal amputation of the left foot, left diabetic heel wound who presented to the ER for evaluation of urinary retention and incomplete evacuation of bladder.     In the ER patient was hemodynamically stable.  Was placed on 2 L oxygen via nasal cannula for SpO2 of 87% on room air.  Labs showed a creatinine of 1.8, close to baseline.  , WBC 13.  Urinalysis showed large leukocyte esterase with WBCs and bacteria.  Chest x-ray was negative.  X-ray of the left heel without erosions to suggest osteomyelitis.  Podiatry was consulted, wound cultures not obtained since no purulent drainage noted.  Recommended empiric antibiotics, partial heel weightbearing to left lower extremity, elevation of left lower extremity, Santyl dressings, wound care.  X-ray directional pressures recommended further imaging.    Assessment/Plan:      Current Principal Problem:  Diabetic foot infection (HCC)    Acute urinary retention, acute UTI  Insulin-dependent type 2 diabetes, left diabetic foot ulcer, osteomyelitis?  Diabetic peripheral neuropathy  Hypoxia, likely has underlying KAREN,   Persistent A-fib on Eliquis, sick sinus syndrome, HFpEF 7/2024 EF 55 to 60%, RVSP 45 mmHg, coronary artery disease status post

## 2024-12-23 NOTE — CONSULTS
[facility] -  Clinical Pharmacy Note    Vancomycin - Management by Pharmacy    Consult Date(s): 12/21/24   Consulting Provider(s): Dr. Chandler     Assessment / Plan  Bone and Joint Infection  - Vancomycin  Concurrent Antimicrobials: Zosyn  Day of Vanc Therapy / Ordered Duration: Day 3 of TBD  Current Dosing Method: Intermittent dosing based on levels  Therapeutic Goal: Random ~15-20 mcg/mL  Current Dose / Plan:   Patient with PMH of CKD, baseline Scr around 1.9-2.0 per chart review. SCr remains elevated at 2.5 today (baseline ~1.9-2 per chart). Will continue intermittent vancomycin dosing today.  UOP in past 24 hours was 0.8 mL/kg/hr.  Random level = 18.9 mcg/mL today after 1250 mg dose yesterday.  Will send vancomycin 750 mg x1 dose today, with random level in AM to assess need for re-dose.  Of note, pt has been on vancomycin at [facility] in the past. Various doses used including 1000mg q24h and 750mg q12h. Monitor closely.   Given BMI >/= 30, risk of accumulation can occur.   Will continue to monitor clinical condition and make adjustments to regimen as appropriate.    Please call with any questions.  Mery Dos Santos, PharmD, BCPS  Wireless: [phone]  Main pharmacy: [id number]  12/23/2024 7:53 AM      Interval update: Urine cx+ MSSA, on vanc/Zosyn. BP remains soft.    Subjective/Objective:   Veronica Chaidez is a 74 y.o. male with a PMHx significant for hypertension, persistent A-fib on Eliquis, coronary artery disease status post CABG with left atrial appendage clip, SSS dual-chamber Medtronic ICD, ischemic cardiomyopathy, HFpEF 7/2024 EF 55 to 60%, RVSP 45 mmHg, CKD 3A, insulin-dependent type 2 diabetes who is admitted with urinary retention and large wound of left heel.     Pharmacy is consulted to dose vancomycin.    Ht Readings from Last 1 Encounters:   12/21/24 1.88 m (6' 2\")     Wt Readings from Last 1 Encounters:   12/22/24 116.3 kg (256 lb 6.3 oz)     Current & Prior Antimicrobial Regimen(s):  Zosyn

## 2024-12-23 NOTE — PROGRESS NOTES
brisk to the midfoot skin B/L. Skin temperature is warm to hot from proximal to distal with no focal calor noted. No edema noted. No pain with calf compression b/l.     NEUROLOGIC: Gross and epicritic sensation is absent b/l. Protective sensation is diminished at all pedal sites b/l.     DERMATOLOGIC:   Verbal consent obtained for photos yesterday 12/21, unchanged at today's visit:     Right lower extremity:  Is a closed soft tissue envelope with no acute signs of infection      Left lower extremity:  Large unstageable plantar heel eschar. No purulent drainage. Malodorous. Negtive probe to bone. No crepitus, mild fluctuance.  Granular base central and distal as well as dry necrotic cap Central and proxima that has increased in size secondary to weightbearing against medical advice. Hx TMA.        MUSCULOSKELETAL: Muscle strength is 5/5 for all pedal groups tested.  Significant pain on palpation left distal calcaneus and periwound tissue. Ankle joint ROM is decreased in dorsiflexion with the knee extended. Hx TMA left foot.      IMAGING:  MRI left foot (12/22/2024): Pending    XR left foot (12/21/2024)  IMPRESSION:  Impression:  1.  No osseous erosions are identified to suggest osteomyelitis. If clinical concern remains further evaluation with MRI of the foot with and without contrast should be considered.     IMPRESSION/RECOMMENDATIONS:       -Unstageable heel ulcer, left  -Type 2 diabetes mellitus complicated by peripheral neuropathy  -History of TMA, left foot  -History of noncompliance     -Patient examined and evaluated at the bedside   -Hypotensive otherwise VSS. No Leukocytosis (WBC 9.5).  -ESR 81 .0   - All imaging reviewed and impressions as noted above   -Wound culture: Not obtained secondary to no purulent drainage  -LLE dressed with Santyl, gauze, Kerlix, Ace  -Antibiotics per primary: Zosyn  -NWB LLE in surgical shoe; wheelchair ordered        -Given significant malodor at today's visit with new  onset pain upon palpation of left heel proximal to wound, agree with MRI at this juncture.  Will determine if surgical intervention is necessary based on MRI results.  If no osteomyelitis is present we will tentatively planned patient up for I&D with excisional debridement of all nonviable tissue and potential wound VAC application this week.        DISPO: unstageable decubitus heel ulcer, left, all imaging and labs reviewed, NWB LLE in surgical shoe, wheelchair ordered, antibiotics per primary: Zosyn,  will continue with local wound care while patient is in house.  Given new onset pain left foot MRI ordered and pending results will determine need for surgical necessity versus local wound care.  If no osteomyelitis is present we will tentatively plan for I&D with excisional debridement of all nonviable soft tissue left lower extremity.       Discussed assessment and plan with Dr. Chandana Downey, SINDIM.    Jalen Dickinson DPM, MS    Podiatric Resident, PGY-1   [employer]   Pager Number: [phone]  12/23/2024

## 2024-12-23 NOTE — PROGRESS NOTES
Nephrology Progress Note                                                                                                                                                                                                                                                                                                                                                               Office : [phone]     Fax :[phone]    Patient's Name: Veronica Chaidez  2:57 PM  12/23/2024    Reason for Consult:  NANETTE   Requesting Physician:  Good Renee MD  Chief Complaint:    Chief Complaint   Patient presents with    Urinary Retention     Difficulty urinating the last few days        Assessment/Plan     NANETTE  Etiology of NANETTE seems to be obstructive in nature  In ER \"Bedside ultrasound revealed a significant amount of urine in the bladder.  Gómez catheter placed.  Urine output was significant\"  Cr currently stable   Urology on board   Continue GÓMEZ  Monitor RFP and urine output   CKD 3b/4   Baseline creatinine close to 2.0  Etiology from diabetic kidney disease, cardiorenal, obstructive   Probable UTI   Antibiotics   Diabetic foot   Podiatry on board   Diabetic kidney disease   Proteinuria present   We will follow him in our nephrology clinic   CAD/CABG  No signs of fluid overload       History of Present Ilness:    Veronica Chaidez is a 74 y.o. male with  a chief complaint of urinary retention.  Patient states he has had a cold for the past few days and his doctor started him on Mucinex and Claritin.  Since that time he feels like he has been having difficulty urinating.  States he can get a few drops out but feels like he is not fully emptying.  Has had prostate issues in the past but not for a while.  Denies any fevers.  States his cold symptoms are still ongoing, but have gotten a little bit better.      On chart review he has hypertension, persistent A-fib on Eliquis, coronary artery disease status post CABG with left atrial

## 2024-12-24 ENCOUNTER — APPOINTMENT (OUTPATIENT)
Dept: VASCULAR LAB | Age: 74
End: 2024-12-24
Payer: MEDICAID

## 2024-12-24 LAB
ANION GAP SERPL CALCULATED.3IONS-SCNC: 10 MMOL/L (ref 3–16)
BASOPHILS # BLD: 0.1 K/UL (ref 0–0.2)
BASOPHILS NFR BLD: 0.6 %
BUN SERPL-MCNC: 41 MG/DL (ref 7–20)
CALCIUM SERPL-MCNC: 8.4 MG/DL (ref 8.3–10.6)
CHLORIDE SERPL-SCNC: 103 MMOL/L (ref 99–110)
CO2 SERPL-SCNC: 21 MMOL/L (ref 21–32)
CREAT SERPL-MCNC: 2.2 MG/DL (ref 0.8–1.3)
DEPRECATED RDW RBC AUTO: 14.5 % (ref 12.4–15.4)
ECHO BSA: 2.45 M2
EOSINOPHIL # BLD: 0.4 K/UL (ref 0–0.6)
EOSINOPHIL NFR BLD: 3.5 %
GFR SERPLBLD CREATININE-BSD FMLA CKD-EPI: 31 ML/MIN/{1.73_M2}
GLUCOSE BLD-MCNC: 136 MG/DL (ref 70–99)
GLUCOSE BLD-MCNC: 177 MG/DL (ref 70–99)
GLUCOSE BLD-MCNC: 187 MG/DL (ref 70–99)
GLUCOSE BLD-MCNC: 189 MG/DL (ref 70–99)
GLUCOSE SERPL-MCNC: 135 MG/DL (ref 70–99)
HCT VFR BLD AUTO: 29 % (ref 40.5–52.5)
HGB BLD-MCNC: 9.7 G/DL (ref 13.5–17.5)
LYMPHOCYTES # BLD: 1 K/UL (ref 1–5.1)
LYMPHOCYTES NFR BLD: 9.6 %
MCH RBC QN AUTO: 29.3 PG (ref 26–34)
MCHC RBC AUTO-ENTMCNC: 33.4 G/DL (ref 31–36)
MCV RBC AUTO: 87.7 FL (ref 80–100)
MONOCYTES # BLD: 0.7 K/UL (ref 0–1.3)
MONOCYTES NFR BLD: 7.4 %
MRSA DNA SPEC QL NAA+PROBE: NORMAL
NEUTROPHILS # BLD: 8 K/UL (ref 1.7–7.7)
NEUTROPHILS NFR BLD: 78.9 %
PERFORMED ON: ABNORMAL
PLATELET # BLD AUTO: 257 K/UL (ref 135–450)
PMV BLD AUTO: 8.6 FL (ref 5–10.5)
POTASSIUM SERPL-SCNC: 4.2 MMOL/L (ref 3.5–5.1)
RBC # BLD AUTO: 3.31 M/UL (ref 4.2–5.9)
SODIUM SERPL-SCNC: 134 MMOL/L (ref 136–145)
VANCOMYCIN SERPL-MCNC: 17.1 UG/ML
WBC # BLD AUTO: 10.2 K/UL (ref 4–11)

## 2024-12-24 PROCEDURE — 2580000003 HC RX 258: Performed by: INTERNAL MEDICINE

## 2024-12-24 PROCEDURE — 1200000000 HC SEMI PRIVATE

## 2024-12-24 PROCEDURE — 85025 COMPLETE CBC W/AUTO DIFF WBC: CPT

## 2024-12-24 PROCEDURE — 6360000002 HC RX W HCPCS: Performed by: INTERNAL MEDICINE

## 2024-12-24 PROCEDURE — 80048 BASIC METABOLIC PNL TOTAL CA: CPT

## 2024-12-24 PROCEDURE — 93925 LOWER EXTREMITY STUDY: CPT

## 2024-12-24 PROCEDURE — 6360000002 HC RX W HCPCS: Performed by: STUDENT IN AN ORGANIZED HEALTH CARE EDUCATION/TRAINING PROGRAM

## 2024-12-24 PROCEDURE — 51702 INSERT TEMP BLADDER CATH: CPT

## 2024-12-24 PROCEDURE — 80202 ASSAY OF VANCOMYCIN: CPT

## 2024-12-24 PROCEDURE — 93971 EXTREMITY STUDY: CPT | Performed by: SURGERY

## 2024-12-24 PROCEDURE — 6370000000 HC RX 637 (ALT 250 FOR IP): Performed by: STUDENT IN AN ORGANIZED HEALTH CARE EDUCATION/TRAINING PROGRAM

## 2024-12-24 PROCEDURE — 2500000003 HC RX 250 WO HCPCS: Performed by: INTERNAL MEDICINE

## 2024-12-24 PROCEDURE — 36415 COLL VENOUS BLD VENIPUNCTURE: CPT

## 2024-12-24 PROCEDURE — 99232 SBSQ HOSP IP/OBS MODERATE 35: CPT | Performed by: HOSPITALIST

## 2024-12-24 PROCEDURE — 6370000000 HC RX 637 (ALT 250 FOR IP): Performed by: NURSE PRACTITIONER

## 2024-12-24 PROCEDURE — 94669 MECHANICAL CHEST WALL OSCILL: CPT

## 2024-12-24 RX ORDER — MECOBALAMIN 5000 MCG
5 TABLET,DISINTEGRATING ORAL NIGHTLY PRN
Status: DISCONTINUED | OUTPATIENT
Start: 2024-12-24 | End: 2024-12-31

## 2024-12-24 RX ORDER — VANCOMYCIN 750 MG/150ML
750 INJECTION, SOLUTION INTRAVENOUS ONCE
Status: COMPLETED | OUTPATIENT
Start: 2024-12-24 | End: 2024-12-24

## 2024-12-24 RX ADMIN — TAMSULOSIN HYDROCHLORIDE 0.4 MG: 0.4 CAPSULE ORAL at 07:44

## 2024-12-24 RX ADMIN — VANCOMYCIN 750 MG: 750 INJECTION, SOLUTION INTRAVENOUS at 13:10

## 2024-12-24 RX ADMIN — SODIUM CHLORIDE, PRESERVATIVE FREE 10 ML: 5 INJECTION INTRAVENOUS at 07:46

## 2024-12-24 RX ADMIN — TIZANIDINE 2 MG: 4 TABLET ORAL at 15:45

## 2024-12-24 RX ADMIN — SODIUM CHLORIDE: 900 INJECTION INTRAVENOUS at 00:24

## 2024-12-24 RX ADMIN — BENZONATATE 100 MG: 100 CAPSULE ORAL at 07:45

## 2024-12-24 RX ADMIN — GUAIFENESIN 600 MG: 600 TABLET ORAL at 20:25

## 2024-12-24 RX ADMIN — PIPERACILLIN AND TAZOBACTAM 3375 MG: 3; .375 INJECTION, POWDER, LYOPHILIZED, FOR SOLUTION INTRAVENOUS at 07:55

## 2024-12-24 RX ADMIN — GUAIFENESIN 600 MG: 600 TABLET ORAL at 07:45

## 2024-12-24 RX ADMIN — COLLAGENASE SANTYL: 250 OINTMENT TOPICAL at 07:46

## 2024-12-24 RX ADMIN — Medication 5 MG: at 02:40

## 2024-12-24 RX ADMIN — MICONAZOLE NITRATE: 20 POWDER TOPICAL at 07:45

## 2024-12-24 RX ADMIN — TIZANIDINE 2 MG: 4 TABLET ORAL at 00:25

## 2024-12-24 RX ADMIN — INSULIN LISPRO 1 UNITS: 100 INJECTION, SOLUTION INTRAVENOUS; SUBCUTANEOUS at 07:44

## 2024-12-24 RX ADMIN — BENZONATATE 100 MG: 100 CAPSULE ORAL at 15:45

## 2024-12-24 RX ADMIN — MICONAZOLE NITRATE: 20 POWDER TOPICAL at 20:24

## 2024-12-24 RX ADMIN — ENOXAPARIN SODIUM 120 MG: 150 INJECTION SUBCUTANEOUS at 21:09

## 2024-12-24 RX ADMIN — AMIODARONE HYDROCHLORIDE 200 MG: 200 TABLET ORAL at 07:45

## 2024-12-24 RX ADMIN — PIPERACILLIN AND TAZOBACTAM 3375 MG: 3; .375 INJECTION, POWDER, LYOPHILIZED, FOR SOLUTION INTRAVENOUS at 00:25

## 2024-12-24 RX ADMIN — ENOXAPARIN SODIUM 120 MG: 150 INJECTION SUBCUTANEOUS at 08:56

## 2024-12-24 RX ADMIN — PIPERACILLIN AND TAZOBACTAM 3375 MG: 3; .375 INJECTION, POWDER, LYOPHILIZED, FOR SOLUTION INTRAVENOUS at 16:01

## 2024-12-24 RX ADMIN — INSULIN LISPRO 1 UNITS: 100 INJECTION, SOLUTION INTRAVENOUS; SUBCUTANEOUS at 21:09

## 2024-12-24 RX ADMIN — BENZONATATE 100 MG: 100 CAPSULE ORAL at 20:25

## 2024-12-24 ASSESSMENT — PAIN DESCRIPTION - DESCRIPTORS
DESCRIPTORS: CRAMPING
DESCRIPTORS: ACHING;DISCOMFORT

## 2024-12-24 ASSESSMENT — PAIN DESCRIPTION - LOCATION: LOCATION: LEG

## 2024-12-24 ASSESSMENT — PAIN SCALES - GENERAL
PAINLEVEL_OUTOF10: 6
PAINLEVEL_OUTOF10: 5
PAINLEVEL_OUTOF10: 2
PAINLEVEL_OUTOF10: 0

## 2024-12-24 ASSESSMENT — PAIN DESCRIPTION - PAIN TYPE
TYPE: ACUTE PAIN
TYPE: ACUTE PAIN

## 2024-12-24 ASSESSMENT — PAIN DESCRIPTION - DIRECTION: RADIATING_TOWARDS: BL LEGS

## 2024-12-24 ASSESSMENT — PAIN DESCRIPTION - ONSET
ONSET: ON-GOING
ONSET: GRADUAL

## 2024-12-24 ASSESSMENT — PAIN - FUNCTIONAL ASSESSMENT
PAIN_FUNCTIONAL_ASSESSMENT: PREVENTS OR INTERFERES SOME ACTIVE ACTIVITIES AND ADLS
PAIN_FUNCTIONAL_ASSESSMENT: ACTIVITIES ARE NOT PREVENTED

## 2024-12-24 ASSESSMENT — PAIN DESCRIPTION - FREQUENCY
FREQUENCY: INTERMITTENT
FREQUENCY: INTERMITTENT

## 2024-12-24 ASSESSMENT — PAIN DESCRIPTION - ORIENTATION
ORIENTATION: LEFT;RIGHT
ORIENTATION: RIGHT;LEFT

## 2024-12-24 NOTE — PLAN OF CARE
Problem: Chronic Conditions and Co-morbidities  Goal: Patient's chronic conditions and co-morbidity symptoms are monitored and maintained or improved  Outcome: Progressing  Flowsheets (Taken 12/23/2024 0911)  Care Plan - Patient's Chronic Conditions and Co-Morbidity Symptoms are Monitored and Maintained or Improved: Monitor and assess patient's chronic conditions and comorbid symptoms for stability, deterioration, or improvement     Problem: Discharge Planning  Goal: Discharge to home or other facility with appropriate resources  Outcome: Progressing  Flowsheets (Taken 12/23/2024 0911)  Discharge to home or other facility with appropriate resources: Identify barriers to discharge with patient and caregiver     Problem: Safety - Adult  Goal: Free from fall injury  Outcome: Progressing     Problem: Skin/Tissue Integrity  Goal: Absence of new skin breakdown  Description: 1.  Monitor for areas of redness and/or skin breakdown  2.  Assess vascular access sites hourly  3.  Every 4-6 hours minimum:  Change oxygen saturation probe site  4.  Every 4-6 hours:  If on nasal continuous positive airway pressure, respiratory therapy assess nares and determine need for appliance change or resting period.  Outcome: Progressing     Problem: Pain  Goal: Verbalizes/displays adequate comfort level or baseline comfort level  Outcome: Progressing     Problem: Pain  Goal: Verbalizes/displays adequate comfort level or baseline comfort level  Outcome: Progressing

## 2024-12-24 NOTE — PLAN OF CARE
Problem: Chronic Conditions and Co-morbidities  Goal: Patient's chronic conditions and co-morbidity symptoms are monitored and maintained or improved  Outcome: Progressing     Problem: Discharge Planning  Goal: Discharge to home or other facility with appropriate resources  Outcome: Progressing     Problem: Safety - Adult  Goal: Free from fall injury  Outcome: Progressing   All safety precautions are in place; bed in lowest position, wheels locked, bed/chair alarm on, call light and personal belongings within reach.     Problem: Skin/Tissue Integrity  Goal: Absence of new skin breakdown  Description: 1.  Monitor for areas of redness and/or skin breakdown  2.  Assess vascular access sites hourly  3.  Every 4-6 hours minimum:  Change oxygen saturation probe site  4.  Every 4-6 hours:  If on nasal continuous positive airway pressure, respiratory therapy assess nares and determine need for appliance change or resting period.  Outcome: Progressing     Problem: Pain  Goal: Verbalizes/displays adequate comfort level or baseline comfort level  Outcome: Progressing   Pt pain being managed per MAR with PRN and scheduled medications; along with non pharm interventions such as intermittent ice packs.

## 2024-12-24 NOTE — PROGRESS NOTES
Pt transferred to 5502; pt oriented to call light and room. Pt VSS on room air. 4 eye skin assessment completed with second RN. All safety precautions are in place; plan of care to continue.

## 2024-12-24 NOTE — PROGRESS NOTES
Podiatric Surgery Daily Progress Note  Veronica Chaidez      Subjective :   Patient seen and examined this am at the bedside. Patient denies any acute overnight events.  Patient still affirms persistent cough, and pain to the LLE.  Patient denies N/V/F/C/SOB. Patient denies calf pain, thigh pain, chest pain.     Review of Systems: A 12 point review of symptoms is unremarkable with the exception of the chief complaint. Patient specifically denies nausea, fever, vomiting, chills, shortness of breath, chest pain, abdominal pain, constipation or difficulty urinating.       Objective     /64   Pulse 78   Temp 98.2 °F (36.8 °C) (Oral)   Resp 16   Ht 1.88 m (6' 2\")   Wt 116.3 kg (256 lb 6.3 oz)   SpO2 98%   BMI 32.92 kg/m²      I/O:  Intake/Output Summary (Last 24 hours) at 12/24/2024 1104  Last data filed at 12/24/2024 1034  Gross per 24 hour   Intake 1675.7 ml   Output 2650 ml   Net -974.3 ml              Wt Readings from Last 3 Encounters:   12/22/24 116.3 kg (256 lb 6.3 oz)   12/10/24 123.7 kg (272 lb 12.8 oz)   07/08/24 125.6 kg (276 lb 12.8 oz)       LABS:    Recent Labs     12/23/24  0454 12/24/24  0418   WBC 9.5 10.2   HGB 9.6* 9.7*   HCT 29.9* 29.0*    257        Recent Labs     12/24/24  0418   *   K 4.2      CO2 21   BUN 41*   CREATININE 2.2*      No results for input(s): \"INR\", \"APTT\" in the last 72 hours.    Invalid input(s): \"PROT\"        LOWER EXTREMITY EXAMINATION    Dressing to left LE intact. No strikethrough noted to the external dressing.  Sanguinous drainage noted to the internal layers of the dressing.     VASCULAR: DP and PT pulses faintly palpable 1/2B/L upon prior Doppler ultrasound DP and PT pulses were found to be audibly biphasic B/L. CFT is brisk to the midfoot skin B/L. Skin temperature is warm to hot from proximal to distal with no focal calor noted. No edema noted. No pain with calf compression b/l.     NEUROLOGIC: Gross and epicritic sensation is absent b/l.

## 2024-12-24 NOTE — CARE COORDINATION
SW following for discharge coordination.     Possible plan for surgical intervention and ID to follow.     Patient anticipates to discharge to a SNF at discharge.     Electronically signed by EYAL Barnhart on 12/24/2024 at 1:18 PM

## 2024-12-24 NOTE — PROGRESS NOTES
Patient is A&O x4.  RA, sat 95%.  No complaints of pain or SOB.  C/o numbness in right leg and foot.  Vital signs stable as charted.  Respirations appear to easy and unlabored.  Lungs clear.  Respirations easy with no complaints of cough.  No complaints of nausea/vomiting/diarrhea.  Up with assist/walker to the bathroom or chair as needed.  Right foot dressing intact with no drainage noted.  Right FA PIV intact and flushed.  Wound to bottom of right foot ROMMEL.  Wound consult ordered for what appears to be DTI on bottom of right foot.    Tolerating regular diet.   Caicedo in place for retention.   Plan of care and safety measures reviewed with the patient.  Call light in reach and bed alarm in place.  Bed attached to wall for alarm purposes.  Will continue to monitor.  Electronically signed by Jessie Guzmán RN on 12/23/2024 at 11:06 PM

## 2024-12-24 NOTE — PROGRESS NOTES
[facility] -  Clinical Pharmacy Note    Vancomycin - Management by Pharmacy    Consult Date(s): 12/21/24   Consulting Provider(s): Dr. Chandler     Assessment / Plan  Bone and Joint Infection  - Vancomycin  Concurrent Antimicrobials: Zosyn  Day of Vanc Therapy / Ordered Duration: Day 4 of TBD  Current Dosing Method: Intermittent dosing based on levels  Therapeutic Goal: Random ~15-20 mcg/mL  Current Dose / Plan:   Patient with PMH of CKD, baseline Scr around 1.9-2.0 per chart review. SCr remains elevated at 2.2 today, however it is improved since yesterday. Will continue intermittent vancomycin dosing today.  UOP in past 24 hours was 0.9 mL/kg/hr.  Random level = 17.1 mcg/mL today after 750 mg dose yesterday.  Will send vancomycin 750 mg x1 dose today, with random level in AM to assess need for re-dose.  May be able to schedule tomorrow if renal function/level remains stable.  Of note, pt has been on vancomycin at [facility] in the past. Various doses used including 1000mg q24h and 750mg q12h. Monitor closely.   Given BMI >/= 30, risk of accumulation can occur.   Will continue to monitor clinical condition and make adjustments to regimen as appropriate.    Please call with any questions.  Mery Dos Santos, PharmD, BCPS  Wireless: [phone]  Main pharmacy: [phone]  12/24/2024 7:28 AM      Interval update: BP remains soft. New onset pain in heel - repeat MRI shows likely OM.    Subjective/Objective:   Veronica Chaidez is a 74 y.o. male with a PMHx significant for hypertension, persistent A-fib on Eliquis, coronary artery disease status post CABG with left atrial appendage clip, SSS dual-chamber Medtronic ICD, ischemic cardiomyopathy, HFpEF 7/2024 EF 55 to 60%, RVSP 45 mmHg, CKD 3A, insulin-dependent type 2 diabetes who is admitted with urinary retention and large wound of left heel.     Pharmacy is consulted to dose vancomycin.    Ht Readings from Last 1 Encounters:   12/21/24 1.88 m (6' 2\")     Wt Readings from Last 1

## 2024-12-24 NOTE — PROGRESS NOTES
Nephrology Progress Note                                                                                                                                                                                                                                                                                                                                                               Office : [phone]     Fax :[phone]    Patient's Name: Veronica Chaidez  5:07 PM  12/24/2024    Reason for Consult:  NANETTE   Requesting Physician:  Good Renee MD  Chief Complaint:    Chief Complaint   Patient presents with    Urinary Retention     Difficulty urinating the last few days        Assessment/Plan     NANETTE  Creatinine is improving and is close to baseline   Etiology of NANETTE seems to be obstructive in nature  In ER \"Bedside ultrasound revealed a significant amount of urine in the bladder.  Gómez catheter placed.  Urine output was significant\"  Urology on board   Continue GÓMEZ  Monitor RFP and urine output   CKD 3b/4   Baseline creatinine close to 2.0  Etiology from diabetic kidney disease, cardiorenal, obstructive   Probable UTI   Antibiotics   Diabetic foot   Podiatry on board   Diabetic kidney disease   Proteinuria present   We will follow him in our nephrology clinic   CAD/CABG  No signs of fluid overload       History of Present Ilness:    Veronica Chaidez is a 74 y.o. male with  a chief complaint of urinary retention.  Patient states he has had a cold for the past few days and his doctor started him on Mucinex and Claritin.  Since that time he feels like he has been having difficulty urinating.  States he can get a few drops out but feels like he is not fully emptying.  Has had prostate issues in the past but not for a while.  Denies any fevers.  States his cold symptoms are still ongoing, but have gotten a little bit better.      On chart review he has hypertension, persistent A-fib on Eliquis, coronary artery disease

## 2024-12-24 NOTE — PROGRESS NOTES
4 Eyes Skin Assessment     NAME:  Veronica Chaidez  YOB: 1950  MEDICAL RECORD NUMBER:  [id number]    The patient is being assessed for  Transfer to New Unit    I agree that at least one RN has performed a thorough Head to Toe Skin Assessment on the patient. ALL assessment sites listed below have been assessed.      Areas assessed by both nurses:    Head, Face, Ears, Shoulders, Back, Chest, Arms, Elbows, Hands, Sacrum. Buttock, Coccyx, Ischium, and Legs. Feet and Heels    Scabbed R shoulder abrasion  Scattered abrasions to bilateral upper and lower extremities  R foot DTI  L foot wrapped per podiatry JULIETA  Redness to abdominal folds        Does the Patient have a Wound? Yes wound(s) were present on assessment. LDA wound assessment was Initiated and completed by RN       Damaso Prevention initiated by RN: Yes  Wound Care Orders initiated by RN: No    Pressure Injury (Stage 3,4, Unstageable, DTI, NWPT, and Complex wounds) if present, place Wound referral order by RN under : Yes    New Ostomies, if present place, Ostomy referral order under : No     Nurse 1 eSignature: Electronically signed by Becca Peguero RN on 12/24/24 at 4:52 PM EST    **SHARE this note so that the co-signing nurse can place an eSignature**    Nurse 2 eSignature: Electronically signed by Melanie Bautista RN on 12/24/24 at 5:18 PM EST

## 2024-12-24 NOTE — PROGRESS NOTES
V2.0    [facility] Progress Note      Name:  Veronica Chaidez /Age/Sex: 1950  (74 y.o. male)   MRN & CSN:  [id number] & [id number] Encounter Date/Time: 2024 9:39 AM EST   Location:  [address] [place] [address] PCP: Good Renee MD     Attending:Ministerio Medley MD       Hospital Day: 4    Assessment and Recommendations   Veronica Chaidez is a 74 y.o. male with pmh of HTN, persistent afib, CAD s/p CABG, SSS s/p AICD, HFpEF, CKD IIIa, Type II DM who presents with Diabetic foot infection (HCC)    #Diabetic foot infection with osteomyelitis  -Podiatry consulted and following  -MRI L foot completed  with findings in calcaneus c/w osteomyelitis  -Continue Zosyn  -Consult ID  -Follow blood cultures, NGTD so far    #UTI  #Acute urinary retention  --UCx NGTD but initial UA c/w UTI with + leukocyte esterase and pyruia  -Continue Zosyn as above  -Urology consulted, now s/p Caicedo placement, voiding trial as outpatient  -Continue tamsulosin    #Type II DM  -continue insulin therapy  --monitor BG frequently while on insulin to monitor for hypoglycemia as possible toxicity of insulin therapy, treat prn per hypoglycemia protocol    #CKD IIIa  -Nephrology following  -Trend BMP    #Afib  #Chronic HFpEF  #CAD  #SSS  -Continue home amiodarone, metoprolol  -Continue Lovenox, hold Eliquis pending possible surgical intervention      Diet ADULT DIET; Regular; 3 carb choices (45 gm/meal); Low Sodium (2 gm); 1200 ml   DVT Prophylaxis [] Lovenox, []  Heparin, [] SCDs, [] Ambulation,  [] Eliquis, [] Xarelto  [] Coumadin   Code Status Limited   Disposition From: Home  Expected Disposition: TBD  Estimated Date of Discharge: 2+ days  Patient requires continued admission due to IV abx   Surrogate Decision Maker/ JOSEF Caballero     Personally reviewed Lab Studies and Imaging         Subjective:     Chief Complaint: Foot wound    Veronica Chaidez is a 74 y.o. male with Type II DM who presented initially with concerns for UTI and foot osteomyelitis.  Electronically signed by Anastacio Barrow MD    XR FOOT LEFT (2 VIEWS)    Result Date: 12/21/2024  Exam: XR FOOT LEFT (2 VIEWS) History: open wound concern for osteo Comparison: June 2024 Findings: Bones: Postsurgical changes are present consistent with transmetatarsal amputation. No osseous erosion or destruction is seen. Joint spaces: The joint spaces are well maintained. Calcaneal spur is present. Soft tissues: There is no soft tissue swelling. No radiopaque foreign body is seen.     Impression: 1.  No osseous erosions are identified to suggest osteomyelitis. If clinical concern remains further evaluation with MRI of the foot with and without contrast should be considered. Electronically signed by Anastacio Barrow MD      CBC:   Recent Labs     12/22/24 0457 12/23/24 0454 12/24/24 0418   WBC 10.1 9.5 10.2   HGB 10.0* 9.6* 9.7*    270 257     BMP:    Recent Labs     12/22/24 0457 12/23/24 0454 12/24/24 0418   * 135* 134*   K 3.9 3.9 4.2    102 103   CO2 20* 22 21   BUN 40* 46* 41*   CREATININE 2.4* 2.5* 2.2*   GLUCOSE 135* 120* 135*     Hepatic: No results for input(s): \"AST\", \"ALT\", \"BILITOT\", \"ALKPHOS\" in the last 72 hours.    Invalid input(s): \"ALB\"  Lipids:   Lab Results   Component Value Date/Time    CHOL 172 03/02/2020 06:18 AM    HDL 30 08/28/2022 10:14 AM    TRIG 236 03/02/2020 06:18 AM     Hemoglobin A1C:   Lab Results   Component Value Date/Time    LABA1C 6.3 04/20/2024 04:35 AM     TSH:   Lab Results   Component Value Date/Time    TSH 3.83 05/28/2024 12:21 PM     Troponin: No results found for: \"TROPONINT\"  Lactic Acid: No results for input(s): \"LACTA\" in the last 72 hours.  BNP: No results for input(s): \"PROBNP\" in the last 72 hours.  UA:  Lab Results   Component Value Date/Time    NITRU Negative 12/21/2024 04:04 AM    COLORU Yellow 12/21/2024 04:04 AM    PHUR 6.0 12/21/2024 04:04 AM    PHUR 6.0 12/15/2022 01:44 PM    WBCUA >100 12/21/2024 04:04 AM    RBCUA 5-10 12/21/2024 04:04 AM

## 2024-12-25 PROBLEM — S91.309A NON-HEALING OPEN WOUND OF HEEL, INITIAL ENCOUNTER: Status: ACTIVE | Noted: 2024-12-25

## 2024-12-25 LAB
ALBUMIN SERPL-MCNC: 2.8 G/DL (ref 3.4–5)
ANION GAP SERPL CALCULATED.3IONS-SCNC: 12 MMOL/L (ref 3–16)
BACTERIA BLD CULT ORG #2: NORMAL
BACTERIA BLD CULT: NORMAL
BUN SERPL-MCNC: 39 MG/DL (ref 7–20)
CALCIUM SERPL-MCNC: 8.6 MG/DL (ref 8.3–10.6)
CHLORIDE SERPL-SCNC: 104 MMOL/L (ref 99–110)
CO2 SERPL-SCNC: 22 MMOL/L (ref 21–32)
CREAT SERPL-MCNC: 2 MG/DL (ref 0.8–1.3)
ECHO BSA: 2.45 M2
GFR SERPLBLD CREATININE-BSD FMLA CKD-EPI: 35 ML/MIN/{1.73_M2}
GLUCOSE BLD-MCNC: 160 MG/DL (ref 70–99)
GLUCOSE BLD-MCNC: 166 MG/DL (ref 70–99)
GLUCOSE BLD-MCNC: 179 MG/DL (ref 70–99)
GLUCOSE BLD-MCNC: 186 MG/DL (ref 70–99)
GLUCOSE SERPL-MCNC: 163 MG/DL (ref 70–99)
INR PPP: 1.38 (ref 0.85–1.15)
PERFORMED ON: ABNORMAL
PHOSPHATE SERPL-MCNC: 3.7 MG/DL (ref 2.5–4.9)
POTASSIUM SERPL-SCNC: 4.4 MMOL/L (ref 3.5–5.1)
PROTHROMBIN TIME: 17.1 SEC (ref 11.9–14.9)
SODIUM SERPL-SCNC: 138 MMOL/L (ref 136–145)
VANCOMYCIN SERPL-MCNC: 14.8 UG/ML
VAS LEFT ABI: 0.93
VAS LEFT ARM BP: 140 MMHG
VAS LEFT ATA DIST PSV: 133 CM/S
VAS LEFT CFA DIST PSV: 124 CM/S
VAS LEFT DORSALIS PEDIS BP: 120 MMHG
VAS LEFT PFA PROX PSV: 63.7 CM/S
VAS LEFT POP A PROX PSV: 114 CM/S
VAS LEFT PTA BP: 130 MMHG
VAS LEFT PTA DIST PSV: 132 CM/S
VAS LEFT SFA MID PSV: 98.5 CM/S
VAS RIGHT ABI: 1.07
VAS RIGHT ARM BP: 138 MMHG
VAS RIGHT ATA DIST PSV: 89.7 CM/S
VAS RIGHT CFA DIST PSV: 123 CM/S
VAS RIGHT DORSALIS PEDIS BP: 150 MMHG
VAS RIGHT PFA PROX PSV: 85.6 CM/S
VAS RIGHT POP A PROX PSV: 92.4 CM/S
VAS RIGHT PTA BP: 150 MMHG
VAS RIGHT PTA DIST PSV: 91.1 CM/S
VAS RIGHT SFA MID PSV: 101 CM/S

## 2024-12-25 PROCEDURE — 2500000003 HC RX 250 WO HCPCS: Performed by: INTERNAL MEDICINE

## 2024-12-25 PROCEDURE — 6370000000 HC RX 637 (ALT 250 FOR IP): Performed by: STUDENT IN AN ORGANIZED HEALTH CARE EDUCATION/TRAINING PROGRAM

## 2024-12-25 PROCEDURE — 1200000000 HC SEMI PRIVATE

## 2024-12-25 PROCEDURE — 6370000000 HC RX 637 (ALT 250 FOR IP): Performed by: INTERNAL MEDICINE

## 2024-12-25 PROCEDURE — 36415 COLL VENOUS BLD VENIPUNCTURE: CPT

## 2024-12-25 PROCEDURE — 80202 ASSAY OF VANCOMYCIN: CPT

## 2024-12-25 PROCEDURE — 94669 MECHANICAL CHEST WALL OSCILL: CPT

## 2024-12-25 PROCEDURE — 51702 INSERT TEMP BLADDER CATH: CPT

## 2024-12-25 PROCEDURE — 80069 RENAL FUNCTION PANEL: CPT

## 2024-12-25 PROCEDURE — 99232 SBSQ HOSP IP/OBS MODERATE 35: CPT | Performed by: INTERNAL MEDICINE

## 2024-12-25 PROCEDURE — 6360000002 HC RX W HCPCS: Performed by: STUDENT IN AN ORGANIZED HEALTH CARE EDUCATION/TRAINING PROGRAM

## 2024-12-25 PROCEDURE — 2580000003 HC RX 258: Performed by: INTERNAL MEDICINE

## 2024-12-25 PROCEDURE — 93925 LOWER EXTREMITY STUDY: CPT | Performed by: SURGERY

## 2024-12-25 PROCEDURE — 6360000002 HC RX W HCPCS: Performed by: INTERNAL MEDICINE

## 2024-12-25 PROCEDURE — 85610 PROTHROMBIN TIME: CPT

## 2024-12-25 RX ORDER — VANCOMYCIN 750 MG/150ML
750 INJECTION, SOLUTION INTRAVENOUS ONCE
Status: COMPLETED | OUTPATIENT
Start: 2024-12-25 | End: 2024-12-25

## 2024-12-25 RX ADMIN — BENZONATATE 100 MG: 100 CAPSULE ORAL at 08:57

## 2024-12-25 RX ADMIN — PIPERACILLIN AND TAZOBACTAM 3375 MG: 3; .375 INJECTION, POWDER, LYOPHILIZED, FOR SOLUTION INTRAVENOUS at 09:05

## 2024-12-25 RX ADMIN — VANCOMYCIN 750 MG: 750 INJECTION, SOLUTION INTRAVENOUS at 13:04

## 2024-12-25 RX ADMIN — PIPERACILLIN AND TAZOBACTAM 3375 MG: 3; .375 INJECTION, POWDER, LYOPHILIZED, FOR SOLUTION INTRAVENOUS at 15:57

## 2024-12-25 RX ADMIN — AMIODARONE HYDROCHLORIDE 200 MG: 200 TABLET ORAL at 08:57

## 2024-12-25 RX ADMIN — ENOXAPARIN SODIUM 120 MG: 150 INJECTION SUBCUTANEOUS at 08:58

## 2024-12-25 RX ADMIN — MICONAZOLE NITRATE: 20 POWDER TOPICAL at 08:58

## 2024-12-25 RX ADMIN — PIPERACILLIN AND TAZOBACTAM 3375 MG: 3; .375 INJECTION, POWDER, LYOPHILIZED, FOR SOLUTION INTRAVENOUS at 23:41

## 2024-12-25 RX ADMIN — ENOXAPARIN SODIUM 120 MG: 150 INJECTION SUBCUTANEOUS at 20:39

## 2024-12-25 RX ADMIN — SODIUM CHLORIDE, PRESERVATIVE FREE 10 ML: 5 INJECTION INTRAVENOUS at 08:57

## 2024-12-25 RX ADMIN — BENZONATATE 100 MG: 100 CAPSULE ORAL at 14:14

## 2024-12-25 RX ADMIN — GUAIFENESIN 600 MG: 600 TABLET ORAL at 20:14

## 2024-12-25 RX ADMIN — PIPERACILLIN AND TAZOBACTAM 3375 MG: 3; .375 INJECTION, POWDER, LYOPHILIZED, FOR SOLUTION INTRAVENOUS at 00:14

## 2024-12-25 RX ADMIN — TAMSULOSIN HYDROCHLORIDE 0.4 MG: 0.4 CAPSULE ORAL at 08:57

## 2024-12-25 RX ADMIN — INSULIN LISPRO 1 UNITS: 100 INJECTION, SOLUTION INTRAVENOUS; SUBCUTANEOUS at 20:13

## 2024-12-25 RX ADMIN — COLLAGENASE SANTYL: 250 OINTMENT TOPICAL at 08:59

## 2024-12-25 RX ADMIN — MICONAZOLE NITRATE: 20 POWDER TOPICAL at 20:14

## 2024-12-25 RX ADMIN — SODIUM CHLORIDE, PRESERVATIVE FREE 10 ML: 5 INJECTION INTRAVENOUS at 20:14

## 2024-12-25 RX ADMIN — GUAIFENESIN 600 MG: 600 TABLET ORAL at 08:57

## 2024-12-25 RX ADMIN — POLYETHYLENE GLYCOL 3350 17 G: 17 POWDER, FOR SOLUTION ORAL at 11:23

## 2024-12-25 RX ADMIN — BENZONATATE 100 MG: 100 CAPSULE ORAL at 20:13

## 2024-12-25 NOTE — PLAN OF CARE
Problem: Discharge Planning  Goal: Discharge to home or other facility with appropriate resources  12/25/2024 0738 by Ava Garner, RN  Outcome: Progressing  Pt involved in discharge planning. Barriers to discharge discussed with pt. Discharge learning needs identified. Discussed with pt any additional needed resources and transportation plans.      Problem: Safety - Adult  Goal: Free from fall injury  12/25/2024 0738 by Ava Garner, RN  Outcome: Progressing  All fall precautions in place. Bed locked and in lowest position with alarm on. Overbed table and personal belonings within reach. Call light within reach and patient instructed to use call light for assistance. Non-skid socks on.      Problem: Skin/Tissue Integrity  Goal: Absence of new skin breakdown  Description: 1.  Monitor for areas of redness and/or skin breakdown  2.  Assess vascular access sites hourly  3.  Every 4-6 hours minimum:  Change oxygen saturation probe site  4.  Every 4-6 hours:  If on nasal continuous positive airway pressure, respiratory therapy assess nares and determine need for appliance change or resting period.  12/25/2024 0738 by Ava Garner RN  Outcome: Progressing      Problem: Pain  Goal: Verbalizes/displays adequate comfort level or baseline comfort level  12/25/2024 0738 by Ava Garner RN  Outcome: Progressing  Pt endorsing pain to bilateral legs. Being treated with PRN pain medication, rest, and frequent repositioning with pillow support for comfort and pressure relief. Pt reports some relief from pain with above interventions.

## 2024-12-25 NOTE — PROGRESS NOTES
V2.0    [facility] Progress Note      Name:  Veronica Chaidez /Age/Sex: 1950  (74 y.o. male)   MRN & CSN:  [id number] & [id number] Encounter Date/Time: 2024 9:39 AM EST   Location:  [facility]-01 PCP: Good Renee MD     Attending:Ministerio Medely MD       Hospital Day: 5    Assessment and Recommendations   Veronica Chaidez is a 74 y.o. male with pmh of HTN, persistent afib, CAD s/p CABG, SSS s/p AICD, HFpEF, CKD IIIa, Type II DM who presents with Diabetic foot infection (HCC)    #Diabetic foot infection with osteomyelitis  -Podiatry consulted and following  -MRI L foot completed  with findings in calcaneus c/w osteomyelitis  --Podiatry plan for OR   -Continue Zosyn  -Consult ID post-op  -Follow blood cultures, NGTD so far    #UTI  #Acute urinary retention  --UCx NGTD but initial UA c/w UTI with + leukocyte esterase and pyruia  -Continue Zosyn as above  -Urology consulted, now s/p Caicedo placement, voiding trial as outpatient  -Continue tamsulosin    #Type II DM  -continue insulin therapy  --monitor BG frequently while on insulin to monitor for hypoglycemia as possible toxicity of insulin therapy, treat prn per hypoglycemia protocol    #CKD IIIa  -Nephrology following  -Trend BMP    #Afib  #Chronic HFpEF  #CAD  #SSS  -Continue home amiodarone, metoprolol  -Continue Lovenox, hold Eliquis pending possible surgical intervention      Diet ADULT DIET; Regular; 3 carb choices (45 gm/meal); Low Sodium (2 gm); 1200 ml   DVT Prophylaxis [] Lovenox, []  Heparin, [] SCDs, [] Ambulation,  [] Eliquis, [] Xarelto  [] Coumadin   Code Status Limited   Disposition From: Home  Expected Disposition: TBD  Estimated Date of Discharge: 2+ days  Patient requires continued admission due to IV abx   Surrogate Decision Maker/ JOSEF Caballero     Personally reviewed Lab Studies and Imaging         Subjective:     Chief Complaint: Foot wound    Veronica Chaidez is a 74 y.o. male with Type II DM who presented initially with

## 2024-12-25 NOTE — PROGRESS NOTES
Podiatric Surgery Daily Progress Note  Veronica Chaidez      Subjective :   Patient seen and examined this am at the bedside. Patient denies any acute overnight events.  Patient still affirms persistent cough, and pain to the LLE.  Patient denies N/V/F/C/SOB. Patient denies calf pain, thigh pain, chest pain.     Review of Systems: A 12 point review of symptoms is unremarkable with the exception of the chief complaint. Patient specifically denies nausea, fever, vomiting, chills, shortness of breath, chest pain, abdominal pain, constipation or difficulty urinating.       Objective     /71   Pulse 75   Temp 98.7 °F (37.1 °C) (Oral)   Resp 16   Ht 1.88 m (6' 2\")   Wt 116 kg (255 lb 11.7 oz)   SpO2 95%   BMI 32.83 kg/m²      I/O:  Intake/Output Summary (Last 24 hours) at 12/25/2024 0921  Last data filed at 12/25/2024 0429  Gross per 24 hour   Intake 500 ml   Output 1200 ml   Net -700 ml              Wt Readings from Last 3 Encounters:   12/25/24 116 kg (255 lb 11.7 oz)   12/10/24 123.7 kg (272 lb 12.8 oz)   07/08/24 125.6 kg (276 lb 12.8 oz)       LABS:    Recent Labs     12/23/24  0454 12/24/24  0418   WBC 9.5 10.2   HGB 9.6* 9.7*   HCT 29.9* 29.0*    257        Recent Labs     12/25/24  0558      K 4.4      CO2 22   PHOS 3.7   BUN 39*   CREATININE 2.0*      No results for input(s): \"INR\", \"APTT\" in the last 72 hours.    Invalid input(s): \"PROT\"        LOWER EXTREMITY EXAMINATION    Dressing to left LE intact. No strikethrough noted to the external dressing.  Sanguinous drainage noted to the internal layers of the dressing.     VASCULAR: DP and PT pulses faintly palpable 1/2B/L upon prior Doppler ultrasound DP and PT pulses were found to be audibly biphasic B/L. CFT is brisk to the midfoot skin B/L. Skin temperature is warm to hot from proximal to distal with no focal calor noted. No edema noted. No pain with calf compression b/l.     NEUROLOGIC: Gross and epicritic sensation is absent b/l.

## 2024-12-25 NOTE — PROGRESS NOTES
Nephrology Progress Note                                                                                                                                                                                                                                                                                                                                                               Office : [phone]     Fax :[phone]    Patient's Name: Veronica Chaidez  11:21 AM  12/25/2024    Reason for Consult:  NANETTE   Requesting Physician:  Good Renee MD  Chief Complaint:    Chief Complaint   Patient presents with    Urinary Retention     Difficulty urinating the last few days        Assessment/Plan     NANETTE  Creatinine is improving and is close to baseline   Etiology of NANETTE seems to be obstructive in nature  On admisionm significant amount of urine in the bladder.  Gómez catheter placed.  Urine output was significant\"  Urology on board   Continue GÓMEZ  Monitor RFP and urine output   CKD 3b/4   Baseline creatinine close to 1.7-2.0  Etiology from diabetic kidney disease, cardiorenal, obstructive   Probable UTI   Antibiotics   Diabetic foot   Podiatry on board   Diabetic kidney disease   Proteinuria present   We will follow him in our nephrology clinic   CAD/CABG  No signs of fluid overload       History of Present Ilness:    Veronica Chaidez is a 74 y.o. male with  a chief complaint of urinary retention.  Patient states he has had a cold for the past few days and his doctor started him on Mucinex and Claritin.  Since that time he feels like he has been having difficulty urinating.  States he can get a few drops out but feels like he is not fully emptying.  Has had prostate issues in the past but not for a while.  Denies any fevers.  States his cold symptoms are still ongoing, but have gotten a little bit better.      On chart review he has hypertension, persistent A-fib on Eliquis, coronary artery disease status post CABG with

## 2024-12-25 NOTE — DISCHARGE INSTRUCTIONS
[facility] outpatient podiatry clinic     Dr. Chandana Downey                                             Post Operative Instructions    1.  Have Prescriptions filled and take as directed.  All medications should be taken with food or milk.    2.  Keep foot elevated six inches above the level of the heart.  Support feet, legs, and knees with pillows.    3.  KEEP FOOT ELEVATED AS MUCH AS POSSIBLE UNTIL YOUR NEXT VISIT    4.  Place an ice pack on the bandaged site for 15 minutes every hour while awake    5.  Keep dressing clean, dry, and intact.  DO NOT REMOVE DRESSING.  CALL THE OFFICE IF BANDAGE COMES OFF.    6.  For the first 7 days after surgery, take temperature by mouth three times a day.  Call the office if greater than 101F.    7.  Ambulate with NON weight bearing to the LEFT lower extremity with wheelchair and crutches / walker.    8.  All instructions are to be followed until otherwise instructed by your surgeon.    9.  Call our office if you have any concerns or questions which arise.       Extra Heart Failure Education/ Tools/ Resources:     [email or web address]/publication/?z=161602   --- this is American Heart Association interactive Healthier Living with Heart Failure guidebook.  Please click hyperlink or copy / paste link into search bar. The QR Code is also available below. Use your mouse to scroll through the pages.  Lots of information about weight monitoring, diet tips, activity, meds, etc    Heart Failure Tools and Resources QR Code is below. It includes multiple resources to include symptom tracker, med tracker, further HF info, and access to a HF Support Network online Community    HF [place] Kyle  -- this is a free smart phone kyle available for iPhone and Android download.  Use your phone to track sodium / fluid intake, zone tool symptom tracking, weights, medications, etc. Click on this hyperlink  HF [place] Kyle   for QR code for easy download or the link is also found in the below HF

## 2024-12-25 NOTE — DISCHARGE INSTR - COC
Continuity of Care Form    Patient Name: Veronica Chaidez   :  1950  MRN:  [id number]    Admit date:  2024  Discharge date:  1/3/2025    Code Status Order: Limited   Advance Directives:   Advance Care Flowsheet Documentation             Admitting Physician:  Carroll Sierra MD  PCP: Good Renee MD    Discharging Nurse: MIGUEL Suazo RN  Discharging Hospital Unit/Room#: 5502/5502-01  Discharging Unit Phone Number: [phone]    Emergency Contact:   Extended Emergency Contact Information  Primary Emergency Contact: Moody Caballero  Address: [address] [place] [address]  Home Phone: [phone]  Relation: Friend    Past Surgical History:  Past Surgical History:   Procedure Laterality Date    CARDIAC CATHETERIZATION  12/10/2019    Dr. Shaver - severe multi-vessel disease    CORONARY ARTERY BYPASS GRAFT N/A 2020    WAYNE; TCPB; CABG x 3, LIMA to LAD w/ long segment endarterectomy (4 cm); bilateral pulmonary vein isolation; 40 mm Atriclip to L atril appendage; endoscopic L GSV harvest; ICNB x 5 levels bilat; sternal plate x 1 to manubrium performed by Sohail Rhoades MD at [facility] OR    FOOT SURGERY Left 2024    LEFT FOOT TRANSMETATARSAL AMPUTATION WITH DAVID TENDON LENGTHENING performed by Chandana Downey DPM at [facility] OR    NASAL POLYP SURGERY  x2    NASAL SEPTUM SURGERY         Immunization History:   Immunization History   Administered Date(s) Administered    TDaP, ADACEL (age 10y-64y), BOOSTRIX (age 10y+), IM, 0.5mL 2021       Active Problems:  Patient Active Problem List   Diagnosis Code    Dilated cardiomyopathy (HCC) I42.0    Paroxysmal atrial fibrillation (HCC) I48.0    S/P coronary angiogram Z98.890    CAD, multiple vessel I25.10    ICD (implantable cardioverter-defibrillator) in place-MEDTRONIC Z95.810    Ischemic cardiomyopathy I25.5    Mixed hyperlipidemia E78.2    Gastroesophageal reflux disease without esophagitis K21.9    Uncontrolled type 2 diabetes  mellitus with hyperglycemia (HCC) E11.65    Primary hypertension I10    Acute kidney injury superimposed on stage 4 chronic kidney disease (HCC) N17.9, N18.4    Chronic systolic (congestive) heart failure (HCC) I50.22    Status post ablation of atrial fibrillation Z98.890, Z86.79    Longstanding persistent atrial fibrillation (HCC) I48.11    Class 1 obesity due to excess calories with serious comorbidity and body mass index (BMI) of 34.0 to 34.9 in adult E66.811, E66.09, Z68.34    Osteomyelitis of second toe of left foot M86.9    Stage 3 chronic kidney disease (HCC) N18.30    Electrolyte imbalance E87.8    Diabetic foot infection (HCC) E11.628, L08.9    Diabetic nephropathy associated with type 2 diabetes mellitus (HCC) E11.21    Urinary tract infection without hematuria N39.0    Non-healing open wound of heel, initial encounter S91.309A       Isolation/Infection:   Isolation            No Isolation          Patient Infection Status       Infection Onset Added Last Indicated Last Indicated By Review Planned Expiration Resolved Resolved By    None active    Resolved    Influenza 12/15/22 12/15/22 12/15/22 Rapid Flu Swab   22 Infection                        Nurse Assessment:  Last Vital Signs: /66   Pulse 83   Temp 98.6 °F (37 °C) (Oral)   Resp 15   Ht 1.88 m (6' 2\")   Wt 116 kg (255 lb 11.7 oz)   SpO2 98%   BMI 32.83 kg/m²     Last documented pain score (0-10 scale): Pain Level: 5  Last Weight:   Wt Readings from Last 1 Encounters:   24 116 kg (255 lb 11.7 oz)     Mental Status:  oriented, alert, coherent, logical, thought processes intact, and able to concentrate and follow conversation    IV Access:  - PICC - site  R Upper Arm, insertion date: 24    Nursing Mobility/ADLs:  Walking   Assisted  Non weight bearing left foot    Transfer  Assisted  Bathing  Assisted  Dressing  Assisted  Toileting  Assisted  Feeding  Independent  Med Admin  Assisted  Med Delivery   whole    Wound

## 2024-12-25 NOTE — PROGRESS NOTES
Patient is alert and oriented x4. VSS on RA. Patient not endorsing any pain this shift just occasional cramping to left lower extremity. Ambulating to bathroom and up to chair x2 with stedy. Tolerated well. Patient is NWB to LLE. Offloading boot on LLE.Dressing to LLE is CDI.  Caicedo draining clear yellow urine. Caicedo cleansed with soap and water. Caicedo removed at 1810 for voiding trial this shift. Standard safety precautions in place. Bed locked and in lowest position with call light, bedside table, and belongings within reach.

## 2024-12-25 NOTE — PROGRESS NOTES
The [facility], [place] -  Clinical Pharmacy Note    Vancomycin - Management by Pharmacy    Consult Date(s): 12/21/24   Consulting Provider(s): Dr. Chandler     Assessment / Plan  Bone and Joint Infection  - Vancomycin  Concurrent Antimicrobials: Zosyn  Day of Vanc Therapy / Ordered Duration: Day 4 of TBD  Current Dosing Method: Intermittent dosing based on levels  Therapeutic Goal: Random ~15-20 mcg/mL  Current Dose / Plan:   Patient with PMH of CKD, baseline Scr around 1.9-2.0 per chart review. SCr remains improved today to 2.0. Will continue intermittent vancomycin dosing today and consider scheduling doses if remains stable at baseline tomorrow.  UOP in past 24 hours reduced 0.9 -> 0.4 mL/kg/hr.  Random level = 17.5 mcg/mL today after 750 mg dose yesterday.  Will send vancomycin 750 mg x1 dose today, with random level in AM to assess need for re-dose.  May be able to schedule tomorrow if renal function/level remains stable.  Of note, pt has been on vancomycin at [facility] in the past. Various doses used including 1000mg q24h and 750mg q12h. Monitor closely.   Given BMI >/= 30, risk of accumulation can occur.   Will continue to monitor clinical condition and make adjustments to regimen as appropriate.    Please call with any questions,  Shania Connolly, PharmD  PGY1 Pharmacy Resident  Wireless: [phone]  12/25/2024 8:09 AM        Interval update: BP improved. MRI + for OM. Tentative plan for I&D 12/27 - plans to c/s ID after. Adequate blood flow per B/L LE arterial duplex.improved Scr but decreased UOP - will cont to [facility] closely.    Subjective/Objective:   Veronica Chaidez is a 74 y.o. male with a PMHx significant for hypertension, persistent A-fib on Eliquis, coronary artery disease status post CABG with left atrial appendage clip, SSS dual-chamber Medtronic ICD, ischemic cardiomyopathy, HFpEF 7/2024 EF 55 to 60%, RVSP 45 mmHg, CKD 3A, insulin-dependent type 2 diabetes who is admitted with urinary retention and large wound

## 2024-12-25 NOTE — PLAN OF CARE
Problem: Safety - Adult  Goal: Free from fall injury  12/24/2024 2304 by Shari Wyatt, RN  Outcome: Progressing  Note: Pt is free of fall injury this shift. All proper safety precautions are in place. Call light is within reach. Bed alarm is on.     Problem: Pain  Goal: Verbalizes/displays adequate comfort level or baseline comfort level  12/24/2024 2304 by Shari Wyatt, RN  Outcome: Progressing

## 2024-12-25 NOTE — PROGRESS NOTES
Patient is A&Ox 4. VSS on RA. Patient has endorsed pain to L foot managed well per MAR and non-pharm measures. Patient is tolerating PO diet. Tolerating ambulation x 2 assist with stedy- NWB on LLE. Voiding via guo catheter- guo car performed via soap/ water. Pt had 1 BM this shift. Patient updated on plan of care. Fall and safety precautions in place, call light within reach.

## 2024-12-26 PROBLEM — S91.302A OPEN WOUND OF LEFT FOOT: Status: ACTIVE | Noted: 2024-12-26

## 2024-12-26 PROBLEM — M86.471 CHRONIC OSTEOMYELITIS OF RIGHT FOOT WITH DRAINING SINUS: Status: ACTIVE | Noted: 2024-12-26

## 2024-12-26 LAB
ABO + RH BLD: NORMAL
ANION GAP SERPL CALCULATED.3IONS-SCNC: 13 MMOL/L (ref 3–16)
BLD GP AB SCN SERPL QL: NORMAL
BUN SERPL-MCNC: 32 MG/DL (ref 7–20)
CALCIUM SERPL-MCNC: 8.6 MG/DL (ref 8.3–10.6)
CHLORIDE SERPL-SCNC: 103 MMOL/L (ref 99–110)
CO2 SERPL-SCNC: 18 MMOL/L (ref 21–32)
CREAT SERPL-MCNC: 1.8 MG/DL (ref 0.8–1.3)
EKG ATRIAL RATE: 88 BPM
EKG DIAGNOSIS: NORMAL
EKG P AXIS: 73 DEGREES
EKG P-R INTERVAL: 240 MS
EKG Q-T INTERVAL: 394 MS
EKG QRS DURATION: 94 MS
EKG QTC CALCULATION (BAZETT): 476 MS
EKG R AXIS: 77 DEGREES
EKG T AXIS: 42 DEGREES
EKG VENTRICULAR RATE: 88 BPM
GFR SERPLBLD CREATININE-BSD FMLA CKD-EPI: 39 ML/MIN/{1.73_M2}
GLUCOSE BLD-MCNC: 118 MG/DL (ref 70–99)
GLUCOSE BLD-MCNC: 146 MG/DL (ref 70–99)
GLUCOSE BLD-MCNC: 170 MG/DL (ref 70–99)
GLUCOSE BLD-MCNC: 223 MG/DL (ref 70–99)
GLUCOSE SERPL-MCNC: 136 MG/DL (ref 70–99)
PERFORMED ON: ABNORMAL
POTASSIUM SERPL-SCNC: 4.6 MMOL/L (ref 3.5–5.1)
SODIUM SERPL-SCNC: 134 MMOL/L (ref 136–145)
VANCOMYCIN SERPL-MCNC: 12.2 UG/ML

## 2024-12-26 PROCEDURE — 6360000002 HC RX W HCPCS: Performed by: STUDENT IN AN ORGANIZED HEALTH CARE EDUCATION/TRAINING PROGRAM

## 2024-12-26 PROCEDURE — 93010 ELECTROCARDIOGRAM REPORT: CPT | Performed by: INTERNAL MEDICINE

## 2024-12-26 PROCEDURE — 80048 BASIC METABOLIC PNL TOTAL CA: CPT

## 2024-12-26 PROCEDURE — 86850 RBC ANTIBODY SCREEN: CPT

## 2024-12-26 PROCEDURE — 6370000000 HC RX 637 (ALT 250 FOR IP): Performed by: INTERNAL MEDICINE

## 2024-12-26 PROCEDURE — 86900 BLOOD TYPING SEROLOGIC ABO: CPT

## 2024-12-26 PROCEDURE — 99255 IP/OBS CONSLTJ NEW/EST HI 80: CPT | Performed by: INTERNAL MEDICINE

## 2024-12-26 PROCEDURE — 80202 ASSAY OF VANCOMYCIN: CPT

## 2024-12-26 PROCEDURE — 6370000000 HC RX 637 (ALT 250 FOR IP): Performed by: STUDENT IN AN ORGANIZED HEALTH CARE EDUCATION/TRAINING PROGRAM

## 2024-12-26 PROCEDURE — 2580000003 HC RX 258: Performed by: STUDENT IN AN ORGANIZED HEALTH CARE EDUCATION/TRAINING PROGRAM

## 2024-12-26 PROCEDURE — 1200000000 HC SEMI PRIVATE

## 2024-12-26 PROCEDURE — 86901 BLOOD TYPING SEROLOGIC RH(D): CPT

## 2024-12-26 PROCEDURE — 2580000003 HC RX 258: Performed by: INTERNAL MEDICINE

## 2024-12-26 PROCEDURE — 6370000000 HC RX 637 (ALT 250 FOR IP): Performed by: NURSE PRACTITIONER

## 2024-12-26 PROCEDURE — 93005 ELECTROCARDIOGRAM TRACING: CPT

## 2024-12-26 PROCEDURE — 99232 SBSQ HOSP IP/OBS MODERATE 35: CPT | Performed by: HOSPITALIST

## 2024-12-26 PROCEDURE — 6360000002 HC RX W HCPCS: Performed by: INTERNAL MEDICINE

## 2024-12-26 PROCEDURE — 36415 COLL VENOUS BLD VENIPUNCTURE: CPT

## 2024-12-26 RX ORDER — VANCOMYCIN 1 G/200ML
1000 INJECTION, SOLUTION INTRAVENOUS ONCE
Status: COMPLETED | OUTPATIENT
Start: 2024-12-26 | End: 2024-12-26

## 2024-12-26 RX ORDER — LINEZOLID 600 MG/1
600 TABLET, FILM COATED ORAL EVERY 12 HOURS SCHEDULED
Status: DISCONTINUED | OUTPATIENT
Start: 2024-12-26 | End: 2024-12-30

## 2024-12-26 RX ADMIN — PIPERACILLIN AND TAZOBACTAM 3375 MG: 3; .375 INJECTION, POWDER, LYOPHILIZED, FOR SOLUTION INTRAVENOUS at 17:03

## 2024-12-26 RX ADMIN — MICONAZOLE NITRATE: 20 POWDER TOPICAL at 22:15

## 2024-12-26 RX ADMIN — GUAIFENESIN 600 MG: 600 TABLET ORAL at 22:08

## 2024-12-26 RX ADMIN — VANCOMYCIN 1000 MG: 1 INJECTION, SOLUTION INTRAVENOUS at 13:37

## 2024-12-26 RX ADMIN — AMIODARONE HYDROCHLORIDE 200 MG: 200 TABLET ORAL at 08:57

## 2024-12-26 RX ADMIN — BENZONATATE 100 MG: 100 CAPSULE ORAL at 22:08

## 2024-12-26 RX ADMIN — ENOXAPARIN SODIUM 120 MG: 150 INJECTION SUBCUTANEOUS at 22:13

## 2024-12-26 RX ADMIN — BENZOCAINE 6 MG-MENTHOL 10 MG LOZENGES 1 LOZENGE: at 12:16

## 2024-12-26 RX ADMIN — COLLAGENASE SANTYL: 250 OINTMENT TOPICAL at 09:49

## 2024-12-26 RX ADMIN — OXYCODONE 5 MG: 5 TABLET ORAL at 04:45

## 2024-12-26 RX ADMIN — TAMSULOSIN HYDROCHLORIDE 0.4 MG: 0.4 CAPSULE ORAL at 08:56

## 2024-12-26 RX ADMIN — BENZONATATE 100 MG: 100 CAPSULE ORAL at 08:56

## 2024-12-26 RX ADMIN — BENZONATATE 100 MG: 100 CAPSULE ORAL at 15:52

## 2024-12-26 RX ADMIN — MICONAZOLE NITRATE: 20 POWDER TOPICAL at 08:57

## 2024-12-26 RX ADMIN — GUAIFENESIN 600 MG: 600 TABLET ORAL at 08:57

## 2024-12-26 RX ADMIN — PIPERACILLIN AND TAZOBACTAM 3375 MG: 3; .375 INJECTION, POWDER, LYOPHILIZED, FOR SOLUTION INTRAVENOUS at 08:57

## 2024-12-26 RX ADMIN — LINEZOLID 600 MG: 600 TABLET, FILM COATED ORAL at 22:08

## 2024-12-26 RX ADMIN — INSULIN LISPRO 1 UNITS: 100 INJECTION, SOLUTION INTRAVENOUS; SUBCUTANEOUS at 12:16

## 2024-12-26 RX ADMIN — ENOXAPARIN SODIUM 120 MG: 150 INJECTION SUBCUTANEOUS at 12:17

## 2024-12-26 ASSESSMENT — PAIN DESCRIPTION - DESCRIPTORS: DESCRIPTORS: CRAMPING

## 2024-12-26 ASSESSMENT — PAIN SCALES - GENERAL
PAINLEVEL_OUTOF10: 2
PAINLEVEL_OUTOF10: 0
PAINLEVEL_OUTOF10: 6
PAINLEVEL_OUTOF10: 0

## 2024-12-26 ASSESSMENT — PAIN DESCRIPTION - ORIENTATION: ORIENTATION: LEFT

## 2024-12-26 ASSESSMENT — PAIN DESCRIPTION - PAIN TYPE: TYPE: ACUTE PAIN

## 2024-12-26 ASSESSMENT — PAIN - FUNCTIONAL ASSESSMENT: PAIN_FUNCTIONAL_ASSESSMENT: ACTIVITIES ARE NOT PREVENTED

## 2024-12-26 ASSESSMENT — PAIN DESCRIPTION - ONSET: ONSET: ON-GOING

## 2024-12-26 ASSESSMENT — PAIN DESCRIPTION - LOCATION: LOCATION: FOOT

## 2024-12-26 ASSESSMENT — PAIN DESCRIPTION - FREQUENCY: FREQUENCY: INTERMITTENT

## 2024-12-26 NOTE — PROGRESS NOTES
V2.0    [facility] [place]      Name:  Veronica Chaidez /Age/Sex: 1950  (74 y.o. male)   MRN & CSN:  [id number] & [id number] Encounter Date/Time: 2024 9:39 AM EST   Location:  [facility]- PCP: Good Renee MD     Attending:Ministerio Medley MD       Hospital Day: 6    Assessment and Recommendations   Veronica Chaidez is a 74 y.o. male with pmh of HTN, persistent afib, CAD s/p CABG, SSS s/p AICD, HFpEF, CKD IIIa, Type II DM who presents with Diabetic foot infection (HCC)    #Diabetic foot infection with osteomyelitis  -Podiatry consulted and following  -MRI L foot completed  with findings in calcaneus c/w osteomyelitis  --Podiatry plan for OR   -Continue Zosyn  -Consult ID today  -Follow blood cultures, NGTD so far    #UTI  #Acute urinary retention  --UCx NGTD but initial UA c/w UTI with + leukocyte esterase and pyruia  -Continue Zosyn as above  -Urology consulted, now s/p Caicedo placement, voiding trial as outpatient  -Continue tamsulosin    #Type II DM  -continue insulin therapy  --monitor BG frequently while on insulin to monitor for hypoglycemia as possible toxicity of insulin therapy, treat prn per hypoglycemia protocol    #CKD IIIa  -Nephrology following  -Trend BMP    #Afib  #Chronic HFpEF  #CAD  #SSS  -Continue home amiodarone, metoprolol  -Continue Lovenox, hold Eliquis pending possible surgical intervention      Diet ADULT DIET; Regular; 3 carb choices (45 gm/meal); Low Sodium (2 gm); 1200 ml  Diet NPO   DVT Prophylaxis [] Lovenox, []  Heparin, [] SCDs, [] Ambulation,  [] Eliquis, [] Xarelto  [] Coumadin   Code Status Limited   Disposition From: Home  Expected Disposition: TBD  Estimated Date of Discharge: 2+ days  Patient requires continued admission due to IV abx   Surrogate Decision Maker/ JOSEF Caballero     Personally reviewed Lab Studies and Imaging         Subjective:     Chief Complaint: Foot wound    Veronica Chaidez is a 74 y.o. male with Type II DM who presented initially

## 2024-12-26 NOTE — CARE COORDINATION
DISCHARGE PLANNING:  Chart reviewed.  Patient is from a Motor Home and is modified independent at baseline using a cane as needed. His motor home currently does not have a furnace (a friend is helping to repair it).    Plan for I&D and partial calcanectomy with podiatry tomorrow. He will need post-op therapy evals.    I met with patient at bedside to discuss discharge plans. His top choice is [facility] in [place].   I explained to him that we could try for an ARU but that we need SNF backups in case he does not do well with therapy post-op. He stated understanding. A SNF list was provided for him to review.    CM team will continue to follow.  Lorri Rosas RN Case Manager  [phone]

## 2024-12-26 NOTE — PROGRESS NOTES
The [facility] -  Clinical Pharmacy Note    Vancomycin - Management by Pharmacy    Consult Date(s): 12/21/24   Consulting Provider(s): Dr. Chandler     Assessment / Plan  Bone and Joint Infection  - Vancomycin  Concurrent Antimicrobials: Zosyn (was set to end today after 5 days- discussed with Dr Medley, will extend duration as surgery tomorrow with ID consult to follow)  Day of Vanc Therapy / Ordered Duration: Day 4 of TBD  Current Dosing Method: Intermittent dosing based on levels  Therapeutic Goal: Random ~15-20 mcg/mL  Current Dose / Plan:   SCr continues to improve (2.5?2.2?2.0?1.8)  Appears to be at baseline in patient with CKD . Documented UOP is poor (0.4 ml/kg/h past 24h)  Dosing intermittently for now  Received 750 mg yesterday 12/25   Level this am = 12.2 (drawn ~ 20 h after prior dose)   Will order 1000 mg IV x1 today, and re-check level tomorrow  Anticipate starting scheduled dosing tomorrow if renal function remains stable  Will continue to monitor clinical condition and make adjustments to regimen as appropriate.    Please call with any questions    Dilia Phoenix  Pharm.D. [facility]  [phone] (Main Pharmacy)      Interval update:  Pt remains afebrile and HDS.  Plan for OR tomorrow for I&D with partial calcanectomy. Podiatry discussed with ID and will consult ID after surgical cultures taken.    Subjective/Objective:   Veronica Chaidez is a 74 y.o. male with a PMHx significant for hypertension, persistent A-fib on Eliquis, coronary artery disease status post CABG with left atrial appendage clip, SSS dual-chamber Medtronic ICD, ischemic cardiomyopathy, HFpEF 7/2024 EF 55 to 60%, RVSP 45 mmHg, CKD 3A, insulin-dependent type 2 diabetes who is admitted with urinary retention and large wound of left heel.     Pharmacy is consulted to dose vancomycin.    Ht Readings from Last 1 Encounters:   12/21/24 1.88 m (6' 2\")     Wt Readings from Last 1 Encounters:   12/25/24 116 kg (255 lb 11.7 oz)     Current & Prior

## 2024-12-26 NOTE — PROGRESS NOTES
Nephrology Progress Note                                                                                                                                                                                                                                                                                                                                                               Office : [phone]     Fax :[phone]    Patient's Name: Veronica Chaidez  12:37 PM  12/26/2024    Reason for Consult:  NANETTE   Requesting Physician:  Good Renee MD  Chief Complaint:    Chief Complaint   Patient presents with    Urinary Retention     Difficulty urinating the last few days        Assessment/Plan     NANETTE  Creatinine is improving and back to baseline   Etiology of NANETTE seems to be obstructive in nature  On admisionm significant amount of urine in the bladder.  Caicedo catheter placed.  Urine output was significant\"  Urology on board   Caicedo d/c'ed & now voiding on his own   MONITOR PVR  Monitor RFP and urine output   CKD 3b/4   Baseline creatinine close to 1.7-2.0  Etiology from diabetic kidney disease, cardiorenal, obstructive   Probable UTI   Antibiotics   Diabetic foot   Podiatry on board   Plan for OR on 12/27  Diabetic kidney disease   Proteinuria present   We will follow him in our nephrology clinic   CAD/CABG  No signs of fluid overload       History of Present Ilness:    Veronica Chaidez is a 74 y.o. male with  a chief complaint of urinary retention.  Patient states he has had a cold for the past few days and his doctor started him on Mucinex and Claritin.  Since that time he feels like he has been having difficulty urinating.  States he can get a few drops out but feels like he is not fully emptying.  Has had prostate issues in the past but not for a while.  Denies any fevers.  States his cold symptoms are still ongoing, but have gotten a little bit better.      On chart review he has hypertension, persistent A-fib on   /68   Pulse 92   Temp 97.8 °F (36.6 °C) (Oral)   Resp 16   Ht 1.88 m (6' 2\")   Wt 116 kg (255 lb 11.7 oz)   SpO2 94%   BMI 32.83 kg/m²   Constitutional:  OAA X3 NAD Yes  Skin: no rash, turgor wnl  Heent:  eomi, mmm  Neck: no bruits or jvd noted  Cardiovascular:  S1, S2 without m/r/g  Respiratory: CTA B without w/r/r  Abdomen:  soft, nt, nd  Ext:  lower extremity edema : mild, L> R   Psychiatric: mood and affect stable   Musculoskeletal:  Rom, muscular strength intact    Data:   Labs:  CBC:   Recent Labs     12/24/24 0418   WBC 10.2   HGB 9.7*        BMP:    Recent Labs     12/24/24 0418 12/25/24  0558 12/26/24  0951   * 138 134*   K 4.2 4.4 4.6    104 103   CO2 21 22 18*   BUN 41* 39* 32*   CREATININE 2.2* 2.0* 1.8*   GLUCOSE 135* 163* 136*     Ca/Mg/Phos:   Recent Labs     12/24/24 0418 12/25/24  0558 12/26/24  0951   CALCIUM 8.4 8.6 8.6   PHOS  --  3.7  --      Hepatic: No results for input(s): \"AST\", \"ALT\", \"BILITOT\", \"ALKPHOS\" in the last 72 hours.    Invalid input(s): \"ALB\"  Troponin: No results for input(s): \"TROPONINI\" in the last 72 hours.  BNP: No results for input(s): \"BNP\" in the last 72 hours.  Lipids: No results for input(s): \"CHOL\", \"TRIG\", \"HDL\" in the last 72 hours.    Invalid input(s): \"LDLCALC\", \"LABVLDL\"  ABGs: No results for input(s): \"PHART\", \"PO2ART\", \"OQL5CNU\" in the last 72 hours.  INR:   Recent Labs     12/25/24  0558   INR 1.38*     UA:  No results for input(s): \"COLORU\", \"CLARITYU\", \"GLUCOSEU\", \"BILIRUBINUR\", \"KETUA\", \"SPECGRAV\", \"BLOODU\", \"PHUR\", \"PROTEINU\", \"UROBILINOGEN\", \"NITRU\", \"LEUKOCYTESUR\", \"URINETYPE\" in the last 72 hours.    Invalid input(s): \"LABMICR\"     Urine Microscopic:   No results for input(s): \"LABCAST\", \"BACTERIA\", \"COMU\", \"HYALCAST\", \"WBCUA\", \"RBCUA\" in the last 72 hours.    Invalid input(s): \"EPIU\"    Urine Culture:   No results for input(s): \"LABURIN\" in the last 72 hours.    Urine Chemistry: No results for input(s): \"CLUR\",

## 2024-12-26 NOTE — PROGRESS NOTES
Patient is A/Ox4. VSS, on RA. Has denied any pain throughout shift, just intermittent cramping to LLE. Dressing to LLE is CDI and managed daily by podiatry.   Tolerating diet well, denies n/v. Voiding without complications. X1 assist with stedy used for transfers. NWB to LLE.   Fall precautions in place. Bed locked in lowest position with alarm on. Call light within reach and patient using appropriately. Hourly rounding by RN. Plan of care continues.

## 2024-12-26 NOTE — CONSULTS
The [facility] - Clinical Pharmacy Note    Vancomycin therapy has been discontinued. Pharmacy will sign off at this time. Please consider consulting pharmacy again if vancomycin is re-started.    Thank you for allowing us to participate in the care of this patient.    Marlo Martínez PharmD  Main Pharmacy: [phone]  12/26/2024 6:31 PM

## 2024-12-26 NOTE — PROGRESS NOTES
Podiatric Surgery Daily Progress Note  Veronicaskye Chaidez      Subjective :   Patient seen and examined this am at the bedside. Patient denies any acute overnight events.  Patient still affirms persistent cough, and pain to the LLE, however he is amenable to surgery at this juncture. All questions answered and patient informed consent will be obtained later this afternoon.  Patient denies N/V/F/C/SOB. Patient denies calf pain, thigh pain, chest pain.     Review of Systems: A 12 point review of symptoms is unremarkable with the exception of the chief complaint. Patient specifically denies nausea, fever, vomiting, chills, shortness of breath, chest pain, abdominal pain, constipation or difficulty urinating.       Objective     BP (!) 116/56   Pulse 86   Temp 98.4 °F (36.9 °C) (Oral)   Resp 16   Ht 1.88 m (6' 2\")   Wt 116 kg (255 lb 11.7 oz)   SpO2 96%   BMI 32.83 kg/m²      I/O:  Intake/Output Summary (Last 24 hours) at 12/26/2024 0700  Last data filed at 12/25/2024 2349  Gross per 24 hour   Intake 720 ml   Output 1250 ml   Net -530 ml              Wt Readings from Last 3 Encounters:   12/25/24 116 kg (255 lb 11.7 oz)   12/10/24 123.7 kg (272 lb 12.8 oz)   07/08/24 125.6 kg (276 lb 12.8 oz)       LABS:    Recent Labs     12/24/24  0418   WBC 10.2   HGB 9.7*   HCT 29.0*           Recent Labs     12/25/24  0558      K 4.4      CO2 22   PHOS 3.7   BUN 39*   CREATININE 2.0*        Recent Labs     12/25/24  0558   INR 1.38*           LOWER EXTREMITY EXAMINATION    Dressing to left LE intact. No strikethrough noted to the external dressing.  Sanguinous drainage noted to the internal layers of the dressing.     VASCULAR: DP and PT pulses faintly palpable 1/2B/L upon prior Doppler ultrasound DP and PT pulses were found to be audibly biphasic B/L. CFT is brisk to the midfoot skin B/L. Skin temperature is warm to cool from proximal to distal with no focal calor noted; improvement in overall calor of LLE.

## 2024-12-26 NOTE — PLAN OF CARE
Problem: Chronic Conditions and Co-morbidities  Goal: Patient's chronic conditions and co-morbidity symptoms are monitored and maintained or improved  12/25/2024 1954 by Lois Mehta RN  Outcome: Progressing     Problem: Discharge Planning  Goal: Discharge to home or other facility with appropriate resources  12/25/2024 1954 by Lois Mehta RN  Outcome: Progressing  Patient will have all needs met prior to discharge      Problem: Safety - Adult  Goal: Free from fall injury  12/25/2024 1954 by Lois Mehta RN  Outcome: Progressing  Patient will remain fall free during stay by implementing and following all safety precautions.      Problem: Skin/Tissue Integrity  Goal: Absence of new skin breakdown  Description: 1.  Monitor for areas of redness and/or skin breakdown  2.  Assess vascular access sites hourly  3.  Every 4-6 hours minimum:  Change oxygen saturation probe site  4.  Every 4-6 hours:  If on nasal continuous positive airway pressure, respiratory therapy assess nares and determine need for appliance change or resting period.  12/25/2024 1954 by Lois Mheta RN  Outcome: Progressing     Problem: Pain  Goal: Verbalizes/displays adequate comfort level or baseline comfort level  12/25/2024 1954 by Lois Mehta RN  Outcome: Progressing  Patient will have pain managed by utilizing both pharmacological and non pharmacological interventions.      Problem: ABCDS Injury Assessment  Goal: Absence of physical injury  12/25/2024 1954 by Lois Mehta RN  Outcome: Progressing

## 2024-12-26 NOTE — CONSULTS
Infectious Diseases Inpatient Consult Note    RESIDENT NOTE - reviewed / edited, Attending note at bottom    Reason for Consult:   Left calcaneus osteomyelitis  Requesting Physician:   Ministerio Medley MD  Primary Care Physician:  Good Renee MD  History Obtained From:   Pt, EPIC    Admit Date: 12/21/2024  Hospital Day: 6    CHIEF COMPLAINT:       Chief Complaint   Patient presents with    Urinary Retention     Difficulty urinating the last few days        HISTORY OF PRESENT ILLNESS:      74 year-old Male  PMH- HTN, pAfib (on Eliquis), CAD s/p CABG w/ SHANTI clip, SSS s/p ICD, ischemic cardiomyopathy, HFrecEF (55-60%), CKD3, DM2, diabetic polyneuropathy s/p L TMA    Admitted 04/19-04/26 for L toe osteomyelitis, had TMA + JENA 04/22, treated empirically with Zosyn (04/19), vancomycin (04/19-04/22), cefepime (04/20-04/23)  No purulence noted intra-op, no cultures  History of poor adherence with outpatient management (wound dressing, weightbearing restrictions)    Saw PCP on 12/10 for cold, cough for 2-3 days. Started on Claritin + Mucinex  CXR order placed but not obtained  Felt like he had been having difficulty urinating since then    Presented to the ED on 12/21 for urinary retention  In the ED, afebrile, SpO2 87% on room air  Cr 1.8, ESR 81, , WBC 13  UA- large leuk esterase, WBCs, bacteria  CXR without acute concerns  Xray of left heel was without findings suggestive of osteomyelitis  Placed on 2L O2 via NC, started on empiric Zosyn, vancomycin    Podiatry consulted, noted large unstageable plantar heel eschar on left, malodorous but without purulent drainage.  Developed worsening pain in L heel  MRI obtained, consistent with left calcaneus osteomyelitis      Past Medical History:    Past Medical History:   Diagnosis Date    A-fib (HCC) 2020    CAD, multiple vessel 12/2019    Coronary artery disease involving coronary bypass graft     Diabetes mellitus (HCC)     diet controlled     Encounter for imaging

## 2024-12-26 NOTE — PLAN OF CARE
Problem: Chronic Conditions and Co-morbidities  Goal: Patient's chronic conditions and co-morbidity symptoms are monitored and maintained or improved  Outcome: Progressing  Note: Hx of diabetes. Blood sugar checked before each meal. Sliding scale Humalog administered per the MAR accordingly. Patient is on a regular, 3 carb diet. Plan of care continues.      Problem: Discharge Planning  Goal: Discharge to home or other facility with appropriate resources  Outcome: Progressing  Note: Patient is from home alone. Surgery planned for 12/27 with podiatry team. Will need PT/TO post-op. Social work following for needs. Plan of care continues.      Problem: Safety - Adult  Goal: Free from fall injury  Outcome: Progressing  Note: NWB to LLE. Stedy used for transfers. Fall precautions in place. Bed locked in lowest position with alarm on and side rails up. Bedside table, belongings, and call light within reach. Floor clean and free from clutter. Room door open. Plan of care continues.      Problem: Pain  Goal: Verbalizes/displays adequate comfort level or baseline comfort level  Outcome: Progressing  Note: Endorsing cramping to LLE. Using numerical pain rating scale appropriately. PRN medications available per the MAR and patient aware. Patient educated on medications, side effects, and verbalized understanding. Assisted with turning and repositioning in bed/chair for comfort and pressure relief. Notified of pain medication administration schedule. Plan of care continues.

## 2024-12-26 NOTE — PROGRESS NOTES
Patient is A&Ox4. VSS this shift with exception of soft BP. Patient has endorsed pain to L foot managed  per MAR and non-pharm measures. Patient is tolerating PO diet. Tolerating ambulation x1 assist with stedy. Voiding via BRP. Pt denies any needs at this time.Patient updated on plan of care. Fall and safety precautions in place, call light within reach. Continuing care.

## 2024-12-27 LAB
ANION GAP SERPL CALCULATED.3IONS-SCNC: 8 MMOL/L (ref 3–16)
BUN SERPL-MCNC: 27 MG/DL (ref 7–20)
CALCIUM SERPL-MCNC: 8.5 MG/DL (ref 8.3–10.6)
CHLORIDE SERPL-SCNC: 105 MMOL/L (ref 99–110)
CO2 SERPL-SCNC: 23 MMOL/L (ref 21–32)
CREAT SERPL-MCNC: 1.8 MG/DL (ref 0.8–1.3)
GFR SERPLBLD CREATININE-BSD FMLA CKD-EPI: 39 ML/MIN/{1.73_M2}
GLUCOSE BLD-MCNC: 111 MG/DL (ref 70–99)
GLUCOSE BLD-MCNC: 125 MG/DL (ref 70–99)
GLUCOSE BLD-MCNC: 164 MG/DL (ref 70–99)
GLUCOSE BLD-MCNC: 175 MG/DL (ref 70–99)
GLUCOSE SERPL-MCNC: 109 MG/DL (ref 70–99)
PERFORMED ON: ABNORMAL
POTASSIUM SERPL-SCNC: 4.3 MMOL/L (ref 3.5–5.1)
SODIUM SERPL-SCNC: 136 MMOL/L (ref 136–145)

## 2024-12-27 PROCEDURE — 02HV33Z INSERTION OF INFUSION DEVICE INTO SUPERIOR VENA CAVA, PERCUTANEOUS APPROACH: ICD-10-PCS | Performed by: STUDENT IN AN ORGANIZED HEALTH CARE EDUCATION/TRAINING PROGRAM

## 2024-12-27 PROCEDURE — 6360000002 HC RX W HCPCS: Performed by: INTERNAL MEDICINE

## 2024-12-27 PROCEDURE — 36569 INSJ PICC 5 YR+ W/O IMAGING: CPT

## 2024-12-27 PROCEDURE — 6370000000 HC RX 637 (ALT 250 FOR IP): Performed by: INTERNAL MEDICINE

## 2024-12-27 PROCEDURE — 6360000002 HC RX W HCPCS: Performed by: STUDENT IN AN ORGANIZED HEALTH CARE EDUCATION/TRAINING PROGRAM

## 2024-12-27 PROCEDURE — 6370000000 HC RX 637 (ALT 250 FOR IP): Performed by: STUDENT IN AN ORGANIZED HEALTH CARE EDUCATION/TRAINING PROGRAM

## 2024-12-27 PROCEDURE — C1751 CATH, INF, PER/CENT/MIDLINE: HCPCS

## 2024-12-27 PROCEDURE — 99232 SBSQ HOSP IP/OBS MODERATE 35: CPT | Performed by: INTERNAL MEDICINE

## 2024-12-27 PROCEDURE — 2580000003 HC RX 258: Performed by: STUDENT IN AN ORGANIZED HEALTH CARE EDUCATION/TRAINING PROGRAM

## 2024-12-27 PROCEDURE — 2500000003 HC RX 250 WO HCPCS: Performed by: INTERNAL MEDICINE

## 2024-12-27 PROCEDURE — 36415 COLL VENOUS BLD VENIPUNCTURE: CPT

## 2024-12-27 PROCEDURE — 1200000000 HC SEMI PRIVATE

## 2024-12-27 PROCEDURE — 80048 BASIC METABOLIC PNL TOTAL CA: CPT

## 2024-12-27 PROCEDURE — 99232 SBSQ HOSP IP/OBS MODERATE 35: CPT | Performed by: HOSPITALIST

## 2024-12-27 RX ORDER — LIDOCAINE HYDROCHLORIDE 10 MG/ML
50 INJECTION, SOLUTION EPIDURAL; INFILTRATION; INTRACAUDAL; PERINEURAL ONCE
Status: COMPLETED | OUTPATIENT
Start: 2024-12-27 | End: 2024-12-27

## 2024-12-27 RX ORDER — SODIUM CHLORIDE 9 MG/ML
INJECTION, SOLUTION INTRAVENOUS PRN
Status: DISCONTINUED | OUTPATIENT
Start: 2024-12-27 | End: 2025-01-03 | Stop reason: HOSPADM

## 2024-12-27 RX ORDER — SODIUM CHLORIDE 0.9 % (FLUSH) 0.9 %
5-40 SYRINGE (ML) INJECTION EVERY 12 HOURS SCHEDULED
Status: DISCONTINUED | OUTPATIENT
Start: 2024-12-27 | End: 2025-01-03 | Stop reason: HOSPADM

## 2024-12-27 RX ORDER — SODIUM CHLORIDE 0.9 % (FLUSH) 0.9 %
5-40 SYRINGE (ML) INJECTION PRN
Status: DISCONTINUED | OUTPATIENT
Start: 2024-12-27 | End: 2025-01-03 | Stop reason: HOSPADM

## 2024-12-27 RX ADMIN — COLLAGENASE SANTYL: 250 OINTMENT TOPICAL at 09:03

## 2024-12-27 RX ADMIN — ENOXAPARIN SODIUM 120 MG: 150 INJECTION SUBCUTANEOUS at 20:51

## 2024-12-27 RX ADMIN — LINEZOLID 600 MG: 600 TABLET, FILM COATED ORAL at 20:51

## 2024-12-27 RX ADMIN — BENZONATATE 100 MG: 100 CAPSULE ORAL at 08:45

## 2024-12-27 RX ADMIN — GUAIFENESIN 600 MG: 600 TABLET ORAL at 20:51

## 2024-12-27 RX ADMIN — PIPERACILLIN AND TAZOBACTAM 3375 MG: 3; .375 INJECTION, POWDER, LYOPHILIZED, FOR SOLUTION INTRAVENOUS at 17:20

## 2024-12-27 RX ADMIN — GUAIFENESIN 600 MG: 600 TABLET ORAL at 08:46

## 2024-12-27 RX ADMIN — SODIUM CHLORIDE, PRESERVATIVE FREE 10 ML: 5 INJECTION INTRAVENOUS at 20:51

## 2024-12-27 RX ADMIN — AMIODARONE HYDROCHLORIDE 200 MG: 200 TABLET ORAL at 08:46

## 2024-12-27 RX ADMIN — BENZONATATE 100 MG: 100 CAPSULE ORAL at 20:51

## 2024-12-27 RX ADMIN — PIPERACILLIN AND TAZOBACTAM 3375 MG: 3; .375 INJECTION, POWDER, LYOPHILIZED, FOR SOLUTION INTRAVENOUS at 01:23

## 2024-12-27 RX ADMIN — LINEZOLID 600 MG: 600 TABLET, FILM COATED ORAL at 08:44

## 2024-12-27 RX ADMIN — BENZONATATE 100 MG: 100 CAPSULE ORAL at 14:48

## 2024-12-27 RX ADMIN — SODIUM CHLORIDE, PRESERVATIVE FREE 10 ML: 5 INJECTION INTRAVENOUS at 09:01

## 2024-12-27 RX ADMIN — TAMSULOSIN HYDROCHLORIDE 0.4 MG: 0.4 CAPSULE ORAL at 08:45

## 2024-12-27 RX ADMIN — MICONAZOLE NITRATE: 20 POWDER TOPICAL at 09:05

## 2024-12-27 RX ADMIN — ENOXAPARIN SODIUM 120 MG: 150 INJECTION SUBCUTANEOUS at 12:49

## 2024-12-27 RX ADMIN — PIPERACILLIN AND TAZOBACTAM 3375 MG: 3; .375 INJECTION, POWDER, LYOPHILIZED, FOR SOLUTION INTRAVENOUS at 08:59

## 2024-12-27 RX ADMIN — LIDOCAINE HYDROCHLORIDE 50 MG: 10 INJECTION, SOLUTION EPIDURAL; INFILTRATION; INTRACAUDAL; PERINEURAL at 16:18

## 2024-12-27 NOTE — PROGRESS NOTES
Patient is alert and oriented. Vital signs are stable. Patient denies any pain. NWB to LLE. Up x1 with a becky stedy. Bed is in the lowest position. Bed alarm is activated. Call light is within reach. Will continue to monitor and reassess.

## 2024-12-27 NOTE — PLAN OF CARE
Problem: Safety - Adult  Goal: Free from fall injury  12/27/2024 1226 by Scott Leong, RN  Outcome: Progressing  Note: Patient has remained free of fall injury this shift. Patient tolerates ambulation x2 assist with steady. Gait belt and gripper socks applied for safety.      Problem: ABCDS Injury Assessment  Goal: Absence of physical injury  12/27/2024 1226 by Scott Leong, RN  Outcome: Progressing  Note: Patient has remained free of physical injury this shift. Fall and safety precautions in place. Bed exit alarm activated, bed in lowest position with wheels locked, call light and belongings within reach.

## 2024-12-27 NOTE — PROGRESS NOTES
Consulted for concern for right foot - DTI. Podiatry consulted and treating left foot. Per Podiatry note dated 12/27 - \"Right lower extremity:  Is a closed soft tissue envelope with no acute signs of infection\". Wound Care to sign off and defer to Podiatry.

## 2024-12-27 NOTE — PROGRESS NOTES
Comprehensive Nutrition Assessment    RECOMMENDATIONS:  PO Diet: Regular; 4 carb choices  Nutrition Supplement: Begin Bong 1 pkt bid  Nutrition Education: No recommendation at this time     NUTRITION ASSESSMENT:   Nutritional summary & status: LOS. Pt with increased nutrient needs due to diabetic foot wound with associated osteomyelitis, left heel. Left foot I & D with partial calcanectomy and potential wound VAC application planned for Mon 12/30. Pt reports good appetite at present with EMR showing % intake of meals. Wt down 17 lbs since 12/10, attribute to fluid loss as -2.2L since admit. Pt agreeable to receive Bong supplement to assist with wound healing. Will continue to monitor intake adequacy and acceptance of supplement.   Admission // PMH: Diabetic foot infection//HTN, T2DM, CAD, s/p CABG    MALNUTRITION ASSESSMENT  Context of Malnutrition: Chronic Illness   Malnutrition Status: No malnutrition  Findings of the 6 clinical characteristics of malnutrition (Minimum of 2 out of 6 clinical characteristics is required to make the diagnosis of moderate or severe Protein Calorie Malnutrition based on AND/ASPEN Guidelines):  Energy Intake:  No decrease in energy intake  Weight Loss:  No weight loss (wt decrease 2/2 to fluid loss)     Body Fat Loss:  No body fat loss     Muscle Mass Loss:  No muscle mass loss      NUTRITION DIAGNOSIS   Increased nutrient needs related to increase demand for energy/nutrients as evidenced by wounds    Nutrition Monitoring and Evaluation:   Food/Nutrient Intake Outcomes:  Food and Nutrient Intake, Supplement Intake  Physical Signs/Symptoms Outcomes:  Biochemical Data, Nutrition Focused Physical Findings, Skin, Weight     OBJECTIVE DATA: Significant to nutrition assessment  Nutrition Related Findings: Glu 11, LBM12/26. RLE +1 nonpitting;LLE +2 nonpitting.  Wounds: Diabetic Ulcer (lt heel)  Nutrition Goals: PO intake 75% or greater     CURRENT NUTRITION THERAPIES  ADULT DIET;

## 2024-12-27 NOTE — PROGRESS NOTES
Podiatric Surgery Daily Progress Note  Veronica Chaidez      Subjective :   Patient seen and examined this am at the bedside. Patient denies any acute overnight events.  Discussed surgical plan with patient, now plan for surgery on Monday 12/30.  Patient understanding.  Patient denies N/V/F/C/SOB. Patient denies calf pain, thigh pain, chest pain.     Review of Systems: A 12 point review of symptoms is unremarkable with the exception of the chief complaint. Patient specifically denies nausea, fever, vomiting, chills, shortness of breath, chest pain, abdominal pain, constipation or difficulty urinating.       Objective     /71   Pulse 80   Temp 97.6 °F (36.4 °C) (Oral)   Resp 16   Ht 1.88 m (6' 2\")   Wt 116 kg (255 lb 11.7 oz)   SpO2 98%   BMI 32.83 kg/m²      I/O:  Intake/Output Summary (Last 24 hours) at 12/27/2024 1025  Last data filed at 12/26/2024 2103  Gross per 24 hour   Intake 960 ml   Output --   Net 960 ml              Wt Readings from Last 3 Encounters:   12/25/24 116 kg (255 lb 11.7 oz)   12/10/24 123.7 kg (272 lb 12.8 oz)   07/08/24 125.6 kg (276 lb 12.8 oz)       LABS:    No results for input(s): \"WBC\", \"HGB\", \"HCT\", \"PLT\" in the last 72 hours.       Recent Labs     12/25/24  0558 12/26/24  0951 12/27/24  0540      < > 136   K 4.4   < > 4.3      < > 105   CO2 22   < > 23   PHOS 3.7  --   --    BUN 39*   < > 27*   CREATININE 2.0*   < > 1.8*    < > = values in this interval not displayed.        Recent Labs     12/25/24  0558   INR 1.38*           LOWER EXTREMITY EXAMINATION    Dressing to left LE intact. No strikethrough noted to the external dressing.  Sanguinous drainage noted to the internal layers of the dressing.     VASCULAR: DP and PT pulses faintly palpable 1/2B/L upon prior Doppler ultrasound DP and PT pulses were found to be audibly biphasic B/L. CFT is brisk to the midfoot skin B/L. Skin temperature is warm to cool from proximal to distal with no focal calor noted;

## 2024-12-27 NOTE — PROGRESS NOTES
V2.0    [facility] [place]      Name:  Veronica Chaidez /Age/Sex: 1950  (74 y.o. male)   MRN & CSN:  [id number] & [id number] Encounter Date/Time: 2024 9:39 AM EST   Location:  [facility]-01 PCP: Good Renee MD     Attending:Ministerio Medley MD       Hospital Day: 7    Assessment and Recommendations   Veronica Chaidez is a 74 y.o. male with pmh of HTN, persistent afib, CAD s/p CABG, SSS s/p AICD, HFpEF, CKD IIIa, Type II DM who presents with Diabetic foot infection (HCC)    #Diabetic foot infection with osteomyelitis  -Podiatry consulted and following  -MRI L foot completed  with findings in calcaneus c/w osteomyelitis  --Podiatry plan for OR  for resection of infected area in calcaneal bone  -Continue Zosyn + linezolid  -Consulted ID   -Follow blood cultures, NGTD so far    #UTI  #Acute urinary retention  --UCx NGTD but initial UA c/w UTI with + leukocyte esterase and pyruia  -Continue Zosyn as above  -Urology consulted, now s/p Caicedo placement, voiding trial as outpatient  -Continue tamsulosin    #Type II DM  -continue insulin therapy  --monitor BG frequently while on insulin to monitor for hypoglycemia as possible toxicity of insulin therapy, treat prn per hypoglycemia protocol    #CKD IIIa  -Nephrology following  -Trend BMP    #Afib  #Chronic HFpEF  #CAD  #SSS  -Continue home amiodarone, metoprolol  -Continue Lovenox, hold Eliquis pending possible surgical intervention      Diet ADULT DIET; Regular   DVT Prophylaxis [] Lovenox, []  Heparin, [] SCDs, [] Ambulation,  [] Eliquis, [] Xarelto  [] Coumadin   Code Status Limited   Disposition From: Home  Expected Disposition: TBD  Estimated Date of Discharge: 2+ days  Patient requires continued admission due to IV abx   Surrogate Decision Maker/ JOSEF Caballero     Personally reviewed Lab Studies and Imaging         Subjective:     Chief Complaint: Foot wound    Veronica Chaidez is a 74 y.o. male with Type II DM who presented initially  chloride, , PRN  ondansetron, 4 mg, Q8H PRN   Or  ondansetron, 4 mg, Q6H PRN  polyethylene glycol, 17 g, Daily PRN  acetaminophen, 650 mg, Q6H PRN   Or  acetaminophen, 650 mg, Q6H PRN  glucose, 4 tablet, PRN  dextrose bolus, 125 mL, PRN   Or  dextrose bolus, 250 mL, PRN  glucagon (rDNA), 1 mg, PRN  dextrose, , Continuous PRN        Labs and Imaging   MRI FOOT LEFT WO CONTRAST    Result Date: 12/23/2024  History: Osteomyelitis. MRI of the left foot without contrast TECHNIQUE: Multiplanar T1 and T2-weighted images were obtained of the left foot without use of contrast. FINDINGS: Previous amputation of the left foot at the level of the metatarsals. Extensive soft tissue swelling. Diffuse subcutaneous edema around the foot and ankle. There is an ulceration along the posterior heel. There is extensive abnormal marrow signal involving the posterior process of the calcaneus along the plantar lateral aspect compatible with osteomyelitis. No abscess formation identified. Amputations of the distal metatarsals with equivocal signal abnormality along the resection margins. No significant T1 signal alteration to suggest convincing evidence of osteomyelitis. Tibiotalar and subtalar joint effusion. No focal marrow edema or osseous injury otherwise identified.     1. Ulceration along the heel with abnormal marrow signal in the posterior calcaneal process compatible with osteomyelitis. 2. Previous amputation with presumed reactive edema around the distal foot. 3. Diffuse dependent edema without focal fluid collection to suggest abscess formation. Electronically signed by Sherman Stoner MD    XR CHEST PORTABLE    Result Date: 12/21/2024  EXAM: XR CHEST PORTABLE INDICATION: cough COMPARISON: June 20, 2024 FINDINGS: Medical Devices: None. Lungs: The lungs are clear. Pleura: No pneumothorax or pleural effusion. Heart and Mediastinum: The heart is enlarged. Bones: No acute suspicious abnormality.     1.  No acute cardiopulmonary

## 2024-12-27 NOTE — PLAN OF CARE
Problem: Chronic Conditions and Co-morbidities  Goal: Patient's chronic conditions and co-morbidity symptoms are monitored and maintained or improved  12/26/2024 2343 by Wally Sparrow, RN  Outcome: Progressing    Problem: Safety - Adult  Goal: Free from fall injury  12/26/2024 2343 by Wally Sparrow, RN  Outcome: Progressing    Problem: Skin/Tissue Integrity  Goal: Absence of new skin breakdown  Description: 1.  Monitor for areas of redness and/or skin breakdown  2.  Assess vascular access sites hourly  3.  Every 4-6 hours minimum:  Change oxygen saturation probe site  4.  Every 4-6 hours:  If on nasal continuous positive airway pressure, respiratory therapy assess nares and determine need for appliance change or resting period.  Outcome: Progressing     Problem: Pain  Goal: Verbalizes/displays adequate comfort level or baseline comfort level  12/26/2024 2343 by Wally Sparrow, RN  Outcome: Progressing    Problem: ABCDS Injury Assessment  Goal: Absence of physical injury  Outcome: Progressing

## 2024-12-27 NOTE — PROGRESS NOTES
Patient is alert and oriented x4. Vital signs have been stable with exception to BP which has been high. Latest /75. Patient is x1 with a becky steady. Patient prefers to be up in chair. Medications given per MAR, non pharmacologic interventions used. Safety precautions in place, gripper socks on, bedside table and call light within reach.

## 2024-12-27 NOTE — PROCEDURES
PROCEDURE NOTE  Date: 2024   Name: Veronica Chaidez  YOB: 1950    Procedures                                                                       SINGLE PICC PROCEDURE NOTE  Chart reviewed for allergies, diagnosis, labs, known contraindications, reason for line placement and planned length of treatment.  Informed consent noted to be signed and on chart.  Insertion procedure discussed with patient/family member.  Three patient identifiers - Patient name,   and MRN -  completed &  confirmed verbally.         Time out performed Hat, mask and eye shield donned.  PICC site cleaned with chlorhexidine wipes then scrubbed with Chloraprep  One-Step applicator for 30 seconds x 1.   Hand Hygiene  performed with 3% Chlorhexidine surgical scrub x1 min prior to  sterile gloves, sterile gown being donned.  Patient draped using maximal sterile barrier technique ( head to toe ).  PICC site scrubbed a 2nd time with Chloraprep One-Step applicator x 30 sec. Modified Seldinger technique/ultrasound assisted and tip locating system utilized for insertion and 1% Lidocaine 5 ml injected intradermal pre-insertion.  PICC tip location in the SVC confirmed by ECG technology.   Positive brisk blood return obtained from lumen.  Valve applied to lumen and flushed with 10 mls  0.9% Sterile Sodium Chloride.  All lumens flush easily with no resistance.  Skin prep applied to site.  Catheter secured with non-sutured locking device per hospital protocol. Bio-patch/CHG impregnated sterile tegaderm dressing applied.  Alcohol Swab Cap applied to valve.  Sterile field maintained during procedure.  PICC insertion, rhythm and positioning wire (utilized prn) accounted for post procedure and disposed of in sharps.  Appearance of site is Clean dry and intact without bleeding or edema. All edges of Tegaderm occlusive.   Site marked with date and initials of RN placing line. Teaching performed to pt/family and noted in education section.

## 2024-12-27 NOTE — PROGRESS NOTES
he does not currently use alcohol. He reports that he does not use drugs.    Allergies:  Aspartame and phenylalanine, Codeine, and Msg    Current Medications:    piperacillin-tazobactam (ZOSYN) 3,375 mg in sodium chloride 0.9 % 50 mL IVPB (mini-bag), Q8H  linezolid (ZYVOX) tablet 600 mg, 2 times per day  sodium chloride (OCEAN, BABY AYR) 0.65 % nasal spray 1 spray, Q2H PRN  melatonin disintegrating tablet 5 mg, Nightly PRN  oxyCODONE (ROXICODONE) immediate release tablet 5 mg, Q6H PRN  Benzocaine-Menthol (CEPACOL) 1 lozenge, Q2H PRN  [Held by provider] furosemide (LASIX) tablet 20 mg, BID  albuterol (PROVENTIL) (2.5 MG/3ML) 0.083% nebulizer solution 2.5 mg, Q6H PRN  [Held by provider] metoprolol tartrate (LOPRESSOR) tablet 25 mg, BID  sodium chloride flush 0.9 % injection 5-40 mL, 2 times per day  sodium chloride flush 0.9 % injection 5-40 mL, PRN  0.9 % sodium chloride infusion, PRN  ondansetron (ZOFRAN-ODT) disintegrating tablet 4 mg, Q8H PRN   Or  ondansetron (ZOFRAN) injection 4 mg, Q6H PRN  polyethylene glycol (GLYCOLAX) packet 17 g, Daily PRN  acetaminophen (TYLENOL) tablet 650 mg, Q6H PRN   Or  acetaminophen (TYLENOL) suppository 650 mg, Q6H PRN  amiodarone (CORDARONE) tablet 200 mg, Daily  tamsulosin (FLOMAX) capsule 0.4 mg, Daily  enoxaparin (LOVENOX) injection 120 mg, BID  glucose chewable tablet 16 g, PRN  dextrose bolus 10% 125 mL, PRN   Or  dextrose bolus 10% 250 mL, PRN  glucagon injection 1 mg, PRN  dextrose 10 % infusion, Continuous PRN  insulin lispro (HUMALOG,ADMELOG) injection vial 0-4 Units, 4x Daily AC & HS  collagenase ointment, Daily  benzonatate (TESSALON) capsule 100 mg, TID  guaiFENesin (MUCINEX) extended release tablet 600 mg, BID  miconazole (MICOTIN) 2 % powder, BID      Physical exam:     Vitals:  /71   Pulse 80   Temp 97.6 °F (36.4 °C) (Oral)   Resp 16   Ht 1.88 m (6' 2\")   Wt 116 kg (255 lb 11.7 oz)   SpO2 98%   BMI 32.83 kg/m²   Constitutional:  OAA X3 NAD Yes  Skin: no  along the heel with abnormal marrow signal in the posterior calcaneal process compatible with osteomyelitis.   2. Previous amputation with presumed reactive edema around the distal foot.   3. Diffuse dependent edema without focal fluid collection to suggest abscess formation.      Electronically signed by Sherman Stoner MD      Vascular duplex lower extremity venous left   Final Result      XR CHEST PORTABLE   Final Result   1.  No acute cardiopulmonary findings.       Electronically signed by Anastacio Barrow MD      XR FOOT LEFT (2 VIEWS)   Final Result   Impression:   1.  No osseous erosions are identified to suggest osteomyelitis. If clinical concern remains further evaluation with MRI of the foot with and without contrast should be considered.      Electronically signed by Anastacio Barrow MD          Medical Decision Making:  The following items were considered in medical decision making:  Discussion of patient care with other providers  Reviewed clinical lab tests  Reviewed radiology tests  Reviewed other diagnostic tests/interventions    Khoi Coppola MD   [facility]  Office : [phone] or through [employer]  Fax :[phone]

## 2024-12-27 NOTE — CARE COORDINATION
DISCHARGE PLANNING:  Chart reviewed.  Patient is from a Motor Home and is modified independent at baseline using a cane as needed. His motor home currently does not have a furnace (a friend is helping to repair it).     Plan for I&D with partial calcanectomy and possible wound VAC placement with Podiatry on 12/30.  He will need post-op therapy evals.    I met with patient at bedside yesterday to discuss discharge plans. His top choice is [facility] in [place].   I explained to him that we could try for an ARU but that we need SNF backups in case he does not do well with therapy post-op. He stated understanding. A SNF list was provided for him to review yesterday.    CM team will continue to follow.  Lorri Rosas RN Case Manager  [phone]   yes

## 2024-12-27 NOTE — PROGRESS NOTES
without contrast  FINDINGS:  Previous amputation of the left foot at the level of the metatarsals. Extensive soft tissue swelling. Diffuse subcutaneous edema around the foot and ankle. There is an ulceration along the posterior heel. There is extensive abnormal marrow signal involving the posterior process of the calcaneus along the plantar lateral aspect compatible with osteomyelitis. No abscess formation identified.     Amputations of the distal metatarsals with equivocal signal abnormality along the resection margins. No significant T1 signal alteration to suggest convincing evidence of osteomyelitis.     Tibiotalar and subtalar joint effusion. No focal marrow edema or osseous injury otherwise identified.     IMPRESSION:  1. Ulceration along the heel with abnormal marrow signal in the posterior calcaneal process compatible with osteomyelitis.  2. Previous amputation with presumed reactive edema around the distal foot.  3. Diffuse dependent edema without focal fluid collection to suggest abscess formation.    Scheduled Meds:   piperacillin-tazobactam  3,375 mg IntraVENous Q8H    linezolid  600 mg Oral 2 times per day    [Held by provider] furosemide  20 mg Oral BID    [Held by provider] metoprolol tartrate  25 mg Oral BID    sodium chloride flush  5-40 mL IntraVENous 2 times per day    amiodarone  200 mg Oral Daily    tamsulosin  0.4 mg Oral Daily    enoxaparin  1 mg/kg SubCUTAneous BID    insulin lispro  0-4 Units SubCUTAneous 4x Daily AC & HS    collagenase   Topical Daily    benzonatate  100 mg Oral TID    guaiFENesin  600 mg Oral BID    miconazole   Topical BID       Continuous Infusions:   sodium chloride Stopped (12/24/24 0025)    dextrose         PRN Meds:  sodium chloride, melatonin, oxyCODONE, Benzocaine-Menthol, albuterol, sodium chloride flush, sodium chloride, ondansetron **OR** ondansetron, polyethylene glycol, acetaminophen **OR** acetaminophen, glucose, dextrose bolus **OR** dextrose bolus, glucagon  (rDNA), dextrose      Assessment:     Left calcaneus osteomyelitis  Prior TMA + JENA 04/22  History of poor adherence to treatment plan  ESR 81,  on admission  WBC improving 13 > 10.2  S/p vancomycin 12/21-12/26  Empiric antibiotics w/ linezolid, Zosyn  MRI with abnormal marrow signal consistent with osteomyelitis  Podiatry planning OR 12/30      Plan:     Please obtain intra-op specimens for culture  Continue empiric antibiotics (Zosyn + linezolid)  Anticipate prolonged course of OPAT - will need PICC line placed    Discussed with ID attending Dr. Anthony Pettit MD, PGY-1    Addendum to Resident Progress note:  Pt seen, examined and evaluated. I have reviewed the current history, physical findings, labs and assessment and plan and agree with note as documented by resident (Dr. Pettit ).     75 yo man  PMH - DM, neuropaty, HTN, CAD, CM/CHF, CKD  PSurgHx - CABG, L TMA (4/22/24)     Pt represented with difficutly urinating / urinary retention  In ED, afeb, WBC 13,Cr 1.8  L heel escar noted -   Seen by Podiatry, ordered MRI (L calcaneal OM - see report)  Plan for surgical debridement / partial calcanectomy      IMP/  L DFI   L heel unstageable ulcer   Calcaneous osteomyelitis     REC/   Cont Zosyn  Cont Linezolid     Will review MRI w Radiologist     Await findings, cult / path from schedule Podiatry Surg      Anticipate need iv antibiotics after discharge    - calcaneous resection will not be definitive   - PICC     Medical Decision Making:  The following items were considered in medical decision making:  Discussion of patient care with other providers  Reviewed clinical lab tests  Reviewed radiology tests  Reviewed other diagnostic tests/interventions  Independent review of radiologic images - will review MRI w Rad  Microbiology cultures and other micro tests reviewed       Discussed with pt      Anthony Peterson MD

## 2024-12-27 NOTE — PROGRESS NOTES
Pre-Operative Note  Resident Note     Patient: Veronica Chaidez      Procedure: Left foot incision and drainage with partial calcanectomy and potential wound VAC application    Consent: In chart     Labs:      No results for input(s): \"WBC\", \"HGB\", \"HCT\", \"MCV\", \"PLT\" in the last 72 hours.     Recent Labs     12/28/24  0547      K 4.2      CO2 23   BUN 24*   CREATININE 1.7*      No results for input(s): \"AST\", \"ALT\", \"BILIDIR\", \"BILITOT\", \"ALKPHOS\" in the last 72 hours.    Invalid input(s): \"ALB\"   No results for input(s): \"LIPASE\", \"AMYLASE\" in the last 72 hours.     Recent Labs     12/29/24  1120   INR 1.28*      No results for input(s): \"CKTOTAL\", \"CKMB\", \"CKMBINDEX\", \"TROPONINI\" in the last 72 hours.    Clearance for surgery: Yes per medicine     Diet: NPO at midnight    CXR: normal      EKG: See cardiac section of EMR    Echocardiogram: not done    PT/INR: 16.1/1.28    Abx: Linezolid and Zosyn    Type and Screen: B, Rh-, antibody negative      Known risk factors for perioperative complications: Uncontrolled diabetes mellitus, CAD, paroxysmal A-fib, CKD stage III    Anesthesia to see patient    Evans Rivera DPM   Podiatric Resident PGY-2  Pager [phone] or [employer]  12/30/2024, 5:45 AM

## 2024-12-28 LAB
ANION GAP SERPL CALCULATED.3IONS-SCNC: 8 MMOL/L (ref 3–16)
BUN SERPL-MCNC: 24 MG/DL (ref 7–20)
CALCIUM SERPL-MCNC: 8.3 MG/DL (ref 8.3–10.6)
CHLORIDE SERPL-SCNC: 107 MMOL/L (ref 99–110)
CO2 SERPL-SCNC: 23 MMOL/L (ref 21–32)
CREAT SERPL-MCNC: 1.7 MG/DL (ref 0.8–1.3)
GFR SERPLBLD CREATININE-BSD FMLA CKD-EPI: 42 ML/MIN/{1.73_M2}
GLUCOSE BLD-MCNC: 115 MG/DL (ref 70–99)
GLUCOSE BLD-MCNC: 141 MG/DL (ref 70–99)
GLUCOSE BLD-MCNC: 165 MG/DL (ref 70–99)
GLUCOSE BLD-MCNC: 197 MG/DL (ref 70–99)
GLUCOSE SERPL-MCNC: 122 MG/DL (ref 70–99)
PERFORMED ON: ABNORMAL
POTASSIUM SERPL-SCNC: 4.2 MMOL/L (ref 3.5–5.1)
SODIUM SERPL-SCNC: 138 MMOL/L (ref 136–145)

## 2024-12-28 PROCEDURE — 6360000002 HC RX W HCPCS: Performed by: INTERNAL MEDICINE

## 2024-12-28 PROCEDURE — 6360000002 HC RX W HCPCS: Performed by: STUDENT IN AN ORGANIZED HEALTH CARE EDUCATION/TRAINING PROGRAM

## 2024-12-28 PROCEDURE — 1200000000 HC SEMI PRIVATE

## 2024-12-28 PROCEDURE — 2500000003 HC RX 250 WO HCPCS: Performed by: INTERNAL MEDICINE

## 2024-12-28 PROCEDURE — 51798 US URINE CAPACITY MEASURE: CPT

## 2024-12-28 PROCEDURE — 6370000000 HC RX 637 (ALT 250 FOR IP): Performed by: STUDENT IN AN ORGANIZED HEALTH CARE EDUCATION/TRAINING PROGRAM

## 2024-12-28 PROCEDURE — 6370000000 HC RX 637 (ALT 250 FOR IP): Performed by: INTERNAL MEDICINE

## 2024-12-28 PROCEDURE — 51701 INSERT BLADDER CATHETER: CPT

## 2024-12-28 PROCEDURE — 2580000003 HC RX 258: Performed by: STUDENT IN AN ORGANIZED HEALTH CARE EDUCATION/TRAINING PROGRAM

## 2024-12-28 PROCEDURE — 80048 BASIC METABOLIC PNL TOTAL CA: CPT

## 2024-12-28 PROCEDURE — 99233 SBSQ HOSP IP/OBS HIGH 50: CPT | Performed by: INTERNAL MEDICINE

## 2024-12-28 RX ADMIN — ONDANSETRON 4 MG: 2 INJECTION INTRAMUSCULAR; INTRAVENOUS at 10:08

## 2024-12-28 RX ADMIN — TAMSULOSIN HYDROCHLORIDE 0.4 MG: 0.4 CAPSULE ORAL at 07:48

## 2024-12-28 RX ADMIN — BENZONATATE 100 MG: 100 CAPSULE ORAL at 19:54

## 2024-12-28 RX ADMIN — ENOXAPARIN SODIUM 120 MG: 150 INJECTION SUBCUTANEOUS at 08:58

## 2024-12-28 RX ADMIN — PIPERACILLIN AND TAZOBACTAM 3375 MG: 3; .375 INJECTION, POWDER, LYOPHILIZED, FOR SOLUTION INTRAVENOUS at 00:54

## 2024-12-28 RX ADMIN — MICONAZOLE NITRATE: 20 POWDER TOPICAL at 19:55

## 2024-12-28 RX ADMIN — SODIUM CHLORIDE, PRESERVATIVE FREE 10 ML: 5 INJECTION INTRAVENOUS at 07:49

## 2024-12-28 RX ADMIN — COLLAGENASE SANTYL: 250 OINTMENT TOPICAL at 08:58

## 2024-12-28 RX ADMIN — GUAIFENESIN 600 MG: 600 TABLET ORAL at 19:54

## 2024-12-28 RX ADMIN — LINEZOLID 600 MG: 600 TABLET, FILM COATED ORAL at 19:55

## 2024-12-28 RX ADMIN — PIPERACILLIN AND TAZOBACTAM 3375 MG: 3; .375 INJECTION, POWDER, LYOPHILIZED, FOR SOLUTION INTRAVENOUS at 08:01

## 2024-12-28 RX ADMIN — GUAIFENESIN 600 MG: 600 TABLET ORAL at 07:48

## 2024-12-28 RX ADMIN — ENOXAPARIN SODIUM 120 MG: 150 INJECTION SUBCUTANEOUS at 21:20

## 2024-12-28 RX ADMIN — BENZONATATE 100 MG: 100 CAPSULE ORAL at 07:48

## 2024-12-28 RX ADMIN — PIPERACILLIN AND TAZOBACTAM 3375 MG: 3; .375 INJECTION, POWDER, LYOPHILIZED, FOR SOLUTION INTRAVENOUS at 15:41

## 2024-12-28 RX ADMIN — BENZONATATE 100 MG: 100 CAPSULE ORAL at 15:42

## 2024-12-28 RX ADMIN — AMIODARONE HYDROCHLORIDE 200 MG: 200 TABLET ORAL at 07:48

## 2024-12-28 RX ADMIN — SODIUM CHLORIDE, PRESERVATIVE FREE 10 ML: 5 INJECTION INTRAVENOUS at 19:55

## 2024-12-28 RX ADMIN — LINEZOLID 600 MG: 600 TABLET, FILM COATED ORAL at 07:48

## 2024-12-28 RX ADMIN — INSULIN LISPRO 1 UNITS: 100 INJECTION, SOLUTION INTRAVENOUS; SUBCUTANEOUS at 21:20

## 2024-12-28 RX ADMIN — MICONAZOLE NITRATE: 20 POWDER TOPICAL at 07:49

## 2024-12-28 NOTE — PROGRESS NOTES
Nephrology Progress Note                                                                                                                                                                                                                                                                                                                                                               Office : [phone]     Fax :[phone]    Patient's Name: Veronica Chaidez  10:20 AM  12/28/2024    Reason for Consult:  NANETTE   Requesting Physician:  Good Renee MD  Chief Complaint:    Chief Complaint   Patient presents with    Urinary Retention     Difficulty urinating the last few days        Assessment/Plan     NANETTE  Creatinine is improving and back to baseline   Etiology of NANETTE seems to be obstructive in nature  On admission significant amount of urine in the bladder.  Caicedo catheter placed.  Urine output was significant\"  Urology on board   Caicedo d/c'ed & now voiding on his own   MONITOR PVR  Monitor RFP and urine output   CKD 3b/4   Baseline creatinine close to 1.7-2.0  Etiology from diabetic kidney disease, cardiorenal, obstructive   Probable UTI   Antibiotics   Diabetic foot   Podiatry on board   Plan for OR on 12/30  Diabetic kidney disease   Proteinuria present   We will follow him in our nephrology clinic   CAD/CABG  No signs of fluid overload       History of Present Ilness:    Veronica Chaidez is a 74 y.o. male with  a chief complaint of urinary retention.  Patient states he has had a cold for the past few days and his doctor started him on Mucinex and Claritin.  Since that time he feels like he has been having difficulty urinating.  States he can get a few drops out but feels like he is not fully emptying.  Has had prostate issues in the past but not for a while.  Denies any fevers.  States his cold symptoms are still ongoing, but have gotten a little bit better.      On chart review he has hypertension, persistent A-fib on  Chemistry: No results for input(s): \"CLUR\", \"LABCREA\", \"PROTEINUR\", \"NAUR\" in the last 72 hours.      IMAGING:  Vascular duplex lower extremity arteries bilateral   Final Result      MRI FOOT LEFT WO CONTRAST   Final Result   1. Ulceration along the heel with abnormal marrow signal in the posterior calcaneal process compatible with osteomyelitis.   2. Previous amputation with presumed reactive edema around the distal foot.   3. Diffuse dependent edema without focal fluid collection to suggest abscess formation.      Electronically signed by Sherman Stoner MD      Vascular duplex lower extremity venous left   Final Result      XR CHEST PORTABLE   Final Result   1.  No acute cardiopulmonary findings.       Electronically signed by Anastacio Barrow MD      XR FOOT LEFT (2 VIEWS)   Final Result   Impression:   1.  No osseous erosions are identified to suggest osteomyelitis. If clinical concern remains further evaluation with MRI of the foot with and without contrast should be considered.      Electronically signed by Anastacio Barrow MD          Medical Decision Making:  The following items were considered in medical decision making:  Discussion of patient care with other providers  Reviewed clinical lab tests  Reviewed radiology tests  Reviewed other diagnostic tests/interventions    Karlee Webber, APRN - CNP   [facility]  Office : [phone] or through [employer]  Fax :[phone]    I have seen the patient independently from the PA/NP .I discussed the care with the PA/NP . I personally reviewed the HPI, PH, FH, SH, ROS and medications. I repeated pertinent portions of the examination and reviewed the relevant imaging and laboratory data. I agree with the findings, assessment and plan as documented, with the following addendum:        Jaime Majano MD

## 2024-12-28 NOTE — PROGRESS NOTES
Podiatric Surgery Daily Progress Note  Veronicaskye Chaidez      Subjective :   Patient seen and examined this am at the bedside. Patient denies any acute overnight events.  Patient is doing well this morning reporting good sleep.  Patient denies N/V/F/C/SOB. Patient denies calf pain, thigh pain, chest pain.     Review of Systems: A 12 point review of symptoms is unremarkable with the exception of the chief complaint. Patient specifically denies nausea, fever, vomiting, chills, shortness of breath, chest pain, abdominal pain, constipation or difficulty urinating.       Objective     /78   Pulse 86   Temp 98.3 °F (36.8 °C) (Oral)   Resp 16   Ht 1.88 m (6' 2\")   Wt 118.8 kg (261 lb 14.5 oz)   SpO2 97%   BMI 33.63 kg/m²      I/O:  Intake/Output Summary (Last 24 hours) at 12/28/2024 0844  Last data filed at 12/28/2024 0822  Gross per 24 hour   Intake 955 ml   Output 75 ml   Net 880 ml              Wt Readings from Last 3 Encounters:   12/28/24 118.8 kg (261 lb 14.5 oz)   12/10/24 123.7 kg (272 lb 12.8 oz)   07/08/24 125.6 kg (276 lb 12.8 oz)       LABS:    No results for input(s): \"WBC\", \"HGB\", \"HCT\", \"PLT\" in the last 72 hours.       Recent Labs     12/28/24  0547      K 4.2      CO2 23   BUN 24*   CREATININE 1.7*        No results for input(s): \"INR\", \"APTT\" in the last 72 hours.    Invalid input(s): \"PROT\"          LOWER EXTREMITY EXAMINATION    Dressing to left LE intact. No strikethrough noted to the external dressing.  Sanguinous drainage noted to the internal layers of the dressing.     VASCULAR: DP and PT pulses faintly palpable 1/2B/L upon prior Doppler ultrasound DP and PT pulses were found to be audibly biphasic B/L. CFT is brisk to the midfoot skin B/L. Skin temperature is warm to cool from proximal to distal with no focal calor noted; improvement in overall calor of LLE. No edema noted. No pain with calf compression b/l.     NEUROLOGIC: Gross and epicritic sensation is absent b/l.  Protective sensation is diminished at all pedal sites b/l.     DERMATOLOGIC:     Right lower extremity:  Closed soft tissue envelope with no acute signs of infection      Left lower extremity:    Full-thickness ulceration to the plantar heel measuring roughly 6.5 cm x 6 cm x 0.5 cm.  Distal aspect of the wound has a granular base however proximal majority is covered by fibro ischemic cap making assessment of the underlying wound difficult.  No evidence of purulence, fluctuance, crepitations.  Wound does not seem to probe to bone, tunnel, or track.    MUSCULOSKELETAL: Muscle strength is 5/5 for all pedal groups tested.  Ankle joint ROM is decreased in dorsiflexion with the knee extended. TMA left foot.      IMAGING:  Arterial duplex B/L LE (12/24/2024)  Right CAROLE 1.07; waveforms diffusely multiphasic  Left CAROLE 0.93; waveforms diffusely multiphasic with biphasic at PT artery    MRI left foot (12/22/2024):   IMPRESSION:  1. Ulceration along the heel with abnormal marrow signal in the posterior calcaneal process compatible with osteomyelitis.  2. Previous amputation with presumed reactive edema around the distal foot.  3. Diffuse dependent edema without focal fluid collection to suggest abscess formation.    XR left foot (12/21/2024)  IMPRESSION:  Impression:  1.  No osseous erosions are identified to suggest osteomyelitis. If clinical concern remains further evaluation with MRI of the foot with and without contrast should be considered.     Assessment/plan:     -Diabetic foot wound with associated osteomyelitis, left heel ( NPIAP unstageable)  -Type 2 diabetes mellitus complicated by peripheral neuropathy  -TMA, left foot  -History of noncompliance     -Patient examined and evaluated at the bedside   -VSS. No Leukocytosis (WBC 10.2 on 12/24).  -ESR 81 .0   - All imaging reviewed and impressions as noted above   -Wound culture: Not obtained secondary to no purulent drainage  -LLE dressed with Santyl moistened by

## 2024-12-28 NOTE — PROGRESS NOTES
V2.0    [facility] [place] Progress Note      Name:  Veronica Chaidez /Age/Sex: 1950  (74 y.o. male)   MRN & CSN:  [id number] & [id number] Encounter Date/Time: 2024 9:39 AM EST   Location:  5502/5502-01 PCP: Good Renee MD     Attending:Ministerio Medley MD       Hospital Day: 8    Assessment and Recommendations   Veronica Chaidez is a 74 y.o. male with pmh of HTN, persistent afib, CAD s/p CABG, SSS s/p AICD, HFpEF, CKD IIIa, Type II DM who presents with Diabetic foot infection (HCC)    #Diabetic foot infection with osteomyelitis  -Podiatry consulted and following  -MRI L foot completed  with findings in calcaneus c/w osteomyelitis  --Podiatry plan for surgery  for resection of infected area in calcaneal bone  -Continue Zosyn + linezolid  -Consulted ID   -Follow blood cultures, NGTD so far    #UTI  #Acute urinary retention  --UCx NGTD but initial UA c/w UTI with + leukocyte esterase and pyruia  -Continue Zosyn as above  -Urology consulted, now s/p Caicedo placement, voiding trial as outpatient  -Continue tamsulosin    #Type II DM  -continue insulin therapy  --monitor BG frequently while on insulin to monitor for hypoglycemia as possible toxicity of insulin therapy, treat prn per hypoglycemia protocol    #CKD IIIa  -Nephrology following  -Trend BMP    #Afib  #Chronic HFpEF  #CAD  #SSS  -Continue home amiodarone, metoprolol  -Continue Lovenox, hold Eliquis pending possible surgical intervention      Diet ADULT DIET; Regular; 4 carb choices (60 gm/meal)  ADULT ORAL NUTRITION SUPPLEMENT; Breakfast, Dinner; Wound Healing Oral Supplement   DVT Prophylaxis [] Lovenox, []  Heparin, [] SCDs, [] Ambulation,  [] Eliquis, [] Xarelto  [] Coumadin   Code Status Limited   Disposition From: Home  Expected Disposition: TBD  Estimated Date of Discharge: 2+ days  Patient requires continued admission due to IV abx   Surrogate Decision Maker/ POA  Moody Caballero     Personally reviewed Lab Studies and Imaging

## 2024-12-28 NOTE — PROGRESS NOTES
Patient is A&Ox4. VSS this shift. Patient has not endorsed pain this shift. Patient is tolerating PO diet. Tolerating ambulation x1 assist with stedy. Bladder scanned and straight cathed this shift, output 700 mL. Urine had clots and bloody appearance, MD notified. Patient updated on plan of care. Fall and safety precautions in place, call light within reach.   Vitals:    12/28/24 1130   BP: 135/72   Pulse: 89   Resp: 18   Temp: 97.9 °F (36.6 °C)   SpO2: 95%

## 2024-12-28 NOTE — PLAN OF CARE
Problem: Chronic Conditions and Co-morbidities  Goal: Patient's chronic conditions and co-morbidity symptoms are monitored and maintained or improved  12/28/2024 0149 by Katina Hooks RN  Outcome: Progressing  12/27/2024 1226 by Scott Leong RN  Outcome: Progressing     Problem: Discharge Planning  Goal: Discharge to home or other facility with appropriate resources  12/28/2024 0149 by Katina Hooks RN  Outcome: Progressing  12/27/2024 1226 by Scott Leong RN  Outcome: Progressing     Problem: Safety - Adult  Goal: Free from fall injury  12/28/2024 0149 by Katina Hooks RN  Outcome: Progressing  12/27/2024 1226 by Scott Leong RN  Outcome: Progressing  Note: Patient has remained free of fall injury this shift. Patient tolerates ambulation x2 assist with steady. Gait belt and gripper socks applied for safety.       Problem: Skin/Tissue Integrity  Goal: Absence of new skin breakdown  Description: 1.  Monitor for areas of redness and/or skin breakdown  2.  Assess vascular access sites hourly  3.  Every 4-6 hours minimum:  Change oxygen saturation probe site  4.  Every 4-6 hours:  If on nasal continuous positive airway pressure, respiratory therapy assess nares and determine need for appliance change or resting period.  12/27/2024 1226 by Scott Leong RN  Outcome: Progressing     Problem: Pain  Goal: Verbalizes/displays adequate comfort level or baseline comfort level  12/28/2024 0149 by Katina Hooks RN  Outcome: Progressing  12/27/2024 1226 by Scott Leong RN  Outcome: Progressing     Problem: ABCDS Injury Assessment  Goal: Absence of physical injury  12/28/2024 0149 by Katina Hooks RN  Outcome: Progressing  12/27/2024 1226 by Scott Leong RN  Outcome: Progressing  Note: Patient has remained free of physical injury this shift. Fall and safety precautions in place. Bed exit alarm activated, bed in lowest position with wheels locked, call light and belongings  within reach.

## 2024-12-28 NOTE — PLAN OF CARE
Problem: Chronic Conditions and Co-morbidities  Goal: Patient's chronic conditions and co-morbidity symptoms are monitored and maintained or improved  12/28/2024 1501 by Laney Schultz RN  Outcome: Progressing  Flowsheets (Taken 12/28/2024 1501)  Care Plan - Patient's Chronic Conditions and Co-Morbidity Symptoms are Monitored and Maintained or Improved:   Monitor and assess patient's chronic conditions and comorbid symptoms for stability, deterioration, or improvement   Collaborate with multidisciplinary team to address chronic and comorbid conditions and prevent exacerbation or deterioration   Update acute care plan with appropriate goals if chronic or comorbid symptoms are exacerbated and prevent overall improvement and discharge     Problem: Discharge Planning  Goal: Discharge to home or other facility with appropriate resources  12/28/2024 1501 by Laney Schultz RN  Outcome: Progressing  Flowsheets (Taken 12/28/2024 1501)  Discharge to home or other facility with appropriate resources:   Identify barriers to discharge with patient and caregiver   Identify discharge learning needs (meds, wound care, etc)   Refer to discharge planning if patient needs post-hospital services based on physician order or complex needs related to functional status, cognitive ability or social support system   Arrange for needed discharge resources and transportation as appropriate  Note: Patient updated and educated on barriers to discharge and plan of care.      Problem: Safety - Adult  Goal: Free from fall injury  12/28/2024 1501 by Laney Schultz, RN  Outcome: Progressing  Flowsheets (Taken 12/28/2024 1501)  Free From Fall Injury:   Instruct family/caregiver on patient safety   Based on caregiver fall risk screen, instruct family/caregiver to ask for assistance with transferring infant if caregiver noted to have fall risk factors  Note: Patient has remained free of fall injury this shift.      Problem: Skin/Tissue

## 2024-12-29 LAB
GLUCOSE BLD-MCNC: 111 MG/DL (ref 70–99)
GLUCOSE BLD-MCNC: 122 MG/DL (ref 70–99)
GLUCOSE BLD-MCNC: 149 MG/DL (ref 70–99)
GLUCOSE BLD-MCNC: 196 MG/DL (ref 70–99)
INR PPP: 1.28 (ref 0.85–1.15)
PERFORMED ON: ABNORMAL
PROTHROMBIN TIME: 16.1 SEC (ref 11.9–14.9)

## 2024-12-29 PROCEDURE — 2500000003 HC RX 250 WO HCPCS: Performed by: INTERNAL MEDICINE

## 2024-12-29 PROCEDURE — 2500000003 HC RX 250 WO HCPCS: Performed by: NURSE PRACTITIONER

## 2024-12-29 PROCEDURE — 51702 INSERT TEMP BLADDER CATH: CPT

## 2024-12-29 PROCEDURE — 6370000000 HC RX 637 (ALT 250 FOR IP): Performed by: NURSE PRACTITIONER

## 2024-12-29 PROCEDURE — 6370000000 HC RX 637 (ALT 250 FOR IP): Performed by: STUDENT IN AN ORGANIZED HEALTH CARE EDUCATION/TRAINING PROGRAM

## 2024-12-29 PROCEDURE — 6360000002 HC RX W HCPCS: Performed by: STUDENT IN AN ORGANIZED HEALTH CARE EDUCATION/TRAINING PROGRAM

## 2024-12-29 PROCEDURE — 1200000000 HC SEMI PRIVATE

## 2024-12-29 PROCEDURE — 36415 COLL VENOUS BLD VENIPUNCTURE: CPT

## 2024-12-29 PROCEDURE — 6370000000 HC RX 637 (ALT 250 FOR IP): Performed by: INTERNAL MEDICINE

## 2024-12-29 PROCEDURE — 93005 ELECTROCARDIOGRAM TRACING: CPT

## 2024-12-29 PROCEDURE — 85610 PROTHROMBIN TIME: CPT

## 2024-12-29 PROCEDURE — 99233 SBSQ HOSP IP/OBS HIGH 50: CPT | Performed by: INTERNAL MEDICINE

## 2024-12-29 PROCEDURE — 2580000003 HC RX 258: Performed by: STUDENT IN AN ORGANIZED HEALTH CARE EDUCATION/TRAINING PROGRAM

## 2024-12-29 RX ADMIN — MICONAZOLE NITRATE: 20 POWDER TOPICAL at 13:21

## 2024-12-29 RX ADMIN — BENZOCAINE 6 MG-MENTHOL 10 MG LOZENGES 1 LOZENGE: at 04:57

## 2024-12-29 RX ADMIN — MICONAZOLE NITRATE: 20 POWDER TOPICAL at 20:06

## 2024-12-29 RX ADMIN — COLLAGENASE SANTYL: 250 OINTMENT TOPICAL at 08:39

## 2024-12-29 RX ADMIN — BENZONATATE 100 MG: 100 CAPSULE ORAL at 18:30

## 2024-12-29 RX ADMIN — LINEZOLID 600 MG: 600 TABLET, FILM COATED ORAL at 20:06

## 2024-12-29 RX ADMIN — GUAIFENESIN 600 MG: 600 TABLET ORAL at 20:06

## 2024-12-29 RX ADMIN — PIPERACILLIN AND TAZOBACTAM 3375 MG: 3; .375 INJECTION, POWDER, LYOPHILIZED, FOR SOLUTION INTRAVENOUS at 00:54

## 2024-12-29 RX ADMIN — TAMSULOSIN HYDROCHLORIDE 0.4 MG: 0.4 CAPSULE ORAL at 08:38

## 2024-12-29 RX ADMIN — LINEZOLID 600 MG: 600 TABLET, FILM COATED ORAL at 08:38

## 2024-12-29 RX ADMIN — AMIODARONE HYDROCHLORIDE 200 MG: 200 TABLET ORAL at 08:38

## 2024-12-29 RX ADMIN — SODIUM CHLORIDE, PRESERVATIVE FREE 10 ML: 5 INJECTION INTRAVENOUS at 20:06

## 2024-12-29 RX ADMIN — SODIUM CHLORIDE, PRESERVATIVE FREE 10 ML: 5 INJECTION INTRAVENOUS at 20:07

## 2024-12-29 RX ADMIN — ENOXAPARIN SODIUM 120 MG: 150 INJECTION SUBCUTANEOUS at 08:37

## 2024-12-29 RX ADMIN — SODIUM CHLORIDE, PRESERVATIVE FREE 10 ML: 5 INJECTION INTRAVENOUS at 08:38

## 2024-12-29 RX ADMIN — INSULIN LISPRO 1 UNITS: 100 INJECTION, SOLUTION INTRAVENOUS; SUBCUTANEOUS at 20:10

## 2024-12-29 RX ADMIN — BENZONATATE 100 MG: 100 CAPSULE ORAL at 20:05

## 2024-12-29 RX ADMIN — PIPERACILLIN AND TAZOBACTAM 3375 MG: 3; .375 INJECTION, POWDER, LYOPHILIZED, FOR SOLUTION INTRAVENOUS at 18:36

## 2024-12-29 RX ADMIN — BENZONATATE 100 MG: 100 CAPSULE ORAL at 08:38

## 2024-12-29 RX ADMIN — PIPERACILLIN AND TAZOBACTAM 3375 MG: 3; .375 INJECTION, POWDER, LYOPHILIZED, FOR SOLUTION INTRAVENOUS at 08:48

## 2024-12-29 RX ADMIN — Medication 5 MG: at 23:57

## 2024-12-29 RX ADMIN — GUAIFENESIN 600 MG: 600 TABLET ORAL at 08:38

## 2024-12-29 NOTE — PROGRESS NOTES
Podiatric Surgery Daily Progress Note  Veronica Chaidez      Subjective :   Patient seen and examined this am at the chair. Patient denies any acute overnight events .  Patient denies N/V/F/C/SOB. Patient denies calf pain, thigh pain, chest pain.     Review of Systems: A 12 point review of symptoms is unremarkable with the exception of the chief complaint. Patient specifically denies nausea, fever, vomiting, chills, shortness of breath, chest pain, abdominal pain, constipation or difficulty urinating.       Objective     /77   Pulse 86   Temp 98.4 °F (36.9 °C) (Oral)   Resp 18   Ht 1.88 m (6' 2\")   Wt 118.8 kg (261 lb 14.5 oz)   SpO2 91%   BMI 33.63 kg/m²      I/O:  Intake/Output Summary (Last 24 hours) at 12/29/2024 1005  Last data filed at 12/28/2024 2000  Gross per 24 hour   Intake 1201.53 ml   Output 700 ml   Net 501.53 ml              Wt Readings from Last 3 Encounters:   12/28/24 118.8 kg (261 lb 14.5 oz)   12/10/24 123.7 kg (272 lb 12.8 oz)   07/08/24 125.6 kg (276 lb 12.8 oz)       LABS:    No results for input(s): \"WBC\", \"HGB\", \"HCT\", \"PLT\" in the last 72 hours.       Recent Labs     12/28/24  0547      K 4.2      CO2 23   BUN 24*   CREATININE 1.7*        No results for input(s): \"INR\", \"APTT\" in the last 72 hours.    Invalid input(s): \"PROT\"          LOWER EXTREMITY EXAMINATION    Dressing to left LE disheveled and dirty. No strikethrough noted to the external dressing.  Sanguinous drainage noted to the internal layers of the dressing.     VASCULAR: DP and PT pulses faintly palpable 1/2B/L. Upon prior Doppler ultrasound DP and PT pulses were found to be audibly biphasic B/L. CFT is brisk to the midfoot skin B/L. Skin temperature is warm to cool from proximal to distal with no focal calor noted; improvement in overall calor of LLE. No edema noted. No pain with calf compression b/l.     NEUROLOGIC: Gross and epicritic sensation is absent b/l. Protective sensation is diminished at all

## 2024-12-29 NOTE — PROGRESS NOTES
Nephrology Progress Note                                                                                                                                                                                                                                                                                                                                                               Office : [phone]     Fax :[phone]    Patient's Name: Veronica Chaidez  11:38 AM  12/29/2024    Reason for Consult:  NANETTE   Requesting Physician:  Good Renee MD  Chief Complaint:    Chief Complaint   Patient presents with    Urinary Retention     Difficulty urinating the last few days        Assessment/Plan     NANETTE  Creatinine is improving and back to baseline   Etiology of NANETTE seems to be obstructive in nature  On admission significant amount of urine in the bladder.  Caicedo catheter was placed.   Urology on board   Caicedo d/c'ed   MONITOR PVR - st cath as needed   Monitor RFP and urine output   CKD 3b/4   Baseline creatinine close to 1.7-2.0  Etiology from diabetic kidney disease, cardiorenal, obstructive   Probable UTI   Antibiotics   Diabetic foot   Podiatry on board   Plan for OR on 12/30  Diabetic kidney disease   Proteinuria present   We will follow him in our nephrology clinic   CAD/CABG  No signs of fluid overload       History of Present Ilness:    Veronica Chaidez is a 74 y.o. male with  a chief complaint of urinary retention.  Patient states he has had a cold for the past few days and his doctor started him on Mucinex and Claritin.  Since that time he feels like he has been having difficulty urinating.  States he can get a few drops out but feels like he is not fully emptying.  Has had prostate issues in the past but not for a while.  Denies any fevers.  States his cold symptoms are still ongoing, but have gotten a little bit better.      On chart review he has hypertension, persistent A-fib on Eliquis, coronary artery disease  marrow signal in the posterior calcaneal process compatible with osteomyelitis.   2. Previous amputation with presumed reactive edema around the distal foot.   3. Diffuse dependent edema without focal fluid collection to suggest abscess formation.      Electronically signed by Sherman Stoner MD      Vascular duplex lower extremity venous left   Final Result      XR CHEST PORTABLE   Final Result   1.  No acute cardiopulmonary findings.       Electronically signed by Anastacio Barrow MD      XR FOOT LEFT (2 VIEWS)   Final Result   Impression:   1.  No osseous erosions are identified to suggest osteomyelitis. If clinical concern remains further evaluation with MRI of the foot with and without contrast should be considered.      Electronically signed by Anastacio Barrow MD          Medical Decision Making:  The following items were considered in medical decision making:  Discussion of patient care with other providers  Reviewed clinical lab tests  Reviewed radiology tests  Reviewed other diagnostic tests/interventions    Jaime Majano MD   [facility]  Office : [phone] or through [employer]  Fax :[phone]

## 2024-12-29 NOTE — PLAN OF CARE
Problem: Chronic Conditions and Co-morbidities  Goal: Patient's chronic conditions and co-morbidity symptoms are monitored and maintained or improved  12/29/2024 0010 by Diana Sesay RN  Outcome: Progressing     Problem: Discharge Planning  Goal: Discharge to home or other facility with appropriate resources  12/29/2024 0010 by Diana Sesay RN  Outcome: Progressing     Problem: Safety - Adult  Goal: Free from fall injury  12/29/2024 0010 by Diana Sesay RN  Outcome: Progressing  Flowsheets (Taken 12/28/2024 2335)  Free From Fall Injury: Instruct family/caregiver on patient safety  All fall precautions in place. Bed locked and in lowest position with alarm on. Overbed table and personal belonings within reach. Call light within reach and patient instructed to use call light for assistance. Non-skid socks on.       Problem: Skin/Tissue Integrity  Goal: Absence of new skin breakdown  Description: 1.  Monitor for areas of redness and/or skin breakdown  2.  Assess vascular access sites hourly  3.  Every 4-6 hours minimum:  Change oxygen saturation probe site  4.  Every 4-6 hours:  If on nasal continuous positive airway pressure, respiratory therapy assess nares and determine need for appliance change or resting period.  12/29/2024 0010 by Diana Sesay RN  Outcome: Progressing  Skin assessed this shift. Mayra care completed as necessary. Patient alternating positions to reduce pressure and prevent skin injury q2 and as needed.        Problem: Pain  Goal: Verbalizes/displays adequate comfort level or baseline comfort level  12/29/2024 0010 by Diana Sesay RN  Outcome: Progressing  No c/o pain at this time, will continue to monitor and give interventions as indicated       Problem: ABCDS Injury Assessment  Goal: Absence of physical injury  12/29/2024 0010 by Diana Sesay RN  Outcome: Progressing  Flowsheets (Taken 12/28/2024 2335)  Absence of Physical Injury: Implement safety measures based on

## 2024-12-29 NOTE — PROGRESS NOTES
VSS, afebrile. Alert and oriented. Pt able to void overnight, some difficulty. Up x1 with stedy. Dsg to RLE CD&I.  No other acute events overnight. All fall & safety precautions in place. Call light within reach. Continue current plan of care.

## 2024-12-29 NOTE — PROGRESS NOTES
12/21/2024 04:04 AM    COLORU Yellow 12/21/2024 04:04 AM    PHUR 6.0 12/21/2024 04:04 AM    PHUR 6.0 12/15/2022 01:44 PM    WBCUA >100 12/21/2024 04:04 AM    RBCUA 5-10 12/21/2024 04:04 AM    MUCUS 1+ 12/15/2022 01:44 PM    YEAST Present 11/20/2019 11:37 PM    BACTERIA 4+ 12/21/2024 04:04 AM    CLARITYU Clear 12/21/2024 04:04 AM    LEUKOCYTESUR LARGE 12/21/2024 04:04 AM    UROBILINOGEN 0.2 12/21/2024 04:04 AM    BILIRUBINUR Negative 12/21/2024 04:04 AM    BLOODU LARGE 12/21/2024 04:04 AM    GLUCOSEU Negative 12/21/2024 04:04 AM    KETUA Negative 12/21/2024 04:04 AM     Urine Cultures:   Lab Results   Component Value Date/Time    LABURIN No growth at 18 to 36 hours 12/21/2024 04:04 AM     Blood Cultures:   Lab Results   Component Value Date/Time    BC No Growth after 4 days of incubation. 12/21/2024 10:36 AM     Lab Results   Component Value Date/Time    BLOODCULT2 No Growth after 4 days of incubation. 12/21/2024 10:36 AM     Organism:   Lab Results   Component Value Date/Time    ORG Staphylococcus aureus 06/28/2024 07:09 PM         Electronically signed by Ministerio Medley MD on 12/29/2024 at 11:38 AM

## 2024-12-29 NOTE — PROGRESS NOTES
Patient is A&O x4.   Vitals:    12/29/24 1539   BP: 125/68   Pulse: 87   Resp: 16   Temp: 97.9 °F (36.6 °C)   SpO2: 92%    Patient has endorsed pain to guo site managed well per MAR and non-pharm measures. Patient is tolerating PO diet. Tolerating ambulation x1 assist with stedy. Voiding via guo. Patient updated on plan of care. Fall and safety precautions in place, call light within reach.

## 2024-12-29 NOTE — CONSULTS
Urology Attending Consult Note      Reason for Consultation: Retention, difficulty urinating    History: 73yo M with BPH on tamsulosin currently admitted with diabetic foot wound. We saw him earlier this admission for retention and recommended voiding trial when he was ambulatory. Caicedo was removed yesterday and he has not been able to void since. Straight cathed once for ~700cc urine. We were reconsulted for recs.     Family History, Social History, Review of Systems:  Reviewed and agreed to as per chart    Vitals:  /85   Pulse 84   Temp 97.9 °F (36.6 °C) (Oral)   Resp 16   Ht 1.88 m (6' 2\")   Wt 118.8 kg (261 lb 14.5 oz)   SpO2 94%   BMI 33.63 kg/m²   Temp  Av.2 °F (36.8 °C)  Min: 97.9 °F (36.6 °C)  Max: 98.4 °F (36.9 °C)    Intake/Output Summary (Last 24 hours) at 2024 1417  Last data filed at 2024 1015  Gross per 24 hour   Intake 1441.53 ml   Output 700 ml   Net 741.53 ml         Physical:  Well developed, well nourished in no acute distress  Mood indicates no abnormalities. Pt doesn’t appear depressed  Orientated to time and place  Neck is supple, trachea is midline  Respiratory effort is normal          Labs:  WBC:    Lab Results   Component Value Date/Time    WBC 10.2 2024 04:18 AM     Hemoglobin/Hematocrit:    Lab Results   Component Value Date/Time    HGB 9.7 2024 04:18 AM    HCT 29.0 2024 04:18 AM     BMP:    Lab Results   Component Value Date/Time     2024 05:47 AM    K 4.2 2024 05:47 AM    K 4.2 2024 04:18 AM     2024 05:47 AM    CO2 23 2024 05:47 AM    BUN 24 2024 05:47 AM    CREATININE 1.7 2024 05:47 AM    CALCIUM 8.3 2024 05:47 AM    GFRAA 52 2022 06:50 AM    LABGLOM 42 2024 05:47 AM    LABGLOM 33 2024 05:01 AM     PT/INR:    Lab Results   Component Value Date/Time    PROTIME 16.1 2024 11:20 AM    INR 1.28 2024 11:20 AM     PTT:    Lab Results   Component Value  Date/Time    APTT 37.2 03/02/2020 01:47 PM   [APTT    Impression/Plan: 75yo M with recurrent retention admitted with diabetic foot infection.    -He has not been able to void on his own since guo removal  -He is going to OR tomorrow with podiatry; recommend replacement of catheter. Would use 16F coude   -Keep this catheter in indefinitely. He can be discharged with guo and FU to see Dr. Bynum as outpatient  -Please call with any questions. We will see PRN for now      SAM Shirley

## 2024-12-29 NOTE — PLAN OF CARE
Problem: Chronic Conditions and Co-morbidities  Goal: Patient's chronic conditions and co-morbidity symptoms are monitored and maintained or improved  12/29/2024 0746 by Laney Schultz RN  Outcome: Progressing  Flowsheets (Taken 12/29/2024 0746)  Care Plan - Patient's Chronic Conditions and Co-Morbidity Symptoms are Monitored and Maintained or Improved:   Monitor and assess patient's chronic conditions and comorbid symptoms for stability, deterioration, or improvement   Collaborate with multidisciplinary team to address chronic and comorbid conditions and prevent exacerbation or deterioration   Update acute care plan with appropriate goals if chronic or comorbid symptoms are exacerbated and prevent overall improvement and discharge     Problem: Discharge Planning  Goal: Discharge to home or other facility with appropriate resources  12/29/2024 0746 by Laney Schultz RN  Outcome: Progressing  Flowsheets (Taken 12/29/2024 0746)  Discharge to home or other facility with appropriate resources:   Identify barriers to discharge with patient and caregiver   Identify discharge learning needs (meds, wound care, etc)   Refer to discharge planning if patient needs post-hospital services based on physician order or complex needs related to functional status, cognitive ability or social support system   Arrange for needed discharge resources and transportation as appropriate  Note: Patient updated and educated on barriers to discharge and plan of care.      Problem: Safety - Adult  Goal: Free from fall injury  12/29/2024 0746 by Laney Schultz, RN  Outcome: Progressing  Flowsheets (Taken 12/29/2024 0746)  Free From Fall Injury:   Instruct family/caregiver on patient safety   Based on caregiver fall risk screen, instruct family/caregiver to ask for assistance with transferring infant if caregiver noted to have fall risk factors  Note: Patient has remained free of fall injury this shift.      Problem: Skin/Tissue

## 2024-12-30 ENCOUNTER — ANESTHESIA (OUTPATIENT)
Dept: OPERATING ROOM | Age: 74
End: 2024-12-30
Payer: MEDICAID

## 2024-12-30 ENCOUNTER — ANESTHESIA EVENT (OUTPATIENT)
Dept: OPERATING ROOM | Age: 74
End: 2024-12-30
Payer: MEDICAID

## 2024-12-30 ENCOUNTER — APPOINTMENT (OUTPATIENT)
Dept: GENERAL RADIOLOGY | Age: 74
End: 2024-12-30
Payer: MEDICAID

## 2024-12-30 LAB
GLUCOSE BLD-MCNC: 100 MG/DL (ref 70–99)
GLUCOSE BLD-MCNC: 101 MG/DL (ref 70–99)
GLUCOSE BLD-MCNC: 149 MG/DL (ref 70–99)
GLUCOSE BLD-MCNC: 99 MG/DL (ref 70–99)
PERFORMED ON: ABNORMAL
PERFORMED ON: NORMAL

## 2024-12-30 PROCEDURE — 6370000000 HC RX 637 (ALT 250 FOR IP): Performed by: INTERNAL MEDICINE

## 2024-12-30 PROCEDURE — 6360000002 HC RX W HCPCS: Performed by: INTERNAL MEDICINE

## 2024-12-30 PROCEDURE — 7100000001 HC PACU RECOVERY - ADDTL 15 MIN: Performed by: PODIATRIST

## 2024-12-30 PROCEDURE — 2580000003 HC RX 258: Performed by: INTERNAL MEDICINE

## 2024-12-30 PROCEDURE — 1200000000 HC SEMI PRIVATE

## 2024-12-30 PROCEDURE — 7100000000 HC PACU RECOVERY - FIRST 15 MIN: Performed by: PODIATRIST

## 2024-12-30 PROCEDURE — 2709999900 HC NON-CHARGEABLE SUPPLY: Performed by: PODIATRIST

## 2024-12-30 PROCEDURE — 6360000002 HC RX W HCPCS: Performed by: STUDENT IN AN ORGANIZED HEALTH CARE EDUCATION/TRAINING PROGRAM

## 2024-12-30 PROCEDURE — 99233 SBSQ HOSP IP/OBS HIGH 50: CPT | Performed by: INTERNAL MEDICINE

## 2024-12-30 PROCEDURE — 2580000003 HC RX 258: Performed by: STUDENT IN AN ORGANIZED HEALTH CARE EDUCATION/TRAINING PROGRAM

## 2024-12-30 PROCEDURE — 88305 TISSUE EXAM BY PATHOLOGIST: CPT

## 2024-12-30 PROCEDURE — 3600000004 HC SURGERY LEVEL 4 BASE: Performed by: PODIATRIST

## 2024-12-30 PROCEDURE — 2500000003 HC RX 250 WO HCPCS: Performed by: PODIATRIST

## 2024-12-30 PROCEDURE — 2500000003 HC RX 250 WO HCPCS: Performed by: INTERNAL MEDICINE

## 2024-12-30 PROCEDURE — 2580000003 HC RX 258

## 2024-12-30 PROCEDURE — 3700000001 HC ADD 15 MINUTES (ANESTHESIA): Performed by: PODIATRIST

## 2024-12-30 PROCEDURE — 73630 X-RAY EXAM OF FOOT: CPT

## 2024-12-30 PROCEDURE — 6360000002 HC RX W HCPCS

## 2024-12-30 PROCEDURE — 6370000000 HC RX 637 (ALT 250 FOR IP): Performed by: STUDENT IN AN ORGANIZED HEALTH CARE EDUCATION/TRAINING PROGRAM

## 2024-12-30 PROCEDURE — 88311 DECALCIFY TISSUE: CPT

## 2024-12-30 PROCEDURE — 0QBM0ZZ EXCISION OF LEFT TARSAL, OPEN APPROACH: ICD-10-PCS | Performed by: PODIATRIST

## 2024-12-30 PROCEDURE — 6360000002 HC RX W HCPCS: Performed by: PODIATRIST

## 2024-12-30 PROCEDURE — 3600000014 HC SURGERY LEVEL 4 ADDTL 15MIN: Performed by: PODIATRIST

## 2024-12-30 PROCEDURE — 3700000000 HC ANESTHESIA ATTENDED CARE: Performed by: PODIATRIST

## 2024-12-30 RX ORDER — FENTANYL CITRATE 50 UG/ML
25 INJECTION, SOLUTION INTRAMUSCULAR; INTRAVENOUS EVERY 5 MIN PRN
Status: DISCONTINUED | OUTPATIENT
Start: 2024-12-30 | End: 2024-12-30 | Stop reason: HOSPADM

## 2024-12-30 RX ORDER — PROCHLORPERAZINE EDISYLATE 5 MG/ML
5 INJECTION INTRAMUSCULAR; INTRAVENOUS
Status: DISCONTINUED | OUTPATIENT
Start: 2024-12-30 | End: 2024-12-30 | Stop reason: HOSPADM

## 2024-12-30 RX ORDER — HYDROMORPHONE HYDROCHLORIDE 1 MG/ML
0.5 INJECTION, SOLUTION INTRAMUSCULAR; INTRAVENOUS; SUBCUTANEOUS EVERY 5 MIN PRN
Status: DISCONTINUED | OUTPATIENT
Start: 2024-12-30 | End: 2024-12-30 | Stop reason: HOSPADM

## 2024-12-30 RX ORDER — LABETALOL HYDROCHLORIDE 5 MG/ML
10 INJECTION, SOLUTION INTRAVENOUS
Status: DISCONTINUED | OUTPATIENT
Start: 2024-12-30 | End: 2024-12-30 | Stop reason: HOSPADM

## 2024-12-30 RX ORDER — LIDOCAINE HYDROCHLORIDE 10 MG/ML
INJECTION, SOLUTION EPIDURAL; INFILTRATION; INTRACAUDAL; PERINEURAL PRN
Status: DISCONTINUED | OUTPATIENT
Start: 2024-12-30 | End: 2024-12-30 | Stop reason: HOSPADM

## 2024-12-30 RX ORDER — MAGNESIUM HYDROXIDE 1200 MG/15ML
LIQUID ORAL CONTINUOUS PRN
Status: DISCONTINUED | OUTPATIENT
Start: 2024-12-30 | End: 2024-12-30 | Stop reason: HOSPADM

## 2024-12-30 RX ORDER — FENTANYL CITRATE 50 UG/ML
INJECTION, SOLUTION INTRAMUSCULAR; INTRAVENOUS
Status: DISCONTINUED | OUTPATIENT
Start: 2024-12-30 | End: 2024-12-30 | Stop reason: SDUPTHER

## 2024-12-30 RX ORDER — NALOXONE HYDROCHLORIDE 0.4 MG/ML
INJECTION, SOLUTION INTRAMUSCULAR; INTRAVENOUS; SUBCUTANEOUS PRN
Status: DISCONTINUED | OUTPATIENT
Start: 2024-12-30 | End: 2024-12-30 | Stop reason: HOSPADM

## 2024-12-30 RX ORDER — SODIUM CHLORIDE 0.9 % (FLUSH) 0.9 %
5-40 SYRINGE (ML) INJECTION PRN
Status: DISCONTINUED | OUTPATIENT
Start: 2024-12-30 | End: 2024-12-30 | Stop reason: HOSPADM

## 2024-12-30 RX ORDER — ACETAMINOPHEN 325 MG/1
650 TABLET ORAL
Status: DISCONTINUED | OUTPATIENT
Start: 2024-12-30 | End: 2024-12-30 | Stop reason: HOSPADM

## 2024-12-30 RX ORDER — SODIUM CHLORIDE, SODIUM LACTATE, POTASSIUM CHLORIDE, CALCIUM CHLORIDE 600; 310; 30; 20 MG/100ML; MG/100ML; MG/100ML; MG/100ML
INJECTION, SOLUTION INTRAVENOUS
Status: DISCONTINUED | OUTPATIENT
Start: 2024-12-30 | End: 2024-12-30 | Stop reason: SDUPTHER

## 2024-12-30 RX ORDER — IPRATROPIUM BROMIDE AND ALBUTEROL SULFATE 2.5; .5 MG/3ML; MG/3ML
1 SOLUTION RESPIRATORY (INHALATION)
Status: DISCONTINUED | OUTPATIENT
Start: 2024-12-30 | End: 2024-12-30 | Stop reason: HOSPADM

## 2024-12-30 RX ORDER — ONDANSETRON 2 MG/ML
INJECTION INTRAMUSCULAR; INTRAVENOUS
Status: DISCONTINUED | OUTPATIENT
Start: 2024-12-30 | End: 2024-12-30 | Stop reason: SDUPTHER

## 2024-12-30 RX ORDER — SODIUM CHLORIDE 0.9 % (FLUSH) 0.9 %
5-40 SYRINGE (ML) INJECTION EVERY 12 HOURS SCHEDULED
Status: DISCONTINUED | OUTPATIENT
Start: 2024-12-30 | End: 2024-12-30 | Stop reason: HOSPADM

## 2024-12-30 RX ORDER — ONDANSETRON 2 MG/ML
4 INJECTION INTRAMUSCULAR; INTRAVENOUS
Status: DISCONTINUED | OUTPATIENT
Start: 2024-12-30 | End: 2024-12-30 | Stop reason: HOSPADM

## 2024-12-30 RX ORDER — SODIUM CHLORIDE 9 MG/ML
INJECTION, SOLUTION INTRAVENOUS PRN
Status: DISCONTINUED | OUTPATIENT
Start: 2024-12-30 | End: 2024-12-30 | Stop reason: HOSPADM

## 2024-12-30 RX ORDER — PROPOFOL 10 MG/ML
INJECTION, EMULSION INTRAVENOUS
Status: DISCONTINUED | OUTPATIENT
Start: 2024-12-30 | End: 2024-12-30 | Stop reason: SDUPTHER

## 2024-12-30 RX ORDER — LIDOCAINE HYDROCHLORIDE 20 MG/ML
INJECTION, SOLUTION INFILTRATION; PERINEURAL
Status: DISCONTINUED | OUTPATIENT
Start: 2024-12-30 | End: 2024-12-30 | Stop reason: SDUPTHER

## 2024-12-30 RX ADMIN — SODIUM CHLORIDE, PRESERVATIVE FREE 10 ML: 5 INJECTION INTRAVENOUS at 20:05

## 2024-12-30 RX ADMIN — TAMSULOSIN HYDROCHLORIDE 0.4 MG: 0.4 CAPSULE ORAL at 10:00

## 2024-12-30 RX ADMIN — GUAIFENESIN 600 MG: 600 TABLET ORAL at 20:04

## 2024-12-30 RX ADMIN — BENZONATATE 100 MG: 100 CAPSULE ORAL at 10:01

## 2024-12-30 RX ADMIN — ENOXAPARIN SODIUM 120 MG: 150 INJECTION SUBCUTANEOUS at 20:08

## 2024-12-30 RX ADMIN — FENTANYL CITRATE 25 MCG: 50 INJECTION, SOLUTION INTRAMUSCULAR; INTRAVENOUS at 15:48

## 2024-12-30 RX ADMIN — SODIUM CHLORIDE, PRESERVATIVE FREE 10 ML: 5 INJECTION INTRAVENOUS at 10:03

## 2024-12-30 RX ADMIN — PIPERACILLIN AND TAZOBACTAM 3375 MG: 3; .375 INJECTION, POWDER, LYOPHILIZED, FOR SOLUTION INTRAVENOUS at 18:28

## 2024-12-30 RX ADMIN — PIPERACILLIN AND TAZOBACTAM 3375 MG: 3; .375 INJECTION, POWDER, LYOPHILIZED, FOR SOLUTION INTRAVENOUS at 10:18

## 2024-12-30 RX ADMIN — FENTANYL CITRATE 50 MCG: 50 INJECTION, SOLUTION INTRAMUSCULAR; INTRAVENOUS at 15:57

## 2024-12-30 RX ADMIN — OXYCODONE 5 MG: 5 TABLET ORAL at 19:30

## 2024-12-30 RX ADMIN — LIDOCAINE HYDROCHLORIDE 100 MG: 20 INJECTION, SOLUTION INFILTRATION; PERINEURAL at 15:38

## 2024-12-30 RX ADMIN — ONDANSETRON 4 MG: 2 INJECTION INTRAMUSCULAR; INTRAVENOUS at 15:46

## 2024-12-30 RX ADMIN — LINEZOLID 600 MG: 600 TABLET, FILM COATED ORAL at 10:00

## 2024-12-30 RX ADMIN — SODIUM CHLORIDE, PRESERVATIVE FREE 10 ML: 5 INJECTION INTRAVENOUS at 10:02

## 2024-12-30 RX ADMIN — BENZONATATE 100 MG: 100 CAPSULE ORAL at 20:04

## 2024-12-30 RX ADMIN — PIPERACILLIN AND TAZOBACTAM 3375 MG: 3; .375 INJECTION, POWDER, LYOPHILIZED, FOR SOLUTION INTRAVENOUS at 00:54

## 2024-12-30 RX ADMIN — PROPOFOL 200 MG: 10 INJECTION, EMULSION INTRAVENOUS at 15:38

## 2024-12-30 RX ADMIN — SODIUM CHLORIDE, SODIUM LACTATE, POTASSIUM CHLORIDE, AND CALCIUM CHLORIDE: .6; .31; .03; .02 INJECTION, SOLUTION INTRAVENOUS at 15:33

## 2024-12-30 RX ADMIN — FENTANYL CITRATE 25 MCG: 50 INJECTION, SOLUTION INTRAMUSCULAR; INTRAVENOUS at 15:44

## 2024-12-30 RX ADMIN — SALINE NASAL SPRAY 1 SPRAY: 1.5 SOLUTION NASAL at 10:20

## 2024-12-30 RX ADMIN — DAPTOMYCIN 700 MG: 500 INJECTION, POWDER, LYOPHILIZED, FOR SOLUTION INTRAVENOUS at 20:32

## 2024-12-30 RX ADMIN — AMIODARONE HYDROCHLORIDE 200 MG: 200 TABLET ORAL at 10:03

## 2024-12-30 RX ADMIN — GUAIFENESIN 600 MG: 600 TABLET ORAL at 10:01

## 2024-12-30 RX ADMIN — SALINE NASAL SPRAY 1 SPRAY: 1.5 SOLUTION NASAL at 10:03

## 2024-12-30 ASSESSMENT — PAIN SCALES - GENERAL
PAINLEVEL_OUTOF10: 0
PAINLEVEL_OUTOF10: 6
PAINLEVEL_OUTOF10: 0

## 2024-12-30 NOTE — PROGRESS NOTES
V2.0    [facility] Progress Note      Name:  Veronica Chaidez /Age/Sex: 1950  (74 y.o. male)   MRN & CSN:  [id number] & [id number] Encounter Date/Time: 2024 9:39 AM EST   Location:  [address] [place] [address] PCP: Good Renee MD     Attending:Ministerio Medley MD       Hospital Day: 10    Assessment and Recommendations   Veronica Chaidez is a 74 y.o. male with pmh of HTN, persistent afib, CAD s/p CABG, SSS s/p AICD, HFpEF, CKD IIIa, Type II DM who presents with Diabetic foot infection (HCC)    #Diabetic foot infection with osteomyelitis  -Podiatry consulted and following  -MRI L foot completed  with findings in calcaneus c/w osteomyelitis  --Podiatry plan for surgery today for resection of infected area in calcaneal bone  -Continue Zosyn + linezolid  -Consulted ID   -Follow blood cultures, NGTD so far    #UTI  #Acute urinary retention  --UCx NGTD but initial UA c/w UTI with + leukocyte esterase and pyruia  -Continue Zosyn as above  -Urology consulted, now s/p Caicedo placement, voiding trial as outpatient  -Continue tamsulosin    #Type II DM  -continue insulin therapy  --monitor BG frequently while on insulin to monitor for hypoglycemia as possible toxicity of insulin therapy, treat prn per hypoglycemia protocol    #CKD IIIa  -Nephrology following  -Trend BMP    #Afib  #Chronic HFpEF  #CAD  #SSS  -Continue home amiodarone, metoprolol  -Continue Lovenox, hold Eliquis pending possible surgical intervention      Diet Diet NPO   DVT Prophylaxis [] Lovenox, []  Heparin, [] SCDs, [] Ambulation,  [] Eliquis, [] Xarelto  [] Coumadin   Code Status Limited   Disposition From: Home  Expected Disposition: TBD  Estimated Date of Discharge: 2+ days  Patient requires continued admission due to IV abx   Surrogate Decision Maker/ JOSEF Caballero     Personally reviewed Lab Studies and Imaging         Subjective:     Chief Complaint: Foot wound    Veronica Chaidez is a 74 y.o. male with Type II DM who presented initially with concerns

## 2024-12-30 NOTE — PROGRESS NOTES
Patient resting in bed, Mendes Fall Risk: High (45 and higher), Pain Level: 0, vitals stable, assisted to position of comfort, call light and belongings in reach, encouraged to call with needs,   Vitals:    12/29/24 2300   BP: (!) 134/95   Pulse: 78   Resp: 16   Temp: 98.3 °F (36.8 °C)   SpO2: 94%

## 2024-12-30 NOTE — ANESTHESIA PRE PROCEDURE
05:47 AM    CO2 23 12/28/2024 05:47 AM    BUN 24 12/28/2024 05:47 AM    CREATININE 1.7 12/28/2024 05:47 AM    GFRAA 52 08/26/2022 06:50 AM    AGRATIO 0.7 04/21/2024 05:13 AM    LABGLOM 42 12/28/2024 05:47 AM    LABGLOM 33 04/26/2024 05:01 AM    GLUCOSE 122 12/28/2024 05:47 AM    CALCIUM 8.3 12/28/2024 05:47 AM    BILITOT 0.5 04/21/2024 05:13 AM    ALKPHOS 76 04/21/2024 05:13 AM    AST 16 04/21/2024 05:13 AM    ALT 7 04/21/2024 05:13 AM       POC Tests:   Recent Labs     12/30/24  0648   POCGLU 101*       Coags:   Lab Results   Component Value Date/Time    PROTIME 16.1 12/29/2024 11:20 AM    INR 1.28 12/29/2024 11:20 AM    APTT 37.2 03/02/2020 01:47 PM       HCG (If Applicable): No results found for: \"PREGTESTUR\", \"PREGSERUM\", \"HCG\", \"HCGQUANT\"     ABGs:   Lab Results   Component Value Date/Time    PHART 7.367 03/02/2020 07:35 PM    PO2ART 84.2 03/02/2020 07:35 PM    UBV1GLA 40.8 03/02/2020 07:35 PM    SSR9WDJ 23.4 03/02/2020 07:35 PM    BEART -2 03/02/2020 07:35 PM    X0POKSEI 96 03/02/2020 07:35 PM        Type & Screen (If Applicable):  No results found for: \"LABABO\"    Drug/Infectious Status (If Applicable):  No results found for: \"HIV\", \"HEPCAB\"    COVID-19 Screening (If Applicable):   Lab Results   Component Value Date/Time    COVID19 NOT DETECTED 10/12/2023 08:04 PM    COVID19 Not Detected 03/08/2021 12:13 PM           Anesthesia Evaluation  Patient summary reviewed   no history of anesthetic complications:   Airway: Mallampati: II  TM distance: >3 FB     Mouth opening: > = 3 FB   Dental:          Pulmonary:Negative Pulmonary ROS and normal exam                               Cardiovascular:    (+) hypertension:, pacemaker ([employer]): AICD, CAD:, CABG/stent (CABG with SHANTI clip 03/2020):, CHF: systolic, hyperlipidemia               ROS comment: 08/2022 TTE:   Normal left ventricular size.   Mild concentric left ventricular hypertrophy.   Left ventricular systolic function appears at the lower limits of normal 
Cardiovascular:  Exercise tolerance: no interval change  (+) hypertension:, pacemaker:, CAD:, CHF:        Rhythm: regular  Rate: normal                    Neuro/Psych:               GI/Hepatic/Renal:   (+) GERD:          Endo/Other:    (+) DiabetesType II DM.                 Abdominal:   (+) obese          Vascular:          Other Findings:       Anesthesia Plan      general     ASA 3       Induction: intravenous.      Anesthetic plan and risks discussed with patient.    Use of blood products discussed with patient whom.    Plan discussed with surgical team and CRNA.                Irving Kirkland MD   12/30/2024

## 2024-12-30 NOTE — ANESTHESIA POSTPROCEDURE EVALUATION
Department of Anesthesiology  Postprocedure Note    Patient: Veronica Chaidez  MRN: [id number]  YOB: 1950  Date of evaluation: 12/30/2024    Procedure Summary       Date: 12/30/24 Room / Location: [place] [id number] / [facility]    Anesthesia Start: 1533 Anesthesia Stop: 1633    Procedures:       LEFT FOOT INCISION AND DRAINAGE WITH PARTIAL CALCANECTOMY (Left: Foot)      . (Left: Foot) Diagnosis:       Acute hematogenous osteomyelitis of left foot      (Acute hematogenous osteomyelitis of left foot [M86.072])    Surgeons: Chandana Downey DPM Responsible Provider: Irving Kirkland MD    Anesthesia Type: General ASA Status: 3            Anesthesia Type: General    Esmer Phase I: Esmer Score: 7    Esmer Phase II:      Anesthesia Post Evaluation    Patient location during evaluation: PACU  Patient participation: complete - patient participated  Level of consciousness: awake  Pain score: 0  Nausea & Vomiting: no nausea and no vomiting  Cardiovascular status: blood pressure returned to baseline  Respiratory status: acceptable  Hydration status: euvolemic  Pain management: adequate    No notable events documented.

## 2024-12-30 NOTE — PROGRESS NOTES
ID Follow-up NOTE    CC:   Left calcaneus osteomyelitis   Antibiotics: Zosyn, Linezolid    Admit Date: 2024  Hospital Day: 10    Subjective:     OR today - L foot I&D, partial calcanectomy    No pain  No complaint    Objective:     Patient Vitals for the past 8 hrs:   BP Temp Temp src Pulse Resp SpO2   24 0833 129/72 97.7 °F (36.5 °C) Oral 72 16 95 %     I/O last 3 completed shifts:  In: 1736.5 [P.O.:1415; IV Piggyback:321.5]  Out: 1850 [Urine:1850]  No intake/output data recorded.    EXAM:  GENERAL: No apparent distress.  RA  HEENT: Membranes moist, no oral lesion  NECK:  Supple, no lymphadenopathy  LUNGS: Clear b/l, no rales, no dullness  CARDIAC: RRR, no murmur appreciated  ABD:  + BS, soft / NT  EXT:  L foot w dressing / ACE  NEURO: No focal neurologic findings  PSYCH: Orientation, sensorium, mood normal  LINES:  R PICC, placed        Data Review:  Lab Results   Component Value Date    WBC 10.2 2024    HGB 9.7 (L) 2024    HCT 29.0 (L) 2024    MCV 87.7 2024     2024     Lab Results   Component Value Date    CREATININE 1.7 (H) 2024    BUN 24 (H) 2024     2024    K 4.2 2024     2024    CO2 23 2024       Hepatic Function Panel:   Lab Results   Component Value Date/Time    ALKPHOS 76 2024 05:13 AM    ALT 7 2024 05:13 AM    AST 16 2024 05:13 AM    BILITOT 0.5 2024 05:13 AM    BILIDIR <0.2 2021 03:14 PM    IBILI see below 2021 03:14 PM       MICRO:  2024 UCx > 100k S aureus (MSSA)  2024 UCx no growth                     BC x 2 no growth  2024 MRSA nares negative    IMAGIN/21 CXR neg       MRI of the left foot without contrast  IMPRESSION:  1. Ulceration along the heel with abnormal marrow signal in the posterior calcaneal process compatible with osteomyelitis.  2. Previous amputation with presumed reactive edema around the distal foot.  3. Diffuse  dependent edema without focal fluid collection to suggest abscess formation.      Scheduled Meds:   sodium chloride flush  5-40 mL IntraVENous 2 times per day    piperacillin-tazobactam  3,375 mg IntraVENous Q8H    linezolid  600 mg Oral 2 times per day    [Held by provider] furosemide  20 mg Oral BID    [Held by provider] metoprolol tartrate  25 mg Oral BID    sodium chloride flush  5-40 mL IntraVENous 2 times per day    amiodarone  200 mg Oral Daily    tamsulosin  0.4 mg Oral Daily    enoxaparin  1 mg/kg SubCUTAneous BID    insulin lispro  0-4 Units SubCUTAneous 4x Daily AC & HS    collagenase   Topical Daily    benzonatate  100 mg Oral TID    guaiFENesin  600 mg Oral BID    miconazole   Topical BID       Continuous Infusions:   sodium chloride      sodium chloride Stopped (12/24/24 0025)    dextrose         PRN Meds:  sodium chloride flush, sodium chloride, sodium chloride, melatonin, oxyCODONE, Benzocaine-Menthol, albuterol, sodium chloride flush, sodium chloride, ondansetron **OR** ondansetron, polyethylene glycol, acetaminophen **OR** acetaminophen, glucose, dextrose bolus **OR** dextrose bolus, glucagon (rDNA), dextrose      Assessment:     73 yo man  PMH - DM, neuropaty, HTN, CAD, CM/CHF, CKD  PSurgHx - CABG, L TMA (4/22/24)     Pt represented with difficutly urinating / urinary retention  In ED, afeb, WBC 13,Cr 1.8  L heel escar noted -   Seen by Podiatry, ordered MRI (L calcaneal OM - see report)  Plan for surgical debridement / partial calcanectomy      IMP/  L DFI   L heel unstageable ulcer     Calcaneous osteomyelitis   - OR debridement / partial calcanectomy 12/30, reviewed OR note       Plan:     Cont Zosyn  Start Daptomycin  D/c Linezolid     Will f/u on OR cult, path     Await findings, cult / path from schedule Podiatry Surg      Anticipate need iv antibiotics after discharge    - calcaneous resection will not be definitive   - PICC in place    See SAMINA      Medical Decision Making:  The following

## 2024-12-30 NOTE — BRIEF OP NOTE
Brief Postoperative Note      Patient: Veronica Chaidez  YOB: 1950  MRN: [id number]    Date of Procedure: 12/30/2024    Pre-Op Diagnosis Codes:      * Acute hematogenous osteomyelitis of left foot [M86.072]    Post-Op Diagnosis: Same       Procedure(s):  LEFT FOOT INCISION AND DRAINAGE WITH PARTIAL CALCANECTOMY  .    Surgeon(s):  Chandana Downey DPM    Assistant:  Resident: Evans Rivera DPM    Anesthesia: General    Hemostasis: anatomic dissection and electrocautery    Injectables: Pre-Op 10 cc of 1% Lidocaine plain    Materials: 2-0 Vicryl, 2-0 Nylon    Implants: NONE      Estimated Blood Loss (mL): Minimal    Complications: None    Specimens:   ID Type Source Tests Collected by Time Destination   A : CALCANEUS Tissue Tissue SURGICAL PATHOLOGY Chandana Downey DPM 12/30/2024 1601        Implants:  * No implants in log *      Drains:   Urinary Catheter 12/29/24 Coude;Caicedo (Active)   $ Urethral catheter insertion $ Not inserted for procedure 12/29/24 2300   Catheter Indications Urinary retention (acute or chronic), continuous bladder irrigation or bladder outlet obstruction 12/30/24 1504   Site Assessment Red 12/30/24 1504   Urine Color Burgandy 12/30/24 1504   Urine Appearance Clots 12/30/24 0833   Urine Odor Malodorous 12/29/24 1735   Collection Container Standard 12/30/24 1504   Securement Method Leg strap 12/30/24 1504   Catheter Care  Soap and water 12/29/24 2300   Catheter Best Practices  Drainage tube clipped to bed;Catheter secured to thigh;Tamper seal intact;Bag below bladder;Bag not on floor;Lack of dependent loop in tubing;Drainage bag less than half full 12/30/24 1504   Status Draining 12/30/24 1504   Output (mL) 350 mL 12/30/24 0408       [REMOVED] Urinary Catheter 12/21/24 Caicedo (Removed)   $ Urethral catheter insertion $ Not inserted for procedure 12/25/24 1600   Catheter Indications Urinary retention (acute or chronic), continuous bladder irrigation or bladder

## 2024-12-30 NOTE — OP NOTE
Operative Note      Patient: Veronica Chaidez  YOB: 1950  MRN: [id number]    Date of Procedure: 12/30/2024    Pre-Op Diagnosis Codes:      * Acute hematogenous osteomyelitis of left foot [M86.072]    Post-Op Diagnosis: Same       Procedure(s):  LEFT FOOT INCISION AND DRAINAGE WITH PARTIAL CALCANECTOMY  .    Surgeon(s):  Chandana Downey DPM    Assistant:   Resident: Evans Rivera DPM    Anesthesia: General    Hemostasis: anatomic dissection and electrocautery    Injectables: Pre-Op 10 cc of 1% Lidocaine plain    Materials: 2-0 Vicryl, 2-0 Nylon    Implants: NONE      Estimated Blood Loss (mL): Minimal    Complications: None    Specimens:   ID Type Source Tests Collected by Time Destination   A : CALCANEUS Tissue Tissue SURGICAL PATHOLOGY Chandana Downey DPM 12/30/2024 1601        Implants:  * No implants in log *      Drains:   Urinary Catheter 12/29/24 Coude;Caicedo (Active)   $ Urethral catheter insertion $ Not inserted for procedure 12/29/24 2300   Catheter Indications Urinary retention (acute or chronic), continuous bladder irrigation or bladder outlet obstruction 12/30/24 1504   Site Assessment Red 12/30/24 1504   Urine Color Burgandy 12/30/24 1504   Urine Appearance Clots 12/30/24 0833   Urine Odor Malodorous 12/29/24 1735   Collection Container Standard 12/30/24 1504   Securement Method Leg strap 12/30/24 1504   Catheter Care  Soap and water 12/29/24 2300   Catheter Best Practices  Drainage tube clipped to bed;Catheter secured to thigh;Tamper seal intact;Bag below bladder;Bag not on floor;Lack of dependent loop in tubing;Drainage bag less than half full 12/30/24 1504   Status Draining 12/30/24 1504   Output (mL) 350 mL 12/30/24 0408       [REMOVED] Urinary Catheter 12/21/24 Caicedo (Removed)   $ Urethral catheter insertion $ Not inserted for procedure 12/25/24 1600   Catheter Indications Urinary retention (acute or chronic), continuous bladder irrigation or bladder outlet

## 2024-12-30 NOTE — PROGRESS NOTES
and phenylalanine, Codeine, and Msg    Current Medications:    sodium chloride flush 0.9 % injection 5-40 mL, 2 times per day  sodium chloride flush 0.9 % injection 5-40 mL, PRN  0.9 % sodium chloride infusion, PRN  piperacillin-tazobactam (ZOSYN) 3,375 mg in sodium chloride 0.9 % 50 mL IVPB (mini-bag), Q8H  linezolid (ZYVOX) tablet 600 mg, 2 times per day  sodium chloride (OCEAN, BABY AYR) 0.65 % nasal spray 1 spray, Q2H PRN  melatonin disintegrating tablet 5 mg, Nightly PRN  oxyCODONE (ROXICODONE) immediate release tablet 5 mg, Q6H PRN  Benzocaine-Menthol (CEPACOL) 1 lozenge, Q2H PRN  [Held by provider] furosemide (LASIX) tablet 20 mg, BID  albuterol (PROVENTIL) (2.5 MG/3ML) 0.083% nebulizer solution 2.5 mg, Q6H PRN  [Held by provider] metoprolol tartrate (LOPRESSOR) tablet 25 mg, BID  sodium chloride flush 0.9 % injection 5-40 mL, 2 times per day  sodium chloride flush 0.9 % injection 5-40 mL, PRN  0.9 % sodium chloride infusion, PRN  ondansetron (ZOFRAN-ODT) disintegrating tablet 4 mg, Q8H PRN   Or  ondansetron (ZOFRAN) injection 4 mg, Q6H PRN  polyethylene glycol (GLYCOLAX) packet 17 g, Daily PRN  acetaminophen (TYLENOL) tablet 650 mg, Q6H PRN   Or  acetaminophen (TYLENOL) suppository 650 mg, Q6H PRN  amiodarone (CORDARONE) tablet 200 mg, Daily  tamsulosin (FLOMAX) capsule 0.4 mg, Daily  enoxaparin (LOVENOX) injection 120 mg, BID  glucose chewable tablet 16 g, PRN  dextrose bolus 10% 125 mL, PRN   Or  dextrose bolus 10% 250 mL, PRN  glucagon injection 1 mg, PRN  dextrose 10 % infusion, Continuous PRN  insulin lispro (HUMALOG,ADMELOG) injection vial 0-4 Units, 4x Daily AC & HS  collagenase ointment, Daily  benzonatate (TESSALON) capsule 100 mg, TID  guaiFENesin (MUCINEX) extended release tablet 600 mg, BID  miconazole (MICOTIN) 2 % powder, BID      Physical exam:     Vitals:  /72   Pulse 72   Temp 97.7 °F (36.5 °C) (Oral)   Resp 16   Ht 1.88 m (6' 2\")   Wt 118.8 kg (261 lb 14.5 oz)   SpO2 95%   BMI  33.63 kg/m²   Constitutional:  OAA X3 NAD Yes  Skin: no rash, turgor wnl  Cardiovascular:  S1, S2 without m/r/g  Respiratory: CTA B without w/r/r  Abdomen:  soft, nt, nd  Ext:  lower extremity edema : trace, L> R   Psychiatric: mood and affect stable   Musculoskeletal:  Rom, muscular strength intact    Data:   Labs:  CBC:   No results for input(s): \"WBC\", \"HGB\", \"PLT\" in the last 72 hours.    BMP:    Recent Labs     12/28/24  0547      K 4.2      CO2 23   BUN 24*   CREATININE 1.7*   GLUCOSE 122*     Ca/Mg/Phos:   Recent Labs     12/28/24  0547   CALCIUM 8.3     Hepatic: No results for input(s): \"AST\", \"ALT\", \"BILITOT\", \"ALKPHOS\" in the last 72 hours.    Invalid input(s): \"ALB\"  Troponin: No results for input(s): \"TROPONINI\" in the last 72 hours.  BNP: No results for input(s): \"BNP\" in the last 72 hours.  Lipids: No results for input(s): \"CHOL\", \"TRIG\", \"HDL\" in the last 72 hours.    Invalid input(s): \"LDLCALC\", \"LABVLDL\"  ABGs: No results for input(s): \"PHART\", \"PO2ART\", \"UHI3UNZ\" in the last 72 hours.  INR:   Recent Labs     12/29/24  1120   INR 1.28*       UA:  No results for input(s): \"COLORU\", \"CLARITYU\", \"GLUCOSEU\", \"BILIRUBINUR\", \"KETUA\", \"SPECGRAV\", \"BLOODU\", \"PHUR\", \"PROTEINU\", \"UROBILINOGEN\", \"NITRU\", \"LEUKOCYTESUR\", \"URINETYPE\" in the last 72 hours.    Invalid input(s): \"LABMICR\"     Urine Microscopic:   No results for input(s): \"LABCAST\", \"BACTERIA\", \"COMU\", \"HYALCAST\", \"WBCUA\", \"RBCUA\" in the last 72 hours.    Invalid input(s): \"EPIU\"    Urine Culture:   No results for input(s): \"LABURIN\" in the last 72 hours.    Urine Chemistry: No results for input(s): \"CLUR\", \"LABCREA\", \"PROTEINUR\", \"NAUR\" in the last 72 hours.      IMAGING:  Vascular duplex lower extremity arteries bilateral   Final Result      MRI FOOT LEFT WO CONTRAST   Final Result   1. Ulceration along the heel with abnormal marrow signal in the posterior calcaneal process compatible with osteomyelitis.   2. Previous amputation with

## 2024-12-30 NOTE — PROGRESS NOTES
PACU Transfer Note    Vitals:    12/30/24 1700   BP: (!) 143/77   Pulse: 77   Resp: 20   Temp:    SpO2:        In: 400 [I.V.:400]  Out: 20     Pain assessment:  none  Pain Level: 0    Report given to Receiving unit RN via phone. Pt awake, denies pain. VSS. Left foot ace wrap d/I. No drainage. Elevated on pillows. Pt taken back to his room per pacu transport.    12/30/2024 5:08 PM

## 2024-12-31 LAB
ALBUMIN SERPL-MCNC: 3.1 G/DL (ref 3.4–5)
ANION GAP SERPL CALCULATED.3IONS-SCNC: 7 MMOL/L (ref 3–16)
BASOPHILS # BLD: 0 K/UL (ref 0–0.2)
BASOPHILS NFR BLD: 0.5 %
BUN SERPL-MCNC: 24 MG/DL (ref 7–20)
CALCIUM SERPL-MCNC: 8.5 MG/DL (ref 8.3–10.6)
CHLORIDE SERPL-SCNC: 103 MMOL/L (ref 99–110)
CO2 SERPL-SCNC: 23 MMOL/L (ref 21–32)
CREAT SERPL-MCNC: 1.7 MG/DL (ref 0.8–1.3)
DEPRECATED RDW RBC AUTO: 14.8 % (ref 12.4–15.4)
EOSINOPHIL # BLD: 0.3 K/UL (ref 0–0.6)
EOSINOPHIL NFR BLD: 3.3 %
GFR SERPLBLD CREATININE-BSD FMLA CKD-EPI: 42 ML/MIN/{1.73_M2}
GLUCOSE BLD-MCNC: 127 MG/DL (ref 70–99)
GLUCOSE BLD-MCNC: 136 MG/DL (ref 70–99)
GLUCOSE BLD-MCNC: 174 MG/DL (ref 70–99)
GLUCOSE SERPL-MCNC: 142 MG/DL (ref 70–99)
HCT VFR BLD AUTO: 26.9 % (ref 40.5–52.5)
HGB BLD-MCNC: 8.6 G/DL (ref 13.5–17.5)
LYMPHOCYTES # BLD: 0.8 K/UL (ref 1–5.1)
LYMPHOCYTES NFR BLD: 9 %
MCH RBC QN AUTO: 28.5 PG (ref 26–34)
MCHC RBC AUTO-ENTMCNC: 32.1 G/DL (ref 31–36)
MCV RBC AUTO: 89 FL (ref 80–100)
MONOCYTES # BLD: 0.7 K/UL (ref 0–1.3)
MONOCYTES NFR BLD: 7.5 %
NEUTROPHILS # BLD: 7.3 K/UL (ref 1.7–7.7)
NEUTROPHILS NFR BLD: 79.7 %
PERFORMED ON: ABNORMAL
PHOSPHATE SERPL-MCNC: 3.4 MG/DL (ref 2.5–4.9)
PLATELET # BLD AUTO: 368 K/UL (ref 135–450)
PMV BLD AUTO: 8.9 FL (ref 5–10.5)
POTASSIUM SERPL-SCNC: 4.5 MMOL/L (ref 3.5–5.1)
RBC # BLD AUTO: 3.03 M/UL (ref 4.2–5.9)
SODIUM SERPL-SCNC: 133 MMOL/L (ref 136–145)
WBC # BLD AUTO: 9.1 K/UL (ref 4–11)

## 2024-12-31 PROCEDURE — 6370000000 HC RX 637 (ALT 250 FOR IP): Performed by: STUDENT IN AN ORGANIZED HEALTH CARE EDUCATION/TRAINING PROGRAM

## 2024-12-31 PROCEDURE — 2580000003 HC RX 258: Performed by: STUDENT IN AN ORGANIZED HEALTH CARE EDUCATION/TRAINING PROGRAM

## 2024-12-31 PROCEDURE — 97166 OT EVAL MOD COMPLEX 45 MIN: CPT

## 2024-12-31 PROCEDURE — 80069 RENAL FUNCTION PANEL: CPT

## 2024-12-31 PROCEDURE — 6360000002 HC RX W HCPCS: Performed by: STUDENT IN AN ORGANIZED HEALTH CARE EDUCATION/TRAINING PROGRAM

## 2024-12-31 PROCEDURE — 85025 COMPLETE CBC W/AUTO DIFF WBC: CPT

## 2024-12-31 PROCEDURE — 51798 US URINE CAPACITY MEASURE: CPT

## 2024-12-31 PROCEDURE — 99232 SBSQ HOSP IP/OBS MODERATE 35: CPT | Performed by: INTERNAL MEDICINE

## 2024-12-31 PROCEDURE — 1200000000 HC SEMI PRIVATE

## 2024-12-31 PROCEDURE — 2580000003 HC RX 258: Performed by: NURSE PRACTITIONER

## 2024-12-31 PROCEDURE — 2500000003 HC RX 250 WO HCPCS: Performed by: INTERNAL MEDICINE

## 2024-12-31 PROCEDURE — 6360000002 HC RX W HCPCS: Performed by: INTERNAL MEDICINE

## 2024-12-31 PROCEDURE — 97530 THERAPEUTIC ACTIVITIES: CPT

## 2024-12-31 PROCEDURE — 6370000000 HC RX 637 (ALT 250 FOR IP): Performed by: NURSE PRACTITIONER

## 2024-12-31 PROCEDURE — 93283 PRGRMG EVAL IMPLANTABLE DFB: CPT | Performed by: INTERNAL MEDICINE

## 2024-12-31 PROCEDURE — 97162 PT EVAL MOD COMPLEX 30 MIN: CPT

## 2024-12-31 PROCEDURE — 2580000003 HC RX 258: Performed by: INTERNAL MEDICINE

## 2024-12-31 PROCEDURE — 99233 SBSQ HOSP IP/OBS HIGH 50: CPT | Performed by: INTERNAL MEDICINE

## 2024-12-31 RX ORDER — 0.9 % SODIUM CHLORIDE 0.9 %
500 INTRAVENOUS SOLUTION INTRAVENOUS ONCE
Status: COMPLETED | OUTPATIENT
Start: 2024-12-31 | End: 2024-12-31

## 2024-12-31 RX ORDER — FUROSEMIDE 20 MG/1
20 TABLET ORAL 2 TIMES DAILY
Status: DISCONTINUED | OUTPATIENT
Start: 2025-01-01 | End: 2025-01-03 | Stop reason: HOSPADM

## 2024-12-31 RX ORDER — MECOBALAMIN 5000 MCG
5 TABLET,DISINTEGRATING ORAL NIGHTLY PRN
Status: DISCONTINUED | OUTPATIENT
Start: 2024-12-31 | End: 2025-01-03 | Stop reason: HOSPADM

## 2024-12-31 RX ORDER — OXYCODONE HYDROCHLORIDE 5 MG/1
5 TABLET ORAL EVERY 6 HOURS PRN
Qty: 12 TABLET | Refills: 0 | Status: SHIPPED | OUTPATIENT
Start: 2024-12-31 | End: 2025-01-03 | Stop reason: HOSPADM

## 2024-12-31 RX ORDER — FUROSEMIDE 20 MG/1
20 TABLET ORAL 2 TIMES DAILY
Qty: 60 TABLET | Refills: 3
Start: 2024-12-31

## 2024-12-31 RX ORDER — METOPROLOL TARTRATE 25 MG/1
25 TABLET, FILM COATED ORAL 2 TIMES DAILY
Status: DISCONTINUED | OUTPATIENT
Start: 2025-01-01 | End: 2025-01-03 | Stop reason: HOSPADM

## 2024-12-31 RX ORDER — TAMSULOSIN HYDROCHLORIDE 0.4 MG/1
0.4 CAPSULE ORAL DAILY
Qty: 30 CAPSULE | Refills: 1
Start: 2024-12-31

## 2024-12-31 RX ORDER — SODIUM CHLORIDE 9 MG/ML
INJECTION, SOLUTION INTRAVENOUS CONTINUOUS
Status: ACTIVE | OUTPATIENT
Start: 2024-12-31 | End: 2024-12-31

## 2024-12-31 RX ADMIN — BENZONATATE 100 MG: 100 CAPSULE ORAL at 16:42

## 2024-12-31 RX ADMIN — SODIUM CHLORIDE 500 ML: 9 INJECTION, SOLUTION INTRAVENOUS at 04:40

## 2024-12-31 RX ADMIN — ENOXAPARIN SODIUM 120 MG: 150 INJECTION SUBCUTANEOUS at 20:24

## 2024-12-31 RX ADMIN — SODIUM CHLORIDE, PRESERVATIVE FREE 10 ML: 5 INJECTION INTRAVENOUS at 20:15

## 2024-12-31 RX ADMIN — AMIODARONE HYDROCHLORIDE 200 MG: 200 TABLET ORAL at 09:53

## 2024-12-31 RX ADMIN — ENOXAPARIN SODIUM 120 MG: 150 INJECTION SUBCUTANEOUS at 10:06

## 2024-12-31 RX ADMIN — OXYCODONE 5 MG: 5 TABLET ORAL at 20:13

## 2024-12-31 RX ADMIN — OXYCODONE 5 MG: 5 TABLET ORAL at 03:52

## 2024-12-31 RX ADMIN — SODIUM CHLORIDE, PRESERVATIVE FREE 10 ML: 5 INJECTION INTRAVENOUS at 09:53

## 2024-12-31 RX ADMIN — TAMSULOSIN HYDROCHLORIDE 0.4 MG: 0.4 CAPSULE ORAL at 09:53

## 2024-12-31 RX ADMIN — SODIUM CHLORIDE, PRESERVATIVE FREE 10 ML: 5 INJECTION INTRAVENOUS at 20:14

## 2024-12-31 RX ADMIN — GUAIFENESIN 600 MG: 600 TABLET ORAL at 09:53

## 2024-12-31 RX ADMIN — PIPERACILLIN AND TAZOBACTAM 3375 MG: 3; .375 INJECTION, POWDER, LYOPHILIZED, FOR SOLUTION INTRAVENOUS at 16:45

## 2024-12-31 RX ADMIN — SODIUM CHLORIDE: 9 INJECTION, SOLUTION INTRAVENOUS at 05:55

## 2024-12-31 RX ADMIN — DAPTOMYCIN 700 MG: 500 INJECTION, POWDER, LYOPHILIZED, FOR SOLUTION INTRAVENOUS at 20:24

## 2024-12-31 RX ADMIN — BENZONATATE 100 MG: 100 CAPSULE ORAL at 20:13

## 2024-12-31 RX ADMIN — PIPERACILLIN AND TAZOBACTAM 3375 MG: 3; .375 INJECTION, POWDER, LYOPHILIZED, FOR SOLUTION INTRAVENOUS at 00:30

## 2024-12-31 RX ADMIN — PIPERACILLIN AND TAZOBACTAM 3375 MG: 3; .375 INJECTION, POWDER, LYOPHILIZED, FOR SOLUTION INTRAVENOUS at 10:10

## 2024-12-31 RX ADMIN — MICONAZOLE NITRATE: 20 POWDER TOPICAL at 09:52

## 2024-12-31 RX ADMIN — TIZANIDINE 4 MG: 4 TABLET ORAL at 05:08

## 2024-12-31 RX ADMIN — BENZONATATE 100 MG: 100 CAPSULE ORAL at 09:53

## 2024-12-31 RX ADMIN — TIZANIDINE 4 MG: 4 TABLET ORAL at 16:42

## 2024-12-31 RX ADMIN — GUAIFENESIN 600 MG: 600 TABLET ORAL at 20:13

## 2024-12-31 RX ADMIN — MICONAZOLE NITRATE: 20 POWDER TOPICAL at 20:15

## 2024-12-31 ASSESSMENT — PAIN - FUNCTIONAL ASSESSMENT: PAIN_FUNCTIONAL_ASSESSMENT: ACTIVITIES ARE NOT PREVENTED

## 2024-12-31 ASSESSMENT — PAIN SCALES - GENERAL
PAINLEVEL_OUTOF10: 0
PAINLEVEL_OUTOF10: 6
PAINLEVEL_OUTOF10: 1

## 2024-12-31 ASSESSMENT — PAIN DESCRIPTION - LOCATION: LOCATION: GENERALIZED

## 2024-12-31 ASSESSMENT — PAIN DESCRIPTION - PAIN TYPE: TYPE: ACUTE PAIN;CHRONIC PAIN

## 2024-12-31 ASSESSMENT — PAIN DESCRIPTION - DESCRIPTORS: DESCRIPTORS: ACHING;DISCOMFORT

## 2024-12-31 ASSESSMENT — PAIN DESCRIPTION - DIRECTION: RADIATING_TOWARDS: GENERALIZED

## 2024-12-31 ASSESSMENT — PAIN DESCRIPTION - FREQUENCY: FREQUENCY: INTERMITTENT

## 2024-12-31 ASSESSMENT — PAIN DESCRIPTION - ORIENTATION: ORIENTATION: RIGHT;LEFT

## 2024-12-31 ASSESSMENT — PAIN DESCRIPTION - ONSET: ONSET: GRADUAL

## 2024-12-31 NOTE — CARE COORDINATION
SW following for discharge coordination.     Patient has worked with PT/OT and has recommendations in for SNF discharge.     Patient is agreeable to SNF discharge but his top choice is [facility].    SW met with patient to discuss discharge plan but patient was bleeding and needed medical oversight.     RN and team dealt with issue and MD removed DC order due to blood.     Patient is on IV ABX though 2/12/25    LEROY called Ministerio With [facility] -[phone] who is following for ARU admission. Ministerio noted that patient would need precert approved but could start it whenever the discharge order was placed.     Electronically signed by EYAL Barnhart on 12/31/2024 at 2:23 PM

## 2024-12-31 NOTE — PROGRESS NOTES
V2.0    [facility] Progress Note      Name:  Veronica Chaidez /Age/Sex: 1950  (74 y.o. male)   MRN & CSN:  [id number] & [id number] Encounter Date/Time: 2024 9:39 AM EST   Location:  [address] [place] [address] PCP: Good Renee MD     Attending:Ministerio Medley MD       Hospital Day: 11    Assessment and Recommendations   Veronica Chaidez is a 74 y.o. male with pmh of HTN, persistent afib, CAD s/p CABG, SSS s/p AICD, HFpEF, CKD IIIa, Type II DM who presents with Diabetic foot infection (HCC)    #Diabetic foot infection with osteomyelitis  -Podiatry consulted and following  -MRI L foot completed  with findings in calcaneus c/w osteomyelitis  --Podiatry plan for surgery today for resection of infected area in calcaneal bone  -Continue Zosyn + linezolid  -Consulted ID   -Follow blood cultures, NGTD so far    #UTI  #Acute urinary retention  #Gross hematuria, possible 2/2 urethral trauma  --UCx NGTD but initial UA c/w UTI with + leukocyte esterase and pyruia  -Continue Zosyn as above  -Urology consulted  --recontacted today due to increase in gross hematuria in case needs further irrigation  --flush Caicedo prn in interim  -Continue tamsulosin  -Trend CBC    #Type II DM  -continue insulin therapy  --monitor BG frequently while on insulin to monitor for hypoglycemia as possible toxicity of insulin therapy, treat prn per hypoglycemia protocol    #CKD IIIa  -Nephrology following  -Trend BMP    #Afib  #Chronic HFpEF  #CAD  #SSS  -Continue home amiodarone, metoprolol  -Continue Lovenox, hold Eliquis pending possible surgical intervention      Diet ADULT DIET; Regular; 4 carb choices (60 gm/meal); Low Sodium (2 gm)   DVT Prophylaxis [] Lovenox, []  Heparin, [] SCDs, [] Ambulation,  [] Eliquis, [] Xarelto  [] Coumadin   Code Status Limited   Disposition From: Home  Expected Disposition: TBD  Estimated Date of Discharge: 2+ days  Patient requires continued admission due to IV abx   Surrogate Decision Maker/ POA  Moody Caballero

## 2024-12-31 NOTE — PROGRESS NOTES
Nephrology Progress Note                                                                                                                                                                                                                                                                                                                                                               Office : [phone]     Fax :[phone]    Patient's Name: Veronica Chaidez  3:06 PM  12/31/2024    Reason for Consult:  NANETTE   Requesting Physician:  Good Renee MD  Chief Complaint:    Chief Complaint   Patient presents with    Urinary Retention     Difficulty urinating the last few days        Assessment/Plan     NANETTE  Creatinine is improving and back to baseline   Etiology of NANETTE seems to be obstructive in nature  On admission significant amount of urine in the bladder.  Guo catheter was placed  Urology on board   MONITOR PVR - needed guo replaced   Monitor RFP and urine output   CKD 3b/4   Baseline creatinine close to 1.7-2.0  Etiology from diabetic kidney disease, cardiorenal, obstructive   Probable UTI   Antibiotics   Diabetic foot   Podiatry on board   Plan for OR on 12/30  Diabetic kidney disease   Proteinuria present   We will follow him in our nephrology clinic   CAD/CABG  No signs of fluid overload       History of Present Ilness:    Veronica Chaidez is a 74 y.o. male with  a chief complaint of urinary retention.  Patient states he has had a cold for the past few days and his doctor started him on Mucinex and Claritin.  Since that time he feels like he has been having difficulty urinating.  States he can get a few drops out but feels like he is not fully emptying.  Has had prostate issues in the past but not for a while.  Denies any fevers.  States his cold symptoms are still ongoing, but have gotten a little bit better.      On chart review he has hypertension, persistent A-fib on Eliquis, coronary artery disease status post  CABG with left atrial appendage clip, SSS dual-chamber Medtronic ICD, ischemic cardiomyopathy, HFpEF 7/2024 EF 55 to 60%, RVSP 45 mmHg, CKD 3 A baseline creatinine around 2.0, insulin-dependent type 2 diabetes, diabetic polyneuropathy with transmetatarsal amputation of the left foot, left diabetic heel wound.     Interval hx:    Good UOP  BP's controlled   GFR stable   Feels fair     Past Medical History:   Diagnosis Date    A-fib ([facility]) 2020    CAD, multiple vessel 12/2019    Coronary artery disease involving coronary bypass graft     Diabetes mellitus ([facility])     diet controlled     Encounter for imaging to screen for metal prior to MRI 12/23/2024    medtronic pacemaker:  Evera MRI XT  [id number]; leads:RA - 5076; RV - 6935M; IMPLANT DATE: 03/12/2021; followed by Dr. Salamanca- lrf    Enlarged prostate     Environmental allergies     Kidney stones     Pacemaker     Systolic CHF ([facility])        Past Surgical History:   Procedure Laterality Date    CARDIAC CATHETERIZATION  12/10/2019    Dr. Shaver - severe multi-vessel disease    CORONARY ARTERY BYPASS GRAFT N/A 03/02/2020    WAYNE; TCPB; CABG x 3, LIMA to LAD w/ long segment endarterectomy (4 cm); bilateral pulmonary vein isolation; 40 mm Atriclip to L atril appendage; endoscopic L GSV harvest; ICNB x 5 levels bilat; sternal plate x 1 to manubrium performed by Sohail Rhoades MD at [facility] OR    FOOT DEBRIDEMENT Left 12/30/2024    LEFT FOOT INCISION AND DRAINAGE WITH PARTIAL CALCANECTOMY performed by Chandana Downey DPM at [facility] OR    FOOT SURGERY Left 4/22/2024    LEFT FOOT TRANSMETATARSAL AMPUTATION WITH DAVID TENDON LENGTHENING performed by Chandana Downey DPM at [facility] OR    NASAL POLYP SURGERY  x2    NASAL SEPTUM SURGERY      WOUND VACUUM APPLICATION Left 12/30/2024    . performed by Chandana Downey DPM at [facility] OR       Family History   Problem Relation Age of Onset    Dementia Mother         Vascular    Alzheimer's Disease Mother     Heart Attack Father

## 2024-12-31 NOTE — PLAN OF CARE
Problem: Chronic Conditions and Co-morbidities  Goal: Patient's chronic conditions and co-morbidity symptoms are monitored and maintained or improved  Outcome: Progressing     Problem: Discharge Planning  Goal: Discharge to home or other facility with appropriate resources  Outcome: Progressing     Problem: Safety - Adult  Goal: Free from fall injury  Outcome: Progressing  Flowsheets (Taken 12/30/2024 0830)  Free From Fall Injury: Instruct family/caregiver on patient safety     Problem: Skin/Tissue Integrity  Goal: Absence of new skin breakdown  Description: 1.  Monitor for areas of redness and/or skin breakdown  2.  Assess vascular access sites hourly  3.  Every 4-6 hours minimum:  Change oxygen saturation probe site  4.  Every 4-6 hours:  If on nasal continuous positive airway pressure, respiratory therapy assess nares and determine need for appliance change or resting period.  Outcome: Progressing     Problem: Pain  Goal: Verbalizes/displays adequate comfort level or baseline comfort level  Outcome: Progressing

## 2024-12-31 NOTE — PROGRESS NOTES
Podiatric Surgery Daily Progress Note  Veronica Chaidez      Subjective :   Patient seen and examined this am at the chair.  Patient is now postop day 1 following left foot I&D with partial calcanectomy, patient tolerated surgery well.  Patient denies any acute overnight events .  Patient denies N/V/F/C/SOB. Patient denies calf pain, thigh pain, chest pain.     Review of Systems: A 12 point review of symptoms is unremarkable with the exception of the chief complaint. Patient specifically denies nausea, fever, vomiting, chills, shortness of breath, chest pain, abdominal pain, constipation or difficulty urinating.       Objective     /63   Pulse 69   Temp 98.1 °F (36.7 °C) (Oral)   Resp 18   Ht 1.88 m (6' 2\")   Wt 119.2 kg (262 lb 12.6 oz)   SpO2 94%   BMI 33.74 kg/m²      I/O:  Intake/Output Summary (Last 24 hours) at 12/31/2024 1131  Last data filed at 12/31/2024 0613  Gross per 24 hour   Intake 1350 ml   Output 420 ml   Net 930 ml              Wt Readings from Last 3 Encounters:   12/31/24 119.2 kg (262 lb 12.6 oz)   12/10/24 123.7 kg (272 lb 12.8 oz)   07/08/24 125.6 kg (276 lb 12.8 oz)       LABS:    No results for input(s): \"WBC\", \"HGB\", \"HCT\", \"PLT\" in the last 72 hours.       Recent Labs     12/31/24  0453   *   K 4.5      CO2 23   PHOS 3.4   BUN 24*   CREATININE 1.7*        Recent Labs     12/29/24  1120   INR 1.28*             LOWER EXTREMITY EXAMINATION    Dressing to left lower extremity left clean, dry, and intact.  No strikethrough noted to external dressing.       IMAGING:  Arterial duplex B/L LE (12/24/2024)  Right CAROLE 1.07; waveforms diffusely multiphasic  Left CAROLE 0.93; waveforms diffusely multiphasic with biphasic at PT artery    MRI left foot (12/22/2024):   IMPRESSION:  1. Ulceration along the heel with abnormal marrow signal in the posterior calcaneal process compatible with osteomyelitis.  2. Previous amputation with presumed reactive edema around the distal foot.  3.

## 2024-12-31 NOTE — PROGRESS NOTES
Physical Therapy  Facility/Department: [address]  Physical Therapy Initial Assessment / Treatment    Name: Veronica Chaidez  : 1950  MRN: [id number]  Date of Service: 2024    Discharge Recommendations:  Subacute/Skilled Nursing Facility   PT Equipment Recommendations  Equipment Needed: No  Other: defer to next level of care      Patient Diagnosis(es): The primary encounter diagnosis was Urinary tract infection without hematuria, site unspecified. Diagnoses of Non-healing open wound of heel, initial encounter, Pain in left leg, and Acute hematogenous osteomyelitis of left foot were also pertinent to this visit.  Past Medical History:  has a past medical history of A-fib (HCC), CAD, multiple vessel, Coronary artery disease involving coronary bypass graft, Diabetes mellitus (HCC), Encounter for imaging to screen for metal prior to MRI, Enlarged prostate, Environmental allergies, Kidney stones, Pacemaker, and Systolic CHF (HCC).  Past Surgical History:  has a past surgical history that includes Cardiac catheterization (12/10/2019); Nasal polyp surgery (x2); Nasal septum surgery; Coronary artery bypass graft (N/A, 2020); Foot surgery (Left, 2024); Foot Debridement (Left, 2024); and Wound Vacuum Application (Left, 2024).    Assessment  Body Structures, Functions, Activity Limitations Requiring Skilled Therapeutic Intervention: Decreased functional mobility ;Decreased balance  Assessment: Pt is 74 y.o. male admit with diabetic foot infection, is now POD #1 s/p left foot I&D with partial calcanectomy. Pt is now strict NWB to L LE. Requires 2 person assist for transfer from chair to RW. Casandra good effort but poor hopping ability. Not safe to ambulate or perform stand pivot transfer at this time. Demos good understanding of NWB status to L LE but requires freuquent cues for compliance. Rec SNF at d/c.  Treatment Diagnosis: impaired functional mobility  Therapy Prognosis: Good  Decision

## 2024-12-31 NOTE — PROGRESS NOTES
ID Follow-up NOTE    CC:   Left calcaneus osteomyelitis   Antibiotics: Zosyn, Linezolid    Admit Date: 2024  Hospital Day: 11    Subjective:     POD#1- L foot I&D, partial calcanectomy    No L foot / surg site pain  Asking about plan for guo catheter (failed void trial  per RN)    Objective:     Patient Vitals for the past 8 hrs:   BP Temp Temp src Pulse Resp SpO2 Weight   24 0802 105/63 98.1 °F (36.7 °C) Oral 69 18 94 % --   24 0555 -- -- -- -- -- -- 119.2 kg (262 lb 12.6 oz)   24 0422 -- -- -- -- 18 -- --   24 0304 121/73 98.3 °F (36.8 °C) Oral 98 18 92 % --     I/O last 3 completed shifts:  In: 1590 [P.O.:1190; I.V.:400]  Out: 1270 [Urine:1250; Blood:20]  No intake/output data recorded.    EXAM:  GENERAL: No apparent distress.  RA  HEENT: Membranes moist, no oral lesion  NECK:  Supple, no lymphadenopathy  LUNGS: Clear b/l, no rales, no dullness  CARDIAC: RRR, no murmur appreciated  ABD:  + BS, soft / NT  EXT:  L foot w dressing / ACE  NEURO: No focal neurologic findings  PSYCH: Orientation, sensorium, mood normal  LINES:  R PICC, placed        Data Review:  Lab Results   Component Value Date    WBC 10.2 2024    HGB 9.7 (L) 2024    HCT 29.0 (L) 2024    MCV 87.7 2024     2024     Lab Results   Component Value Date    CREATININE 1.7 (H) 2024    BUN 24 (H) 2024     (L) 2024    K 4.5 2024     2024    CO2 23 2024       Hepatic Function Panel:   Lab Results   Component Value Date/Time    ALKPHOS 76 2024 05:13 AM    ALT 7 2024 05:13 AM    AST 16 2024 05:13 AM    BILITOT 0.5 2024 05:13 AM    BILIDIR <0.2 2021 03:14 PM    IBILI see below 2021 03:14 PM       MICRO:  2024 UCx > 100k S aureus (MSSA)  2024 UCx no growth                     BC x 2 no growth  2024 MRSA nares negative    IMAGIN/21 CXR neg       MRI of the left foot without  reviewed OR note       Plan:     Cont Zosyn  Cont Daptomycin     Will f/u on OR path (no cult sent)    Recommend iv antibiotics after discharge    - calcaneous resection will not be definitive   - PICC in place    See SAMINA      Medical Decision Making:  The following items were considered in medical decision making:  Discussion of patient care with other providers  Reviewed clinical lab tests  Reviewed radiology tests  Reviewed other diagnostic tests/interventions  Independent review of radiologic images - reviewed MRI w Rad  Microbiology cultures and other micro tests reviewed       Discussed with pt      Anthony Peterson MD    Recommended Follow-up, Labs or Other Treatments After Discharge:    ID INFUSION ORDERS:    Daptomycin 750 mg iv daily through 2/12/25  Ertapenem 1 gm iv daily through 2/12/25  - Diagnosis - L calcaneous osteomyelitis   - First dose given in hospital  - Routine CVC care   - Labs: CBC w diff, CMP, ESR, CRP, CK every Mon or Tue, FAX results to [phone]  - Call with antibiotic / infusion issues, [phone]  - Call with any change in status, transfer in or out of a facility or to hospital - [phone]  - Orders only valid for current disposition, NOT transferable upon discharge from facility or new admission to another facility.  - No f/u in outpatient ID office necessary    Anthony Peterson MD

## 2024-12-31 NOTE — PROGRESS NOTES
Occupational Therapy  Facility/Department: [address]  Occupational Therapy Initial Assessment    Name: Veronica Chaidez  : 1950  MRN: [id number]  Date of Service: 2024    Discharge Recommendations:  Subacute/Skilled Nursing Facility  OT Equipment Recommendations  Equipment Needed: No       Patient Diagnosis(es): The primary encounter diagnosis was Urinary tract infection without hematuria, site unspecified. Diagnoses of Non-healing open wound of heel, initial encounter, Pain in left leg, and Acute hematogenous osteomyelitis of left foot were also pertinent to this visit.  Past Medical History:  has a past medical history of A-fib (HCC), CAD, multiple vessel, Coronary artery disease involving coronary bypass graft, Diabetes mellitus (HCC), Encounter for imaging to screen for metal prior to MRI, Enlarged prostate, Environmental allergies, Kidney stones, Pacemaker, and Systolic CHF (HCC).  Past Surgical History:  has a past surgical history that includes Cardiac catheterization (12/10/2019); Nasal polyp surgery (x2); Nasal septum surgery; Coronary artery bypass graft (N/A, 2020); Foot surgery (Left, 2024); Foot Debridement (Left, 2024); and Wound Vacuum Application (Left, 2024).    Treatment Diagnosis: imp mob, tf, ADL      Assessment  Performance deficits / Impairments: Decreased functional mobility ;Decreased ADL status;Decreased endurance  Assessment: From home alone, lives in RV and diaz in various areas. Pt completing mobility with Ax2 this date. Pt currently NWB to LLE. Attempting sit<>stands and pivot/hops this date with assist of two people and max cues for strict NWB. Would benefit from cont inpt care at [place].  Treatment Diagnosis: imp mob, tf, ADL  Decision Making: Medium Complexity  REQUIRES OT FOLLOW-UP: Yes  Activity Tolerance  Activity Tolerance: Patient Tolerated treatment well     Plan  Occupational Therapy Plan  Times Per Week:  (UE)  Strength: Within functional limits (UE)  ADL  UE Dressing: Contact guard assistance  Putting On/Taking Off Footwear: Maximum assistance  Toileting: Maximum assistance  Toileting Skilled Clinical Factors: cath leak upon arrival, RN aware ++ time for clean up  Functional Mobility: Dependent/Total  Product Used : N/A              Vision  Vision: Impaired  Vision Exceptions: Wears glasses at all times  Hearing  Hearing: Within functional limits  Cognition  Overall Cognitive Status: Exceptions  Attention Span: Attends with cues to redirect  Cognition Comment: tangential conversation, needs cues to redirect attention and focus on task at hand  Orientation  Overall Orientation Status: Within Functional Limits                  Education Given To: Patient  Education Provided: Role of Therapy;Plan of Care;ADL Adaptive Strategies;Transfer Training  Education Method: Demonstration;Verbal  Barriers to Learning: None  Education Outcome: Verbalized understanding;Demonstrated understanding                     G-Code     OutComes Score                                                  AM-PAC - ADL  AM-PAC Daily Activity - Inpatient   How much help is needed for putting on and taking off regular lower body clothing?: A Lot  How much help is needed for bathing (which includes washing, rinsing, drying)?: A Lot  How much help is needed for toileting (which includes using toilet, bedpan, or urinal)?: A Lot  How much help is needed for putting on and taking off regular upper body clothing?: A Little  How much help is needed for taking care of personal grooming?: A Little  How much help for eating meals?: None  AM-PAC Inpatient Daily Activity Raw Score: 16  AM-PAC Inpatient ADL T-Scale Score : 35.96  ADL Inpatient CMS 0-100% Score: 53.32  ADL Inpatient CMS G-Code Modifier : CK    Tinneti Score       Goals  Short Term Goals  Time Frame for Short Term Goals: dc  Short Term Goal 1: supine to sit with SPVN  Short Term Goal 2: Sit<>stand

## 2025-01-01 LAB
ANION GAP SERPL CALCULATED.3IONS-SCNC: 8 MMOL/L (ref 3–16)
BASOPHILS # BLD: 0.1 K/UL (ref 0–0.2)
BASOPHILS NFR BLD: 0.7 %
BUN SERPL-MCNC: 29 MG/DL (ref 7–20)
CALCIUM SERPL-MCNC: 8.3 MG/DL (ref 8.3–10.6)
CHLORIDE SERPL-SCNC: 103 MMOL/L (ref 99–110)
CK SERPL-CCNC: 159 U/L (ref 39–308)
CO2 SERPL-SCNC: 22 MMOL/L (ref 21–32)
CREAT SERPL-MCNC: 2 MG/DL (ref 0.8–1.3)
DEPRECATED RDW RBC AUTO: 14.6 % (ref 12.4–15.4)
EKG DIAGNOSIS: NORMAL
EKG Q-T INTERVAL: 398 MS
EKG QRS DURATION: 92 MS
EKG QTC CALCULATION (BAZETT): 481 MS
EKG R AXIS: 77 DEGREES
EKG T AXIS: 74 DEGREES
EKG VENTRICULAR RATE: 88 BPM
EOSINOPHIL # BLD: 0.5 K/UL (ref 0–0.6)
EOSINOPHIL NFR BLD: 6.2 %
GFR SERPLBLD CREATININE-BSD FMLA CKD-EPI: 35 ML/MIN/{1.73_M2}
GLUCOSE BLD-MCNC: 103 MG/DL (ref 70–99)
GLUCOSE BLD-MCNC: 115 MG/DL (ref 70–99)
GLUCOSE BLD-MCNC: 138 MG/DL (ref 70–99)
GLUCOSE BLD-MCNC: 189 MG/DL (ref 70–99)
GLUCOSE SERPL-MCNC: 134 MG/DL (ref 70–99)
HCT VFR BLD AUTO: 25.8 % (ref 40.5–52.5)
HGB BLD-MCNC: 8.5 G/DL (ref 13.5–17.5)
LYMPHOCYTES # BLD: 1.3 K/UL (ref 1–5.1)
LYMPHOCYTES NFR BLD: 17.3 %
MCH RBC QN AUTO: 28.9 PG (ref 26–34)
MCHC RBC AUTO-ENTMCNC: 32.8 G/DL (ref 31–36)
MCV RBC AUTO: 88.2 FL (ref 80–100)
MONOCYTES # BLD: 0.6 K/UL (ref 0–1.3)
MONOCYTES NFR BLD: 8.3 %
NEUTROPHILS # BLD: 5.2 K/UL (ref 1.7–7.7)
NEUTROPHILS NFR BLD: 67.5 %
PERFORMED ON: ABNORMAL
PLATELET # BLD AUTO: 355 K/UL (ref 135–450)
PMV BLD AUTO: 7.1 FL (ref 5–10.5)
POTASSIUM SERPL-SCNC: 4.3 MMOL/L (ref 3.5–5.1)
RBC # BLD AUTO: 2.92 M/UL (ref 4.2–5.9)
SODIUM SERPL-SCNC: 133 MMOL/L (ref 136–145)
WBC # BLD AUTO: 7.7 K/UL (ref 4–11)

## 2025-01-01 PROCEDURE — 6370000000 HC RX 637 (ALT 250 FOR IP)

## 2025-01-01 PROCEDURE — 82550 ASSAY OF CK (CPK): CPT

## 2025-01-01 PROCEDURE — 2580000003 HC RX 258

## 2025-01-01 PROCEDURE — 6360000002 HC RX W HCPCS

## 2025-01-01 PROCEDURE — 85025 COMPLETE CBC W/AUTO DIFF WBC: CPT

## 2025-01-01 PROCEDURE — 1200000000 HC SEMI PRIVATE

## 2025-01-01 PROCEDURE — 2580000003 HC RX 258: Performed by: INTERNAL MEDICINE

## 2025-01-01 PROCEDURE — 2500000003 HC RX 250 WO HCPCS

## 2025-01-01 PROCEDURE — 6360000002 HC RX W HCPCS: Performed by: INTERNAL MEDICINE

## 2025-01-01 PROCEDURE — 93010 ELECTROCARDIOGRAM REPORT: CPT | Performed by: INTERNAL MEDICINE

## 2025-01-01 PROCEDURE — 80048 BASIC METABOLIC PNL TOTAL CA: CPT

## 2025-01-01 PROCEDURE — 99232 SBSQ HOSP IP/OBS MODERATE 35: CPT | Performed by: INTERNAL MEDICINE

## 2025-01-01 RX ADMIN — BENZONATATE 100 MG: 100 CAPSULE ORAL at 20:21

## 2025-01-01 RX ADMIN — BENZONATATE 100 MG: 100 CAPSULE ORAL at 16:16

## 2025-01-01 RX ADMIN — AMIODARONE HYDROCHLORIDE 200 MG: 200 TABLET ORAL at 08:28

## 2025-01-01 RX ADMIN — ENOXAPARIN SODIUM 120 MG: 150 INJECTION SUBCUTANEOUS at 20:25

## 2025-01-01 RX ADMIN — TAMSULOSIN HYDROCHLORIDE 0.4 MG: 0.4 CAPSULE ORAL at 08:28

## 2025-01-01 RX ADMIN — PIPERACILLIN AND TAZOBACTAM 3375 MG: 3; .375 INJECTION, POWDER, LYOPHILIZED, FOR SOLUTION INTRAVENOUS at 00:21

## 2025-01-01 RX ADMIN — BENZONATATE 100 MG: 100 CAPSULE ORAL at 08:28

## 2025-01-01 RX ADMIN — PIPERACILLIN AND TAZOBACTAM 3375 MG: 3; .375 INJECTION, POWDER, LYOPHILIZED, FOR SOLUTION INTRAVENOUS at 08:40

## 2025-01-01 RX ADMIN — ENOXAPARIN SODIUM 120 MG: 150 INJECTION SUBCUTANEOUS at 11:57

## 2025-01-01 RX ADMIN — MICONAZOLE NITRATE: 20 POWDER TOPICAL at 20:21

## 2025-01-01 RX ADMIN — INSULIN LISPRO 1 UNITS: 100 INJECTION, SOLUTION INTRAVENOUS; SUBCUTANEOUS at 11:57

## 2025-01-01 RX ADMIN — ACETAMINOPHEN 650 MG: 325 TABLET ORAL at 08:28

## 2025-01-01 RX ADMIN — MICONAZOLE NITRATE: 20 POWDER TOPICAL at 08:28

## 2025-01-01 RX ADMIN — SODIUM CHLORIDE, PRESERVATIVE FREE 10 ML: 5 INJECTION INTRAVENOUS at 09:00

## 2025-01-01 RX ADMIN — OXYCODONE 5 MG: 5 TABLET ORAL at 08:27

## 2025-01-01 RX ADMIN — GUAIFENESIN 600 MG: 600 TABLET ORAL at 20:21

## 2025-01-01 RX ADMIN — GUAIFENESIN 600 MG: 600 TABLET ORAL at 08:28

## 2025-01-01 RX ADMIN — BENZOCAINE 6 MG-MENTHOL 10 MG LOZENGES 1 LOZENGE: at 23:26

## 2025-01-01 RX ADMIN — PIPERACILLIN AND TAZOBACTAM 3375 MG: 3; .375 INJECTION, POWDER, LYOPHILIZED, FOR SOLUTION INTRAVENOUS at 18:29

## 2025-01-01 RX ADMIN — DAPTOMYCIN 700 MG: 500 INJECTION, POWDER, LYOPHILIZED, FOR SOLUTION INTRAVENOUS at 20:16

## 2025-01-01 ASSESSMENT — PAIN SCALES - GENERAL
PAINLEVEL_OUTOF10: 3
PAINLEVEL_OUTOF10: 3
PAINLEVEL_OUTOF10: 8
PAINLEVEL_OUTOF10: 3

## 2025-01-01 ASSESSMENT — PAIN DESCRIPTION - ORIENTATION: ORIENTATION: LEFT

## 2025-01-01 ASSESSMENT — PAIN DESCRIPTION - LOCATION: LOCATION: FOOT

## 2025-01-01 ASSESSMENT — PAIN DESCRIPTION - DESCRIPTORS: DESCRIPTORS: THROBBING

## 2025-01-01 NOTE — PLAN OF CARE
Problem: Chronic Conditions and Co-morbidities  Goal: Patient's chronic conditions and co-morbidity symptoms are monitored and maintained or improved  Outcome: Progressing     Problem: Discharge Planning  Goal: Discharge to home or other facility with appropriate resources  Outcome: Progressing     Problem: Safety - Adult  Goal: Free from fall injury  Outcome: Progressing     Problem: Skin/Tissue Integrity  Goal: Absence of new skin breakdown  Description: 1.  Monitor for areas of redness and/or skin breakdown  2.  Assess vascular access sites hourly  3.  Every 4-6 hours minimum:  Change oxygen saturation probe site  4.  Every 4-6 hours:  If on nasal continuous positive airway pressure, respiratory therapy assess nares and determine need for appliance change or resting period.  Outcome: Progressing     Problem: Pain  Goal: Verbalizes/displays adequate comfort level or baseline comfort level  Outcome: Progressing  Verbalizes/displays adequate comfort level or baseline comfort level: Encourage patient to monitor pain and request assistance     Problem: ABCDS Injury Assessment  Goal: Absence of physical injury  Outcome: Progressing     Problem: Nutrition Deficit:  Goal: Optimize nutritional status  Outcome: Progressing     Problem: Respiratory - Adult  Goal: Achieves optimal ventilation and oxygenation  Outcome: Progressing

## 2025-01-01 NOTE — CARE COORDINATION
CM  following for  d/c planning:    Patient plans to  d/c to  [facility]U:    D/C order  noted:     [facility] [place]    Services Available   Home Rehabilitation, Inpatient Rehabilitation, Outpatient Rehabilitation      Address   [address] [address]             Contact Information    [phone]        REPORT: [phone]  FAX: [phone]    CM  spoke with Kelley ( Abdullahi [phone], ) who is  [employer]  today and informed her  of  D/C  and they can start  pre cert:    She  acknowledged and  will start pre cert  in  AM .    -  Pre cert  pending  .  Electronically signed by Nadine Benito RN on 1/1/2025 at 12:58 PM       Nadine Benito RN Case Manager  [facility] [address]  [facility] [id number] [phone] [facility]   Fax [phone]

## 2025-01-01 NOTE — PROGRESS NOTES
74-year-old male with initial urinary retention who developed gross hematuria after likely catheter balloon inflation in the prostatic urethra.    -His hematuria has cleared significantly overnight with just manual irrigation.  We will continue manual irrigation tonight again.  We will reexamine patient in the morning.  -He will likely need to keep this indwelling Caicedo catheter for several more days given the likely balloon trauma and his failed voiding trial

## 2025-01-01 NOTE — PROGRESS NOTES
Physician Progress Note      PATIENT:               LARISSA MORALES  [facility] #:                  [id number]  :                       1950  ADMIT DATE:       2024 2:41 AM  DISCH DATE:  RESPONDING  PROVIDER #:        Ramesh Mullen MD          QUERY TEXT:    Patient admitted with Diabetic foot infection. Noted documentation of UTI in   H&P on . In order to support the diagnosis of UTI, please include   additional clinical indicators in your documentation.  Or please document if   the diagnosis of UTI has been ruled out after further study.    The medical record reflects the following:  Risk Factors: CKD, age- 74  Clinical Indicators: H&P -Acute urinary retention, acute UTI, Urinalysis   concerning for UTI  nephrology PN -Probable UTI  urine culture - No growth at 18 to 36 hours  UA -  Blood urine- Largeprotein-100  leucocyte esterase- large  WBC- >100  Treatment: Empiric ceftriaxone started in the ED IV Zosyn, IV fluids   nephrology consult, serial labs    Thank you,  CHRISTY Pink CDS  Options provided:  -- UTI present as evidenced by, Please document evidence.  -- UTI was ruled out  -- Other - I will add my own diagnosis  -- Disagree - Not applicable / Not valid  -- Disagree - Clinically unable to determine / Unknown  -- Refer to Clinical Documentation Reviewer    PROVIDER RESPONSE TEXT:    UTI was ruled out after study.    Query created by: Marques Guerrero on 2024 7:54 AM      Electronically signed by:  Ramesh Mullen MD 2025 4:02 PM

## 2025-01-01 NOTE — PROGRESS NOTES
Shift assessment completed. VSS. Patient A/O x4, call light an david bed table within reach, safety measures in place, hourly rounding and visual checks in place, will continue to monitor.

## 2025-01-01 NOTE — PROGRESS NOTES
This is a very pleasant 74-year-old gentleman who we have been seeing for urinary retention.  He had prior indwelling Caiecdo catheter in place draining clear yellow urine.  This was removed yesterday for voiding trial that he apparently failed.  The catheter was replaced.  It is unclear if the catheter ever drained well.  At some point today the nurse noted that the catheter was not draining well and the catheter was therefore removed.  The patient developed urethral bleeding and clots were noted at the tip of the catheter.  A new catheter was appropriately placed by the nurse but the urine drainage has continued to be bloody.    I personally manually irrigated the 16 Indian catheter that was replaced by the nurse.  I irrigated out a tremendous amount of old blood clot.  The catheter irrigated to nice clear pink.  I suspect that the replaced catheter yesterday had the balloon inflated in the prostate.  This should hopefully resolve with continued intermittent manual irrigation.  Given the catheter was functioning well and the urine cleared with manual irrigation I did not exchange his catheter for a hematuria catheter.  We will continue manual irrigation overnight and monitor.

## 2025-01-01 NOTE — PROGRESS NOTES
Podiatric Surgery Daily Progress Note  Veronica Chaidez      Subjective :   Patient seen and examined this am at the chair.  Patient is now postop day 2 following left foot I&D with partial calcanectomy, patient tolerated surgery well.  See nursing notes for overnight events regarding catheter.  Patient denies N/V/F/C/SOB. Patient denies calf pain, thigh pain, chest pain.     Review of Systems: A 12 point review of symptoms is unremarkable with the exception of the chief complaint. Patient specifically denies nausea, fever, vomiting, chills, shortness of breath, chest pain, abdominal pain, constipation or difficulty urinating.       Objective     /68   Pulse 80   Temp 98.4 °F (36.9 °C) (Oral)   Resp 18   Ht 1.88 m (6' 2\")   Wt 119.2 kg (262 lb 12.6 oz)   SpO2 94%   BMI 33.74 kg/m²      I/O:  Intake/Output Summary (Last 24 hours) at 1/1/2025 1105  Last data filed at 1/1/2025 0828  Gross per 24 hour   Intake 2457.25 ml   Output 1350 ml   Net 1107.25 ml              Wt Readings from Last 3 Encounters:   12/31/24 119.2 kg (262 lb 12.6 oz)   12/10/24 123.7 kg (272 lb 12.8 oz)   07/08/24 125.6 kg (276 lb 12.8 oz)       LABS:    Recent Labs     12/31/24  1532 01/01/25  0459   WBC 9.1 7.7   HGB 8.6* 8.5*   HCT 26.9* 25.8*    355          Recent Labs     12/31/24  0453 01/01/25  0459   * 133*   K 4.5 4.3    103   CO2 23 22   PHOS 3.4  --    BUN 24* 29*   CREATININE 1.7* 2.0*        Recent Labs     12/29/24  1120   INR 1.28*             LOWER EXTREMITY EXAMINATION    Dressing to left lower extremity left clean, dry, and intact.  No strikethrough noted to external dressing.       IMAGING:  Arterial duplex B/L LE (12/24/2024)  Right CAROLE 1.07; waveforms diffusely multiphasic  Left CAROLE 0.93; waveforms diffusely multiphasic with biphasic at PT artery    MRI left foot (12/22/2024):   IMPRESSION:  1. Ulceration along the heel with abnormal marrow signal in the posterior calcaneal process compatible with

## 2025-01-01 NOTE — PROGRESS NOTES
Pt noted a puddle of bloody urine under chair and blood coming from penis. MD notified that guo may not be in bladder, advised to exchange guo. New guo draining blood and clots. Urology consulted and at bedside to assess.   Electronically signed by Haritha Pfeiffer RN on 12/31/2024 at 7:57 PM

## 2025-01-02 LAB
GLUCOSE BLD-MCNC: 102 MG/DL (ref 70–99)
GLUCOSE BLD-MCNC: 128 MG/DL (ref 70–99)
GLUCOSE BLD-MCNC: 136 MG/DL (ref 70–99)
GLUCOSE BLD-MCNC: 174 MG/DL (ref 70–99)
PERFORMED ON: ABNORMAL

## 2025-01-02 PROCEDURE — 2500000003 HC RX 250 WO HCPCS

## 2025-01-02 PROCEDURE — 6370000000 HC RX 637 (ALT 250 FOR IP): Performed by: NURSE PRACTITIONER

## 2025-01-02 PROCEDURE — 97535 SELF CARE MNGMENT TRAINING: CPT

## 2025-01-02 PROCEDURE — 6360000002 HC RX W HCPCS: Performed by: INTERNAL MEDICINE

## 2025-01-02 PROCEDURE — 1200000000 HC SEMI PRIVATE

## 2025-01-02 PROCEDURE — 2580000003 HC RX 258

## 2025-01-02 PROCEDURE — 99233 SBSQ HOSP IP/OBS HIGH 50: CPT | Performed by: INTERNAL MEDICINE

## 2025-01-02 PROCEDURE — 6360000002 HC RX W HCPCS

## 2025-01-02 PROCEDURE — 6370000000 HC RX 637 (ALT 250 FOR IP)

## 2025-01-02 PROCEDURE — 97110 THERAPEUTIC EXERCISES: CPT

## 2025-01-02 PROCEDURE — 2580000003 HC RX 258: Performed by: INTERNAL MEDICINE

## 2025-01-02 PROCEDURE — 99232 SBSQ HOSP IP/OBS MODERATE 35: CPT | Performed by: INTERNAL MEDICINE

## 2025-01-02 RX ADMIN — AMIODARONE HYDROCHLORIDE 200 MG: 200 TABLET ORAL at 08:41

## 2025-01-02 RX ADMIN — ENOXAPARIN SODIUM 120 MG: 150 INJECTION SUBCUTANEOUS at 08:54

## 2025-01-02 RX ADMIN — PIPERACILLIN AND TAZOBACTAM 3375 MG: 3; .375 INJECTION, POWDER, LYOPHILIZED, FOR SOLUTION INTRAVENOUS at 00:35

## 2025-01-02 RX ADMIN — MICONAZOLE NITRATE: 20 POWDER TOPICAL at 08:41

## 2025-01-02 RX ADMIN — TIZANIDINE 2 MG: 4 TABLET ORAL at 22:36

## 2025-01-02 RX ADMIN — DAPTOMYCIN 700 MG: 500 INJECTION, POWDER, LYOPHILIZED, FOR SOLUTION INTRAVENOUS at 18:57

## 2025-01-02 RX ADMIN — BENZONATATE 100 MG: 100 CAPSULE ORAL at 08:41

## 2025-01-02 RX ADMIN — SODIUM CHLORIDE 30 ML: 9 INJECTION, SOLUTION INTRAVENOUS at 08:49

## 2025-01-02 RX ADMIN — PIPERACILLIN AND TAZOBACTAM 3375 MG: 3; .375 INJECTION, POWDER, LYOPHILIZED, FOR SOLUTION INTRAVENOUS at 17:13

## 2025-01-02 RX ADMIN — TAMSULOSIN HYDROCHLORIDE 0.4 MG: 0.4 CAPSULE ORAL at 08:41

## 2025-01-02 RX ADMIN — BENZONATATE 100 MG: 100 CAPSULE ORAL at 20:35

## 2025-01-02 RX ADMIN — BENZONATATE 100 MG: 100 CAPSULE ORAL at 15:04

## 2025-01-02 RX ADMIN — SODIUM CHLORIDE, PRESERVATIVE FREE 10 ML: 5 INJECTION INTRAVENOUS at 20:29

## 2025-01-02 RX ADMIN — PIPERACILLIN AND TAZOBACTAM 3375 MG: 3; .375 INJECTION, POWDER, LYOPHILIZED, FOR SOLUTION INTRAVENOUS at 08:50

## 2025-01-02 RX ADMIN — ENOXAPARIN SODIUM 120 MG: 150 INJECTION SUBCUTANEOUS at 20:26

## 2025-01-02 RX ADMIN — SODIUM CHLORIDE, PRESERVATIVE FREE 10 ML: 5 INJECTION INTRAVENOUS at 08:41

## 2025-01-02 ASSESSMENT — PAIN SCALES - GENERAL
PAINLEVEL_OUTOF10: 1
PAINLEVEL_OUTOF10: 4
PAINLEVEL_OUTOF10: 5
PAINLEVEL_OUTOF10: 0
PAINLEVEL_OUTOF10: 4
PAINLEVEL_OUTOF10: 1

## 2025-01-02 ASSESSMENT — PAIN DESCRIPTION - LOCATION
LOCATION: LEG

## 2025-01-02 ASSESSMENT — PAIN DESCRIPTION - DESCRIPTORS
DESCRIPTORS: SPASM

## 2025-01-02 ASSESSMENT — PAIN DESCRIPTION - ONSET
ONSET: ON-GOING

## 2025-01-02 ASSESSMENT — PAIN - FUNCTIONAL ASSESSMENT
PAIN_FUNCTIONAL_ASSESSMENT: ACTIVITIES ARE NOT PREVENTED

## 2025-01-02 ASSESSMENT — PAIN DESCRIPTION - PAIN TYPE
TYPE: ACUTE PAIN

## 2025-01-02 ASSESSMENT — PAIN DESCRIPTION - FREQUENCY
FREQUENCY: INTERMITTENT

## 2025-01-02 ASSESSMENT — PAIN DESCRIPTION - ORIENTATION
ORIENTATION: RIGHT;LEFT

## 2025-01-02 NOTE — PROGRESS NOTES
Comprehensive Nutrition Assessment    RECOMMENDATIONS:  PO Diet: Continue Regular; 4 CC  Nutrition Supplement: resume Bong 1 pkt bid  Nutrition Education: No recommendation at this time     NUTRITION ASSESSMENT:   Nutritional summary & status: Follow-up. Meal intake remains good at %. Bong ordered for wound healing on previous RD visit. Pt stated that he received only one time. Bong not resumed following NPO status. Pt would like to resume supplement. Will order bid. Discharge pending precert for [facility].   Admission // PMH: Diabetic foot infection//HTN, T2DM, CAD, s/p CABG    MALNUTRITION ASSESSMENT  Context of Malnutrition: Chronic Illness   Malnutrition Status: No malnutrition  Findings of the 6 clinical characteristics of malnutrition (Minimum of 2 out of 6 clinical characteristics is required to make the diagnosis of moderate or severe Protein Calorie Malnutrition based on AND/ASPEN Guidelines):  Energy Intake:  No decrease in energy intake  Weight Loss:  No weight loss (wt decrease 2/2 to fluid loss)     Body Fat Loss:  No body fat loss     Muscle Mass Loss:  No muscle mass loss      NUTRITION DIAGNOSIS   Increased nutrient needs related to increase demand for energy/nutrients as evidenced by wounds    Nutrition Monitoring and Evaluation:   Food/Nutrient Intake Outcomes:  Food and Nutrient Intake, Supplement Intake  Physical Signs/Symptoms Outcomes:  Biochemical Data, Nutrition Focused Physical Findings, Skin, Weight     OBJECTIVE DATA: Significant to nutrition assessment  Nutrition Related Findings: Glu 102. LBM 12/31. BLE +1 pitting edema.  Wounds: Diabetic Ulcer (lt heel)  Nutrition Goals: PO intake 75% or greater     CURRENT NUTRITION THERAPIES  ADULT DIET; Regular; 4 carb choices (60 gm/meal); Low Sodium (2 gm)  ADULT ORAL NUTRITION SUPPLEMENT; Breakfast, Dinner; Wound Healing Oral Supplement  PO Intake: %   PO Supplement Intake:None Ordered  Additional Sources of Calories/IVF:n/a

## 2025-01-02 NOTE — PROGRESS NOTES
Occupational Therapy  Facility/Department: [address]  Daily Treatment Note  NAME: Veronica Chaidez  : 1950  MRN: [id number]    Date of Service: 2025    Discharge Recommendations:  Subacute/Skilled Nursing Facility         Patient Diagnosis(es): The primary encounter diagnosis was Urinary tract infection without hematuria, site unspecified. Diagnoses of Non-healing open wound of heel, initial encounter, Pain in left leg, and Acute hematogenous osteomyelitis of left foot were also pertinent to this visit.     Assessment   Assessment: Pt with fair tolerance for therapy. No transfer completed this session. Pt able to complete ADLs seated in recliner chair with set up. UE exercise completed seated with heavy encouragment and limited tolerance. Pt declining transfer this session having completed with RN prior to session with Rosenda knight.  Pt would benefit from inpt OT upon discharge        Discharge Recommendations: Subacute/Skilled Nursing Facility     Plan  Occupational Therapy Plan  Times Per Week: 2-5    Restrictions  Position Activity Restriction  Other Position/Activity Restrictions: strict NWB L LE    Subjective  Subjective  Subjective: Pt met seated in  recliner chair and agreeable to OT session.  Orientation  Overall Orientation Status: Within Functional Limits  Cognition  Attention Span: Attends with cues to redirect       Objective     ADL  Grooming: Setup  Grooming Skilled Clinical Factors: pt completing oral care and washing hands and face seated in recliner chair. Pt is very talkative requiring increased time with verbal cues for completing task      Functional Mobility: Dependent/Total  Functional Mobility Skilled Clinical Factors: Transfer completed with RN via Rosenda knight prior entrance.      OT Exercises  Exercise Treatment: x10 reps chair push ups with increased time and multiple rest breaks.     Safety Devices  Type of Devices: Chair alarm in place;Gait belt;Nurse notified;Left in chair;Call  light within reach    Patient Education  Education Given To: Patient  Education Provided: Role of Therapy;Plan of Care;ADL Adaptive Strategies;Transfer Training  Education Method: Demonstration;Verbal  Barriers to Learning: None  Education Outcome: Verbalized understanding;Demonstrated understanding    Goals  Short Term Goals  Time Frame for Short Term Goals: dc  Short Term Goal 1: supine to sit with SPVN - ongoing  Short Term Goal 2: Sit<>stand with David - ongoing  Short Term Goal 3: pivot bed to chair/BSC with David - ongoing  Short Term Goal 4: LB dressing with David - ongoing  Short Term Goal 5: toileting with David - ongoing    AM-PAC - ADL  AM-PAC Daily Activity - Inpatient   How much help is needed for putting on and taking off regular lower body clothing?: A Lot  How much help is needed for bathing (which includes washing, rinsing, drying)?: A Lot  How much help is needed for toileting (which includes using toilet, bedpan, or urinal)?: A Lot  How much help is needed for putting on and taking off regular upper body clothing?: A Little  How much help is needed for taking care of personal grooming?: A Little  How much help for eating meals?: None  AM-PAC Inpatient Daily Activity Raw Score: 16  AM-PAC Inpatient ADL T-Scale Score : 35.96  ADL Inpatient CMS 0-100% Score: 53.32  ADL Inpatient CMS G-Code Modifier : CK    Therapy Time   Individual Concurrent Group Co-treatment   Time In 1515         Time Out 1540         Minutes 25         Timed Code Treatment Minutes: 25 Minutes       PB Vaca/KASEY      Hydroxychloroquine Pregnancy And Lactation Text: This medication has been shown to cause fetal harm but it isn't assigned a Pregnancy Risk Category. There are small amounts excreted in breast milk.

## 2025-01-02 NOTE — PROGRESS NOTES
ID Follow-up NOTE    CC:   Left calcaneus osteomyelitis   Antibiotics: Zosyn, Linezolid    Admit Date: 2024  Hospital Day: 13    Subjective:     POD#3- L foot I&D, partial calcanectomy    No L foot / surg site pain  C/o reaction to 'cold medicine' last night     Objective:     Patient Vitals for the past 8 hrs:   BP Temp Temp src Pulse Resp SpO2   25 0436 137/74 98.2 °F (36.8 °C) Oral 84 16 97 %     I/O last 3 completed shifts:  In: 3397.2 [P.O.:1310; I.V.:898.6; Other:120; IV Piggyback:1068.6]  Out: 2420 [Urine:2420]  No intake/output data recorded.    EXAM:  GENERAL: No apparent distress.  RA  HEENT: Membranes moist, no oral lesion  NECK:  Supple, no lymphadenopathy  LUNGS: Clear b/l, no rales, no dullness  CARDIAC: RRR, no murmur appreciated  ABD:  + BS, soft / NT  EXT:  L foot w dressing / ACE  NEURO: No focal neurologic findings  PSYCH: Orientation, sensorium, mood normal  LINES:  R PICC, placed        Data Review:  Lab Results   Component Value Date    WBC 7.7 2025    HGB 8.5 (L) 2025    HCT 25.8 (L) 2025    MCV 88.2 2025     2025     Lab Results   Component Value Date    CREATININE 2.0 (H) 2025    BUN 29 (H) 2025     (L) 2025    K 4.3 2025     2025    CO2 22 2025       Hepatic Function Panel:   Lab Results   Component Value Date/Time    ALKPHOS 76 2024 05:13 AM    ALT 7 2024 05:13 AM    AST 16 2024 05:13 AM    BILITOT 0.5 2024 05:13 AM    BILIDIR <0.2 2021 03:14 PM    IBILI see below 2021 03:14 PM       MICRO:  2024 UCx > 100k S aureus (MSSA)  2024 UCx no growth                     BC x 2 no growth  2024 MRSA nares negative    IMAGIN/21 CXR neg       MRI of the left foot without contrast  IMPRESSION:  1. Ulceration along the heel with abnormal marrow signal in the posterior calcaneal process compatible with osteomyelitis.  2. Previous amputation

## 2025-01-02 NOTE — CARE COORDINATION
Case Management           Date/ Time of Note: 1/2/2025 9:08 AM  Note completed by: EYAL Calvert, DELVIN    If patient is discharged prior to next notation, then this note serves as note for discharge by case management.    Patient Name: Veronica Chaidez  YOB: 1950    Diagnosis:Diabetic foot infection (HCC) [E11.628, L08.9]  Urinary tract infection without hematuria, site unspecified [N39.0]  Non-healing open wound of heel, initial encounter [S91.309A]  Patient Admission Status: Inpatient  Date of Admission:12/21/2024  2:41 AM    Length of Stay: 12 GLOS: GMLOS: 6.2 Readmission Risk Score: Readmission Risk Score: 20.7    Current Plan of Care:   9:08 AM  Patient is from a Motor Home and is modified independent at baseline using a cane as needed. His motor home currently does not have a furnace (a friend is helping to repair it). Pt has decided to dc to [facility] rehab. [facility] has accepted.    LEROY spoke to Abdullahi in admissions at [facility], he is aware and starting pre-cert today. Pre-cert was unable to be submitted yesterday due to the holiday since the medicaid office was closed.        REPORT: [phone]  FAX: [phone]    Referrals completed: Inpatient Rehab: [facility]    Resources/ information provided: Not indicated at this time    IMM Status:        Veronica and his family were provided with choice of provider; he and his family are in agreement with the discharge plan.    Electronically signed by EYAL Calvert, DELVIN, DENTON on 1/2/2025 at 9:08 AM  [facility]  Case Management Department  Ph: [phone]

## 2025-01-02 NOTE — PLAN OF CARE
Problem: Chronic Conditions and Co-morbidities  Goal: Patient's chronic conditions and co-morbidity symptoms are monitored and maintained or improved  Outcome: Progressing  Flowsheets (Taken 1/1/2025 0845 by Lisa Suazo, RN)  Care Plan - Patient's Chronic Conditions and Co-Morbidity Symptoms are Monitored and Maintained or Improved: Monitor and assess patient's chronic conditions and comorbid symptoms for stability, deterioration, or improvement     Problem: Discharge Planning  Goal: Discharge to home or other facility with appropriate resources  Outcome: Progressing  Flowsheets (Taken 1/1/2025 0845 by Lisa Suazo, RN)  Discharge to home or other facility with appropriate resources: Identify barriers to discharge with patient and caregiver     Problem: Safety - Adult  Goal: Free from fall injury  Outcome: Progressing     Problem: Skin/Tissue Integrity  Goal: Absence of new skin breakdown  Description: 1.  Monitor for areas of redness and/or skin breakdown  2.  Assess vascular access sites hourly  3.  Every 4-6 hours minimum:  Change oxygen saturation probe site  4.  Every 4-6 hours:  If on nasal continuous positive airway pressure, respiratory therapy assess nares and determine need for appliance change or resting period.  Outcome: Progressing     Problem: Pain  Goal: Verbalizes/displays adequate comfort level or baseline comfort level  Outcome: Progressing  Flowsheets (Taken 1/1/2025 0827 by Lisa Suazo, RN)  Verbalizes/displays adequate comfort level or baseline comfort level:   Encourage patient to monitor pain and request assistance   Assess pain using appropriate pain scale   Administer analgesics based on type and severity of pain and evaluate response   Implement non-pharmacological measures as appropriate and evaluate response     Problem: ABCDS Injury Assessment  Goal: Absence of physical injury  Outcome: Progressing     Problem: Nutrition Deficit:  Goal: Optimize nutritional status  Outcome:  [Labia Majora] : normal [Labia Minora] : normal [Normal] : normal [Uterine Adnexae] : normal

## 2025-01-02 NOTE — PROGRESS NOTES
Nephrology Progress Note                                                                                                                                                                                                                                                                                                                                                               Office : [phone]     Fax :[phone]    Patient's Name: Veronica Chaidez  1/2/2025    Reason for Consult:  NANETTE   Requesting Physician:  Good Renee MD  Chief Complaint:    Chief Complaint   Patient presents with    Urinary Retention     Difficulty urinating the last few days        Assessment/Plan     NANETTE  Creatinine is improving and back to baseline   Etiology of NANETTE seems to be obstructive in nature  On admission significant amount of urine in the bladder.  Guo catheter was placed  Urology on board   MONITOR PVR - needed guo replaced   Monitor RFP and urine output   CKD 3b/4   Baseline creatinine close to 1.7-2.0  Etiology from diabetic kidney disease, cardiorenal, obstructive   Probable UTI   Antibiotics   Diabetic foot   Podiatry on board   S/p OR on 12/30  Diabetic kidney disease   Proteinuria present   We will follow him in our nephrology clinic   CAD/CABG  No signs of fluid overload       History of Present Ilness:    Veronica Chaidez is a 74 y.o. male with  a chief complaint of urinary retention.  Patient states he has had a cold for the past few days and his doctor started him on Mucinex and Claritin.  Since that time he feels like he has been having difficulty urinating.  States he can get a few drops out but feels like he is not fully emptying.  Has had prostate issues in the past but not for a while.  Denies any fevers.  States his cold symptoms are still ongoing, but have gotten a little bit better.      On chart review he has hypertension, persistent A-fib on Eliquis, coronary artery disease status post CABG with left  \"LABURIN\" in the last 72 hours.    Urine Chemistry: No results for input(s): \"CLUR\", \"LABCREA\", \"PROTEINUR\", \"NAUR\" in the last 72 hours.      IMAGING:  XR FOOT LEFT (MIN 3 VIEWS)   Final Result      There is a partial calcanectomy. Status post transmetatarsal amputation. Soft tissue swelling about the ankle.      Electronically signed by Choco Muñoz      Vascular duplex lower extremity arteries bilateral   Final Result      MRI FOOT LEFT WO CONTRAST   Final Result   1. Ulceration along the heel with abnormal marrow signal in the posterior calcaneal process compatible with osteomyelitis.   2. Previous amputation with presumed reactive edema around the distal foot.   3. Diffuse dependent edema without focal fluid collection to suggest abscess formation.      Electronically signed by Sherman Stoner MD      Vascular duplex lower extremity venous left   Final Result      XR CHEST PORTABLE   Final Result   1.  No acute cardiopulmonary findings.       Electronically signed by Anastacio Barrow MD      XR FOOT LEFT (2 VIEWS)   Final Result   Impression:   1.  No osseous erosions are identified to suggest osteomyelitis. If clinical concern remains further evaluation with MRI of the foot with and without contrast should be considered.      Electronically signed by Anastacio Barrow MD          Medical Decision Making:  The following items were considered in medical decision making:  Discussion of patient care with other providers  Reviewed clinical lab tests  Reviewed radiology tests  Reviewed other diagnostic tests/interventions    Karlee Webber, APRN - CNP   [facility]  Office : [phone] or through [employer]  Fax :[phone]      I have seen the patient independently from the PA/NP .I discussed the care with the PA/NP . I personally reviewed the HPI, PH, FH, SH, ROS and medications. I repeated pertinent portions of the examination and reviewed the relevant imaging and laboratory data.

## 2025-01-02 NOTE — PROGRESS NOTES
Urology Attending Progress Note      Subjective: No issues with cath drainage overnight    Vitals:  BP (!) 149/81   Pulse 86   Temp 98.4 °F (36.9 °C) (Oral)   Resp 16   Ht 1.88 m (6' 2\")   Wt 119.2 kg (262 lb 12.6 oz)   SpO2 97%   BMI 33.74 kg/m²   Temp  Av.2 °F (36.8 °C)  Min: 97.8 °F (36.6 °C)  Max: 98.4 °F (36.9 °C)    Intake/Output Summary (Last 24 hours) at 2025 0909  Last data filed at 2025 0857  Gross per 24 hour   Intake 739.97 ml   Output 1970 ml   Net -1230.03 ml       Exam: Urine clear yellow in guo tubing     Labs:  WBC:    Lab Results   Component Value Date/Time    WBC 7.7 2025 04:59 AM     Hemoglobin/Hematocrit:    Lab Results   Component Value Date/Time    HGB 8.5 2025 04:59 AM    HCT 25.8 2025 04:59 AM     BMP:    Lab Results   Component Value Date/Time     2025 04:59 AM    K 4.3 2025 04:59 AM    K 4.2 2024 04:18 AM     2025 04:59 AM    CO2 22 2025 04:59 AM    BUN 29 2025 04:59 AM    CREATININE 2.0 2025 04:59 AM    CALCIUM 8.3 2025 04:59 AM    GFRAA 52 2022 06:50 AM    LABGLOM 35 2025 04:59 AM    LABGLOM 33 2024 05:01 AM     PT/INR:    Lab Results   Component Value Date/Time    PROTIME 16.1 2024 11:20 AM    INR 1.28 2024 11:20 AM     PTT:    Lab Results   Component Value Date/Time    APTT 37.2 2020 01:47 PM   [APTT    Impression/Plan: 75yo M with urinary retention admitted for diabetic foot infection sp I&D with partial calcenectomy 24. Developed GH after a cath insertion.     -No longer having any gross hematuria in guo. Urine appears clear yellow. No  complaints  -Fine to discharge from our standpoint with guo in place. I have advised he see us as outpatient next week to discuss removal of guo. He agrees  -Please call with any questions       SAM Shirley

## 2025-01-02 NOTE — PROGRESS NOTES
Nephrology Progress Note                                                                                                                                                                                                                                                                                                                                                               Office : [phone]     Fax :[phone]    Patient's Name: Veronica Chaidez  1/1/2025    Reason for Consult:  NANETTE   Requesting Physician:  Good Renee MD  Chief Complaint:    Chief Complaint   Patient presents with    Urinary Retention     Difficulty urinating the last few days        Assessment/Plan     NANETTE  Creatinine is improving and back to baseline   Etiology of NANETTE seems to be obstructive in nature  On admission significant amount of urine in the bladder.  Guo catheter was placed  Urology on board   MONITOR PVR - needed guo replaced   Monitor RFP and urine output   CKD 3b/4   Baseline creatinine close to 1.7-2.0  Etiology from diabetic kidney disease, cardiorenal, obstructive   Probable UTI   Antibiotics   Diabetic foot   Podiatry on board   Plan for OR on 12/30  Diabetic kidney disease   Proteinuria present   We will follow him in our nephrology clinic   CAD/CABG  No signs of fluid overload       History of Present Ilness:    Veronica Chaidez is a 74 y.o. male with  a chief complaint of urinary retention.  Patient states he has had a cold for the past few days and his doctor started him on Mucinex and Claritin.  Since that time he feels like he has been having difficulty urinating.  States he can get a few drops out but feels like he is not fully emptying.  Has had prostate issues in the past but not for a while.  Denies any fevers.  States his cold symptoms are still ongoing, but have gotten a little bit better.      On chart review he has hypertension, persistent A-fib on Eliquis, coronary artery disease status post CABG with

## 2025-01-03 VITALS
HEART RATE: 80 BPM | BODY MASS INDEX: 33.73 KG/M2 | DIASTOLIC BLOOD PRESSURE: 64 MMHG | WEIGHT: 262.79 LBS | OXYGEN SATURATION: 95 % | RESPIRATION RATE: 16 BRPM | HEIGHT: 74 IN | SYSTOLIC BLOOD PRESSURE: 123 MMHG | TEMPERATURE: 97.7 F

## 2025-01-03 LAB
ANION GAP SERPL CALCULATED.3IONS-SCNC: 10 MMOL/L (ref 3–16)
ANION GAP SERPL CALCULATED.3IONS-SCNC: 8 MMOL/L (ref 3–16)
BUN SERPL-MCNC: 23 MG/DL (ref 7–20)
BUN SERPL-MCNC: 24 MG/DL (ref 7–20)
CALCIUM SERPL-MCNC: 8.6 MG/DL (ref 8.3–10.6)
CALCIUM SERPL-MCNC: 8.7 MG/DL (ref 8.3–10.6)
CHLORIDE SERPL-SCNC: 105 MMOL/L (ref 99–110)
CHLORIDE SERPL-SCNC: 105 MMOL/L (ref 99–110)
CO2 SERPL-SCNC: 22 MMOL/L (ref 21–32)
CO2 SERPL-SCNC: 23 MMOL/L (ref 21–32)
CREAT SERPL-MCNC: 1.7 MG/DL (ref 0.8–1.3)
CREAT SERPL-MCNC: 1.7 MG/DL (ref 0.8–1.3)
GFR SERPLBLD CREATININE-BSD FMLA CKD-EPI: 42 ML/MIN/{1.73_M2}
GFR SERPLBLD CREATININE-BSD FMLA CKD-EPI: 42 ML/MIN/{1.73_M2}
GLUCOSE BLD-MCNC: 152 MG/DL (ref 70–99)
GLUCOSE BLD-MCNC: 160 MG/DL (ref 70–99)
GLUCOSE SERPL-MCNC: 116 MG/DL (ref 70–99)
GLUCOSE SERPL-MCNC: 116 MG/DL (ref 70–99)
PERFORMED ON: ABNORMAL
PERFORMED ON: ABNORMAL
POTASSIUM SERPL-SCNC: 4.4 MMOL/L (ref 3.5–5.1)
POTASSIUM SERPL-SCNC: 4.4 MMOL/L (ref 3.5–5.1)
SODIUM SERPL-SCNC: 136 MMOL/L (ref 136–145)
SODIUM SERPL-SCNC: 137 MMOL/L (ref 136–145)

## 2025-01-03 PROCEDURE — 6360000002 HC RX W HCPCS

## 2025-01-03 PROCEDURE — 6370000000 HC RX 637 (ALT 250 FOR IP): Performed by: STUDENT IN AN ORGANIZED HEALTH CARE EDUCATION/TRAINING PROGRAM

## 2025-01-03 PROCEDURE — 36592 COLLECT BLOOD FROM PICC: CPT

## 2025-01-03 PROCEDURE — 99232 SBSQ HOSP IP/OBS MODERATE 35: CPT | Performed by: INTERNAL MEDICINE

## 2025-01-03 PROCEDURE — 2500000003 HC RX 250 WO HCPCS

## 2025-01-03 PROCEDURE — 6370000000 HC RX 637 (ALT 250 FOR IP)

## 2025-01-03 PROCEDURE — 2580000003 HC RX 258

## 2025-01-03 PROCEDURE — 80048 BASIC METABOLIC PNL TOTAL CA: CPT

## 2025-01-03 RX ADMIN — TIZANIDINE 2 MG: 4 TABLET ORAL at 14:21

## 2025-01-03 RX ADMIN — PIPERACILLIN AND TAZOBACTAM 3375 MG: 3; .375 INJECTION, POWDER, LYOPHILIZED, FOR SOLUTION INTRAVENOUS at 01:44

## 2025-01-03 RX ADMIN — AMIODARONE HYDROCHLORIDE 200 MG: 200 TABLET ORAL at 09:03

## 2025-01-03 RX ADMIN — SODIUM CHLORIDE: 9 INJECTION, SOLUTION INTRAVENOUS at 01:43

## 2025-01-03 RX ADMIN — MICONAZOLE NITRATE: 20 POWDER TOPICAL at 09:03

## 2025-01-03 RX ADMIN — PIPERACILLIN AND TAZOBACTAM 3375 MG: 3; .375 INJECTION, POWDER, LYOPHILIZED, FOR SOLUTION INTRAVENOUS at 09:02

## 2025-01-03 RX ADMIN — SODIUM CHLORIDE, PRESERVATIVE FREE 10 ML: 5 INJECTION INTRAVENOUS at 09:04

## 2025-01-03 RX ADMIN — TAMSULOSIN HYDROCHLORIDE 0.4 MG: 0.4 CAPSULE ORAL at 09:03

## 2025-01-03 RX ADMIN — ENOXAPARIN SODIUM 120 MG: 150 INJECTION SUBCUTANEOUS at 09:00

## 2025-01-03 RX ADMIN — BENZONATATE 100 MG: 100 CAPSULE ORAL at 09:03

## 2025-01-03 ASSESSMENT — PAIN SCALES - GENERAL
PAINLEVEL_OUTOF10: 0

## 2025-01-03 NOTE — PROGRESS NOTES
At 1445 patient transported to [facility] via ambulance with belongings. Patient denied questions or concerns regarding discharge or medications. No distress noted. Report given to Modesta SETHI at [facility].

## 2025-01-03 NOTE — PROGRESS NOTES
Patient is A&O x4.  RA, sat 97%.  No complaints of SOB.  C/o spasm pain in BLE.   Vital signs stable as charted.  Respirations appear to easy and unlabored.  Lungs clear.  Respirations easy with no complaints of cough.  No complaints of nausea/vomiting/diarrhea.  Up with assist/walker to the bathroom or chair as needed.  LLE dressing intact with no drainage noted.  Right arm PICC line intact and flushed with brisk blood return noted.    Tolerating regular diet.  Plan of care and safety measures reviewed with the patient.  Call light in reach and bed alarm in place.  Bed attached to wall for alarm purposes.  Will continue to monitor.  Electronically signed by Jessie Guzmán RN on 1/2/2025 at 10:48 PM

## 2025-01-03 NOTE — CARE COORDINATION
Case Management Assessment            Discharge Note                    Date / Time of Note: 1/3/2025 11:27 AM                  Discharge Note Completed by: Yasmine Odonnell MSW, LSW    Patient Name: Veronica Chaidez   YOB: 1950  Diagnosis: Diabetic foot infection (HCC) [E11.628, L08.9]  Urinary tract infection without hematuria, site unspecified [N39.0]  Non-healing open wound of heel, initial encounter [S91.309A]   Date / Time: 12/21/2024  2:41 AM    Current PCP: Good Renee MD  Clinic patient: No    Hospitalization in the last 30 days: No       Advance Directives:  Code Status: Limited  [place] DNR form completed and on chart: Yes    Financial:  Payor: [employer] [place] MEDICAID / Plan: [employer] [place] MEDICA / Product Type: *No Product type* /      Pharmacy:    [facility] [address] [place] [address] - [address] - P [phone] - F [phone]  [address] [address]  Phone: [phone] Fax: [phone]      Assistance purchasing medications?: Potential Assistance Purchasing Medications: No  Assistance provided by Case Management: None at this time    Does patient want to participate in local refill/ meds to beds program?:      Meds To Beds General Rules:  1. Can ONLY be done Monday- Friday between 8:30am-5pm  2. Prescription(s) must be in pharmacy by 3pm to be filled same day  3.Copy of patient's insurance/ prescription drug card and patient face sheet must be sent along with the prescription(s)  4. Cost of Rx cannot be added to hospital bill. If financial assistance is needed, please contact unit  or ;  or  CANNOT provide pharmacy voucher for patients co-pays  5. Patients can then  the prescription on their way out of the hospital at discharge, or pharmacy can deliver to the bedside if staff is available. (payment due at time of pick-up or delivery - cash, check, or card accepted)     Able to afford home

## 2025-01-03 NOTE — PROGRESS NOTES
Nephrology Progress Note                                                                                                                                                                                                                                                                                                                                                               Office : [phone]     Fax :[phone]    Patient's Name: Veronica Chaidez  1/3/2025    Reason for Consult:  NANETTE   Requesting Physician:  Good Renee MD  Chief Complaint:    Chief Complaint   Patient presents with    Urinary Retention     Difficulty urinating the last few days        Assessment/Plan     NANETTE  Creatinine is stable and back to baseline   Etiology of NANETTE seems to be obstructive in nature  On admission significant amount of urine in the bladder.  Guo catheter was placed  Urology on board   MONITOR PVR - needed guo replaced   Monitor RFP and urine output   CKD 3b/4   Baseline creatinine close to 1.7-2.0  Etiology from diabetic kidney disease, cardiorenal, obstructive   Probable UTI   Antibiotics   Diabetic foot   Podiatry on board   S/p OR on 12/30  Diabetic kidney disease   Proteinuria present   We will follow him in our nephrology clinic   CAD/CABG  No signs of fluid overload       History of Present Ilness:    Veronica Chaidez is a 74 y.o. male with  a chief complaint of urinary retention.  Patient states he has had a cold for the past few days and his doctor started him on Mucinex and Claritin.  Since that time he feels like he has been having difficulty urinating.  States he can get a few drops out but feels like he is not fully emptying.  Has had prostate issues in the past but not for a while.  Denies any fevers.  States his cold symptoms are still ongoing, but have gotten a little bit better.      On chart review he has hypertension, persistent A-fib on Eliquis, coronary artery disease status post CABG with left  HS  collagenase ointment, Daily  benzonatate (TESSALON) capsule 100 mg, TID  miconazole (MICOTIN) 2 % powder, BID      Physical exam:     Vitals:  /64   Pulse 80   Temp 97.7 °F (36.5 °C) (Oral)   Resp 16   Ht 1.88 m (6' 2\")   Wt 119.2 kg (262 lb 12.6 oz)   SpO2 95%   BMI 33.74 kg/m²   Constitutional:  OAA X3 NAD Yes  Skin: no rash, turgor wnl  Cardiovascular:  S1, S2 without m/r/g  Respiratory: CTA B without w/r/r  Abdomen:  soft, nt, nd  Ext:  lower extremity edema : Yes, L> R   Psychiatric: mood and affect stable   Musculoskeletal:  Rom, muscular strength intact    Data:   Labs:  CBC:   Recent Labs     12/31/24  1532 01/01/25  0459   WBC 9.1 7.7   HGB 8.6* 8.5*    355       BMP:    Recent Labs     01/01/25  0459 01/03/25  0500   * 137  136   K 4.3 4.4  4.4    105  105   CO2 22 22  23   BUN 29* 23*  24*   CREATININE 2.0* 1.7*  1.7*   GLUCOSE 134* 116*  116*     Ca/Mg/Phos:   Recent Labs     01/01/25  0459 01/03/25  0500   CALCIUM 8.3 8.7  8.6     Hepatic: No results for input(s): \"AST\", \"ALT\", \"BILITOT\", \"ALKPHOS\" in the last 72 hours.    Invalid input(s): \"ALB\"  Troponin: No results for input(s): \"TROPONINI\" in the last 72 hours.  BNP: No results for input(s): \"BNP\" in the last 72 hours.  Lipids: No results for input(s): \"CHOL\", \"TRIG\", \"HDL\" in the last 72 hours.    Invalid input(s): \"LDLCALC\", \"LABVLDL\"  ABGs: No results for input(s): \"PHART\", \"PO2ART\", \"OQA5WEE\" in the last 72 hours.  INR:   No results for input(s): \"INR\" in the last 72 hours.      UA:  No results for input(s): \"COLORU\", \"CLARITYU\", \"GLUCOSEU\", \"BILIRUBINUR\", \"KETUA\", \"SPECGRAV\", \"BLOODU\", \"PHUR\", \"PROTEINU\", \"UROBILINOGEN\", \"NITRU\", \"LEUKOCYTESUR\", \"URINETYPE\" in the last 72 hours.    Invalid input(s): \"LABMICR\"     Urine Microscopic:   No results for input(s): \"LABCAST\", \"BACTERIA\", \"COMU\", \"HYALCAST\", \"WBCUA\", \"RBCUA\" in the last 72 hours.    Invalid input(s): \"EPIU\"    Urine Culture:   No results for

## 2025-01-03 NOTE — PROGRESS NOTES
contrast  IMPRESSION:  1. Ulceration along the heel with abnormal marrow signal in the posterior calcaneal process compatible with osteomyelitis.  2. Previous amputation with presumed reactive edema around the distal foot.  3. Diffuse dependent edema without focal fluid collection to suggest abscess formation.      Scheduled Meds:   [Held by provider] furosemide  20 mg Oral BID    [Held by provider] metoprolol tartrate  25 mg Oral BID    DAPTOmycin (CUBICIN) 700 mg in sodium chloride (PF) 0.9 % 14 mL IV syringe  6 mg/kg IntraVENous Q24H    sodium chloride flush  5-40 mL IntraVENous 2 times per day    piperacillin-tazobactam  3,375 mg IntraVENous Q8H    sodium chloride flush  5-40 mL IntraVENous 2 times per day    amiodarone  200 mg Oral Daily    tamsulosin  0.4 mg Oral Daily    enoxaparin  1 mg/kg SubCUTAneous BID    insulin lispro  0-4 Units SubCUTAneous 4x Daily AC & HS    collagenase   Topical Daily    benzonatate  100 mg Oral TID    miconazole   Topical BID       Continuous Infusions:   sodium chloride Stopped (01/03/25 0144)    sodium chloride Stopped (12/24/24 0025)    dextrose         PRN Meds:  melatonin, sodium chloride flush, sodium chloride, sodium chloride, oxyCODONE, Benzocaine-Menthol, albuterol, sodium chloride flush, sodium chloride, ondansetron **OR** ondansetron, polyethylene glycol, acetaminophen **OR** acetaminophen, glucose, dextrose bolus **OR** dextrose bolus, glucagon (rDNA), dextrose      Assessment:     75 yo man  PMH - DM, neuropaty, HTN, CAD, CM/CHF, CKD  PSurgHx - CABG, L TMA (4/22/24)     Pt represented with difficutly urinating / urinary retention  In ED, afeb, WBC 13,Cr 1.8  L heel escar noted -   Seen by Podiatry, ordered MRI (L calcaneal OM - see report)  Plan for surgical debridement / partial calcanectomy      IMP/  L DFI   L heel unstageable ulcer     Calcaneous osteomyelitis   - OR debridement / partial calcanectomy 12/30, reviewed OR note    - Path - 'focal acute osteomyelitis

## 2025-01-03 NOTE — CARE COORDINATION
11:00 AM      Pre-cert approved. LEROY working on DC. DC note to follow.     Electronically signed by Yasmine Odonnell, MSW, NATALIEW, CIERRA-LEROY on 1/3/2025 at 11:00 AM  [phone]

## 2025-01-03 NOTE — DISCHARGE INSTR - DIET

## 2025-01-03 NOTE — PROGRESS NOTES
Podiatric Surgery Daily Progress Note  Veronica Cahidez      Subjective :   Patient seen and examined this am at the bedside. Patient denies any acute overnight events.  Patient is now postop day 3 following left foot I&D with partial calcanectomy.  Patient denies N/V/F/C/SOB. Patient denies calf pain, thigh pain, chest pain.     Review of Systems: A 12 point review of symptoms is unremarkable with the exception of the chief complaint. Patient specifically denies nausea, fever, vomiting, chills, shortness of breath, chest pain, abdominal pain, constipation or difficulty urinating.       Objective     BP (!) 144/92   Pulse 73   Temp 97.6 °F (36.4 °C) (Oral)   Resp 18   Ht 1.88 m (6' 2\")   Wt 119.2 kg (262 lb 12.6 oz)   SpO2 96%   BMI 33.74 kg/m²      I/O:  Intake/Output Summary (Last 24 hours) at 1/3/2025 0749  Last data filed at 1/3/2025 0507  Gross per 24 hour   Intake 1253.43 ml   Output 1850 ml   Net -596.57 ml              Wt Readings from Last 3 Encounters:   12/31/24 119.2 kg (262 lb 12.6 oz)   12/10/24 123.7 kg (272 lb 12.8 oz)   07/08/24 125.6 kg (276 lb 12.8 oz)       LABS:    Recent Labs     12/31/24  1532 01/01/25  0459   WBC 9.1 7.7   HGB 8.6* 8.5*   HCT 26.9* 25.8*    355        Recent Labs     01/03/25  0500     136   K 4.4  4.4     105   CO2 22  23   BUN 23*  24*   CREATININE 1.7*  1.7*      No results for input(s): \"INR\", \"APTT\" in the last 72 hours.    Invalid input(s): \"PROT\"        LOWER EXTREMITY EXAMINATION    Dressing to left LE intact. No strikethrough noted to the external dressing.  Moderate drainage noted to the internal layers of the dressing.     VASCULAR: DP and PT pulses are palpable weakly. CFT is brisk to the digits of the right foot and to the distal TMA stump on the left. Skin temperature is warm to cool from proximal to distal with no focal calor noted. No edema noted. No pain with calf compression b/l.    NEUROLOGIC: Gross and epicritic sensation is    -Patient examined and evaluated at the bedside   -Hypertensive otherwise VSS. No Leukocytosis (no AM CBC, last WBC 7.7).  -ESR 81 .0   - All imaging reviewed and impressions as noted above   -Surgical pathology (12/30/2024): Bone with acute osteomyelitis, adjacent fibroadipose with prominent acute, focal necrotizing inflammation.  -Wound culture: Not obtained secondary to no purulent drainage  -LLE dressing left clean dry and intact  -Antibiotics per ID: Daptomycin and Zosyn (OP AT PICC line through 2/12)     - STRICTLY NON WEIGHT BEARING TO THE LEFT LOWER EXTREMITY     DISPO: S/p I&D with partial calcanectomy, left lower extremity (DOS 12/30/2024).  All labs and imaging reviewed, impressions noted above.  Recommend patient continue on IV antibiotics per ID, appreciate continued recommendations.  No further podiatric surgical intervention to plan during this admission, patient stable to discharge from podiatric standpoint pending final ID recommendations and medical clearance.     Discussed assessment and plan with Dr. Chandana Downey DPM.     Evans Rivera DPM   Podiatric Resident PGY-2  Pager [phone] or PerfectServe  1/3/2025, 7:50 AM

## 2025-01-03 NOTE — DISCHARGE SUMMARY
V2.0  Discharge Summary      Name:  Veronica Chaidez /Age/Sex: 1950 (74 y.o. male)   Admit Date: 2024  Discharge Date: 25    MRN & CSN:  [id number] & [id number] Encounter Date and Time 25 9:28 AM EST    Attending:  Jah Loera MD Discharging Provider: Jah Loera MD       Hospital Course:     Brief HPI: Veronica Chaidez is a 74 y.o. male with Type II DM who presented initially with concerns for UTI and foot osteomyelitis. Admitted for further care.     Brief Problem Based Course:     #L Diabetic foot infection with osteomyelitis  -Treated with Zosyn and linezolid inpatient  -Podiatry consulted and following  -MRI L foot completed  with findings in calcaneus c/w osteomyelitis  -Underwent I&D with partial calcanectomy on   -Consulted ID :-To finish treatment with empiric daptomycin and ertapenem through   -PICC placed     #UTI  #Acute urinary retention  #Gross hematuria, possible 2/2 urethral trauma  --UCx NGTD but initial UA c/w UTI with + leukocyte esterase and pyruia  -Treated with empiric abx as above  -Required Caicedo while inpatient, developed gross hematuria and Caicedo replaced : output cleared after manual irrigation to discharge with Caicedo  -Continued tamsulosin     #Type II DM  -treated with insulin while inpatient     #CKD IIIa  -Nephrology following  -Trended BMP, stable     #Afib  #Chronic HFpEF  #CAD  #SSS  -Continued home amiodarone, metoprolol  -Lovenox before surgery, resumed Eliquis post op      The patient expressed appropriate understanding of, and agreement with the discharge recommendations, medications, and plan.     Consults this admission:  IP CONSULT TO PODIATRY  IP CONSULT TO SPIRITUAL SERVICES  IP CONSULT TO UROLOGY  IP CONSULT TO NEPHROLOGY  IP CONSULT TO INFECTIOUS DISEASES  IP CONSULT TO VASCULAR ACCESS TEAM  IP CONSULT TO UROLOGY  IP CONSULT TO UROLOGY    Discharge Diagnosis:   Diabetic foot infection (HCC)  Calcaneal osteomyelitis  Gross 
    V2.0  Discharge Summary    This note serves as a progress note if the patient is not discharged today.  Name:  Veronica Chaidez /Age/Sex: 1950 (74 y.o. male)   Admit Date: 2024  Discharge Date: 25    MRN & CSN:  [id number] & [id number] Encounter Date and Time 25 9:28 AM EST    Attending:  Jah Loera MD Discharging Provider: Jah Loera MD       Hospital Course:     Brief HPI: Veronica Chaidez is a 74 y.o. male with Type II DM who presented initially with concerns for UTI and foot osteomyelitis. Admitted for further care.     Brief Problem Based Course:     #L Diabetic foot infection with osteomyelitis  -Treated with Zosyn and linezolid inpatient  -Podiatry consulted and following  -MRI L foot completed  with findings in calcaneus c/w osteomyelitis  -Underwent I&D with partial calcanectomy on   -Consulted ID :-To finish treatment with empiric daptomycin and ertapenem through   -PICC placed     #UTI  #Acute urinary retention  #Gross hematuria, possible 2/2 urethral trauma  --UCx NGTD but initial UA c/w UTI with + leukocyte esterase and pyruia  -Treated with empiric abx as above  -Required Caicedo while inpatient, developed gross hematuria and Caicedo replaced : output cleared after manual irrigation to discharge with Caicedo  -Continued tamsulosin     #Type II DM  -treated with insulin while inpatient     #CKD IIIa  -Nephrology following  -Trended BMP, stable     #Afib  #Chronic HFpEF  #CAD  #SSS  -Continued home amiodarone, metoprolol  -Lovenox before surgery, resumed Eliquis post op      The patient expressed appropriate understanding of, and agreement with the discharge recommendations, medications, and plan.     Consults this admission:  IP CONSULT TO PODIATRY  IP CONSULT TO SPIRITUAL SERVICES  IP CONSULT TO UROLOGY  IP CONSULT TO NEPHROLOGY  IP CONSULT TO INFECTIOUS DISEASES  IP CONSULT TO VASCULAR ACCESS TEAM  IP CONSULT TO UROLOGY  IP CONSULT TO UROLOGY    Discharge 
       Where to Get Your Medications        You can get these medications from any pharmacy    Bring a paper prescription for each of these medications  oxyCODONE 5 MG immediate release tablet       Information about where to get these medications is not yet available    Ask your nurse or doctor about these medications  furosemide 20 MG tablet  tamsulosin 0.4 MG capsule        Objective Findings at Discharge:   /68   Pulse 80   Temp 98.4 °F (36.9 °C) (Oral)   Resp 18   Ht 1.88 m (6' 2\")   Wt 119.2 kg (262 lb 12.6 oz)   SpO2 94%   BMI 33.74 kg/m²       Physical Exam:     General: NAD  Eyes: EOMI  ENT: neck supple  Cardiovascular: Regular rate.  Respiratory: Clear to auscultation  Gastrointestinal: Soft, non tender  Genitourinary: no suprapubic tenderness, Caicedo in place draining clear-pink urine  Musculoskeletal: LLE wrapped in ACE bandage  Skin: warm, dry  Neuro: Alert.  Psych: Mood appropriate.         Labs and Imaging   XR FOOT LEFT (MIN 3 VIEWS)    Result Date: 12/30/2024  EXAM: XR FOOT LEFT (MIN 3 VIEWS) CLINICAL  INDICATION: Postop partial calcanectomy COMPARISON: 12/21/2024. TECHNIQUE: XR FOOT LEFT (MIN 3 VIEWS) FINDINGS/    There is a partial calcanectomy. Status post transmetatarsal amputation. Soft tissue swelling about the ankle. Electronically signed by Choco Muñoz      CBC:   Recent Labs     12/31/24  1532 01/01/25  0459   WBC 9.1 7.7   HGB 8.6* 8.5*    355     BMP:    Recent Labs     12/31/24  0453 01/01/25  0459   * 133*   K 4.5 4.3    103   CO2 23 22   BUN 24* 29*   CREATININE 1.7* 2.0*   GLUCOSE 142* 134*     Hepatic: No results for input(s): \"AST\", \"ALT\", \"BILITOT\", \"ALKPHOS\" in the last 72 hours.    Invalid input(s): \"ALB\"  Lipids:   Lab Results   Component Value Date/Time    CHOL 172 03/02/2020 06:18 AM    HDL 30 08/28/2022 10:14 AM    TRIG 236 03/02/2020 06:18 AM     Hemoglobin A1C:   Lab Results   Component Value Date/Time    LABA1C 6.3 04/20/2024 04:35 AM     TSH:

## 2025-01-05 ENCOUNTER — HOSPITAL ENCOUNTER (EMERGENCY)
Age: 75
Discharge: HOME | End: 2025-01-05
Attending: STUDENT IN AN ORGANIZED HEALTH CARE EDUCATION/TRAINING PROGRAM
Payer: MEDICAID

## 2025-01-05 VITALS
TEMPERATURE: 97.5 F | HEART RATE: 86 BPM | OXYGEN SATURATION: 95 % | WEIGHT: 265.4 LBS | DIASTOLIC BLOOD PRESSURE: 76 MMHG | HEIGHT: 74 IN | BODY MASS INDEX: 34.06 KG/M2 | RESPIRATION RATE: 16 BRPM | SYSTOLIC BLOOD PRESSURE: 144 MMHG

## 2025-01-05 DIAGNOSIS — T83.9XXA URINARY CATHETER COMPLICATION, INITIAL ENCOUNTER (HCC): Primary | ICD-10-CM

## 2025-01-05 PROCEDURE — 99283 EMERGENCY DEPT VISIT LOW MDM: CPT

## 2025-01-05 PROCEDURE — 51798 US URINE CAPACITY MEASURE: CPT

## 2025-01-05 PROCEDURE — 6370000000 HC RX 637 (ALT 250 FOR IP): Performed by: STUDENT IN AN ORGANIZED HEALTH CARE EDUCATION/TRAINING PROGRAM

## 2025-01-05 RX ORDER — METHOCARBAMOL 500 MG/1
500 TABLET, FILM COATED ORAL ONCE
Status: COMPLETED | OUTPATIENT
Start: 2025-01-05 | End: 2025-01-05

## 2025-01-05 RX ADMIN — METHOCARBAMOL 500 MG: 500 TABLET ORAL at 22:08

## 2025-01-05 ASSESSMENT — PAIN DESCRIPTION - ORIENTATION: ORIENTATION: LEFT

## 2025-01-05 ASSESSMENT — PAIN SCALES - GENERAL: PAINLEVEL_OUTOF10: 8

## 2025-01-05 ASSESSMENT — PAIN DESCRIPTION - DESCRIPTORS: DESCRIPTORS: SHARP;THROBBING

## 2025-01-05 ASSESSMENT — PAIN DESCRIPTION - LOCATION: LOCATION: FOOT

## 2025-01-05 ASSESSMENT — PAIN - FUNCTIONAL ASSESSMENT: PAIN_FUNCTIONAL_ASSESSMENT: NONE - DENIES PAIN

## 2025-01-06 ENCOUNTER — CLINICAL DOCUMENTATION (OUTPATIENT)
Dept: INFECTIOUS DISEASES | Age: 75
End: 2025-01-06

## 2025-01-06 ENCOUNTER — TELEPHONE (OUTPATIENT)
Dept: INFECTIOUS DISEASES | Age: 75
End: 2025-01-06

## 2025-01-06 ENCOUNTER — CARE COORDINATION (OUTPATIENT)
Dept: CARE COORDINATION | Age: 75
End: 2025-01-06

## 2025-01-06 ENCOUNTER — ENROLLMENT (OUTPATIENT)
Dept: INFECTIOUS DISEASES | Age: 75
End: 2025-01-06

## 2025-01-06 DIAGNOSIS — M86.172 ACUTE OSTEOMYELITIS OF CALCANEUM, LEFT: Primary | ICD-10-CM
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
## 2025-01-06 NOTE — ED PROVIDER NOTES
THE [facility]  EMERGENCY DEPARTMENT ENCOUNTER          EM RESIDENT NOTE       Date of evaluation: 1/5/2025    Chief Complaint     Urinary Catheter (Patient at [facility], arrived yesterday and ECF changed guo yesterday. Today noticed bloody urine and bleeding at meatus site. Then decreased urine and patient was having discomfort, Staff at facility hand irrigated guo and removed multiple blood clots. Patient on Eliqus. Patient has guo d/t urinary retention from prostate. Sent to have guo checked out)      History of Present Illness     Veronica Chaidez is a 74 y.o. male who presents with blood exiting his penile meatus for 1 day.  Patient was recently admitted for osteomyelitis of the left foot and a UTI.  He underwent surgical intervention 1 week prior to presentation for his left foot osteo-.  He was discharged to a nursing facility with a PICC line on IV antibiotics for his osteomyelitis and his UTI.  He was also discharged with a Guo catheter.  1 day prior to presentation his Guo catheter was removed and exchanged at the nursing home.  Afterwards patient noted blood coming from his penile meatus and blood in his urine.  He then reported some abdominal discomfort and urinary retention.  At the nursing home they were able to flush the catheter which allowed for removal of clots followed by decompression of the bladder with urine.  Patient reports no more abdominal discomfort.  He reports that all his symptoms have resolved.  Patient presents with bloody urine actively draining through the Ugo catheter with scant blood in penile meatus.  Denies fevers, abdominal pain, nausea, vomiting.    MEDICAL DECISION MAKING / ASSESSMENT / PLAN     INITIAL VITALS: BP: 130/72, Temp: 97.5 °F (36.4 °C), Pulse: 89, Respirations: 18, SpO2: 100 %    Veronica Chaidez is a 74 y.o. male presenting with scant blood surrounding his Guo catheter in his penile meatus with no systemic symptoms.

## 2025-01-06 NOTE — ED NOTES
Patient taken by [facility] in no signs of distress. Left with guo catheter from facility  After visit summary instructed to patient     Yoana Duffy RN  01/05/25 [id number]

## 2025-01-06 NOTE — ED PROVIDER NOTES
ED Attending Attestation Note     Date of evaluation: 1/5/2025    I have discussed the case with the resident. I have personally performed a history, physical exam, and my own medical decision making. I have reviewed the note and agree with the findings and plan.     INITIAL VITALS: BP: 130/72, Temp: 97.5 °F (36.4 °C), Pulse: 89, Respirations: 18, SpO2: 100 %     MDM:  My assessment reveals a well-appearing 74-year-old male who was sent in for problems with his Caicedo catheter.  He was just discharged from the hospital with a Caicedo catheter after he had issues with urinary retention.  The Caicedo catheter was replaced when he got to his care facility yesterday and then noticed today that there is bleeding around his urethral meatus around the Caicedo.  There were also clots in the tube.  The nursing facility irrigated the Caicedo catheter and had subsequent drainage but sent the patient in to be evaluated regardless.  On evaluation, he has very minimal bleeding from his urethral meatus, suspect prior trauma from his catheter insertion.  The patient was bladder scanned and has approximately 200 cc of urine in the bladder however the Caicedo catheter is draining appropriately.  The urine is bloody and the patient is at risk for clotting, however the Caicedo flushes easily and is draining appropriately currently.  Will discharge the patient back to his care facility and encouraged them to flush the Caicedo if there is concern for clotted blood.  Discharged with strict return precautions and PCP follow-up       Adelso Riggins MD  01/05/25 [id number]

## 2025-01-06 NOTE — ED NOTES
Patient reviewed and discharged by MD, called to [facility] about the plan of care. Transportation back to facility in process     Yoana Duffy RN  01/05/25 1574

## 2025-01-06 NOTE — TELEPHONE ENCOUNTER
Made 2 attempts to contact [facility] - no answer, no phone tree, no VM option    0930  Third attempt to contact facility - no answer    1015  Still unable to reach facility.  Contacted pt  Pt states he is doing well, receiving and tolerating IV ATB  Pt unaware of any follow up appts at this time  Pt visited [facility] ED last night for guo cath issues - those have been resolved    25 0918  Still unable to reach facility  I attempted to contact  director, Ammy Jones, and left a detailed VM asking for a return call today to my personal number.  I need to verify IV ATB and Weekly lab orders  My name, title, Dr Peterson's name, specialty and affiliation  Pt name and +  My direct number [phone]  Office fax [phone]      Received a return call from Ammy.  OPAT orders verified -  Daptomycin 750 mg iv daily through 25  Ertapenem 1 gm iv daily through 25   CBC w diff, CMP, ESR, CRP, CK every Mon or Tue, FAX results to [phone]  Call with antibiotic / infusion issues, [phone]  - Call with any change in status, transfer in or out of a facility or to hospital - [phone]  - Orders only valid for current disposition, NOT transferable upon discharge from facility or new admission to another facility.

## 2025-01-06 NOTE — CARE COORDINATION
Pt declines need for [facility] outreach, noting he is in facility [facility], disposition TBD.  Notes he was sent to ED, from SNF,  for concern of catheter.  Declines need for ACC review or further outreach.  ACM extended open invitation for pt to outreach in future, once he returns to home/community (ie ALU or ILU, if applicable).     Patient Excluded from Care Coordination? Yes     The patient will be excluded from Care Coordination for the following reason: Other in facility; disposition TBD

## 2025-01-06 NOTE — DISCHARGE INSTRUCTIONS
You are seen today because of blood surrounding your Caicedo catheter.  We think that it is from some irritation from exchanging her Caicedo catheter and because you are on a blood thinner.  Our exam was reassuring.  Your vitals are stable.  We scan your bladder which shows that it is properly draining and there is not much urinary retention.  Please return to the emergency department for worsening symptoms

## 2025-01-07 LAB
ALBUMIN: 2.8 G/DL
ALP BLD-CCNC: 49 U/L
ALT SERPL-CCNC: 12 U/L
ANION GAP SERPL CALCULATED.3IONS-SCNC: 5 MMOL/L
AST SERPL-CCNC: 8 U/L
BILIRUB SERPL-MCNC: 0.3 MG/DL (ref 0.1–1.4)
BUN BLDV-MCNC: 17 MG/DL
C-REACTIVE PROTEIN: 11.6
CALCIUM SERPL-MCNC: 7.4 MG/DL
CHLORIDE BLD-SCNC: 110 MMOL/L
CO2: 25 MMOL/L
CREAT SERPL-MCNC: 1.39 MG/DL
GFR, ESTIMATED: 54
GLUCOSE BLD-MCNC: 85 MG/DL
POTASSIUM SERPL-SCNC: 3.9 MMOL/L
SED RATE, AUTOMATED: 45
SODIUM BLD-SCNC: 140 MMOL/L
TOTAL CK: 75 U/L
TOTAL PROTEIN: 5.7 G/DL (ref 6.4–8.2)

## 2025-01-07 NOTE — PROGRESS NOTES
Physician Progress Note      PATIENT:               LARISSA MORALES  [facility] #:                  [id number]  :                       1950  ADMIT DATE:       2024 2:41 AM  DISCH DATE:        1/3/2025 3:02 PM  RESPONDING  PROVIDER #:        Jah Loera MD          QUERY TEXT:    Pt admitted with Diabetic foot infection with osteomyelitis. Pt noted to have   WBC-13, HR-107, 105, 99, 104, 101, 100, 97, 100, RR-20, 26, 31, 29, 30, 25,   24, CRP-138, procal- 0.17. If possible, please document in the progress notes   and discharge summary if you are evaluating and /or treating any of the   following:  The medical record reflects the following:  Risk Factors: Diabetic foot infection with osteomyelitis  Clinical Indicators: H&P -Diabetic foot infection, Insulin-dependent type   2 diabetes, left diabetic foot ulcer, osteomyelitis?  Diabetic peripheral   neuropathy, X-ray of the left heel without erosions to suggest osteomyelitis.  IM PN -Diabetic foot infection with osteomyelitis, MRI L foot completed    with findings in calcaneus c/w osteomyelitis  DS - L Diabetic foot infection with osteomyelitis  WBC-13  HR-107, 105, 99, 104, 101, 100, 97, 100  RR-20, 26, 31, 29, 30, 25, 24  CRP-138  procal- 0.17  Treatment: Underwent I&D with partial calcanectomy, Empiric IV vancomycin and   Zosyn, Podiatry consult, serial labs    Thank you,  Marques Guerrero [facility] CDS  Options provided:  -- Sepsis due to osteomyelitis, present on admission  -- osteomyelitis without Sepsis  -- Other - I will add my own diagnosis  -- Disagree - Not applicable / Not valid  -- Disagree - Clinically unable to determine / Unknown  -- Refer to Clinical Documentation Reviewer    PROVIDER RESPONSE TEXT:    This patient has osteomyelitis without Sepsis.    Query created by: Marques Guerrero on 2025 7:26 AM      Electronically signed by:  Jah Loera MD 2025 11:04 AM

## 2025-01-08 DIAGNOSIS — M86.172 ACUTE OSTEOMYELITIS OF CALCANEUM, LEFT: ICD-10-CM

## 2025-01-09 DIAGNOSIS — M86.172 ACUTE OSTEOMYELITIS OF CALCANEUM, LEFT: ICD-10-CM

## 2025-01-09 LAB
BASOPHILS ABSOLUTE: 68 /ΜL
BASOPHILS RELATIVE PERCENT: 1.1 %
EOSINOPHILS ABSOLUTE: 465 /ΜL
EOSINOPHILS RELATIVE PERCENT: 7.5 %
HCT VFR BLD CALC: 28.2 % (ref 41–53)
HEMOGLOBIN: 9.5 G/DL (ref 13.5–17.5)
LYMPHOCYTES ABSOLUTE: 880 /ΜL
LYMPHOCYTES RELATIVE PERCENT: 14.2 %
MCH RBC QN AUTO: 29.5 PG
MCHC RBC AUTO-ENTMCNC: 33.7 G/DL
MCV RBC AUTO: 87.7 FL
MONOCYTES ABSOLUTE: 502 /ΜL
MONOCYTES RELATIVE PERCENT: 8.1 %
NEUTROPHILS ABSOLUTE: 4284 /ΜL
NEUTROPHILS RELATIVE PERCENT: 69.1 %
PDW BLD-RTO: 15.9 %
PLATELET # BLD: 292 K/ΜL
PMV BLD AUTO: 9 FL
RBC # BLD: 3.22 10^6/ΜL
WBC # BLD: 6.2 10^3/ML

## 2025-01-13 ENCOUNTER — TELEPHONE (OUTPATIENT)
Dept: INFECTIOUS DISEASES | Age: 75
End: 2025-01-13

## 2025-01-13 NOTE — TELEPHONE ENCOUNTER
Left vm for nurse manager asking for a return call to discuss pt condition, any follow up appts, pain, VS, and how his foot is healing  My name, title, Dr Peterson's name, specialty and affiliation  Pt name and +  My direct number [phone]    OPAT Nurse Coordinator Weekly Update Note    Current OPAT plan:  Daptomycin 750 mg iv daily through 25  Ertapenem 1 gm iv daily through 25    Diagnosis:  L calcaneous osteomyelitis    Assessment:  Pt was discharged from [facility] on .  Left VM on , [employer] work and cell phones  My name, title, Dr Peterson's name, specialty and affiliation  Pt name and +  My direct number [phone]  Office fax [phone]  Another of our pt was DC without our office being notified    Spoke with patient. He is now at [facility] for wound care and therapy.  Pt states he is receiving dressing changes to foot daily and tolerating the IV ATB without adverse SE.  Pt states he is not bearing any weight on his foot and it is difficult to ambulate.  Pt has no follow ups with Dr Downey scheduled at this time.    Left message with  at [facility] asking for nurse caring for pt to return my call ASAP, I have orders to review with them  My name, title, Dr Peterson's name, specialty and affiliation  Pt name and +  My direct number [phone]    Spoke with DON and verified OPAT orders  Daptomycin 750 mg iv daily through 25  Ertapenem 1 gm iv daily through 25  CBC w diff, CMP, ESR, CRP, CK every Mon or Tue, FAX results to [phone]  - Call with antibiotic / infusion issues, [phone]  - Call with any change in status, transfer in or out of a facility or to hospital - [phone]  - Orders only valid for current disposition, NOT transferable upon discharge from facility or new admission to another facility.  I asked for a set of labs to be drawn this week as I am not sure they were drawn yesteday before leaving facility.  DON V/U and RBVO

## 2025-01-20 ENCOUNTER — TELEPHONE (OUTPATIENT)
Dept: INFECTIOUS DISEASES | Age: 75
End: 2025-01-20

## 2025-01-20 NOTE — TELEPHONE ENCOUNTER
1/20/25  Left message with reception requesting a return call to office to discuss pt condition and L foot    OPAT Nurse Coordinator Weekly Update Note    Current OPAT plan:  Daptomycin 750 mg iv daily through 2/12/25  Ertapenem 1 gm iv daily through 2/12/25    Diagnosis: L calcaneous osteomyelitis    Assessment:  left vm for pt.  I sat on hold for 15 minutes with facility  My name, title, Dr Peterson's name,   Pt name   My direct number [phone]    1/24/25  Spoke with nurse  She stated pt took his meds without issue the morning and about 20 minutes ago pt became very drowsy and his O2 sat dropped to 87%  Pt was placed on 2L O2, sat came up to 96, NP examined pt and decided to send pt out.  Ambulance arrived just before my call.  I asked to please send to [facility] if possible and nurse stated she will tell 9-1-1 team to take pt to [facility]  Pt remain drowsy throughout entire event    Follow up appts:  when will pt see Dr Downey again?

## 2025-01-24 ENCOUNTER — APPOINTMENT (OUTPATIENT)
Dept: CT IMAGING | Age: 75
End: 2025-01-24
Payer: MEDICAID

## 2025-01-24 ENCOUNTER — APPOINTMENT (OUTPATIENT)
Dept: GENERAL RADIOLOGY | Age: 75
End: 2025-01-24
Payer: MEDICAID

## 2025-01-24 ENCOUNTER — HOSPITAL ENCOUNTER (INPATIENT)
Age: 75
LOS: 14 days | Discharge: NURSING, REHAB OR LONG-TERM CARE | End: 2025-02-07
Attending: EMERGENCY MEDICINE | Admitting: FAMILY MEDICINE
Payer: MEDICAID

## 2025-01-24 ENCOUNTER — APPOINTMENT (OUTPATIENT)
Age: 75
End: 2025-01-24
Attending: FAMILY MEDICINE
Payer: MEDICAID

## 2025-01-24 DIAGNOSIS — S91.302A OPEN WOUND OF LEFT HEEL, INITIAL ENCOUNTER: ICD-10-CM

## 2025-01-24 DIAGNOSIS — I50.9 CONGESTIVE HEART FAILURE, UNSPECIFIED HF CHRONICITY, UNSPECIFIED HEART FAILURE TYPE (HCC): ICD-10-CM

## 2025-01-24 DIAGNOSIS — R41.82 ALTERED MENTAL STATUS, UNSPECIFIED ALTERED MENTAL STATUS TYPE: Primary | ICD-10-CM

## 2025-01-24 DIAGNOSIS — E87.70 HYPERVOLEMIA, UNSPECIFIED HYPERVOLEMIA TYPE: ICD-10-CM

## 2025-01-24 DIAGNOSIS — M86.172 ACUTE OSTEOMYELITIS OF LEFT FOOT: ICD-10-CM

## 2025-01-24 DIAGNOSIS — R55 SYNCOPE, UNSPECIFIED SYNCOPE TYPE: ICD-10-CM

## 2025-01-24 DIAGNOSIS — R33.9 URINARY RETENTION: ICD-10-CM

## 2025-01-24 PROBLEM — J81.0 PULMONARY EDEMA, ACUTE: Status: ACTIVE | Noted: 2025-01-24

## 2025-01-24 LAB
ALBUMIN SERPL-MCNC: 2.9 G/DL (ref 3.4–5)
ALP SERPL-CCNC: 97 U/L (ref 40–129)
ALT SERPL-CCNC: ABNORMAL U/L (ref 10–40)
AMMONIA PLAS-SCNC: 24 UMOL/L (ref 16–60)
ANION GAP SERPL CALCULATED.3IONS-SCNC: 10 MMOL/L (ref 3–16)
APTT BLD: 49.4 SEC (ref 22.1–36.4)
AST SERPL-CCNC: 36 U/L (ref 15–37)
BACTERIA URNS QL MICRO: ABNORMAL /HPF
BASE EXCESS BLDV CALC-SCNC: -1.7 MMOL/L (ref -2–3)
BASOPHILS # BLD: 0.1 K/UL (ref 0–0.2)
BASOPHILS NFR BLD: 0.9 %
BILIRUB DIRECT SERPL-MCNC: <0.1 MG/DL (ref 0–0.3)
BILIRUB INDIRECT SERPL-MCNC: 0.4 MG/DL (ref 0–1)
BILIRUB SERPL-MCNC: 0.5 MG/DL (ref 0–1)
BILIRUB UR QL STRIP.AUTO: NEGATIVE
BUN SERPL-MCNC: 30 MG/DL (ref 7–20)
CALCIUM SERPL-MCNC: 8.5 MG/DL (ref 8.3–10.6)
CHLORIDE SERPL-SCNC: 105 MMOL/L (ref 99–110)
CK SERPL-CCNC: 65 U/L (ref 39–308)
CLARITY UR: CLEAR
CO2 BLDV-SCNC: 25 MMOL/L
CO2 SERPL-SCNC: 20 MMOL/L (ref 21–32)
COHGB MFR BLDV: 2 % (ref 0–1.5)
COLOR UR: YELLOW
CREAT SERPL-MCNC: 1.7 MG/DL (ref 0.8–1.3)
DEPRECATED RDW RBC AUTO: 16.4 % (ref 12.4–15.4)
DO-HGB MFR BLDV: 13.3 %
ECHO BSA: 2.56 M2
ECHO LV EDV A2C: 186 ML
ECHO LV EDV A4C: 186 ML
ECHO LV EDV INDEX A4C: 75 ML/M2
ECHO LV EDV NDEX A2C: 75 ML/M2
ECHO LV EJECTION FRACTION A2C: 38 %
ECHO LV EJECTION FRACTION A4C: 32 %
ECHO LV EJECTION FRACTION BIPLANE: 35 % (ref 55–100)
ECHO LV ESV A2C: 116 ML
ECHO LV ESV A4C: 126 ML
ECHO LV ESV INDEX A2C: 47 ML/M2
ECHO LV ESV INDEX A4C: 51 ML/M2
ECHO LV FRACTIONAL SHORTENING: 17 % (ref 28–44)
ECHO LV INTERNAL DIMENSION DIASTOLE INDEX: 2.17 CM/M2
ECHO LV INTERNAL DIMENSION DIASTOLIC: 5.4 CM (ref 4.2–5.9)
ECHO LV INTERNAL DIMENSION SYSTOLIC INDEX: 1.81 CM/M2
ECHO LV INTERNAL DIMENSION SYSTOLIC: 4.5 CM
ECHO LV IVSD: 1.1 CM (ref 0.6–1)
ECHO LV MASS 2D: 234.8 G (ref 88–224)
ECHO LV MASS INDEX 2D: 94.3 G/M2 (ref 49–115)
ECHO LV POSTERIOR WALL DIASTOLIC: 1.1 CM (ref 0.6–1)
ECHO LV RELATIVE WALL THICKNESS RATIO: 0.41
ECHO RV TAPSE: 0.8 CM (ref 1.7–?)
EKG ATRIAL RATE: 89 BPM
EKG DIAGNOSIS: NORMAL
EKG Q-T INTERVAL: 406 MS
EKG QRS DURATION: 92 MS
EKG QTC CALCULATION (BAZETT): 491 MS
EKG R AXIS: 112 DEGREES
EKG T AXIS: 80 DEGREES
EKG VENTRICULAR RATE: 88 BPM
EOSINOPHIL # BLD: 0.8 K/UL (ref 0–0.6)
EOSINOPHIL NFR BLD: 11.7 %
EPI CELLS #/AREA URNS HPF: ABNORMAL /HPF (ref 0–5)
FLUAV RNA RESP QL NAA+PROBE: NOT DETECTED
FLUBV RNA RESP QL NAA+PROBE: NOT DETECTED
GFR SERPLBLD CREATININE-BSD FMLA CKD-EPI: 42 ML/MIN/{1.73_M2}
GLUCOSE BLD-MCNC: 113 MG/DL (ref 70–99)
GLUCOSE BLD-MCNC: 114 MG/DL (ref 70–99)
GLUCOSE SERPL-MCNC: 124 MG/DL (ref 70–99)
GLUCOSE UR STRIP.AUTO-MCNC: NEGATIVE MG/DL
HCO3 BLDV-SCNC: 23.7 MMOL/L (ref 24–28)
HCT VFR BLD AUTO: 26.6 % (ref 40.5–52.5)
HGB BLD-MCNC: 8.7 G/DL (ref 13.5–17.5)
HGB UR QL STRIP.AUTO: ABNORMAL
INR PPP: 2.01 (ref 0.85–1.15)
KETONES UR STRIP.AUTO-MCNC: NEGATIVE MG/DL
LACTATE BLDV-SCNC: 0.9 MMOL/L (ref 0.4–1.9)
LACTATE BLDV-SCNC: 0.9 MMOL/L (ref 0.4–1.9)
LEUKOCYTE ESTERASE UR QL STRIP.AUTO: ABNORMAL
LIPASE SERPL-CCNC: 48 U/L (ref 13–60)
LYMPHOCYTES # BLD: 0.8 K/UL (ref 1–5.1)
LYMPHOCYTES NFR BLD: 11.7 %
MCH RBC QN AUTO: 27.6 PG (ref 26–34)
MCHC RBC AUTO-ENTMCNC: 32.5 G/DL (ref 31–36)
MCV RBC AUTO: 84.9 FL (ref 80–100)
METHGB MFR BLDV: 0.1 % (ref 0–1.5)
MONOCYTES # BLD: 0.5 K/UL (ref 0–1.3)
MONOCYTES NFR BLD: 7.4 %
NEUTROPHILS # BLD: 4.8 K/UL (ref 1.7–7.7)
NEUTROPHILS NFR BLD: 68.3 %
NITRITE UR QL STRIP.AUTO: NEGATIVE
NT-PROBNP SERPL-MCNC: 5604 PG/ML (ref 0–124)
PCO2 BLDV: 42 MMHG (ref 41–51)
PERFORMED ON: ABNORMAL
PERFORMED ON: ABNORMAL
PH BLDV: 7.36 [PH] (ref 7.35–7.45)
PH UR STRIP.AUTO: 6 [PH] (ref 5–8)
PLATELET # BLD AUTO: 332 K/UL (ref 135–450)
PMV BLD AUTO: 8.3 FL (ref 5–10.5)
PO2 BLDV: 55 MMHG (ref 25–40)
POTASSIUM SERPL-SCNC: ABNORMAL MMOL/L (ref 3.5–5.1)
PROT SERPL-MCNC: 7.2 G/DL (ref 6.4–8.2)
PROT UR STRIP.AUTO-MCNC: ABNORMAL MG/DL
PROTHROMBIN TIME: 22.9 SEC (ref 11.9–14.9)
RBC # BLD AUTO: 3.14 M/UL (ref 4.2–5.9)
RBC #/AREA URNS HPF: ABNORMAL /HPF (ref 0–4)
SAO2 % BLDV: 86 %
SARS-COV-2 RNA RESP QL NAA+PROBE: NOT DETECTED
SODIUM SERPL-SCNC: 135 MMOL/L (ref 136–145)
SP GR UR STRIP.AUTO: 1.02 (ref 1–1.03)
TROPONIN, HIGH SENSITIVITY: 52 NG/L (ref 0–22)
TROPONIN, HIGH SENSITIVITY: 54 NG/L (ref 0–22)
TROPONIN, HIGH SENSITIVITY: 57 NG/L (ref 0–22)
UA COMPLETE W REFLEX CULTURE PNL UR: ABNORMAL
UA DIPSTICK W REFLEX MICRO PNL UR: YES
URN SPEC COLLECT METH UR: ABNORMAL
UROBILINOGEN UR STRIP-ACNC: 0.2 E.U./DL
WBC # BLD AUTO: 7 K/UL (ref 4–11)
WBC #/AREA URNS HPF: ABNORMAL /HPF (ref 0–5)

## 2025-01-24 PROCEDURE — 84484 ASSAY OF TROPONIN QUANT: CPT

## 2025-01-24 PROCEDURE — 99285 EMERGENCY DEPT VISIT HI MDM: CPT

## 2025-01-24 PROCEDURE — 87636 SARSCOV2 & INF A&B AMP PRB: CPT

## 2025-01-24 PROCEDURE — 82803 BLOOD GASES ANY COMBINATION: CPT

## 2025-01-24 PROCEDURE — 6360000004 HC RX CONTRAST MEDICATION

## 2025-01-24 PROCEDURE — 70498 CT ANGIOGRAPHY NECK: CPT

## 2025-01-24 PROCEDURE — 99222 1ST HOSP IP/OBS MODERATE 55: CPT

## 2025-01-24 PROCEDURE — 73620 X-RAY EXAM OF FOOT: CPT

## 2025-01-24 PROCEDURE — 2580000003 HC RX 258: Performed by: FAMILY MEDICINE

## 2025-01-24 PROCEDURE — 36415 COLL VENOUS BLD VENIPUNCTURE: CPT

## 2025-01-24 PROCEDURE — 83880 ASSAY OF NATRIURETIC PEPTIDE: CPT

## 2025-01-24 PROCEDURE — 82550 ASSAY OF CK (CPK): CPT

## 2025-01-24 PROCEDURE — 83605 ASSAY OF LACTIC ACID: CPT

## 2025-01-24 PROCEDURE — 6360000002 HC RX W HCPCS: Performed by: FAMILY MEDICINE

## 2025-01-24 PROCEDURE — 85610 PROTHROMBIN TIME: CPT

## 2025-01-24 PROCEDURE — 83690 ASSAY OF LIPASE: CPT

## 2025-01-24 PROCEDURE — 82140 ASSAY OF AMMONIA: CPT

## 2025-01-24 PROCEDURE — 87040 BLOOD CULTURE FOR BACTERIA: CPT

## 2025-01-24 PROCEDURE — 2060000000 HC ICU INTERMEDIATE R&B

## 2025-01-24 PROCEDURE — 6360000002 HC RX W HCPCS: Performed by: PHYSICIAN ASSISTANT

## 2025-01-24 PROCEDURE — 70450 CT HEAD/BRAIN W/O DYE: CPT

## 2025-01-24 PROCEDURE — 93325 DOPPLER ECHO COLOR FLOW MAPG: CPT

## 2025-01-24 PROCEDURE — 6360000004 HC RX CONTRAST MEDICATION: Performed by: FAMILY MEDICINE

## 2025-01-24 PROCEDURE — 93308 TTE F-UP OR LMTD: CPT | Performed by: INTERNAL MEDICINE

## 2025-01-24 PROCEDURE — 87086 URINE CULTURE/COLONY COUNT: CPT

## 2025-01-24 PROCEDURE — 6360000002 HC RX W HCPCS

## 2025-01-24 PROCEDURE — 85730 THROMBOPLASTIN TIME PARTIAL: CPT

## 2025-01-24 PROCEDURE — 71250 CT THORAX DX C-: CPT

## 2025-01-24 PROCEDURE — 71045 X-RAY EXAM CHEST 1 VIEW: CPT

## 2025-01-24 PROCEDURE — 85025 COMPLETE CBC W/AUTO DIFF WBC: CPT

## 2025-01-24 PROCEDURE — 2580000003 HC RX 258

## 2025-01-24 PROCEDURE — 80076 HEPATIC FUNCTION PANEL: CPT

## 2025-01-24 PROCEDURE — 73630 X-RAY EXAM OF FOOT: CPT

## 2025-01-24 PROCEDURE — 80048 BASIC METABOLIC PNL TOTAL CA: CPT

## 2025-01-24 PROCEDURE — 93005 ELECTROCARDIOGRAM TRACING: CPT | Performed by: PHYSICIAN ASSISTANT

## 2025-01-24 PROCEDURE — 72125 CT NECK SPINE W/O DYE: CPT

## 2025-01-24 PROCEDURE — 81001 URINALYSIS AUTO W/SCOPE: CPT

## 2025-01-24 PROCEDURE — 93325 DOPPLER ECHO COLOR FLOW MAPG: CPT | Performed by: INTERNAL MEDICINE

## 2025-01-24 RX ORDER — ENOXAPARIN SODIUM 100 MG/ML
30 INJECTION SUBCUTANEOUS EVERY 12 HOURS
Status: DISCONTINUED | OUTPATIENT
Start: 2025-01-24 | End: 2025-01-24

## 2025-01-24 RX ORDER — SODIUM CHLORIDE 0.9 % (FLUSH) 0.9 %
10 SYRINGE (ML) INJECTION PRN
Status: DISCONTINUED | OUTPATIENT
Start: 2025-01-24 | End: 2025-02-07 | Stop reason: HOSPADM

## 2025-01-24 RX ORDER — MEROPENEM AND SODIUM CHLORIDE 1 G/50ML
1000 INJECTION, SOLUTION INTRAVENOUS ONCE
Status: COMPLETED | OUTPATIENT
Start: 2025-01-24 | End: 2025-01-24

## 2025-01-24 RX ORDER — MAGNESIUM SULFATE IN WATER 40 MG/ML
2000 INJECTION, SOLUTION INTRAVENOUS PRN
Status: DISCONTINUED | OUTPATIENT
Start: 2025-01-24 | End: 2025-01-24

## 2025-01-24 RX ORDER — POTASSIUM CHLORIDE 7.45 MG/ML
10 INJECTION INTRAVENOUS PRN
Status: DISCONTINUED | OUTPATIENT
Start: 2025-01-24 | End: 2025-01-24

## 2025-01-24 RX ORDER — AMIODARONE HYDROCHLORIDE 200 MG/1
200 TABLET ORAL DAILY
Status: DISCONTINUED | OUTPATIENT
Start: 2025-01-25 | End: 2025-01-27

## 2025-01-24 RX ORDER — IOPAMIDOL 755 MG/ML
75 INJECTION, SOLUTION INTRAVASCULAR
Status: COMPLETED | OUTPATIENT
Start: 2025-01-24 | End: 2025-01-24

## 2025-01-24 RX ORDER — POLYETHYLENE GLYCOL 3350 17 G/17G
17 POWDER, FOR SOLUTION ORAL DAILY PRN
Status: DISCONTINUED | OUTPATIENT
Start: 2025-01-24 | End: 2025-01-24

## 2025-01-24 RX ORDER — INSULIN LISPRO 100 [IU]/ML
0-4 INJECTION, SOLUTION INTRAVENOUS; SUBCUTANEOUS
Status: DISCONTINUED | OUTPATIENT
Start: 2025-01-24 | End: 2025-02-07 | Stop reason: HOSPADM

## 2025-01-24 RX ORDER — PANTOPRAZOLE SODIUM 40 MG/1
40 TABLET, DELAYED RELEASE ORAL DAILY
COMMUNITY

## 2025-01-24 RX ORDER — OXYCODONE HYDROCHLORIDE 5 MG/1
5 TABLET ORAL EVERY 6 HOURS PRN
Status: ON HOLD | COMMUNITY
End: 2025-02-05 | Stop reason: HOSPADM

## 2025-01-24 RX ORDER — POTASSIUM CHLORIDE 1500 MG/1
40 TABLET, EXTENDED RELEASE ORAL PRN
Status: DISCONTINUED | OUTPATIENT
Start: 2025-01-24 | End: 2025-01-24

## 2025-01-24 RX ORDER — ACETAMINOPHEN 325 MG/1
650 TABLET ORAL EVERY 6 HOURS PRN
Status: DISCONTINUED | OUTPATIENT
Start: 2025-01-24 | End: 2025-01-31

## 2025-01-24 RX ORDER — POLYETHYLENE GLYCOL 3350 17 G/17G
17 POWDER, FOR SOLUTION ORAL DAILY PRN
Status: ON HOLD | COMMUNITY
End: 2025-02-05 | Stop reason: HOSPADM

## 2025-01-24 RX ORDER — DEXTROSE MONOHYDRATE 25 G/50ML
25 INJECTION, SOLUTION INTRAVENOUS PRN
Status: ON HOLD | COMMUNITY
End: 2025-02-05 | Stop reason: HOSPADM

## 2025-01-24 RX ORDER — ACETAMINOPHEN 650 MG/1
650 SUPPOSITORY RECTAL EVERY 6 HOURS PRN
Status: DISCONTINUED | OUTPATIENT
Start: 2025-01-24 | End: 2025-01-31

## 2025-01-24 RX ORDER — POLYETHYLENE GLYCOL 3350 17 G/17G
17 POWDER, FOR SOLUTION ORAL DAILY PRN
Status: DISCONTINUED | OUTPATIENT
Start: 2025-01-24 | End: 2025-02-03

## 2025-01-24 RX ORDER — ASPIRIN 81 MG/1
81 TABLET, CHEWABLE ORAL DAILY
Status: DISCONTINUED | OUTPATIENT
Start: 2025-01-25 | End: 2025-02-07 | Stop reason: HOSPADM

## 2025-01-24 RX ORDER — GLUCAGON 1 MG/ML
1 KIT INJECTION PRN
Status: DISCONTINUED | OUTPATIENT
Start: 2025-01-24 | End: 2025-02-07 | Stop reason: HOSPADM

## 2025-01-24 RX ORDER — SODIUM CHLORIDE 0.9 % (FLUSH) 0.9 %
10 SYRINGE (ML) INJECTION PRN
Status: ON HOLD | COMMUNITY
End: 2025-02-05 | Stop reason: HOSPADM

## 2025-01-24 RX ORDER — ASPIRIN 300 MG/1
300 SUPPOSITORY RECTAL DAILY
Status: DISCONTINUED | OUTPATIENT
Start: 2025-01-25 | End: 2025-02-07 | Stop reason: HOSPADM

## 2025-01-24 RX ORDER — LORAZEPAM 2 MG/ML
2 INJECTION INTRAMUSCULAR ONCE
Status: COMPLETED | OUTPATIENT
Start: 2025-01-24 | End: 2025-01-24

## 2025-01-24 RX ORDER — SODIUM CHLORIDE 0.9 % (FLUSH) 0.9 %
5-40 SYRINGE (ML) INJECTION EVERY 12 HOURS SCHEDULED
Status: DISCONTINUED | OUTPATIENT
Start: 2025-01-24 | End: 2025-01-24

## 2025-01-24 RX ORDER — DEXTROSE MONOHYDRATE 100 MG/ML
INJECTION, SOLUTION INTRAVENOUS CONTINUOUS PRN
Status: DISCONTINUED | OUTPATIENT
Start: 2025-01-24 | End: 2025-02-07 | Stop reason: HOSPADM

## 2025-01-24 RX ORDER — ACETAMINOPHEN 325 MG/1
650 TABLET ORAL EVERY 6 HOURS PRN
COMMUNITY

## 2025-01-24 RX ORDER — SODIUM CHLORIDE 9 MG/ML
INJECTION, SOLUTION INTRAVENOUS PRN
Status: DISCONTINUED | OUTPATIENT
Start: 2025-01-24 | End: 2025-01-24

## 2025-01-24 RX ORDER — MICONAZOLE NITRATE 20 MG/G
CREAM TOPICAL
COMMUNITY

## 2025-01-24 RX ORDER — FUROSEMIDE 10 MG/ML
20 INJECTION INTRAMUSCULAR; INTRAVENOUS 2 TIMES DAILY
Status: DISCONTINUED | OUTPATIENT
Start: 2025-01-24 | End: 2025-01-26

## 2025-01-24 RX ORDER — BENZONATATE 100 MG/1
100 CAPSULE ORAL 3 TIMES DAILY PRN
Status: ON HOLD | COMMUNITY
End: 2025-02-05 | Stop reason: HOSPADM

## 2025-01-24 RX ORDER — SODIUM CHLORIDE 0.9 % (FLUSH) 0.9 %
5-40 SYRINGE (ML) INJECTION PRN
Status: DISCONTINUED | OUTPATIENT
Start: 2025-01-24 | End: 2025-01-24

## 2025-01-24 RX ORDER — INSULIN LISPRO 100 [IU]/ML
2-20 INJECTION, SOLUTION INTRAVENOUS; SUBCUTANEOUS
Status: ON HOLD | COMMUNITY
End: 2025-02-05 | Stop reason: HOSPADM

## 2025-01-24 RX ORDER — CHOLECALCIFEROL (VITAMIN D3) 25 MCG
6 TABLET ORAL
COMMUNITY

## 2025-01-24 RX ORDER — BENZONATATE 100 MG/1
100 CAPSULE ORAL 3 TIMES DAILY PRN
Status: DISCONTINUED | OUTPATIENT
Start: 2025-01-24 | End: 2025-02-07 | Stop reason: HOSPADM

## 2025-01-24 RX ORDER — ATORVASTATIN CALCIUM 80 MG/1
80 TABLET, FILM COATED ORAL NIGHTLY
Status: DISCONTINUED | OUTPATIENT
Start: 2025-01-24 | End: 2025-02-07 | Stop reason: HOSPADM

## 2025-01-24 RX ADMIN — MEROPENEM AND SODIUM CHLORIDE 1000 MG: 1 INJECTION, SOLUTION INTRAVENOUS at 17:48

## 2025-01-24 RX ADMIN — SODIUM CHLORIDE: 9 INJECTION, SOLUTION INTRAVENOUS at 17:45

## 2025-01-24 RX ADMIN — IOPAMIDOL 75 ML: 755 INJECTION, SOLUTION INTRAVENOUS at 20:34

## 2025-01-24 RX ADMIN — LORAZEPAM 2 MG: 2 INJECTION INTRAMUSCULAR; INTRAVENOUS at 20:00

## 2025-01-24 RX ADMIN — SULFUR HEXAFLUORIDE 2 ML: 60.7; .19; .19 INJECTION, POWDER, LYOPHILIZED, FOR SUSPENSION INTRAVENOUS; INTRAVESICAL at 15:31

## 2025-01-24 RX ADMIN — DAPTOMYCIN 750 MG: 500 INJECTION, POWDER, LYOPHILIZED, FOR SOLUTION INTRAVENOUS at 20:44

## 2025-01-24 RX ADMIN — FUROSEMIDE 20 MG: 10 INJECTION, SOLUTION INTRAMUSCULAR; INTRAVENOUS at 17:50

## 2025-01-24 ASSESSMENT — PAIN SCALES - PAIN ASSESSMENT IN ADVANCED DEMENTIA (PAINAD)
BODYLANGUAGE: TENSE, DISTRESSED PACING, FIDGETING
BREATHING: NORMAL
BREATHING: NOISY LABORED BREATHING, LONG PERIODS HYPERVENTILATION, CHEYNE-STOKES RESPIRATIONS
FACIALEXPRESSION: SMILING OR INEXPRESSIVE
TOTALSCORE: 0
TOTALSCORE: 4
FACIALEXPRESSION: SMILING OR INEXPRESSIVE
CONSOLABILITY: DISTRACTED OR REASSURED BY VOICE/TOUCH
CONSOLABILITY: NO NEED TO CONSOLE
FACIALEXPRESSION: SMILING OR INEXPRESSIVE
BODYLANGUAGE: RELAXED
TOTALSCORE: 1
BODYLANGUAGE: TENSE, DISTRESSED PACING, FIDGETING
CONSOLABILITY: NO NEED TO CONSOLE
BREATHING: NORMAL

## 2025-01-24 ASSESSMENT — PAIN SCALES - GENERAL
PAINLEVEL_OUTOF10: 1
PAINLEVEL_OUTOF10: 0

## 2025-01-24 NOTE — PROGRESS NOTES
Pt arrived to room 4320 via stretcher. Pt is oriented to self but speech is garbled and pt is hallucinating. VSS, on 3L O2. Caicedo bag draining brown urine with sediment. Bleeding at meatus. Physician notified of arrival.

## 2025-01-24 NOTE — ED NOTES
Clinical Pharmacy Note  Medication History     Encounter Date: 1/24/2025 12:11 PM    List of current medications patient is taking is complete. Home Medication list in Epic updated to reflect changes noted below.    Source of information: Transfer documents provided by patient's nursing facility ([facility])    Patient's home pharmacy:   [facility] - [place], [place] - [address] - P [phone] - F [phone] [address] [address]  Phone: [phone] Fax: [phone]    Changes made to medication list:   Medications removed: (include reason, ex: therapy completed, patient no longer taking, etc.)  Aquaphor ointment - no longer taking per nursing facility  Epinephrine inhaler - no longer taking per nursing facility  Furosemide - no longer taking per nursing facility  Loratadine - no longer taking per nursing facility  Metoprolol tartrate - no longer taking per nursing facility  Potassium chloride ER - no longer taking per nursing facility  Medications added:   Acetaminophen  Benzonatate  Cathflo inj soln  Insulin lispro inj soln  Melatonin ER  Miconazole cream  Polyethylene glycol powder  Naloxone nasal spray  Normal saline flushes  Oxycodone  Pantoprazole DR  Medication doses adjusted:   None at this time  Other notes:   Confirmed that last dose times for both daptomycin and ertapenem IV were on 1/23 PM at nursing facility (confirmed w/ RN at facility on 1/24 AM)  Last administration time for apixaban was this AM (1/24) prior to ED presentation per RN at facility report    Updated Prior to Admission Medication List (as of 1/24/2025 12:11 PM):   Current Outpatient Medications   Medication Instructions    acetaminophen (TYLENOL) 650 mg, Oral, EVERY 6 HOURS PRN    ALTEplase (CATHFLO) 2 mg, IntraCATHeter, EVERY 6 HOURS PRN    amiodarone (CORDARONE) 200 mg, Oral, DAILY    apixaban (ELIQUIS) 5 mg, Oral, 2 TIMES DAILY    benzonatate (TESSALON) 100 mg, Oral, 3

## 2025-01-24 NOTE — ED NOTES
Patient Name: Veronica Chaidez  : 1950 74 y.o.  MRN: [id number]  ED Room #: A06/A06-06     Chief complaint:   Chief Complaint   Patient presents with    Drug Overdose     Per EMS pt was given 4mg of Intranasal and responding more.      Hospital Problem/Diagnosis:   Hospital Problems             Last Modified POA    * (Principal) Pulmonary edema, acute 2025 Yes         O2 Flow Rate:O2 Device: Nasal cannula O2 Flow Rate (L/min): 3 L/min (if applicable)  Cardiac Rhythm:   (if applicable)  Active LDA's:   Peripheral IV 25 Distal;Left;Posterior Forearm (Active)   Site Assessment Clean, dry & intact 25 1335   Line Status Normal saline locked;Blood return noted;Brisk blood return;Specimen collected 25 1335   Dressing Status New dressing applied 25 1335      Urinary Catheter 25 Caicedo (Active)   $ Urethral catheter insertion Inserted for procedure 25 1421   Site Assessment Bleeding;Moist 25 1421   Urine Color Bloody 25 1421   Urine Appearance Mucous 25 1421   Collection Container Standard 25 1421   Securement Method Securing device (Describe) 25 1421   Catheter Care  Perineal wipes 25 1421   Catheter Best Practices  Catheter secured to thigh;Bag below bladder;Drainage tube clipped to bed;Drainage bag less than half full 25 1421   Status Draining 25 1421   Output (mL) 1200 mL 25 1421      PICC 24 Right Basilic (Active)        How does patient ambulate? Contact Guard    2. How does patient take pills? Other-have not had PO meds in ED.    3. Is patient alert? Drowsy, but responds to voice    4. Is patient oriented? Confused    5.   Patient arrived from:  nursing home  Facility Name: [facility]    6. If patient is disoriented or from a Skill Nursing Facility has family been notified of admission? Yes    7. Patient belongings? W/ pt.    Disposition of belongings? Kept with Patient     8. Any specific

## 2025-01-24 NOTE — ED PROVIDER NOTES
THE [facility]  EMERGENCY DEPARTMENT ENCOUNTER          PHYSICIAN ASSISTANT NOTE       Date of evaluation: 1/24/2025    Chief Complaint     Drug Overdose (Per EMS pt was given 4mg of Intranasal and responding more. )      History of Present Illness     Veronica Chaidez is a 74 y.o. male who presents via EMS with unresponsiveness.  Patient was found down at his nursing home.  He was given 4 mg of intranasal Narcan and became more alert/responsive.   Per [facility], patient seemed his normal self around 8 AM this morning.  He was given his morning meds.  Around 10:30 AM, an aide found him on the floor next to his bed.  They noted him to be lethargic.  They state that his blood pressure was 155/90, his O2 sat was 85% and they put him on 2 L nasal cannula.  His blood sugar was 130.  They called EMS.  They state that at baseline, patient is alert and oriented.  He does have generalized weakness but can sometimes go to the bathroom on his own.  He does not use a walker or cane.  Recently he has had a \"bad cough\" for which they plan to get a chest x-ray but this has not been completed yet.  Chart review reveals that patient was recently admitted in December for a UTI as well as osteomyelitis of his left heel.  He underwent debridement with podiatry.  He has been on IV Ertapenem and Daptomycin through his PICC line.  He also has oxycodone listed on his med list.  However, staff at his nursing facility states that they did not administer any narcotics this morning.    ASSESSMENT / PLAN  (MEDICAL DECISION MAKING)     INITIAL VITALS: BP: (!) 145/75, Temp: 98.3 °F (36.8 °C), Pulse: 85, Respirations: 28, SpO2: 98 %    Veronica Chaidez is a 74 y.o. male with PMH of CAD, Afib, Dm2, CKD, s/p ICD, Obesity who presented to the ED with unresponsiveness.  History obtained from EMS, patient's nursing facility and chart review.  Patient was found down on the ground next to his bed by nursing staff around 10:30 AM

## 2025-01-24 NOTE — ED PROVIDER NOTES
ED Attending Attestation Note     Date of evaluation: 1/24/2025    This patient was seen by the advance practice provider.  I have seen and examined the patient, agree with the workup, evaluation, management and diagnosis. The care plan has been discussed.  I have reviewed the ECG and concur with the EUNICE's interpretation.  My assessment reveals an altered 74-year-old male with complex past medical history presents to the emergency department altered mental status.  Patient with history of diabetes, atrial fibrillation on anticoagulation.  Heart failure status post ICD placement, CKD and recurrent osteomyelitis of his left heel status post debridement in December this past year currently on chronic IV antibiotics who presents to the emergency department with altered mental status.  Patient reportedly found unresponsive at nursing home.  He was noted have pinpoint pupils and responded to Narcan though has persisted to be altered from his baseline.  He is not able to provide any details to his current state at time of my examination.    On examination fine adult male, intermittently agitated, confused.  He is moving all extremities.  His lungs are clear to auscultation.  Abdomen is nondistended.  No grimace to deep palpation.    Will proceed with broad workup for evaluation of possible etiology of his altered mental status.    Azar Khan MD MPH   Physician       Azar Khan MD  01/24/25 5515

## 2025-01-24 NOTE — PROGRESS NOTES
4 Eyes Skin Assessment     NAME:  Veronica Chaidez  YOB: 1950  MEDICAL RECORD NUMBER:  [id number]    The patient is being assessed for  Admission    I agree that at least one RN has performed a thorough Head to Toe Skin Assessment on the patient. ALL assessment sites listed below have been assessed.      Areas assessed by both nurses:    Head, Face, Ears, Shoulders, Back, Chest, Arms, Elbows, Hands, Sacrum. Buttock, Coccyx, Ischium, Legs. Feet and Heels, and Under Medical Devices         Does the Patient have a Wound? Yes wound(s) were present on assessment. LDA wound assessment was Initiated and completed by RN  -Stage II coccyx  -redness L shin  -DTI R foot arch  -scattered bruising/abrasions  -L inner heel surgical wound with sutures  -L outer heel Stage III pressure injury       Damaso Prevention initiated by RN: Yes  Wound Care Orders initiated by RN: No    Pressure Injury (Stage 3,4, Unstageable, DTI, NWPT, and Complex wounds) if present, place Wound referral order by RN under : No    New Ostomies, if present place, Ostomy referral order under : No     Nurse 1 eSignature: Electronically signed by Annette Appiah RN on 1/24/25 at 4:08 PM EST    **SHARE this note so that the co-signing nurse can place an eSignature**    Nurse 2 eSignature: Electronically signed by Lisa Jenkins RN on 1/24/25 at 5:21 PM EST

## 2025-01-24 NOTE — H&P
Internal Medicine H&P    Date: 2025   Patient: Veronica Chaidez   Patient : 1950     CC: Drug Overdose (Per EMS pt was given 4mg of Intranasal and responding more. )       Subjective   HPI:   Mr Veronica Chaidez is a 73 y/o M w/ a PMH of CAD, A Fib, T2DM, CKD, s/p ICD placement, and obesity who presented to the ED with AMS. ED physician obtained history from EMS and I was unable to get a hold of the nursing facility but did speak to his friend. He was found down and unresponsive next to his bed around 10:30 am today after he was noted to be in his normal state at 8:30 am when he was given his morning meds. Vitals and blood sugar were checked and he was found to be saturating at 85%. He was placed on 4 L supplemental O2. 4 mg of IM narcan was administered as the patient is on Oxycodone at his SNF. The patient became more alert however he was still significantly altered. As per the nursing facility, the patient has had a \"bad cough\" as of late. He was admitted to [facility] in 2024 for L foot OM and is currently on IV Ertapenem as well as Daptomycin through a RUE PICC line.     On presentation to the emergency department, the patient was satting at 98% on 4 L of supplemental oxygen while his vital signs were within normal limits except for blood pressure 145/75. His VBG showed a pH of 7.360 with a pCO2 of 42.0, pO2 of 55.0, and venous bicarb of 23.7. His CBC was largely unremarkable with a WBC of 7.0 and a hemoglobin of 8.7 which seems to be around his baseline.  His lactic acid was 0.9. BMP was done which showed a creatinine of 1.7 which seems to be around his baseline. His hepatic function panel seem to be at baseline. His lipase was within normal limits. Initial troponin was 54 which up trended to 57. His BNP was 5604 with a baseline of 1491 in 2024. CT head was done which showed old infarct in the right frontal lobe with no acute intracranial hemorrhage or mass effect. Chest x-ray was done which

## 2025-01-24 NOTE — CARE COORDINATION
2:39 PM  Pt will be a readmission and will require a readmission assessment if admitted Inpatient.     Pt is from LTC at [facility]. Pt is currently minimally responsive. Pt has no NOK listed in his contacts.     Electronically signed by EYAL Calvert, NATALIEW, CIERRA-LEROY on 1/24/2025 at 2:39 PM  [phone]

## 2025-01-24 NOTE — ED NOTES
Removed pre-existing guo catheter with bloody, mucus like drainage noted. Tip intact, bleeding at meatus.      Latasha Medina, RN  01/24/25 4003

## 2025-01-24 NOTE — CONSULTS
NEUROLOGY CONSULT NOTE     Patient: Veronica Chaidez MRN: [id number]    YOB: 1950  Age: 74 y.o.  Sex: male   Unit: [facility] 4 U  Room/Bed: 4320/4320-01 Location: [facility]    Date of Consultation: 1/24/2025  Date of Admission: 1/24/2025 11:16 AM ( LOS: 0 days )  Primary Care Physician: Good Renee MD   Consult Requested By: Vinay Reid MD    Reason for Consult: AMS without clear etiology for the last 3 weeks    IMPRESSION & RECOMMENDATIONS     IMPRESSION:  -Veronica is a 74 year old male with a significant history of DM, A-fib on Eliquis, heart failure s/p pacemaker/ICD placement, CKD and ongoing IV antibiotic therapy for osteomyelitis who presents from his nursing home with severe encephalopathy.     -Based off report, it sounds like this was more of an acute change as he was describe to be in his normal state of health early in the morning and subsequently found severely altered a few hours later. Unclear if there was ongoing or progressive mentation changes prior.    -Overall, the immediate initial concern is that he has a vascular lesion in his basilar artery given his apparent acute worsening, his profound dysarthria, generalized weakness and hx of A-fib.    -If his CTA is negative, then suspect what we are seeing is likely secondary to on overall systemic, toxic metabolic encephalopathy.     -However, CNS infection is also on the differential but this would seem unlikely, especially if he has been on appropriate antibiotic therapy for his foot. Moreover, his exam, vitals and workup thus far don't totally support this. It is possible there could also be neuro toxicity from the ertapenem as there is small risk associated with this antibiotic.     -There is no clinical or historical evidence of seizure but suppose this is a thought as well    RECOMMENDATIONS:  -CTA head and neck stat  -MRI brain with and without contrast if able, he will likely not tolerate this

## 2025-01-25 ENCOUNTER — APPOINTMENT (OUTPATIENT)
Dept: MRI IMAGING | Age: 75
End: 2025-01-25
Payer: MEDICAID

## 2025-01-25 DIAGNOSIS — I42.0 DILATED CARDIOMYOPATHY (HCC): ICD-10-CM

## 2025-01-25 DIAGNOSIS — I48.0 PAROXYSMAL ATRIAL FIBRILLATION (HCC): ICD-10-CM

## 2025-01-25 DIAGNOSIS — I50.22 CHRONIC SYSTOLIC (CONGESTIVE) HEART FAILURE (HCC): Chronic | ICD-10-CM

## 2025-01-25 DIAGNOSIS — Z95.810 ICD (IMPLANTABLE CARDIOVERTER-DEFIBRILLATOR) IN PLACE: Primary | ICD-10-CM

## 2025-01-25 DIAGNOSIS — I25.5 ISCHEMIC CARDIOMYOPATHY: ICD-10-CM

## 2025-01-25 PROBLEM — G93.40 ENCEPHALOPATHY ACUTE: Status: ACTIVE | Noted: 2025-01-25

## 2025-01-25 LAB
ALBUMIN SERPL-MCNC: 3 G/DL (ref 3.4–5)
AMPHETAMINES UR QL SCN>1000 NG/ML: NORMAL
ANION GAP SERPL CALCULATED.3IONS-SCNC: 11 MMOL/L (ref 3–16)
BARBITURATES UR QL SCN>200 NG/ML: NORMAL
BASE EXCESS BLDV CALC-SCNC: -1.7 MMOL/L (ref -2–3)
BENZODIAZ UR QL SCN>200 NG/ML: NORMAL
BUN SERPL-MCNC: 28 MG/DL (ref 7–20)
CALCIUM SERPL-MCNC: 8.9 MG/DL (ref 8.3–10.6)
CANNABINOIDS UR QL SCN>50 NG/ML: NORMAL
CHLORIDE SERPL-SCNC: 106 MMOL/L (ref 99–110)
CHOLEST SERPL-MCNC: 120 MG/DL (ref 0–199)
CK SERPL-CCNC: 59 U/L (ref 39–308)
CO2 BLDV-SCNC: 26 MMOL/L
CO2 SERPL-SCNC: 24 MMOL/L (ref 21–32)
COCAINE UR QL SCN: NORMAL
COHGB MFR BLDV: 1.7 % (ref 0–1.5)
CREAT SERPL-MCNC: 1.6 MG/DL (ref 0.8–1.3)
DEPRECATED RDW RBC AUTO: 16.8 % (ref 12.4–15.4)
DO-HGB MFR BLDV: 30.8 %
DRUG SCREEN COMMENT UR-IMP: NORMAL
FENTANYL SCREEN, URINE: NORMAL
GFR SERPLBLD CREATININE-BSD FMLA CKD-EPI: 45 ML/MIN/{1.73_M2}
GLUCOSE BLD-MCNC: 74 MG/DL (ref 70–99)
GLUCOSE BLD-MCNC: 75 MG/DL (ref 70–99)
GLUCOSE BLD-MCNC: 84 MG/DL (ref 70–99)
GLUCOSE BLD-MCNC: 87 MG/DL (ref 70–99)
GLUCOSE SERPL-MCNC: 79 MG/DL (ref 70–99)
HCO3 BLDV-SCNC: 24.8 MMOL/L (ref 24–28)
HCT VFR BLD AUTO: 29 % (ref 40.5–52.5)
HDLC SERPL-MCNC: 24 MG/DL (ref 40–60)
HGB BLD-MCNC: 9.6 G/DL (ref 13.5–17.5)
LDLC SERPL CALC-MCNC: 77 MG/DL
MAGNESIUM SERPL-MCNC: 2.03 MG/DL (ref 1.8–2.4)
MAGNESIUM SERPL-MCNC: 2.09 MG/DL (ref 1.8–2.4)
MCH RBC QN AUTO: 28.2 PG (ref 26–34)
MCHC RBC AUTO-ENTMCNC: 33.1 G/DL (ref 31–36)
MCV RBC AUTO: 85.2 FL (ref 80–100)
METHADONE UR QL SCN>300 NG/ML: NORMAL
METHGB MFR BLDV: <0 % (ref 0–1.5)
OPIATES UR QL SCN>300 NG/ML: NORMAL
OXYCODONE UR QL SCN: NORMAL
PCO2 BLDV: 48.5 MMHG (ref 41–51)
PCP UR QL SCN>25 NG/ML: NORMAL
PERFORMED ON: NORMAL
PH BLDV: 7.32 [PH] (ref 7.35–7.45)
PH UR STRIP: 4 [PH]
PHOSPHATE SERPL-MCNC: 3.5 MG/DL (ref 2.5–4.9)
PLATELET # BLD AUTO: 341 K/UL (ref 135–450)
PMV BLD AUTO: 7.6 FL (ref 5–10.5)
PO2 BLDV: 39.7 MMHG (ref 25–40)
POTASSIUM SERPL-SCNC: 4.3 MMOL/L (ref 3.5–5.1)
RBC # BLD AUTO: 3.4 M/UL (ref 4.2–5.9)
SAO2 % BLDV: 69 %
SODIUM SERPL-SCNC: 141 MMOL/L (ref 136–145)
TRIGL SERPL-MCNC: 93 MG/DL (ref 0–150)
TROPONIN, HIGH SENSITIVITY: 61 NG/L (ref 0–22)
VLDLC SERPL CALC-MCNC: 19 MG/DL
WBC # BLD AUTO: 8.2 K/UL (ref 4–11)

## 2025-01-25 PROCEDURE — 92610 EVALUATE SWALLOWING FUNCTION: CPT

## 2025-01-25 PROCEDURE — 87086 URINE CULTURE/COLONY COUNT: CPT

## 2025-01-25 PROCEDURE — 87077 CULTURE AEROBIC IDENTIFY: CPT

## 2025-01-25 PROCEDURE — 99233 SBSQ HOSP IP/OBS HIGH 50: CPT | Performed by: PSYCHIATRY & NEUROLOGY

## 2025-01-25 PROCEDURE — 2580000003 HC RX 258

## 2025-01-25 PROCEDURE — 2500000003 HC RX 250 WO HCPCS

## 2025-01-25 PROCEDURE — 83036 HEMOGLOBIN GLYCOSYLATED A1C: CPT

## 2025-01-25 PROCEDURE — 6370000000 HC RX 637 (ALT 250 FOR IP)

## 2025-01-25 PROCEDURE — 85027 COMPLETE CBC AUTOMATED: CPT

## 2025-01-25 PROCEDURE — 84484 ASSAY OF TROPONIN QUANT: CPT

## 2025-01-25 PROCEDURE — 82803 BLOOD GASES ANY COMBINATION: CPT

## 2025-01-25 PROCEDURE — 99223 1ST HOSP IP/OBS HIGH 75: CPT | Performed by: INTERNAL MEDICINE

## 2025-01-25 PROCEDURE — 2060000000 HC ICU INTERMEDIATE R&B

## 2025-01-25 PROCEDURE — 80307 DRUG TEST PRSMV CHEM ANLYZR: CPT

## 2025-01-25 PROCEDURE — 82550 ASSAY OF CK (CPK): CPT

## 2025-01-25 PROCEDURE — 83735 ASSAY OF MAGNESIUM: CPT

## 2025-01-25 PROCEDURE — 80061 LIPID PANEL: CPT

## 2025-01-25 PROCEDURE — 6360000002 HC RX W HCPCS

## 2025-01-25 PROCEDURE — 80069 RENAL FUNCTION PANEL: CPT

## 2025-01-25 RX ORDER — MEROPENEM AND SODIUM CHLORIDE 1 G/50ML
1000 INJECTION, SOLUTION INTRAVENOUS DAILY
Status: DISCONTINUED | OUTPATIENT
Start: 2025-01-25 | End: 2025-01-26

## 2025-01-25 RX ADMIN — AMIODARONE HYDROCHLORIDE 200 MG: 200 TABLET ORAL at 09:37

## 2025-01-25 RX ADMIN — SODIUM CHLORIDE 750 MG: 9 INJECTION INTRAMUSCULAR; INTRAVENOUS; SUBCUTANEOUS at 16:33

## 2025-01-25 RX ADMIN — FUROSEMIDE 20 MG: 10 INJECTION, SOLUTION INTRAMUSCULAR; INTRAVENOUS at 17:55

## 2025-01-25 RX ADMIN — SODIUM CHLORIDE, PRESERVATIVE FREE 10 ML: 5 INJECTION INTRAVENOUS at 21:01

## 2025-01-25 RX ADMIN — Medication 6 MG: at 21:01

## 2025-01-25 RX ADMIN — FUROSEMIDE 20 MG: 10 INJECTION, SOLUTION INTRAMUSCULAR; INTRAVENOUS at 09:38

## 2025-01-25 RX ADMIN — MEROPENEM AND SODIUM CHLORIDE 1000 MG: 1 INJECTION, SOLUTION INTRAVENOUS at 12:53

## 2025-01-25 RX ADMIN — ASPIRIN 81 MG: 81 TABLET, CHEWABLE ORAL at 09:37

## 2025-01-25 RX ADMIN — SODIUM CHLORIDE, PRESERVATIVE FREE 10 ML: 5 INJECTION INTRAVENOUS at 09:38

## 2025-01-25 ASSESSMENT — PAIN SCALES - GENERAL
PAINLEVEL_OUTOF10: 0

## 2025-01-25 NOTE — PLAN OF CARE
Podiatric Surgery Plan of Care Note  Veronica Chaidez      Podiatry consult received, patient off the floor in MRI when podiatry attempted to see patient. Podiatry to return today when patient is in room.      Discussed with Dr. Chandana Downey DPM.    Ric Mcmahon DPM   Podiatric Resident PGY2  [employer]  Pager number [phone]  1/25/2025, 12:02 PM

## 2025-01-25 NOTE — PLAN OF CARE
SLP evaluation completed. Please refer to EMR.    Diana Nguyen MA CCC-SLP; [id number]  Speech-Language Pathologist

## 2025-01-25 NOTE — PROGRESS NOTES
Internal Medicine H&P    Date: 2025   Patient: Veronica Chaidez   Patient : 1950     CC: Drug Overdose (Per EMS pt was given 4mg of Intranasal and responding more. )    Interval history  Patient was seen and examined at bedside, still appears lethargic unable to follow commands.  He continues on 2 L, and has bilateral extremity 1+ edema.    Currently on Lasix 20 mg twice daily for heart failure exacerbation.  Neurology following for acute encephalopathy.  Rate is currently in sinus and controlled. Will resume beta blockers and amiodarone when patient is PO    PMHx:      Diagnosis Date    A-fib ([facility])     CAD, multiple vessel 2019    Coronary artery disease involving coronary bypass graft     Diabetes mellitus ([facility])     diet controlled     Encounter for imaging to screen for metal prior to MRI 2024    medtronic pacemaker:  Evera MRI XT  [id number]; leads:RA - 5076; RV - 6935M; IMPLANT DATE: 2021; followed by Dr. Salamanca- lrf    Encounter for imaging to screen for metal prior to MRI     Enlarged prostate     Environmental allergies     Kidney stones     Pacemaker     Systolic CHF ([facility])        PSurgHx:      Procedure Laterality Date    CARDIAC CATHETERIZATION  12/10/2019    Dr. Shaver - severe multi-vessel disease    CORONARY ARTERY BYPASS GRAFT N/A 2020    WAYNE; TCPB; CABG x 3, LIMA to LAD w/ long segment endarterectomy (4 cm); bilateral pulmonary vein isolation; 40 mm Atriclip to L atril appendage; endoscopic L GSV harvest; ICNB x 5 levels bilat; sternal plate x 1 to manubrium performed by Sohail Rhoades MD at [facility] OR    FOOT DEBRIDEMENT Left 2024    LEFT FOOT INCISION AND DRAINAGE WITH PARTIAL CALCANECTOMY performed by Chandana Downey DPM at [facility] OR    FOOT SURGERY Left 2024    LEFT FOOT TRANSMETATARSAL AMPUTATION WITH DAVID TENDON LENGTHENING performed by Chandana Downey DPM at [facility] OR    NASAL POLYP SURGERY  x2    NASAL SEPTUM SURGERY      WOUND VACUUM  osseous process or fracture.      PARANASAL SINUSES / MASTOIDS: Mucoperiosteal thickening paranasal sinuses with air-fluid level right maxillary sinus.      ORBITS: Normal.      EXTRACRANIAL SOFT TISSUES: Normal.      OTHER: None.      IMPRESSION:      1. No large vessel occlusion.   2. Remote right frontotemporal infarct.   3. Parenchymal volume loss and chronic small vessel ischemic changes in the white matter.      Electronically signed by Denis Gee MD      CT CHEST WO CONTRAST   Final Result   1. Multifocal areas of consolidation, groundglass opacity and interlobular septal thickening presumably reflecting an acute infectious process. Superimposed pulmonary edema is also a possibility.   2. Trace pleural effusions.   3. Cardiomegaly.   4. Mediastinal lymphadenopathy, presumably reactive. Recommend follow-up chest CT to document resolution.      Electronically signed by Juventino Bella DO      CT CERVICAL SPINE WO CONTRAST   Final Result   1.  No acute fracture or subluxation.   2.  Multilevel degenerative changes as described above with varying degrees is of neuroforaminal narrowing.      Electronically signed by Choco Owen      XR CHEST PORTABLE   Final Result      1.  Findings consistent with pulmonary edema.      Electronically signed by Lenny Palm      XR FOOT LEFT (2 VIEWS)   Final Result   Plantar wound overlying the calcaneus with suggestion of periosteal   reaction and minimal erosive change concerning for acute osteomyelitis.      Electronically signed by Michele Montaño      CT HEAD WO CONTRAST   Final Result   Old infarct in the right frontal lobe.      No acute cranial hemorrhage or mass effect.      Electronically signed by Juventino Bella      MRI FOOT LEFT WO CONTRAST    (Results Pending)         Assessment & Plan   Mr Veronica Chaidez is a 75 y/o M w/ a PMH of CAD, A Fib, T2DM, CKD, s/p ICD placement, and obesity who presented to the ED with AMS.    Heart Failure w/ Acute

## 2025-01-25 NOTE — PLAN OF CARE
Problem: Chronic Conditions and Co-morbidities  Goal: Patient's chronic conditions and co-morbidity symptoms are monitored and maintained or improved  Outcome: Progressing  Flowsheets (Taken 1/25/2025 0441)  Care Plan - Patient's Chronic Conditions and Co-Morbidity Symptoms are Monitored and Maintained or Improved:   Collaborate with multidisciplinary team to address chronic and comorbid conditions and prevent exacerbation or deterioration   Monitor and assess patient's chronic conditions and comorbid symptoms for stability, deterioration, or improvement   Update acute care plan with appropriate goals if chronic or comorbid symptoms are exacerbated and prevent overall improvement and discharge     Problem: Discharge Planning  Goal: Discharge to home or other facility with appropriate resources  Outcome: Progressing  Flowsheets (Taken 1/25/2025 0441)  Discharge to home or other facility with appropriate resources:   Identify barriers to discharge with patient and caregiver   Identify discharge learning needs (meds, wound care, etc)   Refer to discharge planning if patient needs post-hospital services based on physician order or complex needs related to functional status, cognitive ability or social support system   Arrange for interpreters to assist at discharge as needed   Arrange for needed discharge resources and transportation as appropriate     Problem: Confusion  Goal: Confusion, delirium, dementia, or psychosis is improved or at baseline  Description: INTERVENTIONS:  1. Assess for possible contributors to thought disturbance, including medications, impaired vision or hearing, underlying metabolic abnormalities, dehydration, psychiatric diagnoses, and notify attending LIP  2. [place] high risk fall precautions, as indicated  3. Provide frequent short contacts to provide reality reorientation, refocusing and direction  4. Decrease environmental stimuli, including noise as appropriate  5. Monitor and

## 2025-01-25 NOTE — PROGRESS NOTES
Speech Language Pathology  [address]  Dysphagia Evaluation  Name: Veronica Chaidez  : 1950  MRN: [id number]                                                       Patient Diagnosis(es):   Patient Active Problem List    Diagnosis Date Noted    Chronic systolic (congestive) heart failure (HCC) 01/10/2023    Acute kidney injury superimposed on stage 4 chronic kidney disease (HCC) 2022    Primary hypertension 12/15/2022    Mixed hyperlipidemia 2022    Gastroesophageal reflux disease without esophagitis 2022    Uncontrolled type 2 diabetes mellitus with hyperglycemia (HCC) 2022    Encephalopathy acute 2025    Pulmonary edema, acute 2025    Chronic osteomyelitis of right foot with draining sinus 2024    Open wound of left foot 2024    Non-healing open wound of heel, initial encounter 2024    Diabetic nephropathy associated with type 2 diabetes mellitus (HCC) 2024    Urinary tract infection without hematuria 2024    Diabetic foot infection (HCC) 2024    Stage 3 chronic kidney disease (HCC) 2024    Electrolyte imbalance 2024    Osteomyelitis of second toe of left foot 2024    Class 1 obesity due to excess calories with serious comorbidity and body mass index (BMI) of 34.0 to 34.9 in adult 2023    Status post ablation of atrial fibrillation 10/03/2023    Longstanding persistent atrial fibrillation (HCC) 10/03/2023    ICD (implantable cardioverter-defibrillator) in place-[employer]IC 2021    Ischemic cardiomyopathy 2021    CAD, multiple vessel     Dilated cardiomyopathy (HCC) 12/10/2019    Paroxysmal atrial fibrillation (HCC) 12/10/2019    S/P coronary angiogram 12/10/2019       Past Medical History:   Diagnosis Date    A-fib (HCC)     CAD, multiple vessel 2019    Coronary artery disease involving coronary bypass graft     Diabetes mellitus (HCC)     diet controlled     Encounter for

## 2025-01-25 NOTE — PROGRESS NOTES
Neurology Progress Note    Patient: Veronica Chaidez MRN: [id number]    YOB: 1950  Age: 74 y.o.  Sex: male   Unit: TJ 4 PCU Room/Bed: 4320/4320-01 Location: [facility] Date: 1/25/2025  Date of Admission: 1/24/2025 11:16 AM  Admitting Physician: DALJIT BAEZ    Primary Care Physician: Good Renee MD          LOS: 1 day      ASSESSMENT & RECOMMENDATIONS     Assessment  75yo man with afib on apixaban, osteomyelitis of left foot one month ago on long-term antibiotics s/p partial calcanectomy presented to ER after being found down at ECF and minimally responsive and found to have evidence concerning for PNA vs pulmonary edema vs both on imaging, increased oxygen requirement, concerns for CHF exacerbation, and possible UTI on UA  Overall, lower suspicion for neurologic issue here  He is encephalopathic and this could certainly be due to any of his acute medical issues  He is not really dysarthric and exhibits a lot of word-chopping, secondary to catching his breath, which leads to severe hypophonia and, additionally, he is giving poor overall effort in word annunciation  Although it is very difficult to discern what he is talking about, and it is clear that much of what he is saying is either tangential or inappropriate to what was asked or the situation, do not sense that this is aphasia, necessarily  Cannot exclude that there isn't an element of stroke here, however, especially in light his history of stroke, but remainder of exam demonstrates no sig focality to suggest this (power is good overall and he has no receptive aphasia given how well he follows commands)  Unfortunately, he did not tolerate MRI and cannot be sedated enough to tolerate it due to pacer/ICD and needing to be awake to respond to any pain/discomfort  Likewise, given how well he follows commands, do not feel that his degree of encephalopathy suggests he is (or has had) subclinical seizures; do  Ref Range    Troponin, High Sensitivity 57 (H) 0 - 22 ng/L   Urinalysis with Reflex to Culture    Collection Time: 01/24/25  2:23 PM    Specimen: Urine   Result Value Ref Range    Color, UA Yellow Straw/Yellow    Clarity, UA Clear Clear    Glucose, Ur Negative Negative mg/dL    Bilirubin, Urine Negative Negative    Ketones, Urine Negative Negative mg/dL    Specific Gravity, UA 1.020 1.005 - 1.030    Blood, Urine LARGE (A) Negative    pH, Urine 6.0 5.0 - 8.0    Protein, UA TRACE (A) Negative mg/dL    Urobilinogen, Urine 0.2 <2.0 E.U./dL    Nitrite, Urine Negative Negative    Leukocyte Esterase, Urine MODERATE (A) Negative    Microscopic Examination YES     Urine Type NotGiven     Urine Reflex to Culture Not Indicated    Microscopic Urinalysis    Collection Time: 01/24/25  2:23 PM   Result Value Ref Range    WBC, UA 6-9 (A) 0 - 5 /HPF    RBC, UA 5-10 (A) 0 - 4 /HPF    Epithelial Cells, UA 0-1 0 - 5 /HPF    Bacteria, UA 1+ (A) None Seen /HPF   Echo (TTE) limited (PRN contrast/bubble/strain/3D)    Collection Time: 01/24/25  3:29 PM   Result Value Ref Range    Body Surface Area 2.56 m2    LV EDV A2C 186 mL    LV EDV A4C 186 mL    LV ESV A2C 116 mL    LV ESV A4C 126 mL    IVSd 1.1 (A) 0.6 - 1.0 cm    LVIDd 5.4 4.2 - 5.9 cm    LVIDs 4.5 cm    LVPWd 1.1 (A) 0.6 - 1.0 cm    LV Ejection Fraction A2C 38 %    LV Ejection Fraction A4C 32 %    EF BP 35 (A) 55 - 100 %    TAPSE 0.8 (A) 1.7 cm    Fractional Shortening 2D 17 28 - 44 %    LV ESV Index A4C 51 mL/m2    LV EDV Index A4C 75 mL/m2    LV ESV Index A2C 47 mL/m2    LV EDV Index A2C 75 mL/m2    LVIDd Index 2.17 cm/m2    LVIDs Index 1.81 cm/m2    LV RWT Ratio 0.41     LV Mass 2D 234.8 (A) 88 - 224 g    LV Mass 2D Index 94.3 49 - 115 g/m2   Protime-INR    Collection Time: 01/24/25  4:23 PM   Result Value Ref Range    Protime 22.9 (H) 11.9 - 14.9 sec    INR 2.01 (H) 0.85 - 1.15   APTT    Collection Time: 01/24/25  4:23 PM   Result Value Ref Range    aPTT 49.4 (H) 22.1 - 36.4 sec
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
## 2025-01-25 NOTE — PROGRESS NOTES
One time dose ativan given. Patient off floor for CTA of the head/neck as ordered. Electronically signed by Suzanne Dukes RN on 1/25/2025 at 3:08 AM

## 2025-01-25 NOTE — CONSULTS
discussed with the patient and educated on the negative effects. I have asked the patient to not utilize these agents.    Thank you for allowing to us to participate in the care or Veronica Chaidez. Further evaluation will be based upon the patient's clinical course and testing results.    I have spent 55 minutes of face to face time with the patient with more than 50% spent counseling and coordinating care.     All questions and concerns were addressed to the patient/family. Alternatives to my treatment were discussed. The note was completed using EMR. Every effort was made to ensure accuracy; however, inadvertent computerized transcription errors may be present. I have personally reviewed the reports and images of labs, radiological studies, cardiac studies including ECG's and telemetry, current and old medical records.     Fernando Parekh MD, FACC, [facility]  Advanced Cardiac Imaging  [facility]

## 2025-01-25 NOTE — PLAN OF CARE
Problem: Chronic Conditions and Co-morbidities  Goal: Patient's chronic conditions and co-morbidity symptoms are monitored and maintained or improved  1/25/2025 1158 by Jolene Winchester RN  Outcome: Progressing  Flowsheets (Taken 1/25/2025 0441 by Suzanne Dukes, RN)  Care Plan - Patient's Chronic Conditions and Co-Morbidity Symptoms are Monitored and Maintained or Improved:   Collaborate with multidisciplinary team to address chronic and comorbid conditions and prevent exacerbation or deterioration   Monitor and assess patient's chronic conditions and comorbid symptoms for stability, deterioration, or improvement   Update acute care plan with appropriate goals if chronic or comorbid symptoms are exacerbated and prevent overall improvement and discharge     Problem: Discharge Planning  Goal: Discharge to home or other facility with appropriate resources  1/25/2025 1158 by Jolene Winchester RN  Outcome: Progressing  Flowsheets (Taken 1/25/2025 0441 by Suzanne Dukes, RN)  Discharge to home or other facility with appropriate resources:   Identify barriers to discharge with patient and caregiver   Identify discharge learning needs (meds, wound care, etc)   Refer to discharge planning if patient needs post-hospital services based on physician order or complex needs related to functional status, cognitive ability or social support system   Arrange for interpreters to assist at discharge as needed   Arrange for needed discharge resources and transportation as appropriate     Problem: Confusion  Goal: Confusion, delirium, dementia, or psychosis is improved or at baseline  Description: INTERVENTIONS:  1. Assess for possible contributors to thought disturbance, including medications, impaired vision or hearing, underlying metabolic abnormalities, dehydration, psychiatric diagnoses, and notify attending LIP  2. [place] high risk fall precautions, as indicated  3. Provide frequent short contacts to provide reality

## 2025-01-26 PROBLEM — M86.172 ACUTE OSTEOMYELITIS OF LEFT CALCANEUS: Status: ACTIVE | Noted: 2025-01-26

## 2025-01-26 PROBLEM — I50.9 CONGESTIVE HEART FAILURE (HCC): Status: ACTIVE | Noted: 2025-01-26

## 2025-01-26 PROBLEM — Z79.2 RECEIVING INTRAVENOUS ANTIBIOTIC TREATMENT AS OUTPATIENT: Status: ACTIVE | Noted: 2025-01-26

## 2025-01-26 PROBLEM — E87.70 HYPERVOLEMIA: Status: ACTIVE | Noted: 2025-01-26

## 2025-01-26 PROBLEM — R41.82 ALTERED MENTAL STATUS: Status: ACTIVE | Noted: 2025-01-26

## 2025-01-26 PROBLEM — S91.302A OPEN WOUND OF LEFT HEEL: Status: ACTIVE | Noted: 2025-01-26

## 2025-01-26 PROBLEM — R33.9 URINARY RETENTION: Status: ACTIVE | Noted: 2025-01-26

## 2025-01-26 PROBLEM — J18.9 MULTIFOCAL PNEUMONIA: Status: ACTIVE | Noted: 2025-01-26

## 2025-01-26 LAB
ALBUMIN SERPL-MCNC: 3 G/DL (ref 3.4–5)
ANION GAP SERPL CALCULATED.3IONS-SCNC: 9 MMOL/L (ref 3–16)
BACTERIA UR CULT: ABNORMAL
BACTERIA UR CULT: ABNORMAL
BUN SERPL-MCNC: 28 MG/DL (ref 7–20)
CALCIUM SERPL-MCNC: 8.8 MG/DL (ref 8.3–10.6)
CHLORIDE SERPL-SCNC: 104 MMOL/L (ref 99–110)
CO2 SERPL-SCNC: 26 MMOL/L (ref 21–32)
CREAT SERPL-MCNC: 1.7 MG/DL (ref 0.8–1.3)
CRP SERPL-MCNC: 60.6 MG/L (ref 0–5.1)
DEPRECATED RDW RBC AUTO: 16.1 % (ref 12.4–15.4)
EKG DIAGNOSIS: NORMAL
EKG Q-T INTERVAL: 434 MS
EKG QRS DURATION: 96 MS
EKG QTC CALCULATION (BAZETT): 488 MS
EKG R AXIS: 97 DEGREES
EKG T AXIS: 95 DEGREES
EKG VENTRICULAR RATE: 76 BPM
ERYTHROCYTE [SEDIMENTATION RATE] IN BLOOD BY WESTERGREN METHOD: 108 MM/HR (ref 0–20)
EST. AVERAGE GLUCOSE BLD GHB EST-MCNC: 114 MG/DL
GFR SERPLBLD CREATININE-BSD FMLA CKD-EPI: 42 ML/MIN/{1.73_M2}
GLUCOSE BLD-MCNC: 108 MG/DL (ref 70–99)
GLUCOSE BLD-MCNC: 79 MG/DL (ref 70–99)
GLUCOSE BLD-MCNC: 79 MG/DL (ref 70–99)
GLUCOSE BLD-MCNC: 99 MG/DL (ref 70–99)
GLUCOSE SERPL-MCNC: 71 MG/DL (ref 70–99)
HBA1C MFR BLD: 5.6 %
HCT VFR BLD AUTO: 28.4 % (ref 40.5–52.5)
HGB BLD-MCNC: 9.1 G/DL (ref 13.5–17.5)
MAGNESIUM SERPL-MCNC: 1.91 MG/DL (ref 1.8–2.4)
MCH RBC QN AUTO: 27.4 PG (ref 26–34)
MCHC RBC AUTO-ENTMCNC: 32.2 G/DL (ref 31–36)
MCV RBC AUTO: 85.2 FL (ref 80–100)
ORGANISM: ABNORMAL
ORGANISM: ABNORMAL
PERFORMED ON: ABNORMAL
PERFORMED ON: NORMAL
PHOSPHATE SERPL-MCNC: 3.4 MG/DL (ref 2.5–4.9)
PLATELET # BLD AUTO: 354 K/UL (ref 135–450)
PMV BLD AUTO: 7.7 FL (ref 5–10.5)
POTASSIUM SERPL-SCNC: 4 MMOL/L (ref 3.5–5.1)
RBC # BLD AUTO: 3.33 M/UL (ref 4.2–5.9)
SODIUM SERPL-SCNC: 139 MMOL/L (ref 136–145)
WBC # BLD AUTO: 6.8 K/UL (ref 4–11)

## 2025-01-26 PROCEDURE — 6370000000 HC RX 637 (ALT 250 FOR IP)

## 2025-01-26 PROCEDURE — 2500000003 HC RX 250 WO HCPCS

## 2025-01-26 PROCEDURE — 99231 SBSQ HOSP IP/OBS SF/LOW 25: CPT | Performed by: NURSE PRACTITIONER

## 2025-01-26 PROCEDURE — 93005 ELECTROCARDIOGRAM TRACING: CPT

## 2025-01-26 PROCEDURE — 99233 SBSQ HOSP IP/OBS HIGH 50: CPT | Performed by: NURSE PRACTITIONER

## 2025-01-26 PROCEDURE — 99223 1ST HOSP IP/OBS HIGH 75: CPT | Performed by: INTERNAL MEDICINE

## 2025-01-26 PROCEDURE — 92526 ORAL FUNCTION THERAPY: CPT

## 2025-01-26 PROCEDURE — 6360000002 HC RX W HCPCS: Performed by: INTERNAL MEDICINE

## 2025-01-26 PROCEDURE — 85027 COMPLETE CBC AUTOMATED: CPT

## 2025-01-26 PROCEDURE — 80069 RENAL FUNCTION PANEL: CPT

## 2025-01-26 PROCEDURE — 85652 RBC SED RATE AUTOMATED: CPT

## 2025-01-26 PROCEDURE — 2700000000 HC OXYGEN THERAPY PER DAY

## 2025-01-26 PROCEDURE — 6360000002 HC RX W HCPCS

## 2025-01-26 PROCEDURE — 86140 C-REACTIVE PROTEIN: CPT

## 2025-01-26 PROCEDURE — 94761 N-INVAS EAR/PLS OXIMETRY MLT: CPT

## 2025-01-26 PROCEDURE — 93010 ELECTROCARDIOGRAM REPORT: CPT | Performed by: INTERNAL MEDICINE

## 2025-01-26 PROCEDURE — 6370000000 HC RX 637 (ALT 250 FOR IP): Performed by: INTERNAL MEDICINE

## 2025-01-26 PROCEDURE — 2060000000 HC ICU INTERMEDIATE R&B

## 2025-01-26 PROCEDURE — 83735 ASSAY OF MAGNESIUM: CPT

## 2025-01-26 RX ORDER — MEROPENEM AND SODIUM CHLORIDE 1 G/50ML
1000 INJECTION, SOLUTION INTRAVENOUS EVERY 12 HOURS
Status: DISCONTINUED | OUTPATIENT
Start: 2025-01-26 | End: 2025-02-01

## 2025-01-26 RX ORDER — FUROSEMIDE 40 MG/1
40 TABLET ORAL DAILY
Status: DISCONTINUED | OUTPATIENT
Start: 2025-01-27 | End: 2025-02-04

## 2025-01-26 RX ORDER — LINEZOLID 2 MG/ML
600 INJECTION, SOLUTION INTRAVENOUS EVERY 12 HOURS
Status: DISCONTINUED | OUTPATIENT
Start: 2025-01-26 | End: 2025-01-30

## 2025-01-26 RX ORDER — LORAZEPAM 2 MG/ML
0.5 INJECTION INTRAMUSCULAR ONCE
Status: COMPLETED | OUTPATIENT
Start: 2025-01-26 | End: 2025-01-26

## 2025-01-26 RX ADMIN — ASPIRIN 81 MG: 81 TABLET, CHEWABLE ORAL at 09:14

## 2025-01-26 RX ADMIN — APIXABAN 5 MG: 5 TABLET, FILM COATED ORAL at 20:51

## 2025-01-26 RX ADMIN — FUROSEMIDE 20 MG: 10 INJECTION, SOLUTION INTRAMUSCULAR; INTRAVENOUS at 09:08

## 2025-01-26 RX ADMIN — ATORVASTATIN CALCIUM 80 MG: 80 TABLET, FILM COATED ORAL at 20:51

## 2025-01-26 RX ADMIN — SODIUM CHLORIDE, PRESERVATIVE FREE 10 ML: 5 INJECTION INTRAVENOUS at 09:09

## 2025-01-26 RX ADMIN — AMIODARONE HYDROCHLORIDE 200 MG: 200 TABLET ORAL at 09:14

## 2025-01-26 RX ADMIN — LINEZOLID 600 MG: 600 INJECTION, SOLUTION INTRAVENOUS at 13:02

## 2025-01-26 RX ADMIN — LORAZEPAM 0.5 MG: 2 INJECTION INTRAMUSCULAR; INTRAVENOUS at 03:56

## 2025-01-26 RX ADMIN — MEROPENEM AND SODIUM CHLORIDE 1000 MG: 1 INJECTION, SOLUTION INTRAVENOUS at 21:55

## 2025-01-26 RX ADMIN — MEROPENEM AND SODIUM CHLORIDE 1000 MG: 1 INJECTION, SOLUTION INTRAVENOUS at 09:06

## 2025-01-26 RX ADMIN — Medication 6 MG: at 20:51

## 2025-01-26 ASSESSMENT — PAIN SCALES - GENERAL
PAINLEVEL_OUTOF10: 0

## 2025-01-26 NOTE — PLAN OF CARE
Problem: Confusion  Goal: Confusion, delirium, dementia, or psychosis is improved or at baseline  Description: INTERVENTIONS:  1. Assess for possible contributors to thought disturbance, including medications, impaired vision or hearing, underlying metabolic abnormalities, dehydration, psychiatric diagnoses, and notify attending LIP  2. [place] high risk fall precautions, as indicated  3. Provide frequent short contacts to provide reality reorientation, refocusing and direction  4. Decrease environmental stimuli, including noise as appropriate  5. Monitor and intervene to maintain adequate nutrition, hydration, elimination, sleep and activity  6. If unable to ensure safety without constant attention obtain sitter and review sitter guidelines with assigned personnel  7. Initiate Psychosocial CNS and Spiritual Care consult, as indicated  1/26/2025 1601 by Erick Means, RN  Outcome: Not Progressing

## 2025-01-26 NOTE — CONSULTS
Infectious Diseases Inpatient Consult Note      Reason for Consult: Left foot osteomyelitis    Requesting Physician:  Dr. Decker     Primary Care Physician:  Good Renee MD    History Obtained From:  Epic and pt    CHIEF COMPLAINT:     Chief Complaint   Patient presents with    Drug Overdose     Per EMS pt was given 4mg of Intranasal and responding more.    Left foot osteomyelitis    HISTORY OF PRESENT ILLNESS:  74 y.o. male with a significant history for atrial fibrillation, coronary artery disease, diabetes, pacemaker in place, kidney stones multiple surgeries on the left foot last incision and drainage of the left calcaneus was on 12/30/24 patient currently receiving IV antibiotics through PICC line now admitted to hospital from nursing facility secondary to change in mental status, labs indicate creatinine 1.7 BNP elevated at 5/6/2004 hemoglobin A1c 5.6 UDS negative WBC count normal hemoglobin 9.1 urine analysis negative, blood cultures negative rapid flu negative COVID-19 negative, CT head is negative, CT chest indicating multifocal area of consolidation and groundglass changes.  We are consulted for antibiotic recommendations      Past Medical History:    Past Medical History:   Diagnosis Date    A-fib ([facility]) 2020    CAD, multiple vessel 12/2019    Coronary artery disease involving coronary bypass graft     Diabetes mellitus ([facility])     diet controlled     Encounter for imaging to screen for metal prior to MRI 12/23/2024    medtronic pacemaker:  Evera MRI XT  [id number]; leads:RA - 5076; RV - 6935M; IMPLANT DATE: 03/12/2021; followed by Dr. Salamanca- [facility]    Encounter for imaging to screen for metal prior to MRI     Enlarged prostate     Environmental allergies     Kidney stones     Pacemaker     Systolic CHF ([facility])        Past Surgical History:    Past Surgical History:   Procedure Laterality Date    CARDIAC CATHETERIZATION  12/10/2019    Dr. Shaver - severe multi-vessel disease    CORONARY ARTERY

## 2025-01-26 NOTE — PLAN OF CARE
Problem: Chronic Conditions and Co-morbidities  Goal: Patient's chronic conditions and co-morbidity symptoms are monitored and maintained or improved  Outcome: Progressing  Flowsheets (Taken 1/25/2025 0441)  Care Plan - Patient's Chronic Conditions and Co-Morbidity Symptoms are Monitored and Maintained or Improved:   Collaborate with multidisciplinary team to address chronic and comorbid conditions and prevent exacerbation or deterioration   Monitor and assess patient's chronic conditions and comorbid symptoms for stability, deterioration, or improvement   Update acute care plan with appropriate goals if chronic or comorbid symptoms are exacerbated and prevent overall improvement and discharge     Problem: Discharge Planning  Goal: Discharge to home or other facility with appropriate resources  Outcome: Progressing  Flowsheets (Taken 1/25/2025 0441)  Discharge to home or other facility with appropriate resources:   Identify barriers to discharge with patient and caregiver   Identify discharge learning needs (meds, wound care, etc)   Refer to discharge planning if patient needs post-hospital services based on physician order or complex needs related to functional status, cognitive ability or social support system   Arrange for interpreters to assist at discharge as needed   Arrange for needed discharge resources and transportation as appropriate     Problem: Confusion  Goal: Confusion, delirium, dementia, or psychosis is improved or at baseline  Description: INTERVENTIONS:  1. Assess for possible contributors to thought disturbance, including medications, impaired vision or hearing, underlying metabolic abnormalities, dehydration, psychiatric diagnoses, and notify attending LIP  2. [place] high risk fall precautions, as indicated  3. Provide frequent short contacts to provide reality reorientation, refocusing and direction  4. Decrease environmental stimuli, including noise as appropriate  5. Monitor and  protocol as indicated   Assess the need for suctioning and aspirate as needed   Respiratory therapy support as indicated   Assess and instruct to report shortness of breath or any respiratory difficulty   Encourage broncho-pulmonary hygiene including cough, deep breathe, incentive spirometry   Oxygen supplementation based on oxygen saturation or arterial blood gases   Assess for changes in mentation and behavior  Note: SpO2 98% 2 L NC.      Problem: Genitourinary - Adult  Goal: Absence of urinary retention  Outcome: Progressing  Flowsheets (Taken 1/25/2025 0441)  Absence of urinary retention:   Assess patient’s ability to void and empty bladder   Place urinary catheter per Licensed Independent Practitioner order if needed   Discuss catheterization for long term situations as appropriate   Monitor intake/output and perform bladder scan as needed   Discuss with Licensed Independent Practitioner  medications to alleviate retention as needed     Problem: Genitourinary - Adult  Goal: Urinary catheter remains patent  Outcome: Progressing  Flowsheets (Taken 1/25/2025 0441)  Urinary catheter remains patent:   Assess patency of urinary catheter   Irrigate catheter per Licensed Independent Practitioner order if indicated and notify Licensed Independent Practitioner if unable to irrigate   Assess need for a larger catheter size or a 3-way catheter for continuous bladder irrigation     Problem: Metabolic/Fluid and Electrolytes - Adult  Goal: Glucose maintained within prescribed range  Outcome: Progressing  Flowsheets (Taken 1/25/2025 0441)  Glucose maintained within prescribed range:   Monitor blood glucose as ordered   Administer ordered medications to maintain glucose within target range   Instruct patient on self management of diabetes and initiate consult as needed   Assess barriers to adequate nutritional intake and initiate nutrition consult as needed   Assess for signs and symptoms of hyperglycemia and hypoglycemia

## 2025-01-26 NOTE — PROGRESS NOTES
NEUROLOGY PROGRESS NOTE       Patient Name: Veronica Chaidez YOB: 1950   Sex: Male Age: 74 yrs     CC / Reason for Consult: acute encephalopathy     Subjective / ROS / Updates over last 24 hours:  Seems to be doing okay this morning, exam has improved but still remains slightly confused.     ASSESSMENT & RECOMMENDATIONS   Veronica is a 74 year old male with a significant history of DM, A-fib on Eliquis, heart failure s/p pacemaker/ICD placement, CKD and ongoing IV antibiotic therapy for osteomyelitis who presents from his nursing home with severe encephalopathy, has improved over the last 48 hours. Acute encephalopathy likely all metabolic related     Recommendations  Unable to get MRI  No need for EEG  No further w/u needed  Okay to resume anticoagulation today   We will sign off. Please call with questions      Management and plan discussed with:  Nursing staff  Dr. Horacio Meza, APRN - CNP   NEUROLOGY  1/26/2025 11:44 AM  [facility]: [facility] NEUROLOGY    HISTORY       Allergies Allergies   Allergen Reactions    Codeine Seizure     Seizures    Aspartame And Phenylalanine Headaches and Myalgia    Msg Headaches     causes headache    Yellow Dyes (Non-Tartrazine) Headaches      Diet ADULT DIET; Dysphagia - Soft and Bite Sized; Low Sodium (2 gm)   Isolation No active isolations     LABS   Metabolic Panel Recent Labs     01/24/25  1142 01/24/25  1900 01/25/25  1457 01/25/25  1808 01/26/25  0549   *  --  141  --  139   K see below  --  4.3  --  4.0     --  106  --  104   CO2 20*  --  24  --  26   BUN 30*  --  28*  --  28*   CREATININE 1.7*  --  1.6*  --  1.7*   GLUCOSE 124*  --  79  --  71   CALCIUM 8.5  --  8.9  --  8.8   PHOS  --   --  3.5  --  3.4   MG  --   --  2.09 2.03 1.91   ALKPHOS 97  --   --   --   --    ALT see below  --   --   --   --    AST 36  --   --   --   --    AMMONIA  --  24  --   --   --       CBC / Coags Recent Labs     01/24/25  1142 01/24/25  1623

## 2025-01-26 NOTE — PROGRESS NOTES
[facility]   Cardiology  Note   Dr Parekh   Was pt of Dr Taiwo Lynne RN, FNP APRN     Date: 1/26/2025    Admit Date: 1/24/2025       CC:Drug Overdose (Per EMS pt was given 4mg of Intranasal and responding more)     Following cardiology today for: SOB hypoxic respiratory failure/A-fib/CAD/chronic systolic heart failure   PMH: CAD, A Fib, sp ablation T2DM, CKD, s/p ICD placement, and obesity     Interval Hx /  Subjective:Today, he is resting in bed, VSS SV02 96% on 2 l n/c 02   +1 edema on lasix 20 mg IVP daily to oral tomorrow   sCr 1.7 (usual range 2.5> 1.6)  No new complaints today.   He had some confusion during the HS but more alert today     Medical Decision Making:  Discussion of patient care with other providers  Patient seen and examined. Clinical notes reviewed. Telemetry reviewed / Pertinent labs, diagnostic, device, and imaging results reviewed as a part of this visit    Past Medical History:  Past Medical History:   Diagnosis Date    A-fib ([facility]) 2020    CAD, multiple vessel 12/2019    Coronary artery disease involving coronary bypass graft     Diabetes mellitus (HCC)     diet controlled     Encounter for imaging to screen for metal prior to MRI 12/23/2024    medtronic pacemaker:  Evera MRI XT  [id number]; leads:RA - 5076; RV - 6935M; IMPLANT DATE: 03/12/2021; followed by Dr. Salamanca- [facility]    Encounter for imaging to screen for metal prior to MRI     Enlarged prostate     Environmental allergies     Kidney stones     Pacemaker     Systolic CHF (HCC)           Scheduled Meds:   meropenem  1,000 mg IntraVENous Daily    melatonin  6 mg Oral Nightly    DAPTOmycin (CUBICIN) 750 mg in sodium chloride (PF) 0.9 % 15 mL IV syringe  750 mg IntraVENous Q24H    [Held by provider] atorvastatin  80 mg Oral Nightly    aspirin  81 mg Oral Daily    Or    aspirin  300 mg Rectal Daily    furosemide  20 mg IntraVENous BID    insulin lispro  0-4 Units SubCUTAneous 4x Daily AC & HS    amiodarone  200 mg Oral  left foot 04/19/2024    Class 1 obesity due to excess calories with serious comorbidity and body mass index (BMI) of 34.0 to 34.9 in adult 11/28/2023    Status post ablation of atrial fibrillation 10/03/2023    Longstanding persistent atrial fibrillation (HCC) 10/03/2023    ICD (implantable cardioverter-defibrillator) in place-MEDTRONIC 03/12/2021    Ischemic cardiomyopathy 03/12/2021    CAD, multiple vessel     Dilated cardiomyopathy (HCC) 12/10/2019    Paroxysmal atrial fibrillation (HCC) 12/10/2019    S/P coronary angiogram 12/10/2019        Assessment     Shortness of breath multifactorial   hypoxic respiratory failure/A-fib/CAD/chronic systolic heart failure  /  improving     TTE 1/24/25     Left Ventricle: Severely reduced left ventricular systolic function with a visually estimated EF of 30 - 35%. Left ventricle is dilated. Mildly increased wall thickness. Global hypokinesis present.    Right Ventricle: Not assessed. Lead present in the right ventricle. Mildly reduced systolic function.    Aortic Valve: No regurgitation.    Mitral Valve: Mild regurgitation.    Tricuspid Valve: Mild regurgitation.    Image quality is adequate. Contrast used: Lumason. Technically difficult study due to patient's body habitus.    When compared to previous study 7/1/24 (personally reviewed images) EF appears unchanged and reduced at 35%.     HFrEF dual-chamber Medtronic ICD   ProBNP 5604   Overall EF remains unchanged when compared last 2 echocardiograms. (Previous echocardiogram likely overestimated LV function).  CT chest concerning for areas of consolidation and groundglass opacity.  Agree with IV Lasix today, 1/25/26   likely transition to oral diuretics tomorrow on 1/26/26   Continue metoprolol with holding parameters.  Continue home amiodarone  Resume Eliquis when appropriate >on  hold  per neuro   -Antibiotics per primary team    CAD  Hx CABG   post CABG with left atrial appendage clip    PAF  Sp ablation   [id number]

## 2025-01-26 NOTE — PROGRESS NOTES
Podiatric Surgery Daily Progress Note  Veronica Dillonisaac      Subjective :   Patient seen and examined this am at the bedside. Patient is unable to give updated H&P. Patient having hallucinations during exam, unable to hold still for debridement. Patient unable to answer any questions on today's exam.     Review of Systems: As above.       Objective     /73   Pulse 79   Temp 97.8 °F (36.6 °C) (Axillary)   Resp 18   Ht 1.88 m (6' 2\")   Wt 113.5 kg (250 lb 3.6 oz)   SpO2 96%   BMI 32.13 kg/m²      I/O:  Intake/Output Summary (Last 24 hours) at 1/26/2025 1204  Last data filed at 1/26/2025 1124  Gross per 24 hour   Intake 140 ml   Output 4125 ml   Net -3985 ml              Wt Readings from Last 3 Encounters:   01/26/25 113.5 kg (250 lb 3.6 oz)   01/05/25 120.4 kg (265 lb 6.4 oz)   12/31/24 119.2 kg (262 lb 12.6 oz)       LABS:    Recent Labs     01/25/25  0530 01/26/25  0549   WBC 8.2 6.8   HGB 9.6* 9.1*   HCT 29.0* 28.4*    354        Recent Labs     01/26/25  0549      K 4.0      CO2 26   PHOS 3.4   BUN 28*   CREATININE 1.7*        Recent Labs     01/24/25  1623   INR 2.01*   APTT 49.4*           LOWER EXTREMITY EXAMINATION    Dressing to left LE intact. No strikethrough noted to the external dressing. Mild serosanguinous drainage noted to the internal layers of the dressing.     VASCULAR: DP and PT pulses are weakly palpable 1/4 b/l.  Upon Doppler examination DP and PT pulses were found to be biphasic.  CFT is brisk to the digits of the right foot and brisk to the distal aspect of TMA site of left foot. Skin temperature is warm to cool from proximal to distal with no focal calor noted.  +2 nonpitting edema left lower extremity.     NEUROLOGIC: Unable to be assessed 2/2 patient mentation.     DERMATOLOGIC:   Left lower extremity:    Full-thickness ulceration to the posterior heel with sutures along the posterior medial aspect of the heel.  Signs of surgical site dehiscence with sutures

## 2025-01-26 NOTE — PROGRESS NOTES
Speech Language Pathology  Facility/Department:[facility] PCU  Dysphagia treatment note/ DISCHARGE      Name: Veronica Chaidez  : 1950  MRN: [id number]                                                     Patient Diagnosis(es):   Patient Active Problem List    Diagnosis Date Noted    Chronic systolic (congestive) heart failure (HCC) 01/10/2023    Acute kidney injury superimposed on stage 4 chronic kidney disease (HCC) 2022    Primary hypertension 12/15/2022    Mixed hyperlipidemia 2022    Gastroesophageal reflux disease without esophagitis 2022    Uncontrolled type 2 diabetes mellitus with hyperglycemia (HCC) 2022    Encephalopathy acute 2025    Pulmonary edema, acute 2025    Chronic osteomyelitis of right foot with draining sinus 2024    Open wound of left foot 2024    Non-healing open wound of heel, initial encounter 2024    Diabetic nephropathy associated with type 2 diabetes mellitus (HCC) 2024    Urinary tract infection without hematuria 2024    Diabetic foot infection (HCC) 2024    Stage 3 chronic kidney disease (HCC) 2024    Electrolyte imbalance 2024    Osteomyelitis of second toe of left foot 2024    Class 1 obesity due to excess calories with serious comorbidity and body mass index (BMI) of 34.0 to 34.9 in adult 2023    Status post ablation of atrial fibrillation 10/03/2023    Longstanding persistent atrial fibrillation (HCC) 10/03/2023    ICD (implantable cardioverter-defibrillator) in place-MEDTRONIC 2021    Ischemic cardiomyopathy 2021    CAD, multiple vessel     Dilated cardiomyopathy (HCC) 12/10/2019    Paroxysmal atrial fibrillation (HCC) 12/10/2019    S/P coronary angiogram 12/10/2019       Past Medical History:   Diagnosis Date    A-fib ([facility])     CAD, multiple vessel 2019    Coronary artery disease involving coronary bypass graft     Diabetes mellitus (HCC)     diet controlled      effusion.      EXAM: CT ANGIOGRAPHY HEAD WITH/WITHOUT CONTRAST      INDICATION: evaluate for posterior circulation stenosis      COMPARISON: CT angiogram head and neck from 7/4/2019.      TECHNIQUE: Axial CT imaging obtained through the head prior to and following administration of IV contrast. Axial images, multiplanar reformatted images, and maximum intensity projection images were reviewed for CT angiographic technique.   IV contrast: Administered.      FINDINGS:      ANTERIOR CIRCULATION: Calcification cavernous internal carotid arteries without significant stenosis. Anterior and middle cerebral arteries patent.      POSTERIOR CIRCULATION: Fetal origin of the right posterior cerebral artery. Normal left posterior cerebral artery. Normal vertebral arteries and basilar artery.      INTRACRANIAL VENOUS SYSTEM: Arachnoid granulation right transverse sinus.      INTRACRANIAL HEMORRHAGE: None.      VENTRICLES: Lateral ventricles mildly dilated related to parenchymal volume loss.      BRAIN PARENCHYMA: Mild cerebral parenchymal volume loss. Right temporal and frontal encephalomalacia. Mild low-attenuation white matter. Parenchymal volume loss.      SKULL: No destructive osseous process or fracture.      PARANASAL SINUSES / MASTOIDS: Mucoperiosteal thickening paranasal sinuses with air-fluid level right maxillary sinus.      ORBITS: Normal.      EXTRACRANIAL SOFT TISSUES: Normal.      OTHER: None.      IMPRESSION:      1. No large vessel occlusion.   2. Remote right frontotemporal infarct.   3. Parenchymal volume loss and chronic small vessel ischemic changes in the white matter.      Electronically signed by Denis Gee MD      CT CHEST WO CONTRAST   Final Result   1. Multifocal areas of consolidation, groundglass opacity and interlobular septal thickening presumably reflecting an acute infectious process. Superimposed pulmonary edema is also a possibility.   2. Trace pleural effusions.   3. Cardiomegaly.   4.

## 2025-01-26 NOTE — PROGRESS NOTES
Internal Medicine H&P    Date: 2025   Patient: Veronica Chaidez   Patient : 1950     CC: Drug Overdose (Per EMS pt was given 4mg of Intranasal and responding more. )    Interval history  Patient was seen and examined at bedside, oriented only to himself.  He continues on 2 L, and has bilateral extremity 1+ edema.    UOP 3.8L, net -8.3L  Lost 26 lb since admission    Started PO amiodarone, continue IV lasix.  Neurology following    PMHx:      Diagnosis Date    A-fib ([facility])     CAD, multiple vessel 2019    Coronary artery disease involving coronary bypass graft     Diabetes mellitus ([facility])     diet controlled     Encounter for imaging to screen for metal prior to MRI 2024    medtronic pacemaker:  Evera MRI XT  [id number]; leads:RA - 5076; RV - 6935M; IMPLANT DATE: 2021; followed by Dr. Salamanca- f    Encounter for imaging to screen for metal prior to MRI     Enlarged prostate     Environmental allergies     Kidney stones     Pacemaker     Systolic CHF ([facility])        PSurgHx:      Procedure Laterality Date    CARDIAC CATHETERIZATION  12/10/2019    Dr. Shaver - severe multi-vessel disease    CORONARY ARTERY BYPASS GRAFT N/A 2020    WAYNE; TCPB; CABG x 3, LIMA to LAD w/ long segment endarterectomy (4 cm); bilateral pulmonary vein isolation; 40 mm Atriclip to L atril appendage; endoscopic L GSV harvest; ICNB x 5 levels bilat; sternal plate x 1 to manubrium performed by Sohail Rhoades MD at [facility] OR    FOOT DEBRIDEMENT Left 2024    LEFT FOOT INCISION AND DRAINAGE WITH PARTIAL CALCANECTOMY performed by Chandana Downey DPM at [facility] OR    FOOT SURGERY Left 2024    LEFT FOOT TRANSMETATARSAL AMPUTATION WITH DAVID TENDON LENGTHENING performed by Chandana Downey DPM at [facility] OR    NASAL POLYP SURGERY  x2    NASAL SEPTUM SURGERY      WOUND VACUUM APPLICATION Left 2024    . performed by Chandana Downey DPM at [facility] OR        Medication:  Medications Prior to

## 2025-01-27 ENCOUNTER — APPOINTMENT (OUTPATIENT)
Dept: MRI IMAGING | Age: 75
End: 2025-01-27
Payer: MEDICAID

## 2025-01-27 DIAGNOSIS — I50.22 CHRONIC SYSTOLIC (CONGESTIVE) HEART FAILURE (HCC): Chronic | ICD-10-CM

## 2025-01-27 DIAGNOSIS — I42.0 DILATED CARDIOMYOPATHY (HCC): ICD-10-CM

## 2025-01-27 DIAGNOSIS — I25.5 ISCHEMIC CARDIOMYOPATHY: ICD-10-CM

## 2025-01-27 DIAGNOSIS — I48.0 PAROXYSMAL ATRIAL FIBRILLATION (HCC): ICD-10-CM

## 2025-01-27 DIAGNOSIS — Z95.810 ICD (IMPLANTABLE CARDIOVERTER-DEFIBRILLATOR) IN PLACE: Primary | ICD-10-CM

## 2025-01-27 DIAGNOSIS — I48.11 LONGSTANDING PERSISTENT ATRIAL FIBRILLATION (HCC): Chronic | ICD-10-CM

## 2025-01-27 PROBLEM — Z95.1 HX OF CABG: Status: ACTIVE | Noted: 2025-01-27

## 2025-01-27 PROBLEM — M86.472: Status: ACTIVE | Noted: 2024-12-26

## 2025-01-27 PROBLEM — I50.23 ACUTE ON CHRONIC HFREF (HEART FAILURE WITH REDUCED EJECTION FRACTION) (HCC): Status: ACTIVE | Noted: 2025-01-27

## 2025-01-27 LAB
ALBUMIN SERPL-MCNC: 3 G/DL (ref 3.4–5)
ANION GAP SERPL CALCULATED.3IONS-SCNC: 11 MMOL/L (ref 3–16)
BASOPHILS # BLD: 0.1 K/UL (ref 0–0.2)
BASOPHILS NFR BLD: 0.7 %
BUN SERPL-MCNC: 28 MG/DL (ref 7–20)
CALCIUM SERPL-MCNC: 8.7 MG/DL (ref 8.3–10.6)
CHLORIDE SERPL-SCNC: 104 MMOL/L (ref 99–110)
CO2 SERPL-SCNC: 26 MMOL/L (ref 21–32)
CREAT SERPL-MCNC: 1.6 MG/DL (ref 0.8–1.3)
DEPRECATED RDW RBC AUTO: 16.3 % (ref 12.4–15.4)
EOSINOPHIL # BLD: 1.4 K/UL (ref 0–0.6)
EOSINOPHIL NFR BLD: 17.9 %
GFR SERPLBLD CREATININE-BSD FMLA CKD-EPI: 45 ML/MIN/{1.73_M2}
GLUCOSE BLD-MCNC: 100 MG/DL (ref 70–99)
GLUCOSE BLD-MCNC: 102 MG/DL (ref 70–99)
GLUCOSE BLD-MCNC: 126 MG/DL (ref 70–99)
GLUCOSE BLD-MCNC: 136 MG/DL (ref 70–99)
GLUCOSE SERPL-MCNC: 90 MG/DL (ref 70–99)
HCT VFR BLD AUTO: 28 % (ref 40.5–52.5)
HGB BLD-MCNC: 9.1 G/DL (ref 13.5–17.5)
LYMPHOCYTES # BLD: 0.8 K/UL (ref 1–5.1)
LYMPHOCYTES NFR BLD: 9.6 %
MAGNESIUM SERPL-MCNC: 1.96 MG/DL (ref 1.8–2.4)
MAGNESIUM SERPL-MCNC: 2 MG/DL (ref 1.8–2.4)
MCH RBC QN AUTO: 27.4 PG (ref 26–34)
MCHC RBC AUTO-ENTMCNC: 32.5 G/DL (ref 31–36)
MCV RBC AUTO: 84.3 FL (ref 80–100)
MONOCYTES # BLD: 0.6 K/UL (ref 0–1.3)
MONOCYTES NFR BLD: 7.7 %
NEUTROPHILS # BLD: 5.1 K/UL (ref 1.7–7.7)
NEUTROPHILS NFR BLD: 64.1 %
NT-PROBNP SERPL-MCNC: 7048 PG/ML (ref 0–124)
PERFORMED ON: ABNORMAL
PHOSPHATE SERPL-MCNC: 3.6 MG/DL (ref 2.5–4.9)
PLATELET # BLD AUTO: 364 K/UL (ref 135–450)
PMV BLD AUTO: 7.9 FL (ref 5–10.5)
POTASSIUM SERPL-SCNC: 3.8 MMOL/L (ref 3.5–5.1)
RBC # BLD AUTO: 3.32 M/UL (ref 4.2–5.9)
SODIUM SERPL-SCNC: 141 MMOL/L (ref 136–145)
WBC # BLD AUTO: 8 K/UL (ref 4–11)

## 2025-01-27 PROCEDURE — 97530 THERAPEUTIC ACTIVITIES: CPT

## 2025-01-27 PROCEDURE — 6370000000 HC RX 637 (ALT 250 FOR IP): Performed by: NURSE PRACTITIONER

## 2025-01-27 PROCEDURE — 80069 RENAL FUNCTION PANEL: CPT

## 2025-01-27 PROCEDURE — 83735 ASSAY OF MAGNESIUM: CPT

## 2025-01-27 PROCEDURE — 99232 SBSQ HOSP IP/OBS MODERATE 35: CPT | Performed by: NURSE PRACTITIONER

## 2025-01-27 PROCEDURE — 6370000000 HC RX 637 (ALT 250 FOR IP): Performed by: INTERNAL MEDICINE

## 2025-01-27 PROCEDURE — 85025 COMPLETE CBC W/AUTO DIFF WBC: CPT

## 2025-01-27 PROCEDURE — 6370000000 HC RX 637 (ALT 250 FOR IP)

## 2025-01-27 PROCEDURE — 97166 OT EVAL MOD COMPLEX 45 MIN: CPT

## 2025-01-27 PROCEDURE — 92526 ORAL FUNCTION THERAPY: CPT

## 2025-01-27 PROCEDURE — 70551 MRI BRAIN STEM W/O DYE: CPT

## 2025-01-27 PROCEDURE — 73718 MRI LOWER EXTREMITY W/O DYE: CPT

## 2025-01-27 PROCEDURE — 99233 SBSQ HOSP IP/OBS HIGH 50: CPT | Performed by: INTERNAL MEDICINE

## 2025-01-27 PROCEDURE — 83880 ASSAY OF NATRIURETIC PEPTIDE: CPT

## 2025-01-27 PROCEDURE — 2700000000 HC OXYGEN THERAPY PER DAY

## 2025-01-27 PROCEDURE — 6360000002 HC RX W HCPCS: Performed by: INTERNAL MEDICINE

## 2025-01-27 PROCEDURE — 94761 N-INVAS EAR/PLS OXIMETRY MLT: CPT

## 2025-01-27 PROCEDURE — 97162 PT EVAL MOD COMPLEX 30 MIN: CPT

## 2025-01-27 PROCEDURE — 2060000000 HC ICU INTERMEDIATE R&B

## 2025-01-27 RX ORDER — METOPROLOL SUCCINATE 25 MG/1
25 TABLET, EXTENDED RELEASE ORAL DAILY
Status: DISCONTINUED | OUTPATIENT
Start: 2025-01-27 | End: 2025-02-07 | Stop reason: HOSPADM

## 2025-01-27 RX ORDER — AMIODARONE HYDROCHLORIDE 200 MG/1
200 TABLET ORAL DAILY
Status: DISCONTINUED | OUTPATIENT
Start: 2025-01-27 | End: 2025-01-27

## 2025-01-27 RX ORDER — AMIODARONE HYDROCHLORIDE 200 MG/1
200 TABLET ORAL DAILY
Status: DISCONTINUED | OUTPATIENT
Start: 2025-01-27 | End: 2025-02-07 | Stop reason: HOSPADM

## 2025-01-27 RX ORDER — POTASSIUM CHLORIDE 1500 MG/1
40 TABLET, EXTENDED RELEASE ORAL ONCE
Status: COMPLETED | OUTPATIENT
Start: 2025-01-27 | End: 2025-01-27

## 2025-01-27 RX ADMIN — METOPROLOL SUCCINATE 25 MG: 25 TABLET, FILM COATED, EXTENDED RELEASE ORAL at 11:48

## 2025-01-27 RX ADMIN — APIXABAN 5 MG: 5 TABLET, FILM COATED ORAL at 21:32

## 2025-01-27 RX ADMIN — FUROSEMIDE 40 MG: 40 TABLET ORAL at 08:33

## 2025-01-27 RX ADMIN — MEROPENEM AND SODIUM CHLORIDE 1000 MG: 1 INJECTION, SOLUTION INTRAVENOUS at 08:44

## 2025-01-27 RX ADMIN — ASPIRIN 81 MG: 81 TABLET, CHEWABLE ORAL at 08:33

## 2025-01-27 RX ADMIN — POTASSIUM CHLORIDE 40 MEQ: 1500 TABLET, EXTENDED RELEASE ORAL at 08:33

## 2025-01-27 RX ADMIN — LINEZOLID 600 MG: 600 INJECTION, SOLUTION INTRAVENOUS at 12:26

## 2025-01-27 RX ADMIN — MEROPENEM AND SODIUM CHLORIDE 1000 MG: 1 INJECTION, SOLUTION INTRAVENOUS at 21:31

## 2025-01-27 RX ADMIN — AMIODARONE HYDROCHLORIDE 200 MG: 200 TABLET ORAL at 08:33

## 2025-01-27 RX ADMIN — APIXABAN 5 MG: 5 TABLET, FILM COATED ORAL at 08:33

## 2025-01-27 RX ADMIN — Medication 6 MG: at 21:31

## 2025-01-27 RX ADMIN — ATORVASTATIN CALCIUM 80 MG: 80 TABLET, FILM COATED ORAL at 21:32

## 2025-01-27 RX ADMIN — LINEZOLID 600 MG: 600 INJECTION, SOLUTION INTRAVENOUS at 01:14

## 2025-01-27 ASSESSMENT — PAIN SCALES - GENERAL
PAINLEVEL_OUTOF10: 0

## 2025-01-27 NOTE — PROGRESS NOTES
Internal Medicine H&P    Date: 2025   Patient: Veronica Chaidez   Patient : 1950     CC: Drug Overdose (Per EMS pt was given 4mg of Intranasal and responding more. )    Interval history  No acute events overnight.  Patient seen and examined at bedside this morning.  He was comfortably sitting up in bed without any signs of acute distress.  Patient reports improvement    Creatinine stable at 1.6  UOP 2.42 L    Per ID recs will continue Merrem and linezolid      HPI  74-year-old M with PMH chronic systolic heart failure, CAD s/p ICD, A-fib, T2DM, CKD, class I obesity who presented from [facility]) to the [facility] ED with altered mental status.    History was obtained from review of the EMR.  Patient noted becoming unresponsive.  Has been around 1:30 AM on the day of presentation.  Last known well was 8:30 AM on the day of presentation when medicines were administered.  Patient was noted to be hypoxic with SpO2 85% and placed on 4 L O2 via nasal cannula.  4 mg IM Narcan was administered (as patient is on oxycodone at his ) which led to improvement in alertness.  Per history patient has had a \"bad cough\" lately    Recent hospital admissions include 2024 admission for \"left foot osteomyelitis and UTI (urine culture without growth, initial UA had pyuria and leukocyte esterase), treated with empiric antibiotics and required Caicedo; developed gross hematuria and Caicedo replaced on 2024.  Currently on IV ertapenem and daptomycin through right upper extremity PICC        PMHx:      Diagnosis Date    A-fib (HCC)     CAD, multiple vessel 2019    Coronary artery disease involving coronary bypass graft     Diabetes mellitus (HCC)     diet controlled     Encounter for imaging to screen for metal prior to MRI 2024    medtronic pacemaker:  Evera MRI XT  [id number]; leads:RA - 5000; RV - 6966M; IMPLANT DATE: 2021; followed by Dr. Salamanca- [facility]    Encounter for imaging to  hemodynamically significant stenosis.   2. Consolidation in lungs with small left pleural effusion.      EXAM: CT ANGIOGRAPHY HEAD WITH/WITHOUT CONTRAST      INDICATION: evaluate for posterior circulation stenosis      COMPARISON: CT angiogram head and neck from 7/4/2019.      TECHNIQUE: Axial CT imaging obtained through the head prior to and following administration of IV contrast. Axial images, multiplanar reformatted images, and maximum intensity projection images were reviewed for CT angiographic technique.   IV contrast: Administered.      FINDINGS:      ANTERIOR CIRCULATION: Calcification cavernous internal carotid arteries without significant stenosis. Anterior and middle cerebral arteries patent.      POSTERIOR CIRCULATION: Fetal origin of the right posterior cerebral artery. Normal left posterior cerebral artery. Normal vertebral arteries and basilar artery.      INTRACRANIAL VENOUS SYSTEM: Arachnoid granulation right transverse sinus.      INTRACRANIAL HEMORRHAGE: None.      VENTRICLES: Lateral ventricles mildly dilated related to parenchymal volume loss.      BRAIN PARENCHYMA: Mild cerebral parenchymal volume loss. Right temporal and frontal encephalomalacia. Mild low-attenuation white matter. Parenchymal volume loss.      SKULL: No destructive osseous process or fracture.      PARANASAL SINUSES / MASTOIDS: Mucoperiosteal thickening paranasal sinuses with air-fluid level right maxillary sinus.      ORBITS: Normal.      EXTRACRANIAL SOFT TISSUES: Normal.      OTHER: None.      IMPRESSION:      1. No large vessel occlusion.   2. Remote right frontotemporal infarct.   3. Parenchymal volume loss and chronic small vessel ischemic changes in the white matter.      Electronically signed by Denis Gee MD      CT CHEST WO CONTRAST   Final Result   1. Multifocal areas of consolidation, groundglass opacity and interlobular septal thickening presumably reflecting an acute infectious process. Superimposed

## 2025-01-27 NOTE — PROGRESS NOTES
Occupational Therapy  Facility/Department: [address]  Occupational Therapy Initial Assessment and Treatment     Name: Veronica Chaidez  : 1950  MRN: [id number]  Date of Service: 2025    Discharge Recommendations:  Subacute/Skilled Nursing Facility  OT Equipment Recommendations  Other: defer at this time       Patient Diagnosis(es): The primary encounter diagnosis was Altered mental status, unspecified altered mental status type. Diagnoses of Hypervolemia, unspecified hypervolemia type, Urinary retention, Open wound of left heel, initial encounter, Congestive heart failure, unspecified HF chronicity, unspecified heart failure type (HCC), and Syncope, unspecified syncope type were also pertinent to this visit.  Past Medical History:  has a past medical history of A-fib (HCC), CAD, multiple vessel, Coronary artery disease involving coronary bypass graft, Diabetes mellitus (HCC), Encounter for imaging to screen for metal prior to MRI, Encounter for imaging to screen for metal prior to MRI, Enlarged prostate, Environmental allergies, Kidney stones, Pacemaker, and Systolic CHF (HCC).  Past Surgical History:  has a past surgical history that includes Cardiac catheterization (12/10/2019); Nasal polyp surgery (x2); Nasal septum surgery; Coronary artery bypass graft (N/A, 2020); Foot surgery (Left, 2024); Foot Debridement (Left, 2024); and Wound Vacuum Application (Left, 2024).           Assessment  Performance deficits / Impairments: Decreased functional mobility ;Decreased cognition;Decreased ADL status;Decreased endurance;Decreased high-level IADLs;Decreased strength;Decreased balance;Decreased safe awareness  Assessment: Pt tolerated therapy evaluation well today. He was agreeable and motivated to work with therapy. Education spent on pt NWB precautions to LLE--pt receptive but requires mod/max cues throughout session in order to maintain. He completed bed mobility SBA with cues. min A

## 2025-01-27 NOTE — PROGRESS NOTES
[facility] Cardiology Progress Note    Primary Cardiologist: Dr Parkeh    CC/HPI: CHF    S: sitting up in chair walking with a friend. Patient/friend states the patient's mental status is baseline. No c/o cp, sob, or palpitations. Using 1-2 liter oxygen.     Tele: Paced     O:  Physical Exam:  BP (!) 154/72   Pulse 75   Temp 98.6 °F (37 °C) (Oral)   Resp 18   Ht 1.88 m (6' 2\")   Wt 111.1 kg (244 lb 14.9 oz)   SpO2 97%   BMI 31.45 kg/m²    General (appearance):  No acute distress  Eyes: anicteric   Neck: soft, No JVD  Ears/Nose/Mouth/Thorat: No cyanosis  CV: RRR   Respiratory:  normal effort  GI: soft, non-tender, non-distended  Skin: Warm, dry. No rashes  Neuro/Psych: Alert and oriented x 3. Appropriate behavior  Ext:  No c/c. + trace LE edema. Venostasis discoloration, Left foot partial amputation and ACE wrapped.     I.O's= -10 L     Weight  Admission: Weight - Scale: 125.4 kg (276 lb 6.4 oz)   Today: Weight - Scale: 111.1 kg (244 lb 14.9 oz)    CBC:   Recent Labs     01/25/25  0530 01/26/25  0549 01/27/25  0551   WBC 8.2 6.8 8.0   HGB 9.6* 9.1* 9.1*   HCT 29.0* 28.4* 28.0*   MCV 85.2 85.2 84.3    354 364     BMP:   Recent Labs     01/25/25  1457 01/26/25  0549 01/27/25  0551    139 141   K 4.3 4.0 3.8    104 104   CO2 24 26 26   PHOS 3.5 3.4 3.6   BUN 28* 28* 28*   CREATININE 1.6* 1.7* 1.6*     Estimated Creatinine Clearance: 55 mL/min (A) (based on SCr of 1.6 mg/dL (H)).  Mag:   Lab Results   Component Value Date/Time    MG 2.00 01/27/2025 05:51 AM     LIVER PROFILE:   Recent Labs     01/24/25  1142   AST 36   ALT see below   LIPASE 48.0   BILIDIR <0.1   BILITOT 0.5   ALKPHOS 97     Pro-BNP:   Lab Results   Component Value Date/Time    PROBNP 7,048 01/27/2025 05:51 AM    PROBNP 5,604 01/24/2025 11:42 AM    PROBNP 1,491 06/28/2024 06:27 PM    PROBNP 1,792 02/21/2023 11:39 AM    PROBNP 2,520 02/06/2023 10:37 PM     High Sensitivity Troponin:   Lab Results

## 2025-01-27 NOTE — PLAN OF CARE
intracranial hemorrhage or mass effect.      Chest x-ray was done which showed findings consistent with pulmonary edema.      X-ray of his left foot showed signs concerning for acute osteomyelitis.      EKG showed normal sinus rhythm with a right bundle branch block and a prolonged QTc of 491.      His Caicedo catheter was exchanged with 1 L of urine output. Urinalysis showed moderate leukocyte Estrace and large blood. Microscopic urinalysis showed 6-9 WBC, 5-10 RBC and 1+ bacteriuria.      He was admitted for further workup of his new oxygen requirement and concerns for new onset heart failure.            Acute encephalopathy: POA  - Reportedly improved a bit after narcan, but appears was still weak appearing confused, did not know where he is  - Vitals and labs do not show acute issue, apart from some hypoxia, requiring supplemental oxygen  - On antibx long term , hence less likely infection but we sent urine culture as UA shows some features of infection with mod leuc esterase and 6-9 WBCs and 1+ bacteri (on BSA, but may have resistannt infections incl CRE)  Hopefully sent from new Caicedo: candida: likely contaminant. follow final result.    - His BG was reportedly not low during episode  - NH3 normal at 24  - Urine tox screen: negative  - CT head and CTA: old infarct in the right frontal lobe with no acute intracranial hemorrhage or mass effect.   - Would appear toxic encephalopathy from opioid overdose (opioids from diabetic foot infection), with on going evaluation to r/o other causes including metabolic encephalopathy sec t o UTI and seizures.   - MRI brain to r/o CVA; hx of CVA, CVA risk factors if able to be done (has ICD).   - His EF is lower than previous, and he is at risk for arrhythmias, may not appear to be responsible for current presentation, but we will interrogate his device.   - Telemetry  - Neuro-checks: No new focal deficits  -His mental status appears improved 1/26/25; he is more awake more  mg dly at home  - IV lasix 20 mg BID: appears well diuresed. Transitioned to PO lasix.   - Monitor renal function as has CKD  - Monitor fluid status closely     Paroxysmal Afib  SSS  - S/p ablation in 2021; has PPM  - Secondary hypercoagulable state secondary to A-fib  - On amiodarone  - On Eliquis: Defer need to hold to Podiatry.   - Keep K > 4; Mg > 2  -Telemetry     Chronic CVA  - Head CT: old infarct in the right frontal lobe with no acute intracranial hemorrhage or mass effect.   - ASA  - Resume statin when off Daptomycin     CAD s/p CABG x3 2020 with ischemic cardiomyopathy, has ICD: POA  - Appears previous EF was 25% in 2020; documented to be improved to 50% in 2022, 55-60% in 7/2024; was noted 35% this admission;  noted to be decreased again on this admission.   - Echo show severely reduced left ventricular systolic function with a visually estimated EF of 30 - 35%. Left ventricle is dilated. Mildly increased wall thickness. Global hypokinesis present.   - Troponin is elevated in the 50s, flat at baseline in the 20s.   - Suspicious for possible acute MI/NSTEMI  - Does not seem to be on optimal cardiac meds at home based on current med list: no ASA, no statin (?held as on daptomycin), no ACEI/ARB/ ARNI although may be sec to kidney disease. No spironolactone; no SGLT -2 inhib  - Continued home metoprolol  - ASA  - Keep K > 4; Mg > 2  - Defer cardiac meds and need for further cardiac w/u to Cardiology- Cardiology consulted: no need for further w/u at this time     Chronic urinary retention: POA  - During recent admission, was noted with urinary retention, and also gross hematuria, felt possible sec to urethral trauma, discharged on chronic Caicedo  - Recent UTI: abnormal UA but neg culture during recent admission- Required Caicedo while inpatient, developed gross hematuria and Caicedo replaced 12/31: output cleared after manual irrigation to discharge with Caicedo  - UA this admission is not normal, we sent for cx:

## 2025-01-27 NOTE — PROGRESS NOTES
represent partial tearing of the Achilles tendon without retraction.  4.  Osteomyelitis affecting the distal margin of the first metatarsal amputation site in the forefoot with adjacent 18 x 15 mm fluid collection or abscess. The remainder of the amputated metatarsals marrow signal is unremarkable.  5.  Myositis along the flexor compartment and subcutaneous cellulitis.      Scheduled Meds:   amiodarone  200 mg Oral Daily    metoprolol succinate  25 mg Oral Daily    furosemide  40 mg Oral Daily    meropenem  1,000 mg IntraVENous Q12H    linezolid  600 mg IntraVENous Q12H    melatonin  6 mg Oral Nightly    atorvastatin  80 mg Oral Nightly    aspirin  81 mg Oral Daily    Or    aspirin  300 mg Rectal Daily    insulin lispro  0-4 Units SubCUTAneous 4x Daily AC & HS    apixaban  5 mg Oral BID       Continuous Infusions:   dextrose         PRN Meds:  acetaminophen **OR** acetaminophen, glucose, dextrose bolus **OR** dextrose bolus, glucagon (rDNA), dextrose, polyethylene glycol, sodium chloride flush, benzonatate      Assessment:     75 yo man  PMH - DM, neuropaty, HTN, CAD, CM/CHF, CKD, nephrolithiasis  PSurgHx - CABG, L TMA (4/22/24), PM / ICD     Admit 12/21 - 1/3 -  Urinary retention  L heel escar.  MRI (L calcaneal OM - see report)  Plan for surgical debridement / partial calcanectomy   OR 12/30 debridement / partial calcanectomy     - path - 'focal acute osteomyelitis ....'  Discharged on Daptomycin + Ertapenem    Present - 'found down', 'unresponsive' at nursing home  In ED 1/24, afeb, WBC   CXR 'pul edema'  Seen by Neuro, Card, Podiatry  Seen by ID 1/26 -     IMP/  L DFI   L heel ulcer / osteomyelitis   - OM progressive on MRI 1/27 on broad spectrum antibiotics    Encephalopathy, resolved   - poss med related.  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, often an eosinophilic  pneumonitis, treat with stopping Dapto and steroids    - minimal to no sx      Plan:     Cont Meropenem + Linezolid for now  OFF Daptomycin    Will review new L foot MRI w Radiologist, compare to previous  Will confer with Podiatry given new MRI findings       Medical Decision Making:  The following items were considered in medical decision making:  Discussion of patient care with other providers  Reviewed clinical lab tests  Reviewed radiology tests  Reviewed other diagnostic tests/interventions  Independent review of radiologic images - reviewed MRI w Rad  Microbiology cultures and other micro tests reviewed       Discussed with pt      Anthony Peterson MD

## 2025-01-27 NOTE — PROGRESS NOTES
Podiatric Surgery Daily Progress Note  Veronica Chaidez      Subjective :   Patient seen and examined this am at the bedside. Patient is unable to give updated H&P however patient is more coherent today. Patient unable to answer any questions on today's exam.     Review of Systems: As above.       Objective     BP (!) 154/72   Pulse 75   Temp 98.6 °F (37 °C) (Oral)   Resp 18   Ht 1.88 m (6' 2\")   Wt 111.1 kg (244 lb 14.9 oz)   SpO2 97%   BMI 31.45 kg/m²      I/O:  Intake/Output Summary (Last 24 hours) at 1/27/2025 1010  Last data filed at 1/27/2025 0512  Gross per 24 hour   Intake 600 ml   Output 1775 ml   Net -1175 ml              Wt Readings from Last 3 Encounters:   01/27/25 111.1 kg (244 lb 14.9 oz)   01/05/25 120.4 kg (265 lb 6.4 oz)   12/31/24 119.2 kg (262 lb 12.6 oz)       LABS:    Recent Labs     01/26/25  0549 01/27/25  0551   WBC 6.8 8.0   HGB 9.1* 9.1*   HCT 28.4* 28.0*    364        Recent Labs     01/27/25  0551      K 3.8      CO2 26   PHOS 3.6   BUN 28*   CREATININE 1.6*        Recent Labs     01/24/25  1623   INR 2.01*   APTT 49.4*           LOWER EXTREMITY EXAMINATION    Dressing to left LE intact. No strikethrough noted to the external dressing. Mild serosanguinous drainage noted to the internal layers of the dressing.     VASCULAR: DP and PT pulses are weakly palpable 1/4 b/l.  Upon Doppler examination DP and PT pulses were found to be biphasic.  CFT is brisk to the digits of the right foot and brisk to the distal aspect of TMA site of left foot. Skin temperature is warm to cool from proximal to distal with no focal calor noted.  +2 nonpitting edema left lower extremity.     NEUROLOGIC: Unable to be assessed 2/2 patient mentation.     DERMATOLOGIC:   Left lower extremity:    Full-thickness ulceration to the posterior heel with sutures along the posterior medial aspect of the heel.  Signs of surgical site dehiscence with sutures scattered throughout incision site no longer

## 2025-01-27 NOTE — PROGRESS NOTES
- Static: Fair (stood x 15 sec in becky stedy with B UE support and L LE NWB)          OutComes Score                                                  AM-PAC - Mobility    AM-PAC Basic Mobility - Inpatient   How much help is needed turning from your back to your side while in a flat bed without using bedrails?: A Little  How much help is needed moving from lying on your back to sitting on the side of a flat bed without using bedrails?: A Little  How much help is needed moving to and from a bed to a chair?: Total  How much help is needed standing up from a chair using your arms?: A Lot  How much help is needed walking in hospital room?: Total  How much help is needed climbing 3-5 steps with a railing?: Total  AM-PAC Inpatient Mobility Raw Score : 11  AM-PAC Inpatient T-Scale Score : 33.86  Mobility Inpatient CMS 0-100% Score: 72.57  Mobility Inpatient CMS G-Code Modifier : CL         Tinneti Score       Goals  Short Term Goals  Time Frame for Short Term Goals: discharge  Short Term Goal 1: pt will transfer supine <--> sit with supervision from flat bed  Short Term Goal 2: Pt will transfer sit <--> stand with SBA while following L LE NWB precautions  Short Term Goal 3: Pt will transfer bed <-->chair with mod a x 1 while following L LE NWB precautions  Patient Goals   Patient Goals : return to rehab       Education  Patient Education  Education Given To: Patient  Education Provided: Role of Therapy  Education Method: Demonstration  Barriers to Learning: Cognition  Education Outcome: Demonstrated understanding;Continued education needed      Therapy Time   Individual Concurrent Group Co-treatment   Time In 0830         Time Out 0855         Minutes 25                 Timed Code Treatment Minutes:  10    Total Treatment Minutes:  25    If patient is discharged prior to next treatment, this note will serve as the discharge summary.  Ioana Jernigan, PT, DPT  [id number]

## 2025-01-27 NOTE — CARE COORDINATION
Case Management Assessment  Initial Evaluation    Date/Time of Evaluation: 1/27/2025 4:27 PM  Assessment Completed by: Faisal Washington RN    If patient is discharged prior to next notation, then this note serves as note for discharge by case management.    Patient Name: Veronica Chaidez                   YOB: 1950  Diagnosis: Urinary retention [R33.9]  Pulmonary edema, acute [J81.0]  Open wound of left heel, initial encounter [S91.302A]  Altered mental status, unspecified altered mental status type [R41.82]  Syncope, unspecified syncope type [R55]  Hypervolemia, unspecified hypervolemia type [E87.70]  Congestive heart failure, unspecified HF chronicity, unspecified heart failure type (HCC) [I50.9]                   Date / Time: 1/24/2025 11:16 AM    Patient Admission Status: Inpatient   Readmission Risk (Low < 19, Mod (19-27), High > 27): Readmission Risk Score: 24.8    Current PCP: Good Renee MD  PCP verified by ? Yes    Chart Reviewed: Yes      History Provided by: Medical Record  Patient Orientation: Alert and Oriented    Patient Cognition: Alert    Hospitalization in the last 30 days (Readmission):  No    If yes, Readmission Assessment in  Navigator will be completed.    Advance Directives:      Code Status: Full Code   Patient's Primary Decision Maker is: Legal Next of Kin    Primary Decision Maker: Moody Caballero - Friend - [phone]    Discharge Planning:    Patient lives with: Alone Type of Home: Skilled Nursing Facility  Primary Care Giver: Other (Comment) ([facility] staff)  Patient Support Systems include: Friends/Neighbors   Current Financial resources:    Current community resources:    Current services prior to admission: Skilled Nursing Facility            Current DME:              Type of Home Care services:  None    ADLS  Prior functional level: Assistance with the following:, Bathing, Dressing, Toileting, Cooking, Housework, Shopping, Mobility  Current functional level:

## 2025-01-27 NOTE — PLAN OF CARE
Problem: Chronic Conditions and Co-morbidities  Goal: Patient's chronic conditions and co-morbidity symptoms are monitored and maintained or improved  Outcome: Progressing  Flowsheets (Taken 1/27/2025 0058)  Care Plan - Patient's Chronic Conditions and Co-Morbidity Symptoms are Monitored and Maintained or Improved:   Monitor and assess patient's chronic conditions and comorbid symptoms for stability, deterioration, or improvement   Collaborate with multidisciplinary team to address chronic and comorbid conditions and prevent exacerbation or deterioration   Update acute care plan with appropriate goals if chronic or comorbid symptoms are exacerbated and prevent overall improvement and discharge     Problem: Discharge Planning  Goal: Discharge to home or other facility with appropriate resources  Outcome: Progressing  Flowsheets (Taken 1/27/2025 0058)  Discharge to home or other facility with appropriate resources:   Identify barriers to discharge with patient and caregiver   Identify discharge learning needs (meds, wound care, etc)   Arrange for needed discharge resources and transportation as appropriate     Problem: Pain  Goal: Verbalizes/displays adequate comfort level or baseline comfort level  Outcome: Progressing  Flowsheets (Taken 1/27/2025 0058)  Verbalizes/displays adequate comfort level or baseline comfort level:   Encourage patient to monitor pain and request assistance   Administer analgesics based on type and severity of pain and evaluate response   Assess pain using appropriate pain scale   Implement non-pharmacological measures as appropriate and evaluate response     Problem: Confusion  Goal: Confusion, delirium, dementia, or psychosis is improved or at baseline  Description: INTERVENTIONS:  1. Assess for possible contributors to thought disturbance, including medications, impaired vision or hearing, underlying metabolic abnormalities, dehydration, psychiatric diagnoses, and notify attending LIP  2.  Respiratory - Adult  Goal: Achieves optimal ventilation and oxygenation  1/27/2025 0058 by Cally Bull, RN  Outcome: Progressing  Flowsheets (Taken 1/27/2025 0058)  Achieves optimal ventilation and oxygenation:   Assess for changes in respiratory status   Position to facilitate oxygenation and minimize respiratory effort   Initiate smoking cessation protocol as indicated   Assess the need for suctioning and aspirate as needed   Respiratory therapy support as indicated   Assess for changes in mentation and behavior   Oxygen supplementation based on oxygen saturation or arterial blood gases   Encourage broncho-pulmonary hygiene including cough, deep breathe, incentive spirometry   Assess and instruct to report shortness of breath or any respiratory difficulty  1/26/2025 1601 by Erick Means, RN  Outcome: Progressing     Problem: Skin/Tissue Integrity - Adult  Goal: Skin integrity remains intact  Description: 1.  Monitor for areas of redness and/or skin breakdown  2.  Assess vascular access sites hourly  3.  Every 4-6 hours minimum:  Change oxygen saturation probe site  4.  Every 4-6 hours:  If on nasal continuous positive airway pressure, respiratory therapy assess nares and determine need for appliance change or resting period  1/27/2025 0058 by Cally Bull, RN  Outcome: Progressing  Flowsheets (Taken 1/27/2025 0058)  Skin Integrity Remains Intact:   Monitor for areas of redness and/or skin breakdown   Assess vascular access sites hourly   Every 4-6 hours minimum: Change oxygen saturation probe site   Every 4-6 hours: If on nasal continuous positive airway pressure, respiratory therapy assesses nares and determine need for appliance change or resting period  1/26/2025 1601 by Erick Means, RN  Outcome: Progressing     Problem: Genitourinary - Adult  Goal: Absence of urinary retention  Outcome: Progressing  Flowsheets (Taken 1/27/2025 0058)  Absence of urinary retention:   Assess patient’s ability to

## 2025-01-27 NOTE — PLAN OF CARE
Problem: Chronic Conditions and Co-morbidities  Goal: Patient's chronic conditions and co-morbidity symptoms are monitored and maintained or improved  1/27/2025 1030 by Chito Rodriguez RN  Outcome: Progressing  Flowsheets (Taken 1/27/2025 1030)  Care Plan - Patient's Chronic Conditions and Co-Morbidity Symptoms are Monitored and Maintained or Improved:   Monitor and assess patient's chronic conditions and comorbid symptoms for stability, deterioration, or improvement   Collaborate with multidisciplinary team to address chronic and comorbid conditions and prevent exacerbation or deterioration   Update acute care plan with appropriate goals if chronic or comorbid symptoms are exacerbated and prevent overall improvement and discharge     Problem: Discharge Planning  Goal: Discharge to home or other facility with appropriate resources  1/27/2025 1030 by Chito Rodriguez RN  Outcome: Progressing  Flowsheets (Taken 1/27/2025 1030)  Discharge to home or other facility with appropriate resources:   Identify barriers to discharge with patient and caregiver   Arrange for needed discharge resources and transportation as appropriate   Arrange for interpreters to assist at discharge as needed     Problem: Confusion  Goal: Confusion, delirium, dementia, or psychosis is improved or at baseline  Description: INTERVENTIONS:  1. Assess for possible contributors to thought disturbance, including medications, impaired vision or hearing, underlying metabolic abnormalities, dehydration, psychiatric diagnoses, and notify attending LIP  2. [place] high risk fall precautions, as indicated  3. Provide frequent short contacts to provide reality reorientation, refocusing and direction  4. Decrease environmental stimuli, including noise as appropriate  5. Monitor and intervene to maintain adequate nutrition, hydration, elimination, sleep and activity  6. If unable to ensure safety without constant attention obtain sitter and review sitter  guidelines with assigned personnel  7. Initiate Psychosocial CNS and Spiritual Care consult, as indicated  1/27/2025 1030 by Chito Rodriguez, RN  Outcome: Progressing  Flowsheets (Taken 1/27/2025 1030)  Effect of thought disturbance (confusion, delirium, dementia, or psychosis) are managed with adequate functional status:   Monitor and intervene to maintain adequate nutrition, hydration, elimination, sleep and activity   Provide frequent short contacts to provide reality reorientation, refocusing and direction   Decrease environmental stimuli, including noise as appropriate     Problem: Safety - Adult  Goal: Free from fall injury  1/27/2025 1030 by Chito Rodriguez, RN  Outcome: Progressing  Flowsheets (Taken 1/27/2025 1030)  Free From Fall Injury:   Instruct family/caregiver on patient safety   Based on caregiver fall risk screen, instruct family/caregiver to ask for assistance with transferring infant if caregiver noted to have fall risk factors

## 2025-01-28 LAB
ALBUMIN SERPL-MCNC: 3 G/DL (ref 3.4–5)
ANION GAP SERPL CALCULATED.3IONS-SCNC: 8 MMOL/L (ref 3–16)
BACTERIA BLD CULT ORG #2: NORMAL
BACTERIA BLD CULT: NORMAL
BASOPHILS # BLD: 0.1 K/UL (ref 0–0.2)
BASOPHILS NFR BLD: 1 %
BUN SERPL-MCNC: 28 MG/DL (ref 7–20)
CALCIUM SERPL-MCNC: 8.7 MG/DL (ref 8.3–10.6)
CHLORIDE SERPL-SCNC: 102 MMOL/L (ref 99–110)
CO2 SERPL-SCNC: 28 MMOL/L (ref 21–32)
CREAT SERPL-MCNC: 1.5 MG/DL (ref 0.8–1.3)
DEPRECATED RDW RBC AUTO: 16.3 % (ref 12.4–15.4)
EOSINOPHIL # BLD: 1.6 K/UL (ref 0–0.6)
EOSINOPHIL NFR BLD: 20.8 %
GFR SERPLBLD CREATININE-BSD FMLA CKD-EPI: 49 ML/MIN/{1.73_M2}
GLUCOSE BLD-MCNC: 103 MG/DL (ref 70–99)
GLUCOSE BLD-MCNC: 132 MG/DL (ref 70–99)
GLUCOSE BLD-MCNC: 147 MG/DL (ref 70–99)
GLUCOSE BLD-MCNC: 148 MG/DL (ref 70–99)
GLUCOSE SERPL-MCNC: 110 MG/DL (ref 70–99)
HCT VFR BLD AUTO: 29.1 % (ref 40.5–52.5)
HGB BLD-MCNC: 9.5 G/DL (ref 13.5–17.5)
LYMPHOCYTES # BLD: 1 K/UL (ref 1–5.1)
LYMPHOCYTES NFR BLD: 12.1 %
MAGNESIUM SERPL-MCNC: 2 MG/DL (ref 1.8–2.4)
MCH RBC QN AUTO: 27.2 PG (ref 26–34)
MCHC RBC AUTO-ENTMCNC: 32.5 G/DL (ref 31–36)
MCV RBC AUTO: 83.7 FL (ref 80–100)
MONOCYTES # BLD: 0.6 K/UL (ref 0–1.3)
MONOCYTES NFR BLD: 7.6 %
NEUTROPHILS # BLD: 4.6 K/UL (ref 1.7–7.7)
NEUTROPHILS NFR BLD: 58.5 %
NT-PROBNP SERPL-MCNC: 7607 PG/ML (ref 0–124)
PERFORMED ON: ABNORMAL
PHOSPHATE SERPL-MCNC: 3.2 MG/DL (ref 2.5–4.9)
PLATELET # BLD AUTO: 375 K/UL (ref 135–450)
PMV BLD AUTO: 7.9 FL (ref 5–10.5)
POTASSIUM SERPL-SCNC: 4.1 MMOL/L (ref 3.5–5.1)
RBC # BLD AUTO: 3.48 M/UL (ref 4.2–5.9)
SODIUM SERPL-SCNC: 138 MMOL/L (ref 136–145)
WBC # BLD AUTO: 7.9 K/UL (ref 4–11)

## 2025-01-28 PROCEDURE — 2500000003 HC RX 250 WO HCPCS

## 2025-01-28 PROCEDURE — 99497 ADVNCD CARE PLAN 30 MIN: CPT | Performed by: NURSE PRACTITIONER

## 2025-01-28 PROCEDURE — 80069 RENAL FUNCTION PANEL: CPT

## 2025-01-28 PROCEDURE — 83735 ASSAY OF MAGNESIUM: CPT

## 2025-01-28 PROCEDURE — 2060000000 HC ICU INTERMEDIATE R&B

## 2025-01-28 PROCEDURE — 6370000000 HC RX 637 (ALT 250 FOR IP)

## 2025-01-28 PROCEDURE — 99232 SBSQ HOSP IP/OBS MODERATE 35: CPT | Performed by: INTERNAL MEDICINE

## 2025-01-28 PROCEDURE — 6360000002 HC RX W HCPCS: Performed by: INTERNAL MEDICINE

## 2025-01-28 PROCEDURE — 6370000000 HC RX 637 (ALT 250 FOR IP): Performed by: NURSE PRACTITIONER

## 2025-01-28 PROCEDURE — 85025 COMPLETE CBC W/AUTO DIFF WBC: CPT

## 2025-01-28 PROCEDURE — 99255 IP/OBS CONSLTJ NEW/EST HI 80: CPT | Performed by: SURGERY

## 2025-01-28 PROCEDURE — 83880 ASSAY OF NATRIURETIC PEPTIDE: CPT

## 2025-01-28 PROCEDURE — 2700000000 HC OXYGEN THERAPY PER DAY

## 2025-01-28 PROCEDURE — 94761 N-INVAS EAR/PLS OXIMETRY MLT: CPT

## 2025-01-28 PROCEDURE — 6370000000 HC RX 637 (ALT 250 FOR IP): Performed by: INTERNAL MEDICINE

## 2025-01-28 RX ADMIN — ATORVASTATIN CALCIUM 80 MG: 80 TABLET, FILM COATED ORAL at 20:24

## 2025-01-28 RX ADMIN — POLYETHYLENE GLYCOL 3350 17 G: 17 POWDER, FOR SOLUTION ORAL at 17:08

## 2025-01-28 RX ADMIN — SODIUM CHLORIDE, PRESERVATIVE FREE 10 ML: 5 INJECTION INTRAVENOUS at 08:43

## 2025-01-28 RX ADMIN — LINEZOLID 600 MG: 600 INJECTION, SOLUTION INTRAVENOUS at 01:10

## 2025-01-28 RX ADMIN — LINEZOLID 600 MG: 600 INJECTION, SOLUTION INTRAVENOUS at 12:50

## 2025-01-28 RX ADMIN — METOPROLOL SUCCINATE 25 MG: 25 TABLET, FILM COATED, EXTENDED RELEASE ORAL at 08:40

## 2025-01-28 RX ADMIN — AMIODARONE HYDROCHLORIDE 200 MG: 200 TABLET ORAL at 08:40

## 2025-01-28 RX ADMIN — Medication 6 MG: at 20:24

## 2025-01-28 RX ADMIN — FUROSEMIDE 40 MG: 40 TABLET ORAL at 08:40

## 2025-01-28 RX ADMIN — MEROPENEM AND SODIUM CHLORIDE 1000 MG: 1 INJECTION, SOLUTION INTRAVENOUS at 08:53

## 2025-01-28 RX ADMIN — MEROPENEM AND SODIUM CHLORIDE 1000 MG: 1 INJECTION, SOLUTION INTRAVENOUS at 21:08

## 2025-01-28 ASSESSMENT — PAIN SCALES - GENERAL: PAINLEVEL_OUTOF10: 0

## 2025-01-28 NOTE — PROGRESS NOTES
ID Follow-up NOTE    CC:   Left calcaneus osteomyelitis, Encephalopathy, Pul densities  Antibiotics: Meropenem, Linezolid    Admit Date: 2025  Hospital Day: 5    Subjective:     No complaint    - No L foot / surg site pain   - no Resp sx - denies cough, SOB    Objective:     Patient Vitals for the past 8 hrs:   BP Temp Temp src Pulse Resp SpO2 Weight   25 0837 127/66 97.4 °F (36.3 °C) Oral 65 19 98 % --   25 0502 -- -- -- -- -- -- 112.4 kg (247 lb 12.8 oz)   25 0353 117/69 97.6 °F (36.4 °C) Oral 63 18 92 % --     I/O last 3 completed shifts:  In: 1240 [P.O.:1240]  Out: 2000 [Urine:]  No intake/output data recorded.    EXAM:  GENERAL: No apparent distress.  2L  HEENT: Membranes moist, no oral lesion  NECK:  Supple, no lymphadenopathy  LUNGS: Clear b/l, no rales, no dullness  CARDIAC: RRR, no murmur appreciated  ABD:  + BS, soft / NT  EXT:  L foot w dressing / ACE  NEURO: No focal neurologic findings  PSYCH: Orientation, sensorium, mood normal  LINES:         Data Review:  Lab Results   Component Value Date    WBC 7.9 2025    HGB 9.5 (L) 2025    HCT 29.1 (L) 2025    MCV 83.7 2025     2025     Lab Results   Component Value Date    CREATININE 1.5 (H) 2025    BUN 28 (H) 2025     2025    K 4.1 2025     2025    CO2 28 2025       Hepatic Function Panel:   Lab Results   Component Value Date/Time    ALKPHOS 97 2025 11:42 AM    ALT see below 2025 11:42 AM    AST 36 2025 11:42 AM    BILITOT 0.5 2025 11:42 AM    BILIDIR <0.1 2025 11:42 AM    IBILI 0.4 2025 11:42 AM       MICRO:  2024 UCx > 100k S aureus (MSSA)  2024 UCx no growth                     BC x 2 no growth  2024 MRSA nares negative     SARS CoV-2, Influenza A/B PCR neg   BC x 2 no growth to date    Urine cult >100k C parapsilosis      IMAGIN/21 CXR neg       MRI of the left foot  partial tearing of the Achilles tendon without retraction.  4.  Osteomyelitis affecting the distal margin of the first metatarsal amputation site in the forefoot with adjacent 18 x 15 mm fluid collection or abscess. The remainder of the amputated metatarsals marrow signal is unremarkable.  5.  Myositis along the flexor compartment and subcutaneous cellulitis.      Scheduled Meds:   amiodarone  200 mg Oral Daily    metoprolol succinate  25 mg Oral Daily    furosemide  40 mg Oral Daily    meropenem  1,000 mg IntraVENous Q12H    linezolid  600 mg IntraVENous Q12H    melatonin  6 mg Oral Nightly    atorvastatin  80 mg Oral Nightly    [Held by provider] aspirin  81 mg Oral Daily    Or    [Held by provider] aspirin  300 mg Rectal Daily    insulin lispro  0-4 Units SubCUTAneous 4x Daily AC & HS    [Held by provider] apixaban  5 mg Oral BID       Continuous Infusions:   dextrose         PRN Meds:  acetaminophen **OR** acetaminophen, glucose, dextrose bolus **OR** dextrose bolus, glucagon (rDNA), dextrose, polyethylene glycol, sodium chloride flush, benzonatate      Assessment:     73 yo man  PMH - DM, neuropaty, HTN, CAD, CM/CHF, CKD, nephrolithiasis  PSurgHx - CABG, L TMA (4/22/24), PM / ICD     Admit 12/21 - 1/3 -  Urinary retention  L heel escar.  MRI (L calcaneal OM - see report)  Plan for surgical debridement / partial calcanectomy   OR 12/30 debridement / partial calcanectomy     - path - 'focal acute osteomyelitis ....'  Discharged on Daptomycin + Ertapenem    Present - 'found down', 'unresponsive' at nursing home  In ED 1/24, afeb, WBC   CXR 'pul edema'  Seen by Neuro, Card, Podiatry  Seen by ID 1/26 -     IMP/  L DFI   L heel ulcer / osteomyelitis   - OM progressive on MRI 1/27 on broad spectrum antibiotics    Encephalopathy, resolved   - poss med related.  Pt says he received more meds at nursing facility than usual 1 day    Pul densities - pneumonia vs pul edema vs pneumonitis   - possible that pt has Daptomycin

## 2025-01-28 NOTE — PLAN OF CARE
Problem: Chronic Conditions and Co-morbidities  Goal: Patient's chronic conditions and co-morbidity symptoms are monitored and maintained or improved  1/28/2025 0954 by Chito Rodriguez RN  Outcome: Progressing  Flowsheets (Taken 1/28/2025 0954)  Care Plan - Patient's Chronic Conditions and Co-Morbidity Symptoms are Monitored and Maintained or Improved:   Monitor and assess patient's chronic conditions and comorbid symptoms for stability, deterioration, or improvement   Collaborate with multidisciplinary team to address chronic and comorbid conditions and prevent exacerbation or deterioration   Update acute care plan with appropriate goals if chronic or comorbid symptoms are exacerbated and prevent overall improvement and discharge     Problem: Discharge Planning  Goal: Discharge to home or other facility with appropriate resources  1/28/2025 0954 by Chito Rodriguez RN  Flowsheets (Taken 1/28/2025 0954)  Discharge to home or other facility with appropriate resources:   Identify barriers to discharge with patient and caregiver   Arrange for needed discharge resources and transportation as appropriate   Identify discharge learning needs (meds, wound care, etc)     Problem: Confusion  Goal: Confusion, delirium, dementia, or psychosis is improved or at baseline  Description: INTERVENTIONS:  1. Assess for possible contributors to thought disturbance, including medications, impaired vision or hearing, underlying metabolic abnormalities, dehydration, psychiatric diagnoses, and notify attending LIP  2. [place] high risk fall precautions, as indicated  3. Provide frequent short contacts to provide reality reorientation, refocusing and direction  4. Decrease environmental stimuli, including noise as appropriate  5. Monitor and intervene to maintain adequate nutrition, hydration, elimination, sleep and activity  6. If unable to ensure safety without constant attention obtain sitter and review sitter guidelines with assigned

## 2025-01-28 NOTE — PROGRESS NOTES
Supportive conversation shared with patient about his condition, concerns, and hopes.     01/28/25 1450   Encounter Summary   Encounter Overview/Reason Initial Encounter;Spiritual/Emotional Needs   Service Provided For Patient   Referral/Consult From Rounding   Support System Friends/neighbors   Last Encounter  01/28/25  ([facility] F/U 1/29)   Complexity of Encounter Moderate   Begin Time 1300   End Time  1330   Total Time Calculated 30 min   Spiritual/Emotional needs   Type Difficult news received   Assessment/Intervention/Outcome   Assessment Anxious;Coping;Sad   Intervention Active listening;Discussed meaning/purpose;Explored/Affirmed feelings, thoughts, concerns;Sustaining Presence/Ministry of presence   Outcome Coping;Engaged in conversation;Expressed feelings, needs, and concerns;Grieving;Venting emotion   Plan and Referrals   Plan/Referrals Continue to visit, (comment)     Spiritual Health History and Assessment/Progress Note  [facility]    Initial Encounter, Spiritual/Emotional Needs,  ,  ,      Name: Veronica Chaidez MRN: [id number]    Age: 74 y.o.     Sex: male   Language: English   Yazidism: Church   Pulmonary edema, acute     Date: 1/28/2025            Total Time Calculated: (P) 30 min              Spiritual Assessment began in [facility] 4 PCU        Referral/Consult From: Rounding   Encounter Overview/Reason: Initial Encounter, Spiritual/Emotional Needs  Service Provided For: Patient    Lisette, Belief, Meaning:   Patient is connected with a lisette tradition or spiritual practice and has beliefs or practices that help with coping during difficult times: Patient draws comfort and meaning from humor  Family/Friends No family/friends present      Importance and Influence:  Patient has spiritual/personal beliefs that influence decisions regarding their health  Family/Friends No family/friends present    Community:  Patient feels well-supported. Support system includes: Friends  Family/Friends No

## 2025-01-28 NOTE — DISCHARGE INSTRUCTIONS
You are being discharged on a guo. Please follow up with urology in 3 days to remove the guo.      Extra Heart Failure Education/ Tools/ Resources:     https://[employer].com/publication/?k=476923   --- this is American Heart Association interactive Healthier Living with Heart Failure guidebook.  Please click hyperlink or copy / paste link into search bar. The QR Code is also available below. Use your mouse to scroll through the pages.  Lots of information about weight monitoring, diet tips, activity, meds, etc    Heart Failure Tools and Resources QR Code is below. It includes multiple resources to include symptom tracker, med tracker, further HF info, and access to a HF Support Network online Community    HF [place] Kyle  -- this is a free smart phone kyle available for iPhone and Android download.  Use your phone to track sodium / fluid intake, zone tool symptom tracking, weights, medications, etc. Click on this hyperlink  HF [place] Kyle   for QR code for easy download or the link is also found in the below HF Tools and Resources.      DASH (Dietary Approach to Stop Hypertension) diet --  https://www.nhlbi.nih.gov/education/dash-eating-plan -- this diet is a flexible eating plan that promotes heart healthy eating style.  Click on hyperlink or copy / paste link into search bar.  Lots of low sodium recipes and tips.    https://www.[employer]/recipes  -- more free recipes        Podiatry Discharge Instructions:   Please make an appointment with [facility] clinic for contniued care of your right foot  Please be sure that you are elevating your heel off of the bed with pillows to prevent further ulceration  Please call the clinic if you notice signs of infection inclusing worsening of wound in size, increased redness, swelling, pain or drainage.

## 2025-01-28 NOTE — DISCHARGE SUMMARY
INTERNAL MEDICINE DEPARTMENT AT THE [facility]  DISCHARGE SUMMARY    Patient ID: Veronica Chaidez                                             Discharge Date: 1/28/2025   Patient's PCP: Good Renee MD                                          Discharge Physician: Valery Decker MD  Admit Date: 1/24/2025   Admitting Physician: Vinay Reid MD    DISCHARGE DIAGNOSES:  -Acute encephalopathy  -Acute hypoxic respiratory failure  -Acute exacerbation of chronic CHF  -Atrial fibrillation with controlled ventricular response  -Chronic urinary retention  -Left diabetic foot ulcer infection with left calcaneus osteomyelitis, treated with left below the knee amputation  -Chronic normocytic anemia with elevated RDW  -Type 2 diabetes mellitus, with complications including diabetic foot wound infection and osteomyelitis  - History of coronary artery disease, status post CABG (2020)  -History of atrial arrhythmia, sick sinus syndrome, ischemic cardiomyopathy, status post ablation and PPM  - History of CVA  - History of CKD 3A  -Class I obesity      Hospital Course:      Veronica Chaidez is a 74 y.o. male The patient is a 74-year-old male with a complex medical history, including chronic heart failure, coronary artery disease (CAD) status post-internal cardiac defibrillator (ICD) placement, atrial fibrillation (Afib), type 2 diabetes mellitus (T2DM), chronic kidney disease (CKD), and class I obesity. He presented to the emergency department with altered mental status (AMS), likely due to opioid toxicity from his oxycodone medication, as improvement in his mental status was noted after the administration of Narcan. However, hypoxia, metabolic encephalopathy, and other factors were also considered in his evaluation. A CT of the head revealed old infarcts in the right frontal lobe, but no acute intracranial abnormalities. His history of chronic heart failure was complicated by a recent decline in left ventricular ejection

## 2025-01-28 NOTE — PLAN OF CARE
General Internal Medicine Attending     Chart and data reviewed.  Patient seen and examined, case discussed with medical resident.   Physical exam independently repeated.  Labs and imaging studies reviewed.         Agree with documentation, assessment and plan as outlined above with extensions, exceptions, clarifications, and addenda as follows.             73 y/o M with  chronic systolic heart failure, CAD s/p ICD placement, A Fib, T2DM, CKD, and obesity who presented from [facility] to the ED with AMS.     Appears he was found down and unresponsive next to his bed around 10:30 am after he was noted to be in his \"normal state\" at 8:30 am when he was being given his morning meds.      Vitals and blood sugar were checked and he was found to be saturating at 85%. He was placed on 4 L supplemental O2. 4 mg of IM narcan was administered as the patient is on Oxycodone at his SNF. The patient became more alert however he was still significantly altered.      Appears patient has had a \"bad cough\" as of late.      He was admitted to [facility] in 12/2024- 1/1/2025 for \"L foot OM and UTI (UCx NGTD but initial UA c/w UTI with + leukocyte esterase and pyruia. Was treated with empiric abx, required Caicedo while inpatient, developed gross hematuria and Caicedo replaced 12/31) and is currently on IV Ertapenem as well as Daptomycin through a RUE PICC line.      In the ED, patient sat at 98% on 4 L; RR 28     VBG showed a pH of 7.360 with a pCO2 of 42.0, pO2 of 55.0, and venous bicarb of 23.7.      CBC:  WBC of 7.0 and a hemoglobin of 8.7 (around his baseline), normal MCV.  Lactic acid was 0.9.      BMP showed a creatinine of 1.7 which seems to be around his baseline, although recently 1.4 .      His hepatic function was normal     Initial troponin was 54 which up trended to 57.      His BNP was 5604 with a baseline of 1491 in June 2024.      CT head was done which showed old infarct in the right frontal lobe with no acute  with Urology     Type II DM on long term insulin, complicated by  peripheral neuropathy, nephropathy: POA  -Corrective insulin, monitoring for hypoglycemia secondary to insulin with point-of-care blood glucose checks t     CKD IIIa  - Cr appears at baseline     Chronic anemia with normocytosis  - Hgb at baseline, likely anemia of CKD  - Monitor hgb     Obesity Body mass index is 32.75 kg/m². - Complicating assessment and treatment. Placing patient at risk for multiple co-morbidities as well as early death and contributing to the patient's presentation.  on weight loss                  Disposition: Ptx is from SNF: [facility]  - Likely in pxt at least 2-3 days:  Podi considering amputation  - PT/OT eval: return to SNF at discharge.            Valery Decker MD

## 2025-01-28 NOTE — CONSULTS
Advance Care Planning      Palliative Medicine Provider (MD/NP)  Advance Care Planning (ACP) Conversation      Date of Conversation: 01/28/25  The patient and/or authorized decision maker consented to a voluntary Advance Care Planning conversation.   Individuals present for the conversation:   Patient with decision making capacity and friend Moody    Legal Healthcare Agent(s):    Primary Decision Maker: Moody Caballero - Friend - [phone]    ACP documents available in EMR prior to discussion:  -None    Primary Palliative Diagnosis(es):  HFrEF  Chronic wound and osteomyelitis to left foot    Conversation Summary:  Met with pt and had his friend Moody Caballero on speaker phone during the conversation. Introduced palliative care and had a voluntary discussion about advance care planning. The pt wanted to complete a HCPOA and named Moody as his agent. He said he does not trust his two nephews. The pt also said he would not want any efforts at resuscitation. I let him know a DNR would be suspended during surgery and he agreed to that. He mentioned a couple times that he would not want to have his life prolonged by artificial means. Moody verbalized agreement and understanding. He is agreeable to amputation of LLE.    Resuscitation Status:  changed to DNR-CCA per pt's wishes    Outcomes / Completed Documentation:  An explanation of advance directives and their importance was provided and the following forms completed:    -Power of  for Healthcare and -Portable DNR    If new document completed, original was provided to patient and/or family member.    Copy was placed for scanning into the [facility] EMR.      I spent 20 minutes providing separately identifiable ACP services with the patient and/or surrogate decision maker in a voluntary, in-person conversation discussing the patient's wishes and goals as detailed in the above note.       Jackelin Reagan, EVELYN - CNP

## 2025-01-28 NOTE — PROGRESS NOTES
Upon assessment patient with bright red         blood around guo site and bright red blood in urine.   made aware via perfect serve.  Per Md at bedside to hold eliquis and aspirin. Patient denies pain. Vitals stable.

## 2025-01-28 NOTE — CARE COORDINATION
CASE MANAGEMENT NOTE:     Case Management following for discharge coordination.     Current plan at discharge includes:   Transportation plan: EMS.  Discharge LOC: SNF.    Patient continues with medical treatment in the hospital. Barriers include: AMS, PT/OT evaluation, MRI shows worsening infection, possible amputation. CM will continue to follow.    Electronically signed by Faisal Washington RN on 1/28/2025 at 10:24 AM

## 2025-01-28 NOTE — PLAN OF CARE
Problem: Chronic Conditions and Co-morbidities  Goal: Patient's chronic conditions and co-morbidity symptoms are monitored and maintained or improved  Outcome: Progressing  Flowsheets (Taken 1/28/2025 0147)  Care Plan - Patient's Chronic Conditions and Co-Morbidity Symptoms are Monitored and Maintained or Improved:   Monitor and assess patient's chronic conditions and comorbid symptoms for stability, deterioration, or improvement   Collaborate with multidisciplinary team to address chronic and comorbid conditions and prevent exacerbation or deterioration   Update acute care plan with appropriate goals if chronic or comorbid symptoms are exacerbated and prevent overall improvement and discharge     Problem: Discharge Planning  Goal: Discharge to home or other facility with appropriate resources  Outcome: Progressing  Flowsheets (Taken 1/28/2025 0147)  Discharge to home or other facility with appropriate resources:   Identify barriers to discharge with patient and caregiver   Identify discharge learning needs (meds, wound care, etc)   Arrange for needed discharge resources and transportation as appropriate     Problem: Pain  Goal: Verbalizes/displays adequate comfort level or baseline comfort level  Outcome: Progressing  Flowsheets (Taken 1/28/2025 0147)  Verbalizes/displays adequate comfort level or baseline comfort level:   Encourage patient to monitor pain and request assistance   Administer analgesics based on type and severity of pain and evaluate response   Assess pain using appropriate pain scale   Implement non-pharmacological measures as appropriate and evaluate response     Problem: Confusion  Goal: Confusion, delirium, dementia, or psychosis is improved or at baseline  Description: INTERVENTIONS:  1. Assess for possible contributors to thought disturbance, including medications, impaired vision or hearing, underlying metabolic abnormalities, dehydration, psychiatric diagnoses, and notify attending LIP  2.

## 2025-01-28 NOTE — PROGRESS NOTES
Internal Medicine H&P    Date: 2025   Patient: Veronica Chaidez   Patient : 1950     CC: Drug Overdose (Per EMS pt was given 4mg of Intranasal and responding more. )    Interval history  2025 MRI brain:  1. No evidence for intracranial mass or acute intracranial abnormality   2. Mild chronic small vessel ischemia and a dominant area of chronic cortical/white matter infarction involving the right frontal operculum/corona radiata        2025 MRI L foot:  1.  Severely advanced ulceration along the plantar heel with large ulcer measuring at least 3 cm x 3.5 cm with depth down to the level of the calcaneus along the lateral margin   2.  Worsening osteomyelitis of the calcaneus with cortical bone destruction and severe marrow edema which likely represents osteomyelitis and could also represent worsening stress reaction.   3.  Bright signal at the distal Achilles tendon insertion with retrocalcaneal bursal inflammation which could represent partial tearing of the Achilles tendon without retraction.   4.  Osteomyelitis affecting the distal margin of the first metatarsal amputation site in the forefoot with adjacent 18 x 15 mm fluid collection or abscess. The remainder of the amputated metatarsals marrow signal is unremarkable.   5.  Myositis along the flexor compartment and subcutaneous cellulitis     Patient noted to have beatris blood from around Guo.  No other significant events overnight.    Patient seen and examined at bedside this morning.  He was comfortably sitting up in bed without any signs of acute distress.  Patient reports improvement.  AO x 3 but not to situation    Saturating well on 2 L (SpO2 92%)  ProBNP 7k -> 7.6k  Hg stable at 9.5    Creatinine slightly improving 1.5  UOP 1.35 L via guo   I/O -230 (-10.4 L)  Guo draining bright red blood  ASA and Eliquis held  In light of MRI foot findings, BKA being discussed.     HPI:  \"74-year-old M with PMH chronic systolic heart failure,  Activity: 0   Stress: Not on File (7/19/2022)    [facility] from [facility]    Stress     Stress: 0   Social Connections: Not on File (7/19/2022)    [facility] from [facility]    Social Connections     Social Connections and Isolation: 0   Housing Stability: Patient Unable To Answer (1/24/2025)    Housing Stability Vital Sign     Unable to Pay for Housing in the Last Year: Patient unable to answer     Number of Times Moved in the Last Year: 1     Homeless in the Last Year: Patient unable to answer        FHx:      Problem Relation Age of Onset    Dementia Mother         Vascular    Alzheimer's Disease Mother     Heart Attack Father     Diabetes Paternal Aunt        Review of Systems   Per HPI    Objective     Vital Signs:  Patient Vitals for the past 8 hrs:   BP Temp Temp src Pulse Resp SpO2 Weight   01/28/25 0502 -- -- -- -- -- -- 112.4 kg (247 lb 12.8 oz)   01/28/25 0353 117/69 97.6 °F (36.4 °C) Oral 63 18 92 % --   01/28/25 0054 -- -- -- 67 20 94 % --   01/27/25 2332 -- -- -- 65 18 93 % --   01/27/25 2331 113/62 98.4 °F (36.9 °C) Oral 65 18 93 % --       Physical Exam  Vitals reviewed.   Constitutional:       General: He is not in acute distress.     Appearance: He is obese. He is not toxic-appearing or diaphoretic.      Comments: Chronically ill-appearing obese age-appropriate older  male in no acute distress   HENT:      Head: Normocephalic and atraumatic.      Mouth/Throat:      Mouth: Mucous membranes are moist.   Eyes:      Extraocular Movements: Extraocular movements intact.      Conjunctiva/sclera: Conjunctivae normal.   Cardiovascular:      Rate and Rhythm: Normal rate and regular rhythm.      Heart sounds: Normal heart sounds.   Pulmonary:      Effort: Respiratory distress present.      Breath sounds: Normal breath sounds.   Genitourinary:     Comments: Caicedo draining sanguinous fluid  Musculoskeletal:         General: Deformity and signs of injury present.      Right lower leg: No edema.

## 2025-01-28 NOTE — PROGRESS NOTES
Cardiology note  From a cardiac perspective okay to proceed with surgery if needed  EF remains stable, no evidence for acute heart failure, EF essentially unchanged over the last few years  No need for further testing    Fernando Parekh MD, FACC, [facility]  Advanced Cardiac Imaging  [facility]

## 2025-01-28 NOTE — CONSULTS
01/24/2025 02:23 PM    MUCUS 1+ 12/15/2022 01:44 PM    YEAST Present 11/20/2019 11:37 PM    BACTERIA 1+ 01/24/2025 02:23 PM    CLARITYU Clear 01/24/2025 02:23 PM    LEUKOCYTESUR MODERATE 01/24/2025 02:23 PM    UROBILINOGEN 0.2 01/24/2025 02:23 PM    BILIRUBINUR Negative 01/24/2025 02:23 PM    BLOODU LARGE 01/24/2025 02:23 PM    GLUCOSEU Negative 01/24/2025 02:23 PM       Assessment/Plan:  This is a 74 y.o. male with PMH CAD, A fib, T2DM, CKD, L calcaneal osteomyelitis for whom vascular surgery is consulted for BKA.     -patient unsure if he would like to proceed with BKA vs ongoing attempts at limb salvage  -if agreeable to BKA, will coordinate timing of surgery   -abx for osteomyelitis per ID  -remainder of care per primary     Lea Schrader MD  PGY2, General Surgery  01/28/25   10:48 AM   Perfect[employer]  [phone]

## 2025-01-28 NOTE — PROGRESS NOTES
ulceration to the posterior heel with sutures removed. Wound base is appreciated to be fibrotic in nature with macerated periwound as well as maceration along the previous incision site. No evidence of purulence, fluctuance, or crepitations.  This wound does probe bone.      Right lower extremity:  Is a closed soft tissue envelope without ulceration however there is evidence of previous fissure to the plantar aspect of calcaneus.  No open lesions appreciated at this time however there is surrounding well adhered dried sanguinous crust.  No acute signs of infection.      MUSCULOSKELETAL: Muscle strength is 5/5 for all pedal groups tested. . Ankle joint ROM is decreased in dorsiflexion with the knee extended.  History of TMA and partial calcanectomy of left lower extremity.     IMAGING:  MRI Left foot (1/27/25)  IMPRESSION:   1.  Severely advanced ulceration along the plantar heel with large ulcer measuring at least 3 cm x 3.5 cm with depth down to the level of the calcaneus along the lateral margin  2.  Worsening osteomyelitis of the calcaneus with cortical bone destruction and severe marrow edema which likely represents osteomyelitis and could also represent worsening stress reaction.  3.  Bright signal at the distal Achilles tendon insertion with retrocalcaneal bursal inflammation which could represent partial tearing of the Achilles tendon without retraction.  4.  Osteomyelitis affecting the distal margin of the first metatarsal amputation site in the forefoot with adjacent 18 x 15 mm fluid collection or abscess. The remainder of the amputated metatarsals marrow signal is unremarkable.    XR left foot (1/24/2025)  IMPRESSION:  Plantar wound overlying the calcaneus with suggestion of periosteal  reaction and minimal erosive change concerning for acute osteomyelitis.     Arterial duplex B/L LE (12/24/2024)  Right CAROLE 1.07; waveforms diffusely multiphasic  Left CAROLE 0.93; waveforms diffusely multiphasic with biphasic  ertapenem.  Podiatry will continue to follow patient while in house with local wound care at this time. Vascular surgery consulted to discuss BKA.     Patient assessment and plan was discussed with Dr. Chandana Downey DPM.    Ric Mcmahon DPM   Podiatric Resident PGY2  [place]  1/28/2025, 9:43 AM

## 2025-01-29 LAB
ALBUMIN SERPL-MCNC: 3 G/DL (ref 3.4–5)
ANION GAP SERPL CALCULATED.3IONS-SCNC: 7 MMOL/L (ref 3–16)
BASOPHILS # BLD: 0.1 K/UL (ref 0–0.2)
BASOPHILS NFR BLD: 1.4 %
BUN SERPL-MCNC: 28 MG/DL (ref 7–20)
CALCIUM SERPL-MCNC: 8.7 MG/DL (ref 8.3–10.6)
CHLORIDE SERPL-SCNC: 97 MMOL/L (ref 99–110)
CO2 SERPL-SCNC: 29 MMOL/L (ref 21–32)
CREAT SERPL-MCNC: 1.6 MG/DL (ref 0.8–1.3)
DEPRECATED RDW RBC AUTO: 16.4 % (ref 12.4–15.4)
EOSINOPHIL # BLD: 1.3 K/UL (ref 0–0.6)
EOSINOPHIL NFR BLD: 17.6 %
GFR SERPLBLD CREATININE-BSD FMLA CKD-EPI: 45 ML/MIN/{1.73_M2}
GLUCOSE BLD-MCNC: 121 MG/DL (ref 70–99)
GLUCOSE BLD-MCNC: 125 MG/DL (ref 70–99)
GLUCOSE BLD-MCNC: 132 MG/DL (ref 70–99)
GLUCOSE BLD-MCNC: 94 MG/DL (ref 70–99)
GLUCOSE SERPL-MCNC: 100 MG/DL (ref 70–99)
HCT VFR BLD AUTO: 29.1 % (ref 40.5–52.5)
HGB BLD-MCNC: 9.5 G/DL (ref 13.5–17.5)
LYMPHOCYTES # BLD: 1.2 K/UL (ref 1–5.1)
LYMPHOCYTES NFR BLD: 16.7 %
MAGNESIUM SERPL-MCNC: 1.92 MG/DL (ref 1.8–2.4)
MCH RBC QN AUTO: 27.3 PG (ref 26–34)
MCHC RBC AUTO-ENTMCNC: 32.5 G/DL (ref 31–36)
MCV RBC AUTO: 84.1 FL (ref 80–100)
MONOCYTES # BLD: 0.7 K/UL (ref 0–1.3)
MONOCYTES NFR BLD: 8.9 %
NEUTROPHILS # BLD: 4.1 K/UL (ref 1.7–7.7)
NEUTROPHILS NFR BLD: 55.4 %
PERFORMED ON: ABNORMAL
PERFORMED ON: NORMAL
PHOSPHATE SERPL-MCNC: 3.4 MG/DL (ref 2.5–4.9)
PLATELET # BLD AUTO: 358 K/UL (ref 135–450)
PMV BLD AUTO: 7.7 FL (ref 5–10.5)
POTASSIUM SERPL-SCNC: 4.3 MMOL/L (ref 3.5–5.1)
RBC # BLD AUTO: 3.46 M/UL (ref 4.2–5.9)
SODIUM SERPL-SCNC: 133 MMOL/L (ref 136–145)
WBC # BLD AUTO: 7.5 K/UL (ref 4–11)

## 2025-01-29 PROCEDURE — 83735 ASSAY OF MAGNESIUM: CPT

## 2025-01-29 PROCEDURE — 84145 PROCALCITONIN (PCT): CPT

## 2025-01-29 PROCEDURE — 99232 SBSQ HOSP IP/OBS MODERATE 35: CPT | Performed by: INTERNAL MEDICINE

## 2025-01-29 PROCEDURE — 92526 ORAL FUNCTION THERAPY: CPT

## 2025-01-29 PROCEDURE — 6370000000 HC RX 637 (ALT 250 FOR IP): Performed by: INTERNAL MEDICINE

## 2025-01-29 PROCEDURE — 99232 SBSQ HOSP IP/OBS MODERATE 35: CPT | Performed by: NURSE PRACTITIONER

## 2025-01-29 PROCEDURE — 6370000000 HC RX 637 (ALT 250 FOR IP): Performed by: NURSE PRACTITIONER

## 2025-01-29 PROCEDURE — 85025 COMPLETE CBC W/AUTO DIFF WBC: CPT

## 2025-01-29 PROCEDURE — 2700000000 HC OXYGEN THERAPY PER DAY

## 2025-01-29 PROCEDURE — 80069 RENAL FUNCTION PANEL: CPT

## 2025-01-29 PROCEDURE — 6360000002 HC RX W HCPCS: Performed by: INTERNAL MEDICINE

## 2025-01-29 PROCEDURE — 6370000000 HC RX 637 (ALT 250 FOR IP)

## 2025-01-29 PROCEDURE — 97535 SELF CARE MNGMENT TRAINING: CPT

## 2025-01-29 PROCEDURE — 94761 N-INVAS EAR/PLS OXIMETRY MLT: CPT

## 2025-01-29 PROCEDURE — 2060000000 HC ICU INTERMEDIATE R&B

## 2025-01-29 PROCEDURE — 2500000003 HC RX 250 WO HCPCS

## 2025-01-29 PROCEDURE — 97530 THERAPEUTIC ACTIVITIES: CPT

## 2025-01-29 RX ADMIN — ATORVASTATIN CALCIUM 80 MG: 80 TABLET, FILM COATED ORAL at 23:20

## 2025-01-29 RX ADMIN — LINEZOLID 600 MG: 600 INJECTION, SOLUTION INTRAVENOUS at 14:41

## 2025-01-29 RX ADMIN — SODIUM CHLORIDE, PRESERVATIVE FREE 10 ML: 5 INJECTION INTRAVENOUS at 08:58

## 2025-01-29 RX ADMIN — AMIODARONE HYDROCHLORIDE 200 MG: 200 TABLET ORAL at 08:58

## 2025-01-29 RX ADMIN — METOPROLOL SUCCINATE 25 MG: 25 TABLET, FILM COATED, EXTENDED RELEASE ORAL at 08:57

## 2025-01-29 RX ADMIN — Medication 6 MG: at 23:20

## 2025-01-29 RX ADMIN — SODIUM CHLORIDE, PRESERVATIVE FREE 10 ML: 5 INJECTION INTRAVENOUS at 23:21

## 2025-01-29 RX ADMIN — LINEZOLID 600 MG: 600 INJECTION, SOLUTION INTRAVENOUS at 01:23

## 2025-01-29 RX ADMIN — MEROPENEM AND SODIUM CHLORIDE 1000 MG: 1 INJECTION, SOLUTION INTRAVENOUS at 23:23

## 2025-01-29 RX ADMIN — MEROPENEM AND SODIUM CHLORIDE 1000 MG: 1 INJECTION, SOLUTION INTRAVENOUS at 11:30

## 2025-01-29 RX ADMIN — FUROSEMIDE 40 MG: 40 TABLET ORAL at 08:58

## 2025-01-29 ASSESSMENT — PAIN SCALES - GENERAL
PAINLEVEL_OUTOF10: 0

## 2025-01-29 NOTE — PROGRESS NOTES
Physical Therapy  Facility/Department: [address]  Physical Therapy Treatment    Name: Veronica Chaidez  : 1950  MRN: [id number]  Date of Service: 2025    Discharge Recommendations:  Subacute/Skilled Nursing Facility   PT Equipment Recommendations  Other: defer to next level of care      Patient Diagnosis(es): The primary encounter diagnosis was Altered mental status, unspecified altered mental status type. Diagnoses of Hypervolemia, unspecified hypervolemia type, Urinary retention, Open wound of left heel, initial encounter, Congestive heart failure, unspecified HF chronicity, unspecified heart failure type (HCC), and Syncope, unspecified syncope type were also pertinent to this visit.  Past Medical History:  has a past medical history of A-fib (HCC), CAD, multiple vessel, Coronary artery disease involving coronary bypass graft, Diabetes mellitus (HCC), Encounter for imaging to screen for metal prior to MRI, Encounter for imaging to screen for metal prior to MRI, Enlarged prostate, Environmental allergies, Kidney stones, Pacemaker, and Systolic CHF (HCC).  Past Surgical History:  has a past surgical history that includes Cardiac catheterization (12/10/2019); Nasal polyp surgery (x2); Nasal septum surgery; Coronary artery bypass graft (N/A, 2020); Foot surgery (Left, 2024); Foot Debridement (Left, 2024); and Wound Vacuum Application (Left, 2024).    Assessment  Body Structures, Functions, Activity Limitations Requiring Skilled Therapeutic Intervention: Decreased functional mobility   Assessment: Pt demos good effort and participation. Tolerated session well overall. Pt verbalizes good understanding of NWB status to L LE but is unable to maintain. Pt demos strength to lift L foot off floor but unable to notice or feel L foot when it gradually returns to the floor. Not safe to transfer with walker or to ambulate at this time. Per chart, plan is for L BKA later this admission.

## 2025-01-29 NOTE — PLAN OF CARE
Problem: Chronic Conditions and Co-morbidities  Goal: Patient's chronic conditions and co-morbidity symptoms are monitored and maintained or improved  Outcome: Progressing  Flowsheets (Taken 1/29/2025 0316)  Care Plan - Patient's Chronic Conditions and Co-Morbidity Symptoms are Monitored and Maintained or Improved:   Monitor and assess patient's chronic conditions and comorbid symptoms for stability, deterioration, or improvement   Collaborate with multidisciplinary team to address chronic and comorbid conditions and prevent exacerbation or deterioration     Problem: Discharge Planning  Goal: Discharge to home or other facility with appropriate resources  Outcome: Progressing  Flowsheets (Taken 1/29/2025 0316)  Discharge to home or other facility with appropriate resources:   Identify barriers to discharge with patient and caregiver   Identify discharge learning needs (meds, wound care, etc)   Arrange for needed discharge resources and transportation as appropriate     Problem: Pain  Goal: Verbalizes/displays adequate comfort level or baseline comfort level  Outcome: Progressing  Flowsheets (Taken 1/29/2025 0316)  Verbalizes/displays adequate comfort level or baseline comfort level:   Encourage patient to monitor pain and request assistance   Administer analgesics based on type and severity of pain and evaluate response   Assess pain using appropriate pain scale   Implement non-pharmacological measures as appropriate and evaluate response     Problem: Confusion  Goal: Confusion, delirium, dementia, or psychosis is improved or at baseline  Description: INTERVENTIONS:  1. Assess for possible contributors to thought disturbance, including medications, impaired vision or hearing, underlying metabolic abnormalities, dehydration, psychiatric diagnoses, and notify attending LIP  2. [place] high risk fall precautions, as indicated  3. Provide frequent short contacts to provide reality reorientation, refocusing and

## 2025-01-29 NOTE — PROGRESS NOTES
ID Follow-up NOTE    CC:   Left calcaneus osteomyelitis, Encephalopathy, Pul densities  Antibiotics: Meropenem, Linezolid    Admit Date: 2025  Hospital Day: 6    Subjective:     Seen by Vascular, plan for L  BKA on     No complaint    - No L foot / surg site pain   - no Resp sx - denies cough, SOB    Objective:     Patient Vitals for the past 8 hrs:   BP Temp Temp src Pulse Resp SpO2   25 1115 112/67 98.3 °F (36.8 °C) -- 68 18 98 %   25 0857 (!) 144/77 98 °F (36.7 °C) Oral 68 18 98 %     I/O last 3 completed shifts:  In: 1010 [P.O.:1010]  Out: 2375 [Urine:2375]  I/O this shift:  In: -   Out: 800 [Urine:800]    EXAM:  GENERAL: No apparent distress.  2L  HEENT: Membranes moist, no oral lesion  NECK:  Supple, no lymphadenopathy  LUNGS: Clear b/l, no rales, no dullness  CARDIAC: RRR, no murmur appreciated  ABD:  + BS, soft / NT  EXT:  L foot w dressing / ACE  NEURO: No focal neurologic findings  PSYCH: Orientation, sensorium, mood normal  LINES:         Data Review:  Lab Results   Component Value Date    WBC 7.5 2025    HGB 9.5 (L) 2025    HCT 29.1 (L) 2025    MCV 84.1 2025     2025     Lab Results   Component Value Date    CREATININE 1.6 (H) 2025    BUN 28 (H) 2025     (L) 2025    K 4.3 2025    CL 97 (L) 2025    CO2 29 2025       Hepatic Function Panel:   Lab Results   Component Value Date/Time    ALKPHOS 97 2025 11:42 AM    ALT see below 2025 11:42 AM    AST 36 2025 11:42 AM    BILITOT 0.5 2025 11:42 AM    BILIDIR <0.1 2025 11:42 AM    IBILI 0.4 2025 11:42 AM       MICRO:  2024 UCx > 100k S aureus (MSSA)  2024 UCx no growth                     BC x 2 no growth  2024 MRSA nares negative     SARS CoV-2, Influenza A/B PCR neg   BC x 2 no growth to date    Urine cult >100k C parapsilosis      IMAGIN/21 CXR neg       MRI of the left foot without  contrast  IMPRESSION:  1. Ulceration along the heel with abnormal marrow signal in the posterior calcaneal process compatible with osteomyelitis.  2. Previous amputation with presumed reactive edema around the distal foot.  3. Diffuse dependent edema without focal fluid collection to suggest abscess formation.    1/24 L foot x-ray  Plantar wound overlying the calcaneus with suggestion of periosteal   reaction and minimal erosive change concerning for acute osteomyelitis.     1/24 neck CT  1.  No acute fracture or subluxation.   2.  Multilevel degenerative changes as described above with varying degrees is of neuroforaminal narrowing     1/24 Chest CT wo contrast  1. Multifocal areas of consolidation, groundglass opacity and interlobular septal thickening presumably reflecting an acute infectious process. Superimposed pulmonary edema is also a possibility.   2. Trace pleural effusions.   3. Cardiomegaly.   4. Mediastinal lymphadenopathy, presumably reactive. Recommend follow-up chest CT to document resolution        1/24 Neck CTA w contrast  1. No hemodynamically significant stenosis.   2. Consolidation in lungs with small left pleural effusion.     1/24 Head CTA w contrast  1. No large vessel occlusion.   2. Remote right frontotemporal infarct.   3. Parenchymal volume loss and chronic small vessel ischemic changes in the white matter.     1/24 CXR  ' Findings consistent with pulmonary edema '    1/27 L foot MRI w/o contrast  1.  Severely advanced ulceration along the plantar heel with large ulcer measuring at least 3 cm x 3.5 cm with depth down to the level of the calcaneus along the lateral margin  2.  Worsening osteomyelitis of the calcaneus with cortical bone destruction and severe marrow edema which likely represents osteomyelitis and could also represent worsening stress reaction.  3.  Bright signal at the distal Achilles tendon insertion with retrocalcaneal bursal inflammation which could represent partial

## 2025-01-29 NOTE — PROGRESS NOTES
Vascular Surgery   Daily Progress Note  Patient: Veronica Chaidez      CC: Nonhealing potential of left foot    SUBJECTIVE:   No acute events overnight. Patient resting comfortably in bed. Pain controlled. Reports that he would like to proceed with amputation. No other complaints at this time.     OBJECTIVE:    PHYSICAL EXAM:    Vitals:    01/28/25 2009 01/28/25 2354 01/29/25 0536 01/29/25 0540   BP: 139/86 133/81 105/60    Pulse: 67 63 63    Resp: 18 18 17    Temp: 98.3 °F (36.8 °C) 98.3 °F (36.8 °C) 97.5 °F (36.4 °C)    TempSrc: Oral Oral Oral    SpO2: 97% 97% 97%    Weight:    113.6 kg (250 lb 8 oz)   Height:           General appearance: alert, no acute distress, grooming appropriate  General appearance: alert, no acute distress  Neck: trachea midline, no JVD  Chest/Lungs: normal effort on 2L O2  Cardiovascular: RRR  Skin: warm and dry, chronic venous changes noted over leg  Extremities: no edema, no cyanosis  Neuro: A&Ox3       ASSESSMENT & PLAN:   This is a 74 y.o. male with PMH CAD, A fib, T2DM, CKD, L calcaneal osteomyelitis for whom vascular surgery is consulted for BKA.     - Plan for potential L BKA this coming weekend  - Will need medical optimization   - Abx for osteomyelitis per ID  - MMPC  - Remainder of care per primary team    Sharla Jarquin DO  PGY1, General Surgery  01/29/25  6:51 AM  [phone]

## 2025-01-29 NOTE — PROGRESS NOTES
Occupational Therapy  Facility/Department: [address]  Occupational Therapy Treatment    Name: Veronica Chaidez  : 1950  MRN: [id number]  Date of Service: 2025    Discharge Recommendations:  Subacute/Skilled Nursing Facility  OT Equipment Recommendations  Equipment Needed: No  Other: defer at this time       Patient Diagnosis(es): The primary encounter diagnosis was Altered mental status, unspecified altered mental status type. Diagnoses of Hypervolemia, unspecified hypervolemia type, Urinary retention, Open wound of left heel, initial encounter, Congestive heart failure, unspecified HF chronicity, unspecified heart failure type (HCC), and Syncope, unspecified syncope type were also pertinent to this visit.  Past Medical History:  has a past medical history of A-fib (HCC), CAD, multiple vessel, Coronary artery disease involving coronary bypass graft, Diabetes mellitus (HCC), Encounter for imaging to screen for metal prior to MRI, Encounter for imaging to screen for metal prior to MRI, Enlarged prostate, Environmental allergies, Kidney stones, Pacemaker, and Systolic CHF (HCC).  Past Surgical History:  has a past surgical history that includes Cardiac catheterization (12/10/2019); Nasal polyp surgery (x2); Nasal septum surgery; Coronary artery bypass graft (N/A, 2020); Foot surgery (Left, 2024); Foot Debridement (Left, 2024); and Wound Vacuum Application (Left, 2024).           Assessment  Performance deficits / Impairments: Decreased functional mobility ;Decreased cognition;Decreased ADL status;Decreased endurance;Decreased high-level IADLs;Decreased strength;Decreased balance;Decreased safe awareness  Assessment: Pt tolerated OT session well this date. Pt reports understanding of LLE NWB precautions, however required VCs to maintain precautions t/o session, especially during sit> stand transfers. Pt completed bed mobility w/ SBA and transfers w/ Min A to/from becky knight. Pt had BM

## 2025-01-29 NOTE — PROGRESS NOTES
Speech Therapy Attempt    Patient was attempted to be seen by Speech Therapy Department this date for dysphagia. Patient was unable to be seen due to pt working with PT/OT. Speech Therapy will re-attempt to see patient if/when patient is appropriate and available.    Thank you.    No Charge.    Signature:  Bridgette Yepez M.A., CCC-SLP   Speech-Language Pathologist   [facility] PRN  [id number]    Pg. # [phone]

## 2025-01-29 NOTE — PROGRESS NOTES
MRI left foot (12/22/2024):   IMPRESSION:  1. Ulceration along the heel with abnormal marrow signal in the posterior calcaneal process compatible with osteomyelitis.  2. Previous amputation with presumed reactive edema around the distal foot.  3. Diffuse dependent edema without focal fluid collection to suggest abscess formation.     XR left foot (12/21/2024)  IMPRESSION:  Impression:  1.  No osseous erosions are identified to suggest osteomyelitis. If clinical concern remains further evaluation with MRI of the foot with and without contrast should be considered.     ASSESSMENT/PLAN:   -Full-thickness ulceration, left lower extremity  -Possible osteomyelitis  -S/p I&D with partial And ectomy, left lower extremity (DOS 12/30/2024)  -Type 2 diabetes mellitus complicated by peripheral neuropathy  -TMA, left foot  -History of noncompliance     -Patient seen and examined at bedside  -Hypertensive otherwise VSS, no leukocytosis noted (WBC 7.5)  - and CRP 60.6  -lactic acid 0.9  -HbA1c 5.6  -Blood cultures 1 & 2 NGTD  -Images reviewed, impression noted above  -MRI left foot with progressive OM to left calcaneous and abscess.  -Wound culture not obtained at this time 2/2 chronic nature of the wound  -Continue IV antibiotics per ID; meropenem and linezolid  -Left lower extremity dressed with saline soaked gauze, dry gauze, ABD, Kerlix, Ace  -Prevalon boots recommended. Patient is to wear at all times while in bed to prevent further deep tissue injury.  -Patient may be full weightbearing to right LE however should be nonweightbearing to left lower extremity     -Left foot with significant amount of OM. All podiatric surgical interventions have been attempted. Poor prognosis of foot. Would recommend more proximal amputation. Patient in agreement with treatment plan. Vascular surgery following for timing of amputation.    DISPO: Full-thickness ulceration, left lower extremity.  All labs and imaging reviewed,  impressions noted above. MRI Left foot with progressive OM to left calcaneous and abscess. Recommend patient continue on IV antibiotics per primary team; daptomycin and ertapenem. Left foot with significant amount of OM. All podiatric surgical interventions have been attempted. Poor prognosis of foot. Would recommend more proximal amputation. Patient in agreement with treatment plan. Vascular surgery following for timing of amputation.     Patient assessment and plan was discussed with Dr. Chandana Downey DPM.    Ric Mcmahon DPM   Podiatric Resident PGY2  [employer]  1/29/2025, 9:16 AM

## 2025-01-29 NOTE — PROGRESS NOTES
Internal Medicine H&P    Date: 2025   Patient: Veronica Chaidez   Patient : 1950     CC: Drug Overdose (Per EMS pt was given 4mg of Intranasal and responding more. )    Interval history  LEORA.  Pt seen and examined at bedside this morning.  He was sitting up in bed comfortably without any sign of acute distress.  AOx3.  Patient denies any acute complaints    Saturating well on 2 L  Afebrile, without leukocytosis  Hemoglobin is renal function stable    UOP 1.375 L via guo   I/O -485 (-10.9 L)    Clear for OR per cardio  Tentative plan for BKA coming weekend    HPI:  \"74-year-old M with PMH chronic systolic heart failure, CAD s/p ICD, A-fib, T2DM, CKD, class I obesity who presented from [facility]) to the [facility] ED with altered mental status.    History was obtained from review of the EMR.  Patient noted becoming unresponsive.  Has been around 1:30 AM on the day of presentation.  Last known well was 8:30 AM on the day of presentation when medicines were administered.  Patient was noted to be hypoxic with SpO2 85% and placed on 4 L O2 via nasal cannula.  4 mg IM Narcan was administered (as patient is on oxycodone at his [facility]) which led to improvement in alertness.  Per history patient has had a \"bad cough\" lately    Recent hospital admissions include 2024 admission for \"left foot osteomyelitis and UTI (urine culture without growth, initial UA had pyuria and leukocyte esterase), treated with empiric antibiotics and required Guo; developed gross hematuria and Guo replaced on 2024.  Patient was on IV ertapenem and daptomycin through right upper extremity PICC. He returned to the hospital from [facility] with AMS, was found to be hypoxic and admitted for further evaluation and management of his presenting symptoms\".      PMHx:      Diagnosis Date    A-fib (HCC)     CAD, multiple vessel 2019    Coronary artery disease involving coronary bypass graft     Diabetes mellitus  during this admission) 1 g twice daily  -Linezolid 600 mg BID (1/26 - )  - Wound care  - tentative plan for BKA early Feb    Acute encephalopathy (improved)  Due to toxic encephalopathy 2/2 opioid and/or hypoxia  Rule out acute infection  Presented from SNF after being noted to be confused/altered, reportedly improved after Narcan administration    Infectious etiology unlikely due to lack of systemic findings to that effect. Symptoms favor toxic etiology due to initial findings of pinpoint bilateral pupils with response to Narcan.  CT head and CTA were unremarkable    1/27/2025 MRI brain:  1. No evidence for intracranial mass or acute intracranial abnormality   2. Mild chronic small vessel ischemia and a dominant area of chronic cortical/white matter infarction involving the right frontal operculum/corona radiata     -Fall precautions  -Delirium protocol in place  - Neurology following, appreciate any recommendations    Chronic Medical Conditions:  History of CVA  No residual symptoms per history  CT head: Remote infarcts in R frontal lobe, no acute ICH/mass effect  -ASA, held   - Continue statin as patient is off daptomycin    CKDIIIa:   Cr stable  - avoid nephrotoxins  - monitor with RFP and UOP    Urinary retention   Home med Flomax, held since has guo  - Guo in place    A-Fib  SSS  s/p ablation (2021)  S/p PPM  On metoprolol, currently in sinus rhythm  - Toprol XL 25 mg QD  - Eliquis 5 mg BID, held  - Amiodarone 200 mg QD  - Keep K >4, Mg >2  - Tele    CAD   S/p CABG (2020)  - ASA 81 mg, held  - Lipitor 80 mg    T2DM   1/25/25 Hg A1c 5.6  - LDSSi,   - POCT glucose QID ACHS,   - Hypoglycemia protocol    DVT PPx: SCD, Eliquis held  Diet: ADULT DIET; Regular   Code status:  DNR-CCA     Disposition  - Preadmission: [facility]  - Current: [facility]  - Upon discharge: TBD    Will discuss with attending physician Dr. Odonwodo, Ebele, MD Behnam Enayataval, MD  Internal Medicine, PGY-2

## 2025-01-29 NOTE — PLAN OF CARE
Problem: Pain  Goal: Verbalizes/displays adequate comfort level or baseline comfort level  Outcome: Progressing     Problem: Skin/Tissue Integrity  Goal: Skin integrity remains intact  Description: 1.  Monitor for areas of redness and/or skin breakdown  2.  Assess vascular access sites hourly  3.  Every 4-6 hours minimum:  Change oxygen saturation probe site  4.  Every 4-6 hours:  If on nasal continuous positive airway pressure, respiratory therapy assess nares and determine need for appliance change or resting period  Outcome: Progressing     Problem: Safety - Adult  Goal: Free from fall injury  Outcome: Progressing     Problem: Respiratory - Adult  Goal: Achieves optimal ventilation and oxygenation  Outcome: Progressing     Problem: Skin/Tissue Integrity - Adult  Goal: Skin integrity remains intact  Description: 1.  Monitor for areas of redness and/or skin breakdown  2.  Assess vascular access sites hourly  3.  Every 4-6 hours minimum:  Change oxygen saturation probe site  4.  Every 4-6 hours:  If on nasal continuous positive airway pressure, respiratory therapy assess nares and determine need for appliance change or resting period  Outcome: Progressing     Problem: Cardiovascular - Adult  Goal: Maintains optimal cardiac output and hemodynamic stability  Outcome: Progressing

## 2025-01-29 NOTE — PROGRESS NOTES
Patient declined spiritual care visit at this time as eating lunch and resting.  will attempt to follow up later this week or next as available or upon request.     01/29/25 1308   Encounter Summary   Encounter Overview/Reason Follow-up   Service Provided For Patient   Referral/Consult From [facility]   Support System Friends/neighbors   Last Encounter  01/29/25  (GRD F/U prior to 2/3)   Complexity of Encounter Low   Begin Time 1300   End Time  1305   Total Time Calculated 5 min   Assessment/Intervention/Outcome   Assessment Calm;Coping   Intervention Active listening   Outcome Refused/Declined   Plan and Referrals   Plan/Referrals Continue to visit, (comment)  (F/U prior to surgery)

## 2025-01-29 NOTE — PROGRESS NOTES
[facility] Cardiology Progress Note    Primary Cardiologist: Dr Parekh    CC/HPI: CHF    S: No events overnight. No cp or sob     Tele: Paced     O:  Physical Exam:  BP (!) 144/77   Pulse 68   Temp 98 °F (36.7 °C) (Oral)   Resp 18   Ht 1.88 m (6' 2\")   Wt 113.6 kg (250 lb 8 oz)   SpO2 98%   BMI 32.16 kg/m²    General (appearance):  No acute distress  Eyes: anicteric   Neck: soft, No JVD  Ears/Nose/Mouth/Thorat: No cyanosis  CV: RRR   Respiratory:  normal effort  GI: soft, non-tender, non-distended  Skin: Warm, dry. No rashes  Neuro/Psych: Alert and oriented x 3. Appropriate behavior  Ext:  No c/c. + trace LE edema. Venostasis discoloration, Left foot partial amputation and ACE wrapped.     I.O's= -10.8 L     Weight  Admission: Weight - Scale: 125.4 kg (276 lb 6.4 oz)   Today: Weight - Scale: 113.6 kg (250 lb 8 oz)    CBC:   Recent Labs     25  0551 25  0500 25  0530   WBC 8.0 7.9 7.5   HGB 9.1* 9.5* 9.5*   HCT 28.0* 29.1* 29.1*   MCV 84.3 83.7 84.1    375 358     BMP:   Recent Labs     25  0551 25  0500 25  0530    138 133*   K 3.8 4.1 4.3    102 97*   CO2 26 28 29   PHOS 3.6 3.2 3.4   BUN 28* 28* 28*   CREATININE 1.6* 1.5* 1.6*     Estimated Creatinine Clearance: 56 mL/min (A) (based on SCr of 1.6 mg/dL (H)).  Mag:   Lab Results   Component Value Date/Time    MG 1.92 2025 05:30 AM     LIVER PROFILE:   No results for input(s): \"AST\", \"ALT\", \"LIPASE\", \"AMYLASE\", \"BILIDIR\", \"BILITOT\", \"ALKPHOS\" in the last 72 hours.    Invalid input(s): \"ALB\"    Pro-BNP:   Lab Results   Component Value Date/Time    PROBNP 7,607 2025 05:00 AM    PROBNP 7,048 2025 05:51 AM    PROBNP 5,604 2025 11:42 AM     High Sensitivity Troponin:   Lab Results   Component Value Date    CKTOTAL 59 2025    TROPONINI <0.01 2023    TROPHS 61 (H) 2025       Imagin2025 Echo    Left Ventricle: Severely reduced left

## 2025-01-29 NOTE — CARE COORDINATION
CASE MANAGEMENT NOTE:     Case Management following for discharge coordination.     Current plan at discharge includes:   Transportation plan: EMS.  Discharge LOC: [place].    Patient continues with medical treatment in the hospital. Vascular surgery scheduled for this weekend. CM will continue to follow.     Electronically signed by Faisal Washington RN on 1/29/2025 at 1:04 PM

## 2025-01-30 ENCOUNTER — ANESTHESIA EVENT (OUTPATIENT)
Dept: OPERATING ROOM | Age: 75
End: 2025-01-30
Payer: MEDICAID

## 2025-01-30 LAB
ALBUMIN SERPL-MCNC: 3 G/DL (ref 3.4–5)
ANION GAP SERPL CALCULATED.3IONS-SCNC: 7 MMOL/L (ref 3–16)
BASOPHILS # BLD: 0.1 K/UL (ref 0–0.2)
BASOPHILS NFR BLD: 1 %
BUN SERPL-MCNC: 29 MG/DL (ref 7–20)
CALCIUM SERPL-MCNC: 8.8 MG/DL (ref 8.3–10.6)
CHLORIDE SERPL-SCNC: 98 MMOL/L (ref 99–110)
CO2 SERPL-SCNC: 30 MMOL/L (ref 21–32)
CREAT SERPL-MCNC: 1.8 MG/DL (ref 0.8–1.3)
DEPRECATED RDW RBC AUTO: 16.4 % (ref 12.4–15.4)
EOSINOPHIL # BLD: 1 K/UL (ref 0–0.6)
EOSINOPHIL NFR BLD: 14.9 %
GFR SERPLBLD CREATININE-BSD FMLA CKD-EPI: 39 ML/MIN/{1.73_M2}
GLUCOSE BLD-MCNC: 105 MG/DL (ref 70–99)
GLUCOSE BLD-MCNC: 117 MG/DL (ref 70–99)
GLUCOSE BLD-MCNC: 136 MG/DL (ref 70–99)
GLUCOSE BLD-MCNC: 137 MG/DL (ref 70–99)
GLUCOSE BLD-MCNC: 142 MG/DL (ref 70–99)
GLUCOSE BLD-MCNC: 153 MG/DL (ref 70–99)
GLUCOSE SERPL-MCNC: 113 MG/DL (ref 70–99)
HCT VFR BLD AUTO: 29.6 % (ref 40.5–52.5)
HGB BLD-MCNC: 9.7 G/DL (ref 13.5–17.5)
LYMPHOCYTES # BLD: 1.2 K/UL (ref 1–5.1)
LYMPHOCYTES NFR BLD: 19 %
MAGNESIUM SERPL-MCNC: 2 MG/DL (ref 1.8–2.4)
MCH RBC QN AUTO: 27.4 PG (ref 26–34)
MCHC RBC AUTO-ENTMCNC: 32.8 G/DL (ref 31–36)
MCV RBC AUTO: 83.5 FL (ref 80–100)
MONOCYTES # BLD: 0.6 K/UL (ref 0–1.3)
MONOCYTES NFR BLD: 9.3 %
NEUTROPHILS # BLD: 3.6 K/UL (ref 1.7–7.7)
NEUTROPHILS NFR BLD: 55.8 %
PERFORMED ON: ABNORMAL
PHOSPHATE SERPL-MCNC: 3.5 MG/DL (ref 2.5–4.9)
PLATELET # BLD AUTO: 345 K/UL (ref 135–450)
PMV BLD AUTO: 8.2 FL (ref 5–10.5)
POTASSIUM SERPL-SCNC: 4.3 MMOL/L (ref 3.5–5.1)
RBC # BLD AUTO: 3.54 M/UL (ref 4.2–5.9)
SODIUM SERPL-SCNC: 135 MMOL/L (ref 136–145)
WBC # BLD AUTO: 6.5 K/UL (ref 4–11)

## 2025-01-30 PROCEDURE — 6360000002 HC RX W HCPCS: Performed by: INTERNAL MEDICINE

## 2025-01-30 PROCEDURE — 80069 RENAL FUNCTION PANEL: CPT

## 2025-01-30 PROCEDURE — 2060000000 HC ICU INTERMEDIATE R&B

## 2025-01-30 PROCEDURE — 2580000003 HC RX 258

## 2025-01-30 PROCEDURE — 6370000000 HC RX 637 (ALT 250 FOR IP)

## 2025-01-30 PROCEDURE — 83735 ASSAY OF MAGNESIUM: CPT

## 2025-01-30 PROCEDURE — 2500000003 HC RX 250 WO HCPCS

## 2025-01-30 PROCEDURE — 85025 COMPLETE CBC W/AUTO DIFF WBC: CPT

## 2025-01-30 PROCEDURE — 99232 SBSQ HOSP IP/OBS MODERATE 35: CPT | Performed by: INTERNAL MEDICINE

## 2025-01-30 PROCEDURE — 99232 SBSQ HOSP IP/OBS MODERATE 35: CPT | Performed by: NURSE PRACTITIONER

## 2025-01-30 PROCEDURE — 6370000000 HC RX 637 (ALT 250 FOR IP): Performed by: INTERNAL MEDICINE

## 2025-01-30 PROCEDURE — 6370000000 HC RX 637 (ALT 250 FOR IP): Performed by: NURSE PRACTITIONER

## 2025-01-30 RX ORDER — SODIUM CHLORIDE 9 MG/ML
INJECTION, SOLUTION INTRAVENOUS CONTINUOUS
Status: DISCONTINUED | OUTPATIENT
Start: 2025-01-31 | End: 2025-02-01

## 2025-01-30 RX ORDER — LINEZOLID 600 MG/1
600 TABLET, FILM COATED ORAL EVERY 12 HOURS SCHEDULED
Status: DISCONTINUED | OUTPATIENT
Start: 2025-01-30 | End: 2025-02-03

## 2025-01-30 RX ADMIN — MEROPENEM AND SODIUM CHLORIDE 1000 MG: 1 INJECTION, SOLUTION INTRAVENOUS at 09:08

## 2025-01-30 RX ADMIN — LINEZOLID 600 MG: 600 TABLET, FILM COATED ORAL at 11:31

## 2025-01-30 RX ADMIN — LINEZOLID 600 MG: 600 TABLET, FILM COATED ORAL at 21:02

## 2025-01-30 RX ADMIN — SODIUM CHLORIDE: 9 INJECTION, SOLUTION INTRAVENOUS at 23:46

## 2025-01-30 RX ADMIN — Medication 6 MG: at 21:02

## 2025-01-30 RX ADMIN — LINEZOLID 600 MG: 600 INJECTION, SOLUTION INTRAVENOUS at 03:13

## 2025-01-30 RX ADMIN — SODIUM CHLORIDE, PRESERVATIVE FREE 10 ML: 5 INJECTION INTRAVENOUS at 08:14

## 2025-01-30 RX ADMIN — METOPROLOL SUCCINATE 25 MG: 25 TABLET, FILM COATED, EXTENDED RELEASE ORAL at 08:14

## 2025-01-30 RX ADMIN — FUROSEMIDE 40 MG: 40 TABLET ORAL at 08:14

## 2025-01-30 RX ADMIN — AMIODARONE HYDROCHLORIDE 200 MG: 200 TABLET ORAL at 08:14

## 2025-01-30 ASSESSMENT — PAIN SCALES - GENERAL
PAINLEVEL_OUTOF10: 0

## 2025-01-30 ASSESSMENT — ENCOUNTER SYMPTOMS: SHORTNESS OF BREATH: 1

## 2025-01-30 NOTE — PROGRESS NOTES
ID Follow-up NOTE    CC:   Left calcaneus osteomyelitis, Encephalopathy, Pul densities  Antibiotics: Meropenem, Linezolid    Admit Date: 1/24/2025  Hospital Day: 7    Subjective:     Seen by Vascular, plan for L  BKA on 1/31/25    No complaint    - No L foot / surg site pain   - no Resp sx - denies cough, SOB    Objective:     Patient Vitals for the past 8 hrs:   BP Temp Temp src Pulse Resp SpO2 Weight   01/30/25 0800 131/71 97.7 °F (36.5 °C) Oral 65 16 94 % --   01/30/25 0407 -- -- -- -- -- -- 110.1 kg (242 lb 11.6 oz)   01/30/25 0313 119/66 97.8 °F (36.6 °C) Oral 61 18 95 % --     I/O last 3 completed shifts:  In: 240 [P.O.:240]  Out: 2750 [Urine:2750]  No intake/output data recorded.    EXAM:  GENERAL: No apparent distress.  1L  HEENT: Membranes moist, no oral lesion  NECK:  Supple, no lymphadenopathy  LUNGS: Clear b/l, no rales, no dullness  CARDIAC: RRR, no murmur appreciated  ABD:  + BS, soft / NT  EXT:  L foot w dressing / ACE  NEURO: No focal neurologic findings  PSYCH: Orientation, sensorium, mood normal  LINES:         Data Review:  Lab Results   Component Value Date    WBC 6.5 01/30/2025    HGB 9.7 (L) 01/30/2025    HCT 29.6 (L) 01/30/2025    MCV 83.5 01/30/2025     01/30/2025     Lab Results   Component Value Date    CREATININE 1.8 (H) 01/30/2025    BUN 29 (H) 01/30/2025     (L) 01/30/2025    K 4.3 01/30/2025    CL 98 (L) 01/30/2025    CO2 30 01/30/2025       Hepatic Function Panel:   Lab Results   Component Value Date/Time    ALKPHOS 97 01/24/2025 11:42 AM    ALT see below 01/24/2025 11:42 AM    AST 36 01/24/2025 11:42 AM    BILITOT 0.5 01/24/2025 11:42 AM    BILIDIR <0.1 01/24/2025 11:42 AM    IBILI 0.4 01/24/2025 11:42 AM       MICRO:  06/28/2024 UCx > 100k S aureus (MSSA)  12/21/2024 UCx no growth                     BC x 2 no growth  12/23/2024 MRSA nares negative    1/24 SARS CoV-2, Influenza A/B PCR neg  1/24 BC x 2 no growth to date  1/25  Urine cult >100k C  parapsilosis      IMAGIN/21 CXR neg       MRI of the left foot without contrast  IMPRESSION:  1. Ulceration along the heel with abnormal marrow signal in the posterior calcaneal process compatible with osteomyelitis.  2. Previous amputation with presumed reactive edema around the distal foot.  3. Diffuse dependent edema without focal fluid collection to suggest abscess formation.     L foot x-ray  Plantar wound overlying the calcaneus with suggestion of periosteal   reaction and minimal erosive change concerning for acute osteomyelitis.      neck CT  1.  No acute fracture or subluxation.   2.  Multilevel degenerative changes as described above with varying degrees is of neuroforaminal narrowing      Chest CT wo contrast  1. Multifocal areas of consolidation, groundglass opacity and interlobular septal thickening presumably reflecting an acute infectious process. Superimposed pulmonary edema is also a possibility.   2. Trace pleural effusions.   3. Cardiomegaly.   4. Mediastinal lymphadenopathy, presumably reactive. Recommend follow-up chest CT to document resolution         Neck CTA w contrast  1. No hemodynamically significant stenosis.   2. Consolidation in lungs with small left pleural effusion.      Head CTA w contrast  1. No large vessel occlusion.   2. Remote right frontotemporal infarct.   3. Parenchymal volume loss and chronic small vessel ischemic changes in the white matter.      CXR  ' Findings consistent with pulmonary edema '     L foot MRI w/o contrast  1.  Severely advanced ulceration along the plantar heel with large ulcer measuring at least 3 cm x 3.5 cm with depth down to the level of the calcaneus along the lateral margin  2.  Worsening osteomyelitis of the calcaneus with cortical bone destruction and severe marrow edema which likely represents osteomyelitis and could also represent worsening stress reaction.  3.  Bright signal at the distal Achilles tendon

## 2025-01-30 NOTE — PLAN OF CARE
Problem: Chronic Conditions and Co-morbidities  Goal: Patient's chronic conditions and co-morbidity symptoms are monitored and maintained or improved  Outcome: Progressing  Flowsheets (Taken 1/29/2025 2012)  Care Plan - Patient's Chronic Conditions and Co-Morbidity Symptoms are Monitored and Maintained or Improved: Monitor and assess patient's chronic conditions and comorbid symptoms for stability, deterioration, or improvement     Problem: Discharge Planning  Goal: Discharge to home or other facility with appropriate resources  Outcome: Progressing  Flowsheets (Taken 1/29/2025 2012)  Discharge to home or other facility with appropriate resources: Identify barriers to discharge with patient and caregiver     Problem: Pain  Goal: Verbalizes/displays adequate comfort level or baseline comfort level  1/30/2025 0535 by Good Cruz, RN  Outcome: Progressing  Flowsheets (Taken 1/30/2025 0000)  Verbalizes/displays adequate comfort level or baseline comfort level: Encourage patient to monitor pain and request assistance  1/29/2025 1826 by Lorene Lind, RN  Outcome: Progressing     Problem: Confusion  Goal: Confusion, delirium, dementia, or psychosis is improved or at baseline  Description: INTERVENTIONS:  1. Assess for possible contributors to thought disturbance, including medications, impaired vision or hearing, underlying metabolic abnormalities, dehydration, psychiatric diagnoses, and notify attending LIP  2. [place] high risk fall precautions, as indicated  3. Provide frequent short contacts to provide reality reorientation, refocusing and direction  4. Decrease environmental stimuli, including noise as appropriate  5. Monitor and intervene to maintain adequate nutrition, hydration, elimination, sleep and activity  6. If unable to ensure safety without constant attention obtain sitter and review sitter guidelines with assigned personnel  7. Initiate Psychosocial CNS and Spiritual Care consult, as  indicated  Outcome: Progressing  Flowsheets (Taken 1/29/2025 2012)  Effect of thought disturbance (confusion, delirium, dementia, or psychosis) are managed with adequate functional status: Assess for contributors to thought disturbance, including medications, impaired vision or hearing, underlying metabolic abnormalities, dehydration, psychiatric diagnoses, notify LIP

## 2025-01-30 NOTE — ANESTHESIA PRE PROCEDURE
Department of Anesthesiology  Preprocedure Note       Name:  Veronica Chaidez   Age:  74 y.o.  :  1950                                          MRN:  [id number]         Date:  2025      Surgeon: Surgeon(s):  Bridgette Soto MD    Procedure: Procedure(s):  LEFT BELOW KNEE AMPUTATION    Medications prior to admission:   Prior to Admission medications    Medication Sig Start Date End Date Taking? Authorizing Provider   oxyCODONE (ROXICODONE) 5 MG immediate release tablet Take 1 tablet by mouth every 6 hours as needed for Pain.   Yes Lisa Saenz MD   acetaminophen (TYLENOL) 325 MG tablet Take 2 tablets by mouth every 6 hours as needed for Pain   Yes Lisa Saenz MD   benzonatate (TESSALON) 100 MG capsule Take 1 capsule by mouth 3 times daily as needed for Cough   Yes Lisa Saenz MD   ALTEplase (CATHFLO) 2 MG injection 2 mg by IntraCATHeter route every 6 hours as needed (unclog IV site)   Yes Lisa Saenz MD   dextrose 50 % solution Infuse 50 mLs intravenously as needed (hypoglycemia)   Yes Lisa Saenz MD   insulin lispro (HUMALOG) 100 UNIT/ML injection cartridge Inject 2-20 Units into the skin 4 times daily (before meals and nightly) per sliding scale instructions   Yes Lisa Saenz MD   Melatonin CR 3 MG TBCR Take 6 mg by mouth nightly as needed (insomnia)   Yes Lisa Saenz MD   miconazole (MICOTIN) 2 % cream Apply topically 2 times daily to under abdomen.   Yes Lisa Saenz MD   polyethylene glycol (GLYCOLAX) 17 g packet Take 1 packet by mouth daily as needed for Constipation   Yes Lisa Saenz MD   naloxone 4 MG/0.1ML LIQD nasal spray 1 spray by Nasal route as needed for Opioid Reversal   Yes Lisa Saenz MD   sodium chloride flush 0.9 % injection Infuse 10 mLs intravenously as needed for Line Care (PICC line care)   Yes Lisa Saenz MD   pantoprazole (PROTONIX) 40 MG tablet Take 1 tablet by mouth daily

## 2025-01-30 NOTE — PROGRESS NOTES
of efficacy in lung and concern for med-induced pneumonitis  -Lasix 40 mg p.o. daily  -Strict I's and O's  - Daily weights  - RFP & Mg daily  - Cardiology has been consulted, will follow any recommendations      Acute encephalopathy (improved)  Due to toxic encephalopathy 2/2 opioid and/or hypoxia  Rule out acute infection  Presented from SNF after being noted to be confused/altered, reportedly improved after Narcan administration    Infectious etiology unlikely due to lack of systemic findings to that effect. Symptoms favor toxic etiology due to initial findings of pinpoint bilateral pupils with response to Narcan.  CT head and CTA were unremarkable    1/27/2025 MRI brain:  1. No evidence for intracranial mass or acute intracranial abnormality   2. Mild chronic small vessel ischemia and a dominant area of chronic cortical/white matter infarction involving the right frontal operculum/corona radiata     -Fall precautions  -Delirium protocol in place  - Neurology following, appreciate any recommendations    Chronic Medical Conditions:  History of CVA  No residual symptoms per history  CT head: Remote infarcts in R frontal lobe, no acute ICH/mass effect  -ASA, held   - Continue statin as patient is off daptomycin    CKDIIIa:   Cr stable  - avoid nephrotoxins  - monitor with RFP and UOP    Urinary retention   Home med Flomax, held since has guo  - Guo in place    A-Fib  SSS  s/p ablation (2021)  S/p PPM  On metoprolol, currently in sinus rhythm  - Toprol XL 25 mg QD  - Eliquis 5 mg BID, held  - Amiodarone 200 mg QD  - Keep K >4, Mg >2  - Tele    CAD   S/p CABG (2020)  - ASA 81 mg, held  - Lipitor 80 mg    T2DM   1/25/25 Hg A1c 5.6  - LDSSi,   - POCT glucose QID ACHS,   - Hypoglycemia protocol    DVT PPx: SCD, Eliquis held  Diet: ADULT DIET; Regular  Diet NPO Exceptions are: Sips of Water with Meds   Code status:  DNR-CCA     Disposition  - [facility]readmission: [facility]  - Current: [facility]  - Upon discharge:

## 2025-01-30 NOTE — PROGRESS NOTES
Vascular Surgery   Daily Progress Note  Patient: Veronica Chaidez      CC: Nonhealing potential of left foot    SUBJECTIVE:   No acute events overnight. Resting comfortably in bed. Pain well cotrolled. No other complaints    OBJECTIVE:    PHYSICAL EXAM:    Vitals:    01/29/25 2012 01/30/25 0000 01/30/25 0313 01/30/25 0407   BP: 130/78 129/74 119/66    Pulse: 66 68 61    Resp: 18 18 18    Temp: 98.6 °F (37 °C) 97.8 °F (36.6 °C) 97.8 °F (36.6 °C)    TempSrc: Oral Oral Oral    SpO2: 96% 95% 95%    Weight:    110.1 kg (242 lb 11.6 oz)   Height:           General appearance: alert, no acute distress, grooming appropriate  Neck: trachea midline, no JVD  Chest/Lungs: normal effort on 2L O2  Cardiovascular: RRR  Skin: warm and dry, chronic venous changes noted over leg  Extremities: no edema, no cyanosis  Neuro: A&Ox3       ASSESSMENT & PLAN:   This is a 74 y.o. male with PMH CAD, A fib, T2DM, CKD, L calcaneal osteomyelitis for whom vascular surgery is consulted for BKA.     - Plan for L BKA tomorrow (1/31)  - Pre-op orders to come. Will obtain consent  - Abx for osteomyelitis per ID  - MMPC  - Remainder of care per primary team    Sharla Jarquin DO  PGY1, General Surgery  01/30/25  6:06 AM  [phone]

## 2025-01-30 NOTE — PROGRESS NOTES
Podiatric Surgery Daily Progress Note  Veronica Chaidez      Subjective :   Patient seen and examined this am at the bedside. Patient opting for BKA of the left LE planned for tomorrow with vascular surgery. Patient in agreement with treatment. Patient denies any N/V/F/SOB/CP. Patient denies any other pedal complaints.     Review of Systems: As above.       Objective     /71   Pulse 65   Temp 97.7 °F (36.5 °C) (Oral)   Resp 16   Ht 1.88 m (6' 2\")   Wt 110.1 kg (242 lb 11.6 oz)   SpO2 94%   BMI 31.16 kg/m²      I/O:  Intake/Output Summary (Last 24 hours) at 1/30/2025 0939  Last data filed at 1/30/2025 0419  Gross per 24 hour   Intake --   Output 2375 ml   Net -2375 ml              Wt Readings from Last 3 Encounters:   01/30/25 110.1 kg (242 lb 11.6 oz)   01/05/25 120.4 kg (265 lb 6.4 oz)   12/31/24 119.2 kg (262 lb 12.6 oz)       LABS:    Recent Labs     01/29/25  0530 01/30/25  0615   WBC 7.5 6.5   HGB 9.5* 9.7*   HCT 29.1* 29.6*    345        Recent Labs     01/30/25  0615   *   K 4.3   CL 98*   CO2 30   PHOS 3.5   BUN 29*   CREATININE 1.8*        No results for input(s): \"INR\", \"APTT\" in the last 72 hours.    Invalid input(s): \"PROT\"          LOWER EXTREMITY EXAMINATION    Dressing to left LE intact. No strikethrough noted to the external dressing. Mild serosanguinous drainage noted to the internal layers of the dressing.     VASCULAR: DP and PT pulses are weakly palpable 1/4 b/l.  Upon Doppler examination DP and PT pulses were found to be biphasic.  CFT is brisk to the digits of the right foot and brisk to the distal aspect of TMA site of left foot. Skin temperature is warm to cool from proximal to distal with no focal calor noted.  +2 nonpitting edema left lower extremity.     NEUROLOGIC: Gross and epicritic sensation is absent b/l. Protective sensation is diminished at all pedal sites b/l. .     DERMATOLOGIC:   Patent provided verbal consent for photos to be obtained today  Left lower

## 2025-01-30 NOTE — PERIOP NOTE
PRE-OP NOTE  Department of Surgery      Chief Complaint or Reason for Surgery: L calcaneous osteomyelitis    Procedure: L BKA  Expected time: 1/31/25    Plan  1. Diet: NPO at midnight  2. IVF: NS 50 at midnight  3. Antibiotics: meropenem, linezolid  4. Labs to be drawn: AM labs, Type and Screen, INR  5. Anesthesia: to see patient  6. Consent: will be obtained  7. Pulmonary: CXR: 1/24/25  8. Cardiac: cardiology assessed, okay to proceed      Lea Schrader MD  01/30/25  9:38 AM

## 2025-01-30 NOTE — PROGRESS NOTES
[facility] Cardiology Progress Note    Primary Cardiologist: Dr Parekh    CC/HPI: CHF    S:No cp or sob     Tele: Paced     O:  Physical Exam:  /71   Pulse 65   Temp 97.7 °F (36.5 °C) (Oral)   Resp 16   Ht 1.88 m (6' 2\")   Wt 110.1 kg (242 lb 11.6 oz)   SpO2 94%   BMI 31.16 kg/m²    General (appearance):  No acute distress  Eyes: anicteric   Neck: soft, No JVD  Ears/Nose/Mouth/Thorat: No cyanosis  CV: RRR   Respiratory:  normal effort  GI: soft, non-tender, non-distended  Skin: Warm, dry. No rashes  Neuro/Psych: Alert and oriented x 3. Appropriate behavior  Ext:  No c/c. + trace LE edema. Venostasis discoloration, Left foot partial amputation and ACE wrapped.     I.O's= -13.2 L     Weight  Admission: Weight - Scale: 125.4 kg (276 lb 6.4 oz)   Today: Weight - Scale: 110.1 kg (242 lb 11.6 oz)    CBC:   Recent Labs     25  0500 25  0530 25  0615   WBC 7.9 7.5 6.5   HGB 9.5* 9.5* 9.7*   HCT 29.1* 29.1* 29.6*   MCV 83.7 84.1 83.5    358 345     BMP:   Recent Labs     25  0500 25  0530 25  0615    133* 135*   K 4.1 4.3 4.3    97* 98*   CO2 28 29 30   PHOS 3.2 3.4 3.5   BUN 28* 28* 29*   CREATININE 1.5* 1.6* 1.8*     Estimated Creatinine Clearance: 49 mL/min (A) (based on SCr of 1.8 mg/dL (H)).  Mag:   Lab Results   Component Value Date/Time    MG 2.00 2025 06:15 AM     LIVER PROFILE:   No results for input(s): \"AST\", \"ALT\", \"LIPASE\", \"AMYLASE\", \"BILIDIR\", \"BILITOT\", \"ALKPHOS\" in the last 72 hours.    Invalid input(s): \"ALB\"    Pro-BNP:   Lab Results   Component Value Date/Time    PROBNP 7,607 2025 05:00 AM    PROBNP 7,048 2025 05:51 AM    PROBNP 5,604 2025 11:42 AM     High Sensitivity Troponin:   Lab Results   Component Value Date    CKTOTAL 59 2025    TROPONINI <0.01 2023    TROPHS 61 (H) 2025       Imagin2025 Echo    Left Ventricle: Severely reduced left ventricular

## 2025-01-30 NOTE — PLAN OF CARE
Problem: Chronic Conditions and Co-morbidities  Goal: Patient's chronic conditions and co-morbidity symptoms are monitored and maintained or improved  1/30/2025 0757 by Aayush Rico RN  Outcome: Progressing     Problem: Pain  Goal: Verbalizes/displays adequate comfort level or baseline comfort level  1/30/2025 0757 by Aayush Rico RN  Outcome: Progressing     Problem: Confusion  Goal: Confusion, delirium, dementia, or psychosis is improved or at baseline  Description: INTERVENTIONS:  1. Assess for possible contributors to thought disturbance, including medications, impaired vision or hearing, underlying metabolic abnormalities, dehydration, psychiatric diagnoses, and notify attending LIP  2. [place] high risk fall precautions, as indicated  3. Provide frequent short contacts to provide reality reorientation, refocusing and direction  4. Decrease environmental stimuli, including noise as appropriate  5. Monitor and intervene to maintain adequate nutrition, hydration, elimination, sleep and activity  6. If unable to ensure safety without constant attention obtain sitter and review sitter guidelines with assigned personnel  7. Initiate Psychosocial CNS and Spiritual Care consult, as indicated  1/30/2025 0757 by Aayush Rico RN  Outcome: Progressing     Problem: Skin/Tissue Integrity  Goal: Skin integrity remains intact  Description: 1.  Monitor for areas of redness and/or skin breakdown  2.  Assess vascular access sites hourly  3.  Every 4-6 hours minimum:  Change oxygen saturation probe site  4.  Every 4-6 hours:  If on nasal continuous positive airway pressure, respiratory therapy assess nares and determine need for appliance change or resting period  1/30/2025 0757 by Aayush Rico RN  Outcome: Progressing     Problem: Safety - Adult  Goal: Free from fall injury  1/30/2025 0757 by Aayush Rico RN  Outcome: Progressing     Problem: Respiratory - Adult  Goal: Achieves optimal ventilation and

## 2025-01-31 ENCOUNTER — ANESTHESIA (OUTPATIENT)
Dept: OPERATING ROOM | Age: 75
End: 2025-01-31
Payer: MEDICAID

## 2025-01-31 LAB
ABO + RH BLD: NORMAL
ALBUMIN SERPL-MCNC: 3.1 G/DL (ref 3.4–5)
ANION GAP SERPL CALCULATED.3IONS-SCNC: 8 MMOL/L (ref 3–16)
BASOPHILS # BLD: 0.1 K/UL (ref 0–0.2)
BASOPHILS NFR BLD: 1.3 %
BLD GP AB SCN SERPL QL: NORMAL
BUN SERPL-MCNC: 31 MG/DL (ref 7–20)
CALCIUM SERPL-MCNC: 8.8 MG/DL (ref 8.3–10.6)
CHLORIDE SERPL-SCNC: 98 MMOL/L (ref 99–110)
CO2 SERPL-SCNC: 30 MMOL/L (ref 21–32)
CREAT SERPL-MCNC: 1.8 MG/DL (ref 0.8–1.3)
DEPRECATED RDW RBC AUTO: 16.7 % (ref 12.4–15.4)
EOSINOPHIL # BLD: 0.9 K/UL (ref 0–0.6)
EOSINOPHIL NFR BLD: 13.8 %
GFR SERPLBLD CREATININE-BSD FMLA CKD-EPI: 39 ML/MIN/{1.73_M2}
GLUCOSE BLD-MCNC: 102 MG/DL (ref 70–99)
GLUCOSE BLD-MCNC: 120 MG/DL (ref 70–99)
GLUCOSE BLD-MCNC: 85 MG/DL (ref 70–99)
GLUCOSE BLD-MCNC: 92 MG/DL (ref 70–99)
GLUCOSE BLD-MCNC: 93 MG/DL (ref 70–99)
GLUCOSE SERPL-MCNC: 91 MG/DL (ref 70–99)
HCT VFR BLD AUTO: 30.3 % (ref 40.5–52.5)
HGB BLD-MCNC: 9.9 G/DL (ref 13.5–17.5)
INR PPP: 1.36 (ref 0.85–1.15)
LYMPHOCYTES # BLD: 1.4 K/UL (ref 1–5.1)
LYMPHOCYTES NFR BLD: 21 %
MAGNESIUM SERPL-MCNC: 2.01 MG/DL (ref 1.8–2.4)
MCH RBC QN AUTO: 27.3 PG (ref 26–34)
MCHC RBC AUTO-ENTMCNC: 32.8 G/DL (ref 31–36)
MCV RBC AUTO: 83.5 FL (ref 80–100)
MONOCYTES # BLD: 0.7 K/UL (ref 0–1.3)
MONOCYTES NFR BLD: 9.8 %
NEUTROPHILS # BLD: 3.7 K/UL (ref 1.7–7.7)
NEUTROPHILS NFR BLD: 54.1 %
PERFORMED ON: ABNORMAL
PERFORMED ON: ABNORMAL
PERFORMED ON: NORMAL
PHOSPHATE SERPL-MCNC: 3.8 MG/DL (ref 2.5–4.9)
PLATELET # BLD AUTO: 335 K/UL (ref 135–450)
PMV BLD AUTO: 8.5 FL (ref 5–10.5)
POTASSIUM SERPL-SCNC: 4.4 MMOL/L (ref 3.5–5.1)
PROTHROMBIN TIME: 16.9 SEC (ref 11.9–14.9)
RBC # BLD AUTO: 3.63 M/UL (ref 4.2–5.9)
SODIUM SERPL-SCNC: 136 MMOL/L (ref 136–145)
WBC # BLD AUTO: 6.7 K/UL (ref 4–11)

## 2025-01-31 PROCEDURE — 6360000002 HC RX W HCPCS: Performed by: NURSE ANESTHETIST, CERTIFIED REGISTERED

## 2025-01-31 PROCEDURE — 6370000000 HC RX 637 (ALT 250 FOR IP): Performed by: NURSE PRACTITIONER

## 2025-01-31 PROCEDURE — 3600000014 HC SURGERY LEVEL 4 ADDTL 15MIN: Performed by: SURGERY

## 2025-01-31 PROCEDURE — 3700000001 HC ADD 15 MINUTES (ANESTHESIA): Performed by: SURGERY

## 2025-01-31 PROCEDURE — 36415 COLL VENOUS BLD VENIPUNCTURE: CPT

## 2025-01-31 PROCEDURE — 2580000003 HC RX 258

## 2025-01-31 PROCEDURE — 1200000000 HC SEMI PRIVATE

## 2025-01-31 PROCEDURE — 3600000004 HC SURGERY LEVEL 4 BASE: Performed by: SURGERY

## 2025-01-31 PROCEDURE — 6360000002 HC RX W HCPCS: Performed by: STUDENT IN AN ORGANIZED HEALTH CARE EDUCATION/TRAINING PROGRAM

## 2025-01-31 PROCEDURE — 80069 RENAL FUNCTION PANEL: CPT

## 2025-01-31 PROCEDURE — 2500000003 HC RX 250 WO HCPCS: Performed by: SURGERY

## 2025-01-31 PROCEDURE — 2500000003 HC RX 250 WO HCPCS

## 2025-01-31 PROCEDURE — 2709999900 HC NON-CHARGEABLE SUPPLY: Performed by: SURGERY

## 2025-01-31 PROCEDURE — 99231 SBSQ HOSP IP/OBS SF/LOW 25: CPT | Performed by: INTERNAL MEDICINE

## 2025-01-31 PROCEDURE — 3700000000 HC ANESTHESIA ATTENDED CARE: Performed by: SURGERY

## 2025-01-31 PROCEDURE — 6370000000 HC RX 637 (ALT 250 FOR IP): Performed by: INTERNAL MEDICINE

## 2025-01-31 PROCEDURE — 85610 PROTHROMBIN TIME: CPT

## 2025-01-31 PROCEDURE — 0Y6J0Z1 DETACHMENT AT LEFT LOWER LEG, HIGH, OPEN APPROACH: ICD-10-PCS | Performed by: SURGERY

## 2025-01-31 PROCEDURE — 94761 N-INVAS EAR/PLS OXIMETRY MLT: CPT

## 2025-01-31 PROCEDURE — 2580000003 HC RX 258: Performed by: STUDENT IN AN ORGANIZED HEALTH CARE EDUCATION/TRAINING PROGRAM

## 2025-01-31 PROCEDURE — 85025 COMPLETE CBC W/AUTO DIFF WBC: CPT

## 2025-01-31 PROCEDURE — 88311 DECALCIFY TISSUE: CPT

## 2025-01-31 PROCEDURE — 7100000000 HC PACU RECOVERY - FIRST 15 MIN: Performed by: SURGERY

## 2025-01-31 PROCEDURE — 6370000000 HC RX 637 (ALT 250 FOR IP)

## 2025-01-31 PROCEDURE — 2500000003 HC RX 250 WO HCPCS: Performed by: NURSE ANESTHETIST, CERTIFIED REGISTERED

## 2025-01-31 PROCEDURE — 6360000002 HC RX W HCPCS: Performed by: SURGERY

## 2025-01-31 PROCEDURE — 86901 BLOOD TYPING SEROLOGIC RH(D): CPT

## 2025-01-31 PROCEDURE — 86900 BLOOD TYPING SEROLOGIC ABO: CPT

## 2025-01-31 PROCEDURE — 2700000000 HC OXYGEN THERAPY PER DAY

## 2025-01-31 PROCEDURE — 86850 RBC ANTIBODY SCREEN: CPT

## 2025-01-31 PROCEDURE — 99232 SBSQ HOSP IP/OBS MODERATE 35: CPT | Performed by: NURSE PRACTITIONER

## 2025-01-31 PROCEDURE — 83735 ASSAY OF MAGNESIUM: CPT

## 2025-01-31 PROCEDURE — 7100000001 HC PACU RECOVERY - ADDTL 15 MIN: Performed by: SURGERY

## 2025-01-31 PROCEDURE — 2500000003 HC RX 250 WO HCPCS: Performed by: ANESTHESIOLOGY

## 2025-01-31 PROCEDURE — 6360000002 HC RX W HCPCS: Performed by: INTERNAL MEDICINE

## 2025-01-31 PROCEDURE — 88307 TISSUE EXAM BY PATHOLOGIST: CPT

## 2025-01-31 PROCEDURE — 2720000010 HC SURG SUPPLY STERILE: Performed by: SURGERY

## 2025-01-31 RX ORDER — ONDANSETRON 2 MG/ML
INJECTION INTRAMUSCULAR; INTRAVENOUS
Status: DISCONTINUED | OUTPATIENT
Start: 2025-01-31 | End: 2025-01-31 | Stop reason: SDUPTHER

## 2025-01-31 RX ORDER — ACETAMINOPHEN 325 MG/1
650 TABLET ORAL EVERY 6 HOURS SCHEDULED
Status: DISCONTINUED | OUTPATIENT
Start: 2025-02-01 | End: 2025-02-03

## 2025-01-31 RX ORDER — DEXAMETHASONE SODIUM PHOSPHATE 4 MG/ML
INJECTION, SOLUTION INTRA-ARTICULAR; INTRALESIONAL; INTRAMUSCULAR; INTRAVENOUS; SOFT TISSUE
Status: DISCONTINUED | OUTPATIENT
Start: 2025-01-31 | End: 2025-01-31 | Stop reason: SDUPTHER

## 2025-01-31 RX ORDER — FENTANYL CITRATE 50 UG/ML
INJECTION, SOLUTION INTRAMUSCULAR; INTRAVENOUS
Status: DISCONTINUED | OUTPATIENT
Start: 2025-01-31 | End: 2025-01-31 | Stop reason: SDUPTHER

## 2025-01-31 RX ORDER — HYDROMORPHONE HYDROCHLORIDE 1 MG/ML
0.5 INJECTION, SOLUTION INTRAMUSCULAR; INTRAVENOUS; SUBCUTANEOUS EVERY 5 MIN PRN
Status: DISCONTINUED | OUTPATIENT
Start: 2025-01-31 | End: 2025-01-31 | Stop reason: HOSPADM

## 2025-01-31 RX ORDER — LIDOCAINE HYDROCHLORIDE 20 MG/ML
INJECTION, SOLUTION INFILTRATION; PERINEURAL
Status: DISCONTINUED | OUTPATIENT
Start: 2025-01-31 | End: 2025-01-31 | Stop reason: SDUPTHER

## 2025-01-31 RX ORDER — CEFAZOLIN SODIUM 1 G/3ML
INJECTION, POWDER, FOR SOLUTION INTRAMUSCULAR; INTRAVENOUS
Status: DISCONTINUED | OUTPATIENT
Start: 2025-01-31 | End: 2025-01-31 | Stop reason: SDUPTHER

## 2025-01-31 RX ORDER — NALOXONE HYDROCHLORIDE 0.4 MG/ML
INJECTION, SOLUTION INTRAMUSCULAR; INTRAVENOUS; SUBCUTANEOUS PRN
Status: DISCONTINUED | OUTPATIENT
Start: 2025-01-31 | End: 2025-01-31 | Stop reason: HOSPADM

## 2025-01-31 RX ORDER — ROCURONIUM BROMIDE 10 MG/ML
INJECTION, SOLUTION INTRAVENOUS
Status: DISCONTINUED | OUTPATIENT
Start: 2025-01-31 | End: 2025-01-31 | Stop reason: SDUPTHER

## 2025-01-31 RX ORDER — HYDRALAZINE HYDROCHLORIDE 20 MG/ML
10 INJECTION INTRAMUSCULAR; INTRAVENOUS
Status: DISCONTINUED | OUTPATIENT
Start: 2025-01-31 | End: 2025-01-31 | Stop reason: HOSPADM

## 2025-01-31 RX ORDER — ONDANSETRON 2 MG/ML
4 INJECTION INTRAMUSCULAR; INTRAVENOUS
Status: DISCONTINUED | OUTPATIENT
Start: 2025-01-31 | End: 2025-01-31 | Stop reason: HOSPADM

## 2025-01-31 RX ORDER — FENTANYL CITRATE 50 UG/ML
25 INJECTION, SOLUTION INTRAMUSCULAR; INTRAVENOUS EVERY 5 MIN PRN
Status: DISCONTINUED | OUTPATIENT
Start: 2025-01-31 | End: 2025-01-31 | Stop reason: HOSPADM

## 2025-01-31 RX ORDER — LABETALOL HYDROCHLORIDE 5 MG/ML
10 INJECTION, SOLUTION INTRAVENOUS
Status: DISCONTINUED | OUTPATIENT
Start: 2025-01-31 | End: 2025-01-31 | Stop reason: HOSPADM

## 2025-01-31 RX ORDER — PROCHLORPERAZINE EDISYLATE 5 MG/ML
5 INJECTION INTRAMUSCULAR; INTRAVENOUS
Status: DISCONTINUED | OUTPATIENT
Start: 2025-01-31 | End: 2025-01-31 | Stop reason: HOSPADM

## 2025-01-31 RX ORDER — SODIUM CHLORIDE 0.9 % (FLUSH) 0.9 %
5-40 SYRINGE (ML) INJECTION EVERY 12 HOURS SCHEDULED
Status: DISCONTINUED | OUTPATIENT
Start: 2025-01-31 | End: 2025-01-31 | Stop reason: HOSPADM

## 2025-01-31 RX ORDER — PROPOFOL 10 MG/ML
INJECTION, EMULSION INTRAVENOUS
Status: DISCONTINUED | OUTPATIENT
Start: 2025-01-31 | End: 2025-01-31 | Stop reason: SDUPTHER

## 2025-01-31 RX ORDER — SODIUM CHLORIDE 0.9 % (FLUSH) 0.9 %
5-40 SYRINGE (ML) INJECTION PRN
Status: DISCONTINUED | OUTPATIENT
Start: 2025-01-31 | End: 2025-01-31 | Stop reason: HOSPADM

## 2025-01-31 RX ORDER — SODIUM CHLORIDE 9 MG/ML
INJECTION, SOLUTION INTRAVENOUS PRN
Status: DISCONTINUED | OUTPATIENT
Start: 2025-01-31 | End: 2025-01-31 | Stop reason: HOSPADM

## 2025-01-31 RX ADMIN — CEFAZOLIN SODIUM 2 G: 1 POWDER, FOR SOLUTION INTRAMUSCULAR; INTRAVENOUS at 17:29

## 2025-01-31 RX ADMIN — SUGAMMADEX 400 MG: 100 INJECTION, SOLUTION INTRAVENOUS at 18:54

## 2025-01-31 RX ADMIN — FENTANYL CITRATE 100 MCG: 50 INJECTION, SOLUTION INTRAMUSCULAR; INTRAVENOUS at 19:08

## 2025-01-31 RX ADMIN — SODIUM CHLORIDE: 9 INJECTION, SOLUTION INTRAVENOUS at 20:44

## 2025-01-31 RX ADMIN — MEROPENEM AND SODIUM CHLORIDE 1000 MG: 1 INJECTION, SOLUTION INTRAVENOUS at 06:13

## 2025-01-31 RX ADMIN — DEXAMETHASONE SODIUM PHOSPHATE 4 MG: 4 INJECTION INTRA-ARTICULAR; INTRALESIONAL; INTRAMUSCULAR; INTRAVENOUS; SOFT TISSUE at 17:34

## 2025-01-31 RX ADMIN — METOPROLOL SUCCINATE 25 MG: 25 TABLET, FILM COATED, EXTENDED RELEASE ORAL at 07:54

## 2025-01-31 RX ADMIN — FENTANYL CITRATE 100 MCG: 50 INJECTION, SOLUTION INTRAMUSCULAR; INTRAVENOUS at 17:20

## 2025-01-31 RX ADMIN — Medication 6 MG: at 21:18

## 2025-01-31 RX ADMIN — FUROSEMIDE 40 MG: 40 TABLET ORAL at 07:54

## 2025-01-31 RX ADMIN — SODIUM CHLORIDE: 9 INJECTION, SOLUTION INTRAVENOUS at 18:03

## 2025-01-31 RX ADMIN — PROPOFOL 150 MG: 10 INJECTION, EMULSION INTRAVENOUS at 17:20

## 2025-01-31 RX ADMIN — ROCURONIUM BROMIDE 100 MG: 10 INJECTION, SOLUTION INTRAVENOUS at 17:20

## 2025-01-31 RX ADMIN — LIDOCAINE HYDROCHLORIDE 100 MG: 20 INJECTION, SOLUTION INFILTRATION; PERINEURAL at 17:20

## 2025-01-31 RX ADMIN — LINEZOLID 600 MG: 600 TABLET, FILM COATED ORAL at 07:53

## 2025-01-31 RX ADMIN — PHENYLEPHRINE HYDROCHLORIDE 100 MCG: 10 INJECTION, SOLUTION INTRAVENOUS at 17:36

## 2025-01-31 RX ADMIN — SODIUM CHLORIDE, PRESERVATIVE FREE 10 ML: 5 INJECTION INTRAVENOUS at 21:19

## 2025-01-31 RX ADMIN — HYDROMORPHONE HYDROCHLORIDE 0.5 MG: 1 INJECTION, SOLUTION INTRAMUSCULAR; INTRAVENOUS; SUBCUTANEOUS at 22:30

## 2025-01-31 RX ADMIN — LINEZOLID 600 MG: 600 TABLET, FILM COATED ORAL at 21:18

## 2025-01-31 RX ADMIN — AMIODARONE HYDROCHLORIDE 200 MG: 200 TABLET ORAL at 07:53

## 2025-01-31 RX ADMIN — ONDANSETRON 8 MG: 2 INJECTION INTRAMUSCULAR; INTRAVENOUS at 17:34

## 2025-01-31 ASSESSMENT — PAIN DESCRIPTION - ORIENTATION
ORIENTATION: LEFT

## 2025-01-31 ASSESSMENT — PAIN SCALES - GENERAL
PAINLEVEL_OUTOF10: 9
PAINLEVEL_OUTOF10: 0
PAINLEVEL_OUTOF10: 6
PAINLEVEL_OUTOF10: 0
PAINLEVEL_OUTOF10: 8
PAINLEVEL_OUTOF10: 8
PAINLEVEL_OUTOF10: 9
PAINLEVEL_OUTOF10: 0

## 2025-01-31 ASSESSMENT — PAIN SCALES - WONG BAKER
WONGBAKER_NUMERICALRESPONSE: NO HURT

## 2025-01-31 ASSESSMENT — PAIN - FUNCTIONAL ASSESSMENT
PAIN_FUNCTIONAL_ASSESSMENT: ACTIVITIES ARE NOT PREVENTED

## 2025-01-31 ASSESSMENT — PAIN DESCRIPTION - LOCATION
LOCATION: LEG
LOCATION: FOOT
LOCATION: LEG

## 2025-01-31 ASSESSMENT — PAIN DESCRIPTION - PAIN TYPE
TYPE: SURGICAL PAIN

## 2025-01-31 ASSESSMENT — PAIN DESCRIPTION - ONSET
ONSET: ON-GOING

## 2025-01-31 ASSESSMENT — PAIN DESCRIPTION - DESCRIPTORS
DESCRIPTORS: DISCOMFORT;SHARP
DESCRIPTORS: DISCOMFORT
DESCRIPTORS: DISCOMFORT;SHARP

## 2025-01-31 ASSESSMENT — PAIN DESCRIPTION - FREQUENCY
FREQUENCY: CONTINUOUS

## 2025-01-31 NOTE — PLAN OF CARE
[facility] Cardiology Progress Note    Primary Cardiologist: Dr Parekh    CC/HPI: CHF    S:EMR reviewed. Pt to go to OR today for left BKA    Tele:     O:  Physical Exam:  /72   Pulse 64   Temp 97.8 °F (36.6 °C) (Oral)   Resp 16   Ht 1.88 m (6' 2\")   Wt 110.8 kg (244 lb 3.2 oz)   SpO2 95%   BMI 31.35 kg/m²        I.O's= -15.1 L      Weight  Admission: Weight - Scale: 125.4 kg (276 lb 6.4 oz)   Today: Weight - Scale: 110.8 kg (244 lb 3.2 oz)    CBC:   Recent Labs     25  0530 25  0615 25  0500   WBC 7.5 6.5 6.7   HGB 9.5* 9.7* 9.9*   HCT 29.1* 29.6* 30.3*   MCV 84.1 83.5 83.5    345 335     BMP:   Recent Labs     25  0530 25  0615 25  0500   * 135* 136   K 4.3 4.3 4.4   CL 97* 98* 98*   CO2 29 30 30   PHOS 3.4 3.5 3.8   BUN 28* 29* 31*   CREATININE 1.6* 1.8* 1.8*     Estimated Creatinine Clearance: 49 mL/min (A) (based on SCr of 1.8 mg/dL (H)).  Mag:   Lab Results   Component Value Date/Time    MG 2.01 2025 05:00 AM     LIVER PROFILE:   No results for input(s): \"AST\", \"ALT\", \"LIPASE\", \"AMYLASE\", \"BILIDIR\", \"BILITOT\", \"ALKPHOS\" in the last 72 hours.    Invalid input(s): \"ALB\"    Pro-BNP:   Lab Results   Component Value Date/Time    PROBNP 7,607 2025 05:00 AM    PROBNP 7,048 2025 05:51 AM    PROBNP 5,604 2025 11:42 AM     High Sensitivity Troponin:   Lab Results   Component Value Date    CKTOTAL 59 2025    TROPONINI <0.01 2023    TROPHS 61 (H) 2025       Imagin2025 Echo    Left Ventricle: Severely reduced left ventricular systolic function with a visually estimated EF of 30 - 35%. Left ventricle is dilated. Mildly increased wall thickness. Global hypokinesis present.    Right Ventricle: Not assessed. Lead present in the right ventricle. Mildly reduced systolic function.    Aortic Valve: No regurgitation.    Mitral Valve: Mild regurgitation.    Tricuspid Valve: Mild  regurgitation.    Image quality is adequate. Contrast used: Lumason. Technically difficult study due to patient's body habitus.    When compared to previous study 7/1/24 (personally reviewed images) EF appears unchanged and reduced at 35%.    [facility]  1.  Severe LV dysfunction.  Estimated ejection fraction is 25%.  2.  Severe multivessel coronary artery disease as described above.  3.  I recommend surgical consultation.  4.  Successful Angio-Seal of the right femoral arteriotomy site.    Assessment:  Acute on chronic HFrEF EF 30%,   CAD/CABG  ICD  pAF (hx ablation )  DM  HTN  HLD  CKD  PAD  Osteomyelitis  AMS: resolved   Hx CVA    Plan:  -Keep K>4, Mg>2.  -Amio 200 mg po daily   -Eliquis 5 mg po bid on hold for surgery   -ASA 81 mg daily on hold for surgery   -Lipitor 80 mg daily  -Lasix 40 mg po daily:   -Toprol 25 mg po daily. Hold for SBP< 100 mmHg or HR <60 bpm    No ACE/ARNI/MRA/SGLT2 d/t CKD    No new cardiology recommendations. Okay for OR.     Please call the nurse navigator at [phone] during the weekdays  Please call the office at [phone] after hours and weekends.

## 2025-01-31 NOTE — PROGRESS NOTES
Comprehensive Nutrition Assessment    RECOMMENDATIONS:  PO Diet: NPO- continue regular diet after procedure  Nutrition Supplement: Add Bong BID when po diet resumes  Nutrition Education: No recommendation at this time     NUTRITION ASSESSMENT:   Nutritional summary & status: LOS evaluation. Patient admitted with AMS and foot pain r/t DM ulcer with osteomyelitis. Pt NPO for OR today with plans for L BKA. Patient has been tolerating a regular diet with good po intake of all meals. Patient with increased protein needs r/t BKA, RD will order wound healing supplement when diet resumes. Labs reviewed, bowels moving. RD will monitor nutritional status and provide intervention as needed.   Admission // PMH: Osteomyelitis // DM2, CAD, Afib, CHF, CKD    MALNUTRITION ASSESSMENT  Context of Malnutrition: Acute Illness   Malnutrition Status: At risk for malnutrition  Findings of the 6 clinical characteristics of malnutrition (Minimum of 2 out of 6 clinical characteristics is required to make the diagnosis of moderate or severe Protein Calorie Malnutrition based on AND/ASPEN Guidelines):  Energy Intake:  No decrease in energy intake  Weight Loss:  No weight loss     Body Fat Loss:  Unable to assess (In procedure)     Muscle Mass Loss:  Unable to assess    Fluid Accumulation:  No fluid accumulation     Strength:  Not Performed    NUTRITION DIAGNOSIS   Increased nutrient needs related to catabolic illness as evidenced by wounds    Nutrition Monitoring and Evaluation:   Food/Nutrient Intake Outcomes:  Food and Nutrient Intake, Supplement Intake  Physical Signs/Symptoms Outcomes:  Biochemical Data, Fluid Status or Edema, Nutrition Focused Physical Findings, Skin     OBJECTIVE DATA: Significant to nutrition assessment  Nutrition Related Findings: , A1C 5.6, NS @50, +BM 1/31  Wounds: Open Wounds  Nutrition Goals: PO intake 75% or greater, by next RD assessment     CURRENT NUTRITION THERAPIES  Diet NPO Exceptions are: Sips

## 2025-01-31 NOTE — PROGRESS NOTES
Vascular Surgery   Daily Progress Note  Patient: Veronica Chaidez      CC: Nonhealing potential of left foot    SUBJECTIVE:   No acute events overnight. Patient resting comfortably in bed. In good spirits for the OR today.     OBJECTIVE:    PHYSICAL EXAM:    Vitals:    01/30/25 1607 01/30/25 2030 01/31/25 0002 01/31/25 0500   BP: 127/73 119/65 (!) 117/58 (!) 143/78   Pulse: 62 61 64 67   Resp: 16 16 18 17   Temp: 97.6 °F (36.4 °C) 98.5 °F (36.9 °C) 98.7 °F (37.1 °C) 97.8 °F (36.6 °C)   TempSrc: Oral Oral Oral Oral   SpO2: 97% 95% 95% 96%   Weight:       Height:           General appearance: alert, no acute distress, grooming appropriate  Neck: trachea midline, no JVD  Chest/Lungs: normal effort on 2L O2  Cardiovascular: RRR  Skin: warm and dry, chronic venous changes noted over leg  Extremities: no edema, no cyanosis  Neuro: A&Ox3       ASSESSMENT & PLAN:   This is a 74 y.o. male with PMH CAD, A fib, T2DM, CKD, L calcaneal osteomyelitis for whom vascular surgery is consulted for BKA.     - Plan for L BKA today  - Will discuss with surgery team on post operative management  - Abx for osteomyelitis per ID  - MMPC  - Remainder of care per primary team    Sharla Jarquin DO  PGY1, General Surgery  01/31/25  6:42 AM  [phone]

## 2025-01-31 NOTE — PROGRESS NOTES
Internal Medicine H&P    Date: 2025   Patient: Veronica Chaidez   Patient : 1950     CC: Drug Overdose (Per EMS pt was given 4mg of Intranasal and responding more. )    Interval history  No acute events overnight.  Patient was seen and examined at bedside this morning.  He seemed comfortable without any signs of acute distress.    Saturating well on 2 L  A-fib with controlled ventricular response  Patient has remained afebrile, without leukocytosis  Renal function and hemoglobin at baseline.      UOP 2.85 L via guo   I/O - 1.9 L (- 15.2.75 L since admit)      BKA planned for today    HPI:  \"74-year-old M with PMH chronic systolic heart failure, CAD s/p ICD, A-fib, T2DM, CKD, class I obesity who presented from [facility]) to the [facility] ED with altered mental status.    History was obtained from review of the EMR.  Patient noted becoming unresponsive.  Has been around 1:30 AM on the day of presentation.  Last known well was 8:30 AM on the day of presentation when medicines were administered.  Patient was noted to be hypoxic with SpO2 85% and placed on 4 L O2 via nasal cannula.  4 mg IM Narcan was administered (as patient is on oxycodone at his [facility]) which led to improvement in alertness.  Per history patient has had a \"bad cough\" lately    Recent hospital admissions include 2024 admission for \"left foot osteomyelitis and UTI (urine culture without growth, initial UA had pyuria and leukocyte esterase), treated with empiric antibiotics and required Guo; developed gross hematuria and Guo replaced on 2024.  Patient was on IV ertapenem and daptomycin through right upper extremity PICC. He returned to the hospital from [facility] with AMS, was found to be hypoxic and admitted for further evaluation and management of his presenting symptoms\".      PMHx:      Diagnosis Date    A-fib ([facility])     CAD, multiple vessel 2019    Coronary artery disease involving coronary bypass

## 2025-01-31 NOTE — PROGRESS NOTES
ID Follow-up NOTE    CC:   Left calcaneus osteomyelitis, Encephalopathy, Pul densities  Antibiotics: Meropenem, Linezolid    Admit Date: 1/24/2025  Hospital Day: 8    Subjective:     Seen by Vascular, plan for L  BKA today (1/31/25)    No complaint    - No L foot / surg site pain   - no Resp sx - denies cough, SOB    Objective:     Patient Vitals for the past 8 hrs:   BP Temp Temp src Pulse Resp SpO2 Weight   01/31/25 0750 117/72 97.8 °F (36.6 °C) Oral 64 16 95 % --   01/31/25 0600 -- -- -- -- -- -- 110.8 kg (244 lb 3.2 oz)   01/31/25 0500 (!) 143/78 97.8 °F (36.6 °C) Oral 67 17 96 % --     I/O last 3 completed shifts:  In: 940 [P.O.:940]  Out: 3675 [Urine:3675]  I/O this shift:  In: 50 [P.O.:50]  Out: 0     EXAM:  GENERAL: No apparent distress.  1L  HEENT: Membranes moist, no oral lesion  NECK:  Supple, no lymphadenopathy  LUNGS: Clear b/l, no rales, no dullness  CARDIAC: RRR, no murmur appreciated  ABD:  + BS, soft / NT  EXT:  L foot w dressing / ACE  NEURO: No focal neurologic findings  PSYCH: Orientation, sensorium, mood normal  LINES:         Data Review:  Lab Results   Component Value Date    WBC 6.7 01/31/2025    HGB 9.9 (L) 01/31/2025    HCT 30.3 (L) 01/31/2025    MCV 83.5 01/31/2025     01/31/2025     Lab Results   Component Value Date    CREATININE 1.8 (H) 01/31/2025    BUN 31 (H) 01/31/2025     01/31/2025    K 4.4 01/31/2025    CL 98 (L) 01/31/2025    CO2 30 01/31/2025       Hepatic Function Panel:   Lab Results   Component Value Date/Time    ALKPHOS 97 01/24/2025 11:42 AM    ALT see below 01/24/2025 11:42 AM    AST 36 01/24/2025 11:42 AM    BILITOT 0.5 01/24/2025 11:42 AM    BILIDIR <0.1 01/24/2025 11:42 AM    IBILI 0.4 01/24/2025 11:42 AM       MICRO:  06/28/2024 UCx > 100k S aureus (MSSA)  12/21/2024 UCx no growth                     BC x 2 no growth  12/23/2024 MRSA nares negative    1/24 SARS CoV-2, Influenza A/B PCR neg  1/24 BC x 2 no growth to date  1/25  Urine cult >100k C  insertion with retrocalcaneal bursal inflammation which could represent partial tearing of the Achilles tendon without retraction.  4.  Osteomyelitis affecting the distal margin of the first metatarsal amputation site in the forefoot with adjacent 18 x 15 mm fluid collection or abscess. The remainder of the amputated metatarsals marrow signal is unremarkable.  5.  Myositis along the flexor compartment and subcutaneous cellulitis.      Scheduled Meds:   linezolid  600 mg Oral 2 times per day    amiodarone  200 mg Oral Daily    metoprolol succinate  25 mg Oral Daily    furosemide  40 mg Oral Daily    meropenem  1,000 mg IntraVENous Q12H    melatonin  6 mg Oral Nightly    atorvastatin  80 mg Oral Nightly    [Held by provider] aspirin  81 mg Oral Daily    Or    [Held by provider] aspirin  300 mg Rectal Daily    insulin lispro  0-4 Units SubCUTAneous 4x Daily AC & HS    [Held by provider] apixaban  5 mg Oral BID       Continuous Infusions:   sodium chloride 50 mL/hr at 01/30/25 2346    dextrose         PRN Meds:  acetaminophen **OR** acetaminophen, glucose, dextrose bolus **OR** dextrose bolus, glucagon (rDNA), dextrose, polyethylene glycol, sodium chloride flush, benzonatate      Assessment:     73 yo man  PMH - DM, neuropaty, HTN, CAD, CM/CHF, CKD, nephrolithiasis  PSurgHx - CABG, L TMA (4/22/24), PM / ICD     Admit 12/21 - 1/3 -  Urinary retention  L heel escar.  MRI (L calcaneal OM - see report)  Plan for surgical debridement / partial calcanectomy   OR 12/30 debridement / partial calcanectomy     - path - 'focal acute osteomyelitis ....'  Discharged on Daptomycin + Ertapenem    Present - 'found down', 'unresponsive' at nursing home  In ED 1/24, afeb, WBC   CXR 'pul edema'  Seen by Neuro, Card, Podiatry  Seen by ID 1/26 -     IMP/  L DFI   L heel ulcer / osteomyelitis   - OM progressive on MRI 1/27 on broad spectrum antibiotics   - L LE amputation recommended / necessary    Encephalopathy, resolved   - poss med

## 2025-01-31 NOTE — DISCHARGE INSTR - COC
Continuity of Care Form    Patient Name: Veronica Chaidez   :  1950  MRN:  [id number]    Admit date:  2025  Discharge date:  2025    Code Status Order: DNR-CCA   Advance Directives:   Advance Care Flowsheet Documentation             Admitting Physician:  Vinay Reid MD  PCP: Good Renee MD    Discharging Nurse: Jolene MEEKS RN  Discharging Hospital Unit/Room#: 4309/4309-01  Discharging Unit Phone Number: [phone]    Emergency Contact:   Extended Emergency Contact Information  Primary Emergency Contact: Moody Caballero  Address: [address] [place] [address]  Home Phone: [phone]  Mobile Phone: [phone]  Relation: Friend    Past Surgical History:  Past Surgical History:   Procedure Laterality Date    CARDIAC CATHETERIZATION  12/10/2019    Dr. Shaver - severe multi-vessel disease    CORONARY ARTERY BYPASS GRAFT N/A 2020    WAYNE; TCPB; CABG x 3, LIMA to LAD w/ long segment endarterectomy (4 cm); bilateral pulmonary vein isolation; 40 mm Atriclip to L atril appendage; endoscopic L GSV harvest; ICNB x 5 levels bilat; sternal plate x 1 to manubrium performed by Sohail Rhoades MD at [facility]    FOOT DEBRIDEMENT Left 2024    LEFT FOOT INCISION AND DRAINAGE WITH PARTIAL CALCANECTOMY performed by Chandana Downey DPM at [facility]    FOOT SURGERY Left 2024    LEFT FOOT TRANSMETATARSAL AMPUTATION WITH DAVID TENDON LENGTHENING performed by Chandana Downey DPM at [facility]    NASAL POLYP SURGERY  x2    NASAL SEPTUM SURGERY      WOUND VACUUM APPLICATION Left 2024    . performed by Chandana Downey DPM at [facility]       Immunization History:   Immunization History   Administered Date(s) Administered    TDaP, ADACEL (age 10y-64y), BOOSTRIX (age 10y+), IM, 0.5mL 2021       Active Problems:  Patient Active Problem List   Diagnosis Code    Dilated cardiomyopathy (HCC) I42.0    Paroxysmal atrial fibrillation (HCC) I48.0    S/P coronary angiogram

## 2025-01-31 NOTE — PLAN OF CARE
Problem: Chronic Conditions and Co-morbidities  Goal: Patient's chronic conditions and co-morbidity symptoms are monitored and maintained or improved  Outcome: Progressing     Problem: Discharge Planning  Goal: Discharge to home or other facility with appropriate resources  Outcome: Progressing  Flowsheets (Taken 1/30/2025 2030)  Discharge to home or other facility with appropriate resources: Identify barriers to discharge with patient and caregiver     Problem: Pain  Goal: Verbalizes/displays adequate comfort level or baseline comfort level  Outcome: Progressing  Flowsheets (Taken 1/30/2025 2030)  Verbalizes/displays adequate comfort level or baseline comfort level: Encourage patient to monitor pain and request assistance     Problem: Confusion  Goal: Confusion, delirium, dementia, or psychosis is improved or at baseline  Description: INTERVENTIONS:  1. Assess for possible contributors to thought disturbance, including medications, impaired vision or hearing, underlying metabolic abnormalities, dehydration, psychiatric diagnoses, and notify attending LIP  2. [place] high risk fall precautions, as indicated  3. Provide frequent short contacts to provide reality reorientation, refocusing and direction  4. Decrease environmental stimuli, including noise as appropriate  5. Monitor and intervene to maintain adequate nutrition, hydration, elimination, sleep and activity  6. If unable to ensure safety without constant attention obtain sitter and review sitter guidelines with assigned personnel  7. Initiate Psychosocial CNS and Spiritual Care consult, as indicated  Outcome: Progressing     Problem: Skin/Tissue Integrity  Goal: Skin integrity remains intact  Description: 1.  Monitor for areas of redness and/or skin breakdown  2.  Assess vascular access sites hourly  3.  Every 4-6 hours minimum:  Change oxygen saturation probe site  4.  Every 4-6 hours:  If on nasal continuous positive airway pressure, respiratory

## 2025-01-31 NOTE — PLAN OF CARE
Podiatric Surgery Plan of Care Note  Veronica Chaidez      Patient is scheduled for left BKA today with vascular surgery. Nursing comms placed for heel mepilex to be placed daily. Podiatry to sign off at the time. Please re-consult as necessary. Patient to follow up with Dr. Downey in the outpatient setting.      Discussed with Dr. Chandana Downey, YOLANDA.     Ric Mcmahon DPM   Podiatric Resident PGY2  [employer]  Pager number [phone]  1/31/2025, 9:07 AM

## 2025-01-31 NOTE — PROGRESS NOTES
Patient shared medical plan for the day and hopes for recovery. Asked to be prayed for during his surgery.     01/31/25 1005   Encounter Summary   Encounter Overview/Reason Follow-up   Service Provided For Patient   Referral/Consult From [facility]   Support System Friends/neighbors   Last Encounter  01/31/25  (GRD F/U as available)   Complexity of Encounter Low   Begin Time 0950   End Time  1005   Total Time Calculated 15 min   Assessment/Intervention/Outcome   Assessment Calm;Coping;Hopeful   Intervention Active listening;Explored/Affirmed feelings, thoughts, concerns   Outcome Receptive   Plan and Referrals   Plan/Referrals Continue to visit, (comment)  (Occasional for F/U)     Spiritual Health History and Assessment/Progress Note  [facility]    (P) Follow-up,  ,  ,      Name: Veronica Chaidez MRN: [id number]    Age: 74 y.o.     Sex: male   Language: English   Hindu: Yarsanism   Pulmonary edema, acute     Date: 1/31/2025            Total Time Calculated: (P) 15 min              Spiritual Assessment continued in [facility] 4 PCU        Referral/Consult From: (P) Rounding   Encounter Overview/Reason: (P) Follow-up  Service Provided For: (P) Patient    Lisette, Belief, Meaning:   Patient is connected with a lisette tradition or spiritual practice and has beliefs or practices that help with coping during difficult times  Family/Friends No family/friends present      Importance and Influence:  Patient has no beliefs influential to healthcare decision-making identified during this visit  Family/Friends No family/friends present    Community:  Patient feels well-supported. Support system includes: Friends  Family/Friends No family/friends present    Assessment and Plan of Care:     Patient Interventions include: Facilitated expression of thoughts and feelings  Family/Friends Interventions include: No family/friends present    Patient Plan of Care: Spiritual Care available upon further referral  Family/Friends Plan of

## 2025-01-31 NOTE — BRIEF OP NOTE
Brief Postoperative Note      Patient: Veronica Chaidez  YOB: 1950  MRN: [id number]    Date of Procedure: 1/31/2025    Pre-Op Diagnosis Codes:      * Acute osteomyelitis of left foot [M86.172]    Post-Op Diagnosis: Same       Procedure(s):  LEFT BELOW KNEE AMPUTATION    Surgeon(s):  Bridgette Soto MD    Assistant:  Resident: Rich Rolon DO    Anesthesia: General    Estimated Blood Loss (mL): less than 100ml    Complications: None    Specimens:   ID Type Source Tests Collected by Time Destination   A : LEFT LEG Tissue Tissue SURGICAL PATHOLOGY Bridgette Soto MD 1/31/2025 1802        Implants:  * No implants in log *      Drains:   Urinary Catheter 01/24/25 Caicedo (Active)   $ Urethral catheter insertion Inserted for procedure 01/24/25 1421   Catheter Indications Urinary retention (acute or chronic), continuous bladder irrigation or bladder outlet obstruction 01/31/25 1505   Site Assessment No urethral drainage 01/31/25 1505   Urine Color Yellow 01/31/25 1505   Urine Appearance Sediment 01/31/25 1505   Urine Odor Other (Comment) 01/31/25 1505   Collection Container Standard 01/31/25 1505   Securement Method Securing device (Describe) 01/31/25 1505   Catheter Care  Soap and water 01/31/25 0820   Catheter Best Practices  Drainage tube clipped to bed;Catheter secured to thigh;Tamper seal intact;Bag below bladder;Bag not on floor;Lack of dependent loop in tubing;Drainage bag less than half full 01/31/25 1505   Status Draining 01/31/25 1505   Output (mL) 850 mL 01/31/25 1505       [REMOVED] Urinary Catheter 12/21/24 Caicedo (Removed)   $ Urethral catheter insertion $ Not inserted for procedure 12/25/24 1600   Catheter Indications Urinary retention (acute or chronic), continuous bladder irrigation or bladder outlet obstruction 12/25/24 1949   Site Assessment Pink;No urethral drainage 12/25/24 1949   Urine Color Yellow 12/25/24 1949   Urine Appearance Clear 12/25/24 1949   Urine Odor Malodorous 12/25/24 1949    Collection Container Standard 12/25/24 1949   Securement Method Securing device (Describe) 12/25/24 1949   Catheter Care  Soap and water 12/25/24 1253   Catheter Best Practices  Drainage tube clipped to bed;Catheter secured to thigh;Tamper seal intact;Bag below bladder;Bag not on floor;Lack of dependent loop in tubing;Drainage bag less than half full 12/25/24 1949   Status Draining 12/25/24 1949   Output (mL) 200 mL 12/25/24 1810       [REMOVED] Urinary Catheter 12/29/24 Coude;Caicedo (Removed)   $ Urethral catheter insertion $ Not inserted for procedure 12/29/24 2300   Catheter Indications Urinary retention (acute or chronic), continuous bladder irrigation or bladder outlet obstruction 12/31/24 1641   Site Assessment Red 12/31/24 1640   Urine Color Burgandy 12/31/24 1641   Urine Appearance Clots 12/30/24 0833   Urine Odor Malodorous 12/29/24 1735   Collection Container Standard 12/31/24 1641   Securement Method Leg strap 12/31/24 1641   Catheter Care  Soap and water 12/31/24 0308   Catheter Best Practices  Drainage tube clipped to bed;Catheter secured to thigh;Tamper seal intact;Bag below bladder;Bag not on floor;Lack of dependent loop in tubing;Drainage bag less than half full 12/31/24 1641   Status Draining 12/31/24 1641   Output (mL) 0 mL 12/31/24 0613       [REMOVED] Urinary Catheter 12/31/24 (Removed)   Catheter Indications Urinary retention (acute or chronic), continuous bladder irrigation or bladder outlet obstruction 01/03/25 1400   Site Assessment No urethral drainage 01/03/25 1400   Urine Color Pink 01/03/25 1400   Urine Appearance Clear 01/03/25 1245   Urine Odor Other (Comment) 01/03/25 0750   Collection Container Standard 01/03/25 1400   Securement Method Securing device (Describe) 01/03/25 1400   Catheter Care  Soap and water 01/02/25 2018   Catheter Best Practices  Drainage tube clipped to bed;Catheter secured to thigh;Tamper seal intact;Bag below bladder;Bag not on floor;Lack of dependent loop in

## 2025-01-31 NOTE — PLAN OF CARE
Problem: Confusion  Goal: Confusion, delirium, dementia, or psychosis is improved or at baseline  Description: INTERVENTIONS:  1. Assess for possible contributors to thought disturbance, including medications, impaired vision or hearing, underlying metabolic abnormalities, dehydration, psychiatric diagnoses, and notify attending LIP  2. [place] high risk fall precautions, as indicated  3. Provide frequent short contacts to provide reality reorientation, refocusing and direction  4. Decrease environmental stimuli, including noise as appropriate  5. Monitor and intervene to maintain adequate nutrition, hydration, elimination, sleep and activity  6. If unable to ensure safety without constant attention obtain sitter and review sitter guidelines with assigned personnel  7. Initiate Psychosocial CNS and Spiritual Care consult, as indicated  1/31/2025 1203 by Lauren Stanley RN  Outcome: Progressing  Flowsheets (Taken 1/31/2025 1203)  Effect of thought disturbance (confusion, delirium, dementia, or psychosis) are managed with adequate functional status:   Assess for contributors to thought disturbance, including medications, impaired vision or hearing, underlying metabolic abnormalities, dehydration, psychiatric diagnoses, notify LIP   Provide frequent short contacts to provide reality reorientation, refocusing and direction   [place] high risk fall precautions, as indicated   Decrease environmental stimuli, including noise as appropriate  Note: Pt alert and oriented x4     Problem: Skin/Tissue Integrity  Goal: Skin integrity remains intact  Description: 1.  Monitor for areas of redness and/or skin breakdown  2.  Assess vascular access sites hourly  3.  Every 4-6 hours minimum:  Change oxygen saturation probe site  4.  Every 4-6 hours:  If on nasal continuous positive airway pressure, respiratory therapy assess nares and determine need for appliance change or resting period  Outcome: Progressing  Flowsheets  Taken

## 2025-02-01 LAB
ALBUMIN SERPL-MCNC: 3.2 G/DL (ref 3.4–5)
ANION GAP SERPL CALCULATED.3IONS-SCNC: 9 MMOL/L (ref 3–16)
BASOPHILS # BLD: 0 K/UL (ref 0–0.2)
BASOPHILS NFR BLD: 0.3 %
BUN SERPL-MCNC: 30 MG/DL (ref 7–20)
CALCIUM SERPL-MCNC: 8.7 MG/DL (ref 8.3–10.6)
CHLORIDE SERPL-SCNC: 100 MMOL/L (ref 99–110)
CO2 SERPL-SCNC: 29 MMOL/L (ref 21–32)
CREAT SERPL-MCNC: 1.7 MG/DL (ref 0.8–1.3)
DEPRECATED RDW RBC AUTO: 16.5 % (ref 12.4–15.4)
EOSINOPHIL # BLD: 0 K/UL (ref 0–0.6)
EOSINOPHIL NFR BLD: 0 %
GFR SERPLBLD CREATININE-BSD FMLA CKD-EPI: 42 ML/MIN/{1.73_M2}
GLUCOSE BLD-MCNC: 135 MG/DL (ref 70–99)
GLUCOSE BLD-MCNC: 149 MG/DL (ref 70–99)
GLUCOSE BLD-MCNC: 165 MG/DL (ref 70–99)
GLUCOSE BLD-MCNC: 169 MG/DL (ref 70–99)
GLUCOSE SERPL-MCNC: 203 MG/DL (ref 70–99)
HCT VFR BLD AUTO: 33.3 % (ref 40.5–52.5)
HGB BLD-MCNC: 10.8 G/DL (ref 13.5–17.5)
LYMPHOCYTES # BLD: 0.5 K/UL (ref 1–5.1)
LYMPHOCYTES NFR BLD: 6.9 %
MAGNESIUM SERPL-MCNC: 2.08 MG/DL (ref 1.8–2.4)
MCH RBC QN AUTO: 27.3 PG (ref 26–34)
MCHC RBC AUTO-ENTMCNC: 32.5 G/DL (ref 31–36)
MCV RBC AUTO: 83.8 FL (ref 80–100)
MONOCYTES # BLD: 0.3 K/UL (ref 0–1.3)
MONOCYTES NFR BLD: 4.9 %
NEUTROPHILS # BLD: 6.2 K/UL (ref 1.7–7.7)
NEUTROPHILS NFR BLD: 87.9 %
PERFORMED ON: ABNORMAL
PHOSPHATE SERPL-MCNC: 4 MG/DL (ref 2.5–4.9)
PLATELET # BLD AUTO: 340 K/UL (ref 135–450)
PMV BLD AUTO: 8.5 FL (ref 5–10.5)
POTASSIUM SERPL-SCNC: 5 MMOL/L (ref 3.5–5.1)
RBC # BLD AUTO: 3.98 M/UL (ref 4.2–5.9)
SODIUM SERPL-SCNC: 138 MMOL/L (ref 136–145)
WBC # BLD AUTO: 7 K/UL (ref 4–11)

## 2025-02-01 PROCEDURE — 83735 ASSAY OF MAGNESIUM: CPT

## 2025-02-01 PROCEDURE — 80069 RENAL FUNCTION PANEL: CPT

## 2025-02-01 PROCEDURE — 6370000000 HC RX 637 (ALT 250 FOR IP)

## 2025-02-01 PROCEDURE — 6370000000 HC RX 637 (ALT 250 FOR IP): Performed by: NURSE PRACTITIONER

## 2025-02-01 PROCEDURE — 6360000002 HC RX W HCPCS: Performed by: STUDENT IN AN ORGANIZED HEALTH CARE EDUCATION/TRAINING PROGRAM

## 2025-02-01 PROCEDURE — 2500000003 HC RX 250 WO HCPCS

## 2025-02-01 PROCEDURE — 94761 N-INVAS EAR/PLS OXIMETRY MLT: CPT

## 2025-02-01 PROCEDURE — 2700000000 HC OXYGEN THERAPY PER DAY

## 2025-02-01 PROCEDURE — 85025 COMPLETE CBC W/AUTO DIFF WBC: CPT

## 2025-02-01 PROCEDURE — 2580000003 HC RX 258: Performed by: STUDENT IN AN ORGANIZED HEALTH CARE EDUCATION/TRAINING PROGRAM

## 2025-02-01 PROCEDURE — 6370000000 HC RX 637 (ALT 250 FOR IP): Performed by: INTERNAL MEDICINE

## 2025-02-01 PROCEDURE — 6360000002 HC RX W HCPCS

## 2025-02-01 PROCEDURE — 1200000000 HC SEMI PRIVATE

## 2025-02-01 RX ORDER — IPRATROPIUM BROMIDE AND ALBUTEROL SULFATE 2.5; .5 MG/3ML; MG/3ML
1 SOLUTION RESPIRATORY (INHALATION) EVERY 4 HOURS PRN
Status: DISCONTINUED | OUTPATIENT
Start: 2025-02-01 | End: 2025-02-07 | Stop reason: HOSPADM

## 2025-02-01 RX ADMIN — FUROSEMIDE 40 MG: 40 TABLET ORAL at 08:22

## 2025-02-01 RX ADMIN — SODIUM CHLORIDE, PRESERVATIVE FREE 10 ML: 5 INJECTION INTRAVENOUS at 08:22

## 2025-02-01 RX ADMIN — LINEZOLID 600 MG: 600 TABLET, FILM COATED ORAL at 08:21

## 2025-02-01 RX ADMIN — LINEZOLID 600 MG: 600 TABLET, FILM COATED ORAL at 21:04

## 2025-02-01 RX ADMIN — METOPROLOL SUCCINATE 25 MG: 25 TABLET, FILM COATED, EXTENDED RELEASE ORAL at 08:22

## 2025-02-01 RX ADMIN — HYDROMORPHONE HYDROCHLORIDE 0.5 MG: 1 INJECTION, SOLUTION INTRAMUSCULAR; INTRAVENOUS; SUBCUTANEOUS at 02:18

## 2025-02-01 RX ADMIN — HYDROMORPHONE HYDROCHLORIDE 0.5 MG: 1 INJECTION, SOLUTION INTRAMUSCULAR; INTRAVENOUS; SUBCUTANEOUS at 19:03

## 2025-02-01 RX ADMIN — SODIUM CHLORIDE: 9 INJECTION, SOLUTION INTRAVENOUS at 03:15

## 2025-02-01 RX ADMIN — Medication 6 MG: at 21:05

## 2025-02-01 RX ADMIN — SODIUM CHLORIDE, PRESERVATIVE FREE 10 ML: 5 INJECTION INTRAVENOUS at 21:05

## 2025-02-01 RX ADMIN — AMIODARONE HYDROCHLORIDE 200 MG: 200 TABLET ORAL at 08:21

## 2025-02-01 RX ADMIN — HYDROMORPHONE HYDROCHLORIDE 0.5 MG: 1 INJECTION, SOLUTION INTRAMUSCULAR; INTRAVENOUS; SUBCUTANEOUS at 11:06

## 2025-02-01 RX ADMIN — MEROPENEM AND SODIUM CHLORIDE 1000 MG: 1 INJECTION, SOLUTION INTRAVENOUS at 05:51

## 2025-02-01 ASSESSMENT — PAIN SCALES - GENERAL
PAINLEVEL_OUTOF10: 5
PAINLEVEL_OUTOF10: 8
PAINLEVEL_OUTOF10: 7
PAINLEVEL_OUTOF10: 8
PAINLEVEL_OUTOF10: 7
PAINLEVEL_OUTOF10: 0
PAINLEVEL_OUTOF10: 8
PAINLEVEL_OUTOF10: 8
PAINLEVEL_OUTOF10: 0
PAINLEVEL_OUTOF10: 9
PAINLEVEL_OUTOF10: 8
PAINLEVEL_OUTOF10: 5
PAINLEVEL_OUTOF10: 7
PAINLEVEL_OUTOF10: 0
PAINLEVEL_OUTOF10: 2
PAINLEVEL_OUTOF10: 7

## 2025-02-01 ASSESSMENT — PAIN DESCRIPTION - LOCATION
LOCATION: LEG

## 2025-02-01 ASSESSMENT — PAIN DESCRIPTION - ONSET
ONSET: ON-GOING
ONSET: GRADUAL
ONSET: ON-GOING
ONSET: GRADUAL

## 2025-02-01 ASSESSMENT — PAIN DESCRIPTION - DESCRIPTORS
DESCRIPTORS: DISCOMFORT
DESCRIPTORS: DISCOMFORT
DESCRIPTORS: SHARP;STABBING
DESCRIPTORS: DISCOMFORT
DESCRIPTORS: DISCOMFORT
DESCRIPTORS: SHARP
DESCRIPTORS: DISCOMFORT

## 2025-02-01 ASSESSMENT — PAIN DESCRIPTION - PAIN TYPE
TYPE: SURGICAL PAIN

## 2025-02-01 ASSESSMENT — PAIN DESCRIPTION - FREQUENCY
FREQUENCY: CONTINUOUS

## 2025-02-01 ASSESSMENT — PAIN SCALES - WONG BAKER
WONGBAKER_NUMERICALRESPONSE: NO HURT

## 2025-02-01 ASSESSMENT — PAIN - FUNCTIONAL ASSESSMENT

## 2025-02-01 ASSESSMENT — PAIN DESCRIPTION - ORIENTATION
ORIENTATION: LEFT

## 2025-02-01 NOTE — PROGRESS NOTES
Patient arrived to PACU post LEFT BELOW KNEE AMPUTATION - Left with Dr. Soto. VSS on arrival. CRNA gave PACU RN report at bedside stating no complications during procedure. Dressing to surgical site clean, dry and intact. Patient shows no signs of pain at this time. Will continue to monitor.

## 2025-02-01 NOTE — PLAN OF CARE
Problem: Pain  Goal: Verbalizes/displays adequate comfort level or baseline comfort level  Outcome: Progressing  Flowsheets (Taken 2/1/2025 1456)  Verbalizes/displays adequate comfort level or baseline comfort level:   Encourage patient to monitor pain and request assistance   Assess pain using appropriate pain scale   Administer analgesics based on type and severity of pain and evaluate response   Implement non-pharmacological measures as appropriate and evaluate response     Problem: Safety - Adult  Goal: Free from fall injury  Outcome: Progressing  Flowsheets (Taken 2/1/2025 1456)  Free From Fall Injury:   Based on caregiver fall risk screen, instruct family/caregiver to ask for assistance with transferring infant if caregiver noted to have fall risk factors   Instruct family/caregiver on patient safety     Problem: Neurosensory - Adult  Goal: Achieves stable or improved neurological status  Outcome: Progressing  Flowsheets (Taken 2/1/2025 1456)  Achieves stable or improved neurological status:   Assess for and report changes in neurological status   Initiate measures to prevent increased intracranial pressure   Maintain blood pressure and fluid volume within ordered parameters to optimize cerebral perfusion and minimize risk of hemorrhage     Problem: Respiratory - Adult  Goal: Achieves optimal ventilation and oxygenation  Outcome: Progressing  Flowsheets (Taken 2/1/2025 1456)  Achieves optimal ventilation and oxygenation:   Assess for changes in respiratory status   Assess for changes in mentation and behavior   Position to facilitate oxygenation and minimize respiratory effort   Oxygen supplementation based on oxygen saturation or arterial blood gases

## 2025-02-01 NOTE — PROGRESS NOTES
Vascular Surgery   Daily Progress Note  Patient: Veronica Chaidez      CC: S/p L BKA    SUBJECTIVE:   Patient rested well overnight. Pain is controlled with pain meds. Required increasing oxygen requirements but now weaned to 5L HFNC. Tolerating diet without nausea or vomiting. Voiding appropriately without issues. Having bowel movements and passing gas.    OBJECTIVE:    PHYSICAL EXAM:    Vitals:    02/01/25 0218 02/01/25 0248 02/01/25 0305 02/01/25 0443   BP:   139/84    Pulse: 69  80    Resp: 18 19 20    Temp:   97.6 °F (36.4 °C)    TempSrc:   Oral    SpO2: 96%  95%    Weight:    110.7 kg (244 lb 1.6 oz)   Height:         General appearance: alert, no acute distress  Eyes: No scleral icterus, EOM grossly intact  Neck: trachea midline, neck supple  Chest/Lungs:  normal effort with no accessory muscle use on 5L high flow NC  Cardiovascular: RRR,well perfused  Skin: warm and dry, no rashes  Extremities: no edema, no cyanosis. Left Lower extremity with IPOP dressing intact   Genitourinary: Caicedo in place with clear yellow urine  Neuro: A&Ox3, no focal deficits, sensation intact    LABS:   Recent Labs     01/30/25  0615 01/31/25  0500   WBC 6.5 6.7   HGB 9.7* 9.9*   HCT 29.6* 30.3*   MCV 83.5 83.5    335        Recent Labs     01/30/25  0615 01/31/25  0500   * 136   K 4.3 4.4   CL 98* 98*   CO2 30 30   PHOS 3.5 3.8   BUN 29* 31*   CREATININE 1.8* 1.8*       ASSESSMENT & PLAN:   This is a 74 y.o. male status post L BKA secondary to acute osteomyelitis (1/31), POD1.    - Continue IPOP to L BKA, abilities in motion team to come and bivalve the IPOP  [facility]  - Continue to hold Eliquis & ASA until POD2  - Rest of care per primary    Aniceto Moeller DO  PGY-1, General Surgery Resident  02/01/25 8:03 AM  [phone]

## 2025-02-01 NOTE — PROGRESS NOTES
Vascular Surgery  Post-operative Note      Procedure(s) Performed: Left below the knee amputation    Subjective:   Patient reports pain to LLE. No other complaints at this time. Denies nausea or vomiting. Tolerating diet. Denies flatus or BM at this time.    Objective:  Anesthesia type: General      I/O    Intra op    Post op     Fluids  200 mL -----     EBL <100 mL 0 mL     Urine 550 mL 1450 mL     Vitals:   Vitals:    01/31/25 2048 01/31/25 2200 01/31/25 2230 01/31/25 2300   BP:       Pulse:  71     Resp:   20 20   Temp:       TempSrc:       SpO2: 98%      Weight:       Height:           Physical Exam:  Post-op vital signs:  Stable   General appearance: alert, no acute distress  Eyes: No scleral icterus, EOM grossly intact  Neck: trachea midline, no JVD, no lymphadenopathy, neck supple  Chest/Lungs:  normal effort with no accessory muscle use on 7L high flow NC  Cardiovascular: RRR,well perfused  Skin: warm and dry, no rashes  Extremities: no edema, no cyanosis. Left Lower extremity with IPOP dressing intact   Genitourinary: Caicedo in place with clear yellow urine  Neuro: A&Ox3, no focal deficits, sensation intact    Assessment and Plan  This is a 74 y.o. year old male status post Left below the knee amputation secondary to acute osteomyelitis of left foot. (1/31) POD0.    Pain management: Dilaudid pain panel PRN  Cardiovasc: hemodynamically stable, will continue to monitor  Respiratory: IS ordered to bedside, encourage hourly IS and deep breathing, wean oxygen as tolerated  Fluids:  NS 50, Diet: General diet  : Caicedo in place  Ambulation: OOB to chair  Prophylaxis: SCDs  Antibiotics: Linezolid, Merrem  Wound: Local wound care    Jeanette Mandel MD  PGY1, General Surgery  01/31/25  11:44 PM  [phone]

## 2025-02-01 NOTE — PLAN OF CARE
General Internal Medicine Attending     Chart and data reviewed.  Patient seen and examined, case discussed with medical resident.   Physical exam independently repeated.  Labs and imaging studies reviewed.         Agree with documentation, assessment and plan as outlined above with extensions, exceptions, clarifications, and addenda as follows.             75 y/o M with  chronic systolic heart failure, CAD s/p ICD placement, A Fib, T2DM, CKD, and obesity who presented from [facility] ([place] to the ED with AMS.     Appears he was found down and unresponsive next to his bed around 10:30 am after he was noted to be in his \"normal state\" at 8:30 am when he was being given his morning meds.      Vitals and blood sugar were checked and he was found to be saturating at 85%. He was placed on 4 L supplemental O2. 4 mg of IM narcan was administered as the patient is on Oxycodone at his SNF. The patient became more alert however he was still significantly altered.      Appears patient has had a \"bad cough\" as of late.      He was admitted to [facility] in 12/2024- 1/1/2025 for \"L foot OM and UTI (UCx NGTD but initial UA c/w UTI with + leukocyte esterase and pyruia. Was treated with empiric abx, required Caicedo while inpatient, developed gross hematuria and Caicedo replaced 12/31) and is currently on IV Ertapenem as well as Daptomycin through a RUE PICC line.      In the ED, patient sat at 98% on 4 L; RR 28     VBG showed a pH of 7.360 with a pCO2 of 42.0, pO2 of 55.0, and venous bicarb of 23.7.      CBC:  WBC of 7.0 and a hemoglobin of 8.7 (around his baseline), normal MCV.  Lactic acid was 0.9.      BMP showed a creatinine of 1.7 which seems to be around his baseline, although recently 1.4 .      His hepatic function was normal     Initial troponin was 54 which up trended to 57.      His BNP was 5604 with a baseline of 1491 in June 2024.      CT head was done which showed old infarct in the right frontal lobe with no acute

## 2025-02-01 NOTE — PLAN OF CARE
Problem: Pain  Goal: Verbalizes/displays adequate comfort level or baseline comfort level  Outcome: Progressing     Problem: Confusion  Goal: Confusion, delirium, dementia, or psychosis is improved or at baseline  Description: INTERVENTIONS:  1. Assess for possible contributors to thought disturbance, including medications, impaired vision or hearing, underlying metabolic abnormalities, dehydration, psychiatric diagnoses, and notify attending LIP  2. [place] high risk fall precautions, as indicated  3. Provide frequent short contacts to provide reality reorientation, refocusing and direction  4. Decrease environmental stimuli, including noise as appropriate  5. Monitor and intervene to maintain adequate nutrition, hydration, elimination, sleep and activity  6. If unable to ensure safety without constant attention obtain sitter and review sitter guidelines with assigned personnel  7. Initiate Psychosocial CNS and Spiritual Care consult, as indicated  2/1/2025 0010 by Massiel Edgar RN  Outcome: Progressing     Problem: Skin/Tissue Integrity  Goal: Skin integrity remains intact  Description: 1.  Monitor for areas of redness and/or skin breakdown  2.  Assess vascular access sites hourly  3.  Every 4-6 hours minimum:  Change oxygen saturation probe site  4.  Every 4-6 hours:  If on nasal continuous positive airway pressure, respiratory therapy assess nares and determine need for appliance change or resting period  2/1/2025 0010 by Massiel Edgar RN  Outcome: Progressing  Flowsheets (Taken 1/31/2025 2035)  Skin Integrity Remains Intact: Monitor for areas of redness and/or skin breakdown     Problem: Safety - Adult  Goal: Free from fall injury  2/1/2025 0010 by Massiel Edgar RN  Outcome: Progressing  Flowsheets (Taken 1/31/2025 2035)  Free From Fall Injury: Instruct family/caregiver on patient safety     Problem: Neurosensory - Adult  Goal: Achieves stable or improved neurological status  Outcome: Progressing      Problem: Respiratory - Adult  Goal: Achieves optimal ventilation and oxygenation  Outcome: Progressing  Flowsheets (Taken 1/31/2025 2035)  Achieves optimal ventilation and oxygenation:   Assess for changes in respiratory status   Assess for changes in mentation and behavior   Position to facilitate oxygenation and minimize respiratory effort   Oxygen supplementation based on oxygen saturation or arterial blood gases     Problem: Skin/Tissue Integrity - Adult  Goal: Skin integrity remains intact  Description: 1.  Monitor for areas of redness and/or skin breakdown  2.  Assess vascular access sites hourly  3.  Every 4-6 hours minimum:  Change oxygen saturation probe site  4.  Every 4-6 hours:  If on nasal continuous positive airway pressure, respiratory therapy assess nares and determine need for appliance change or resting period  2/1/2025 0010 by Massiel Edgar RN  Outcome: Progressing  Flowsheets (Taken 1/31/2025 2035)  Skin Integrity Remains Intact: Monitor for areas of redness and/or skin breakdown     Problem: Skin/Tissue Integrity - Adult  Goal: Incisions, wounds, or drain sites healing without S/S of infection  Recent Flowsheet Documentation  Taken 1/31/2025 2035 by Massiel Edgar, RN  Incisions, Wounds, or Drain Sites Healing Without Sign and Symptoms of Infection: ADMISSION and DAILY: Assess and document risk factors for pressure ulcer development     Problem: Genitourinary - Adult  Goal: Absence of urinary retention  Outcome: Progressing  Flowsheets (Taken 1/31/2025 2035)  Absence of urinary retention: Assess patient’s ability to void and empty bladder     Problem: Genitourinary - Adult  Goal: Urinary catheter remains patent  Outcome: Progressing  Flowsheets (Taken 1/31/2025 2035)  Urinary catheter remains patent: Assess patency of urinary catheter     Problem: Metabolic/Fluid and Electrolytes - Adult  Goal: Electrolytes maintained within normal limits  Outcome: Progressing     Problem: Metabolic/Fluid

## 2025-02-01 NOTE — PROGRESS NOTES
Pt c/o pain and \"feels like it expanding.\". Pt VSS. Afebrile, sating 97 on 7L HFNC. Notify Dr. Mandel and at bedside.

## 2025-02-01 NOTE — PROGRESS NOTES
PACU Transfer to Floor Note    Procedure(s):  LEFT BELOW KNEE AMPUTATION    Current Allergies: Codeine, Aspartame and phenylalanine, Msg, and Yellow dyes (non-tartrazine)    Pt meets criteria as per Bibiana Score and ASPAN Standards to transfer to next phase of care.     Recent Labs     01/31/25  1624 01/31/25  1909   POCGLU 93 85       Vitals:    01/31/25 1945   BP: 123/85   Pulse: 70   Resp: 12   Temp: 97.2 °F (36.2 °C)   SpO2: 95%     Vitals within 20% of pt's admission vitals as per BIBIANA SCORE    SpO2: 95 %    O2 Flow Rate (L/min): 10 L/min      Intake/Output Summary (Last 24 hours) at 1/31/2025 2006  Last data filed at 1/31/2025 1950  Gross per 24 hour   Intake 250 ml   Output 2750 ml   Net -2500 ml       Pain assessment:  none    Pain Level: 0    Patient was assessed for alterations to skin integrity. There were not alterations observed.    Is patient incontinent: no, GÓMEZ IN PLACE    Patient has all belongings at discharge from PACU.    Handoff report given at bedside.   Family updated and directed to pt room 2486      1/31/2025 8:06 PM

## 2025-02-01 NOTE — PROGRESS NOTES
signed by Fitz Childs,       MRI brain without contrast   Final Result      1. No evidence for intracranial mass or acute intracranial abnormality   2. Mild chronic small vessel ischemia and a dominant area of chronic cortical/white matter infarction involving the right frontal operculum/corona radiata   3. Left temporalis muscle 13 mm circumscribed lesion is indeterminant. Hematoma is a possibility given the small amount of intralesional T1 hyperintensity and the suggested susceptibility of the lesion on the SWI pulse sequence. Correlate for evidence of    acute trauma to this region.      Electronically signed by Jalen Machuca MD      CTA HEAD NECK W CONTRAST   Final Result      1. No hemodynamically significant stenosis.   2. Consolidation in lungs with small left pleural effusion.      EXAM: CT ANGIOGRAPHY HEAD WITH/WITHOUT CONTRAST      INDICATION: evaluate for posterior circulation stenosis      COMPARISON: CT angiogram head and neck from 7/4/2019.      TECHNIQUE: Axial CT imaging obtained through the head prior to and following administration of IV contrast. Axial images, multiplanar reformatted images, and maximum intensity projection images were reviewed for CT angiographic technique.   IV contrast: Administered.      FINDINGS:      ANTERIOR CIRCULATION: Calcification cavernous internal carotid arteries without significant stenosis. Anterior and middle cerebral arteries patent.      POSTERIOR CIRCULATION: Fetal origin of the right posterior cerebral artery. Normal left posterior cerebral artery. Normal vertebral arteries and basilar artery.      INTRACRANIAL VENOUS SYSTEM: Arachnoid granulation right transverse sinus.      INTRACRANIAL HEMORRHAGE: None.      VENTRICLES: Lateral ventricles mildly dilated related to parenchymal volume loss.      BRAIN PARENCHYMA: Mild cerebral parenchymal volume loss. Right temporal and frontal encephalomalacia. Mild low-attenuation white matter. Parenchymal volume  the ED with AMS.      L Diabetic Foot ulcer w/ suspected osteomyelitis  S/p TMA  History of OM with admission in December 2024 1/24/2025 X ray left foot: Concerning for acute osteomyelitis  12/23/2024 MRI L foot  showing findings in calcaneus c/w OM  12/30/2024 I&D w/ partial calcanectomy     1/27/2025 MRI L foot:  1.  Severely advanced ulceration along the plantar heel with large ulcer measuring at least 3 cm x 3.5 cm with depth down to the level of the calcaneus along the lateral margin   2.  Worsening osteomyelitis of the calcaneus with cortical bone destruction and severe marrow edema which likely represents osteomyelitis and could also represent worsening stress reaction.   3.  Bright signal at the distal Achilles tendon insertion with retrocalcaneal bursal inflammation which could represent partial tearing of the Achilles tendon without retraction.   4.  Osteomyelitis affecting the distal margin of the first metatarsal amputation site in the forefoot with adjacent 18 x 15 mm fluid collection or abscess. The remainder of the amputated metatarsals marrow signal is unremarkable.   5.  Myositis along the flexor compartment and subcutaneous cellulitis      S/p Zosyn and Linezolid during previous admission and d/c'ed with PICC line and to finish treatment with empiric Daptomycin and Ertapenem. Continued on Meropenem and Daptomycin through 2/12/2025  S/p BKA 1/31/25    -PT/OT  -Podiatry on board, will appreciate recs  - ID on board, will appreciate recs  - Vascular surgery on board  - Daptomycin DC'd per ID rec on 1/26  - Merrem (was receiving prior to hospitalization, continued during this admission) 1 g twice daily, dc'd on 2/1/25 per ID recs  -Linezolid 600 mg BID (1/26 - )  - Wound care      Acute hypoxic respiratory failure   O2 requirement up to 5L from 2 L  Possibly due to atelectasis postsurgery  Afebrile, no leukocytosis, does not look volume overloaded  - I-S  -Continue to monitor, will consider CXR if

## 2025-02-01 NOTE — RT PROTOCOL NOTE
RT Inhaler-Nebulizer Bronchodilator Protocol Note    There is a bronchodilator order in the chart from a provider indicating to follow the RT Bronchodilator Protocol and there is an “Initiate RT Inhaler-Nebulizer Bronchodilator Protocol” order as well (see protocol at bottom of note).    CXR Findings:  No results found.    The findings from the last RT Protocol Assessment were as follows:   History Pulmonary Disease: None or smoker <15 pack years  Respiratory Pattern: Regular pattern and RR 12-20 bpm  Breath Sounds: Slightly diminished and/or crackles  Cough: Strong, spontaneous, non-productive  Indication for Bronchodilator Therapy:    Bronchodilator Assessment Score: 2    Aerosolized bronchodilator medication orders have been revised according to the RT Inhaler-Nebulizer Bronchodilator Protocol below.    Respiratory Therapist to perform RT Therapy Protocol Assessment initially then follow the protocol.  Repeat RT Therapy Protocol Assessment PRN for score 0-3 or on second treatment, BID, and PRN for scores above 3.    No Indications - adjust the frequency to every 6 hours PRN wheezing or bronchospasm, if no treatments needed after 48 hours then discontinue using Per Protocol order mode.     If indication present, adjust the RT bronchodilator orders based on the Bronchodilator Assessment Score as indicated below.  Use Inhaler orders unless patient has one or more of the following: on home nebulizer, not able to hold breath for 10 seconds, is not alert and oriented, cannot activate and use MDI correctly, or respiratory rate 25 breaths per minute or more, then use the equivalent nebulizer order(s) with same Frequency and PRN reasons based on the score.  If a patient is on this medication at home then do not decrease Frequency below that used at home.    0-3 - enter or revise RT bronchodilator order(s) to equivalent RT Bronchodilator order with Frequency of every 4 hours PRN for wheezing or increased work of breathing  using Per Protocol order mode.        4-6 - enter or revise RT Bronchodilator order(s) to two equivalent RT bronchodilator orders with one order with BID Frequency and one order with Frequency of every 4 hours PRN wheezing or increased work of breathing using Per Protocol order mode.        7-10 - enter or revise RT Bronchodilator order(s) to two equivalent RT bronchodilator orders with one order with TID Frequency and one order with Frequency of every 4 hours PRN wheezing or increased work of breathing using Per Protocol order mode.       11-13 - enter or revise RT Bronchodilator order(s) to one equivalent RT bronchodilator order with QID Frequency and an Albuterol order with Frequency of every 4 hours PRN wheezing or increased work of breathing using Per Protocol order mode.      Greater than 13 - enter or revise RT Bronchodilator order(s) to one equivalent RT bronchodilator order with every 4 hours Frequency and an Albuterol order with Frequency of every 2 hours PRN wheezing or increased work of breathing using Per Protocol order mode.     RT to enter RT Home Evaluation for COPD & MDI Assessment order using Per Protocol order mode.    Electronically signed by Sobeida Hearn RCP on 2/1/2025 at 4:19 AM

## 2025-02-01 NOTE — ANESTHESIA POSTPROCEDURE EVALUATION
Department of Anesthesiology  Postprocedure Note    Patient: Veronica Chaidez  MRN: [id number]  YOB: 1950  Date of evaluation: 1/31/2025    Procedure Summary       Date: 01/31/25 Room / Location: [address]    Anesthesia Start: 1708 Anesthesia Stop: 1909    Procedure: LEFT BELOW KNEE AMPUTATION (Left: Leg Lower) Diagnosis:       Acute osteomyelitis of left foot      (Acute osteomyelitis of left foot [M86.172])    Surgeons: Bridgette Soto MD Responsible Provider: Urvashi Coy DO    Anesthesia Type: general ASA Status: 3            Anesthesia Type: No value filed.    Esmer Phase I: Esmer Score: 8    Esmer Phase II:      Anesthesia Post Evaluation    Patient location during evaluation: PACU  Patient participation: complete - patient participated  Level of consciousness: awake and alert  Airway patency: patent  Nausea & Vomiting: no nausea and no vomiting  Cardiovascular status: blood pressure returned to baseline  Respiratory status: nonlabored ventilation and nasal cannula  Hydration status: euvolemic  Multimodal analgesia pain management approach  Pain management: adequate    No notable events documented.

## 2025-02-02 LAB
ALBUMIN SERPL-MCNC: 3 G/DL (ref 3.4–5)
ANION GAP SERPL CALCULATED.3IONS-SCNC: 6 MMOL/L (ref 3–16)
BASOPHILS # BLD: 0.1 K/UL (ref 0–0.2)
BASOPHILS NFR BLD: 1 %
BUN SERPL-MCNC: 35 MG/DL (ref 7–20)
CALCIUM SERPL-MCNC: 8.4 MG/DL (ref 8.3–10.6)
CHLORIDE SERPL-SCNC: 100 MMOL/L (ref 99–110)
CO2 SERPL-SCNC: 29 MMOL/L (ref 21–32)
CREAT SERPL-MCNC: 2 MG/DL (ref 0.8–1.3)
DEPRECATED RDW RBC AUTO: 16.4 % (ref 12.4–15.4)
EOSINOPHIL # BLD: 0.3 K/UL (ref 0–0.6)
EOSINOPHIL NFR BLD: 2.8 %
GFR SERPLBLD CREATININE-BSD FMLA CKD-EPI: 35 ML/MIN/{1.73_M2}
GLUCOSE BLD-MCNC: 123 MG/DL (ref 70–99)
GLUCOSE BLD-MCNC: 126 MG/DL (ref 70–99)
GLUCOSE BLD-MCNC: 140 MG/DL (ref 70–99)
GLUCOSE BLD-MCNC: 141 MG/DL (ref 70–99)
GLUCOSE SERPL-MCNC: 138 MG/DL (ref 70–99)
HCT VFR BLD AUTO: 31.2 % (ref 40.5–52.5)
HGB BLD-MCNC: 9.9 G/DL (ref 13.5–17.5)
LYMPHOCYTES # BLD: 1.4 K/UL (ref 1–5.1)
LYMPHOCYTES NFR BLD: 14.5 %
MAGNESIUM SERPL-MCNC: 2.02 MG/DL (ref 1.8–2.4)
MCH RBC QN AUTO: 26.9 PG (ref 26–34)
MCHC RBC AUTO-ENTMCNC: 31.8 G/DL (ref 31–36)
MCV RBC AUTO: 84.6 FL (ref 80–100)
MONOCYTES # BLD: 0.9 K/UL (ref 0–1.3)
MONOCYTES NFR BLD: 9.7 %
NEUTROPHILS # BLD: 7.1 K/UL (ref 1.7–7.7)
NEUTROPHILS NFR BLD: 72 %
PERFORMED ON: ABNORMAL
PHOSPHATE SERPL-MCNC: 3.2 MG/DL (ref 2.5–4.9)
PLATELET # BLD AUTO: 291 K/UL (ref 135–450)
PMV BLD AUTO: 8 FL (ref 5–10.5)
POTASSIUM SERPL-SCNC: 4.5 MMOL/L (ref 3.5–5.1)
RBC # BLD AUTO: 3.69 M/UL (ref 4.2–5.9)
SODIUM SERPL-SCNC: 135 MMOL/L (ref 136–145)
WBC # BLD AUTO: 9.8 K/UL (ref 4–11)

## 2025-02-02 PROCEDURE — 80069 RENAL FUNCTION PANEL: CPT

## 2025-02-02 PROCEDURE — 1200000000 HC SEMI PRIVATE

## 2025-02-02 PROCEDURE — 2500000003 HC RX 250 WO HCPCS

## 2025-02-02 PROCEDURE — 2700000000 HC OXYGEN THERAPY PER DAY

## 2025-02-02 PROCEDURE — 6360000002 HC RX W HCPCS: Performed by: STUDENT IN AN ORGANIZED HEALTH CARE EDUCATION/TRAINING PROGRAM

## 2025-02-02 PROCEDURE — 6370000000 HC RX 637 (ALT 250 FOR IP): Performed by: NURSE PRACTITIONER

## 2025-02-02 PROCEDURE — 6370000000 HC RX 637 (ALT 250 FOR IP)

## 2025-02-02 PROCEDURE — 94150 VITAL CAPACITY TEST: CPT

## 2025-02-02 PROCEDURE — 6370000000 HC RX 637 (ALT 250 FOR IP): Performed by: INTERNAL MEDICINE

## 2025-02-02 PROCEDURE — 83735 ASSAY OF MAGNESIUM: CPT

## 2025-02-02 PROCEDURE — 85025 COMPLETE CBC W/AUTO DIFF WBC: CPT

## 2025-02-02 PROCEDURE — 94761 N-INVAS EAR/PLS OXIMETRY MLT: CPT

## 2025-02-02 RX ORDER — GABAPENTIN 100 MG/1
200 CAPSULE ORAL ONCE
Status: COMPLETED | OUTPATIENT
Start: 2025-02-02 | End: 2025-02-02

## 2025-02-02 RX ADMIN — SODIUM CHLORIDE, PRESERVATIVE FREE 10 ML: 5 INJECTION INTRAVENOUS at 20:33

## 2025-02-02 RX ADMIN — Medication 6 MG: at 20:33

## 2025-02-02 RX ADMIN — METOPROLOL SUCCINATE 25 MG: 25 TABLET, FILM COATED, EXTENDED RELEASE ORAL at 08:37

## 2025-02-02 RX ADMIN — HYDROMORPHONE HYDROCHLORIDE 0.5 MG: 1 INJECTION, SOLUTION INTRAMUSCULAR; INTRAVENOUS; SUBCUTANEOUS at 10:49

## 2025-02-02 RX ADMIN — GABAPENTIN 200 MG: 100 CAPSULE ORAL at 00:35

## 2025-02-02 RX ADMIN — LINEZOLID 600 MG: 600 TABLET, FILM COATED ORAL at 20:33

## 2025-02-02 RX ADMIN — LINEZOLID 600 MG: 600 TABLET, FILM COATED ORAL at 08:37

## 2025-02-02 RX ADMIN — SODIUM CHLORIDE, PRESERVATIVE FREE 10 ML: 5 INJECTION INTRAVENOUS at 08:40

## 2025-02-02 RX ADMIN — AMIODARONE HYDROCHLORIDE 200 MG: 200 TABLET ORAL at 08:37

## 2025-02-02 RX ADMIN — FUROSEMIDE 40 MG: 40 TABLET ORAL at 08:38

## 2025-02-02 RX ADMIN — POLYETHYLENE GLYCOL 3350 17 G: 17 POWDER, FOR SOLUTION ORAL at 08:47

## 2025-02-02 RX ADMIN — ACETAMINOPHEN 650 MG: 325 TABLET ORAL at 23:35

## 2025-02-02 ASSESSMENT — PAIN SCALES - GENERAL
PAINLEVEL_OUTOF10: 9
PAINLEVEL_OUTOF10: 8
PAINLEVEL_OUTOF10: 8
PAINLEVEL_OUTOF10: 0
PAINLEVEL_OUTOF10: 9
PAINLEVEL_OUTOF10: 9
PAINLEVEL_OUTOF10: 4
PAINLEVEL_OUTOF10: 10
PAINLEVEL_OUTOF10: 5

## 2025-02-02 ASSESSMENT — PAIN DESCRIPTION - FREQUENCY
FREQUENCY: CONTINUOUS

## 2025-02-02 ASSESSMENT — PAIN DESCRIPTION - DESCRIPTORS
DESCRIPTORS: BURNING;CRAMPING
DESCRIPTORS: DISCOMFORT
DESCRIPTORS: ACHING;DISCOMFORT
DESCRIPTORS: DISCOMFORT

## 2025-02-02 ASSESSMENT — PAIN DESCRIPTION - ORIENTATION
ORIENTATION: LEFT

## 2025-02-02 ASSESSMENT — PAIN DESCRIPTION - PAIN TYPE
TYPE: SURGICAL PAIN

## 2025-02-02 ASSESSMENT — PAIN - FUNCTIONAL ASSESSMENT
PAIN_FUNCTIONAL_ASSESSMENT: PREVENTS OR INTERFERES SOME ACTIVE ACTIVITIES AND ADLS
PAIN_FUNCTIONAL_ASSESSMENT: ACTIVITIES ARE NOT PREVENTED
PAIN_FUNCTIONAL_ASSESSMENT: ACTIVITIES ARE NOT PREVENTED
PAIN_FUNCTIONAL_ASSESSMENT: PREVENTS OR INTERFERES SOME ACTIVE ACTIVITIES AND ADLS
PAIN_FUNCTIONAL_ASSESSMENT: ACTIVITIES ARE NOT PREVENTED
PAIN_FUNCTIONAL_ASSESSMENT: PREVENTS OR INTERFERES SOME ACTIVE ACTIVITIES AND ADLS

## 2025-02-02 ASSESSMENT — PAIN DESCRIPTION - LOCATION
LOCATION: LEG

## 2025-02-02 ASSESSMENT — PAIN DESCRIPTION - ONSET
ONSET: ON-GOING

## 2025-02-02 ASSESSMENT — PAIN SCALES - WONG BAKER: WONGBAKER_NUMERICALRESPONSE: NO HURT

## 2025-02-02 NOTE — PROGRESS NOTES
Internal Medicine H&P    Date: 2025   Patient: Veronica Chaidez   Patient : 1950     CC: Drug Overdose (Per EMS pt was given 4mg of Intranasal and responding more. )    Interval history  POD#1 L BKA  No acute events overnight.  Patient was seen at bedside with RT present.  He was comfortable this morning without any signs of acute distress.  Patient was using his incentive spirometer and denied any acute complaints    A-fib with controlled ventricular response.  Patient continues to be afebrile    Back on  2 L O2  Hg at baseline.  No leukocytosis    Cr 1.7 -> 2.0 (baseline maybe 1.4 - 2.0), lasix held  UOP 1.45 L via guo   I/O - 690 mL (- 17.35 L since admit)  AM glucose 138    Meropenem DC'd yesterday per ID recs  Can restart ASA/Eliquis 2/3 per vasc surg  Likely SNF 1-2 days    HPI:  \"74-year-old M with PMH chronic systolic heart failure, CAD s/p ICD, A-fib, T2DM, CKD, class I obesity who presented from [facility]) to the [facility] ED with altered mental status.    History was obtained from review of the EMR.  Patient noted becoming unresponsive.  Has been around 1:30 AM on the day of presentation.  Last known well was 8:30 AM on the day of presentation when medicines were administered.  Patient was noted to be hypoxic with SpO2 85% and placed on 4 L O2 via nasal cannula.  4 mg IM Narcan was administered (as patient is on oxycodone at his SNF) which led to improvement in alertness.  Per history patient has had a \"bad cough\" lately    Recent hospital admissions include 2024 admission for \"left foot osteomyelitis and UTI (urine culture without growth, initial UA had pyuria and leukocyte esterase), treated with empiric antibiotics and required Guo; developed gross hematuria and Guo replaced on 2024.  Patient was on IV ertapenem and daptomycin through right upper extremity PICC. He returned to the hospital from [facility] with AMS, was found to be hypoxic and admitted for  Echo estimated EF of 55 - 60%.   1/24/2025 TTE: LV dilated, mildly increased wall thickness, global hypokinesis, EF 30-35%.  Trops 54-> 57  EKG normal sinus rhythm with right bundle branch block, QTc prolonged at 491  BNP 5600 on presentation, increased from 1491 and 6/2024  CXR findings consistent with pulmonary edema vs pneumonitis  -Daptomycin Dc'd d/t lack of efficacy in lung and concern for med-induced pneumonitis  -Lasix 40 mg p.o. daily held due to up trending Cr  -Strict I's and O's  - Daily weights  - RFP & Mg daily  - Cardiology has been consulted, will follow any recommendations      Acute encephalopathy (improved)  Due to toxic encephalopathy 2/2 opioid and/or hypoxia  Rule out acute infection  Presented from SNF after being noted to be confused/altered, reportedly improved after Narcan administration    Infectious etiology unlikely due to lack of systemic findings to that effect. Symptoms favor toxic etiology due to initial findings of pinpoint bilateral pupils with response to Narcan.  CT head and CTA were unremarkable    1/27/2025 MRI brain:  1. No evidence for intracranial mass or acute intracranial abnormality   2. Mild chronic small vessel ischemia and a dominant area of chronic cortical/white matter infarction involving the right frontal operculum/corona radiata     -Fall precautions  -Delirium protocol in place  - Neurology following, appreciate any recommendations    Chronic Medical Conditions:  History of CVA  No residual symptoms per history  CT head: Remote infarcts in R frontal lobe, no acute ICH/mass effect  -ASA, held   - Continue statin as patient is off daptomycin    CKDIIIa:   Cr stable  - avoid nephrotoxins  - monitor with RFP and UOP    Urinary retention   Home med Flomax, held since has guo  - Guo in place    A-Fib  SSS  s/p ablation (2021)  S/p PPM  On metoprolol, currently in sinus rhythm  - Toprol XL 25 mg QD  - Eliquis 5 mg BID, held  - Amiodarone 200 mg QD  - Keep K >4, Mg

## 2025-02-02 NOTE — PLAN OF CARE
Problem: Chronic Conditions and Co-morbidities  Goal: Patient's chronic conditions and co-morbidity symptoms are monitored and maintained or improved  Outcome: Progressing  Flowsheets (Taken 2/1/2025 1950)  Care Plan - Patient's Chronic Conditions and Co-Morbidity Symptoms are Monitored and Maintained or Improved:   Monitor and assess patient's chronic conditions and comorbid symptoms for stability, deterioration, or improvement   Collaborate with multidisciplinary team to address chronic and comorbid conditions and prevent exacerbation or deterioration   Update acute care plan with appropriate goals if chronic or comorbid symptoms are exacerbated and prevent overall improvement and discharge     Problem: Discharge Planning  Goal: Discharge to home or other facility with appropriate resources  Outcome: Progressing  Flowsheets (Taken 2/1/2025 1950)  Discharge to home or other facility with appropriate resources:   Identify barriers to discharge with patient and caregiver   Arrange for needed discharge resources and transportation as appropriate   Identify discharge learning needs (meds, wound care, etc)     Problem: Pain  Goal: Verbalizes/displays adequate comfort level or baseline comfort level  2/2/2025 0222 by Massiel Edgar RN  Outcome: Progressing     Problem: Skin/Tissue Integrity  Goal: Skin integrity remains intact  Description: 1.  Monitor for areas of redness and/or skin breakdown  2.  Assess vascular access sites hourly  3.  Every 4-6 hours minimum:  Change oxygen saturation probe site  4.  Every 4-6 hours:  If on nasal continuous positive airway pressure, respiratory therapy assess nares and determine need for appliance change or resting period  Outcome: Progressing  Flowsheets (Taken 2/1/2025 1950)  Skin Integrity Remains Intact: Monitor for areas of redness and/or skin breakdown     Problem: Safety - Adult  Goal: Free from fall injury  2/2/2025 0222 by Massiel Edgar RN  Outcome:

## 2025-02-02 NOTE — PLAN OF CARE
Problem: Chronic Conditions and Co-morbidities  Goal: Patient's chronic conditions and co-morbidity symptoms are monitored and maintained or improved  2/2/2025 1100 by Nahomy Duong RN  Outcome: Progressing  Flowsheets (Taken 2/2/2025 1100)  Care Plan - Patient's Chronic Conditions and Co-Morbidity Symptoms are Monitored and Maintained or Improved:   Monitor and assess patient's chronic conditions and comorbid symptoms for stability, deterioration, or improvement   Update acute care plan with appropriate goals if chronic or comorbid symptoms are exacerbated and prevent overall improvement and discharge     Problem: Discharge Planning  Goal: Discharge to home or other facility with appropriate resources  2/2/2025 1100 by Nahomy Duong RN  Outcome: Progressing  Flowsheets (Taken 2/2/2025 1100)  Discharge to home or other facility with appropriate resources:   Arrange for needed discharge resources and transportation as appropriate   Identify discharge learning needs (meds, wound care, etc)  Note: Pt needs PT for post-amputation ADLs and wound care for surgical site     Problem: Pain  Goal: Verbalizes/displays adequate comfort level or baseline comfort level  2/2/2025 1100 by Nahomy Duong RN  Outcome: Progressing  Flowsheets (Taken 2/2/2025 1100)  Verbalizes/displays adequate comfort level or baseline comfort level:   Administer analgesics based on type and severity of pain and evaluate response   Encourage patient to monitor pain and request assistance  Note: Pt reports post-op pain in LLE    Problem: Confusion  Goal: Confusion, delirium, dementia, or psychosis is improved or at baseline  Description: INTERVENTIONS:  1. Assess for possible contributors to thought disturbance, including medications, impaired vision or hearing, underlying metabolic abnormalities, dehydration, psychiatric diagnoses, and notify attending LIP  2. [place] high risk fall precautions, as indicated  3. Provide frequent short

## 2025-02-02 NOTE — PROGRESS NOTES
Vascular Surgery   Daily Progress Note  Patient: Veronica Chaidez      CC: S/p L BKA    SUBJECTIVE:   No acute issues. Patient has pain only when he moves his stump. Otherwise he had his cast bivalved. No other issues at this time    OBJECTIVE:    PHYSICAL EXAM:    Vitals:    02/02/25 0210 02/02/25 0415 02/02/25 0535 02/02/25 0610   BP:  116/74     Pulse: 69 71  77   Resp:  18     Temp:  98.1 °F (36.7 °C)     TempSrc:  Oral     SpO2:  95%     Weight:   116.2 kg (256 lb 1.6 oz)    Height:         General appearance: alert, no acute distress  Eyes: No scleral icterus, EOM grossly intact  Neck: trachea midline, neck supple  Chest/Lungs:  normal effort with no accessory muscle use on 5L high flow NC  Cardiovascular: RRR,well perfused  Skin: warm and dry, no rashes  Extremities: no edema, no cyanosis. Left Lower extremity with IPOP dressing intact   Genitourinary: Caicedo in place with clear yellow urine  Neuro: A&Ox3, no focal deficits, sensation intact    LABS:   Recent Labs     02/01/25  0616 02/02/25  0432   WBC 7.0 9.8   HGB 10.8* 9.9*   HCT 33.3* 31.2*   MCV 83.8 84.6    291        Recent Labs     02/01/25  0616 02/02/25  0432    135*   K 5.0 4.5    100   CO2 29 29   PHOS 4.0 3.2   BUN 30* 35*   CREATININE 1.7* 2.0*       ASSESSMENT & PLAN:   This is a 74 y.o. male status post L BKA secondary to acute osteomyelitis (1/31),    - Continue IPOP to L BKA, abilities in motion team to follow   - Will plan to change stump dressing tomorrow  [facility]  - Can restart Eliquis & ASA 48hrs following surgery, can start today after 5pm  - Rest of care per primary    Aniceto Moeller DO  PGY-1, General Surgery Resident  02/02/25 7:35 AM  [phone]

## 2025-02-03 LAB
ALBUMIN SERPL-MCNC: 3.1 G/DL (ref 3.4–5)
ANION GAP SERPL CALCULATED.3IONS-SCNC: 6 MMOL/L (ref 3–16)
BASOPHILS # BLD: 0 K/UL (ref 0–0.2)
BASOPHILS NFR BLD: 0.3 %
BUN SERPL-MCNC: 40 MG/DL (ref 7–20)
CALCIUM SERPL-MCNC: 8.5 MG/DL (ref 8.3–10.6)
CHLORIDE SERPL-SCNC: 99 MMOL/L (ref 99–110)
CO2 SERPL-SCNC: 29 MMOL/L (ref 21–32)
CREAT SERPL-MCNC: 2 MG/DL (ref 0.8–1.3)
DEPRECATED RDW RBC AUTO: 16.7 % (ref 12.4–15.4)
EOSINOPHIL # BLD: 0.6 K/UL (ref 0–0.6)
EOSINOPHIL NFR BLD: 7.7 %
GFR SERPLBLD CREATININE-BSD FMLA CKD-EPI: 35 ML/MIN/{1.73_M2}
GLUCOSE BLD-MCNC: 144 MG/DL (ref 70–99)
GLUCOSE BLD-MCNC: 148 MG/DL (ref 70–99)
GLUCOSE BLD-MCNC: 181 MG/DL (ref 70–99)
GLUCOSE BLD-MCNC: 256 MG/DL (ref 70–99)
GLUCOSE SERPL-MCNC: 104 MG/DL (ref 70–99)
HCT VFR BLD AUTO: 30.2 % (ref 40.5–52.5)
HGB BLD-MCNC: 9.7 G/DL (ref 13.5–17.5)
LYMPHOCYTES # BLD: 1.2 K/UL (ref 1–5.1)
LYMPHOCYTES NFR BLD: 14.8 %
MAGNESIUM SERPL-MCNC: 2.09 MG/DL (ref 1.8–2.4)
MCH RBC QN AUTO: 27.1 PG (ref 26–34)
MCHC RBC AUTO-ENTMCNC: 32.1 G/DL (ref 31–36)
MCV RBC AUTO: 84.5 FL (ref 80–100)
MONOCYTES # BLD: 0.8 K/UL (ref 0–1.3)
MONOCYTES NFR BLD: 10.2 %
NEUTROPHILS # BLD: 5.3 K/UL (ref 1.7–7.7)
NEUTROPHILS NFR BLD: 67 %
PERFORMED ON: ABNORMAL
PHOSPHATE SERPL-MCNC: 3.7 MG/DL (ref 2.5–4.9)
PLATELET # BLD AUTO: 267 K/UL (ref 135–450)
PMV BLD AUTO: 8.6 FL (ref 5–10.5)
POTASSIUM SERPL-SCNC: 4.7 MMOL/L (ref 3.5–5.1)
RBC # BLD AUTO: 3.57 M/UL (ref 4.2–5.9)
SODIUM SERPL-SCNC: 134 MMOL/L (ref 136–145)
WBC # BLD AUTO: 7.9 K/UL (ref 4–11)

## 2025-02-03 PROCEDURE — 6370000000 HC RX 637 (ALT 250 FOR IP)

## 2025-02-03 PROCEDURE — 97530 THERAPEUTIC ACTIVITIES: CPT

## 2025-02-03 PROCEDURE — 83735 ASSAY OF MAGNESIUM: CPT

## 2025-02-03 PROCEDURE — 2060000000 HC ICU INTERMEDIATE R&B

## 2025-02-03 PROCEDURE — 1200000000 HC SEMI PRIVATE

## 2025-02-03 PROCEDURE — 97162 PT EVAL MOD COMPLEX 30 MIN: CPT

## 2025-02-03 PROCEDURE — 6370000000 HC RX 637 (ALT 250 FOR IP): Performed by: INTERNAL MEDICINE

## 2025-02-03 PROCEDURE — 85025 COMPLETE CBC W/AUTO DIFF WBC: CPT

## 2025-02-03 PROCEDURE — 2500000003 HC RX 250 WO HCPCS

## 2025-02-03 PROCEDURE — 6360000002 HC RX W HCPCS

## 2025-02-03 PROCEDURE — 97168 OT RE-EVAL EST PLAN CARE: CPT

## 2025-02-03 PROCEDURE — 99232 SBSQ HOSP IP/OBS MODERATE 35: CPT | Performed by: INTERNAL MEDICINE

## 2025-02-03 PROCEDURE — 80069 RENAL FUNCTION PANEL: CPT

## 2025-02-03 PROCEDURE — 6370000000 HC RX 637 (ALT 250 FOR IP): Performed by: NURSE PRACTITIONER

## 2025-02-03 RX ORDER — ACETAMINOPHEN 500 MG
1000 TABLET ORAL EVERY 8 HOURS PRN
Status: DISCONTINUED | OUTPATIENT
Start: 2025-02-03 | End: 2025-02-07 | Stop reason: HOSPADM

## 2025-02-03 RX ORDER — POLYETHYLENE GLYCOL 3350 17 G/17G
17 POWDER, FOR SOLUTION ORAL ONCE
Status: COMPLETED | OUTPATIENT
Start: 2025-02-03 | End: 2025-02-03

## 2025-02-03 RX ORDER — SENNA AND DOCUSATE SODIUM 50; 8.6 MG/1; MG/1
2 TABLET, FILM COATED ORAL 2 TIMES DAILY
Status: DISCONTINUED | OUTPATIENT
Start: 2025-02-03 | End: 2025-02-07 | Stop reason: HOSPADM

## 2025-02-03 RX ADMIN — INSULIN LISPRO 2 UNITS: 100 INJECTION, SOLUTION INTRAVENOUS; SUBCUTANEOUS at 12:15

## 2025-02-03 RX ADMIN — HYDROMORPHONE HYDROCHLORIDE 0.25 MG: 1 INJECTION, SOLUTION INTRAMUSCULAR; INTRAVENOUS; SUBCUTANEOUS at 15:19

## 2025-02-03 RX ADMIN — AMIODARONE HYDROCHLORIDE 200 MG: 200 TABLET ORAL at 08:01

## 2025-02-03 RX ADMIN — SENNOSIDES AND DOCUSATE SODIUM 2 TABLET: 50; 8.6 TABLET ORAL at 09:38

## 2025-02-03 RX ADMIN — APIXABAN 5 MG: 5 TABLET, FILM COATED ORAL at 20:17

## 2025-02-03 RX ADMIN — METOPROLOL SUCCINATE 25 MG: 25 TABLET, FILM COATED, EXTENDED RELEASE ORAL at 08:01

## 2025-02-03 RX ADMIN — POLYETHYLENE GLYCOL 3350 17 G: 17 POWDER, FOR SOLUTION ORAL at 09:38

## 2025-02-03 RX ADMIN — SODIUM CHLORIDE, PRESERVATIVE FREE 10 ML: 5 INJECTION INTRAVENOUS at 20:17

## 2025-02-03 RX ADMIN — Medication 6 MG: at 20:17

## 2025-02-03 RX ADMIN — LINEZOLID 600 MG: 600 TABLET, FILM COATED ORAL at 08:01

## 2025-02-03 RX ADMIN — INSULIN LISPRO 1 UNITS: 100 INJECTION, SOLUTION INTRAVENOUS; SUBCUTANEOUS at 17:00

## 2025-02-03 RX ADMIN — SENNOSIDES AND DOCUSATE SODIUM 2 TABLET: 50; 8.6 TABLET ORAL at 20:17

## 2025-02-03 ASSESSMENT — PAIN SCALES - GENERAL
PAINLEVEL_OUTOF10: 9
PAINLEVEL_OUTOF10: 0
PAINLEVEL_OUTOF10: 0
PAINLEVEL_OUTOF10: 7
PAINLEVEL_OUTOF10: 0
PAINLEVEL_OUTOF10: 0

## 2025-02-03 ASSESSMENT — PAIN - FUNCTIONAL ASSESSMENT: PAIN_FUNCTIONAL_ASSESSMENT: ACTIVITIES ARE NOT PREVENTED

## 2025-02-03 ASSESSMENT — PAIN DESCRIPTION - LOCATION: LOCATION: LEG

## 2025-02-03 ASSESSMENT — PAIN DESCRIPTION - DESCRIPTORS: DESCRIPTORS: ACHING

## 2025-02-03 ASSESSMENT — PAIN DESCRIPTION - FREQUENCY: FREQUENCY: CONTINUOUS

## 2025-02-03 ASSESSMENT — PAIN DESCRIPTION - PAIN TYPE: TYPE: SURGICAL PAIN

## 2025-02-03 ASSESSMENT — PAIN DESCRIPTION - ONSET: ONSET: ON-GOING

## 2025-02-03 ASSESSMENT — PAIN DESCRIPTION - ORIENTATION: ORIENTATION: LEFT

## 2025-02-03 NOTE — CARE COORDINATION
Patient is coming from [place]. Current services include SNF. Case management will continue to follow through hospital stay. Called [facility] and asked them to start precert today. Left phone number for them to update CM. CM will continue to follow patient until discharge.  Electronically signed by Paz Bruce RN on 2/3/2025 at 4:58 PM

## 2025-02-03 NOTE — PROGRESS NOTES
ID Follow-up NOTE    CC:   Left calcaneus osteomyelitis, Encephalopathy, Pul densities  Antibiotics: Linezolid    Admit Date: 1/24/2025  Hospital Day: 11    Subjective:     POD#3 L  BKA    No complaint    - no L leg / stump pain   - no Resp sx - denies cough, SOB    Objective:     Patient Vitals for the past 8 hrs:   BP Temp Temp src Pulse Resp SpO2 Weight   02/03/25 0754 (!) 144/84 98.1 °F (36.7 °C) Oral 70 16 93 % --   02/03/25 0555 -- -- -- 70 -- -- --   02/03/25 0531 -- -- -- -- -- -- 118 kg (260 lb 1.6 oz)   02/03/25 0450 101/68 97.6 °F (36.4 °C) Oral 69 17 91 % --     I/O last 3 completed shifts:  In: 1020 [P.O.:1020]  Out: 2300 [Urine:2300]  I/O this shift:  In: 120 [P.O.:120]  Out: 0     EXAM:  GENERAL: No apparent distress.  RA  HEENT: Membranes moist, no oral lesion  NECK:  Supple, no lymphadenopathy  LUNGS: Clear b/l, no rales, no dullness  CARDIAC: RRR, no murmur appreciated  ABD:  + BS, soft / NT  EXT:  L LE w IPOP (saw images of stump in media section)  NEURO: No focal neurologic findings  PSYCH: Orientation, sensorium, mood normal  LINES:         Data Review:  Lab Results   Component Value Date    WBC 7.9 02/03/2025    HGB 9.7 (L) 02/03/2025    HCT 30.2 (L) 02/03/2025    MCV 84.5 02/03/2025     02/03/2025     Lab Results   Component Value Date    CREATININE 2.0 (H) 02/03/2025    BUN 40 (H) 02/03/2025     (L) 02/03/2025    K 4.7 02/03/2025    CL 99 02/03/2025    CO2 29 02/03/2025       Hepatic Function Panel:   Lab Results   Component Value Date/Time    ALKPHOS 97 01/24/2025 11:42 AM    ALT see below 01/24/2025 11:42 AM    AST 36 01/24/2025 11:42 AM    BILITOT 0.5 01/24/2025 11:42 AM    BILIDIR <0.1 01/24/2025 11:42 AM    IBILI 0.4 01/24/2025 11:42 AM       MICRO:  06/28/2024 UCx > 100k S aureus (MSSA)  12/21/2024 UCx no growth                     BC x 2 no growth  12/23/2024 MRSA nares negative    1/24 SARS CoV-2, Influenza A/B PCR neg  1/24 BC x 2 no growth to date  1/25  Urine cult

## 2025-02-03 NOTE — PROGRESS NOTES
This writer able to assess pt's skin under cast with mobility team during dressing change. Area clean, dry, intact with scant amount of bloody drainage noted. Mobility team at bedside at this time performing dressing change.

## 2025-02-03 NOTE — PLAN OF CARE
Problem: Chronic Conditions and Co-morbidities  Goal: Patient's chronic conditions and co-morbidity symptoms are monitored and maintained or improved  Outcome: Progressing  Flowsheets (Taken 2/2/2025 2025)  Care Plan - Patient's Chronic Conditions and Co-Morbidity Symptoms are Monitored and Maintained or Improved:   Monitor and assess patient's chronic conditions and comorbid symptoms for stability, deterioration, or improvement   Update acute care plan with appropriate goals if chronic or comorbid symptoms are exacerbated and prevent overall improvement and discharge   Collaborate with multidisciplinary team to address chronic and comorbid conditions and prevent exacerbation or deterioration     Problem: Discharge Planning  Goal: Discharge to home or other facility with appropriate resources  Outcome: Progressing  Flowsheets (Taken 2/2/2025 2025)  Discharge to home or other facility with appropriate resources:   Identify barriers to discharge with patient and caregiver   Arrange for needed discharge resources and transportation as appropriate   Identify discharge learning needs (meds, wound care, etc)     Problem: Pain  Goal: Verbalizes/displays adequate comfort level or baseline comfort level  Outcome: Progressing     Problem: Safety - Adult  Goal: Free from fall injury  Outcome: Progressing  Flowsheets (Taken 2/2/2025 2025)  Free From Fall Injury: Instruct family/caregiver on patient safety     Problem: Respiratory - Adult  Goal: Achieves optimal ventilation and oxygenation  Outcome: Progressing     Problem: Skin/Tissue Integrity - Adult  Goal: Skin integrity remains intact  Description: 1.  Monitor for areas of redness and/or skin breakdown  2.  Assess vascular access sites hourly  3.  Every 4-6 hours minimum:  Change oxygen saturation probe site  4.  Every 4-6 hours:  If on nasal continuous positive airway pressure, respiratory therapy assess nares and determine need for appliance change or resting

## 2025-02-03 NOTE — PROGRESS NOTES
Internal Medicine H&P    Date: 2/3/2025   Patient: Veronica Chaidez   Patient : 1950     CC: Drug Overdose (Per EMS pt was given 4mg of Intranasal and responding more. )    Interval history  POD#3 L BKA  No acute events overnight.  Patient was seen and examined at bedside this morning.  He seemed comfortable without any signs of acute distress.  He denies any acute complaints patient was saturating well on room air.  Continues to be in A-fib with controlled ventricular response.  Patient remains afebrile, without leukocytosis    Hemoglobin and renal function stable  May restart anticoagulation or antiplatelet per vascular surgery    UOP 1.85 L via guo   I/O -1.25 (- 16 L since admit)  Last stool 2025, bowel regimen advanced    PT OT scores 7 and 14    CM on board for discharge disposition      HPI:  \"74-year-old M with PMH chronic systolic heart failure, CAD s/p ICD, A-fib, T2DM, CKD, class I obesity who presented from [facility]) to the [facility] ED with altered mental status.    History was obtained from review of the EMR.  Patient noted becoming unresponsive.  Has been around 1:30 AM on the day of presentation.  Last known well was 8:30 AM on the day of presentation when medicines were administered.  Patient was noted to be hypoxic with SpO2 85% and placed on 4 L O2 via nasal cannula.  4 mg IM Narcan was administered (as patient is on oxycodone at his [facility]) which led to improvement in alertness.  Per history patient has had a \"bad cough\" lately    Recent hospital admissions include 2024 admission for \"left foot osteomyelitis and UTI (urine culture without growth, initial UA had pyuria and leukocyte esterase), treated with empiric antibiotics and required Guo; developed gross hematuria and Guo replaced on 2024.  Patient was on IV ertapenem and daptomycin through right upper extremity PICC. He returned to the hospital from [facility] with AMS, was found to be hypoxic and  General

## 2025-02-03 NOTE — PROGRESS NOTES
Physical Therapy  [address]  Physical Therapy RE-Assessment    Name: Veronica Chaidez  : 1950  MRN: [id number]  Date of Service: 2/3/2025    Discharge Recommendations:  Subacute/Skilled Nursing Facility   PT Equipment Recommendations  Equipment Needed: No        Assessment  Assessment: Pt re-evaluated for PT s/p L BKA on .  Pt needing increased assist for bed mobility and transfers.  Standing balance is poor.  Pt requiring use of MAYITO stedy for safe out of bed at this time.  Ongoing limitations by L LE pain.  Pt is vague about PLOF and living situation.  Pt will need ongoing PT at d/c to increase strength and maximize mobility and trasnfers with new BKA.  Recommend up to chair daily with RN staff and use of MAYITO stedy for safety.  Will continue to follow for PT.  Treatment Diagnosis: Impaired transfers associated with L BKA.  Decision Making: Medium Complexity  Requires PT Follow-Up: Yes    Plan  General Plan:  (2-5)  Current Treatment Recommendations: Transfer training, Functional mobility training, Strengthening, Gait training, Safety education & training, Patient/Caregiver education & training    Safety Devices  Type of Devices: Left in chair, Chair alarm in place, Call light within reach, Nurse notified (LEs elevated)    Restrictions  Other Position/Activity Restrictions: Up with assist, WBAT R LE     Subjective  Chart Reviewed: Yes  Additional Pertinent Hx: 74 y.o. male admit  from LTC after being found down; (+) severe encephalopathy; head CT: (-) acute; chest CT: (+) Multifocal areas of consolidation, groundglass opacity and interlobular septal thickening presumably reflecting an acute infectious process. Superimposed pulmonary edema is also a possibility.  podiatry followin.  To OR  for left BKA.  PMH: a-fib, CAD, CABG, CHF, DM, pacemaker, L foot surgery    Diagnosis: Acute pulmonary edema    Subjective  Subjective: Pt found supine in bed.  Pt agreeable for PT.  \"I want

## 2025-02-03 NOTE — PROGRESS NOTES
Patient declined spiritual care support at this time.     02/03/25 1110   Encounter Summary   Encounter Overview/Reason Follow-up   Service Provided For Patient   Referral/Consult From [facility]   Support System Friends/neighbors   Last Encounter  02/03/25  (GRD F/U Occasionaly)   Complexity of Encounter Low   Begin Time 1100   End Time  1110   Total Time Calculated 10 min   Assessment/Intervention/Outcome   Assessment Calm   Intervention Active listening   Outcome Refused/Declined   Plan and Referrals   Plan/Referrals Continue to visit, (comment)  (Occasional)     Spiritual Health History and Assessment/Progress Note  [facility]    (P) Follow-up,  ,  ,      Name: Veronica Chaidez MRN: [id number]    Age: 74 y.o.     Sex: male   Language: English   Latter day: Anglican   Pulmonary edema, acute     Date: 2/3/2025            Total Time Calculated: (P) 10 min              Spiritual Assessment continued in [facility] PCU        Referral/Consult From: (P) Rounding   Encounter Overview/Reason: (P) Follow-up  Service Provided For: (P) Patient    Lisette, Belief, Meaning:   Patient has beliefs or practices that help with coping during difficult times  Family/Friends No family/friends present      Importance and Influence:  Patient has no beliefs influential to healthcare decision-making identified during this visit  Family/Friends No family/friends present    Community:  Patient feels well-supported. Support system includes: Friends  Family/Friends No family/friends present    Assessment and Plan of Care:     Patient Interventions include: Facilitated expression of thoughts and feelings  Family/Friends Interventions include: No family/friends present    Patient Plan of Care: Spiritual Care available upon further referral  Family/Friends Plan of Care: No family/friends present    Electronically signed by Chaplain Jackie on 2/3/2025 at 11:11 AM

## 2025-02-03 NOTE — PROGRESS NOTES
Occupational Therapy  [address]  Occupational Therapy Re-eval    Name: Veronica Chaidez  : 1950  MRN: [id number]  Date of Service: 2/3/2025    Discharge Recommendations:  Subacute/Skilled Nursing Facility  OT Equipment Recommendations  Equipment Needed: No       Patient Diagnosis(es): The primary encounter diagnosis was Altered mental status, unspecified altered mental status type. Diagnoses of Hypervolemia, unspecified hypervolemia type, Urinary retention, Open wound of left heel, initial encounter, Congestive heart failure, unspecified HF chronicity, unspecified heart failure type (HCC), Syncope, unspecified syncope type, and Acute osteomyelitis of left foot were also pertinent to this visit.  Past Medical History:  has a past medical history of A-fib (HCC), CAD, multiple vessel, Coronary artery disease involving coronary bypass graft, Diabetes mellitus (HCC), Encounter for imaging to screen for metal prior to MRI, Encounter for imaging to screen for metal prior to MRI, Enlarged prostate, Environmental allergies, Kidney stones, Pacemaker, and Systolic CHF (HCC).  Past Surgical History:  has a past surgical history that includes Cardiac catheterization (12/10/2019); Nasal polyp surgery (x2); Nasal septum surgery; Coronary artery bypass graft (N/A, 2020); Foot surgery (Left, 2024); Foot Debridement (Left, 2024); Wound Vacuum Application (Left, 2024); and Leg amputation below knee (Left, 2025).    Treatment Diagnosis: imp mob, tf, ADL      Assessment  Performance deficits / Impairments: Decreased functional mobility ;Decreased cognition;Decreased ADL status;Decreased endurance;Decreased high-level IADLs;Decreased strength;Decreased balance;Decreased safe awareness  Assessment: Pt now s/p L BKA. Pt completing mobility with Ax2 this date MAYITO knight to and from bathroom. Would benefit from cont therapies while in acute care and cont in care at NM.  Treatment  Diagnosis: imp mob, tf, ADL    REQUIRES OT FOLLOW-UP: Yes  Activity Tolerance  Activity Tolerance: Patient Tolerated treatment well     Plan  Occupational Therapy Plan  Times Per Week: 2-5  Current Treatment Recommendations: Strengthening, Balance training, Functional mobility training, Endurance training, Gait training, Equipment evaluation, education, & procurement, Patient/Caregiver education & training, Safety education & training, Cognitive reorientation, Pain management, Positioning    Restrictions  Restrictions/Precautions  Restrictions/Precautions: Weight Bearing  Lower Extremity Weight Bearing Restrictions  Left Lower Extremity Weight Bearing: Non Weight Bearing  Position Activity Restriction  Other Position/Activity Restrictions: Up with assist, WBAT R LE    Subjective  General  Chart Reviewed: Yes  Additional Pertinent Hx: 75 y/o M  who presented to the ED with AMS. 1/24 CT head was done which showed old infarct in the right frontal lobe with no acute intracranial hemorrhage or mass effect. Chest x-ray was done which showed findings consistent with pulmonary edema. X-ray of his left foot was also done which showed signs concerning for acute osteomyelitis. EKG was done which showed normal sinus rhythm. 1/24 C spine CT: (-) No acute fracture or subluxation. (+) Multilevel degenerative changes as described above with varying degrees is of neuroforaminal narrowing. 1/24 CTA head/neck: No large vessel occlusion. Remote right frontotemporal infarct. MRI brainf and LLE pending             PMHx of CAD, A Fib, T2DM, CKD, s/p ICD placement, and obesity, admitted to [facility] 12/2024 for L foot OM  Family / Caregiver Present: No  Referring Practitioner: Valery Decker MD  Diagnosis: Pulmonary Edema  Subjective  Subjective: In bed agreeable to session, reporting pain not rated     Social/Functional History  Social/Functional History  Lives With: Alone  Type of Home:  (RV)  Home Layout: One level  Bathroom Toilet:

## 2025-02-03 NOTE — PLAN OF CARE
Problem: Chronic Conditions and Co-morbidities  Goal: Patient's chronic conditions and co-morbidity symptoms are monitored and maintained or improved  2/3/2025 1417 by Jolene Winchester RN  Outcome: Progressing  Flowsheets (Taken 2/2/2025 2025 by Massiel Edgar, RN)  Care Plan - Patient's Chronic Conditions and Co-Morbidity Symptoms are Monitored and Maintained or Improved:   Monitor and assess patient's chronic conditions and comorbid symptoms for stability, deterioration, or improvement   Update acute care plan with appropriate goals if chronic or comorbid symptoms are exacerbated and prevent overall improvement and discharge   Collaborate with multidisciplinary team to address chronic and comorbid conditions and prevent exacerbation or deterioration     Problem: Discharge Planning  Goal: Discharge to home or other facility with appropriate resources  2/3/2025 1417 by Jolene Winchester RN  Outcome: Progressing  Flowsheets (Taken 2/2/2025 2025 by Massiel Edgar, RN)  Discharge to home or other facility with appropriate resources:   Identify barriers to discharge with patient and caregiver   Arrange for needed discharge resources and transportation as appropriate   Identify discharge learning needs (meds, wound care, etc)     Problem: Pain  Goal: Verbalizes/displays adequate comfort level or baseline comfort level  2/3/2025 1417 by Jolene Winchester RN  Outcome: Progressing  Flowsheets (Taken 2/3/2025 1417)  Verbalizes/displays adequate comfort level or baseline comfort level:   Encourage patient to monitor pain and request assistance   Administer analgesics based on type and severity of pain and evaluate response   Consider cultural and social influences on pain and pain management   Notify Licensed Independent Practitioner if interventions unsuccessful or patient reports new pain   Implement non-pharmacological measures as appropriate and evaluate response   Assess pain using appropriate pain scale

## 2025-02-03 NOTE — PROGRESS NOTES
Vascular Surgery   Daily Progress Note  Patient: Veronica Chaidez      CC: S/p L BKA    SUBJECTIVE:   Doing well this AM. No complaints.     OBJECTIVE:    PHYSICAL EXAM:    Vitals:    02/03/25 0200 02/03/25 0450 02/03/25 0531 02/03/25 0555   BP:  101/68     Pulse: 73 69  70   Resp:  17     Temp:  97.6 °F (36.4 °C)     TempSrc:  Oral     SpO2:  91%     Weight:   118 kg (260 lb 1.6 oz)    Height:         General appearance: alert, no acute distress  Eyes: No scleral icterus, EOM grossly intact  Neck: trachea midline, neck supple  Chest/Lungs:  normal effort with no accessory muscle use on 1L high flow NC  Cardiovascular: RRR,well perfused  Skin: warm and dry, no rashes  Extremities: no edema, no cyanosis. Left Lower extremity with IPOP dressing intact   Genitourinary: Caicedo in place with clear yellow urine  Neuro: A&Ox3, no focal deficits, sensation intact    LABS:   Recent Labs     02/02/25  0432 02/03/25  0500   WBC 9.8 7.9   HGB 9.9* 9.7*   HCT 31.2* 30.2*   MCV 84.6 84.5    267        Recent Labs     02/02/25  0432 02/03/25  0500   * 134*   K 4.5 4.7    99   CO2 29 29   PHOS 3.2 3.7   BUN 35* 40*   CREATININE 2.0* 2.0*       ASSESSMENT & PLAN:   This is a 74 y.o. male status post L BKA secondary to acute osteomyelitis (1/31),    - Continue IPOP to L BKA, abilities in motion team to follow   - Will coordinate dressing change with abilities in motion  - [facility]  - OK to restart Eliquis and ASA from vascular surgery perspective  - Recommend inpatient rehab  - Medical management per primary     Unruly Howard DO  PGY2, General Surgery  02/03/25   6:56 AM   [employer]  [phone]

## 2025-02-04 LAB
ALBUMIN SERPL-MCNC: 3 G/DL (ref 3.4–5)
ANION GAP SERPL CALCULATED.3IONS-SCNC: 7 MMOL/L (ref 3–16)
BASOPHILS # BLD: 0.1 K/UL (ref 0–0.2)
BASOPHILS NFR BLD: 0.9 %
BUN SERPL-MCNC: 42 MG/DL (ref 7–20)
CALCIUM SERPL-MCNC: 8.2 MG/DL (ref 8.3–10.6)
CHLORIDE SERPL-SCNC: 99 MMOL/L (ref 99–110)
CO2 SERPL-SCNC: 27 MMOL/L (ref 21–32)
CREAT SERPL-MCNC: 2 MG/DL (ref 0.8–1.3)
DEPRECATED RDW RBC AUTO: 16.9 % (ref 12.4–15.4)
EOSINOPHIL # BLD: 0.7 K/UL (ref 0–0.6)
EOSINOPHIL NFR BLD: 9.4 %
GFR SERPLBLD CREATININE-BSD FMLA CKD-EPI: 35 ML/MIN/{1.73_M2}
GLUCOSE BLD-MCNC: 106 MG/DL (ref 70–99)
GLUCOSE BLD-MCNC: 129 MG/DL (ref 70–99)
GLUCOSE BLD-MCNC: 190 MG/DL (ref 70–99)
GLUCOSE BLD-MCNC: 203 MG/DL (ref 70–99)
GLUCOSE SERPL-MCNC: 103 MG/DL (ref 70–99)
HCT VFR BLD AUTO: 29.6 % (ref 40.5–52.5)
HGB BLD-MCNC: 9.7 G/DL (ref 13.5–17.5)
LYMPHOCYTES # BLD: 1.3 K/UL (ref 1–5.1)
LYMPHOCYTES NFR BLD: 16.9 %
MAGNESIUM SERPL-MCNC: 2.07 MG/DL (ref 1.8–2.4)
MCH RBC QN AUTO: 27.5 PG (ref 26–34)
MCHC RBC AUTO-ENTMCNC: 32.8 G/DL (ref 31–36)
MCV RBC AUTO: 83.9 FL (ref 80–100)
MONOCYTES # BLD: 0.7 K/UL (ref 0–1.3)
MONOCYTES NFR BLD: 9.3 %
NEUTROPHILS # BLD: 5 K/UL (ref 1.7–7.7)
NEUTROPHILS NFR BLD: 63.5 %
PERFORMED ON: ABNORMAL
PHOSPHATE SERPL-MCNC: 3.1 MG/DL (ref 2.5–4.9)
PLATELET # BLD AUTO: 238 K/UL (ref 135–450)
PMV BLD AUTO: 8.3 FL (ref 5–10.5)
POTASSIUM SERPL-SCNC: 4.7 MMOL/L (ref 3.5–5.1)
RBC # BLD AUTO: 3.53 M/UL (ref 4.2–5.9)
SODIUM SERPL-SCNC: 133 MMOL/L (ref 136–145)
WBC # BLD AUTO: 7.8 K/UL (ref 4–11)

## 2025-02-04 PROCEDURE — 6370000000 HC RX 637 (ALT 250 FOR IP): Performed by: INTERNAL MEDICINE

## 2025-02-04 PROCEDURE — 83735 ASSAY OF MAGNESIUM: CPT

## 2025-02-04 PROCEDURE — 6370000000 HC RX 637 (ALT 250 FOR IP)

## 2025-02-04 PROCEDURE — 1200000000 HC SEMI PRIVATE

## 2025-02-04 PROCEDURE — 97530 THERAPEUTIC ACTIVITIES: CPT

## 2025-02-04 PROCEDURE — 80069 RENAL FUNCTION PANEL: CPT

## 2025-02-04 PROCEDURE — 2500000003 HC RX 250 WO HCPCS

## 2025-02-04 PROCEDURE — 85025 COMPLETE CBC W/AUTO DIFF WBC: CPT

## 2025-02-04 PROCEDURE — 6370000000 HC RX 637 (ALT 250 FOR IP): Performed by: NURSE PRACTITIONER

## 2025-02-04 RX ORDER — FUROSEMIDE 20 MG/1
20 TABLET ORAL 2 TIMES DAILY
Status: DISCONTINUED | OUTPATIENT
Start: 2025-02-04 | End: 2025-02-07 | Stop reason: HOSPADM

## 2025-02-04 RX ADMIN — METOPROLOL SUCCINATE 25 MG: 25 TABLET, FILM COATED, EXTENDED RELEASE ORAL at 09:26

## 2025-02-04 RX ADMIN — APIXABAN 5 MG: 5 TABLET, FILM COATED ORAL at 09:24

## 2025-02-04 RX ADMIN — SENNOSIDES AND DOCUSATE SODIUM 2 TABLET: 50; 8.6 TABLET ORAL at 19:53

## 2025-02-04 RX ADMIN — SENNOSIDES AND DOCUSATE SODIUM 2 TABLET: 50; 8.6 TABLET ORAL at 09:24

## 2025-02-04 RX ADMIN — SODIUM CHLORIDE, PRESERVATIVE FREE 10 ML: 5 INJECTION INTRAVENOUS at 19:54

## 2025-02-04 RX ADMIN — ATORVASTATIN CALCIUM 80 MG: 80 TABLET, FILM COATED ORAL at 19:53

## 2025-02-04 RX ADMIN — Medication 6 MG: at 19:54

## 2025-02-04 RX ADMIN — AMIODARONE HYDROCHLORIDE 200 MG: 200 TABLET ORAL at 09:24

## 2025-02-04 RX ADMIN — INSULIN LISPRO 1 UNITS: 100 INJECTION, SOLUTION INTRAVENOUS; SUBCUTANEOUS at 09:23

## 2025-02-04 RX ADMIN — FUROSEMIDE 20 MG: 20 TABLET ORAL at 17:17

## 2025-02-04 RX ADMIN — APIXABAN 5 MG: 5 TABLET, FILM COATED ORAL at 19:53

## 2025-02-04 RX ADMIN — INSULIN LISPRO 1 UNITS: 100 INJECTION, SOLUTION INTRAVENOUS; SUBCUTANEOUS at 17:16

## 2025-02-04 RX ADMIN — ASPIRIN 81 MG: 81 TABLET, CHEWABLE ORAL at 09:25

## 2025-02-04 ASSESSMENT — PAIN SCALES - GENERAL
PAINLEVEL_OUTOF10: 0

## 2025-02-04 NOTE — PROGRESS NOTES
Parenchymal volume loss.      SKULL: No destructive osseous process or fracture.      PARANASAL SINUSES / MASTOIDS: Mucoperiosteal thickening paranasal sinuses with air-fluid level right maxillary sinus.      ORBITS: Normal.      EXTRACRANIAL SOFT TISSUES: Normal.      OTHER: None.      IMPRESSION:      1. No large vessel occlusion.   2. Remote right frontotemporal infarct.   3. Parenchymal volume loss and chronic small vessel ischemic changes in the white matter.      Electronically signed by Denis Gee MD      CT CHEST WO CONTRAST   Final Result   1. Multifocal areas of consolidation, groundglass opacity and interlobular septal thickening presumably reflecting an acute infectious process. Superimposed pulmonary edema is also a possibility.   2. Trace pleural effusions.   3. Cardiomegaly.   4. Mediastinal lymphadenopathy, presumably reactive. Recommend follow-up chest CT to document resolution.      Electronically signed by Juventino Bella DO      CT CERVICAL SPINE WO CONTRAST   Final Result   1.  No acute fracture or subluxation.   2.  Multilevel degenerative changes as described above with varying degrees is of neuroforaminal narrowing.      Electronically signed by Choco Owen      XR CHEST PORTABLE   Final Result      1.  Findings consistent with pulmonary edema.      Electronically signed by Lenny Palm      XR FOOT LEFT (2 VIEWS)   Final Result   Plantar wound overlying the calcaneus with suggestion of periosteal   reaction and minimal erosive change concerning for acute osteomyelitis.      Electronically signed by Michele Montaño      CT HEAD WO CONTRAST   Final Result   Old infarct in the right frontal lobe.      No acute cranial hemorrhage or mass effect.      Electronically signed by Juventino Bella        Assessment & Plan   Mr Veronica Chaidez is a 75 y/o M w/ a PMH of CAD, A Fib, T2DM, CKD, s/p ICD placement, and obesity who presented to the ED with AMS.      L Diabetic Foot ulcer w/ suspected  chronic CHF (improved)  (HFrEF) and/or daptomycin induced pneumonitis, rule out acute infection  7/1/2024 Echo estimated EF of 55 - 60%.   1/24/2025 TTE: LV dilated, mildly increased wall thickness, global hypokinesis, EF 30-35%.  Trops 54-> 57  EKG normal sinus rhythm with right bundle branch block, QTc prolonged at 491  BNP 5600 on presentation, increased from 1491 and 6/2024  CXR findings consistent with pulmonary edema vs pneumonitis  -Daptomycin Dc'd d/t lack of efficacy in lung and concern for med-induced pneumonitis  -Lasix 40 mg p.o. daily held due to up trending Cr  -Strict I's and O's  - Daily weights  - RFP & Mg daily  - Cardiology has been consulted, will follow any recommendations      Acute encephalopathy (improved)  Due to toxic encephalopathy 2/2 opioid and/or hypoxia  Rule out acute infection  Presented from SNF after being noted to be confused/altered, reportedly improved after Narcan administration    Infectious etiology unlikely due to lack of systemic findings to that effect. Symptoms favor toxic etiology due to initial findings of pinpoint bilateral pupils with response to Narcan.  CT head and CTA were unremarkable    1/27/2025 MRI brain:  1. No evidence for intracranial mass or acute intracranial abnormality   2. Mild chronic small vessel ischemia and a dominant area of chronic cortical/white matter infarction involving the right frontal operculum/corona radiata     -Fall precautions  -Delirium protocol in place  - Neurology following, appreciate any recommendations    Urinary retention   Home med Flomax, held since has guo  - Guo in place    A-Fib  SSS  s/p ablation (2021)  S/p PPM  On metoprolol, currently in sinus rhythm  - Toprol XL 25 mg QD  - Eliquis 5 mg BID   - Amiodarone 200 mg QD  - Keep K >4, Mg >2  - Tele    Chronic Medical Conditions:  History of CVA  No residual symptoms per history  CT head: Remote infarcts in R frontal lobe, no acute ICH/mass effect  -ASA,   - Continue

## 2025-02-04 NOTE — PROGRESS NOTES
Vascular Surgery   Daily Progress Note  Patient: Veronica Chaidez      CC: S/p L BKA    SUBJECTIVE:   Reports no issues overnight.  Pain controlled.     OBJECTIVE:    PHYSICAL EXAM:    Vitals:    02/03/25 2015 02/03/25 2300 02/04/25 0305 02/04/25 0512   BP: (!) 110/54 118/72 122/73    Pulse: 73 70 72    Resp: 18 18 18    Temp: 97.7 °F (36.5 °C) 98.5 °F (36.9 °C) 97.6 °F (36.4 °C)    TempSrc: Oral Oral Oral    SpO2: 93% 92% 90%    Weight:    117.8 kg (259 lb 11.2 oz)   Height:         General appearance: alert, no acute distress  Chest/Lungs:  normal effort with no accessory muscle use on room air  Cardiovascular: well perfused  Skin: warm and dry, no rashes  Extremities: no edema, no cyanosis. Left Lower extremity with IPOP dressing intact   Genitourinary: Caicedo in place with clear yellow urine  Neuro: A&Ox3, no focal deficits, sensation intact    LABS:   Recent Labs     02/03/25  0500 02/04/25  0547   WBC 7.9 7.8   HGB 9.7* 9.7*   HCT 30.2* 29.6*   MCV 84.5 83.9    238        Recent Labs     02/03/25  0500 02/04/25  0500   * 133*   K 4.7 4.7   CL 99 99   CO2 29 27   PHOS 3.7 3.1   BUN 40* 42*   CREATININE 2.0* 2.0*       ASSESSMENT & PLAN:   This is a 74 y.o. male status post L BKA secondary to acute osteomyelitis (1/31),    - Continue IPOP to L BKA, abilities in motion team to follow   - Will coordinate dressing change with abilities in motion, next change on Thurs.   - MMPC  - OK to restart Eliquis and ASA from vascular surgery perspective  - PT recs SNF   - Medical management per primary     Jackelin Cardenas, FER  General Surgery

## 2025-02-04 NOTE — PLAN OF CARE
Problem: Chronic Conditions and Co-morbidities  Goal: Patient's chronic conditions and co-morbidity symptoms are monitored and maintained or improved  Outcome: Progressing  Flowsheets (Taken 2/2/2025 2025 by Massiel Edgar, RN)  Care Plan - Patient's Chronic Conditions and Co-Morbidity Symptoms are Monitored and Maintained or Improved:   Monitor and assess patient's chronic conditions and comorbid symptoms for stability, deterioration, or improvement   Update acute care plan with appropriate goals if chronic or comorbid symptoms are exacerbated and prevent overall improvement and discharge   Collaborate with multidisciplinary team to address chronic and comorbid conditions and prevent exacerbation or deterioration     Problem: Discharge Planning  Goal: Discharge to home or other facility with appropriate resources  Outcome: Progressing  Flowsheets (Taken 2/2/2025 2025 by Massiel Edgar RN)  Discharge to home or other facility with appropriate resources:   Identify barriers to discharge with patient and caregiver   Arrange for needed discharge resources and transportation as appropriate   Identify discharge learning needs (meds, wound care, etc)     Problem: Confusion  Goal: Confusion, delirium, dementia, or psychosis is improved or at baseline  Description: INTERVENTIONS:  1. Assess for possible contributors to thought disturbance, including medications, impaired vision or hearing, underlying metabolic abnormalities, dehydration, psychiatric diagnoses, and notify attending LIP  2. [place] high risk fall precautions, as indicated  3. Provide frequent short contacts to provide reality reorientation, refocusing and direction  4. Decrease environmental stimuli, including noise as appropriate  5. Monitor and intervene to maintain adequate nutrition, hydration, elimination, sleep and activity  6. If unable to ensure safety without constant attention obtain sitter and review sitter guidelines with assigned

## 2025-02-04 NOTE — CARE COORDINATION
Christine  (admission rep at [facility]) stated this a.m. that precert has been started for patient to go back to [place]. Electronically signed by Paz Bruce RN on 2/4/2025 at 10:55 AM

## 2025-02-04 NOTE — PLAN OF CARE
Problem: Chronic Conditions and Co-morbidities  Goal: Patient's chronic conditions and co-morbidity symptoms are monitored and maintained or improved  2/4/2025 1511 by Hugo Rosado  Outcome: Progressing  Flowsheets (Taken 2/4/2025 1511)  Care Plan - Patient's Chronic Conditions and Co-Morbidity Symptoms are Monitored and Maintained or Improved:   Monitor and assess patient's chronic conditions and comorbid symptoms for stability, deterioration, or improvement   Collaborate with multidisciplinary team to address chronic and comorbid conditions and prevent exacerbation or deterioration   Update acute care plan with appropriate goals if chronic or comorbid symptoms are exacerbated and prevent overall improvement and discharge     Problem: Discharge Planning  Goal: Discharge to home or other facility with appropriate resources  2/4/2025 1511 by Hugo Rosado  Outcome: Progressing  Flowsheets (Taken 2/4/2025 1511)  Discharge to home or other facility with appropriate resources:   Identify barriers to discharge with patient and caregiver   Arrange for needed discharge resources and transportation as appropriate   Identify discharge learning needs (meds, wound care, etc)     Problem: Pain  Goal: Verbalizes/displays adequate comfort level or baseline comfort level  Outcome: Progressing  Flowsheets (Taken 2/4/2025 1511)  Verbalizes/displays adequate comfort level or baseline comfort level:   Encourage patient to monitor pain and request assistance   Assess pain using appropriate pain scale   Administer analgesics based on type and severity of pain and evaluate response   Implement non-pharmacological measures as appropriate and evaluate response   Consider cultural and social influences on pain and pain management   Notify Licensed Independent Practitioner if interventions unsuccessful or patient reports new pain  Note: Pt has denied pain during shift. Will continue to monitor pt's pain level.     Problem: Skin/Tissue  Assessment  Goal: Absence of physical injury  Outcome: Progressing  Flowsheets (Taken 2/4/2025 1511)  Absence of Physical Injury: Implement safety measures based on patient assessment

## 2025-02-04 NOTE — PROGRESS NOTES
Occupational Therapy  Facility/Department: [address]  Occupational Therapy Treatment     Name: Veronica Chaidez  : 1950  MRN: [id number]  Date of Service: 2025    Discharge Recommendations:  Subacute/Skilled Nursing Facility          Patient Diagnosis(es): The primary encounter diagnosis was Altered mental status, unspecified altered mental status type. Diagnoses of Hypervolemia, unspecified hypervolemia type, Urinary retention, Open wound of left heel, initial encounter, Congestive heart failure, unspecified HF chronicity, unspecified heart failure type (HCC), Syncope, unspecified syncope type, and Acute osteomyelitis of left foot were also pertinent to this visit.  Past Medical History:  has a past medical history of A-fib (HCC), CAD, multiple vessel, Coronary artery disease involving coronary bypass graft, Diabetes mellitus (HCC), Encounter for imaging to screen for metal prior to MRI, Encounter for imaging to screen for metal prior to MRI, Enlarged prostate, Environmental allergies, Kidney stones, Pacemaker, and Systolic CHF (HCC).  Past Surgical History:  has a past surgical history that includes Cardiac catheterization (12/10/2019); Nasal polyp surgery (x2); Nasal septum surgery; Coronary artery bypass graft (N/A, 2020); Foot surgery (Left, 2024); Foot Debridement (Left, 2024); Wound Vacuum Application (Left, 2024); and Leg amputation below knee (Left, 2025).    Treatment Diagnosis: imp mob, tf, ADL      Assessment  Performance deficits / Impairments: Decreased functional mobility ;Decreased cognition;Decreased ADL status;Decreased endurance;Decreased high-level IADLs;Decreased strength;Decreased balance;Decreased safe awareness  Assessment: Pt now s/p L BKA. Pt completing mobility with Ax2 this date. Able to stand to RW 3 trials.  Would benefit from cont therapies while in acute care and cont inpt care at [facility].  REQUIRES OT FOLLOW-UP: Yes  Activity Tolerance  Activity

## 2025-02-04 NOTE — PLAN OF CARE
General Internal Medicine Attending     Chart and data reviewed.  Patient seen and examined, case discussed with medical resident.   Physical exam independently repeated.  Labs and imaging studies reviewed.         Agree with documentation, assessment and plan as outlined above with extensions, exceptions, clarifications, and addenda as follows.             73 y/o M with  chronic systolic heart failure, CAD s/p ICD placement, A Fib, T2DM, CKD, and obesity who presented from [facility] to the ED with AMS.     Appears he was found down and unresponsive next to his bed around 10:30 am after he was noted to be in his \"normal state\" at 8:30 am when he was being given his morning meds.      Vitals and blood sugar were checked and he was found to be saturating at 85%. He was placed on 4 L supplemental O2. 4 mg of IM narcan was administered as the patient is on Oxycodone at his SNF. The patient became more alert however he was still significantly altered.      Appears patient has had a \"bad cough\" as of late.      He was admitted to [facility] in 12/2024- 1/1/2025 for \"L foot OM and UTI (UCx NGTD but initial UA c/w UTI with + leukocyte esterase and pyruia. Was treated with empiric abx, required Caicedo while inpatient, developed gross hematuria and Caicedo replaced 12/31) and is currently on IV Ertapenem as well as Daptomycin through a RUE PICC line.      In the ED, patient sat at 98% on 4 L; RR 28     VBG showed a pH of 7.360 with a pCO2 of 42.0, pO2 of 55.0, and venous bicarb of 23.7.      CBC:  WBC of 7.0 and a hemoglobin of 8.7 (around his baseline), normal MCV.  Lactic acid was 0.9.      BMP showed a creatinine of 1.7 which seems to be around his baseline, although recently 1.4 .      His hepatic function was normal     Initial troponin was 54 which up trended to 57.      His BNP was 5604 with a baseline of 1491 in June 2024.      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 Majano     Chronic anemia with normocytosis  - Hgb at baseline, likely anemia of CKD  - Monitor hgb     Obesity Body mass index is 32.75 kg/m². - Complicating assessment and treatment. Placing patient at risk for multiple co-morbidities as well as early death and contributing to the patient's presentation.  on weight loss                 S/P Left BKA  Continue weaning O2  Incentive spirometry  Close monitoring of periop fluid status  Post op care: Per Vascular              Disposition:   - Ptx is from SNF: [facility]  - S/P Left BKA. - PT/OT eval: post op ordered. Pending:   - Return to SNF with placement/Precert          Valery Decker MD

## 2025-02-04 NOTE — PROGRESS NOTES
Physical Therapy    Daily Treatment Note    Discharge Recommendations:  Subacute/Skilled Nursing Facility  Equipment Needs:  Defer to next level of care    Assessment:  Pt making good progress, standing to rolling walker this date.  Pt needing less assist for transfers.  Standing balance remains poor.  Overall, pt limited by pain and fatigue.  Pt is motivated for PT with a goal use get back to using his cane.  Pt would benefit from SNF at d/c to increase strength and maximize mobility.  Will follow.     Chart Reviewed: Yes   Restrictions/Precautions: Weight Bearing Other Position/Activity Restrictions: Up with assist, WBAT R LE   Additional Pertinent Hx: 74 y.o. male admit 1/24 from LTC after being found down; (+) severe encephalopathy; head CT: (-) acute; chest CT: (+) Multifocal areas of consolidation, groundglass opacity and interlobular septal thickening presumably reflecting an acute infectious process. Superimposed pulmonary edema is also a possibility.  podiatry followin.  To OR 1/31 for left BKA.  PMH: a-fib, CAD, CABG, CHF, DM, pacemaker, L foot surgery      Diagnosis: Acute pulmonary edema   Treatment Diagnosis: Impaired transfers associated with L BKA.      Subjective:  Pt found supine in bed.  Pt reports sitting in chair earlier this morning.  Pt agreeable for PT.  \"I could try to standing.\"  \"I'm tired.\"     Pain:  L LE pain in dependent position, not rated, RN aware.  \"The blood just rushes down there.\"       Objective:    Bed mobility  Supine to sit:  Mod A (using rail, cues for safety, bed flat, assist for upper body)  Sit to Supine:  SBA (bed flat)    Transfers  Sit to stand:  Min-Mod A x2 (Trial 1 bed to rolling walker Mod A x2, cues for hand placement and technique.  Trials 2&3 pt stood from EOB to rolling walker with Min A x2)  Stand to sit:  Min-Mod A x2 (poor control to sit, cues for hand placement and safety technique)  Bed <> chair:  Min A x2 (Pt demonstrated 2-3 small lateral scoot steps

## 2025-02-05 LAB
ALBUMIN SERPL-MCNC: 3.1 G/DL (ref 3.4–5)
ANION GAP SERPL CALCULATED.3IONS-SCNC: 10 MMOL/L (ref 3–16)
BASOPHILS # BLD: 0 K/UL (ref 0–0.2)
BASOPHILS NFR BLD: 0.7 %
BUN SERPL-MCNC: 38 MG/DL (ref 7–20)
CALCIUM SERPL-MCNC: 8.3 MG/DL (ref 8.3–10.6)
CHLORIDE SERPL-SCNC: 100 MMOL/L (ref 99–110)
CO2 SERPL-SCNC: 25 MMOL/L (ref 21–32)
CREAT SERPL-MCNC: 1.9 MG/DL (ref 0.8–1.3)
DEPRECATED RDW RBC AUTO: 17 % (ref 12.4–15.4)
EOSINOPHIL # BLD: 1 K/UL (ref 0–0.6)
EOSINOPHIL NFR BLD: 15.4 %
GFR SERPLBLD CREATININE-BSD FMLA CKD-EPI: 37 ML/MIN/{1.73_M2}
GLUCOSE BLD-MCNC: 117 MG/DL (ref 70–99)
GLUCOSE BLD-MCNC: 146 MG/DL (ref 70–99)
GLUCOSE BLD-MCNC: 149 MG/DL (ref 70–99)
GLUCOSE BLD-MCNC: 162 MG/DL (ref 70–99)
GLUCOSE SERPL-MCNC: 156 MG/DL (ref 70–99)
HCT VFR BLD AUTO: 29.6 % (ref 40.5–52.5)
HGB BLD-MCNC: 9.6 G/DL (ref 13.5–17.5)
LYMPHOCYTES # BLD: 1.1 K/UL (ref 1–5.1)
LYMPHOCYTES NFR BLD: 17.6 %
MAGNESIUM SERPL-MCNC: 2.2 MG/DL (ref 1.8–2.4)
MCH RBC QN AUTO: 27.4 PG (ref 26–34)
MCHC RBC AUTO-ENTMCNC: 32.5 G/DL (ref 31–36)
MCV RBC AUTO: 84.4 FL (ref 80–100)
MONOCYTES # BLD: 0.5 K/UL (ref 0–1.3)
MONOCYTES NFR BLD: 8 %
NEUTROPHILS # BLD: 3.7 K/UL (ref 1.7–7.7)
NEUTROPHILS NFR BLD: 58.3 %
PERFORMED ON: ABNORMAL
PHOSPHATE SERPL-MCNC: 3.2 MG/DL (ref 2.5–4.9)
PLATELET # BLD AUTO: 214 K/UL (ref 135–450)
PMV BLD AUTO: 7.9 FL (ref 5–10.5)
POTASSIUM SERPL-SCNC: 4.5 MMOL/L (ref 3.5–5.1)
RBC # BLD AUTO: 3.51 M/UL (ref 4.2–5.9)
SODIUM SERPL-SCNC: 135 MMOL/L (ref 136–145)
WBC # BLD AUTO: 6.3 K/UL (ref 4–11)

## 2025-02-05 PROCEDURE — 6370000000 HC RX 637 (ALT 250 FOR IP)

## 2025-02-05 PROCEDURE — 6370000000 HC RX 637 (ALT 250 FOR IP): Performed by: STUDENT IN AN ORGANIZED HEALTH CARE EDUCATION/TRAINING PROGRAM

## 2025-02-05 PROCEDURE — 6370000000 HC RX 637 (ALT 250 FOR IP): Performed by: INTERNAL MEDICINE

## 2025-02-05 PROCEDURE — 80069 RENAL FUNCTION PANEL: CPT

## 2025-02-05 PROCEDURE — 83735 ASSAY OF MAGNESIUM: CPT

## 2025-02-05 PROCEDURE — 2500000003 HC RX 250 WO HCPCS: Performed by: STUDENT IN AN ORGANIZED HEALTH CARE EDUCATION/TRAINING PROGRAM

## 2025-02-05 PROCEDURE — 85025 COMPLETE CBC W/AUTO DIFF WBC: CPT

## 2025-02-05 PROCEDURE — 1200000000 HC SEMI PRIVATE

## 2025-02-05 RX ORDER — ASPIRIN 81 MG/1
81 TABLET, CHEWABLE ORAL DAILY
Qty: 30 TABLET | Refills: 3 | Status: SHIPPED | OUTPATIENT
Start: 2025-02-06

## 2025-02-05 RX ORDER — METOPROLOL SUCCINATE 25 MG/1
25 TABLET, EXTENDED RELEASE ORAL DAILY
Qty: 30 TABLET | Refills: 3 | Status: SHIPPED | OUTPATIENT
Start: 2025-02-06

## 2025-02-05 RX ORDER — FUROSEMIDE 20 MG/1
20 TABLET ORAL 2 TIMES DAILY
Qty: 60 TABLET | Refills: 3 | Status: SHIPPED | OUTPATIENT
Start: 2025-02-05

## 2025-02-05 RX ORDER — ATORVASTATIN CALCIUM 80 MG/1
80 TABLET, FILM COATED ORAL NIGHTLY
Qty: 30 TABLET | Refills: 3 | Status: SHIPPED | OUTPATIENT
Start: 2025-02-05

## 2025-02-05 RX ADMIN — Medication 6 MG: at 20:13

## 2025-02-05 RX ADMIN — SENNOSIDES AND DOCUSATE SODIUM 2 TABLET: 50; 8.6 TABLET ORAL at 20:13

## 2025-02-05 RX ADMIN — METOPROLOL SUCCINATE 25 MG: 25 TABLET, FILM COATED, EXTENDED RELEASE ORAL at 07:56

## 2025-02-05 RX ADMIN — SODIUM CHLORIDE, PRESERVATIVE FREE 10 ML: 5 INJECTION INTRAVENOUS at 20:13

## 2025-02-05 RX ADMIN — AMIODARONE HYDROCHLORIDE 200 MG: 200 TABLET ORAL at 07:56

## 2025-02-05 RX ADMIN — FUROSEMIDE 20 MG: 20 TABLET ORAL at 07:56

## 2025-02-05 RX ADMIN — FUROSEMIDE 20 MG: 20 TABLET ORAL at 17:11

## 2025-02-05 RX ADMIN — APIXABAN 5 MG: 5 TABLET, FILM COATED ORAL at 07:56

## 2025-02-05 RX ADMIN — ASPIRIN 81 MG: 81 TABLET, CHEWABLE ORAL at 07:56

## 2025-02-05 RX ADMIN — SODIUM CHLORIDE, PRESERVATIVE FREE 10 ML: 5 INJECTION INTRAVENOUS at 07:57

## 2025-02-05 RX ADMIN — SENNOSIDES AND DOCUSATE SODIUM 2 TABLET: 50; 8.6 TABLET ORAL at 07:56

## 2025-02-05 RX ADMIN — APIXABAN 5 MG: 5 TABLET, FILM COATED ORAL at 20:13

## 2025-02-05 ASSESSMENT — PAIN SCALES - GENERAL
PAINLEVEL_OUTOF10: 0

## 2025-02-05 NOTE — CARE COORDINATION
Cecilia King at [facility]. They got precert approved this a.m.. Setting up transport for 4:00 pm Electronically signed by Paz Bruce RN on 2/5/2025 at 9:42 AM

## 2025-02-05 NOTE — PROGRESS NOTES
4 Eyes Skin Assessment     NAME:  Veronica Chaidez  YOB: 1950  MEDICAL RECORD NUMBER:  [id number]    The patient is being assessed for  Transfer to New Unit    I agree that at least one RN has performed a thorough Head to Toe Skin Assessment on the patient. ALL assessment sites listed below have been assessed.      Areas assessed by both nurses:    Head, Face, Ears, Shoulders, Back, Chest, Arms, Elbows, Hands, Sacrum. Buttock, Coccyx, Ischium, and Legs. Feet and Heels        Does the Patient have a Wound? Yes wound(s) were present on assessment. LDA wound assessment was Initiated and completed by RN       Damaso Prevention initiated by RN: Yes  Wound Care Orders initiated by RN: No    Pressure Injury (Stage 3,4, Unstageable, DTI, NWPT, and Complex wounds) if present, place Wound referral order by RN under : No    New Ostomies, if present place, Ostomy referral order under : No     Nurse 1 eSignature: Electronically signed by Lisa Suazo RN on 2/5/25 at 6:56 PM EST    **SHARE this note so that the co-signing nurse can place an eSignature**    Nurse 2 eSignature: Electronically signed by Nellie Arriaga RN on 2/5/25 at 6:57 PM EST

## 2025-02-05 NOTE — DISCHARGE INSTR - DIET

## 2025-02-05 NOTE — PROGRESS NOTES
Internal Medicine H&P    Date: 2025   Patient: Veronica Chaidez   Patient : 1950     CC: Drug Overdose (Per EMS pt was given 4mg of Intranasal and responding more. )    Interval history  POD#4 L BKA  No acute events overnight.  Patient was seen and examined at bedside this morning.  He seemed comfortable without any signs of acute distress.  He denies any acute complaints patient was saturating well on room air.  Continues to be in A-fib with controlled ventricular response.  Patient remains afebrile, without leukocytosis    UOP 2.3 L via guo   I/O -263 mL (- 19 L since admit)    Precert approved for [place], patient states that he refuses to be DC'd to that facility as he did not receive appropriate care there previously.  Patient would like to be DC'd to [facility] on board      HPI:  \"74-year-old M with PMH chronic systolic heart failure, CAD s/p ICD, A-fib, T2DM, CKD, class I obesity who presented from [facility]) to the [facility] ED with altered mental status.    History was obtained from review of the EMR.  Patient noted becoming unresponsive.  Has been around 1:30 AM on the day of presentation.  Last known well was 8:30 AM on the day of presentation when medicines were administered.  Patient was noted to be hypoxic with SpO2 85% and placed on 4 L O2 via nasal cannula.  4 mg IM Narcan was administered (as patient is on oxycodone at his [facility]) which led to improvement in alertness.  Per history patient has had a \"bad cough\" lately    Recent hospital admissions include 2024 admission for \"left foot osteomyelitis and UTI (urine culture without growth, initial UA had pyuria and leukocyte esterase), treated with empiric antibiotics and required Guo; developed gross hematuria and Guo replaced on 2024.  Patient was on IV ertapenem and daptomycin through right upper extremity PICC. He returned to the hospital from [facility] with AMS, was found to be hypoxic and  the last 72 hours.    BNP: No results for input(s): \"BNP\" in the last 72 hours.  Pro-BNP:   No results for input(s): \"PROBNP\" in the last 72 hours.      Procalcitonin: No results for input(s): \"PROCAL\" in the last 72 hours.  CRP:   No results for input(s): \"CRP\" in the last 72 hours.    ESR: No results for input(s): \"ESR\" in the last 72 hours.    ABGs: No results for input(s): \"PHART\", \"ERQ1QOI\", \"PO2ART\" in the last 72 hours.  VBGs:   No results for input(s): \"PHVEN\", \"[id number]\", \"PO2VEN\" in the last 72 hours.      INR:   No results for input(s): \"INR\" in the last 72 hours.        COVID-19:   No results for input(s): \"COVID19\" in the last 72 hours.      U/A:   No results for input(s): \"NITRITE\", \"COLORU\", \"PHUR\", \"LABCAST\", \"WBCUA\", \"RBCUA\", \"MUCUS\", \"TRICHOMONAS\", \"YEAST\", \"BACTERIA\", \"CLARITYU\", \"SPECGRAV\", \"LEUKOCYTESUR\", \"UROBILINOGEN\", \"BILIRUBINUR\", \"BLOODU\", \"GLUCOSEU\", \"KETONES\", \"AMORPHOUS\" in the last 72 hours.      Radiology:  From patient chart, accessed 1/28/25 1/24 chest CT wo:    MRI FOOT LEFT WO CONTRAST   Final Result      1.  Severely advanced ulceration along the plantar heel with large ulcer measuring at least 3 cm x 3.5 cm with depth down to the level of the calcaneus along the lateral margin   2.  Worsening osteomyelitis of the calcaneus with cortical bone destruction and severe marrow edema which likely represents osteomyelitis and could also represent worsening stress reaction.   3.  Bright signal at the distal Achilles tendon insertion with retrocalcaneal bursal inflammation which could represent partial tearing of the Achilles tendon without retraction.   4.  Osteomyelitis affecting the distal margin of the first metatarsal amputation site in the forefoot with adjacent 18 x 15 mm fluid collection or abscess. The remainder of the amputated metatarsals marrow signal is unremarkable.   5.  Myositis along the flexor compartment and subcutaneous cellulitis.      Electronically signed by

## 2025-02-05 NOTE — PLAN OF CARE
Problem: Chronic Conditions and Co-morbidities  Goal: Patient's chronic conditions and co-morbidity symptoms are monitored and maintained or improved  2/5/2025 1257 by Jolene Winchester RN  Outcome: Adequate for Discharge  2/5/2025 0312 by Trini Bains RN  Outcome: Progressing  Flowsheets (Taken 2/4/2025 1511 by Hugo Rosado)  Care Plan - Patient's Chronic Conditions and Co-Morbidity Symptoms are Monitored and Maintained or Improved:   Monitor and assess patient's chronic conditions and comorbid symptoms for stability, deterioration, or improvement   Collaborate with multidisciplinary team to address chronic and comorbid conditions and prevent exacerbation or deterioration   Update acute care plan with appropriate goals if chronic or comorbid symptoms are exacerbated and prevent overall improvement and discharge     Problem: Discharge Planning  Goal: Discharge to home or other facility with appropriate resources  2/5/2025 1257 by Jolene Winchester RN  Outcome: Adequate for Discharge  2/5/2025 0312 by Trini Bains RN  Outcome: Progressing  Flowsheets (Taken 2/4/2025 1511 by Hugo Rosado)  Discharge to home or other facility with appropriate resources:   Identify barriers to discharge with patient and caregiver   Arrange for needed discharge resources and transportation as appropriate   Identify discharge learning needs (meds, wound care, etc)     Problem: Pain  Goal: Verbalizes/displays adequate comfort level or baseline comfort level  Outcome: Adequate for Discharge     Problem: Confusion  Goal: Confusion, delirium, dementia, or psychosis is improved or at baseline  Description: INTERVENTIONS:  1. Assess for possible contributors to thought disturbance, including medications, impaired vision or hearing, underlying metabolic abnormalities, dehydration, psychiatric diagnoses, and notify attending LIP  2. [place] high risk fall precautions, as indicated  3. Provide frequent short contacts to provide  reality reorientation, refocusing and direction  4. Decrease environmental stimuli, including noise as appropriate  5. Monitor and intervene to maintain adequate nutrition, hydration, elimination, sleep and activity  6. If unable to ensure safety without constant attention obtain sitter and review sitter guidelines with assigned personnel  7. Initiate Psychosocial CNS and Spiritual Care consult, as indicated  2/5/2025 1257 by Jolene Winchester RN  Outcome: Adequate for Discharge  2/5/2025 0312 by Trini Bains RN  Outcome: Progressing  Flowsheets (Taken 1/31/2025 1203 by Lauren Stanley RN)  Effect of thought disturbance (confusion, delirium, dementia, or psychosis) are managed with adequate functional status:   Assess for contributors to thought disturbance, including medications, impaired vision or hearing, underlying metabolic abnormalities, dehydration, psychiatric diagnoses, notify LIP   Provide frequent short contacts to provide reality reorientation, refocusing and direction   [place] high risk fall precautions, as indicated   Decrease environmental stimuli, including noise as appropriate     Problem: Skin/Tissue Integrity  Goal: Skin integrity remains intact  Description: 1.  Monitor for areas of redness and/or skin breakdown  2.  Assess vascular access sites hourly  3.  Every 4-6 hours minimum:  Change oxygen saturation probe site  4.  Every 4-6 hours:  If on nasal continuous positive airway pressure, respiratory therapy assess nares and determine need for appliance change or resting period  Outcome: Adequate for Discharge     Problem: Safety - Adult  Goal: Free from fall injury  2/5/2025 1257 by Jolene Winchester RN  Outcome: Adequate for Discharge  2/5/2025 0312 by Trini Bains RN  Outcome: Progressing  Flowsheets (Taken 2/4/2025 1511 by Hugo Rosado)  Free From Fall Injury: Instruct family/caregiver on patient safety     Problem: Neurosensory - Adult  Goal: Achieves stable or improved

## 2025-02-05 NOTE — CARE COORDINATION
To [facility] at 1600  today.   Nurse report: [phone]  Fax: [phone]  Electronically signed by Paz Bruce RN on 2/5/2025 at 9:46 AM    Addendum:  Patient refusing to go to [facility] for skilled care stay. He stated that they gave him triple the dose medication and does not want to go back. Called [facility] to let them know patient's concerns. They will look at other places in the [facility] network that patient may be able to go to and still follow. Patient has been to [facility] and feels he would get good care there. Sent referral to [facility]. They are reviewing. Patient states he would intend to  discharge to his trailer from [facility] which is his home. Also, if [facility] does not come through as desination, patient would look at another SNF if he must. Electronically signed by Paz Bruce RN on 2/5/2025 at 6:12 PM

## 2025-02-05 NOTE — PLAN OF CARE
Problem: Chronic Conditions and Co-morbidities  Goal: Patient's chronic conditions and co-morbidity symptoms are monitored and maintained or improved  2/5/2025 0312 by Trini Bains RN  Outcome: Progressing  Flowsheets (Taken 2/4/2025 1511 by Hugo Rosado)  Care Plan - Patient's Chronic Conditions and Co-Morbidity Symptoms are Monitored and Maintained or Improved:   Monitor and assess patient's chronic conditions and comorbid symptoms for stability, deterioration, or improvement   Collaborate with multidisciplinary team to address chronic and comorbid conditions and prevent exacerbation or deterioration   Update acute care plan with appropriate goals if chronic or comorbid symptoms are exacerbated and prevent overall improvement and discharge     Problem: Discharge Planning  Goal: Discharge to home or other facility with appropriate resources  2/5/2025 0312 by Trini Bains RN  Outcome: Progressing  Flowsheets (Taken 2/4/2025 1511 by Hugo Rosado)  Discharge to home or other facility with appropriate resources:   Identify barriers to discharge with patient and caregiver   Arrange for needed discharge resources and transportation as appropriate   Identify discharge learning needs (meds, wound care, etc)     Problem: Pain  Goal: Verbalizes/displays adequate comfort level or baseline comfort level  2/4/2025 1511 by Hugo Rosado  Outcome: Progressing  Flowsheets (Taken 2/4/2025 1511)  Verbalizes/displays adequate comfort level or baseline comfort level:   Encourage patient to monitor pain and request assistance   Assess pain using appropriate pain scale   Administer analgesics based on type and severity of pain and evaluate response   Implement non-pharmacological measures as appropriate and evaluate response   Consider cultural and social influences on pain and pain management   Notify Licensed Independent Practitioner if interventions unsuccessful or patient reports new pain  Note: Pt has denied pain

## 2025-02-05 NOTE — PROGRESS NOTES
Arrived to room 3310 with belongings. Aox4. Left BKA stump with dressing and ace wrap intact. Right foot has a dry piece of skin on the dorsal part of foot. Dorsalis pulsed right foot +1, no swelling notedd, right foot warm. Unable to assess left foot due to dressing over site. Patient denies pain or needs. No distress noted.

## 2025-02-05 NOTE — PROGRESS NOTES
Vascular Surgery   Daily Progress Note  Patient: Veronica Chaidez      CC: S/p L BKA    SUBJECTIVE:   Reports he is doing well. No issues overnight. Pain controlled. Excited to be going to rehab soon.     OBJECTIVE:    PHYSICAL EXAM:    Vitals:    02/04/25 1545 02/04/25 1915 02/04/25 2315 02/05/25 0355   BP: 131/76 114/69 126/69 128/70   Pulse: 69 69 69 68   Resp: 18 18 17 17   Temp: 97.6 °F (36.4 °C) 97.7 °F (36.5 °C) 98.2 °F (36.8 °C) 97.7 °F (36.5 °C)   TempSrc: Oral Oral Oral Oral   SpO2: 96% 96% 96% 95%   Weight:    115.3 kg (254 lb 1.6 oz)   Height:         General appearance: alert, no acute distress  Chest/Lungs:  normal effort with no accessory muscle use on room air  Cardiovascular: well perfused  Skin: warm and dry, no rashes  Extremities: no edema, no cyanosis. Left Lower extremity with IPOP dressing intact   Genitourinary: Caicedo in place with clear yellow urine  Neuro: A&Ox3, no focal deficits, sensation intact    LABS:   Recent Labs     02/03/25  0500 02/04/25  0547   WBC 7.9 7.8   HGB 9.7* 9.7*   HCT 30.2* 29.6*   MCV 84.5 83.9    238        Recent Labs     02/03/25  0500 02/04/25  0500   * 133*   K 4.7 4.7   CL 99 99   CO2 29 27   PHOS 3.7 3.1   BUN 40* 42*   CREATININE 2.0* 2.0*       ASSESSMENT & PLAN:   This is a 74 y.o. male status post L BKA secondary to acute osteomyelitis (1/31),    - Continue IPOP to L BKA, [facility] team to follow   - Will coordinate dressing change with [facility], next change on Thurs is still here.   - MMPC  - OK to restart Eliquis and ASA from vascular surgery perspective  - PT recs SNF   -Ok for dispo from a vascular surgery standpoint   - Medical management per primary     Jackelin Cardenas, CNP  General Surgery

## 2025-02-05 NOTE — CARE COORDINATION
Vascular stated (Jackelin Cardenas NP):Regarding his prosthetic, he will be followed by [facility] and they will follow him to his facility. Contact number is [phone]. Sent referral to [facility] and they are looking over to see if they can take. Sent a perfectserve to the attending and no response yet. Electronically signed by Paz Bruce RN on 2/5/2025 at 2:06 PM

## 2025-02-05 NOTE — PLAN OF CARE
General Internal Medicine Attending     Chart and data reviewed.  Patient seen and examined, case discussed with medical resident.   Physical exam independently repeated.  Labs and imaging studies reviewed.         Agree with documentation, assessment and plan as outlined above with extensions, exceptions, clarifications, and addenda as follows.             75 y/o M with  chronic systolic heart failure, CAD s/p ICD placement, A Fib, T2DM, CKD, and obesity who presented from [facility] ([place] to the ED with AMS.     Appears he was found down and unresponsive next to his bed around 10:30 am after he was noted to be in his \"normal state\" at 8:30 am when he was being given his morning meds.      Vitals and blood sugar were checked and he was found to be saturating at 85%. He was placed on 4 L supplemental O2. 4 mg of IM narcan was administered as the patient is on Oxycodone at his SNF. The patient became more alert however he was still significantly altered.      Appears patient has had a \"bad cough\" as of late.      He was admitted to [facility] in 12/2024- 1/1/2025 for \"L foot OM and UTI (UCx NGTD but initial UA c/w UTI with + leukocyte esterase and pyruia. Was treated with empiric abx, required Caicedo while inpatient, developed gross hematuria and Caicedo replaced 12/31) and is currently on IV Ertapenem as well as Daptomycin through a RUE PICC line.      In the ED, patient sat at 98% on 4 L; RR 28     VBG showed a pH of 7.360 with a pCO2 of 42.0, pO2 of 55.0, and venous bicarb of 23.7.      CBC:  WBC of 7.0 and a hemoglobin of 8.7 (around his baseline), normal MCV.  Lactic acid was 0.9.      BMP showed a creatinine of 1.7 which seems to be around his baseline, although recently 1.4 .      His hepatic function was normal     Initial troponin was 54 which up trended to 57.      His BNP was 5604 with a baseline of 1491 in June 2024.      CT head was done which showed old infarct in the right frontal lobe with no acute

## 2025-02-06 LAB
ALBUMIN SERPL-MCNC: 3.2 G/DL (ref 3.4–5)
ANION GAP SERPL CALCULATED.3IONS-SCNC: 8 MMOL/L (ref 3–16)
BASOPHILS # BLD: 0.1 K/UL (ref 0–0.2)
BASOPHILS NFR BLD: 0.8 %
BUN SERPL-MCNC: 35 MG/DL (ref 7–20)
CALCIUM SERPL-MCNC: 8.4 MG/DL (ref 8.3–10.6)
CHLORIDE SERPL-SCNC: 97 MMOL/L (ref 99–110)
CO2 SERPL-SCNC: 27 MMOL/L (ref 21–32)
CREAT SERPL-MCNC: 1.7 MG/DL (ref 0.8–1.3)
DEPRECATED RDW RBC AUTO: 16.7 % (ref 12.4–15.4)
EOSINOPHIL # BLD: 1.5 K/UL (ref 0–0.6)
EOSINOPHIL NFR BLD: 19.7 %
GFR SERPLBLD CREATININE-BSD FMLA CKD-EPI: 42 ML/MIN/{1.73_M2}
GLUCOSE BLD-MCNC: 104 MG/DL (ref 70–99)
GLUCOSE BLD-MCNC: 136 MG/DL (ref 70–99)
GLUCOSE BLD-MCNC: 99 MG/DL (ref 70–99)
GLUCOSE SERPL-MCNC: 158 MG/DL (ref 70–99)
HCT VFR BLD AUTO: 29.5 % (ref 40.5–52.5)
HGB BLD-MCNC: 9.7 G/DL (ref 13.5–17.5)
LYMPHOCYTES # BLD: 1.3 K/UL (ref 1–5.1)
LYMPHOCYTES NFR BLD: 17.2 %
MAGNESIUM SERPL-MCNC: 2.09 MG/DL (ref 1.8–2.4)
MCH RBC QN AUTO: 27.7 PG (ref 26–34)
MCHC RBC AUTO-ENTMCNC: 32.8 G/DL (ref 31–36)
MCV RBC AUTO: 84.4 FL (ref 80–100)
MONOCYTES # BLD: 0.8 K/UL (ref 0–1.3)
MONOCYTES NFR BLD: 10.8 %
NEUTROPHILS # BLD: 3.9 K/UL (ref 1.7–7.7)
NEUTROPHILS NFR BLD: 51.5 %
PERFORMED ON: ABNORMAL
PERFORMED ON: ABNORMAL
PERFORMED ON: NORMAL
PHOSPHATE SERPL-MCNC: 3 MG/DL (ref 2.5–4.9)
PLATELET # BLD AUTO: 204 K/UL (ref 135–450)
PMV BLD AUTO: 8.1 FL (ref 5–10.5)
POTASSIUM SERPL-SCNC: 4.6 MMOL/L (ref 3.5–5.1)
RBC # BLD AUTO: 3.5 M/UL (ref 4.2–5.9)
SODIUM SERPL-SCNC: 132 MMOL/L (ref 136–145)
WBC # BLD AUTO: 7.6 K/UL (ref 4–11)

## 2025-02-06 PROCEDURE — 6370000000 HC RX 637 (ALT 250 FOR IP)

## 2025-02-06 PROCEDURE — 2500000003 HC RX 250 WO HCPCS: Performed by: STUDENT IN AN ORGANIZED HEALTH CARE EDUCATION/TRAINING PROGRAM

## 2025-02-06 PROCEDURE — 97530 THERAPEUTIC ACTIVITIES: CPT

## 2025-02-06 PROCEDURE — 97110 THERAPEUTIC EXERCISES: CPT

## 2025-02-06 PROCEDURE — 83735 ASSAY OF MAGNESIUM: CPT

## 2025-02-06 PROCEDURE — 85025 COMPLETE CBC W/AUTO DIFF WBC: CPT

## 2025-02-06 PROCEDURE — 6370000000 HC RX 637 (ALT 250 FOR IP): Performed by: STUDENT IN AN ORGANIZED HEALTH CARE EDUCATION/TRAINING PROGRAM

## 2025-02-06 PROCEDURE — 6370000000 HC RX 637 (ALT 250 FOR IP): Performed by: INTERNAL MEDICINE

## 2025-02-06 PROCEDURE — 80069 RENAL FUNCTION PANEL: CPT

## 2025-02-06 PROCEDURE — 1200000000 HC SEMI PRIVATE

## 2025-02-06 RX ORDER — METHOCARBAMOL 500 MG/1
500 TABLET, FILM COATED ORAL ONCE
Status: COMPLETED | OUTPATIENT
Start: 2025-02-06 | End: 2025-02-06

## 2025-02-06 RX ORDER — METHOCARBAMOL 500 MG/1
500 TABLET, FILM COATED ORAL 4 TIMES DAILY PRN
Status: DISCONTINUED | OUTPATIENT
Start: 2025-02-06 | End: 2025-02-07 | Stop reason: HOSPADM

## 2025-02-06 RX ORDER — METHOCARBAMOL 750 MG/1
750 TABLET, FILM COATED ORAL 3 TIMES DAILY PRN
Qty: 30 TABLET | Refills: 0 | Status: SHIPPED | OUTPATIENT
Start: 2025-02-06 | End: 2025-02-16

## 2025-02-06 RX ORDER — METHOCARBAMOL 750 MG/1
750 TABLET, FILM COATED ORAL 4 TIMES DAILY PRN
Qty: 40 TABLET | Refills: 0 | Status: SHIPPED | OUTPATIENT
Start: 2025-02-06 | End: 2025-02-16

## 2025-02-06 RX ADMIN — SENNOSIDES AND DOCUSATE SODIUM 2 TABLET: 50; 8.6 TABLET ORAL at 10:20

## 2025-02-06 RX ADMIN — ASPIRIN 81 MG: 81 TABLET, CHEWABLE ORAL at 10:20

## 2025-02-06 RX ADMIN — FUROSEMIDE 20 MG: 20 TABLET ORAL at 10:20

## 2025-02-06 RX ADMIN — APIXABAN 5 MG: 5 TABLET, FILM COATED ORAL at 19:46

## 2025-02-06 RX ADMIN — AMIODARONE HYDROCHLORIDE 200 MG: 200 TABLET ORAL at 10:20

## 2025-02-06 RX ADMIN — SODIUM CHLORIDE, PRESERVATIVE FREE 10 ML: 5 INJECTION INTRAVENOUS at 19:47

## 2025-02-06 RX ADMIN — FUROSEMIDE 20 MG: 20 TABLET ORAL at 19:14

## 2025-02-06 RX ADMIN — METHOCARBAMOL 500 MG: 500 TABLET ORAL at 05:10

## 2025-02-06 RX ADMIN — SENNOSIDES AND DOCUSATE SODIUM 2 TABLET: 50; 8.6 TABLET ORAL at 19:46

## 2025-02-06 RX ADMIN — APIXABAN 5 MG: 5 TABLET, FILM COATED ORAL at 10:20

## 2025-02-06 RX ADMIN — Medication 6 MG: at 19:46

## 2025-02-06 RX ADMIN — METHOCARBAMOL 500 MG: 500 TABLET ORAL at 19:14

## 2025-02-06 RX ADMIN — METOPROLOL SUCCINATE 25 MG: 25 TABLET, FILM COATED, EXTENDED RELEASE ORAL at 10:20

## 2025-02-06 ASSESSMENT — PAIN DESCRIPTION - ORIENTATION: ORIENTATION: LEFT

## 2025-02-06 ASSESSMENT — PAIN DESCRIPTION - DESCRIPTORS: DESCRIPTORS: SPASM

## 2025-02-06 ASSESSMENT — PAIN SCALES - GENERAL
PAINLEVEL_OUTOF10: 0
PAINLEVEL_OUTOF10: 7

## 2025-02-06 ASSESSMENT — PAIN - FUNCTIONAL ASSESSMENT: PAIN_FUNCTIONAL_ASSESSMENT: ACTIVITIES ARE NOT PREVENTED

## 2025-02-06 ASSESSMENT — PAIN DESCRIPTION - LOCATION: LOCATION: LEG

## 2025-02-06 NOTE — DISCHARGE SUMMARY
INTERNAL MEDICINE DEPARTMENT AT THE [facility]  DISCHARGE SUMMARY    Patient ID: Veronica Chaiedz                                             Discharge Date: 2/7/2025   Patient's PCP: Good Renee MD                                          Discharge Physician: Valery Decker MD  Admit Date: 1/24/2025   Admitting Physician: Vinay Reid MD    DISCHARGE DIAGNOSES:  -Acute encephalopathy  -Acute hypoxic respiratory failure  -Acute exacerbation of chronic CHF  -Atrial fibrillation with controlled ventricular response  -Chronic urinary retention  -Left diabetic foot ulcer infection with left calcaneus osteomyelitis, treated with left below the knee amputation  -Chronic normocytic anemia with elevated RDW  -Type 2 diabetes mellitus, with complications including diabetic foot wound infection and osteomyelitis  - History of coronary artery disease, status post CABG (2020)  -History of atrial arrhythmia, sick sinus syndrome, ischemic cardiomyopathy, status post ablation and PPM  - History of CVA  - History of CKD 3A  -Class I obesity      Hospital Course:      Veronica Chaidez is a 74 y.o. male The patient is a 74-year-old male with a complex medical history, including chronic heart failure, coronary artery disease (CAD) status post-internal cardiac defibrillator (ICD) placement, atrial fibrillation (Afib), type 2 diabetes mellitus (T2DM), chronic kidney disease (CKD), and class I obesity. He presented to the emergency department with altered mental status (AMS), likely due to opioid toxicity from his oxycodone medication, as improvement in his mental status was noted after the administration of Narcan. However, hypoxia, metabolic encephalopathy, and other factors were also considered in his evaluation. A CT of the head revealed old infarcts in the right frontal lobe, but no acute intracranial abnormalities. His history of chronic heart failure was complicated by a recent decline in left ventricular ejection

## 2025-02-06 NOTE — OP NOTE
tubing;Drainage bag less than half full 01/03/25 1400   Status Draining;Patent 01/03/25 1400   Manual Irrigation Volume Input (mL) 120 mL 01/02/25 1600   Output (mL) 150 mL 01/03/25 1245       Findings:  Infection Present At Time Of Surgery (PATOS) (choose all levels that have infection present):  - Deep Infection (muscle/fascia) present as evidenced by fluid consistent with infection  Other Findings: left below the knee amputation    Detailed Description of Procedure:   The patient was brought to the operating room and placed in the supine position. Pre-operative time-out was conducted and all present were in agreement. Patient underwent induction of general anesthesia. He was prepped and draped in typical sterile fashion.     The left lower extremity was exsanguinated with an elastic wrap, after which the tourniquet was inflated. A #10 blade scalpel was used to begin the incision on the anterior leg approximately four fingerbreadths distal to the tibial tuberosity, which was extended laterally to a location that would achieve an anterior incision of approximately two-thirds the leg circumference when coupled with medial incision extension that immediately followed. The incision was then extended distally in a longitudinal manner on both the lateral and medial sides to allow for adequate coverage via a posterior flap. The posterior skin incision was then made to complete the circumferential incision of the remaining third of the lower extremity. The incision was then carried down through the subcutaneous tissue, fascia, and muscles of the anterior and lateral compartments with electrocautery. Next a periosteal elevator was used to mobilize the periosteum of the tibia and fibula. Following periosteal elevation, an oscillating saw was used to divide the tibia and fibula and sharp edges were filed down with a rasp. A abiola knife was then used to create the posterior flap, including division of the superficial and  deep posterior compartment contents. Excess tissue was removed from the flap with electrocautery. Major vascular bundles were located and suture ligated and major nerves were divided in a manner to facilitate retraction.     The tourniquet was deflated and hemostasis was achieved with both suture ligation and electrocautery. Pulse lavage was then used to aggressively irrigate the surgical site. Hemostasis was evaluated and electrocautery was used to ensure excellent hemostasis, which was achieved prior to closure. Interrupted 2-0 absorbable sutures were used to approximate the deep fascia, following which the skin was closed with 3-0 interrupted deep dermal sutures and staples along with interrupted vertical mattress sutures.     The stump was dressed with Xeroform gauze and  Kerlix and [facility] applied an immediate post operative prostethetic without issue.     The patient emerged from anesthesia without any issues and was successfully extubated, after which she was brought to the PACU for recovery.     Dr. Soto was scrubbed and present for the entire case.    Electronically signed by Rich Rolon DO on 2/6/2025 at 11:51 AM

## 2025-02-06 NOTE — PROGRESS NOTES
Internal Medicine H&P    Date: 2025   Patient: Veronica Chaidez   Patient : 1950     CC: Drug Overdose (Per EMS pt was given 4mg of Intranasal and responding more. )    Interval history  POD#5 L BKA  Patient reportedly complained of pain overnight which was eventually improved after Robaxin..  Patient was seen and examined at bedside this morning.  Seemed comfortable without any sign of acute distress.  Patient denies any acute complaints    Continues to saturate well on room air  Afebrile without leukocytosis  Hemoglobin stable  Renal function improving creatinine 2.0-> 1.9-> 1.7    UOP 2.325 L via guo   I/O -263 mL (- 19 L since admit)    CM on board for discharge disposition  Patient refused to be discharged to [place]. Prefers to go to [facility]      HPI:  \"74-year-old M with PMH chronic systolic heart failure, CAD s/p ICD, A-fib, T2DM, CKD, class I obesity who presented from [facility]) to the [facility] ED with altered mental status.    History was obtained from review of the EMR.  Patient noted becoming unresponsive.  Has been around 1:30 AM on the day of presentation.  Last known well was 8:30 AM on the day of presentation when medicines were administered.  Patient was noted to be hypoxic with SpO2 85% and placed on 4 L O2 via nasal cannula.  4 mg IM Narcan was administered (as patient is on oxycodone at his [facility]) which led to improvement in alertness.  Per history patient has had a \"bad cough\" lately    Recent hospital admissions include 2024 admission for \"left foot osteomyelitis and UTI (urine culture without growth, initial UA had pyuria and leukocyte esterase), treated with empiric antibiotics and required Guo; developed gross hematuria and Guo replaced on 2024.  Patient was on IV ertapenem and daptomycin through right upper extremity PICC. He returned to the hospital from [facility] with AMS, was found to be hypoxic and admitted for further evaluation and  of education: None    Highest education level: None   Tobacco Use    Smoking status: Never    Smokeless tobacco: Never   Substance and Sexual Activity    Alcohol use: Not Currently     Comment: rare    Drug use: Never    Sexual activity: Not Currently     Social Determinants of Health     Financial Resource Strain: Low Risk  (2/16/2023)    Overall Financial Resource Strain (CARDIA)     Difficulty of Paying Living Expenses: Not very hard   Food Insecurity: Patient Unable To Answer (1/24/2025)    Hunger Vital Sign     Worried About Running Out of Food in the Last Year: Patient unable to answer     Ran Out of Food in the Last Year: Patient unable to answer   Recent Concern: Food Insecurity - Food Insecurity Present (12/21/2024)    Hunger Vital Sign     Worried About Running Out of Food in the Last Year: Often true     Ran Out of Food in the Last Year: Often true   Transportation Needs: Patient Unable To Answer (1/24/2025)    PRAPARE - Transportation     Lack of Transportation (Medical): Patient unable to answer     Lack of Transportation (Non-Medical): Patient unable to answer   Physical Activity: Not on File (7/19/2022)    Received from [facility]    Physical Activity     Physical Activity: 0   Stress: Not on File (7/19/2022)    Received from [facility]    Stress     Stress: 0   Social Connections: Not on File (7/19/2022)    Received from [facility]    Social Connections     Social Connections and Isolation: 0   Housing Stability: Patient Unable To Answer (1/24/2025)    Housing Stability Vital Sign     Unable to Pay for Housing in the Last Year: Patient unable to answer     Number of Times Moved in the Last Year: 1     Homeless in the Last Year: Patient unable to answer        FHx:      Problem Relation Age of Onset    Dementia Mother         Vascular    Alzheimer's Disease Mother     Heart Attack Father     Diabetes Paternal Aunt        Review of Systems   Per HPI    Objective     Vital Signs:  Patient Vitals

## 2025-02-06 NOTE — PROGRESS NOTES
Occupational Therapy  Facility/Department: [address]  Daily Treatment Note  NAME: Veronica Chaidez  : 1950  MRN: [id number]    Date of Service: 2025    Discharge Recommendations:  Subacute/Skilled Nursing Facility  OT Equipment Recommendations  Other: defer at this time      Patient Diagnosis(es): The primary encounter diagnosis was Altered mental status, unspecified altered mental status type. Diagnoses of Hypervolemia, unspecified hypervolemia type, Urinary retention, Open wound of left heel, initial encounter, Congestive heart failure, unspecified HF chronicity, unspecified heart failure type (HCC), Syncope, unspecified syncope type, and Acute osteomyelitis of left foot were also pertinent to this visit.     Assessment   Assessment: Pt agreeable and motivated to participate in OT tx and tolerates session well. Pt continues to function below baseline and demos decreased activity tolerance, decreased balance, and generalized weakness. Pt requires Alvina for functional transfers. Pt participates in BUE therex HEP with fair tolerance. Pt will benefit from continued skilled OT to address above deficits and progress towards PLOF. Cont per POC.  Activity Tolerance: Patient tolerated treatment well  Discharge Recommendations: Subacute/Skilled Nursing Facility  Other: defer at this time     Plan  Occupational Therapy Plan  Times Per Week: 2-5  Current Treatment Recommendations: Strengthening;Balance training;Functional mobility training;Endurance training;Gait training;Equipment evaluation, education, & procurement;Patient/Caregiver education & training;Safety education & training;Cognitive reorientation;Pain management;Positioning    Restrictions  Position Activity Restriction  Other Position/Activity Restrictions: Up with assist, WBAT R LE, NWB LLE    Subjective  75 y/o M who presented to the ED with AMS.  CT head was done which showed old infarct in the right frontal lobe with no acute intracranial

## 2025-02-06 NOTE — CARE COORDINATION
ADDENDUM:  1:35 PM    Pt met w/sourav and agreed to go to [facility] if [facility] is denied.     SW met w/pt at bedside. Pt confirmed he would like to go to [place] if [facility] is not approved.     Electronically signed by EYAL Calvert LSW, ACM-SW on 2/6/2025 at 1:35 PM    12:04 PM    [facility] can accept pt.     Electronically signed by EYAL Calvert LSW, ACM-SW on 2/6/2025 at 12:04 PM    11:53 AM    [facility] accepted and started     SW sent referral to [facility], clare spoke to admissions they will review and call back.     CLARE spoke sourav at [facility] she is here now and will come meet with pt at bedside.     SW following.  Electronically signed by EYAL Calvert LSW, ACM-SW on 2/6/2025 at 11:53 AM  [phone]

## 2025-02-06 NOTE — PROGRESS NOTES
Vascular Surgery   Daily Progress Note  Patient: Veronica Chaidez      CC: S/p L BKA    SUBJECTIVE:   No complaints. Pain controlled.     OBJECTIVE:    PHYSICAL EXAM:    Vitals:    02/05/25 1136 02/05/25 1524 02/05/25 2010 02/06/25 0339   BP: (!) 154/85 (!) 149/87 (!) 143/77 (!) 113/53   Pulse: 85 70 74 72   Resp: 18 20 18 16   Temp: 97.8 °F (36.6 °C) 98 °F (36.7 °C) 97.2 °F (36.2 °C) 97.5 °F (36.4 °C)   TempSrc: Oral Oral Oral Oral   SpO2: 98% 99% 98% 97%   Weight:       Height:         General appearance: alert, no acute distress  Chest/Lungs:  normal effort with no accessory muscle use on room air  Cardiovascular: well perfused  Skin: warm and dry, no rashes  Extremities: no edema, no cyanosis. Left Lower extremity with IPOP dressing intact   Neuro: A&Ox3, no focal deficits, sensation intact    LABS:   Recent Labs     02/05/25  0600 02/06/25  0520   WBC 6.3 7.6   HGB 9.6* 9.7*   HCT 29.6* 29.5*   MCV 84.4 84.4    204        Recent Labs     02/05/25  0600 02/06/25  0520   * 132*   K 4.5 4.6    97*   CO2 25 27   PHOS 3.2 3.0   BUN 38* 35*   CREATININE 1.9* 1.7*       ASSESSMENT & PLAN:   This is a 74 y.o. male status post L BKA secondary to acute osteomyelitis (1/31),    - Continue IPOP to L BKA, [facility] team to follow   - Will coordinate dressing change with [facility], next change today  - MMPC  - OK to restart Eliquis and ASA from vascular surgery perspective  - PT recs SNF   -Ok for dispo from a vascular surgery standpoint   - Medical management per primary     Unruly Howard DO  PGY2, General Surgery  02/06/25   6:32 AM   [employer]  [phone]

## 2025-02-06 NOTE — PROGRESS NOTES
assistance    Balance  Sitting: High guard  Standing: Impaired  Standing - Static: Fair  Standing - Dynamic: poor    Transfer Training  Sit to Stand: Contact guard assistance<>Minimal assistance  Stand to Sit: Minimal assistance (poor eccentric control)  Sit pivot: Contact guard assistance    PT Exercises  2 static stands with a RW for approx 30\"   APs x20 RLE  GS 15x5\" BLE  SLR x10 BLE  Hip Abd/Add x10 BLE  LAQ x12 RLE        Safety Devices  Type of Devices: Call light within reach;Nurse notified;Gait belt;Chair alarm in place;Left in chair    Goals  Short Term Goals  Time Frame for Short Term Goals: Discharge  Short Term Goal 1: supine <> sit SBA  Short Term Goal 2: sit <> stand Min A  Short Term Goal 3: bed <> chair Min A  Patient Goals   Patient Goals : Return to rehab    Education  Patient Education  Education Given To: Patient  Education Provided: Role of Therapy;Plan of Care;Mobility Training  Education Method: Verbal  Barriers to Learning: Cognition  Education Outcome: Demonstrated understanding;Continued education needed    AM-PAC - Mobility    AM-PAC Basic Mobility - Inpatient   How much help is needed turning from your back to your side while in a flat bed without using bedrails?: A Little  How much help is needed moving from lying on your back to sitting on the side of a flat bed without using bedrails?: A Little  How much help is needed moving to and from a bed to a chair?: A Little  How much help is needed standing up from a chair using your arms?: A Little  How much help is needed walking in hospital room?: Total  How much help is needed climbing 3-5 steps with a railing?: Total  AM-PAC Inpatient Mobility Raw Score : 14  AM-PAC Inpatient T-Scale Score : 38.1  Mobility Inpatient CMS 0-100% Score: 61.29  Mobility Inpatient CMS G-Code Modifier : CL     Therapy Time   Individual Concurrent Group Co-treatment   Time In 0755         Time Out 0855         Minutes 60             Coded time: 60 mins   David SILVER

## 2025-02-07 VITALS
OXYGEN SATURATION: 95 % | WEIGHT: 254.1 LBS | HEIGHT: 74 IN | BODY MASS INDEX: 32.61 KG/M2 | DIASTOLIC BLOOD PRESSURE: 60 MMHG | SYSTOLIC BLOOD PRESSURE: 112 MMHG | TEMPERATURE: 97.5 F | RESPIRATION RATE: 16 BRPM | HEART RATE: 69 BPM

## 2025-02-07 LAB
ALBUMIN SERPL-MCNC: 3.1 G/DL (ref 3.4–5)
ANION GAP SERPL CALCULATED.3IONS-SCNC: 7 MMOL/L (ref 3–16)
BASOPHILS # BLD: 0.1 K/UL (ref 0–0.2)
BASOPHILS NFR BLD: 0.9 %
BUN SERPL-MCNC: 32 MG/DL (ref 7–20)
CALCIUM SERPL-MCNC: 8.5 MG/DL (ref 8.3–10.6)
CHLORIDE SERPL-SCNC: 100 MMOL/L (ref 99–110)
CO2 SERPL-SCNC: 27 MMOL/L (ref 21–32)
CREAT SERPL-MCNC: 1.8 MG/DL (ref 0.8–1.3)
DEPRECATED RDW RBC AUTO: 16.7 % (ref 12.4–15.4)
EOSINOPHIL # BLD: 1.8 K/UL (ref 0–0.6)
EOSINOPHIL NFR BLD: 20.9 %
GFR SERPLBLD CREATININE-BSD FMLA CKD-EPI: 39 ML/MIN/{1.73_M2}
GLUCOSE BLD-MCNC: 138 MG/DL (ref 70–99)
GLUCOSE BLD-MCNC: 140 MG/DL (ref 70–99)
GLUCOSE SERPL-MCNC: 92 MG/DL (ref 70–99)
HCT VFR BLD AUTO: 28.9 % (ref 40.5–52.5)
HGB BLD-MCNC: 9.5 G/DL (ref 13.5–17.5)
LYMPHOCYTES # BLD: 1.3 K/UL (ref 1–5.1)
LYMPHOCYTES NFR BLD: 15 %
MAGNESIUM SERPL-MCNC: 2.12 MG/DL (ref 1.8–2.4)
MCH RBC QN AUTO: 27.4 PG (ref 26–34)
MCHC RBC AUTO-ENTMCNC: 32.9 G/DL (ref 31–36)
MCV RBC AUTO: 83.2 FL (ref 80–100)
MONOCYTES # BLD: 1 K/UL (ref 0–1.3)
MONOCYTES NFR BLD: 11.8 %
NEUTROPHILS # BLD: 4.5 K/UL (ref 1.7–7.7)
NEUTROPHILS NFR BLD: 51.4 %
PERFORMED ON: ABNORMAL
PERFORMED ON: ABNORMAL
PHOSPHATE SERPL-MCNC: 3.3 MG/DL (ref 2.5–4.9)
PLATELET # BLD AUTO: 218 K/UL (ref 135–450)
PMV BLD AUTO: 7.9 FL (ref 5–10.5)
POTASSIUM SERPL-SCNC: 4.6 MMOL/L (ref 3.5–5.1)
RBC # BLD AUTO: 3.47 M/UL (ref 4.2–5.9)
SODIUM SERPL-SCNC: 134 MMOL/L (ref 136–145)
WBC # BLD AUTO: 8.7 K/UL (ref 4–11)

## 2025-02-07 PROCEDURE — 83735 ASSAY OF MAGNESIUM: CPT

## 2025-02-07 PROCEDURE — 6370000000 HC RX 637 (ALT 250 FOR IP)

## 2025-02-07 PROCEDURE — 6370000000 HC RX 637 (ALT 250 FOR IP): Performed by: INTERNAL MEDICINE

## 2025-02-07 PROCEDURE — 85025 COMPLETE CBC W/AUTO DIFF WBC: CPT

## 2025-02-07 PROCEDURE — 80069 RENAL FUNCTION PANEL: CPT

## 2025-02-07 PROCEDURE — 2500000003 HC RX 250 WO HCPCS: Performed by: STUDENT IN AN ORGANIZED HEALTH CARE EDUCATION/TRAINING PROGRAM

## 2025-02-07 PROCEDURE — 6370000000 HC RX 637 (ALT 250 FOR IP): Performed by: STUDENT IN AN ORGANIZED HEALTH CARE EDUCATION/TRAINING PROGRAM

## 2025-02-07 RX ADMIN — METHOCARBAMOL 500 MG: 500 TABLET ORAL at 00:56

## 2025-02-07 RX ADMIN — APIXABAN 5 MG: 5 TABLET, FILM COATED ORAL at 09:33

## 2025-02-07 RX ADMIN — ASPIRIN 81 MG: 81 TABLET, CHEWABLE ORAL at 09:33

## 2025-02-07 RX ADMIN — SENNOSIDES AND DOCUSATE SODIUM 2 TABLET: 50; 8.6 TABLET ORAL at 09:33

## 2025-02-07 RX ADMIN — FUROSEMIDE 20 MG: 20 TABLET ORAL at 09:33

## 2025-02-07 RX ADMIN — ACETAMINOPHEN 1000 MG: 500 TABLET, FILM COATED ORAL at 09:33

## 2025-02-07 RX ADMIN — METHOCARBAMOL 500 MG: 500 TABLET ORAL at 06:49

## 2025-02-07 RX ADMIN — SODIUM CHLORIDE, PRESERVATIVE FREE 10 ML: 5 INJECTION INTRAVENOUS at 09:32

## 2025-02-07 RX ADMIN — AMIODARONE HYDROCHLORIDE 200 MG: 200 TABLET ORAL at 09:33

## 2025-02-07 RX ADMIN — METOPROLOL SUCCINATE 25 MG: 25 TABLET, FILM COATED, EXTENDED RELEASE ORAL at 09:33

## 2025-02-07 ASSESSMENT — PAIN SCALES - GENERAL
PAINLEVEL_OUTOF10: 9
PAINLEVEL_OUTOF10: 2
PAINLEVEL_OUTOF10: 3
PAINLEVEL_OUTOF10: 5
PAINLEVEL_OUTOF10: 5
PAINLEVEL_OUTOF10: 3
PAINLEVEL_OUTOF10: 6

## 2025-02-07 ASSESSMENT — PAIN DESCRIPTION - DESCRIPTORS
DESCRIPTORS: SPASM;CRAMPING
DESCRIPTORS: ACHING
DESCRIPTORS: ACHING;DISCOMFORT
DESCRIPTORS: ACHING

## 2025-02-07 ASSESSMENT — PAIN DESCRIPTION - ORIENTATION
ORIENTATION: LEFT

## 2025-02-07 ASSESSMENT — PAIN DESCRIPTION - ONSET: ONSET: ON-GOING

## 2025-02-07 ASSESSMENT — PAIN DESCRIPTION - LOCATION
LOCATION: LEG

## 2025-02-07 ASSESSMENT — PAIN - FUNCTIONAL ASSESSMENT
PAIN_FUNCTIONAL_ASSESSMENT: ACTIVITIES ARE NOT PREVENTED

## 2025-02-07 NOTE — PROGRESS NOTES
Amadeo,       MRI brain without contrast   Final Result      1. No evidence for intracranial mass or acute intracranial abnormality   2. Mild chronic small vessel ischemia and a dominant area of chronic cortical/white matter infarction involving the right frontal operculum/corona radiata   3. Left temporalis muscle 13 mm circumscribed lesion is indeterminant. Hematoma is a possibility given the small amount of intralesional T1 hyperintensity and the suggested susceptibility of the lesion on the SWI pulse sequence. Correlate for evidence of    acute trauma to this region.      Electronically signed by Jalen Machuca MD      CTA HEAD NECK W CONTRAST   Final Result      1. No hemodynamically significant stenosis.   2. Consolidation in lungs with small left pleural effusion.      EXAM: CT ANGIOGRAPHY HEAD WITH/WITHOUT CONTRAST      INDICATION: evaluate for posterior circulation stenosis      COMPARISON: CT angiogram head and neck from 7/4/2019.      TECHNIQUE: Axial CT imaging obtained through the head prior to and following administration of IV contrast. Axial images, multiplanar reformatted images, and maximum intensity projection images were reviewed for CT angiographic technique.   IV contrast: Administered.      FINDINGS:      ANTERIOR CIRCULATION: Calcification cavernous internal carotid arteries without significant stenosis. Anterior and middle cerebral arteries patent.      POSTERIOR CIRCULATION: Fetal origin of the right posterior cerebral artery. Normal left posterior cerebral artery. Normal vertebral arteries and basilar artery.      INTRACRANIAL VENOUS SYSTEM: Arachnoid granulation right transverse sinus.      INTRACRANIAL HEMORRHAGE: None.      VENTRICLES: Lateral ventricles mildly dilated related to parenchymal volume loss.      BRAIN PARENCHYMA: Mild cerebral parenchymal volume loss. Right temporal and frontal encephalomalacia. Mild low-attenuation white matter. Parenchymal volume loss.      SKULL:  of chronic CHF (improved)  (HFrEF) and/or daptomycin induced pneumonitis, rule out acute infection  7/1/2024 Echo estimated EF of 55 - 60%.   1/24/2025 TTE: LV dilated, mildly increased wall thickness, global hypokinesis, EF 30-35%.  Trops 54-> 57  EKG normal sinus rhythm with right bundle branch block, QTc prolonged at 491  BNP 5600 on presentation, increased from 1491 and 6/2024  CXR findings consistent with pulmonary edema vs pneumonitis  -Daptomycin Dc'd d/t lack of efficacy in lung and concern for med-induced pneumonitis  -Lasix 40 mg p.o. daily held due to up trending Cr  -Strict I's and O's  - Daily weights  - RFP & Mg daily  - Cardiology has been consulted, will follow any recommendations      Acute encephalopathy (improved)  Due to toxic encephalopathy 2/2 opioid and/or hypoxia  Rule out acute infection  Presented from SNF after being noted to be confused/altered, reportedly improved after Narcan administration    Infectious etiology unlikely due to lack of systemic findings to that effect. Symptoms favor toxic etiology due to initial findings of pinpoint bilateral pupils with response to Narcan.  CT head and CTA were unremarkable    1/27/2025 MRI brain:  1. No evidence for intracranial mass or acute intracranial abnormality   2. Mild chronic small vessel ischemia and a dominant area of chronic cortical/white matter infarction involving the right frontal operculum/corona radiata     -Fall precautions  -Delirium protocol in place  - Neurology following, appreciate any recommendations    Urinary retention   Home med Flomax, held since has guo  - Guo in place  -Per urology recs, patient to be discharged with Guo and follow-up outpatient with urology for removal of urinary catheter    A-Fib  SSS  s/p ablation (2021)  S/p PPM  On metoprolol, currently in sinus rhythm  - Toprol XL 25 mg QD  - Eliquis 5 mg BID   - Amiodarone 200 mg QD  - Keep K >4, Mg >2      Chronic Medical Conditions:  History of CVA  No

## 2025-02-07 NOTE — CARE COORDINATION
Case Management Assessment            Discharge Note                    Date / Time of Note: 2/7/2025 10:57 AM                  Discharge Note Completed by: Yasmine Odonnell MSW, LSW    Patient Name: Veronica Chaidez   YOB: 1950  Diagnosis: Urinary retention [R33.9]  Pulmonary edema, acute [J81.0]  Open wound of left heel, initial encounter [S91.302A]  Altered mental status, unspecified altered mental status type [R41.82]  Syncope, unspecified syncope type [R55]  Hypervolemia, unspecified hypervolemia type [E87.70]  Congestive heart failure, unspecified HF chronicity, unspecified heart failure type (HCC) [I50.9]   Date / Time: 1/24/2025 11:16 AM    Current PCP: Good Renee MD  Clinic patient: No    Hospitalization in the last 30 days: Yes  Readmission Assessment  Number of Days since last admission?: 8-30 days  Previous Disposition: SNF  Who is being Interviewed: Patient  What was the patient's/caregiver's perception as to why they think they needed to return back to the hospital?: Other (Comment) (overdose at NH)  Did you visit your Primary Care Physician after you left the hospital, before you returned this time?: No  Why weren't you able to visit your PCP?: Other (Comment) (at nh)  Did you see a specialist, such as Cardiac, Pulmonary, Orthopedic Physician, etc. after you left the hospital?: No  Who advised the patient to return to the hospital?: Skilled Unit  Does the patient report anything that got in the way of taking their medications?: No  In our efforts to provide the best possible care to you and others like you, can you think of anything that we could have done to help you after you left the hospital the first time, so that you might not have needed to return so soon?: Other (Comment) (none)    Advance Directives:  Code Status: DNR-[facility] DNR form completed and on chart: Yes    Financial:  Payor: [employer] [place] MEDICAID / Plan: [employer] [place] MEDICA / Product

## 2025-02-07 NOTE — PLAN OF CARE
General Internal Medicine Attending     Chart and data reviewed.  Patient seen and examined, case discussed with medical resident.   Physical exam independently repeated.  Labs and imaging studies reviewed.         Agree with documentation, assessment and plan as outlined above with extensions, exceptions, clarifications, and addenda as follows.             75 y/o M with  chronic systolic heart failure, CAD s/p ICD placement, A Fib, T2DM, CKD, and obesity who presented from [facility] to the ED with AMS.     Appears he was found down and unresponsive next to his bed around 10:30 am after he was noted to be in his \"normal state\" at 8:30 am when he was being given his morning meds.      Vitals and blood sugar were checked and he was found to be saturating at 85%. He was placed on 4 L supplemental O2. 4 mg of IM narcan was administered as the patient is on Oxycodone at his SNF. The patient became more alert however he was still significantly altered.      Appears patient has had a \"bad cough\" as of late.      He was admitted to [facility] in 12/2024- 1/1/2025 for \"L foot OM and UTI (UCx NGTD but initial UA c/w UTI with + leukocyte esterase and pyruia. Was treated with empiric abx, required Caicedo while inpatient, developed gross hematuria and Caicedo replaced 12/31) and is currently on IV Ertapenem as well as Daptomycin through a RUE PICC line.      In the ED, patient sat at 98% on 4 L; RR 28     VBG showed a pH of 7.360 with a pCO2 of 42.0, pO2 of 55.0, and venous bicarb of 23.7.      CBC:  WBC of 7.0 and a hemoglobin of 8.7 (around his baseline), normal MCV.  Lactic acid was 0.9.      BMP showed a creatinine of 1.7 which seems to be around his baseline, although recently 1.4 .      His hepatic function was normal     Initial troponin was 54 which up trended to 57.      His BNP was 5604 with a baseline of 1491 in June 2024.      CT head was done which showed old infarct in the right frontal lobe with no acute  monitoring for hypoglycemia secondary to insulin with point-of-care blood glucose checks t     CKD IIIa  - Cr appears at baseline 1.-5- 2.5  - Cr may be at baseline  - Follows with Dr. Majano     Chronic anemia with normocytosis  - Hgb at baseline, likely anemia of CKD  - Monitor hgb     Obesity Body mass index is 32.75 kg/m². - Complicating assessment and treatment. Placing patient at risk for multiple co-morbidities as well as early death and contributing to the patient's presentation.  on weight loss                  S/P Left BKA     Weaned off O2     Post op care: Per Vascular           Disposition:   - Ptx is from SNF: [facility]  - S/P Left BKA. - PT/OT eval: - placement/Precert          Valery Decker MD

## 2025-02-07 NOTE — PROGRESS NOTES
Vascular Surgery   Daily Progress Note  Patient: Veronica Chaidez      CC: S/p L BKA    SUBJECTIVE:   No complaints this morning. Abilities in motion changed dressing last night. Minimal ecchymosis to anterior stump.     OBJECTIVE:    PHYSICAL EXAM:    Vitals:    02/06/25 0339 02/06/25 1018 02/06/25 1942 02/07/25 0500   BP: (!) 113/53 121/76 116/61 125/75   Pulse: 72 69 73 73   Resp: 16 16 18 18   Temp: 97.5 °F (36.4 °C) 98 °F (36.7 °C) 97.4 °F (36.3 °C) 97 °F (36.1 °C)   TempSrc: Oral Oral Oral Oral   SpO2: 97% 100% 95% 97%   Weight:       Height:         General appearance: alert, no acute distress  Chest/Lungs:  normal effort with no accessory muscle use on room air  Cardiovascular: well perfused  Skin: warm and dry, no rashes  Extremities: no edema, no cyanosis. Left Lower extremity with IPOP dressing intact   Neuro: A&Ox3, no focal deficits, sensation intact    LABS:   Recent Labs     02/06/25  0520 02/07/25  0540   WBC 7.6 8.7   HGB 9.7* 9.5*   HCT 29.5* 28.9*   MCV 84.4 83.2    218        Recent Labs     02/06/25  0520 02/07/25  0540   * 134*   K 4.6 4.6   CL 97* 100   CO2 27 27   PHOS 3.0 3.3   BUN 35* 32*   CREATININE 1.7* 1.8*       ASSESSMENT & PLAN:   This is a 74 y.o. male status post L BKA secondary to acute osteomyelitis (1/31),    - Continue IPOP to L BKA, abilities in motion team to follow   - Will coordinate dressing change with abilities in motion, next change on Monday   - MMPC  - PT recs SNF   -Ok for dispo from a vascular surgery standpoint   - Medical management per primary     Jeanette Mandel MD  PGY1, General Surgery  02/07/25  6:56 AM  [phone]

## 2025-02-07 NOTE — PROGRESS NOTES
Per nursing communication order, PICC line to RUE to be removed and guo to stay in place for discharge. Patient is no longer receiving IV antibiotics through PICC line. Patient did not discharge yesterday to facility as expected.   This RN removed PICC line at this time to RUE without issue and placed a PIV to patients left forearm. Guo catheter remains intact and draining well.

## 2025-02-07 NOTE — PROGRESS NOTES
Pt d/c'd 2/7/25. IV access removed with no complications. Reviewed d/c instructions, home meds, and f/u information utilizing teach-back method. Patient verbalized understanding. Patient discharged in ambulance and left with all documented belongings. Attempted to call report to [facility] at [phone] to nurse Mcdonald.

## 2025-02-07 NOTE — PLAN OF CARE
Problem: Respiratory - Adult  Goal: Achieves optimal ventilation and oxygenation  Outcome: Adequate for Discharge     Problem: Cardiovascular - Adult  Goal: Maintains optimal cardiac output and hemodynamic stability  Outcome: Adequate for Discharge  Goal: Absence of cardiac dysrhythmias or at baseline  Outcome: Adequate for Discharge     Problem: Cardiovascular - Adult  Goal: Absence of cardiac dysrhythmias or at baseline  Outcome: Adequate for Discharge     Problem: Metabolic/Fluid and Electrolytes - Adult  Goal: Electrolytes maintained within normal limits  Outcome: Adequate for Discharge  Goal: Hemodynamic stability and optimal renal function maintained  Outcome: Adequate for Discharge  Goal: Glucose maintained within prescribed range  Outcome: Adequate for Discharge     Problem: Metabolic/Fluid and Electrolytes - Adult  Goal: Hemodynamic stability and optimal renal function maintained  Outcome: Adequate for Discharge     Problem: Metabolic/Fluid and Electrolytes - Adult  Goal: Glucose maintained within prescribed range  Outcome: Adequate for Discharge

## 2025-02-10 ENCOUNTER — TELEPHONE (OUTPATIENT)
Dept: VASCULAR SURGERY | Age: 75
End: 2025-02-10

## 2025-02-11 ENCOUNTER — OFFICE VISIT (OUTPATIENT)
Dept: VASCULAR SURGERY | Age: 75
End: 2025-02-11

## 2025-02-11 ENCOUNTER — TELEPHONE (OUTPATIENT)
Dept: VASCULAR SURGERY | Age: 75
End: 2025-02-11

## 2025-02-11 VITALS
TEMPERATURE: 97.5 F | SYSTOLIC BLOOD PRESSURE: 103 MMHG | HEIGHT: 74 IN | BODY MASS INDEX: 32.62 KG/M2 | OXYGEN SATURATION: 98 % | DIASTOLIC BLOOD PRESSURE: 58 MMHG | HEART RATE: 73 BPM

## 2025-02-11 DIAGNOSIS — S88.112A BELOW-KNEE AMPUTATION OF LEFT LOWER EXTREMITY, INITIAL ENCOUNTER (HCC): Primary | ICD-10-CM

## 2025-02-11 PROBLEM — I25.5 ISCHEMIC CONGESTIVE CARDIOMYOPATHY (HCC): Status: ACTIVE | Noted: 2025-02-11

## 2025-02-11 PROBLEM — N20.0 KIDNEY STONE: Status: ACTIVE | Noted: 2023-11-27

## 2025-02-11 PROBLEM — R26.9 ABNORMAL GAIT: Status: ACTIVE | Noted: 2025-02-11

## 2025-02-11 PROBLEM — T25.322A: Status: ACTIVE | Noted: 2021-07-20

## 2025-02-11 PROBLEM — E66.811 CLASS 1 OBESITY: Status: ACTIVE | Noted: 2023-11-28

## 2025-02-11 PROBLEM — I73.9 PERIPHERAL VASCULAR DISEASE (HCC): Status: ACTIVE | Noted: 2025-02-11

## 2025-02-11 PROBLEM — M79.605 PAIN OF LEFT LOWER EXTREMITY: Status: ACTIVE | Noted: 2025-01-06

## 2025-02-11 PROBLEM — I42.0 ISCHEMIC CONGESTIVE CARDIOMYOPATHY (HCC): Status: ACTIVE | Noted: 2025-02-11

## 2025-02-11 PROBLEM — N32.0 BLADDER-NECK OBSTRUCTION: Status: ACTIVE | Noted: 2020-03-11

## 2025-02-11 PROCEDURE — 99024 POSTOP FOLLOW-UP VISIT: CPT | Performed by: SURGERY

## 2025-02-11 NOTE — PROGRESS NOTES
2025     Veronica Chaidez (:  1950) is a 74 y.o. male,Established patient, here for evaluation of the following chief complaint(s):  Post-Op Check (1st post-op s/p LBKA )        ASSESSMENT/PLAN:  1. Below-knee amputation of left lower extremity, initial encounter ([facility])  Healing nicely.  Continue PT.  Needs stump .    I will see him back in 2 weeks to remove staples.     SUBJECTIVE/OBJECTIVE:  Mr. Chaidez is 12 days s/p L BKA.  No complaints.  He is at rehab facility and actively participating in PT.  IPOP and socks on today.      Left BKA is healing nicely.  Stump is warm and has great cap refill.  Suture line intact with minimal drainage in one spot.  No purulence.  Staples not ready to be removed at this time.     Physical Exam  Vitals:    25 1216   BP: (!) 103/58   Site: Left Upper Arm   Position: Sitting   Pulse: 73   Temp: 97.5 °F (36.4 °C)   TempSrc: Infrared   SpO2: 98%   Height: 1.88 m (6' 2\")       An electronic signature was used to authenticate this note.    --Bridgette Soto MD

## 2025-02-11 NOTE — TELEPHONE ENCOUNTER
LVM for Melisha informing her of mepilex on incision, needs a stump , f/u next week for staple removal, socks on with IPOP.

## 2025-02-11 NOTE — TELEPHONE ENCOUNTER
-Home Care Nurse Jatin requesting call back advise about incision instructions for 1/31/25 LEFT BELOW KNEE AMPUTATION by Dr. Soto.    Contact Nurse Jatin at [phone]

## 2025-02-12 ENCOUNTER — TELEPHONE (OUTPATIENT)
Dept: VASCULAR SURGERY | Age: 75
End: 2025-02-12

## 2025-02-12 NOTE — TELEPHONE ENCOUNTER
Spoke with Artie from [facility] who called about dressing changes and informed her he has a mepilex on incision along with stump socks and IPOP. IPOP can be removed to cleanse incision and a new mepilex replaced over incision and then stump socks and place IPOP on which should be worn at all times other than cleaning incision.

## 2025-02-14 ENCOUNTER — TELEPHONE (OUTPATIENT)
Dept: CARDIOLOGY CLINIC | Age: 75
End: 2025-02-14

## 2025-02-14 NOTE — TELEPHONE ENCOUNTER
Pt called to confirm 2/18 appt but stated he is in a nursing home and is not released until next Friday. Told pt we could call the facility to see if they can coordinate transportation. Pt gave us the number [phone] to contact the facility in [place]. Looked up number to [facility] [phone].     Spoke with staff who stated they will speak to their scheduling department to arrange transportation and let us know if they need to cancel.

## 2025-02-17 ENCOUNTER — OFFICE VISIT (OUTPATIENT)
Dept: INTERNAL MEDICINE CLINIC | Age: 75
End: 2025-02-17
Payer: MEDICAID

## 2025-02-17 DIAGNOSIS — E11.42 TYPE 2 DIABETES MELLITUS WITH PERIPHERAL NEUROPATHY (HCC): Primary | ICD-10-CM

## 2025-02-17 DIAGNOSIS — S91.109D OPEN TOE WOUND, SUBSEQUENT ENCOUNTER: ICD-10-CM

## 2025-02-17 PROBLEM — M86.172 ACUTE OSTEOMYELITIS OF LEFT FOOT (HCC): Status: ACTIVE | Noted: 2025-02-17

## 2025-02-17 PROCEDURE — 99213 OFFICE O/P EST LOW 20 MIN: CPT

## 2025-02-17 NOTE — PROGRESS NOTES
Department of Podiatry  Resident Progress Note    Veronica Chaidez  Allergies: Codeine, Aspartame and phenylalanine, Msg, and Yellow dyes (non-tartrazine)    SUBJECTIVE  The patient is a 74 y.o. male who presents for wound care management of his right lower extremity.  Patient recently underwent left-sided BKA for nonhealing diabetic ulcer with associated osteomyelitis.  Patient has been staying at a rehab facility for which she only has to the end of the week at due to insurance restraints.  Patient plans to move back into his mobile home.  At the rehabilitation center they have been placing Mepilex border gauze on 3 partial-thickness ulcerations on his right lower extremity.  Patient states that these have been getting better with decrease in drainage. Patient denies any other pedal complaints at this time. Denies F/C/N/V.      Past Medical History:        Diagnosis Date    A-fib ([facility]) 2020    CAD, multiple vessel 12/2019    Coronary artery disease involving coronary bypass graft     Diabetes mellitus ([facility])     diet controlled     Encounter for imaging to screen for metal prior to MRI 12/23/2024    medtronic pacemaker:  Evera MRI XT  [id number]; leads:RA - 5076; RV - 6935M; IMPLANT DATE: 03/12/2021; followed by Dr. Salamanca- [facility]    Encounter for imaging to screen for metal prior to MRI     Enlarged prostate     Environmental allergies     Kidney stones     Pacemaker     Systolic CHF ([facility])        REVIEW OF SYSTEMS:  Review of Systems: Pertinent positive and negative findings as documented in the HPI, otherwise all other systems were reviewed and were negative.       OBJECTIVE    Patient presents today in a stretcher accompanied by EMS. Patient is AAOx3 and NAD.    RIGHT LOWER EXTREMITY FOCUSED EXAM 2/2 LEFT BKA    VASCULAR: DP and PT pulses are palpable 2/4. CFT is brisk to the digits of the foot. Skin temperature is warm to cool from proximal to distal with no focal calor noted. No edema noted. No pain with calf

## 2025-02-18 ENCOUNTER — OFFICE VISIT (OUTPATIENT)
Dept: CARDIOLOGY CLINIC | Age: 75
End: 2025-02-18
Payer: MEDICAID

## 2025-02-18 ENCOUNTER — NURSE ONLY (OUTPATIENT)
Dept: CARDIOLOGY CLINIC | Age: 75
End: 2025-02-18

## 2025-02-18 VITALS
BODY MASS INDEX: 30.81 KG/M2 | WEIGHT: 240 LBS | SYSTOLIC BLOOD PRESSURE: 112 MMHG | HEART RATE: 72 BPM | DIASTOLIC BLOOD PRESSURE: 64 MMHG

## 2025-02-18 DIAGNOSIS — I42.0 ISCHEMIC CONGESTIVE CARDIOMYOPATHY (HCC): ICD-10-CM

## 2025-02-18 DIAGNOSIS — I25.5 ISCHEMIC CONGESTIVE CARDIOMYOPATHY (HCC): ICD-10-CM

## 2025-02-18 DIAGNOSIS — I48.0 PAROXYSMAL ATRIAL FIBRILLATION (HCC): ICD-10-CM

## 2025-02-18 DIAGNOSIS — I48.0 PAF (PAROXYSMAL ATRIAL FIBRILLATION) (HCC): ICD-10-CM

## 2025-02-18 DIAGNOSIS — I50.22 CHRONIC SYSTOLIC (CONGESTIVE) HEART FAILURE (HCC): Chronic | ICD-10-CM

## 2025-02-18 DIAGNOSIS — Z95.1 HX OF CABG: ICD-10-CM

## 2025-02-18 DIAGNOSIS — I48.11 LONGSTANDING PERSISTENT ATRIAL FIBRILLATION (HCC): Chronic | ICD-10-CM

## 2025-02-18 DIAGNOSIS — I50.20 HFREF (HEART FAILURE WITH REDUCED EJECTION FRACTION) (HCC): Primary | ICD-10-CM

## 2025-02-18 DIAGNOSIS — Z95.810 ICD (IMPLANTABLE CARDIOVERTER-DEFIBRILLATOR) IN PLACE: ICD-10-CM

## 2025-02-18 DIAGNOSIS — I25.10 CAD IN NATIVE ARTERY: ICD-10-CM

## 2025-02-18 DIAGNOSIS — I42.0 DILATED CARDIOMYOPATHY (HCC): ICD-10-CM

## 2025-02-18 DIAGNOSIS — Z95.810 ICD (IMPLANTABLE CARDIOVERTER-DEFIBRILLATOR) IN PLACE: Primary | ICD-10-CM

## 2025-02-18 DIAGNOSIS — I25.5 ISCHEMIC CARDIOMYOPATHY: ICD-10-CM

## 2025-02-18 PROCEDURE — 99214 OFFICE O/P EST MOD 30 MIN: CPT | Performed by: NURSE PRACTITIONER

## 2025-02-18 PROCEDURE — 1036F TOBACCO NON-USER: CPT | Performed by: NURSE PRACTITIONER

## 2025-02-18 PROCEDURE — G8428 CUR MEDS NOT DOCUMENT: HCPCS | Performed by: NURSE PRACTITIONER

## 2025-02-18 PROCEDURE — 1111F DSCHRG MED/CURRENT MED MERGE: CPT | Performed by: NURSE PRACTITIONER

## 2025-02-18 PROCEDURE — 3078F DIAST BP <80 MM HG: CPT | Performed by: NURSE PRACTITIONER

## 2025-02-18 PROCEDURE — G8417 CALC BMI ABV UP PARAM F/U: HCPCS | Performed by: NURSE PRACTITIONER

## 2025-02-18 PROCEDURE — 1123F ACP DISCUSS/DSCN MKR DOCD: CPT | Performed by: NURSE PRACTITIONER

## 2025-02-18 PROCEDURE — 3017F COLORECTAL CA SCREEN DOC REV: CPT | Performed by: NURSE PRACTITIONER

## 2025-02-18 PROCEDURE — 3074F SYST BP LT 130 MM HG: CPT | Performed by: NURSE PRACTITIONER

## 2025-02-18 RX ORDER — METHOCARBAMOL 750 MG/1
500 TABLET, FILM COATED ORAL 4 TIMES DAILY
COMMUNITY

## 2025-02-18 NOTE — PROGRESS NOTES
[place] [address]  [phone]    Primary Cardiologist: Dr Iglesia KENDALL CHF hospital follow up.     HPI:  74 y.o. with HFrEF, CAD/CABG, ICMP, ICD, pAF, DM, CKD and PAD (left BKA 1/2025) who is here for hospital follow up. He as hospitalized in January for CHF, osteomyelitis and had left BKA. He is now in SNF for rehab but he typically lives in a class C travel trailer and goes from parking lot to parking lot every few days.    He has rare episodes of LEE and LH/dizziness. No palpitations or syncope and his last fall was 2 yeas ago. No change to right leg venostasis. He has cost to left BKA stump.     No c/o cp, weight gain, orthopnea, abdominal bloating or early satiety. No n/v/d, fever or GI/ bleeding.     Past Medical History:   Diagnosis Date    A-fib ([facility]) 2020    CAD, multiple vessel 12/2019    Coronary artery disease involving coronary bypass graft     Diabetes mellitus (HCC)     diet controlled     Encounter for imaging to screen for metal prior to MRI 12/23/2024    medtronic pacemaker:  Evera MRI XT  [id number]; leads:RA - 5076; RV - 6935M; IMPLANT DATE: 03/12/2021; followed by Dr. Salamanca- [facility]    Encounter for imaging to screen for metal prior to MRI     Enlarged prostate     Environmental allergies     Kidney stones     Pacemaker     Systolic CHF (HCC)      Past Surgical History:   Procedure Laterality Date    CARDIAC CATHETERIZATION  12/10/2019    Dr. Shaver - severe multi-vessel disease    CORONARY ARTERY BYPASS GRAFT N/A 03/02/2020    WAYNE; TCPB; CABG x 3, LIMA to LAD w/ long segment endarterectomy (4 cm); bilateral pulmonary vein isolation; 40 mm Atriclip to L atril appendage; endoscopic L GSV harvest; ICNB x 5 levels bilat; sternal plate x 1 to manubrium performed by Sohail Rhoades MD at [facility] [place]    FOOT DEBRIDEMENT Left 12/30/2024    LEFT FOOT INCISION AND DRAINAGE WITH PARTIAL CALCANECTOMY performed by Chandana Downey DPM at [facility] [place]    FOOT SURGERY

## 2025-02-20 ENCOUNTER — OFFICE VISIT (OUTPATIENT)
Dept: VASCULAR SURGERY | Age: 75
End: 2025-02-20

## 2025-02-20 VITALS
HEART RATE: 75 BPM | OXYGEN SATURATION: 97 % | HEIGHT: 74 IN | BODY MASS INDEX: 30.81 KG/M2 | SYSTOLIC BLOOD PRESSURE: 132 MMHG | TEMPERATURE: 97.8 F | DIASTOLIC BLOOD PRESSURE: 74 MMHG

## 2025-02-20 DIAGNOSIS — Z89.512 S/P BKA (BELOW KNEE AMPUTATION), LEFT (HCC): Primary | ICD-10-CM

## 2025-02-23 PROBLEM — Z89.512 S/P BKA (BELOW KNEE AMPUTATION), LEFT (HCC): Status: ACTIVE | Noted: 2025-02-23

## 2025-03-17 ENCOUNTER — OFFICE VISIT (OUTPATIENT)
Dept: INTERNAL MEDICINE CLINIC | Age: 75
End: 2025-03-17
Payer: MEDICAID

## 2025-03-17 DIAGNOSIS — Z89.512 HX OF LEFT BKA (HCC): ICD-10-CM

## 2025-03-17 DIAGNOSIS — L97.511 FOOT ULCERATION, RIGHT, LIMITED TO BREAKDOWN OF SKIN (HCC): Primary | ICD-10-CM

## 2025-03-17 DIAGNOSIS — E11.42 TYPE 2 DIABETES MELLITUS WITH PERIPHERAL NEUROPATHY (HCC): ICD-10-CM

## 2025-03-17 PROCEDURE — 99213 OFFICE O/P EST LOW 20 MIN: CPT

## 2025-03-17 NOTE — PROGRESS NOTES
Department of Podiatry  Resident Progress Note    Veronica Chaidez  Allergies: Codeine, Aspartame and phenylalanine, Msg, Red dye #40 (allura red), and Yellow dyes (non-tartrazine)    SUBJECTIVE  The patient is a 74 y.o. male who presents for wound care management of his right lower extremity.  Patient recently underwent left-sided BKA for nonhealing diabetic ulcer with associated osteomyelitis.  Patient states that he is back to his RV however one of his friends is there to help him with his ADLs.  Patient has been dressing wound sites via bandaids daily.  Patient states that these wounds have been getting better with No drainage present. Per patient he has a follow up with Dr. Soto on March 24th. Patient denies any other pedal complaints at this time. Denies F/C/N/V.      Past Medical History:        Diagnosis Date    A-fib ([facility]) 2020    CAD, multiple vessel 12/2019    Coronary artery disease involving coronary bypass graft     Diabetes mellitus ([facility])     diet controlled     Encounter for imaging to screen for metal prior to MRI 12/23/2024    medtronic pacemaker:  Evera MRI XT  [id number]; leads:RA - 5076; RV - 6935M; IMPLANT DATE: 03/12/2021; followed by Dr. Salamanca- [facility]    Encounter for imaging to screen for metal prior to MRI     Enlarged prostate     Environmental allergies     Kidney stones     Pacemaker     Systolic CHF ([facility])        REVIEW OF SYSTEMS:  Review of Systems: Pertinent positive and negative findings as documented in the HPI, otherwise all other systems were reviewed and were negative.       OBJECTIVE    Patient presents today ambulating via a wheelchair     RIGHT LOWER EXTREMITY FOCUSED EXAM 2/2 LEFT BKA    VASCULAR: DP and PT pulses are palpable 2/4. CFT is brisk to the digits of the foot. Skin temperature is cool to cool from proximal to distal with no focal calor noted. No edema noted. No pain with calf compression.    NEUROLOGIC: Gross and epicritic sensation is diminished. Protective sensation

## 2025-07-14 ENCOUNTER — TELEPHONE (OUTPATIENT)
Dept: CARDIOLOGY CLINIC | Age: 75
End: 2025-07-14

## 2025-07-14 DIAGNOSIS — I48.11 LONGSTANDING PERSISTENT ATRIAL FIBRILLATION (HCC): Chronic | ICD-10-CM

## 2025-07-14 DIAGNOSIS — I25.10 CAD, MULTIPLE VESSEL: Chronic | ICD-10-CM

## 2025-07-14 DIAGNOSIS — I50.22 CHRONIC SYSTOLIC (CONGESTIVE) HEART FAILURE (HCC): Chronic | ICD-10-CM

## 2025-07-14 DIAGNOSIS — I25.10 CAD, MULTIPLE VESSEL: Primary | Chronic | ICD-10-CM

## 2025-07-14 RX ORDER — FUROSEMIDE 20 MG/1
20 TABLET ORAL 2 TIMES DAILY
Qty: 180 TABLET | Refills: 3 | Status: SHIPPED | OUTPATIENT
Start: 2025-07-14 | End: 2025-07-18 | Stop reason: SDUPTHER

## 2025-07-14 RX ORDER — ASPIRIN 81 MG/1
81 TABLET, CHEWABLE ORAL DAILY
Qty: 90 TABLET | Refills: 3 | Status: SHIPPED | OUTPATIENT
Start: 2025-07-14

## 2025-07-14 RX ORDER — ATORVASTATIN CALCIUM 80 MG/1
80 TABLET, FILM COATED ORAL NIGHTLY
Qty: 90 TABLET | Refills: 3 | Status: SHIPPED | OUTPATIENT
Start: 2025-07-14 | End: 2025-07-18 | Stop reason: SDUPTHER

## 2025-07-14 RX ORDER — METOPROLOL SUCCINATE 25 MG/1
25 TABLET, EXTENDED RELEASE ORAL DAILY
Qty: 90 TABLET | Refills: 3 | Status: SHIPPED | OUTPATIENT
Start: 2025-07-14 | End: 2025-07-18 | Stop reason: SDUPTHER

## 2025-07-14 NOTE — TELEPHONE ENCOUNTER
Ramana Martins OV 2/2025:  Assessment:  1. HFrEF (heart failure with reduced ejection fraction) ([facility])    2. Ischemic cardiomyopathy    3. CAD in native artery    4. Hx of CABG    5. PAF (paroxysmal atrial fibrillation) ([facility])    6. ICD (implantable cardioverter-defibrillator) in place          Plan:     HFrEF, ICM: stable/compensated               Discussed s/s decompensated HF              Daily weights, low salt diet, compression stockings              Lasix 20 mg bid               Toprol 25 mg daily      No ACE/ARNI/MRA d/t CKD  Avoid SGLT2 d/t risk UTI     CAD/CABG: stable               Asa 81 mg daily               Lipitor 80 mg daily               Toprol 25 mg daily     pAF: stable               HR 72              Amiodarone 200 mg p daily               Eliquis 5 mg bid               Toprol 25 mg po daily                 ICD: stable               1/30/2025 device check reviewed              Device check today as well.               Cont to follow up with EP     Refills sent to preferred rx.

## 2025-07-14 NOTE — TELEPHONE ENCOUNTER
[facility] Medication Refills:    Medication  metoprolol succinate (TOPROL XL) 25 MG extended release tablet [[id number]]  metoprolol succinate (TOPROL XL) 25 MG extended release tablet [[id number]]    Order Details  Dose: 25 mg Route: Oral Frequency: DAILY   Dispense Quantity: 30 tablet Refills: 3          Sig: Take 1 tablet by mouth daily           Medication  atorvastatin (LIPITOR) 80 MG tablet [[id number]]  atorvastatin (LIPITOR) 80 MG tablet [[id number]]    Order Details  Dose: 80 mg Route: Oral Frequency: NIGHTLY   Dispense Quantity: 30 tablet Refills: 3          Sig: Take 1 tablet by mouth nightly     Medication  aspirin 81 MG chewable tablet [680]  aspirin 81 MG chewable tablet [[id number]]    Order Details  Dose: 81 mg Route: Oral Frequency: DAILY   Dispense Quantity: 30 tablet Refills: 3          Sig: Take 1 tablet by mouth daily     Medication  furosemide (LASIX) 20 MG tablet [3294]  furosemide (LASIX) 20 MG tablet [[id number]]    Order Details  Dose: 20 mg Route: Oral Frequency: 2 TIMES DAILY   Dispense Quantity: 60 tablet Refills: 3          Sig: Take 1 tablet by mouth in the morning and 1 tablet in the evening.     Pharmacy    [facility] [place], [place] - [address] [place] [address] -  [phone] - F [phone]  [address] [place] [address]  Phone: [phone]  Fax: [phone]       Last Office Visit: 02/08/25    Next Office Visit: 08/29/25    Last Refill: 02/06/25    Last Labs: 02/07/25

## 2025-07-18 RX ORDER — AMIODARONE HYDROCHLORIDE 200 MG/1
200 TABLET ORAL DAILY
Qty: 90 TABLET | Refills: 3 | Status: SHIPPED | OUTPATIENT
Start: 2025-07-18

## 2025-07-18 RX ORDER — METOPROLOL SUCCINATE 25 MG/1
25 TABLET, EXTENDED RELEASE ORAL DAILY
Qty: 90 TABLET | Refills: 3 | Status: SHIPPED | OUTPATIENT
Start: 2025-07-18

## 2025-07-18 RX ORDER — FUROSEMIDE 20 MG/1
20 TABLET ORAL 2 TIMES DAILY
Qty: 180 TABLET | Refills: 3 | Status: SHIPPED | OUTPATIENT
Start: 2025-07-18

## 2025-07-18 RX ORDER — ATORVASTATIN CALCIUM 80 MG/1
80 TABLET, FILM COATED ORAL NIGHTLY
Qty: 90 TABLET | Refills: 3 | Status: SHIPPED | OUTPATIENT
Start: 2025-07-18

## 2025-07-18 NOTE — TELEPHONE ENCOUNTER
PT states all these RXS were sent to the WRONG pharmacy.    Correct pharmacy is    Pharmacy    [facility] - [place], [place] - [address] - P [phone] - F [phone]  [address] [place] [address] [place] [address]  Phone: [phone]  Fax: [phone]       Please resend and then call pt to inform him

## 2025-08-29 ENCOUNTER — OFFICE VISIT (OUTPATIENT)
Dept: CARDIOLOGY CLINIC | Age: 75
End: 2025-08-29
Payer: MEDICAID

## 2025-08-29 VITALS — DIASTOLIC BLOOD PRESSURE: 62 MMHG | HEART RATE: 97 BPM | SYSTOLIC BLOOD PRESSURE: 108 MMHG

## 2025-08-29 DIAGNOSIS — Z79.01 ON CONTINUOUS ORAL ANTICOAGULATION: ICD-10-CM

## 2025-08-29 DIAGNOSIS — I25.10 CAD, MULTIPLE VESSEL: Chronic | ICD-10-CM

## 2025-08-29 DIAGNOSIS — I50.22 CHRONIC SYSTOLIC (CONGESTIVE) HEART FAILURE (HCC): Chronic | ICD-10-CM

## 2025-08-29 DIAGNOSIS — Z95.810 ICD (IMPLANTABLE CARDIOVERTER-DEFIBRILLATOR) IN PLACE: ICD-10-CM

## 2025-08-29 DIAGNOSIS — I48.11 LONGSTANDING PERSISTENT ATRIAL FIBRILLATION (HCC): Primary | ICD-10-CM

## 2025-08-29 PROCEDURE — 3074F SYST BP LT 130 MM HG: CPT | Performed by: NURSE PRACTITIONER

## 2025-08-29 PROCEDURE — 93000 ELECTROCARDIOGRAM COMPLETE: CPT | Performed by: NURSE PRACTITIONER

## 2025-08-29 PROCEDURE — 3078F DIAST BP <80 MM HG: CPT | Performed by: NURSE PRACTITIONER

## 2025-08-29 PROCEDURE — 99213 OFFICE O/P EST LOW 20 MIN: CPT | Performed by: NURSE PRACTITIONER

## 2025-08-29 PROCEDURE — 1123F ACP DISCUSS/DSCN MKR DOCD: CPT | Performed by: NURSE PRACTITIONER

## 2025-08-29 RX ORDER — FUROSEMIDE 20 MG/1
20 TABLET ORAL 2 TIMES DAILY
Qty: 180 TABLET | Refills: 3 | Status: SHIPPED | OUTPATIENT
Start: 2025-08-29

## (undated) DEVICE — COVER LT HNDL BLU PLAS

## (undated) DEVICE — SOLUTION IV SODIUM CHL 0.9% 500 ML INJ FLX CONTAINER

## (undated) DEVICE — ORTHO PRE OP PACK: Brand: MEDLINE INDUSTRIES, INC.

## (undated) DEVICE — FOGARTY - HYDRAGRIP SURGICAL - CLAMP INSERTS: Brand: FOGARTY SOFTJAW

## (undated) DEVICE — AIR SHEET,LAT,COMFORT GLIDE, BLEND 40X80: Brand: MEDLINE

## (undated) DEVICE — GOWN,SIRUS,POLYRNF,BRTHSLV,LG,30/CS: Brand: MEDLINE

## (undated) DEVICE — PROTECTOR ULN NRV PUR FOAM HK LOOP STRP ANATOMICALLY

## (undated) DEVICE — STERNUM SAW BLADE, 9.4MM X 34MM X 1MM: Brand: MICROAIRE®

## (undated) DEVICE — SUTURE GOR TX SZ 2-0 L36IN NONABSORBABLE L18MM TH-18 1/2 3N04B

## (undated) DEVICE — CATHETER ABLAT JAW L64MM GLDE PATH TAPE BPLR SYNERGY ACC

## (undated) DEVICE — CONNECTOR PERF 3/8X3/8X3/8IN EQL WYE

## (undated) DEVICE — SUTURE ETHILON SZ 2-0 L30IN NONABSORBABLE BLK L36MM PSLX 3/8 1697H

## (undated) DEVICE — STRIP,CLOSURE,WOUND,MEDI-STRIP,1/2X4: Brand: MEDLINE

## (undated) DEVICE — GLOVE SURG SZ 65 L12IN FNGR THK79MIL GRN LTX FREE

## (undated) DEVICE — SOLUTION NORMOSOL-R PH 7.4   X

## (undated) DEVICE — SUTURE VCRL SZ 3-0 L27IN ABSRB UD L24MM FS-1 3/8 CIR REV J442H

## (undated) DEVICE — BLADE ES ELASTOMERIC COAT INSUL DURABLE BEND UPTO 90DEG

## (undated) DEVICE — SUTURE PDS II SZ 0 L27IN ABSRB VLT L36MM CT-1 1/2 CIR Z340H

## (undated) DEVICE — CONNECTOR PERF W0.25XH3/8IN BASE Y SHP REDUC W/O LUERLOCK

## (undated) DEVICE — SUTURE ETHBND 4-0 L30IN NONABSORBABLE GRN L17MM RB-1 1/2 X551H

## (undated) DEVICE — SOLUTION IV 1000ML 0.9% SOD CHL

## (undated) DEVICE — SUTURE VICRYL + SZ 2-0 L27IN ABSRB CLR CT-1 1/2 CIR TAPERCUT VCP259H

## (undated) DEVICE — Device: Brand: MEDEX

## (undated) DEVICE — CANNULA PERF 7FR L5.5IN AORT ROOT RADPQ STD TIP W/ VENT LN

## (undated) DEVICE — Z INACTIVE NO SUPPLIER SOLUTIONIRRIG 3000ML 0.9% SOD CHL FLX CONT [79720808] [HOSPIRA WORLDWIDE INC]

## (undated) DEVICE — CONNECTOR PERF W3/8XH0.5IN BASE Y SHP REDUC W/O LUERLOCK

## (undated) DEVICE — PAD,NON-ADHERENT,3X8,STERILE,LF,1/PK: Brand: MEDLINE

## (undated) DEVICE — SWAB SPEC COLLCTN DBL W/O CHAR CULTURESWAB +

## (undated) DEVICE — TUBING SUCT L12FT DIA025IN UNIV W SCALLOPED FEM CONN

## (undated) DEVICE — CANNULA PERF 24FR 51CML 45DEG TIP 3 8IN CONN 20CML SUT RNG

## (undated) DEVICE — BANDAGE E SELF CLSR 6INX5YD VELC SWIFTWRAP

## (undated) DEVICE — TOWEL,STOP FLAG GOLD N-W: Brand: MEDLINE

## (undated) DEVICE — SOLUTION IV IRRIG 1000ML POUR BTL 2F7114

## (undated) DEVICE — SOLUTION IRRIG 3000ML 0.9% SOD CHL USP UROMATIC PLAS CONT

## (undated) DEVICE — TRAP FLUID

## (undated) DEVICE — 3M™ STERI-DRAPE™ INSTRUMENT POUCH 1018: Brand: STERI-DRAPE™

## (undated) DEVICE — 3M™ COBAN™ NL STERILE NON-LATEX SELF-ADHERENT WRAP, 2084S, 4 IN X 5 YD (10 CM X 4,5 M), 18 ROLLS/CASE: Brand: 3M™ COBAN™

## (undated) DEVICE — SUTURE VCRL SZ 0 L18IN ABSRB VLT L36MM CT-1 1/2 CIR J740D

## (undated) DEVICE — DECANTER BAG 9": Brand: MEDLINE INDUSTRIES, INC.

## (undated) DEVICE — CONNECTOR PERF W3 8XH3 8XL3 8IN BASE EQL Y SHP W O LUERLOCK

## (undated) DEVICE — TOWEL,OR,DSP,ST,WHITE,DLX,4/PK,20PK/CS: Brand: MEDLINE

## (undated) DEVICE — GLOVE ORANGE PI 8   MSG9080

## (undated) DEVICE — BLADE,CARBON-STEEL,10,STRL,DISPOSABLE,TB: Brand: MEDLINE

## (undated) DEVICE — Device

## (undated) DEVICE — TOWEL,OR,DSP,ST,BLUE,DLX,8/PK,10PK/CS: Brand: MEDLINE

## (undated) DEVICE — GARMENT,MEDLINE,DVT,INT,CALF,MED, GEN2: Brand: MEDLINE

## (undated) DEVICE — CATHETERIZATION TRAY 16 FR 5 CC FOL STR TIP URIMTR BARDX IC

## (undated) DEVICE — HIGH FLOW TIP

## (undated) DEVICE — SET PROC STD VOL BOWL SUCT ASMBLY GS FLTR BLD COLLCTN RESVR

## (undated) DEVICE — Z INACTIVE USE 2662641 SOLUTION IV 1000ML 140MEQ/L SOD 5MEQ/L K 3MEQ/L MG 27MEQ/L

## (undated) DEVICE — CONNECTOR IV TB L28MM CLR VLV ACCS NDLLSS DISP MAXPLUS

## (undated) DEVICE — HANDPIECE SET WITH SUCTION TUBING: Brand: INTERPULSE

## (undated) DEVICE — YANKAUER,OPEN TIP,W/O VENT,STERILE: Brand: MEDLINE INDUSTRIES, INC.

## (undated) DEVICE — STOCKINETTE,DOUBLE PLY,6X48,STERILE: Brand: MEDLINE

## (undated) DEVICE — BAG PRSS INFUS 500ML NYL NETTED BK VISIBLE COLOR-CODED G L

## (undated) DEVICE — SUTURE NONABSORBABLE MONOFILAMENT 5-0 C-1 1X24 IN PROLENE 8725H

## (undated) DEVICE — SUTURE ABSORBABLE BRAIDED 2-0 CT-1 27 IN UD VICRYL J259H

## (undated) DEVICE — CANNULA VES L25IN RADPQ BODY W  1 W VLV 3MM BLNT TIP DLP

## (undated) DEVICE — SPONGE,LAP,18"X18",DLX,XR,ST,5/PK,40/PK: Brand: MEDLINE

## (undated) DEVICE — BLANKET WRM W29.9XL79.1IN UP BODY FORC AIR MISTRAL-AIR

## (undated) DEVICE — SOLUTION IV IRRIG POUR BRL 0.9% SODIUM CHL 2F7124

## (undated) DEVICE — GLOVE SURG SZ 6 L11.2IN FNGR THK9.8MIL STRW LTX POLYMER

## (undated) DEVICE — LOOP,VESSEL,MAXI,BLUE,2/PK,STERILE: Brand: MEDLINE

## (undated) DEVICE — SUTURE VICRYL + SZ 3-0 L27IN ABSRB UD L26MM SH 1/2 CIR VCP416H

## (undated) DEVICE — CONNECTOR PERF L5 IN OD1 2 IN SAT HCT ST FEATURING B CARE 5

## (undated) DEVICE — CATHETER,URETHRAL,REDRUBBER,STERILE,8FR: Brand: MEDLINE

## (undated) DEVICE — ADHESIVE SKIN CLSR 0.7ML TOP DERMBND ADV

## (undated) DEVICE — PODIATRY: Brand: MEDLINE INDUSTRIES, INC.

## (undated) DEVICE — ZIMMER® STERILE DISPOSABLE TOURNIQUET CUFF WITH PLC, DUAL PORT, SINGLE BLADDER, 18 IN. (46 CM)

## (undated) DEVICE — DRAIN SURG SGL COLL PT TB FOR ATS BG OASIS

## (undated) DEVICE — 3M™ TEGADERM™ TRANSPARENT FILM DRESSING FRAME STYLE, 1626, 4 IN X 4-3/4 IN (10 CM X 12 CM), 50/CT 4CT/CASE: Brand: 3M™ TEGADERM™

## (undated) DEVICE — GLOVE SURG SZ 75 L12IN FNGR THK94MIL TRNSLUC YEL LTX

## (undated) DEVICE — SHEET,DRAPE,53X77,STERILE: Brand: MEDLINE

## (undated) DEVICE — STAPLER SKIN H3.9MM WIRE DIA0.58MM CRWN 6.9MM 35 STPL ROT

## (undated) DEVICE — SUTURE VICRYL + SZ 2-0 L18IN ABSRB UD CT1 L36MM 1/2 CIR VCP839D

## (undated) DEVICE — FOAM BUMP, LARGE: Brand: MEDLINE INDUSTRIES, INC.

## (undated) DEVICE — [HIGH FLOW INSUFFLATOR,  DO NOT USE IF PACKAGE IS DAMAGED,  KEEP DRY,  KEEP AWAY FROM SUNLIGHT,  PROTECT FROM HEAT AND RADIOACTIVE SOURCES.]: Brand: PNEUMOSURE

## (undated) DEVICE — ZIMMER® STERILE DISPOSABLE TOURNIQUET CUFF WITH PLC, DUAL PORT, DUAL BLADDER, 18 IN. (46 CM)

## (undated) DEVICE — 3M™ TEGADERM™ TRANSPARENT FILM DRESSING FRAME STYLE, 1624W, 2-3/8 IN X 2-3/4 IN (6 CM X 7 CM), 100/CT 4CT/CASE: Brand: 3M™ TEGADERM™

## (undated) DEVICE — SYRINGE MED 10ML TRNSLUC BRL PLUNG BLK MRK POLYPR CTRL

## (undated) DEVICE — LARGE BORE STOPCOCK WITH ROTATING MALE LUER LOCK

## (undated) DEVICE — NEPTUNE E-SEP SMOKE EVACUATION PENCIL, COATED, 70MM BLADE, PUSH BUTTON SWITCH: Brand: NEPTUNE E-SEP

## (undated) DEVICE — SOLUTION IV CARDPLG PLEGISOL

## (undated) DEVICE — HANDPIECE SUCTION TUBING INTERPULSE 10FT

## (undated) DEVICE — 2108 SERIES SAGITTAL BLADE FAN, OFFSET  (29.0 X 0.89 X 73.0MM)

## (undated) DEVICE — AEGIS 1" DISK 4MM HOLE, PEEL OPEN: Brand: MEDLINE

## (undated) DEVICE — DEVICE ADV VEN ACCS EXP KT HEP W INTLNK W CHLORAPREP 9FR R X

## (undated) DEVICE — Z INACTIVE OBSOLETE PER MEDTRONIC ADAPTER Y TYP RECIRCULATING FEM LUER CLMP W  CLR CODE ARROWS

## (undated) DEVICE — CANNULA PERF L125IN OD15FR POLYVI CHL VEN RG AUTO INFL SMOOT

## (undated) DEVICE — COUNTER NDL 40 COUNT HLD 70 NUM FOAM BLK SGL MAG W BLDE REMV

## (undated) DEVICE — OPEN HRT CDS

## (undated) DEVICE — BANDAGE COMPR W4INXL15FT BGE E SGL LAYERED CLP CLSR

## (undated) DEVICE — MICRO SAGITTAL BLADE (9.4 X 0.4 X 26.2MM)

## (undated) DEVICE — SYRINGE MED 10ML LUERLOCK TIP W/O SFTY DISP

## (undated) DEVICE — Z INACTIVE USE 2582933 BAG BLD TRNSF 1 CPLR IV ST 600ML TERUFLEX

## (undated) DEVICE — JEWISH HOSPITAL TURNOVER KIT: Brand: MEDLINE INDUSTRIES, INC.

## (undated) DEVICE — TUBING, SUCTION, 1/4" X 12', STRAIGHT: Brand: MEDLINE

## (undated) DEVICE — E-Z CLEAN, NON-STICK, PTFE COATED, ELECTROSURGICAL BLADE ELECTRODE, 2.5 INCH (6.35 CM): Brand: EZ CLEAN

## (undated) DEVICE — STANDARD HYPODERMIC NEEDLE,POLYPROPYLENE HUB: Brand: MONOJECT

## (undated) DEVICE — PACK BTTRY MAX DRVR

## (undated) DEVICE — GOWN,SIRUS,POLYRNF,BRTHSLV,XL,30/CS: Brand: MEDLINE

## (undated) DEVICE — Device: Brand: TEMPORARY MYOCARDIAL HEARTWIRE

## (undated) DEVICE — SURE SET-DOUBLE BASIN-LF: Brand: MEDLINE INDUSTRIES, INC.

## (undated) DEVICE — DUAL CUT SAGITTAL BLADE

## (undated) DEVICE — SUTURE NONABSORBABLE MFIL TAPR PNT 3/8 CIR BLU 8.0M BV175-6 8734H

## (undated) DEVICE — SPONGE GZ W4XL4IN COT 12 PLY TYP VII WVN C FLD DSGN

## (undated) DEVICE — Z DISCONTINUED USE 2665188 SUTURE VICRYL SZ 4-0 L18IN ABSRB UD L13MM PC-1 3/8 CIR PRIM J835G

## (undated) DEVICE — ELECTROSURGICAL PENCIL ROCKER SWITCH NON COATED BLADE ELECTRODE 10 FT (3 M) CORD HOLSTER: Brand: MEGADYNE

## (undated) DEVICE — 4-PORT MANIFOLD: Brand: NEPTUNE 2

## (undated) DEVICE — ROYAL SILK SURGICAL GOWN, XL: Brand: CONVERTORS

## (undated) DEVICE — CLIP SM RED INTERN HMOCLP TITAN LIGATING

## (undated) DEVICE — PRESSURE TUBING: Brand: TRUWAVE

## (undated) DEVICE — WAX SURG 2.5GM HEMSTAT BNE BEESWAX PARAFFIN ISO PALMITATE

## (undated) DEVICE — SUTURE PERMAHAND SZ 2-0 L18IN NONABSORBABLE BLK L26MM SH C012D

## (undated) DEVICE — PUNCH AORT DIA4MM LNG HNDL

## (undated) DEVICE — COVER LT HNDL CAM BLU DISP W/ SURG CTRL

## (undated) DEVICE — DUAL LUMEN STOMACH TUBE: Brand: SALEM SUMP

## (undated) DEVICE — SUTURE S STL SZ 6 L18IN NONABSORBABLE SIL L48MM V-40 1/2 M649G

## (undated) DEVICE — SPONGE,PEANUT,XRAY,ST,SM,3/8",5/CARD: Brand: MEDLINE INDUSTRIES, INC.

## (undated) DEVICE — CATHETER ETER URIN 18FR ROB NELATON RED RUB ALL PURP

## (undated) DEVICE — SYSTEM VES HARV ENDOSCP VASOVIEW HEMOPRO

## (undated) DEVICE — BLADE ES L2.5IN PTFE STD TIP BOVIE E-Z CLN

## (undated) DEVICE — BANDAGE COBAN 4 IN COMPR W4INXL5YD FOAM COHESIVE QUIK STK SELF ADH SFT

## (undated) DEVICE — SUTURE PERMAHAND SZ 2-0 L30IN NONABSORBABLE BLK SILK W/O A305H

## (undated) DEVICE — COVER US PRB W15XL244CM ST SURGICAL/INTRAOPERATIVE W/

## (undated) DEVICE — BLOOD TRANSFUSION FILTER: Brand: HAEMONETICS

## (undated) DEVICE — SUTURE PERMA-HAND SZ 2 L60IN NONABSORBABLE BLK SILK BRAID SA8H

## (undated) DEVICE — DRESSING,GAUZE,XEROFORM,CURAD,5"X9",ST: Brand: CURAD

## (undated) DEVICE — CANNULA PERF L15IN DIA29FR VEN 3 STG THN WALL DSGN W  VENT

## (undated) DEVICE — 1.5L THIN WALL CAN: Brand: CRD

## (undated) DEVICE — SUTURE ETHBND EXCEL 2-0 L30IN NONABSORBABLE GRN L26MM SH MX563

## (undated) DEVICE — INTENDED FOR TISSUE SEPARATION, AND OTHER PROCEDURES THAT REQUIRE A SHARP SURGICAL BLADE TO PUNCTURE OR CUT.: Brand: BARD-PARKER ® CARBON RIB-BACK BLADES

## (undated) DEVICE — DRESSING GERM DIA1IN CNTR H DIA7MM BLU CHG ANTIMIC PROTCT

## (undated) DEVICE — SYSTEM SKIN CLSR 22CM DERMBND PRINEO

## (undated) DEVICE — SUTURE PERMA-HAND 0 L18IN NONABSORBABLE BLK SILK BRAID W/O SA66G

## (undated) DEVICE — PROCEDURE KIT COMP 5Y10R8

## (undated) DEVICE — DRAPE SLUSH W44XL66IN FOR RECT BSIN ORS HUSH SYS PLATE-DRP